# Patient Record
Sex: MALE | Race: BLACK OR AFRICAN AMERICAN | NOT HISPANIC OR LATINO | Employment: UNEMPLOYED | URBAN - METROPOLITAN AREA
[De-identification: names, ages, dates, MRNs, and addresses within clinical notes are randomized per-mention and may not be internally consistent; named-entity substitution may affect disease eponyms.]

---

## 2019-12-04 ENCOUNTER — LAB VISIT (OUTPATIENT)
Dept: LAB | Facility: HOSPITAL | Age: 64
End: 2019-12-04
Attending: STUDENT IN AN ORGANIZED HEALTH CARE EDUCATION/TRAINING PROGRAM
Payer: MEDICARE

## 2019-12-04 ENCOUNTER — CLINICAL SUPPORT (OUTPATIENT)
Dept: INTERNAL MEDICINE | Facility: CLINIC | Age: 64
End: 2019-12-04
Payer: MEDICARE

## 2019-12-04 ENCOUNTER — OFFICE VISIT (OUTPATIENT)
Dept: INTERNAL MEDICINE | Facility: CLINIC | Age: 64
End: 2019-12-04
Payer: MEDICARE

## 2019-12-04 VITALS
DIASTOLIC BLOOD PRESSURE: 70 MMHG | SYSTOLIC BLOOD PRESSURE: 110 MMHG | HEART RATE: 81 BPM | WEIGHT: 200.63 LBS | HEIGHT: 68 IN | BODY MASS INDEX: 30.41 KG/M2 | OXYGEN SATURATION: 99 %

## 2019-12-04 DIAGNOSIS — I10 ESSENTIAL HYPERTENSION: ICD-10-CM

## 2019-12-04 DIAGNOSIS — Z00.00 HEALTHCARE MAINTENANCE: ICD-10-CM

## 2019-12-04 DIAGNOSIS — E11.9 TYPE 2 DIABETES MELLITUS WITHOUT COMPLICATION, WITHOUT LONG-TERM CURRENT USE OF INSULIN: ICD-10-CM

## 2019-12-04 DIAGNOSIS — I10 ESSENTIAL HYPERTENSION: Primary | ICD-10-CM

## 2019-12-04 DIAGNOSIS — Z11.59 ENCOUNTER FOR HEPATITIS C SCREENING TEST FOR LOW RISK PATIENT: ICD-10-CM

## 2019-12-04 LAB
ALBUMIN SERPL BCP-MCNC: 3.9 G/DL (ref 3.5–5.2)
ALP SERPL-CCNC: 78 U/L (ref 55–135)
ALT SERPL W/O P-5'-P-CCNC: 31 U/L (ref 10–44)
ANION GAP SERPL CALC-SCNC: 10 MMOL/L (ref 8–16)
AST SERPL-CCNC: 23 U/L (ref 10–40)
BASOPHILS # BLD AUTO: 0.01 K/UL (ref 0–0.2)
BASOPHILS NFR BLD: 0.2 % (ref 0–1.9)
BILIRUB SERPL-MCNC: 1.1 MG/DL (ref 0.1–1)
BUN SERPL-MCNC: 12 MG/DL (ref 8–23)
CALCIUM SERPL-MCNC: 9.8 MG/DL (ref 8.7–10.5)
CHLORIDE SERPL-SCNC: 106 MMOL/L (ref 95–110)
CHOLEST SERPL-MCNC: 200 MG/DL (ref 120–199)
CHOLEST/HDLC SERPL: 3.8 {RATIO} (ref 2–5)
CO2 SERPL-SCNC: 26 MMOL/L (ref 23–29)
CREAT SERPL-MCNC: 1.2 MG/DL (ref 0.5–1.4)
DIFFERENTIAL METHOD: ABNORMAL
EOSINOPHIL # BLD AUTO: 0 K/UL (ref 0–0.5)
EOSINOPHIL NFR BLD: 0.3 % (ref 0–8)
ERYTHROCYTE [DISTWIDTH] IN BLOOD BY AUTOMATED COUNT: 14 % (ref 11.5–14.5)
EST. GFR  (AFRICAN AMERICAN): >60 ML/MIN/1.73 M^2
EST. GFR  (NON AFRICAN AMERICAN): >60 ML/MIN/1.73 M^2
GLUCOSE SERPL-MCNC: 145 MG/DL (ref 70–110)
HCT VFR BLD AUTO: 38.6 % (ref 40–54)
HDLC SERPL-MCNC: 53 MG/DL (ref 40–75)
HDLC SERPL: 26.5 % (ref 20–50)
HGB BLD-MCNC: 12.9 G/DL (ref 14–18)
LDLC SERPL CALC-MCNC: 119.4 MG/DL (ref 63–159)
LYMPHOCYTES # BLD AUTO: 1.6 K/UL (ref 1–4.8)
LYMPHOCYTES NFR BLD: 25.6 % (ref 18–48)
MCH RBC QN AUTO: 30.8 PG (ref 27–31)
MCHC RBC AUTO-ENTMCNC: 33.4 G/DL (ref 32–36)
MCV RBC AUTO: 92 FL (ref 82–98)
MONOCYTES # BLD AUTO: 0.4 K/UL (ref 0.3–1)
MONOCYTES NFR BLD: 7.1 % (ref 4–15)
NEUTROPHILS # BLD AUTO: 4.2 K/UL (ref 1.8–7.7)
NEUTROPHILS NFR BLD: 66.8 % (ref 38–73)
NONHDLC SERPL-MCNC: 147 MG/DL
PLATELET # BLD AUTO: 250 K/UL (ref 150–350)
PMV BLD AUTO: 10.4 FL (ref 9.2–12.9)
POTASSIUM SERPL-SCNC: 4.3 MMOL/L (ref 3.5–5.1)
PROT SERPL-MCNC: 7.6 G/DL (ref 6–8.4)
RBC # BLD AUTO: 4.19 M/UL (ref 4.6–6.2)
SODIUM SERPL-SCNC: 142 MMOL/L (ref 136–145)
TRIGL SERPL-MCNC: 138 MG/DL (ref 30–150)
WBC # BLD AUTO: 6.22 K/UL (ref 3.9–12.7)

## 2019-12-04 PROCEDURE — 87522 HEPATITIS C REVRS TRNSCRPJ: CPT

## 2019-12-04 PROCEDURE — 99999 PR PBB SHADOW E&M-EST. PATIENT-LVL I: CPT | Mod: PBBFAC,,,

## 2019-12-04 PROCEDURE — 36415 COLL VENOUS BLD VENIPUNCTURE: CPT

## 2019-12-04 PROCEDURE — 99999 PR PBB SHADOW E&M-NEW PATIENT-LVL III: ICD-10-PCS | Mod: PBBFAC,GC,, | Performed by: STUDENT IN AN ORGANIZED HEALTH CARE EDUCATION/TRAINING PROGRAM

## 2019-12-04 PROCEDURE — G0009 ADMIN PNEUMOCOCCAL VACCINE: HCPCS | Mod: PBBFAC

## 2019-12-04 PROCEDURE — 99999 PR PBB SHADOW E&M-EST. PATIENT-LVL I: ICD-10-PCS | Mod: PBBFAC,,,

## 2019-12-04 PROCEDURE — 83036 HEMOGLOBIN GLYCOSYLATED A1C: CPT

## 2019-12-04 PROCEDURE — 80061 LIPID PANEL: CPT

## 2019-12-04 PROCEDURE — 99999 PR PBB SHADOW E&M-NEW PATIENT-LVL III: CPT | Mod: PBBFAC,GC,, | Performed by: STUDENT IN AN ORGANIZED HEALTH CARE EDUCATION/TRAINING PROGRAM

## 2019-12-04 PROCEDURE — 99213 PR OFFICE/OUTPT VISIT, EST, LEVL III, 20-29 MIN: ICD-10-PCS | Mod: S$PBB,GC,, | Performed by: STUDENT IN AN ORGANIZED HEALTH CARE EDUCATION/TRAINING PROGRAM

## 2019-12-04 PROCEDURE — 80053 COMPREHEN METABOLIC PANEL: CPT

## 2019-12-04 PROCEDURE — 99211 OFF/OP EST MAY X REQ PHY/QHP: CPT | Mod: PBBFAC,27,25

## 2019-12-04 PROCEDURE — 90662 IIV NO PRSV INCREASED AG IM: CPT | Mod: PBBFAC

## 2019-12-04 PROCEDURE — 99203 OFFICE O/P NEW LOW 30 MIN: CPT | Mod: PBBFAC,25 | Performed by: STUDENT IN AN ORGANIZED HEALTH CARE EDUCATION/TRAINING PROGRAM

## 2019-12-04 PROCEDURE — 85025 COMPLETE CBC W/AUTO DIFF WBC: CPT

## 2019-12-04 PROCEDURE — 99213 OFFICE O/P EST LOW 20 MIN: CPT | Mod: S$PBB,GC,, | Performed by: STUDENT IN AN ORGANIZED HEALTH CARE EDUCATION/TRAINING PROGRAM

## 2019-12-04 RX ORDER — LISINOPRIL 2.5 MG/1
2.5 TABLET ORAL DAILY
Qty: 90 TABLET | Refills: 3 | Status: SHIPPED | OUTPATIENT
Start: 2019-12-04 | End: 2020-09-11

## 2019-12-04 NOTE — PATIENT INSTRUCTIONS
- labs today I will call you   - I will give you two scripts for vaccines please check with pharmacy what is covered, take the flu and tetanus together then you need your pneumonia first shot , and then another one in a year     - all medication is in your pharmacy

## 2019-12-04 NOTE — PROGRESS NOTES
Subjective     Chief Complaint: establish care     History of Present Illness:  Mr. Karthik Bentley is a 64 y.o. male with PMH of diabetes on Janumet, and HTN on lisinopril here for annual physical. Patient was in [place] for the last 5 years working as an  and just came back a couple months ago. He has not complaints and has been doing well. Per his health he has had labs for 6 months every time he was in [place], last labs was probably a year ago. No problems with hyper or hypoglycemia no problems with tingling sensation. He had a cold 3 weeks ago but has resolved , he has some medication left but needs refill     For health maintenance he needs a colonoscopy and update on his vaccines, and a hepatitis screen     Review of Systems   Constitutional: Negative for chills, diaphoresis, fever and weight loss.   HENT: Negative for congestion, hearing loss and sore throat.    Eyes: Negative for blurred vision, double vision and photophobia.   Respiratory: Negative for cough and wheezing.    Cardiovascular: Negative for chest pain, palpitations, orthopnea and PND.   Gastrointestinal: Negative for abdominal pain, constipation, diarrhea, nausea and vomiting.   Genitourinary: Negative for frequency, hematuria and urgency.   Musculoskeletal: Negative for back pain, joint pain, myalgias and neck pain.   Neurological: Negative for dizziness, sensory change, seizures, loss of consciousness, weakness and headaches.   Psychiatric/Behavioral: Negative for depression. The patient is not nervous/anxious.        PAST HISTORY:     Past Medical History:   Diagnosis Date    Diabetes mellitus type II     Hypertension        No past surgical history on file.    No family history on file.    Social History     Socioeconomic History    Marital status:      Spouse name: Not on file    Number of children: Not on file    Years of education: Not on file    Highest education level: Not on file   Occupational History    Not on  "file   Social Needs    Financial resource strain: Not on file    Food insecurity:     Worry: Not on file     Inability: Not on file    Transportation needs:     Medical: Not on file     Non-medical: Not on file   Tobacco Use    Smoking status: Never Smoker   Substance and Sexual Activity    Alcohol use: Not on file    Drug use: Not on file    Sexual activity: Not on file   Lifestyle    Physical activity:     Days per week: Not on file     Minutes per session: Not on file    Stress: Not on file   Relationships    Social connections:     Talks on phone: Not on file     Gets together: Not on file     Attends Latter-day service: Not on file     Active member of club or organization: Not on file     Attends meetings of clubs or organizations: Not on file     Relationship status: Not on file   Other Topics Concern    Not on file   Social History Narrative    Not on file       MEDICATIONS & ALLERGIES:     Current Outpatient Medications on File Prior to Visit   Medication Sig    doxycycline (VIBRA-TABS) 100 MG tablet Take 1 tablet (100 mg total) by mouth once daily.    lisinopril (PRINIVIL,ZESTRIL) 2.5 MG tablet TAKE 1 TABLET BY MOUTH EVERY DAY    metformin (GLUCOPHAGE-XR) 500 MG 24 hr tablet 2 tabs twice a day with meals     No current facility-administered medications on file prior to visit.        Review of patient's allergies indicates:  No Known Allergies    OBJECTIVE:     Vital Signs:  Vitals:    12/04/19 1427   BP: 110/70   BP Location: Right arm   Patient Position: Sitting   BP Method: Large (Manual)   Pulse: 81   SpO2: 99%   Weight: 91 kg (200 lb 9.9 oz)   Height: 5' 8" (1.727 m)       Body mass index is 30.5 kg/m².     Physical Exam:  General:  Well developed, well nourished, no acute distress  Head: Normocephalic, atraumatic  Eyes: PERRL, EOMI, clear sclera  Throat: No posterior pharyngeal erythema or exudate, no tonsillar exudate  Neck: supple, normal ROM, no thyromegaly   CVS:  RRR, S1 and S2 " normal, no murmurs, rubs, gallops, 2+ peripheral pulses  Resp:  Lungs clear to auscultation, no wheezes, rales, rhonchi, cough  GI:  Abdomen soft, non-tender, non-distended, normoactive bowel sounds  MSK:  No muscle atrophy, cyanosis, peripheral edema   Skin:  No rashes, ulcers, erythema  Neuro:  CNII-XII grossly intact, no focal deficits noted  Psych:  Appropriate mood and affect, normal judgement    Laboratory  Lab Results   Component Value Date    WBC 5.58 07/10/2008    HGB 14.5 07/10/2008    HCT 43.7 07/10/2008    MCV 92.4 07/10/2008     07/10/2008     @[id number](GLU,NA,K,Cl,CO2,BUN,Creatinine,Calcium,MG)@  No results found for: INR, PROTIME  Lab Results   Component Value Date    HGBA1C 7.9 (H) 09/03/2011     No results for input(s): POCTGLUCOSE in the last 72 hours.      Health Maintenance Due   Topic Date Due    Hepatitis C Screening  1955    TETANUS VACCINE  06/26/1972    Colonoscopy  06/26/2004    Lipid Panel  09/03/2016    Pneumococcal Vaccine (65+ Low/Medium Risk) (1 of 2 - PCV13) 06/26/2019       ASSESSMENT & PLAN:   Mr. Karthik Bentley is a 64 y.o. male here for annual physical and to establish care     Essential hypertension  Normotensive will continue home medication   -     Hemoglobin A1c; Future; Expected date: 12/04/2019  -     CBC auto differential; Future; Expected date: 12/04/2019  -     Comprehensive metabolic panel; Future; Expected date: 12/04/2019  -     Lipid panel; Future; Expected date: 12/04/2019  -     lisinopril (PRINIVIL,ZESTRIL) 2.5 MG tablet; Take 1 tablet (2.5 mg total) by mouth once daily.  Dispense: 90 tablet; Refill: 3    Type 2 diabetes mellitus without complication, without long-term current use of insulin  Continue home medication will get A1C and make changes as needed   -     Hemoglobin A1c; Future; Expected date: 12/04/2019  -     CBC auto differential; Future; Expected date: 12/04/2019  -     Comprehensive metabolic panel; Future; Expected date:  12/04/2019  -     Lipid panel; Future; Expected date: 12/04/2019  -     SITagliptan-metformin (JANUMET) 50-1,000 mg per tablet; Take 1 tablet by mouth 2 (two) times daily with meals.  Dispense: 180 tablet; Refill: 3    Encounter for hepatitis C screening test for low risk patient  -     HEPATITIS C RNA, QUANTITATIVE, PCR; Future; Expected date: 12/04/2019    Annual physical exam  = Last was when he was 49 with no polyps per patient   -     Case request GI: COLONOSCOPY        RTC in 6 months, gave him scripts for flu, Prevnar 13, and tetanus     Discussed with Dr. Rodriguez   - staff attestation to follow        Diana Pfeiffer MD, MPH  Internal Medicine PGY3  [facility]  [address] [place] [id number] [phone]

## 2019-12-05 LAB
ESTIMATED AVG GLUCOSE: 197 MG/DL (ref 68–131)
HBA1C MFR BLD HPLC: 8.5 % (ref 4–5.6)

## 2019-12-07 ENCOUNTER — PATIENT MESSAGE (OUTPATIENT)
Dept: INTERNAL MEDICINE | Facility: CLINIC | Age: 64
End: 2019-12-07

## 2019-12-09 ENCOUNTER — TELEPHONE (OUTPATIENT)
Dept: ENDOSCOPY | Facility: HOSPITAL | Age: 64
End: 2019-12-09

## 2019-12-09 DIAGNOSIS — Z12.11 SPECIAL SCREENING FOR MALIGNANT NEOPLASMS, COLON: Primary | ICD-10-CM

## 2019-12-09 RX ORDER — POLYETHYLENE GLYCOL 3350, SODIUM SULFATE ANHYDROUS, SODIUM BICARBONATE, SODIUM CHLORIDE, POTASSIUM CHLORIDE 236; 22.74; 6.74; 5.86; 2.97 G/4L; G/4L; G/4L; G/4L; G/4L
4 POWDER, FOR SOLUTION ORAL ONCE
Qty: 4000 ML | Refills: 0 | Status: SHIPPED | OUTPATIENT
Start: 2019-12-09 | End: 2019-12-09

## 2019-12-10 ENCOUNTER — TELEPHONE (OUTPATIENT)
Dept: INTERNAL MEDICINE | Facility: CLINIC | Age: 64
End: 2019-12-10

## 2019-12-10 DIAGNOSIS — E11.65 TYPE 2 DIABETES MELLITUS WITH HYPERGLYCEMIA, WITHOUT LONG-TERM CURRENT USE OF INSULIN: Primary | ICD-10-CM

## 2019-12-10 NOTE — TELEPHONE ENCOUNTER
Called patient back as he had questions about his last visit. He noted that he got embarrassed about being on valtrex and wanted to clarify to me that he does take it. Other than that we discussed his A1C and I am going to refer him to endocrine to establish care. He would like to try diet and exercise before adding another agent. He is to get a repeat A1C in 3 months     Diana Pfeiffer MD

## 2019-12-11 LAB
HCV RNA SERPL NAA+PROBE-LOG IU: <1.08 LOG (10) IU/ML
HCV RNA SERPL QL NAA+PROBE: NOT DETECTED IU/ML
HCV RNA SPEC NAA+PROBE-ACNC: <12 IU/ML

## 2019-12-11 NOTE — PROGRESS NOTES
I have reviewed the notes, assessments, and/or procedures performed by Dr. Pfeiffer, I concur with her documentation of Karthik Bentley.

## 2019-12-31 ENCOUNTER — IMMUNIZATION (OUTPATIENT)
Dept: PHARMACY | Facility: CLINIC | Age: 64
End: 2019-12-31
Payer: MEDICARE

## 2020-01-15 ENCOUNTER — ANESTHESIA EVENT (OUTPATIENT)
Dept: ENDOSCOPY | Facility: HOSPITAL | Age: 65
End: 2020-01-15
Payer: MEDICARE

## 2020-01-16 ENCOUNTER — HOSPITAL ENCOUNTER (OUTPATIENT)
Facility: HOSPITAL | Age: 65
Discharge: HOME | End: 2020-01-16
Attending: COLON & RECTAL SURGERY | Admitting: COLON & RECTAL SURGERY
Payer: MEDICARE

## 2020-01-16 ENCOUNTER — ANESTHESIA (OUTPATIENT)
Dept: ENDOSCOPY | Facility: HOSPITAL | Age: 65
End: 2020-01-16
Payer: MEDICARE

## 2020-01-16 VITALS
SYSTOLIC BLOOD PRESSURE: 157 MMHG | WEIGHT: 191 LBS | OXYGEN SATURATION: 97 % | TEMPERATURE: 97 F | RESPIRATION RATE: 14 BRPM | BODY MASS INDEX: 28.95 KG/M2 | HEART RATE: 75 BPM | HEIGHT: 68 IN | DIASTOLIC BLOOD PRESSURE: 79 MMHG

## 2020-01-16 DIAGNOSIS — Z12.11 SCREEN FOR COLON CANCER: Primary | ICD-10-CM

## 2020-01-16 LAB
GLUCOSE SERPL-MCNC: 119 MG/DL (ref 70–110)
POCT GLUCOSE: 119 MG/DL (ref 70–110)

## 2020-01-16 PROCEDURE — 88305 TISSUE EXAM BY PATHOLOGIST: CPT | Performed by: PATHOLOGY

## 2020-01-16 PROCEDURE — E9220 PRA ENDO ANESTHESIA: HCPCS | Mod: PT,,, | Performed by: NURSE ANESTHETIST, CERTIFIED REGISTERED

## 2020-01-16 PROCEDURE — 45380 COLONOSCOPY AND BIOPSY: CPT | Performed by: COLON & RECTAL SURGERY

## 2020-01-16 PROCEDURE — 27201089 HC SNARE, DISP (ANY): Performed by: COLON & RECTAL SURGERY

## 2020-01-16 PROCEDURE — 45385 PR COLONOSCOPY,REMV LESN,SNARE: ICD-10-PCS | Mod: PT,,, | Performed by: COLON & RECTAL SURGERY

## 2020-01-16 PROCEDURE — 45380 COLONOSCOPY AND BIOPSY: CPT | Mod: 59,,, | Performed by: COLON & RECTAL SURGERY

## 2020-01-16 PROCEDURE — 63600175 PHARM REV CODE 636 W HCPCS: Performed by: NURSE ANESTHETIST, CERTIFIED REGISTERED

## 2020-01-16 PROCEDURE — 37000009 HC ANESTHESIA EA ADD 15 MINS: Performed by: COLON & RECTAL SURGERY

## 2020-01-16 PROCEDURE — E9220 PRA ENDO ANESTHESIA: ICD-10-PCS | Mod: PT,,, | Performed by: NURSE ANESTHETIST, CERTIFIED REGISTERED

## 2020-01-16 PROCEDURE — 37000008 HC ANESTHESIA 1ST 15 MINUTES: Performed by: COLON & RECTAL SURGERY

## 2020-01-16 PROCEDURE — 63600175 PHARM REV CODE 636 W HCPCS: Performed by: COLON & RECTAL SURGERY

## 2020-01-16 PROCEDURE — 88305 TISSUE EXAM BY PATHOLOGIST: ICD-10-PCS | Mod: 26,,, | Performed by: PATHOLOGY

## 2020-01-16 PROCEDURE — 45380 PR COLONOSCOPY,BIOPSY: ICD-10-PCS | Mod: 59,,, | Performed by: COLON & RECTAL SURGERY

## 2020-01-16 PROCEDURE — 27201012 HC FORCEPS, HOT/COLD, DISP: Performed by: COLON & RECTAL SURGERY

## 2020-01-16 PROCEDURE — 88305 TISSUE EXAM BY PATHOLOGIST: CPT | Mod: 26,,, | Performed by: PATHOLOGY

## 2020-01-16 PROCEDURE — 45385 COLONOSCOPY W/LESION REMOVAL: CPT | Performed by: COLON & RECTAL SURGERY

## 2020-01-16 PROCEDURE — 45385 COLONOSCOPY W/LESION REMOVAL: CPT | Mod: PT,,, | Performed by: COLON & RECTAL SURGERY

## 2020-01-16 RX ORDER — PROPOFOL 10 MG/ML
VIAL (ML) INTRAVENOUS CONTINUOUS PRN
Status: DISCONTINUED | OUTPATIENT
Start: 2020-01-16 | End: 2020-01-16

## 2020-01-16 RX ORDER — PROPOFOL 10 MG/ML
VIAL (ML) INTRAVENOUS
Status: DISCONTINUED | OUTPATIENT
Start: 2020-01-16 | End: 2020-01-16

## 2020-01-16 RX ORDER — SODIUM CHLORIDE 9 MG/ML
INJECTION, SOLUTION INTRAVENOUS CONTINUOUS
Status: DISCONTINUED | OUTPATIENT
Start: 2020-01-16 | End: 2020-01-16 | Stop reason: HOSPADM

## 2020-01-16 RX ORDER — LIDOCAINE HCL/PF 100 MG/5ML
SYRINGE (ML) INTRAVENOUS
Status: DISCONTINUED | OUTPATIENT
Start: 2020-01-16 | End: 2020-01-16

## 2020-01-16 RX ADMIN — LIDOCAINE HYDROCHLORIDE 100 MG: 20 INJECTION, SOLUTION INTRAVENOUS at 09:01

## 2020-01-16 RX ADMIN — PROPOFOL 80 MG: 10 INJECTION, EMULSION INTRAVENOUS at 09:01

## 2020-01-16 RX ADMIN — SODIUM CHLORIDE: 0.9 INJECTION, SOLUTION INTRAVENOUS at 09:01

## 2020-01-16 RX ADMIN — PROPOFOL 150 MCG/KG/MIN: 10 INJECTION, EMULSION INTRAVENOUS at 09:01

## 2020-01-16 NOTE — ANESTHESIA POSTPROCEDURE EVALUATION
Anesthesia Post Evaluation    Patient: Karthik Bentley    Procedure(s) Performed: Procedure(s) (LRB):  COLONOSCOPY (N/A)    Final Anesthesia Type: general    Patient location during evaluation: GI PACU  Patient participation: Yes- Able to Participate  Level of consciousness: awake and alert, awake and oriented  Post-procedure vital signs: reviewed and stable  Pain management: adequate  Airway patency: patent    PONV status at discharge: No PONV  Anesthetic complications: no      Cardiovascular status: stable  Respiratory status: unassisted, spontaneous ventilation and room air  Hydration status: euvolemic  Follow-up not needed.          Vitals Value Taken Time   /79 1/16/2020 10:52 AM   Temp 36.3 °C (97.3 °F) 1/16/2020 10:22 AM   Pulse 75 1/16/2020 10:52 AM   Resp 14 1/16/2020 10:52 AM   SpO2 97 % 1/16/2020 10:52 AM         Event Time     Out of Recovery 11:04:29          Pain/Pablo Score: Pablo Score: 10 (1/16/2020 10:52 AM)

## 2020-01-16 NOTE — H&P
"COLONOSCOPY HISTORY AND PHYSICAL EXAM    Procedure : Colonoscopy      INDICATIONS: asymptomatic screening exam and family history of colon cancer (father)    Family Hx of CRC: father diagnosed in his 70s    Last Colonoscopy:  ~15 years ago  Findings: "normal"       Past Medical History:   Diagnosis Date    Diabetes mellitus type II     Hypertension      Sedation Problems: NO  Family History   Problem Relation Age of Onset    Bone cancer Mother     Colon cancer Father      Fam Hx of Sedation Problems: NO  Social History     Socioeconomic History    Marital status:      Spouse name: Not on file    Number of children: Not on file    Years of education: Not on file    Highest education level: Not on file   Occupational History    Not on file   Social Needs    Financial resource strain: Not on file    Food insecurity:     Worry: Not on file     Inability: Not on file    Transportation needs:     Medical: Not on file     Non-medical: Not on file   Tobacco Use    Smoking status: Never Smoker   Substance and Sexual Activity    Alcohol use: Yes     Comment: occasianally    Drug use: Never    Sexual activity: Not on file   Lifestyle    Physical activity:     Days per week: Not on file     Minutes per session: Not on file    Stress: Not on file   Relationships    Social connections:     Talks on phone: Not on file     Gets together: Not on file     Attends Anabaptism service: Not on file     Active member of club or organization: Not on file     Attends meetings of clubs or organizations: Not on file     Relationship status: Not on file   Other Topics Concern    Not on file   Social History Narrative    Not on file       Review of Systems - Negative except   Respiratory ROS: no dyspnea  Cardiovascular ROS: no exertional chest pain  Gastrointestinal ROS: NO abdominal discomfort,  NO rectal bleeding  Musculoskeletal ROS: no muscular pain  Neurological ROS: no recent stroke    Physical Exam:  BP (!) " "164/82 (BP Location: Left arm, Patient Position: Lying)   Pulse 80   Temp 97 °F (36.1 °C) (Temporal)   Resp 16   Ht 5' 8" (1.727 m)   Wt 86.6 kg (191 lb)   SpO2 99%   BMI 29.04 kg/m²   General: no distress  Head: normocephalic  Mallampati Score   Neck: supple, symmetrical, trachea midline  Lungs:  clear to auscultation bilaterally and normal respiratory effort  Heart: regular rate and rhythm and no murmur  Abdomen: soft, non-tender non-distented; bowel sounds normal; no masses,  no organomegaly  Extremities: no cyanosis or edema, or clubbing    ASA:  III    PLAN  COLONOSCOPY.    SedationPlan :MAC    The details of the procedure, the possible need for biopsy or polypectomy and the potential risks including bleeding, perforation, missed polyps were discussed in detail.    "

## 2020-01-16 NOTE — ANESTHESIA PREPROCEDURE EVALUATION
01/16/2020  Karthik Bentley is a 64 y.o., male.    Patient Active Problem List   Diagnosis    Essential hypertension    Type 2 diabetes mellitus, without long-term current use of insulin     History reviewed. No pertinent surgical history.  Past Medical History:   Diagnosis Date    Diabetes mellitus type II     Hypertension          Anesthesia Evaluation    I have reviewed the Patient Summary Reports.     I have reviewed the Medications.     Review of Systems      Physical Exam  General:  Well nourished    Airway/Jaw/Neck:  Airway Findings: Mouth Opening: Normal Tongue: Normal  General Airway Assessment: Adult  Mallampati: II  Improves to I with phonation.  TM Distance: Normal, at least 6 cm  Jaw/Neck Findings:  Neck ROM: Normal ROM  Neck Findings:     Eyes/Ears/Nose:  Eyes/Ears/Nose Findings:    Dental:  Dental Findings: In tact   Chest/Lungs:  Chest/Lungs Findings: Clear to auscultation, Normal Respiratory Rate     Heart/Vascular:  Heart Findings: Rate: Normal  Rhythm: Regular Rhythm  Vascular Findings:     Abdomen:  Abdomen Findings:  Normal       Mental Status:  Mental Status Findings:  Cooperative, Alert and Oriented         Anesthesia Plan  Type of Anesthesia, risks & benefits discussed:  Anesthesia Type:  general  Patient's Preference:   Intra-op Monitoring Plan: standard ASA monitors  Intra-op Monitoring Plan Comments:   Post Op Pain Control Plan:   Post Op Pain Control Plan Comments:   Induction:   IV  Beta Blocker:  Patient is not currently on a Beta-Blocker (No further documentation required).       Informed Consent: Patient understands risks and agrees with Anesthesia plan.  Questions answered. Anesthesia consent signed with patient.  ASA Score: 2     Day of Surgery Review of History & Physical: I have interviewed and examined the patient. I have reviewed the patient's H&P dated:            Ready  For Surgery From Anesthesia Perspective.

## 2020-01-16 NOTE — TRANSFER OF CARE
"Anesthesia Transfer of Care Note    Patient: Karthik Bentley    Procedure(s) Performed: Procedure(s) (LRB):  COLONOSCOPY (N/A)    Patient location: GI    Anesthesia Type: general    Transport from OR: Transported from OR on 6-10 L/min O2 by face mask with adequate spontaneous ventilation    Post pain: adequate analgesia    Post assessment: no apparent anesthetic complications and tolerated procedure well    Post vital signs: stable    Level of consciousness: awake and alert    Nausea/Vomiting: no nausea/vomiting    Complications: none    Transfer of care protocol was followed      Last vitals:   Visit Vitals  BP (!) 111/59 (BP Location: Left arm, Patient Position: Lying)   Pulse 81   Temp 36.3 °C (97.3 °F) (Temporal)   Resp 15   Ht 5' 8" (1.727 m)   Wt 86.6 kg (191 lb)   SpO2 99%   BMI 29.04 kg/m²     "

## 2020-01-16 NOTE — [employer]ATION PATIENT INSTRUCTIONS
Discharge Summary/Instructions after an Endoscopic Procedure  Patient Name: Karthik Bentley  Patient MRN: [id number]  Patient YOB: 1955 Thursday, January 16, 2020  Cynthia Kearney MD  RESTRICTIONS:  During your procedure today, you received medications for sedation.  These   medications may affect your judgment, balance and coordination.  Therefore,   for 24 hours, you have the following restrictions:   - DO NOT drive a car, operate machinery, make legal/financial decisions,   sign important papers or drink alcohol.    ACTIVITY:  Today: no heavy lifting, straining or running due to procedural   sedation/anesthesia.  The following day: return to full activity including work.  DIET:  Eat and drink normally unless instructed otherwise.     TREATMENT FOR COMMON SIDE EFFECTS:  - Mild abdominal pain, nausea, belching, bloating or excessive gas:  rest,   eat lightly and use a heating pad.  - Sore Throat: treat with throat lozenges and/or gargle with warm salt   water.  - Because air was used during the procedure, expelling large amounts of air   from your rectum or belching is normal.  - If a bowel prep was taken, you may not have a bowel movement for 1-3 days.    This is normal.  SYMPTOMS TO WATCH FOR AND REPORT TO YOUR PHYSICIAN:  1. Abdominal pain or bloating, other than gas cramps.  2. Chest pain.  3. Back pain.  4. Signs of infection such as: chills or fever occurring within 24 hours   after the procedure.  5. Rectal bleeding, which would show as bright red, maroon, or black stools.   (A tablespoon of blood from the rectum is not serious, especially if   hemorrhoids are present.)  6. Vomiting.  7. Weakness or dizziness.  GO DIRECTLY TO THE NEAREST EMERGENCY ROOM IF YOU HAVE ANY OF THE FOLLOWING:      Difficulty breathing              Chills and/or fever over 101 F   Persistent vomiting and/or vomiting blood   Severe abdominal pain   Severe chest pain   Black, tarry stools   Bleeding- more than one  tablespoon   Any other symptom or condition that you feel may need urgent attention  Your doctor recommends these additional instructions:  If any biopsies were taken, your doctors clinic will contact you in 1 to 2   weeks with any results.  - Discharge patient to home.   - Resume previous diet.   - Continue present medications.   - Await pathology results.   - Repeat colonoscopy date to be determined after pending pathology results   are reviewed for surveillance.   - Return to referring physician.   - Written discharge instructions were provided to the patient.   - The signs and symptoms of potential delayed complications were discussed   with the patient.   - Patient has a contact number available for emergencies.   - Return to normal activities tomorrow.  For questions, problems or results please call your physician - Cynthia Kearney MD at Work:  [phone].  [facility], EMERGENCY ROOM PHONE NUMBER: [phone]  IF A COMPLICATION OR EMERGENCY SITUATION ARISES AND YOU ARE UNABLE TO REACH   YOUR PHYSICIAN - GO DIRECTLY TO THE EMERGENCY ROOM.  Cynthia Kearney MD  1/16/2020 10:20:11 AM  This report has been verified and signed electronically.  [employer]

## 2020-01-16 NOTE — DISCHARGE INSTRUCTIONS
Colonoscopy     A camera attached to a flexible tube with a viewing lens is used to take video pictures.     Colonoscopy is a test to view the inside of your lower digestive tract (colon and rectum). Sometimes it can show the last part of the small intestine (ileum). During the test, small pieces of tissue may be removed for testing. This is called a biopsy. Small growths, such as polyps, may also be removed.   Why is colonoscopy done?  The test is done to help look for colon cancer. And it can help find the source of abdominal pain, bleeding, and changes in bowel habits. It may be needed once a year, depending on factors such as your:  · Age  · Health history  · Family health history  · Symptoms  · Results from any prior colonoscopy  Risks and possible complications  These include:  · Bleeding               · A puncture or tear in the colon   · Risks of anesthesia  · A cancer lesion not being seen  Getting ready   To prepare for the test:  · Talk with your healthcare provider about the risks of the test (see below). Also ask your healthcare provider about alternatives to the test.  · Tell your healthcare provider about any medicines you take. Also tell him or her about any health conditions you may have.  · Make sure your rectum and colon are empty for the test. Follow the diet and bowel prep instructions exactly. If you dont, the test may need to be rescheduled.  · Plan for a friend or family member to drive you home after the test.     Colonoscopy provides an inside view of the entire colon.     You may discuss the results with your doctor right away or at a future visit.  During the test   The test is usually done in the hospital on an outpatient basis. This means you go home the same day. The procedure takes about 30 minutes. During that time:  · You are given relaxing (sedating) medicine through an IV line. You may be drowsy, or fully asleep.  · The healthcare provider will first give you a physical exam to  check for anal and rectal problems.  · Then the anus is lubricated and the scope inserted.  · If you are awake, you may have a feeling similar to needing to have a bowel movement. You may also feel pressure as air is pumped into the colon. Its OK to pass gas during the procedure.  · Biopsy, polyp removal, or other treatments may be done during the test.  After the test   You may have gas right after the test. It can help to try to pass it to help prevent later bloating. Your healthcare provider may discuss the results with you right away. Or you may need to schedule a follow-up visit to talk about the results. After the test, you can go back to your normal eating and other activities. You may be tired from the sedation and need to rest for a few hours.  Date Last Reviewed: 11/1/2016 © 2000-2017 [employer]. [address] [place] [address], [place] [address]. All rights reserved. This information is not intended as a substitute for professional medical care. Always follow your healthcare professional's instructions.

## 2020-01-21 LAB
FINAL PATHOLOGIC DIAGNOSIS: NORMAL
GROSS: NORMAL

## 2020-01-27 ENCOUNTER — OFFICE VISIT (OUTPATIENT)
Dept: OPHTHALMOLOGY | Facility: CLINIC | Age: 65
End: 2020-01-27
Payer: MEDICARE

## 2020-01-27 ENCOUNTER — PATIENT OUTREACH (OUTPATIENT)
Dept: ADMINISTRATIVE | Facility: OTHER | Age: 65
End: 2020-01-27

## 2020-01-27 ENCOUNTER — OFFICE VISIT (OUTPATIENT)
Dept: PODIATRY | Facility: CLINIC | Age: 65
End: 2020-01-27
Payer: MEDICARE

## 2020-01-27 ENCOUNTER — HOSPITAL ENCOUNTER (OUTPATIENT)
Dept: RADIOLOGY | Facility: HOSPITAL | Age: 65
Discharge: HOME | End: 2020-01-27
Attending: PODIATRIST
Payer: MEDICARE

## 2020-01-27 VITALS
DIASTOLIC BLOOD PRESSURE: 85 MMHG | BODY MASS INDEX: 28.95 KG/M2 | WEIGHT: 191 LBS | SYSTOLIC BLOOD PRESSURE: 136 MMHG | HEART RATE: 68 BPM | HEIGHT: 68 IN

## 2020-01-27 DIAGNOSIS — M20.12 VALGUS DEFORMITY OF BOTH GREAT TOES: ICD-10-CM

## 2020-01-27 DIAGNOSIS — Z13.5 ENCOUNTER FOR SCREENING FOR DIABETIC RETINOPATHY: Primary | ICD-10-CM

## 2020-01-27 DIAGNOSIS — M20.11 VALGUS DEFORMITY OF BOTH GREAT TOES: ICD-10-CM

## 2020-01-27 DIAGNOSIS — B35.3 TINEA PEDIS OF BOTH FEET: ICD-10-CM

## 2020-01-27 DIAGNOSIS — H40.013 OPEN ANGLE WITH BORDERLINE FINDINGS AND LOW GLAUCOMA RISK IN BOTH EYES: ICD-10-CM

## 2020-01-27 DIAGNOSIS — E11.9 DIABETES MELLITUS TYPE 2 WITHOUT RETINOPATHY: Primary | ICD-10-CM

## 2020-01-27 DIAGNOSIS — E11.9 TYPE 2 DIABETES MELLITUS WITHOUT COMPLICATION, WITHOUT LONG-TERM CURRENT USE OF INSULIN: Primary | ICD-10-CM

## 2020-01-27 PROCEDURE — 99203 OFFICE O/P NEW LOW 30 MIN: CPT | Mod: S$PBB,,, | Performed by: PODIATRIST

## 2020-01-27 PROCEDURE — 99999 PR PBB SHADOW E&M-EST. PATIENT-LVL II: ICD-10-PCS | Mod: PBBFAC,,, | Performed by: OPHTHALMOLOGY

## 2020-01-27 PROCEDURE — 99999 PR PBB SHADOW E&M-EST. PATIENT-LVL II: CPT | Mod: PBBFAC,,, | Performed by: OPHTHALMOLOGY

## 2020-01-27 PROCEDURE — 73630 XR FOOT COMPLETE 3 VIEW BILATERAL: ICD-10-PCS | Mod: 26,50,, | Performed by: RADIOLOGY

## 2020-01-27 PROCEDURE — 92004 PR EYE EXAM, NEW PATIENT,COMPREHESV: ICD-10-PCS | Mod: S$PBB,,, | Performed by: OPHTHALMOLOGY

## 2020-01-27 PROCEDURE — 73630 X-RAY EXAM OF FOOT: CPT | Mod: TC,50,PN

## 2020-01-27 PROCEDURE — 92004 COMPRE OPH EXAM NEW PT 1/>: CPT | Mod: S$PBB,,, | Performed by: OPHTHALMOLOGY

## 2020-01-27 PROCEDURE — 99212 OFFICE O/P EST SF 10 MIN: CPT | Mod: PBBFAC,25,PO | Performed by: OPHTHALMOLOGY

## 2020-01-27 PROCEDURE — 99999 PR PBB SHADOW E&M-EST. PATIENT-LVL IV: ICD-10-PCS | Mod: PBBFAC,,, | Performed by: PODIATRIST

## 2020-01-27 PROCEDURE — 99203 PR OFFICE/OUTPT VISIT, NEW, LEVL III, 30-44 MIN: ICD-10-PCS | Mod: S$PBB,,, | Performed by: PODIATRIST

## 2020-01-27 PROCEDURE — 92133 HEIDELBERG RETINA TOMOGRAPHY (HRT) - OU - BOTH EYES: ICD-10-PCS | Mod: 26,S$PBB,, | Performed by: OPHTHALMOLOGY

## 2020-01-27 PROCEDURE — 73630 X-RAY EXAM OF FOOT: CPT | Mod: 26,50,, | Performed by: RADIOLOGY

## 2020-01-27 PROCEDURE — 99999 PR PBB SHADOW E&M-EST. PATIENT-LVL IV: CPT | Mod: PBBFAC,,, | Performed by: PODIATRIST

## 2020-01-27 PROCEDURE — 92133 CPTRZD OPH DX IMG PST SGM ON: CPT | Mod: PBBFAC,PO | Performed by: OPHTHALMOLOGY

## 2020-01-27 PROCEDURE — 99214 OFFICE O/P EST MOD 30 MIN: CPT | Mod: PBBFAC,25,27,PN | Performed by: PODIATRIST

## 2020-01-27 RX ORDER — GENTIAN VIOLET 2% 2 G/100ML
0.5 SOLUTION TOPICAL DAILY PRN
Qty: 59 ML | Refills: 1 | Status: SHIPPED | OUTPATIENT
Start: 2020-01-27 | End: 2022-10-10 | Stop reason: CLARIF

## 2020-01-27 NOTE — PROGRESS NOTES
"HPI     Here for Diabetic Eye Exam  Dx: 2001 BG doing ok. Denies eye pain no   previous eye injury or surgery. Does not use any eye gtts currently.   Positive family h/o States he believes his sister is getting shots in his   eyes unsure.     Lab Results       Component                Value               Date                       HGBA1C                   8.5 (H)             12/04/2019                 HGBA1C                   7.9 (H)             09/03/2011                 HGBA1C                   7.6 (H)             03/09/2011            No results found for: LABA1C    Agree with above. Retired, now back living in [place]. No known family   history of glaucoma.    Last edited by Rimma Cook MD on 1/27/2020  1:59 PM.   (History)        ROS     Positive for: Endocrine (DM diagnosed 2001), Cardiovascular (HTN -   controlled with meds per pt)    Negative for: Genitourinary (denies nephropathy, last Cr 1.2), Eyes   (denies surgery/trauma; poss lid lac OS years ago), Respiratory (denies   asthma/SOB)    Other comments for: Neurological (only numbness is when sleeping, denies   otherwise. )    Last edited by Rimma Cook MD on 1/27/2020  1:59 PM.   (History)        Assessment /Plan     For exam results, see Encounter Report.    Diabetes mellitus type 2 without retinopathy    Open angle with borderline findings and low glaucoma risk in both eyes  -     [facility] (HRT) - OU - Both Eyes; Standing        Annual DFE    Possible slight progression on HRT. Last test 2011, under "[facility]".   IOP 20 OU.  Follow up in about 6 months (around 7/27/2020) for IOP check, 24-2 HVF, OCT optic nerve, Gonio, Pachymetry.                   "

## 2020-01-27 NOTE — PROGRESS NOTES
Chart reviewed. Care Everywhere updates requested. Immunizations reconciled.  updated.  Placed order for diabetic eye photo.

## 2020-01-27 NOTE — PROGRESS NOTES
"Subjective:      Patient ID: Karthik Bentley is a 64 y.o. male.    Chief Complaint: Diabetes Mellitus (Dr. Henderson 12/4/19) and Diabetic Foot Exam (foster bunion )    Karthik is a 64 y.o. male who presents to the clinic for evaluation and treatment of diabetic feet. Karthik has a past medical history of Diabetes mellitus type II and Hypertension.  Patient complains patient complains of bunions bilaterally but denies any pain on his bunions when he wears the right shoes.  Also complains of macerations between his toes that itch particularly at night.  Also complains of dry scaly skin to the bottom of his feet with callus formation.  Also complains of numbness and tingling.  Denies trauma or falls.    PCP: Esau Henderson MD    Date Last Seen by PCP: 12/4/19    Current shoe gear: Casual shoes    Hemoglobin A1C   Date Value Ref Range Status   12/04/2019 8.5 (H) 4.0 - 5.6 % Final     Comment:     ADA Screening Guidelines:  5.7-6.4%  Consistent with prediabetes  >or=6.5%  Consistent with diabetes  High levels of fetal hemoglobin interfere with the HbA1C  assay. Heterozygous hemoglobin variants (HbS, HgC, etc)do  not significantly interfere with this assay.   However, presence of multiple variants may affect accuracy.     09/03/2011 7.9 (H) 4.0 - 6.2 % Final   03/09/2011 7.6 (H) 4.0 - 6.2 % Final     Vitals:    01/27/20 0817   BP: 136/85   Pulse: 68   Weight: 86.6 kg (191 lb)   Height: 5' 8" (1.727 m)   PainSc: 0-No pain      Past Medical History:   Diagnosis Date    Diabetes mellitus type II     Hypertension        Past Surgical History:   Procedure Laterality Date    COLONOSCOPY N/A 1/16/2020    Procedure: COLONOSCOPY;  Surgeon: Cynthia Kearney MD;  Location: [address]);  Service: Endoscopy;  Laterality: N/A;    SHOULDER SURGERY         Family History   Problem Relation Age of Onset    Bone cancer Mother     Colon cancer Father        Social History     Socioeconomic History    Marital status:      Spouse name: " Not on file    Number of children: Not on file    Years of education: Not on file    Highest education level: Not on file   Occupational History    Not on file   Social Needs    Financial resource strain: Not on file    Food insecurity:     Worry: Not on file     Inability: Not on file    Transportation needs:     Medical: Not on file     Non-medical: Not on file   Tobacco Use    Smoking status: Never Smoker   Substance and Sexual Activity    Alcohol use: Yes     Comment: occasianally    Drug use: Never    Sexual activity: Not on file   Lifestyle    Physical activity:     Days per week: Not on file     Minutes per session: Not on file    Stress: Not on file   Relationships    Social connections:     Talks on phone: Not on file     Gets together: Not on file     Attends [employer] service: Not on file     Active member of club or organization: Not on file     Attends meetings of clubs or organizations: Not on file     Relationship status: Not on file   Other Topics Concern    Not on file   Social History Narrative    Not on file       Current Outpatient Medications   Medication Sig Dispense Refill    lisinopril (PRINIVIL,ZESTRIL) 2.5 MG tablet Take 1 tablet (2.5 mg total) by mouth once daily. 90 tablet 3    SITagliptan-metformin (JANUMET) 50-1,000 mg per tablet Take 1 tablet by mouth 2 (two) times daily with meals. 180 tablet 3    gentian violet 2 % topical solution Apply 0.5 mLs topically daily as needed. Apply between toes. 59 mL 1     No current facility-administered medications for this visit.        Review of patient's allergies indicates:  No Known Allergies        Review of Systems   Constitution: Negative for chills and fever.   HENT: Negative for congestion and hearing loss.    Respiratory: Negative for cough, shortness of breath and wheezing.    Skin: Positive for dry skin, itching and nail changes. Negative for color change and suspicious lesions.   Musculoskeletal: Negative for  arthritis, joint pain, joint swelling, muscle weakness and myalgias.   Gastrointestinal: Negative for nausea and vomiting.   Neurological: Positive for numbness. Negative for loss of balance, paresthesias, sensory change and tremors.   Psychiatric/Behavioral: Negative for altered mental status and depression. The patient is not nervous/anxious.            Objective:      Physical Exam   Constitutional: He is oriented to person, place, and time. He appears well-developed and well-nourished. No distress.   HENT:   Head: Normocephalic and atraumatic.   Cardiovascular:   Pulses:       Dorsalis pedis pulses are 2+ on the right side, and 2+ on the left side.        Posterior tibial pulses are 2+ on the right side, and 2+ on the left side.   CFT< 3 secs all toes bilateral foot, skin temp warm bilateral foot, diminished digital hair growth bilateral foot, no lower extremity edema bilateral.     Musculoskeletal: Normal range of motion. He exhibits no edema, tenderness or deformity.   Non painful medial 1st MTPJ exostosis bilateral. Lateral deviation of hallux bilateral that is trackbound. No pain w/ ROM to 1st or 2nd MTPJs. No First ray hypermobility or sub second MT head callus. No lesser toe deformities or pain.     Ankle dorsiflexion decreased at <10 degrees bilateral with moderate increase with knee flexion bilateral.         Feet:   Right Foot:   Protective Sensation: 10 sites tested. 10 sites sensed.   Left Foot:   Protective Sensation: 10 sites tested. 10 sites sensed.   Neurological: He is alert and oriented to person, place, and time. He has normal strength. He displays no atrophy and no tremor.   Reflex Scores:       Patellar reflexes are 2+ on the right side and 2+ on the left side.       Achilles reflexes are 2+ on the right side and 2+ on the left side.  Vibratory sensation intact  5/5 muscle strength b/l LE   Skin: Skin is warm, dry and intact. Capillary refill takes less than 2 seconds. No rash noted. He is  not diaphoretic. No cyanosis or erythema. No pallor. Nails show no clubbing.   Interdigital macerations with spaces 3 and 4 bilateral    Received dry scaly skin in a moccasin distribution bilateral feet.  With hyperkeratotic lesions to heels and to balls of feet.   Psychiatric: He has a normal mood and affect. His behavior is normal.             Assessment:       Encounter Diagnoses   Name Primary?    Type 2 diabetes mellitus without complication, without long-term current use of insulin Yes    Valgus deformity of both great toes     Tinea pedis of both feet          Plan:       Karthik was seen today for diabetes mellitus and diabetic foot exam.    Diagnoses and all orders for this visit:    Type 2 diabetes mellitus without complication, without long-term current use of insulin    Valgus deformity of both great toes  -     X-Ray Foot Complete 3 view Bilateral; Future    Tinea pedis of both feet    Other orders  -     gentian violet 2 % topical solution; Apply 0.5 mLs topically daily as needed. Apply between toes.      I counseled the patient on his conditions, their implications and medical management.    Patient evaluated on Diabetic foot risk factors.  Patient counseled to do daily foot checks.  Counseled to wear accommodative shoe gear.  Educated on importance of glycemic control.    Rx gentian violet for interdigital macerations    X-ray b/l feet as baseline for bunions.    Assisted by Roberto Carlos Crawford, PGY3    I have personally taken the history and examined this patient and agree with the resident's note as stated as above.   Deshawn Manuel DPM, FACFAS    Prescribed medicated ft soaks be utilized daily.  Advised patient to dry very well between his toes.

## 2020-02-05 ENCOUNTER — PATIENT MESSAGE (OUTPATIENT)
Dept: PODIATRY | Facility: CLINIC | Age: 65
End: 2020-02-05

## 2020-02-24 DIAGNOSIS — E11.9 TYPE 2 DIABETES MELLITUS WITHOUT COMPLICATION, WITHOUT LONG-TERM CURRENT USE OF INSULIN: Primary | ICD-10-CM

## 2020-03-26 ENCOUNTER — TELEPHONE (OUTPATIENT)
Dept: ENDOCRINOLOGY | Facility: CLINIC | Age: 65
End: 2020-03-26

## 2020-03-30 ENCOUNTER — PATIENT MESSAGE (OUTPATIENT)
Dept: ENDOCRINOLOGY | Facility: CLINIC | Age: 65
End: 2020-03-30

## 2020-03-30 ENCOUNTER — OFFICE VISIT (OUTPATIENT)
Dept: ENDOCRINOLOGY | Facility: CLINIC | Age: 65
End: 2020-03-30
Payer: MEDICARE

## 2020-03-30 DIAGNOSIS — E11.9 TYPE 2 DIABETES MELLITUS WITHOUT COMPLICATION, WITHOUT LONG-TERM CURRENT USE OF INSULIN: Primary | ICD-10-CM

## 2020-03-30 DIAGNOSIS — E11.65 TYPE 2 DIABETES MELLITUS WITH HYPERGLYCEMIA, WITHOUT LONG-TERM CURRENT USE OF INSULIN: ICD-10-CM

## 2020-03-30 DIAGNOSIS — I10 ESSENTIAL HYPERTENSION: ICD-10-CM

## 2020-03-30 DIAGNOSIS — E66.3 OVERWEIGHT: ICD-10-CM

## 2020-03-30 PROCEDURE — 99204 OFFICE O/P NEW MOD 45 MIN: CPT | Mod: 95,,, | Performed by: INTERNAL MEDICINE

## 2020-03-30 PROCEDURE — 99204 PR OFFICE/OUTPT VISIT, NEW, LEVL IV, 45-59 MIN: ICD-10-PCS | Mod: 95,,, | Performed by: INTERNAL MEDICINE

## 2020-03-30 RX ORDER — ROSUVASTATIN CALCIUM 10 MG/1
10 TABLET, COATED ORAL DAILY
Qty: 90 TABLET | Refills: 3 | Status: SHIPPED | OUTPATIENT
Start: 2020-03-30 | End: 2021-04-29

## 2020-03-30 NOTE — ASSESSMENT & PLAN NOTE
Last A1c above goal although he has made significant change to diet and exercise since then with 10 lb weight loss  Feel he would benefit from addition of GL P 1 but will get labs and make decision pending results as he would like some more time to work on diet and exercise  Continue Janumet  mg b.i.d.  Will check labs in about two months due to current COVID outbreak    Discussed rationale for use of Ace inhibitor.  No recent microalbumin so will check with next labs and pending this as well as blood pressure consider need for ongoing use    Will start rosuvastatin 10 mg daily

## 2020-03-30 NOTE — ASSESSMENT & PLAN NOTE
A few isolated elevations although he feels these were related to stress  Will check blood pressure at next visit and consider need for lisinopril which he is currently taking at very low dose

## 2020-03-30 NOTE — PROGRESS NOTES
Karthik Bentley is a 64 y.o. male with HTN referred by Dr. Diana Pfeiffer for evaluation of T2DM    The patient location is: home  The chief complaint leading to consultation is:   Chief Complaint   Patient presents with    Diabetes     Visit type: Virtual visit with synchronous audio and video  Total time spent with patient: 30 min  Each patient to whom he or she provides medical services by telemedicine is:  (1) informed of the relationship between the physician and patient and the respective role of any other health care provider with respect to management of the patient; and (2) notified that he or she may decline to receive medical services by telemedicine and may withdraw from such care at any time.    History of Present Illness  T2DM  Diagnosed in 1993.  reports overall good control since that time  Known complications: none    Lived in [facility] for 4 years and less careful with diet.  Seen by his PCP in 12/2019 with rising A1c 8.5%. he wanted to focus on making changes to diet and increasing activity before adding a new medication.  Since that visit he has been eating healthier and has been exercising regularly with about 10 lb weight loss    Current Diabetes Regimen:  Janumet  mg BID    Prior mediations tried:  none    Recent Hgb A1C:  Lab Results   Component Value Date    HGBA1C 8.5 (H) 12/04/2019       Glucose Monitoring:  Not checking    Hypoglycemic Episodes:none     Screening / DM Complications:    Nephropathy:  ACEi/ARB:  He has been on low-dose lisinopril for years.  Questions if he needs this  Lab Results   Component Value Date    MICALBCREAT 2.5 09/29/2009       Last Lipid Panel:  Statin: Not taking  Lab Results   Component Value Date    LDLCALC 119.4 12/04/2019       Neuropathy:denies; seen by podiatry 1/2020   Last foot exam : 01/27/2020  Last eye exam : 01/27/2020;  no laser surgery or DR    B12:  No results found for: [id number]    Diet/Exercise:  Overall healthy diet.  Has been trying  to make changes lately        Current Outpatient Medications:     gentian violet 2 % topical solution, Apply 0.5 mLs topically daily as needed. Apply between toes., Disp: 59 mL, Rfl: 1    lisinopril (PRINIVIL,ZESTRIL) 2.5 MG tablet, Take 1 tablet (2.5 mg total) by mouth once daily., Disp: 90 tablet, Rfl: 3    rosuvastatin (CRESTOR) 10 MG tablet, Take 1 tablet (10 mg total) by mouth once daily., Disp: 90 tablet, Rfl: 3    SITagliptan-metformin (JANUMET) 50-1,000 mg per tablet, Take 1 tablet by mouth 2 (two) times daily with meals., Disp: 180 tablet, Rfl: 3    Review of Systems   Constitutional: Negative for fever.        Intentional weight loss   Eyes: Negative for pain.   Respiratory: Negative for shortness of breath.    Cardiovascular: Negative for chest pain and leg swelling.   Gastrointestinal: Negative for abdominal pain.   Genitourinary: Negative for dysuria.   Musculoskeletal: Negative for arthralgias.   Skin:        Foot fungus which has been improving with cream   Neurological: Negative for headaches.   Psychiatric/Behavioral: Negative for confusion.       Objective:     Wt Readings from Last 3 Encounters:   01/27/20 86.6 kg (191 lb)   01/16/20 86.6 kg (191 lb)   12/04/19 91 kg (200 lb 9.9 oz)         Labs    Chemistry        Component Value Date/Time     12/04/2019 1543    K 4.3 12/04/2019 1543     12/04/2019 1543    CO2 26 12/04/2019 1543    BUN 12 12/04/2019 1543    CREATININE 1.2 12/04/2019 1543     (H) 12/04/2019 1543        Component Value Date/Time    CALCIUM 9.8 12/04/2019 1543    ALKPHOS 78 12/04/2019 1543    AST 23 12/04/2019 1543    ALT 31 12/04/2019 1543    BILITOT 1.1 (H) 12/04/2019 1543    ESTGFRAFRICA >60.0 12/04/2019 1543    EGFRNONAA >60.0 12/04/2019 1543              Assessment and Plan     Type 2 diabetes mellitus, without long-term current use of insulin  Last A1c above goal although he has made significant change to diet and exercise since then with 10 lb weight  loss  Feel he would benefit from addition of GL P 1 but will get labs and make decision pending results as he would like some more time to work on diet and exercise  Continue Janumet  mg b.i.d.  Will check labs in about two months due to current COVID outbreak    Discussed rationale for use of Ace inhibitor.  No recent microalbumin so will check with next labs and pending this as well as blood pressure consider need for ongoing use    Will start rosuvastatin 10 mg daily    Overweight  See above  Likely add GLP1 in the future    Essential hypertension  A few isolated elevations although he feels these were related to stress  Will check blood pressure at next visit and consider need for lisinopril which he is currently taking at very low dose        RTC 4 months with labs before visit    I spent 35 minutes face-to-face with the patient, over half of the visit was spent on counseling and/or coordinating the care of the patient.        Michelle Ferreira MD

## 2020-03-30 NOTE — LETTER
March 30, 2020      Diana Pfeiffer MD  [address]  [facility] [address]           [facility] [place]  [address]  [facility] [address]  Phone: [phone]  Fax: [phone]          Patient: Karthik Bentley   MR Number: [id number]   YOB: 1955   Date of Visit: 3/30/2020       Dear Dr. Diana Pfeiffer:    Thank you for referring Karthik Bentley to me for evaluation. Attached you will find relevant portions of my assessment and plan of care.    If you have questions, please do not hesitate to call me. I look forward to following Karthik Bentley along with you.    Sincerely,    Michelle Ferreira MD    Enclosure  CC:  No Recipients    If you would like to receive this communication electronically, please contact [email or web address] or [phone] to request more information on [email or web address] Link access.    For providers and/or their staff who would like to refer a patient to [facility], please contact us through our one-stop-shop provider referral line, [facility], at [phone].    If you feel you have received this communication in error or would no longer like to receive these types of communications, please e-mail [email or web address]

## 2020-07-27 ENCOUNTER — PATIENT OUTREACH (OUTPATIENT)
Dept: ADMINISTRATIVE | Facility: OTHER | Age: 65
End: 2020-07-27

## 2020-07-27 NOTE — PROGRESS NOTES
Chart was reviewed for overdue Proactive [facility] Encounters (FRANCHESCA)  topics  Updates were requested from care everywhere

## 2020-08-03 ENCOUNTER — OFFICE VISIT (OUTPATIENT)
Dept: OPTOMETRY | Facility: CLINIC | Age: 65
End: 2020-08-03
Payer: MEDICARE

## 2020-08-03 ENCOUNTER — CLINICAL SUPPORT (OUTPATIENT)
Dept: OPHTHALMOLOGY | Facility: CLINIC | Age: 65
End: 2020-08-03
Payer: MEDICARE

## 2020-08-03 DIAGNOSIS — H40.013 OPEN ANGLE WITH BORDERLINE FINDINGS OF BOTH EYES: Primary | ICD-10-CM

## 2020-08-03 PROCEDURE — 99211 OFF/OP EST MAY X REQ PHY/QHP: CPT | Mod: PBBFAC,27,PO

## 2020-08-03 PROCEDURE — 76514 ECHO EXAM OF EYE THICKNESS: CPT | Mod: PBBFAC,PO | Performed by: OPTOMETRIST

## 2020-08-03 PROCEDURE — 92083 HUMPHREY VISUAL FIELD - OU - BOTH EYES: ICD-10-PCS | Mod: 26,S$PBB,, | Performed by: OPTOMETRIST

## 2020-08-03 PROCEDURE — 92083 EXTENDED VISUAL FIELD XM: CPT | Mod: PBBFAC,PO | Performed by: OPTOMETRIST

## 2020-08-03 PROCEDURE — 76514 ECHO EXAM OF EYE THICKNESS: CPT | Mod: 26,S$PBB,, | Performed by: OPTOMETRIST

## 2020-08-03 PROCEDURE — 76514 PR  US, EYE, FOR CORNEAL THICKNESS: ICD-10-PCS | Mod: 26,S$PBB,, | Performed by: OPTOMETRIST

## 2020-08-03 PROCEDURE — 92012 PR EYE EXAM, EST PATIENT,INTERMED: ICD-10-PCS | Mod: S$PBB,,, | Performed by: OPTOMETRIST

## 2020-08-03 PROCEDURE — 99999 PR PBB SHADOW E&M-EST. PATIENT-LVL I: CPT | Mod: PBBFAC,,,

## 2020-08-03 PROCEDURE — 92012 INTRM OPH EXAM EST PATIENT: CPT | Mod: S$PBB,,, | Performed by: OPTOMETRIST

## 2020-08-03 PROCEDURE — 99999 PR PBB SHADOW E&M-EST. PATIENT-LVL I: ICD-10-PCS | Mod: PBBFAC,,,

## 2020-08-03 PROCEDURE — 99999 PR PBB SHADOW E&M-EST. PATIENT-LVL II: ICD-10-PCS | Mod: PBBFAC,,, | Performed by: OPTOMETRIST

## 2020-08-03 PROCEDURE — 92133 POSTERIOR SEGMENT OCT OPTIC NERVE(OCULAR COHERENCE TOMOGRAPHY) - OU - BOTH EYES: ICD-10-PCS | Mod: 26,S$PBB,, | Performed by: OPTOMETRIST

## 2020-08-03 PROCEDURE — 99999 PR PBB SHADOW E&M-EST. PATIENT-LVL II: CPT | Mod: PBBFAC,,, | Performed by: OPTOMETRIST

## 2020-08-03 PROCEDURE — 99212 OFFICE O/P EST SF 10 MIN: CPT | Mod: PBBFAC,PO | Performed by: OPTOMETRIST

## 2020-08-03 PROCEDURE — 92133 CPTRZD OPH DX IMG PST SGM ON: CPT | Mod: PBBFAC,PO | Performed by: OPTOMETRIST

## 2020-08-03 NOTE — PROGRESS NOTES
HPI     DLS: 1/27/20  HVF, CCT, OCT N, IOP, GONIO       Pt states no new complaints. Denies pain/ FOL/ floaters. No gtts. States   DM ok.     Hemoglobin A1C       Date                     Value               Ref Range             Status                12/04/2019               8.5 (H)             4.0 - 5.6 %           Final                 09/03/2011               7.9 (H)             4.0 - 6.2 %           Final                 03/09/2011               7.6 (H)             4.0 - 6.2 %           Final            ----------      Last edited by Ysbael Nair on 8/3/2020  1:31 PM. (History)            Assessment /Plan     For exam results, see Encounter Report.    Open angle with borderline findings of both eyes  -     Posterior Segment OCT Optic Nerve- Both eyes  -     Bone Visual Field - OU - Extended - Both Eyes      Glaucoma suspect secondary to larger than average CD ratio OU. Discussed results with patient. No drops at this time, monitor yearly.   (-) fam hx  (+) DM  (+) HTN  (-) sleep apnea    IOP 08/03/20:  OD 17 mmHg, OS 17 mmHg    OCT 08/03/20:   OD no RNFL thinning     OS no RNFL thinning    HVF 08/03/20:  OD GHT within normal limits     OS GHT within normal limits    Pach 8/3/20            RTC in 6 months for comprehensive eye exam, or sooner prn.

## 2020-09-04 ENCOUNTER — LAB VISIT (OUTPATIENT)
Dept: LAB | Facility: HOSPITAL | Age: 65
End: 2020-09-04
Attending: INTERNAL MEDICINE
Payer: MEDICARE

## 2020-09-04 DIAGNOSIS — E11.65 TYPE 2 DIABETES MELLITUS WITH HYPERGLYCEMIA, WITHOUT LONG-TERM CURRENT USE OF INSULIN: ICD-10-CM

## 2020-09-04 LAB
ALBUMIN SERPL BCP-MCNC: 4.4 G/DL (ref 3.5–5.2)
ALP SERPL-CCNC: 84 U/L (ref 55–135)
ALT SERPL W/O P-5'-P-CCNC: 27 U/L (ref 10–44)
ANION GAP SERPL CALC-SCNC: 12 MMOL/L (ref 8–16)
AST SERPL-CCNC: 22 U/L (ref 10–40)
BILIRUB SERPL-MCNC: 0.8 MG/DL (ref 0.1–1)
BUN SERPL-MCNC: 16 MG/DL (ref 8–23)
CALCIUM SERPL-MCNC: 9.9 MG/DL (ref 8.7–10.5)
CHLORIDE SERPL-SCNC: 104 MMOL/L (ref 95–110)
CHOLEST SERPL-MCNC: 115 MG/DL (ref 120–199)
CHOLEST/HDLC SERPL: 2.3 {RATIO} (ref 2–5)
CO2 SERPL-SCNC: 22 MMOL/L (ref 23–29)
CREAT SERPL-MCNC: 1.4 MG/DL (ref 0.5–1.4)
EST. GFR  (AFRICAN AMERICAN): >60 ML/MIN/1.73 M^2
EST. GFR  (NON AFRICAN AMERICAN): 52 ML/MIN/1.73 M^2
ESTIMATED AVG GLUCOSE: 160 MG/DL (ref 68–131)
GLUCOSE SERPL-MCNC: 113 MG/DL (ref 70–110)
HBA1C MFR BLD HPLC: 7.2 % (ref 4–5.6)
HDLC SERPL-MCNC: 50 MG/DL (ref 40–75)
HDLC SERPL: 43.5 % (ref 20–50)
LDLC SERPL CALC-MCNC: 45.6 MG/DL (ref 63–159)
NONHDLC SERPL-MCNC: 65 MG/DL
POTASSIUM SERPL-SCNC: 4.4 MMOL/L (ref 3.5–5.1)
PROT SERPL-MCNC: 7.9 G/DL (ref 6–8.4)
SODIUM SERPL-SCNC: 138 MMOL/L (ref 136–145)
TRIGL SERPL-MCNC: 97 MG/DL (ref 30–150)

## 2020-09-04 PROCEDURE — 36415 COLL VENOUS BLD VENIPUNCTURE: CPT

## 2020-09-04 PROCEDURE — 80053 COMPREHEN METABOLIC PANEL: CPT

## 2020-09-04 PROCEDURE — 83036 HEMOGLOBIN GLYCOSYLATED A1C: CPT

## 2020-09-04 PROCEDURE — 80061 LIPID PANEL: CPT

## 2020-09-11 ENCOUNTER — OFFICE VISIT (OUTPATIENT)
Dept: ENDOCRINOLOGY | Facility: CLINIC | Age: 65
End: 2020-09-11
Payer: MEDICARE

## 2020-09-11 VITALS
BODY MASS INDEX: 29.9 KG/M2 | HEART RATE: 67 BPM | TEMPERATURE: 99 F | RESPIRATION RATE: 18 BRPM | WEIGHT: 197.31 LBS | HEIGHT: 68 IN | OXYGEN SATURATION: 99 % | DIASTOLIC BLOOD PRESSURE: 82 MMHG | SYSTOLIC BLOOD PRESSURE: 128 MMHG

## 2020-09-11 DIAGNOSIS — E11.9 TYPE 2 DIABETES MELLITUS WITHOUT COMPLICATION, WITHOUT LONG-TERM CURRENT USE OF INSULIN: Primary | ICD-10-CM

## 2020-09-11 DIAGNOSIS — E66.3 OVERWEIGHT: ICD-10-CM

## 2020-09-11 DIAGNOSIS — I10 ESSENTIAL HYPERTENSION: ICD-10-CM

## 2020-09-11 PROCEDURE — 99999 PR PBB SHADOW E&M-EST. PATIENT-LVL IV: CPT | Mod: PBBFAC,,, | Performed by: INTERNAL MEDICINE

## 2020-09-11 PROCEDURE — 99999 PR PBB SHADOW E&M-EST. PATIENT-LVL IV: ICD-10-PCS | Mod: PBBFAC,,, | Performed by: INTERNAL MEDICINE

## 2020-09-11 PROCEDURE — 99214 PR OFFICE/OUTPT VISIT, EST, LEVL IV, 30-39 MIN: ICD-10-PCS | Mod: S$PBB,,, | Performed by: INTERNAL MEDICINE

## 2020-09-11 PROCEDURE — 99214 OFFICE O/P EST MOD 30 MIN: CPT | Mod: PBBFAC | Performed by: INTERNAL MEDICINE

## 2020-09-11 PROCEDURE — 99214 OFFICE O/P EST MOD 30 MIN: CPT | Mod: S$PBB,,, | Performed by: INTERNAL MEDICINE

## 2020-09-11 NOTE — PROGRESS NOTES
Karthik Bentley is a 65 y.o. male with HTN presenting for follow-up of T2DM    History of Present Illness  T2DM  Diagnosed around 3246-4947. reports overall good control since that time  Known complications: none     Lived in [place] for 4 years and less careful with diet.  Seen by his PCP in 12/2019 with rising A1c 8.5%. he wanted to focus on making changes to diet and increasing activity before adding a new medication.  Since that visit he has been eating healthier and has been exercising regularly with about 10 lb weight loss    He has continued to work on diet and with this had significant improvement in A1c. Not exercising regularly but paying attention to diet    Current Diabetes Regimen:  Janumet  mg BID    Omitted doses: denies    Prior mediations tried:  none    Diet/Exercise:  Whole foods for all groceries  No red meat, lots of fish, fruits and vegetables  Limiting fried foods    Not as much exercising as he intended    Recent Hgb A1C:  Lab Results   Component Value Date    HGBA1C 7.2 (H) 09/04/2020       Glucose Monitoring:not checking    Hypoglycemic Episodes:denies    Screening / DM Complications:    Nephropathy:  ACEi/ARB: Taking- would like to try stopping ACEi   Lab Results   Component Value Date    MICALBCREAT 2.4 09/04/2020       Last Lipid Panel:  Statin: Taking  Lab Results   Component Value Date    LDLCALC 45.6 (L) 09/04/2020     Last foot exam : 01/27/2020  Last eye exam : 08/03/2020;  no laser surgery or DR    B12:  No results found for: [id number]              Current Outpatient Medications:     rosuvastatin (CRESTOR) 10 MG tablet, Take 1 tablet (10 mg total) by mouth once daily., Disp: 90 tablet, Rfl: 3    SITagliptan-metformin (JANUMET) 50-1,000 mg per tablet, Take 1 tablet by mouth 2 (two) times daily with meals., Disp: 180 tablet, Rfl: 3    gentian violet 2 % topical solution, Apply 0.5 mLs topically daily as needed. Apply between toes. (Patient not taking: Reported on  9/11/2020), Disp: 59 mL, Rfl: 1    Review of Systems   Constitutional: Negative for activity change and unexpected weight change.   Respiratory: Negative for shortness of breath.    Cardiovascular: Negative for chest pain and leg swelling.   Gastrointestinal: Negative for abdominal pain.   Genitourinary: Negative for dysuria.   Skin: Negative for rash.   Neurological: Negative for headaches.   Psychiatric/Behavioral: Negative for confusion.       Objective:     Vitals:    09/11/20 1345   BP: 128/82   Pulse: 67   Resp: 18   Temp: 98.9 °F (37.2 °C)     Wt Readings from Last 3 Encounters:   09/11/20 89.5 kg (197 lb 5 oz)   01/27/20 86.6 kg (191 lb)   01/16/20 86.6 kg (191 lb)     Body mass index is 30 kg/m².  Physical Exam  Constitutional:       Appearance: He is well-developed.   HENT:      Head: Normocephalic.   Eyes:      Conjunctiva/sclera: Conjunctivae normal.   Neck:      Comments: No thyromegaly  Pulmonary:      Effort: Pulmonary effort is normal.   Musculoskeletal: Normal range of motion.   Skin:     General: Skin is warm.      Findings: No rash.   Neurological:      Mental Status: He is alert and oriented to person, place, and time.         LABS    Chemistry        Component Value Date/Time     09/04/2020 0909    K 4.4 09/04/2020 0909     09/04/2020 0909    CO2 22 (L) 09/04/2020 0909    BUN 16 09/04/2020 0909    CREATININE 1.4 09/04/2020 0909     (H) 09/04/2020 0909        Component Value Date/Time    CALCIUM 9.9 09/04/2020 0909    ALKPHOS 84 09/04/2020 0909    AST 22 09/04/2020 0909    ALT 27 09/04/2020 0909    BILITOT 0.8 09/04/2020 0909    ESTGFRAFRICA >60.0 09/04/2020 0909    EGFRNONAA 52.0 (A) 09/04/2020 0909              Assessment and Plan     Type 2 diabetes mellitus, without long-term current use of insulin  Significant improvement in A1c although remains mildly above goal  Discussed again transition from DPP4 to GLP1 as well as benefit of CV protection with Trulicity but he would  prefer to hold off on this  Will continue Janumet  mg BID at this time with increase in exercise and continued attention to diet  A1c 3 months. If still above goal plan to change to Trulicity    Cont statin  Will stop ACEi as on very low dose and no MAC    Overweight  As above    Essential hypertension  BP at goal  Will do trial off lisinopril as above        RTC 6 months    Michelle Ferreira MD

## 2020-09-11 NOTE — ASSESSMENT & PLAN NOTE
Significant improvement in A1c although remains mildly above goal  Discussed again transition from DPP4 to GLP1 as well as benefit of CV protection with Trulicity but he would prefer to hold off on this  Will continue Janumet  mg BID at this time with increase in exercise and continued attention to diet  A1c 3 months. If still above goal plan to change to Trulicity    Cont statin  Will stop ACEi as on very low dose and no MAC

## 2020-10-01 ENCOUNTER — PATIENT MESSAGE (OUTPATIENT)
Dept: OTHER | Facility: OTHER | Age: 65
End: 2020-10-01

## 2020-12-11 ENCOUNTER — PATIENT MESSAGE (OUTPATIENT)
Dept: OTHER | Facility: OTHER | Age: 65
End: 2020-12-11

## 2020-12-11 ENCOUNTER — LAB VISIT (OUTPATIENT)
Dept: LAB | Facility: HOSPITAL | Age: 65
End: 2020-12-11
Attending: INTERNAL MEDICINE
Payer: MEDICARE

## 2020-12-11 DIAGNOSIS — E11.9 TYPE 2 DIABETES MELLITUS WITHOUT COMPLICATION, WITHOUT LONG-TERM CURRENT USE OF INSULIN: ICD-10-CM

## 2020-12-11 LAB
ESTIMATED AVG GLUCOSE: 166 MG/DL (ref 68–131)
HBA1C MFR BLD HPLC: 7.4 % (ref 4–5.6)

## 2020-12-11 PROCEDURE — 36415 COLL VENOUS BLD VENIPUNCTURE: CPT

## 2020-12-11 PROCEDURE — 83036 HEMOGLOBIN GLYCOSYLATED A1C: CPT

## 2020-12-14 ENCOUNTER — PATIENT MESSAGE (OUTPATIENT)
Dept: ENDOCRINOLOGY | Facility: CLINIC | Age: 65
End: 2020-12-14

## 2020-12-15 ENCOUNTER — PATIENT MESSAGE (OUTPATIENT)
Dept: ENDOCRINOLOGY | Facility: CLINIC | Age: 65
End: 2020-12-15

## 2020-12-15 RX ORDER — METFORMIN HYDROCHLORIDE 1000 MG/1
1000 TABLET ORAL 2 TIMES DAILY WITH MEALS
Qty: 180 TABLET | Refills: 3 | Status: SHIPPED | OUTPATIENT
Start: 2020-12-15 | End: 2022-01-18 | Stop reason: SDUPTHER

## 2020-12-15 RX ORDER — DULAGLUTIDE 0.75 MG/.5ML
0.75 INJECTION, SOLUTION SUBCUTANEOUS
Qty: 4 PEN | Refills: 5 | Status: SHIPPED | OUTPATIENT
Start: 2020-12-15 | End: 2021-06-29

## 2020-12-16 ENCOUNTER — PATIENT MESSAGE (OUTPATIENT)
Dept: ENDOCRINOLOGY | Facility: CLINIC | Age: 65
End: 2020-12-16

## 2021-01-13 ENCOUNTER — PATIENT MESSAGE (OUTPATIENT)
Dept: ENDOCRINOLOGY | Facility: CLINIC | Age: 66
End: 2021-01-13

## 2021-01-14 ENCOUNTER — PATIENT MESSAGE (OUTPATIENT)
Dept: ENDOCRINOLOGY | Facility: CLINIC | Age: 66
End: 2021-01-14

## 2021-01-14 ENCOUNTER — TELEPHONE (OUTPATIENT)
Dept: ENDOCRINOLOGY | Facility: CLINIC | Age: 66
End: 2021-01-14

## 2021-01-16 ENCOUNTER — OFFICE VISIT (OUTPATIENT)
Dept: INTERNAL MEDICINE | Facility: CLINIC | Age: 66
End: 2021-01-16
Payer: MEDICARE

## 2021-01-16 ENCOUNTER — LAB VISIT (OUTPATIENT)
Dept: LAB | Facility: HOSPITAL | Age: 66
End: 2021-01-16
Attending: FAMILY MEDICINE
Payer: MEDICARE

## 2021-01-16 VITALS
HEIGHT: 68 IN | OXYGEN SATURATION: 99 % | DIASTOLIC BLOOD PRESSURE: 78 MMHG | HEART RATE: 62 BPM | SYSTOLIC BLOOD PRESSURE: 122 MMHG | WEIGHT: 198.88 LBS | BODY MASS INDEX: 30.14 KG/M2

## 2021-01-16 DIAGNOSIS — I10 ESSENTIAL HYPERTENSION: ICD-10-CM

## 2021-01-16 DIAGNOSIS — E66.3 OVERWEIGHT: ICD-10-CM

## 2021-01-16 DIAGNOSIS — E11.9 TYPE 2 DIABETES MELLITUS WITHOUT COMPLICATION, WITHOUT LONG-TERM CURRENT USE OF INSULIN: ICD-10-CM

## 2021-01-16 DIAGNOSIS — Z76.89 ESTABLISHING CARE WITH NEW DOCTOR, ENCOUNTER FOR: Primary | ICD-10-CM

## 2021-01-16 DIAGNOSIS — B00.9 HSV INFECTION: ICD-10-CM

## 2021-01-16 LAB
ALBUMIN SERPL BCP-MCNC: 4.2 G/DL (ref 3.5–5.2)
ANION GAP SERPL CALC-SCNC: 12 MMOL/L (ref 8–16)
BASOPHILS # BLD AUTO: 0.03 K/UL (ref 0–0.2)
BASOPHILS NFR BLD: 0.4 % (ref 0–1.9)
BUN SERPL-MCNC: 12 MG/DL (ref 8–23)
CALCIUM SERPL-MCNC: 9.6 MG/DL (ref 8.7–10.5)
CHLORIDE SERPL-SCNC: 105 MMOL/L (ref 95–110)
CO2 SERPL-SCNC: 26 MMOL/L (ref 23–29)
CREAT SERPL-MCNC: 1.1 MG/DL (ref 0.5–1.4)
DIFFERENTIAL METHOD: ABNORMAL
EOSINOPHIL # BLD AUTO: 0.3 K/UL (ref 0–0.5)
EOSINOPHIL NFR BLD: 4 % (ref 0–8)
ERYTHROCYTE [DISTWIDTH] IN BLOOD BY AUTOMATED COUNT: 13.2 % (ref 11.5–14.5)
EST. GFR  (AFRICAN AMERICAN): >60 ML/MIN/1.73 M^2
EST. GFR  (NON AFRICAN AMERICAN): >60 ML/MIN/1.73 M^2
GLUCOSE SERPL-MCNC: 109 MG/DL (ref 70–110)
HCT VFR BLD AUTO: 40 % (ref 40–54)
HGB BLD-MCNC: 12.6 G/DL (ref 14–18)
IMM GRANULOCYTES # BLD AUTO: 0.01 K/UL (ref 0–0.04)
IMM GRANULOCYTES NFR BLD AUTO: 0.1 % (ref 0–0.5)
LYMPHOCYTES # BLD AUTO: 2.1 K/UL (ref 1–4.8)
LYMPHOCYTES NFR BLD: 24.2 % (ref 18–48)
MCH RBC QN AUTO: 30.4 PG (ref 27–31)
MCHC RBC AUTO-ENTMCNC: 31.5 G/DL (ref 32–36)
MCV RBC AUTO: 96 FL (ref 82–98)
MONOCYTES # BLD AUTO: 0.6 K/UL (ref 0.3–1)
MONOCYTES NFR BLD: 7.5 % (ref 4–15)
NEUTROPHILS # BLD AUTO: 5.4 K/UL (ref 1.8–7.7)
NEUTROPHILS NFR BLD: 63.8 % (ref 38–73)
NRBC BLD-RTO: 0 /100 WBC
PHOSPHATE SERPL-MCNC: 3.2 MG/DL (ref 2.7–4.5)
PLATELET # BLD AUTO: 189 K/UL (ref 150–350)
PMV BLD AUTO: 11.4 FL (ref 9.2–12.9)
POTASSIUM SERPL-SCNC: 4.3 MMOL/L (ref 3.5–5.1)
RBC # BLD AUTO: 4.15 M/UL (ref 4.6–6.2)
SODIUM SERPL-SCNC: 143 MMOL/L (ref 136–145)
TSH SERPL DL<=0.005 MIU/L-ACNC: 2.4 UIU/ML (ref 0.4–4)
WBC # BLD AUTO: 8.5 K/UL (ref 3.9–12.7)

## 2021-01-16 PROCEDURE — 84443 ASSAY THYROID STIM HORMONE: CPT

## 2021-01-16 PROCEDURE — 99999 PR PBB SHADOW E&M-EST. PATIENT-LVL III: CPT | Mod: PBBFAC,,, | Performed by: FAMILY MEDICINE

## 2021-01-16 PROCEDURE — 99999 PR PBB SHADOW E&M-EST. PATIENT-LVL III: ICD-10-PCS | Mod: PBBFAC,,, | Performed by: FAMILY MEDICINE

## 2021-01-16 PROCEDURE — 99387 PR PREVENTIVE VISIT,NEW,65 & OVER: ICD-10-PCS | Mod: S$GLB,,, | Performed by: FAMILY MEDICINE

## 2021-01-16 PROCEDURE — 36415 COLL VENOUS BLD VENIPUNCTURE: CPT

## 2021-01-16 PROCEDURE — 99499 RISK ADDL DX/OHS AUDIT: ICD-10-PCS | Mod: HCNC,S$GLB,, | Performed by: FAMILY MEDICINE

## 2021-01-16 PROCEDURE — 85025 COMPLETE CBC W/AUTO DIFF WBC: CPT

## 2021-01-16 PROCEDURE — 99213 OFFICE O/P EST LOW 20 MIN: CPT | Mod: PBBFAC | Performed by: FAMILY MEDICINE

## 2021-01-16 PROCEDURE — 99499 UNLISTED E&M SERVICE: CPT | Mod: HCNC,S$GLB,, | Performed by: FAMILY MEDICINE

## 2021-01-16 PROCEDURE — 80069 RENAL FUNCTION PANEL: CPT

## 2021-01-16 PROCEDURE — 99387 INIT PM E/M NEW PAT 65+ YRS: CPT | Mod: S$GLB,,, | Performed by: FAMILY MEDICINE

## 2021-01-16 RX ORDER — VALACYCLOVIR HYDROCHLORIDE 500 MG/1
500 TABLET, FILM COATED ORAL 2 TIMES DAILY
Qty: 6 TABLET | Refills: 0 | Status: SHIPPED | OUTPATIENT
Start: 2021-01-16 | End: 2021-10-12 | Stop reason: SDUPTHER

## 2021-01-23 ENCOUNTER — PATIENT MESSAGE (OUTPATIENT)
Dept: PHARMACY | Facility: CLINIC | Age: 66
End: 2021-01-23

## 2021-01-25 ENCOUNTER — IMMUNIZATION (OUTPATIENT)
Dept: PHARMACY | Facility: CLINIC | Age: 66
End: 2021-01-25
Payer: MEDICARE

## 2021-04-01 ENCOUNTER — IMMUNIZATION (OUTPATIENT)
Dept: PHARMACY | Facility: CLINIC | Age: 66
End: 2021-04-01
Payer: MEDICARE

## 2021-06-21 DIAGNOSIS — E11.9 TYPE 2 DIABETES MELLITUS WITHOUT COMPLICATION: ICD-10-CM

## 2021-07-01 ENCOUNTER — TELEPHONE (OUTPATIENT)
Dept: OPHTHALMOLOGY | Facility: CLINIC | Age: 66
End: 2021-07-01

## 2021-07-06 ENCOUNTER — PATIENT MESSAGE (OUTPATIENT)
Dept: ADMINISTRATIVE | Facility: HOSPITAL | Age: 66
End: 2021-07-06

## 2021-08-03 ENCOUNTER — PATIENT MESSAGE (OUTPATIENT)
Dept: ADMINISTRATIVE | Facility: HOSPITAL | Age: 66
End: 2021-08-03

## 2021-08-19 ENCOUNTER — LAB VISIT (OUTPATIENT)
Dept: LAB | Facility: HOSPITAL | Age: 66
End: 2021-08-19
Attending: FAMILY MEDICINE
Payer: MEDICARE

## 2021-08-19 ENCOUNTER — OFFICE VISIT (OUTPATIENT)
Dept: OPTOMETRY | Facility: CLINIC | Age: 66
End: 2021-08-19
Payer: MEDICARE

## 2021-08-19 DIAGNOSIS — H43.812 POSTERIOR VITREOUS DETACHMENT OF LEFT EYE: ICD-10-CM

## 2021-08-19 DIAGNOSIS — E11.9 DIABETES MELLITUS TYPE 2 WITHOUT RETINOPATHY: Primary | ICD-10-CM

## 2021-08-19 DIAGNOSIS — H40.013 OPEN ANGLE WITH BORDERLINE FINDINGS OF BOTH EYES: ICD-10-CM

## 2021-08-19 DIAGNOSIS — H52.7 REFRACTIVE ERROR: ICD-10-CM

## 2021-08-19 DIAGNOSIS — E11.36 DIABETIC CATARACT: ICD-10-CM

## 2021-08-19 DIAGNOSIS — H25.13 NUCLEAR SCLEROSIS, BILATERAL: ICD-10-CM

## 2021-08-19 DIAGNOSIS — E11.9 TYPE 2 DIABETES MELLITUS WITHOUT COMPLICATION: ICD-10-CM

## 2021-08-19 PROCEDURE — 1126F PR PAIN SEVERITY QUANTIFIED, NO PAIN PRESENT: ICD-10-PCS | Mod: CPTII,S$GLB,, | Performed by: OPTOMETRIST

## 2021-08-19 PROCEDURE — 92014 COMPRE OPH EXAM EST PT 1/>: CPT | Mod: S$GLB,,, | Performed by: OPTOMETRIST

## 2021-08-19 PROCEDURE — 1101F PR PT FALLS ASSESS DOC 0-1 FALLS W/OUT INJ PAST YR: ICD-10-PCS | Mod: CPTII,S$GLB,, | Performed by: OPTOMETRIST

## 2021-08-19 PROCEDURE — 83036 HEMOGLOBIN GLYCOSYLATED A1C: CPT | Performed by: FAMILY MEDICINE

## 2021-08-19 PROCEDURE — 99499 RISK ADDL DX/OHS AUDIT: ICD-10-PCS | Mod: S$GLB,,, | Performed by: OPTOMETRIST

## 2021-08-19 PROCEDURE — 1126F AMNT PAIN NOTED NONE PRSNT: CPT | Mod: CPTII,S$GLB,, | Performed by: OPTOMETRIST

## 2021-08-19 PROCEDURE — 2023F PR DILATED RETINAL EXAM W/O EVID OF RETINOPATHY: ICD-10-PCS | Mod: CPTII,S$GLB,, | Performed by: OPTOMETRIST

## 2021-08-19 PROCEDURE — 1159F PR MEDICATION LIST DOCUMENTED IN MEDICAL RECORD: ICD-10-PCS | Mod: CPTII,S$GLB,, | Performed by: OPTOMETRIST

## 2021-08-19 PROCEDURE — 3288F PR FALLS RISK ASSESSMENT DOCUMENTED: ICD-10-PCS | Mod: CPTII,S$GLB,, | Performed by: OPTOMETRIST

## 2021-08-19 PROCEDURE — 1159F MED LIST DOCD IN RCRD: CPT | Mod: CPTII,S$GLB,, | Performed by: OPTOMETRIST

## 2021-08-19 PROCEDURE — 3288F FALL RISK ASSESSMENT DOCD: CPT | Mod: CPTII,S$GLB,, | Performed by: OPTOMETRIST

## 2021-08-19 PROCEDURE — 1101F PT FALLS ASSESS-DOCD LE1/YR: CPT | Mod: CPTII,S$GLB,, | Performed by: OPTOMETRIST

## 2021-08-19 PROCEDURE — 1160F PR REVIEW ALL MEDS BY PRESCRIBER/CLIN PHARMACIST DOCUMENTED: ICD-10-PCS | Mod: CPTII,S$GLB,, | Performed by: OPTOMETRIST

## 2021-08-19 PROCEDURE — 99999 PR PBB SHADOW E&M-EST. PATIENT-LVL III: ICD-10-PCS | Mod: PBBFAC,,, | Performed by: OPTOMETRIST

## 2021-08-19 PROCEDURE — 92014 PR EYE EXAM, EST PATIENT,COMPREHESV: ICD-10-PCS | Mod: S$GLB,,, | Performed by: OPTOMETRIST

## 2021-08-19 PROCEDURE — 99499 UNLISTED E&M SERVICE: CPT | Mod: S$GLB,,, | Performed by: OPTOMETRIST

## 2021-08-19 PROCEDURE — 2023F DILAT RTA XM W/O RTNOPTHY: CPT | Mod: CPTII,S$GLB,, | Performed by: OPTOMETRIST

## 2021-08-19 PROCEDURE — 36415 COLL VENOUS BLD VENIPUNCTURE: CPT | Performed by: FAMILY MEDICINE

## 2021-08-19 PROCEDURE — 1160F RVW MEDS BY RX/DR IN RCRD: CPT | Mod: CPTII,S$GLB,, | Performed by: OPTOMETRIST

## 2021-08-19 PROCEDURE — 99999 PR PBB SHADOW E&M-EST. PATIENT-LVL III: CPT | Mod: PBBFAC,,, | Performed by: OPTOMETRIST

## 2021-08-20 LAB
ESTIMATED AVG GLUCOSE: 151 MG/DL (ref 68–131)
HBA1C MFR BLD: 6.9 % (ref 4–5.6)

## 2021-08-27 ENCOUNTER — CLINICAL SUPPORT (OUTPATIENT)
Dept: OPHTHALMOLOGY | Facility: CLINIC | Age: 66
End: 2021-08-27
Payer: MEDICARE

## 2021-08-27 DIAGNOSIS — H40.013 OPEN ANGLE WITH BORDERLINE FINDINGS OF BOTH EYES: ICD-10-CM

## 2021-09-07 ENCOUNTER — TELEPHONE (OUTPATIENT)
Dept: OPTOMETRY | Facility: CLINIC | Age: 66
End: 2021-09-07

## 2021-09-18 ENCOUNTER — PATIENT MESSAGE (OUTPATIENT)
Dept: ADMINISTRATIVE | Facility: HOSPITAL | Age: 66
End: 2021-09-18

## 2021-10-11 ENCOUNTER — TELEPHONE (OUTPATIENT)
Dept: INTERNAL MEDICINE | Facility: CLINIC | Age: 66
End: 2021-10-11

## 2021-10-12 ENCOUNTER — OFFICE VISIT (OUTPATIENT)
Dept: INTERNAL MEDICINE | Facility: CLINIC | Age: 66
End: 2021-10-12
Payer: MEDICARE

## 2021-10-12 DIAGNOSIS — Z12.5 PROSTATE CANCER SCREENING: ICD-10-CM

## 2021-10-12 DIAGNOSIS — E11.9 TYPE 2 DIABETES MELLITUS WITHOUT COMPLICATION, WITHOUT LONG-TERM CURRENT USE OF INSULIN: ICD-10-CM

## 2021-10-12 DIAGNOSIS — E66.3 OVERWEIGHT: ICD-10-CM

## 2021-10-12 DIAGNOSIS — I10 ESSENTIAL HYPERTENSION: Primary | ICD-10-CM

## 2021-10-12 DIAGNOSIS — B00.9 HSV INFECTION: ICD-10-CM

## 2021-10-12 DIAGNOSIS — Z00.00 ANNUAL PHYSICAL EXAM: ICD-10-CM

## 2021-10-12 PROCEDURE — 3044F HG A1C LEVEL LT 7.0%: CPT | Mod: HCNC,CPTII,95, | Performed by: FAMILY MEDICINE

## 2021-10-12 PROCEDURE — 3044F PR MOST RECENT HEMOGLOBIN A1C LEVEL <7.0%: ICD-10-PCS | Mod: HCNC,CPTII,95, | Performed by: FAMILY MEDICINE

## 2021-10-12 PROCEDURE — 99443 PR PHYSICIAN TELEPHONE EVALUATION 21-30 MIN: CPT | Mod: HCNC,95,, | Performed by: FAMILY MEDICINE

## 2021-10-12 PROCEDURE — 99443 PR PHYSICIAN TELEPHONE EVALUATION 21-30 MIN: ICD-10-PCS | Mod: HCNC,95,, | Performed by: FAMILY MEDICINE

## 2021-10-12 RX ORDER — VALACYCLOVIR HYDROCHLORIDE 500 MG/1
500 TABLET, FILM COATED ORAL 2 TIMES DAILY
Qty: 6 TABLET | Refills: 6 | Status: SHIPPED | OUTPATIENT
Start: 2021-10-12 | End: 2022-09-20 | Stop reason: SDUPTHER

## 2021-10-18 ENCOUNTER — PATIENT MESSAGE (OUTPATIENT)
Dept: ADMINISTRATIVE | Facility: HOSPITAL | Age: 66
End: 2021-10-18
Payer: MEDICARE

## 2022-01-12 ENCOUNTER — LAB VISIT (OUTPATIENT)
Dept: LAB | Facility: HOSPITAL | Age: 67
End: 2022-01-12
Attending: FAMILY MEDICINE
Payer: MEDICARE

## 2022-01-12 DIAGNOSIS — I10 ESSENTIAL HYPERTENSION: ICD-10-CM

## 2022-01-12 DIAGNOSIS — Z00.00 ANNUAL PHYSICAL EXAM: ICD-10-CM

## 2022-01-12 DIAGNOSIS — Z12.5 PROSTATE CANCER SCREENING: ICD-10-CM

## 2022-01-12 DIAGNOSIS — E11.9 TYPE 2 DIABETES MELLITUS WITHOUT COMPLICATION, WITHOUT LONG-TERM CURRENT USE OF INSULIN: ICD-10-CM

## 2022-01-12 LAB
ALBUMIN SERPL BCP-MCNC: 4 G/DL (ref 3.5–5.2)
ALP SERPL-CCNC: 91 U/L (ref 55–135)
ALT SERPL W/O P-5'-P-CCNC: 16 U/L (ref 10–44)
ANION GAP SERPL CALC-SCNC: 13 MMOL/L (ref 8–16)
AST SERPL-CCNC: 16 U/L (ref 10–40)
BASOPHILS # BLD AUTO: 0.02 K/UL (ref 0–0.2)
BASOPHILS NFR BLD: 0.3 % (ref 0–1.9)
BILIRUB SERPL-MCNC: 0.9 MG/DL (ref 0.1–1)
BUN SERPL-MCNC: 15 MG/DL (ref 8–23)
CALCIUM SERPL-MCNC: 9.8 MG/DL (ref 8.7–10.5)
CHLORIDE SERPL-SCNC: 103 MMOL/L (ref 95–110)
CHOLEST SERPL-MCNC: 100 MG/DL (ref 120–199)
CHOLEST/HDLC SERPL: 2.2 {RATIO} (ref 2–5)
CO2 SERPL-SCNC: 24 MMOL/L (ref 23–29)
COMPLEXED PSA SERPL-MCNC: 0.58 NG/ML (ref 0–4)
CREAT SERPL-MCNC: 1 MG/DL (ref 0.5–1.4)
DIFFERENTIAL METHOD: ABNORMAL
EOSINOPHIL # BLD AUTO: 0.4 K/UL (ref 0–0.5)
EOSINOPHIL NFR BLD: 4.7 % (ref 0–8)
ERYTHROCYTE [DISTWIDTH] IN BLOOD BY AUTOMATED COUNT: 13.2 % (ref 11.5–14.5)
EST. GFR  (AFRICAN AMERICAN): >60 ML/MIN/1.73 M^2
EST. GFR  (NON AFRICAN AMERICAN): >60 ML/MIN/1.73 M^2
ESTIMATED AVG GLUCOSE: 183 MG/DL (ref 68–131)
GLUCOSE SERPL-MCNC: 115 MG/DL (ref 70–110)
HBA1C MFR BLD: 8 % (ref 4–5.6)
HCT VFR BLD AUTO: 42.5 % (ref 40–54)
HDLC SERPL-MCNC: 45 MG/DL (ref 40–75)
HDLC SERPL: 45 % (ref 20–50)
HGB BLD-MCNC: 13.3 G/DL (ref 14–18)
IMM GRANULOCYTES # BLD AUTO: 0 K/UL (ref 0–0.04)
IMM GRANULOCYTES NFR BLD AUTO: 0 % (ref 0–0.5)
LDLC SERPL CALC-MCNC: 39.8 MG/DL (ref 63–159)
LYMPHOCYTES # BLD AUTO: 3 K/UL (ref 1–4.8)
LYMPHOCYTES NFR BLD: 37.7 % (ref 18–48)
MCH RBC QN AUTO: 30.2 PG (ref 27–31)
MCHC RBC AUTO-ENTMCNC: 31.3 G/DL (ref 32–36)
MCV RBC AUTO: 97 FL (ref 82–98)
MONOCYTES # BLD AUTO: 0.6 K/UL (ref 0.3–1)
MONOCYTES NFR BLD: 7.3 % (ref 4–15)
NEUTROPHILS # BLD AUTO: 3.9 K/UL (ref 1.8–7.7)
NEUTROPHILS NFR BLD: 50 % (ref 38–73)
NONHDLC SERPL-MCNC: 55 MG/DL
NRBC BLD-RTO: 0 /100 WBC
PLATELET # BLD AUTO: 238 K/UL (ref 150–450)
PMV BLD AUTO: 11.1 FL (ref 9.2–12.9)
POTASSIUM SERPL-SCNC: 4.6 MMOL/L (ref 3.5–5.1)
PROT SERPL-MCNC: 7.6 G/DL (ref 6–8.4)
RBC # BLD AUTO: 4.4 M/UL (ref 4.6–6.2)
SODIUM SERPL-SCNC: 140 MMOL/L (ref 136–145)
T4 FREE SERPL-MCNC: 0.89 NG/DL (ref 0.71–1.51)
TRIGL SERPL-MCNC: 76 MG/DL (ref 30–150)
TSH SERPL DL<=0.005 MIU/L-ACNC: 4 UIU/ML (ref 0.4–4)
WBC # BLD AUTO: 7.82 K/UL (ref 3.9–12.7)

## 2022-01-12 PROCEDURE — 84153 ASSAY OF PSA TOTAL: CPT | Mod: HCNC | Performed by: FAMILY MEDICINE

## 2022-01-12 PROCEDURE — 85025 COMPLETE CBC W/AUTO DIFF WBC: CPT | Mod: HCNC | Performed by: FAMILY MEDICINE

## 2022-01-12 PROCEDURE — 83036 HEMOGLOBIN GLYCOSYLATED A1C: CPT | Mod: HCNC | Performed by: FAMILY MEDICINE

## 2022-01-12 PROCEDURE — 84443 ASSAY THYROID STIM HORMONE: CPT | Mod: HCNC | Performed by: FAMILY MEDICINE

## 2022-01-12 PROCEDURE — 36415 COLL VENOUS BLD VENIPUNCTURE: CPT | Mod: HCNC | Performed by: FAMILY MEDICINE

## 2022-01-12 PROCEDURE — 80053 COMPREHEN METABOLIC PANEL: CPT | Mod: HCNC | Performed by: FAMILY MEDICINE

## 2022-01-12 PROCEDURE — 84439 ASSAY OF FREE THYROXINE: CPT | Mod: HCNC | Performed by: FAMILY MEDICINE

## 2022-01-12 PROCEDURE — 80061 LIPID PANEL: CPT | Mod: HCNC | Performed by: FAMILY MEDICINE

## 2022-01-15 ENCOUNTER — LAB VISIT (OUTPATIENT)
Dept: PRIMARY CARE CLINIC | Facility: OTHER | Age: 67
End: 2022-01-15
Payer: MEDICARE

## 2022-01-15 DIAGNOSIS — R05.9 COUGH: ICD-10-CM

## 2022-01-15 PROCEDURE — U0003 INFECTIOUS AGENT DETECTION BY NUCLEIC ACID (DNA OR RNA); SEVERE ACUTE RESPIRATORY SYNDROME CORONAVIRUS 2 (SARS-COV-2) (CORONAVIRUS DISEASE [COVID-19]), AMPLIFIED PROBE TECHNIQUE, MAKING USE OF HIGH THROUGHPUT TECHNOLOGIES AS DESCRIBED BY CMS-2020-01-R: HCPCS | Mod: HCNC | Performed by: INTERNAL MEDICINE

## 2022-01-16 LAB
SARS-COV-2 RNA RESP QL NAA+PROBE: NOT DETECTED
SARS-COV-2- CYCLE NUMBER: NORMAL

## 2022-01-18 ENCOUNTER — OFFICE VISIT (OUTPATIENT)
Dept: INTERNAL MEDICINE | Facility: CLINIC | Age: 67
End: 2022-01-18
Payer: MEDICARE

## 2022-01-18 VITALS
HEIGHT: 68 IN | WEIGHT: 194 LBS | DIASTOLIC BLOOD PRESSURE: 80 MMHG | BODY MASS INDEX: 29.4 KG/M2 | HEART RATE: 73 BPM | OXYGEN SATURATION: 98 % | SYSTOLIC BLOOD PRESSURE: 132 MMHG

## 2022-01-18 DIAGNOSIS — E11.9 TYPE 2 DIABETES MELLITUS WITHOUT COMPLICATION, WITHOUT LONG-TERM CURRENT USE OF INSULIN: ICD-10-CM

## 2022-01-18 DIAGNOSIS — I10 ESSENTIAL HYPERTENSION: Primary | ICD-10-CM

## 2022-01-18 DIAGNOSIS — M65.30 TRIGGER FINGER OF RIGHT HAND, UNSPECIFIED FINGER: ICD-10-CM

## 2022-01-18 DIAGNOSIS — E11.65 TYPE 2 DIABETES MELLITUS WITH HYPERGLYCEMIA, WITHOUT LONG-TERM CURRENT USE OF INSULIN: ICD-10-CM

## 2022-01-18 PROCEDURE — 1126F AMNT PAIN NOTED NONE PRSNT: CPT | Mod: HCNC,CPTII,S$GLB, | Performed by: FAMILY MEDICINE

## 2022-01-18 PROCEDURE — 3288F PR FALLS RISK ASSESSMENT DOCUMENTED: ICD-10-PCS | Mod: HCNC,CPTII,S$GLB, | Performed by: FAMILY MEDICINE

## 2022-01-18 PROCEDURE — 3052F HG A1C>EQUAL 8.0%<EQUAL 9.0%: CPT | Mod: HCNC,CPTII,S$GLB, | Performed by: FAMILY MEDICINE

## 2022-01-18 PROCEDURE — 3075F SYST BP GE 130 - 139MM HG: CPT | Mod: HCNC,CPTII,S$GLB, | Performed by: FAMILY MEDICINE

## 2022-01-18 PROCEDURE — 99999 PR PBB SHADOW E&M-EST. PATIENT-LVL III: CPT | Mod: PBBFAC,HCNC,, | Performed by: FAMILY MEDICINE

## 2022-01-18 PROCEDURE — 3079F DIAST BP 80-89 MM HG: CPT | Mod: HCNC,CPTII,S$GLB, | Performed by: FAMILY MEDICINE

## 2022-01-18 PROCEDURE — 3079F PR MOST RECENT DIASTOLIC BLOOD PRESSURE 80-89 MM HG: ICD-10-PCS | Mod: HCNC,CPTII,S$GLB, | Performed by: FAMILY MEDICINE

## 2022-01-18 PROCEDURE — 99499 RISK ADDL DX/OHS AUDIT: ICD-10-PCS | Mod: HCNC,S$GLB,, | Performed by: FAMILY MEDICINE

## 2022-01-18 PROCEDURE — 1159F MED LIST DOCD IN RCRD: CPT | Mod: HCNC,CPTII,S$GLB, | Performed by: FAMILY MEDICINE

## 2022-01-18 PROCEDURE — 99499 UNLISTED E&M SERVICE: CPT | Mod: HCNC,S$GLB,, | Performed by: FAMILY MEDICINE

## 2022-01-18 PROCEDURE — 3008F BODY MASS INDEX DOCD: CPT | Mod: HCNC,CPTII,S$GLB, | Performed by: FAMILY MEDICINE

## 2022-01-18 PROCEDURE — 3052F PR MOST RECENT HEMOGLOBIN A1C LEVEL 8.0 - < 9.0%: ICD-10-PCS | Mod: HCNC,CPTII,S$GLB, | Performed by: FAMILY MEDICINE

## 2022-01-18 PROCEDURE — 3072F PR LOW RISK FOR RETINOPATHY: ICD-10-PCS | Mod: HCNC,CPTII,S$GLB, | Performed by: FAMILY MEDICINE

## 2022-01-18 PROCEDURE — 1101F PT FALLS ASSESS-DOCD LE1/YR: CPT | Mod: HCNC,CPTII,S$GLB, | Performed by: FAMILY MEDICINE

## 2022-01-18 PROCEDURE — 99397 PER PM REEVAL EST PAT 65+ YR: CPT | Mod: HCNC,S$GLB,, | Performed by: FAMILY MEDICINE

## 2022-01-18 PROCEDURE — 99999 PR PBB SHADOW E&M-EST. PATIENT-LVL III: ICD-10-PCS | Mod: PBBFAC,HCNC,, | Performed by: FAMILY MEDICINE

## 2022-01-18 PROCEDURE — 99397 PR PREVENTIVE VISIT,EST,65 & OVER: ICD-10-PCS | Mod: HCNC,S$GLB,, | Performed by: FAMILY MEDICINE

## 2022-01-18 PROCEDURE — 3288F FALL RISK ASSESSMENT DOCD: CPT | Mod: HCNC,CPTII,S$GLB, | Performed by: FAMILY MEDICINE

## 2022-01-18 PROCEDURE — 1126F PR PAIN SEVERITY QUANTIFIED, NO PAIN PRESENT: ICD-10-PCS | Mod: HCNC,CPTII,S$GLB, | Performed by: FAMILY MEDICINE

## 2022-01-18 PROCEDURE — 3008F PR BODY MASS INDEX (BMI) DOCUMENTED: ICD-10-PCS | Mod: HCNC,CPTII,S$GLB, | Performed by: FAMILY MEDICINE

## 2022-01-18 PROCEDURE — 3072F LOW RISK FOR RETINOPATHY: CPT | Mod: HCNC,CPTII,S$GLB, | Performed by: FAMILY MEDICINE

## 2022-01-18 PROCEDURE — 1101F PR PT FALLS ASSESS DOC 0-1 FALLS W/OUT INJ PAST YR: ICD-10-PCS | Mod: HCNC,CPTII,S$GLB, | Performed by: FAMILY MEDICINE

## 2022-01-18 PROCEDURE — 3075F PR MOST RECENT SYSTOLIC BLOOD PRESS GE 130-139MM HG: ICD-10-PCS | Mod: HCNC,CPTII,S$GLB, | Performed by: FAMILY MEDICINE

## 2022-01-18 PROCEDURE — 1159F PR MEDICATION LIST DOCUMENTED IN MEDICAL RECORD: ICD-10-PCS | Mod: HCNC,CPTII,S$GLB, | Performed by: FAMILY MEDICINE

## 2022-01-18 RX ORDER — ROSUVASTATIN CALCIUM 10 MG/1
10 TABLET, COATED ORAL DAILY
Qty: 90 TABLET | Refills: 3 | Status: SHIPPED | OUTPATIENT
Start: 2022-01-18 | End: 2023-02-11

## 2022-01-18 RX ORDER — INSULIN PUMP SYRINGE, 3 ML
EACH MISCELLANEOUS
Qty: 1 EACH | Refills: 0 | Status: SHIPPED | OUTPATIENT
Start: 2022-01-18 | End: 2023-01-18

## 2022-01-18 RX ORDER — LANCETS
EACH MISCELLANEOUS
Qty: 100 EACH | Refills: 3 | Status: SHIPPED | OUTPATIENT
Start: 2022-01-18

## 2022-01-18 RX ORDER — METFORMIN HYDROCHLORIDE 1000 MG/1
1000 TABLET ORAL 2 TIMES DAILY WITH MEALS
Qty: 180 TABLET | Refills: 3 | Status: SHIPPED | OUTPATIENT
Start: 2022-01-18 | End: 2022-01-20 | Stop reason: SDUPTHER

## 2022-01-18 NOTE — PROGRESS NOTES
Subjective:       Patient ID: Karthik Bentley is a 66 y.o. male.    Chief Complaint: Annual Exam    Patient is here for annual exam; had labs recently    Diabetes Management Status    Statin: Taking  ACE/ARB: Not taking    Screening or Prevention Patient's value Goal Complete/Controlled?   HgA1C Testing and Control   Lab Results   Component Value Date    HGBA1C 8.0 (H) 01/12/2022      Annually/Less than 8% No   Lipid profile : 01/12/2022 Annually Yes   LDL control Lab Results   Component Value Date    LDLCALC 39.8 (L) 01/12/2022    Annually/Less than 100 mg/dl  Yes   Nephropathy screening Lab Results   Component Value Date    LABMICR 17.0 12/11/2020     No results found for: PROTEINUA  Lab Results   Component Value Date    UTPCR Unable to calculate 01/12/2022      Annually Yes   Blood pressure BP Readings from Last 1 Encounters:   01/18/22 132/80    Less than 140/90 Yes   Dilated retinal exam : 08/19/2021 Annually Yes   Foot exam   : 01/16/2021 Annually No         Social History     Tobacco Use    Smoking status: Never Smoker   Substance Use Topics    Alcohol use: Yes     Comment: occasianally    Drug use: Never       Family History   Problem Relation Age of Onset    Bone cancer Mother     Colon cancer Father        Past Surgical History:   Procedure Laterality Date    COLONOSCOPY N/A 1/16/2020    Procedure: COLONOSCOPY;  Surgeon: Cynthia Kearney MD;  Location: [address]);  Service: Endoscopy;  Laterality: N/A;    SHOULDER SURGERY         Patient Active Problem List   Diagnosis    Essential hypertension    Type 2 diabetes mellitus, without long-term current use of insulin    Screen for colon cancer    Overweight    HSV infection       Current Outpatient Medications on File Prior to Visit   Medication Sig Dispense Refill    gentian violet 2 % topical solution Apply 0.5 mLs topically daily as needed. Apply between toes. 59 mL 1    metFORMIN (GLUCOPHAGE) 1000 MG tablet Take 1 tablet (1,000 mg  "total) by mouth 2 (two) times daily with meals. 180 tablet 3    pneumococcal 23-merritt ps (PNEUMOVAX 23) 25 mcg/0.5 mL vaccine Inject into the muscle. 0.5 mL 0    rosuvastatin (CRESTOR) 10 MG tablet TAKE 1 TABLET(10 MG) BY MOUTH EVERY DAY 90 tablet 3    TRULICITY 0.75 mg/0.5 mL pen injector INJECT 0.75 MG(0.5 ML) UNDER THE SKIN EVERY 7 DAYS 4 pen 11    valACYclovir (VALTREX) 500 MG tablet Take 1 tablet (500 mg total) by mouth 2 (two) times daily. for 3 days 6 tablet 6    varicella-zoster gE-AS01B, PF, (SHINGRIX, PF,) 50 mcg/0.5 mL injection Inject into the muscle. 1 each 1     No current facility-administered medications on file prior to visit.           Review of Systems   Constitutional: Negative for chills and fever.   HENT: Negative for ear pain.    Eyes: Negative for pain.   Respiratory: Negative for chest tightness.    Cardiovascular: Negative for chest pain.   Gastrointestinal: Negative for abdominal pain.   Genitourinary: Negative for flank pain.   Musculoskeletal: Negative for gait problem.        Rihgt hand digits 2 & 3 are stiff and sometimes gets stuck in a bent position   Neurological: Negative for syncope.   Psychiatric/Behavioral: Negative for behavioral problems.       Objective:     /80 (BP Location: Right arm, Patient Position: Sitting, BP Method: Medium (Manual))   Pulse 73   Ht 5' 8" (1.727 m)   Wt 88 kg (194 lb 0.1 oz)   SpO2 98%   BMI 29.50 kg/m²     Physical Exam  Constitutional:       Appearance: He is well-developed.   HENT:      Head: Normocephalic and atraumatic.   Eyes:      Extraocular Movements: EOM normal.   Cardiovascular:      Rate and Rhythm: Normal rate.      Heart sounds: Normal heart sounds.   Pulmonary:      Effort: No respiratory distress.      Breath sounds: Normal breath sounds. No wheezing or rales.   Abdominal:      Palpations: Abdomen is soft.   Musculoskeletal:         General: No edema.        Arms:       Cervical back: Neck supple.      Right foot: Normal " range of motion. No deformity, bunion, Charcot foot, foot drop or prominent metatarsal heads.      Left foot: Normal range of motion. No deformity, bunion, Charcot foot, foot drop or prominent metatarsal heads.   Feet:      Right foot:      Protective Sensation: 5 sites tested. 5 sites sensed.      Skin integrity: Skin integrity normal.      Left foot:      Protective Sensation: 5 sites tested. 5 sites sensed.      Skin integrity: Skin integrity normal.   Skin:     General: Skin is dry.   Neurological:      Mental Status: He is alert.   Psychiatric:         Behavior: Behavior normal.         Assessment:       1. Essential hypertension    2. Type 2 diabetes mellitus without complication, without long-term current use of insulin    3. Type 2 diabetes mellitus with hyperglycemia, without long-term current use of insulin    4. Trigger finger of right hand, unspecified finger        Plan:       Karthik was seen today for annual exam.    Diagnoses and all orders for this visit:    Essential hypertension  -controlled, stable, no change in meds - diet controlled    Type 2 diabetes mellitus without complication, without long-term current use of insulin  Type 2 diabetes mellitus with hyperglycemia, without long-term current use of insulin  -     rosuvastatin (CRESTOR) 10 MG tablet; Take 1 tablet (10 mg total) by mouth once daily.  -     metFORMIN (GLUCOPHAGE) 1000 MG tablet; Take 1 tablet (1,000 mg total) by mouth 2 (two) times daily with meals.  -     Hemoglobin A1C; Future  -     blood-glucose meter kit; To check BG 1 times daily, to use with insurance preferred meter  -     lancets Misc; To check BG 1 times daily, to use with insurance preferred meter  -     blood sugar diagnostic Strp; To check BG 1 times daily, to use with insurance preferred meter  [id number], pt wants to work on diet, exercise on his own rather than increase meds. Agrees to HgbA1c in 3 mo and see me in 6 mo    Trigger finger of right hand, unspecified  finger  -     Ambulatory referral/consult to Orthopedics; Future    Follow up in about 6 months (around 7/18/2022) for dm, htn.

## 2022-01-20 ENCOUNTER — PATIENT MESSAGE (OUTPATIENT)
Dept: INTERNAL MEDICINE | Facility: CLINIC | Age: 67
End: 2022-01-20
Payer: MEDICARE

## 2022-01-20 DIAGNOSIS — E11.65 TYPE 2 DIABETES MELLITUS WITH HYPERGLYCEMIA, WITHOUT LONG-TERM CURRENT USE OF INSULIN: ICD-10-CM

## 2022-01-20 DIAGNOSIS — E11.9 TYPE 2 DIABETES MELLITUS WITHOUT COMPLICATION, WITHOUT LONG-TERM CURRENT USE OF INSULIN: ICD-10-CM

## 2022-01-20 RX ORDER — METFORMIN HYDROCHLORIDE 1000 MG/1
1000 TABLET ORAL 2 TIMES DAILY WITH MEALS
Qty: 180 TABLET | Refills: 3 | Status: SHIPPED | OUTPATIENT
Start: 2022-01-20 | End: 2023-02-09

## 2022-01-20 NOTE — TELEPHONE ENCOUNTER
----- Message from Noelle Cordero sent at 1/20/2022  1:13 PM CST -----  Contact: [facility] ([employer])[phone] ext [phone]  Requesting an RX refill or new RX.  Is this a refill or new RX: refill  RX name and strength (copy/paste from chart):  metFORMIN (GLUCOPHAGE) 1000 MG tablet  Is this a 30 day or 90 day RX:   Patient advised that in the future they can use their [facility] account to request a refill?:  yes  Patient advised that RX refills can take up to 72 hours?:  yes  Pharmacy name and phone # (copy/paste from chart):    [id number] [address] AT [address] [address]  Phone: [phone] Fax: [phone]      Comments: pt need sent to local pharm while await mail order    Please call and advise

## 2022-01-20 NOTE — TELEPHONE ENCOUNTER
No new care gaps identified.  Powered by [employer]. Reference number: [id number].   1/20/2022 1:37:34 PM CST

## 2022-02-10 DIAGNOSIS — M79.641 RIGHT HAND PAIN: Primary | ICD-10-CM

## 2022-02-17 ENCOUNTER — OFFICE VISIT (OUTPATIENT)
Dept: ORTHOPEDICS | Facility: CLINIC | Age: 67
End: 2022-02-17
Payer: MEDICARE

## 2022-02-17 ENCOUNTER — HOSPITAL ENCOUNTER (OUTPATIENT)
Dept: RADIOLOGY | Facility: HOSPITAL | Age: 67
Discharge: HOME | End: 2022-02-17
Attending: ORTHOPAEDIC SURGERY
Payer: MEDICARE

## 2022-02-17 DIAGNOSIS — M65.30 TRIGGER FINGER OF RIGHT HAND, UNSPECIFIED FINGER: ICD-10-CM

## 2022-02-17 DIAGNOSIS — M79.641 RIGHT HAND PAIN: ICD-10-CM

## 2022-02-17 DIAGNOSIS — M65.331 TRIGGER FINGER, RIGHT MIDDLE FINGER: ICD-10-CM

## 2022-02-17 DIAGNOSIS — G56.03 BILATERAL CARPAL TUNNEL SYNDROME: ICD-10-CM

## 2022-02-17 DIAGNOSIS — G56.23 CUBITAL TUNNEL SYNDROME, BILATERAL: Primary | ICD-10-CM

## 2022-02-17 PROCEDURE — 73130 XR HAND COMPLETE 3 VIEW RIGHT: ICD-10-PCS | Mod: 26,HCNC,RT, | Performed by: RADIOLOGY

## 2022-02-17 PROCEDURE — 99999 PR PBB SHADOW E&M-EST. PATIENT-LVL III: CPT | Mod: PBBFAC,HCNC,, | Performed by: ORTHOPAEDIC SURGERY

## 2022-02-17 PROCEDURE — 1159F PR MEDICATION LIST DOCUMENTED IN MEDICAL RECORD: ICD-10-PCS | Mod: HCNC,CPTII,S$GLB, | Performed by: ORTHOPAEDIC SURGERY

## 2022-02-17 PROCEDURE — 1125F PR PAIN SEVERITY QUANTIFIED, PAIN PRESENT: ICD-10-PCS | Mod: HCNC,CPTII,S$GLB, | Performed by: ORTHOPAEDIC SURGERY

## 2022-02-17 PROCEDURE — 20550 TENDON SHEATH: ICD-10-PCS | Mod: HCNC,F7,S$GLB, | Performed by: ORTHOPAEDIC SURGERY

## 2022-02-17 PROCEDURE — 99204 OFFICE O/P NEW MOD 45 MIN: CPT | Mod: HCNC,25,S$GLB, | Performed by: ORTHOPAEDIC SURGERY

## 2022-02-17 PROCEDURE — 3052F PR MOST RECENT HEMOGLOBIN A1C LEVEL 8.0 - < 9.0%: ICD-10-PCS | Mod: HCNC,CPTII,S$GLB, | Performed by: ORTHOPAEDIC SURGERY

## 2022-02-17 PROCEDURE — 3052F HG A1C>EQUAL 8.0%<EQUAL 9.0%: CPT | Mod: HCNC,CPTII,S$GLB, | Performed by: ORTHOPAEDIC SURGERY

## 2022-02-17 PROCEDURE — 73130 X-RAY EXAM OF HAND: CPT | Mod: 26,HCNC,RT, | Performed by: RADIOLOGY

## 2022-02-17 PROCEDURE — 99204 PR OFFICE/OUTPT VISIT, NEW, LEVL IV, 45-59 MIN: ICD-10-PCS | Mod: HCNC,25,S$GLB, | Performed by: ORTHOPAEDIC SURGERY

## 2022-02-17 PROCEDURE — 20550 NJX 1 TENDON SHEATH/LIGAMENT: CPT | Mod: HCNC,F7,S$GLB, | Performed by: ORTHOPAEDIC SURGERY

## 2022-02-17 PROCEDURE — 99999 PR PBB SHADOW E&M-EST. PATIENT-LVL III: ICD-10-PCS | Mod: PBBFAC,HCNC,, | Performed by: ORTHOPAEDIC SURGERY

## 2022-02-17 PROCEDURE — 1125F AMNT PAIN NOTED PAIN PRSNT: CPT | Mod: HCNC,CPTII,S$GLB, | Performed by: ORTHOPAEDIC SURGERY

## 2022-02-17 PROCEDURE — 3072F PR LOW RISK FOR RETINOPATHY: ICD-10-PCS | Mod: HCNC,CPTII,S$GLB, | Performed by: ORTHOPAEDIC SURGERY

## 2022-02-17 PROCEDURE — 3072F LOW RISK FOR RETINOPATHY: CPT | Mod: HCNC,CPTII,S$GLB, | Performed by: ORTHOPAEDIC SURGERY

## 2022-02-17 PROCEDURE — 1159F MED LIST DOCD IN RCRD: CPT | Mod: HCNC,CPTII,S$GLB, | Performed by: ORTHOPAEDIC SURGERY

## 2022-02-17 PROCEDURE — 73130 X-RAY EXAM OF HAND: CPT | Mod: TC,HCNC,PN,RT

## 2022-02-17 RX ORDER — DEXAMETHASONE SODIUM PHOSPHATE 4 MG/ML
4 INJECTION, SOLUTION INTRA-ARTICULAR; INTRALESIONAL; INTRAMUSCULAR; INTRAVENOUS; SOFT TISSUE
Status: DISCONTINUED | OUTPATIENT
Start: 2022-02-17 | End: 2022-02-17 | Stop reason: HOSPADM

## 2022-02-17 RX ADMIN — DEXAMETHASONE SODIUM PHOSPHATE 4 MG: 4 INJECTION, SOLUTION INTRA-ARTICULAR; INTRALESIONAL; INTRAMUSCULAR; INTRAVENOUS; SOFT TISSUE at 10:02

## 2022-02-17 NOTE — PROGRESS NOTES
[facility]  History & Physical  Orthopedics    SUBJECTIVE:      COVID-19 attestation:  This patient was treated during the COVID-19 pandemic.  This was discussed with the patient, they are aware of our current policies and procedures, were given the option of delaying their visit and or switching to a virtual visit, delaying their surgery when applicable, and they elect to proceed.    Chief Complaint:  Right index and long fingers    Referring Provider: Anibal Moya MD     History of Present Illness:  Patient is a 66 y.o. right hand dominant male who presents today with complaints of pain and stiffness to the right index and long fingers.  This is his primary complaint today.  He notes these are associated with some pain in the palm near the A1 pulleys of the same fingers.  He notes that the long finger occasionally snaps clicks and locks.  He has done some online research and feels that he may be suffering from trigger fingers.  Secondarily he describes numbness and tingling in the right greater than left upper extremities extending primarily from the elbows into the hands.  This is bothersome while sleeping in a wakes him and he has to shake his hands out to regain sensation.  He denies any other complaints today and presents for initial evaluation.     The patient is a/an retired.    Onset of symptoms/DOI was 2 months ago.    Symptoms are aggravated by activity, movement and at night.    Symptoms are alleviated by rest and during the day.    Symptoms consist of pain, swelling, decreased ROM and numbness/tingling.    The patient rates their pain as a 2/10.    Attempted treatment(s) and/or interventions include activity modifications, rest     The patient denies any fevers, chills, N/V, D/C and presents for evaluation.       Past Medical History:   Diagnosis Date    Diabetes mellitus type II     Hypertension      Past Surgical History:   Procedure Laterality Date    COLONOSCOPY N/A  1/16/2020    Procedure: COLONOSCOPY;  Surgeon: Cynthia Kearney MD;  Location: [facility] ([address] [place] [address]);  Service: Endoscopy;  Laterality: N/A;    SHOULDER SURGERY       Review of patient's allergies indicates:  No Known Allergies  Social History     Social History Narrative    Not on file     Family History   Problem Relation Age of Onset    Bone cancer Mother     Colon cancer Father          Current Outpatient Medications:     blood sugar diagnostic Strp, To check BG 1 times daily, to use with insurance preferred meter, Disp: 100 strip, Rfl: 3    blood-glucose meter kit, To check BG 1 times daily, to use with insurance preferred meter, Disp: 1 each, Rfl: 0    gentian violet 2 % topical solution, Apply 0.5 mLs topically daily as needed. Apply between toes., Disp: 59 mL, Rfl: 1    lancets Misc, To check BG 1 times daily, to use with insurance preferred meter, Disp: 100 each, Rfl: 3    metFORMIN (GLUCOPHAGE) 1000 MG tablet, Take 1 tablet (1,000 mg total) by mouth 2 (two) times daily with meals., Disp: 180 tablet, Rfl: 3    pneumococcal 23-merritt ps (PNEUMOVAX 23) 25 mcg/0.5 mL vaccine, Inject into the muscle., Disp: 0.5 mL, Rfl: 0    rosuvastatin (CRESTOR) 10 MG tablet, Take 1 tablet (10 mg total) by mouth once daily., Disp: 90 tablet, Rfl: 3    TRULICITY 0.75 mg/0.5 mL pen injector, INJECT 0.75 MG(0.5 ML) UNDER THE SKIN EVERY 7 DAYS, Disp: 4 pen, Rfl: 11    valACYclovir (VALTREX) 500 MG tablet, Take 1 tablet (500 mg total) by mouth 2 (two) times daily. for 3 days, Disp: 6 tablet, Rfl: 6    varicella-zoster gE-AS01B, PF, (SHINGRIX, PF,) 50 mcg/0.5 mL injection, Inject into the muscle., Disp: 1 each, Rfl: 1      Review of Systems:  As per HPI otherwise noncontributory    OBJECTIVE:      Vital Signs (Most Recent):  There were no vitals filed for this visit.  There is no height or weight on file to calculate BMI.      Physical Exam:  Constitutional: The patient appears well-developed and well-nourished. No  distress.   Skin: No lesions appreciated  Head: Normocephalic and atraumatic.   Nose: Nose normal.   Ears: No deformities seen  Eyes: Conjunctivae and EOM are normal.   Neck: No tracheal deviation present.   Cardiovascular: Normal rate and intact distal pulses.    Pulmonary/Chest: Effort normal. No respiratory distress.   Abdominal: There is no guarding.   Neurological: The patient is alert.   Psychiatric: The patient has a normal mood and affect.     Bilateral Hand/Wrist Examination:    Observation/Inspection:  Swelling  none    Deformity  none  Discoloration  none     Scars   none    Atrophy  None  Significant tenderness to palpation at the right index and long finger A1 pulleys with clicking felt with range of motion in the long finger      HAND/WRIST EXAMINATION:  Finkelstein's Test   Neg  WHAT Test    Neg  Snuff box tenderness   Neg  Osullivan's Test    Neg  Hook of Hamate Tenderness  Neg  CMC grind    Neg  Circumduction test   Neg    Neurovascular Exam:  Digits WWP, brisk CR < 3s throughout  NVI motor/LTS to M/R/U nerves, radial pulse 2+  Tinel's Test - Carpal Tunnel  mildly positive  Tinel's Test - Cubital Tunnel  mildly positive  Phalen's Test    mildly positive  Median Nerve Compression Test mildly positive    ROM hand full, painless except in the right index and long fingers    ROM wrist full, painless    ROM elbow full, painless    Abdomen not guarded  Respirations nonlabored  Perfusion intact    Diagnostic Results:     Imaging - I independently viewed the patient's imaging as well as the radiology report.  Xrays of the patient's right hand  demonstrate no evidence of any acute fractures or dislocations with mild degenerative changes.    EMG - none    ASSESSMENT/PLAN:      66 y.o. yo male with right index and long finger trigger fingers, bilateral carpal and cubital tunnel syndromes  Plan: The patient and I had a thorough discussion today.  We discussed the working diagnosis as well as several other  potential alternative diagnoses.  Treatment options were discussed, both conservative and surgical.  Conservative treatment options would include things such as activity modifications, workplace modifications, a period of rest, oral vs topical OTC and prescription anti-inflammatory medications, occupational therapy, splinting/bracing, immobilization, corticosteroid injections, and others.  Surgical options were discussed as well.     At this time, the patient would like to proceed with a trial of a right long finger corticosteroid injection into the A1 pulley as well as an EMG to evaluate for carpal and/or cubital tunnel syndromes, bilateral carpal tunnel braces to be worn at nighttime, a trigger finger splint which he was provided a handout for, anti-inflammatory medications as needed for pain, and activity modifications..    Should the patient's symptoms worsen, persist, or fail to improve they should return for reevaluation and I would be happy to see them back anytime.        Wiley Warren M.D.     Please be aware that this note has been generated with the assistance of [employer] voice-to-text.  Please excuse any spelling or grammatical errors.    Thank you for choosing Dr. Wiley Warren for your orthopedic hand and upper extremity care. It is our goal to provide you with exceptional care that will help keep you healthy, active, and get you back in the game.     If you felt that you received exemplary care today, please consider leaving feedback for Dr. Warren on [employer] at [email or web address].    Please do not hesitate to reach out to us via email, phone, or MyChart with any questions, concerns, or feedback.

## 2022-02-17 NOTE — PROCEDURES
Tendon Sheath    Date/Time: 2/17/2022 10:30 AM  Performed by: Wiley Warren MD  Authorized by: Wiley Warren MD     Consent Done?:  Yes (Verbal)  Indications:  Pain  Site marked: the procedure site was marked    Timeout: prior to procedure the correct patient, procedure, and site was verified    Prep: patient was prepped and draped in usual sterile fashion      Local anesthesia used?: Yes    Local anesthetic: Topical spray prior to injection and 1cc 1% plain lidocaine in the injectate.  Location:  Long finger  Site:  R long flexor tendon sheath  Ultrasonic guidance for needle placement?: No    Needle size:  25 G  Approach:  Volar  Medications:  4 mg dexamethasone 4 mg/mL  Patient tolerance:  Patient tolerated the procedure well with no immediate complications

## 2022-03-08 ENCOUNTER — PROCEDURE VISIT (OUTPATIENT)
Dept: PHYSICAL MEDICINE AND REHAB | Facility: CLINIC | Age: 67
End: 2022-03-08
Payer: MEDICARE

## 2022-03-08 DIAGNOSIS — G56.03 BILATERAL CARPAL TUNNEL SYNDROME: ICD-10-CM

## 2022-03-08 DIAGNOSIS — G56.23 CUBITAL TUNNEL SYNDROME, BILATERAL: ICD-10-CM

## 2022-03-08 PROCEDURE — 95911 NRV CNDJ TEST 9-10 STUDIES: CPT | Mod: HCNC,S$GLB,, | Performed by: PHYSICAL MEDICINE & REHABILITATION

## 2022-03-08 PROCEDURE — 95911 PR NERVE CONDUCTION STUDY; 9-10 STUDIES: ICD-10-PCS | Mod: HCNC,S$GLB,, | Performed by: PHYSICAL MEDICINE & REHABILITATION

## 2022-03-08 PROCEDURE — 95886 PR EMG COMPLETE, W/ NERVE CONDUCTION STUDIES, 5+ MUSCLES: ICD-10-PCS | Mod: HCNC,S$GLB,, | Performed by: PHYSICAL MEDICINE & REHABILITATION

## 2022-03-08 PROCEDURE — 95886 MUSC TEST DONE W/N TEST COMP: CPT | Mod: HCNC,S$GLB,, | Performed by: PHYSICAL MEDICINE & REHABILITATION

## 2022-03-08 NOTE — PROCEDURES
Procedures         [facility]  Physical Medicine and Rehabilitation   [address] [place] [address], [address]  [place] [address]             Full Name: Karthik Bentley YOB: 1955  Patient ID: [id number]      Visit Date: 3/8/2022 11:18  Age: 66 Years 8 Months Old  Examining Physician: Teddy Interiano D.O.       Sensory NCS      Nerve / Sites Rec. Site Onset Lat Peak Lat NP Amp Segments Distance Velocity     ms ms µV  cm m/s   R Median - Digit II (Antidromic)      Wrist Dig II NR NR NR Wrist - Dig II 14 NR   L Median - Digit II (Antidromic)      Wrist Dig II 4.84 6.88 14.7 Wrist - Dig II 14 29   R Ulnar - Digit V (Antidromic)      Wrist Dig V 3.13 3.65 3.4 Wrist - Dig V 14 45   L Ulnar - Digit V (Antidromic)      Wrist Dig V 3.49 4.38 9.6 Wrist - Dig V 14 40   R Radial - Anatomical snuff box (Forearm)      Forearm Wrist 2.14 2.71 10.1 Forearm - Wrist 10 47       Motor NCS      Nerve / Sites Muscle Latency Amplitude Amp % Duration Segments Distance Lat Diff Velocity     ms mV % ms  cm ms m/s   R Median - APB      Wrist APB 7.14 6.4 100 7.60 Wrist - APB 8        Elbow APB 12.55 5.3 82.3  Elbow - Wrist 22 5.42 41   L Median - APB      Wrist APB 6.56 8.3 100 8.02 Wrist - APB 8        Elbow APB 11.88 7.9 96.1  Elbow - Wrist 24 5.31 45   R Ulnar - ADM      Wrist ADM 2.92 8.4 100 5.52 Wrist - ADM 8        B.Elbow ADM 7.08 5.4 65.1 5.99 B.Elbow - Wrist 22 4.17 53      A.Elbow ADM 8.70 7.5 89.8 6.51 A.Elbow - B.Elbow 8 1.61 50   L Ulnar - ADM      Wrist ADM 3.49 6.3 100 6.35 Wrist - ADM 8        B.Elbow ADM 7.71 4.2 67.5 6.82 B.Elbow - Wrist 21 4.22 50      A.Elbow ADM 9.64 5.8 92.2 7.55 A.Elbow - B.Elbow 8 1.93 42       EMG Summary Table     Spontaneous MUAP Recruitment   Muscle IA Fib PSW Fasc Other Amp Dur. PPP Pattern   R. Deltoid N None None None . N N N N   R. Biceps brachii N None None None . N N N N   R. Triceps brachii N None None None . N N N N   R. Pronator teres 1+ 1+ 1+ None . N N N Reduced   R. First  dorsal interosseous N None None None . N N N N   R. Extensor indicis proprius 1+ None None None . N 1+ 1+ Reduced       Summary    The motor conduction test was performed on 4 nerve(s). The results were normal in 1 nerve(s): R Ulnar - ADM. Results outside the specified normal range were found in 3 nerve(s), as follows:   In the R Median - APB study  o the take off latency result was increased for Wrist stimulation  o the take off velocity result was reduced for Elbow - Wrist segment   In the L Median - APB study  o the take off latency result was increased for Wrist stimulation  o the take off velocity result was reduced for Elbow - Wrist segment   In the L Ulnar - ADM study  o the take off velocity result was reduced for B.Elbow - Wrist segment    The sensory conduction test had results outside of the specified normal range in all 5 of the tested nerves:   In the R Median - Digit II (Antidromic) study  o the response was considered absent for Wrist stimulation   In the L Median - Digit II (Antidromic) study  o the peak latency result was increased for Wrist stimulation  o the peak amplitude result was reduced for Wrist stimulation   In the R Ulnar - Digit V (Antidromic) study  o the peak amplitude result was reduced for Wrist stimulation   In the L Ulnar - Digit V (Antidromic) study  o the peak latency result was increased for Wrist stimulation  o the peak amplitude result was reduced for Wrist stimulation   In the R Radial - Anatomical snuff box (Forearm) study  o the peak latency result was increased for Forearm stimulation  o the peak amplitude result was reduced for Forearm stimulation    The needle EMG examination was performed in 6 muscles. It was normal in 4 muscle(s): R. Deltoid, R. Biceps brachii, R. Triceps brachii, R. First dorsal interosseous. The study was abnormal in 2 muscle(s), with the following distribution:   Abnormal spontaneous/insertional activity was found in R. Pronator teres, R.  Extensor indicis proprius.   The MUP waveform abnormality was found in R. Extensor indicis proprius.   Abnormal interference pattern was found in R. Pronator teres, R. Extensor indicis proprius.          Impression:  Abnormal examination.  There is electrodiagnostic evidence of:  1. Moderate to severe right median mononeuropathy at the wrist with predominantly demyelinating features and mild axonal features (carpal tunnel syndrome.)  2. Moderate left median mononeuropathy at the wrist with demyelinating features only (carpal tunnel syndrome.)  3. Likely an acute on chronic right C7 radiculopathy.  4. Mild ulnar neuropathy at the elbow on the left    Clinical history:  The chief complaint of bilateral right greater than left hand paresthesias in the thumb, index, middle finger is consistent with the above findings.  There is moderate to severe right and moderate left carpal tunnel syndromes.  There is likely an acute on chronic right C7 radiculopathy.  The patient was briefly counselled the above diagnoses.  I recommend a MRI of the cervical spine as well to assess for any C7 nerve root impingement      ____________________________  Teddy Interiano D.O.  American Board of Physical Medicine and Rehabilitation  American Board of Pain Medicine  American Board of Electrodiagnostic Medicine  Registered in Musculoskeletal Ultrasound

## 2022-03-14 DIAGNOSIS — E11.9 TYPE 2 DIABETES MELLITUS WITHOUT COMPLICATION: ICD-10-CM

## 2022-03-17 ENCOUNTER — OFFICE VISIT (OUTPATIENT)
Dept: ORTHOPEDICS | Facility: CLINIC | Age: 67
End: 2022-03-17
Payer: MEDICARE

## 2022-03-17 VITALS — HEIGHT: 68 IN | WEIGHT: 194 LBS | BODY MASS INDEX: 29.4 KG/M2

## 2022-03-17 DIAGNOSIS — G56.00 CTS (CARPAL TUNNEL SYNDROME): ICD-10-CM

## 2022-03-17 DIAGNOSIS — G56.01 CARPAL TUNNEL SYNDROME OF RIGHT WRIST: Primary | ICD-10-CM

## 2022-03-17 PROCEDURE — 1159F MED LIST DOCD IN RCRD: CPT | Mod: CPTII,S$GLB,, | Performed by: ORTHOPAEDIC SURGERY

## 2022-03-17 PROCEDURE — 99999 PR PBB SHADOW E&M-EST. PATIENT-LVL III: ICD-10-PCS | Mod: PBBFAC,HCNC,, | Performed by: ORTHOPAEDIC SURGERY

## 2022-03-17 PROCEDURE — 3052F PR MOST RECENT HEMOGLOBIN A1C LEVEL 8.0 - < 9.0%: ICD-10-PCS | Mod: CPTII,S$GLB,, | Performed by: ORTHOPAEDIC SURGERY

## 2022-03-17 PROCEDURE — 1125F AMNT PAIN NOTED PAIN PRSNT: CPT | Mod: CPTII,S$GLB,, | Performed by: ORTHOPAEDIC SURGERY

## 2022-03-17 PROCEDURE — 1125F PR PAIN SEVERITY QUANTIFIED, PAIN PRESENT: ICD-10-PCS | Mod: CPTII,S$GLB,, | Performed by: ORTHOPAEDIC SURGERY

## 2022-03-17 PROCEDURE — 1101F PR PT FALLS ASSESS DOC 0-1 FALLS W/OUT INJ PAST YR: ICD-10-PCS | Mod: CPTII,S$GLB,, | Performed by: ORTHOPAEDIC SURGERY

## 2022-03-17 PROCEDURE — 3288F FALL RISK ASSESSMENT DOCD: CPT | Mod: CPTII,S$GLB,, | Performed by: ORTHOPAEDIC SURGERY

## 2022-03-17 PROCEDURE — 3008F PR BODY MASS INDEX (BMI) DOCUMENTED: ICD-10-PCS | Mod: CPTII,S$GLB,, | Performed by: ORTHOPAEDIC SURGERY

## 2022-03-17 PROCEDURE — 3072F PR LOW RISK FOR RETINOPATHY: ICD-10-PCS | Mod: CPTII,S$GLB,, | Performed by: ORTHOPAEDIC SURGERY

## 2022-03-17 PROCEDURE — 99999 PR PBB SHADOW E&M-EST. PATIENT-LVL III: CPT | Mod: PBBFAC,HCNC,, | Performed by: ORTHOPAEDIC SURGERY

## 2022-03-17 PROCEDURE — 99214 PR OFFICE/OUTPT VISIT, EST, LEVL IV, 30-39 MIN: ICD-10-PCS | Mod: S$GLB,,, | Performed by: ORTHOPAEDIC SURGERY

## 2022-03-17 PROCEDURE — 3052F HG A1C>EQUAL 8.0%<EQUAL 9.0%: CPT | Mod: CPTII,S$GLB,, | Performed by: ORTHOPAEDIC SURGERY

## 2022-03-17 PROCEDURE — 1159F PR MEDICATION LIST DOCUMENTED IN MEDICAL RECORD: ICD-10-PCS | Mod: CPTII,S$GLB,, | Performed by: ORTHOPAEDIC SURGERY

## 2022-03-17 PROCEDURE — 3288F PR FALLS RISK ASSESSMENT DOCUMENTED: ICD-10-PCS | Mod: CPTII,S$GLB,, | Performed by: ORTHOPAEDIC SURGERY

## 2022-03-17 PROCEDURE — 3008F BODY MASS INDEX DOCD: CPT | Mod: CPTII,S$GLB,, | Performed by: ORTHOPAEDIC SURGERY

## 2022-03-17 PROCEDURE — 99214 OFFICE O/P EST MOD 30 MIN: CPT | Mod: S$GLB,,, | Performed by: ORTHOPAEDIC SURGERY

## 2022-03-17 PROCEDURE — 1101F PT FALLS ASSESS-DOCD LE1/YR: CPT | Mod: CPTII,S$GLB,, | Performed by: ORTHOPAEDIC SURGERY

## 2022-03-17 PROCEDURE — 3072F LOW RISK FOR RETINOPATHY: CPT | Mod: CPTII,S$GLB,, | Performed by: ORTHOPAEDIC SURGERY

## 2022-03-17 RX ORDER — MUPIROCIN 20 MG/G
OINTMENT TOPICAL
Status: CANCELLED | OUTPATIENT
Start: 2022-03-17

## 2022-03-17 RX ORDER — CEFAZOLIN SODIUM 1 G/3ML
2 INJECTION, POWDER, FOR SOLUTION INTRAMUSCULAR; INTRAVENOUS
Status: CANCELLED | OUTPATIENT
Start: 2022-03-17

## 2022-03-17 NOTE — PROGRESS NOTES
[facility]  History & Physical  Orthopedics    SUBJECTIVE:      COVID-19 attestation:  This patient was treated during the COVID-19 pandemic.  This was discussed with the patient, they are aware of our current policies and procedures, were given the option of delaying their visit and or switching to a virtual visit, delaying their surgery when applicable, and they elect to proceed.    Chief Complaint:  Right index and long fingers    Referring Provider: No ref. provider found     History of Present Illness:  Patient is a 66 y.o. right hand dominant male who presents today with complaints of pain and stiffness to the right index and long fingers.  This is his primary complaint today.  He notes these are associated with some pain in the palm near the A1 pulleys of the same fingers.  He notes that the long finger occasionally snaps clicks and locks.  He has done some online research and feels that he may be suffering from trigger fingers.  Secondarily he describes numbness and tingling in the right greater than left upper extremities extending primarily from the elbows into the hands.  This is bothersome while sleeping in a wakes him and he has to shake his hands out to regain sensation.  He denies any other complaints today and presents for initial evaluation.     Interval history March 17, 2022:  The patient returns today for re-evaluation.  He was recently seen by Dr. Interiano and had an EMG performed to evaluate for peripheral nerve compressive neuropathies.  He returns for these results and re-evaluation.  His trigger fingers are not bothering him a whole lot these days.  He does still have some swelling and stiffness in the right index and long fingers but no betty triggering or pulley pain at this point time.  He has been attempting nocturnal carpal tunnel splinting with minimal relief.  He returns for re-evaluation.    The patient is a/an retired.    Onset of symptoms/DOI was 2 months  ago.    Symptoms are aggravated by activity, movement and at night.    Symptoms are alleviated by rest and during the day.    Symptoms consist of pain, swelling, decreased ROM and numbness/tingling.    The patient rates their pain as a 2/10.    Attempted treatment(s) and/or interventions include activity modifications, rest     The patient denies any fevers, chills, N/V, D/C and presents for evaluation.       Past Medical History:   Diagnosis Date    Diabetes mellitus type II     Hypertension      Past Surgical History:   Procedure Laterality Date    COLONOSCOPY N/A 1/16/2020    Procedure: COLONOSCOPY;  Surgeon: Cynthia Kearney MD;  Location: [facility] ([address] [place] [address]);  Service: Endoscopy;  Laterality: N/A;    SHOULDER SURGERY       Review of patient's allergies indicates:  No Known Allergies  Social History     Social History Narrative    Not on file     Family History   Problem Relation Age of Onset    Bone cancer Mother     Colon cancer Father          Current Outpatient Medications:     blood sugar diagnostic Strp, To check BG 1 times daily, to use with insurance preferred meter, Disp: 100 strip, Rfl: 3    blood-glucose meter kit, To check BG 1 times daily, to use with insurance preferred meter, Disp: 1 each, Rfl: 0    gentian violet 2 % topical solution, Apply 0.5 mLs topically daily as needed. Apply between toes., Disp: 59 mL, Rfl: 1    lancets Misc, To check BG 1 times daily, to use with insurance preferred meter, Disp: 100 each, Rfl: 3    metFORMIN (GLUCOPHAGE) 1000 MG tablet, Take 1 tablet (1,000 mg total) by mouth 2 (two) times daily with meals., Disp: 180 tablet, Rfl: 3    pneumococcal 23-merritt ps (PNEUMOVAX 23) 25 mcg/0.5 mL vaccine, Inject into the muscle., Disp: 0.5 mL, Rfl: 0    rosuvastatin (CRESTOR) 10 MG tablet, Take 1 tablet (10 mg total) by mouth once daily., Disp: 90 tablet, Rfl: 3    TRULICITY 0.75 mg/0.5 mL pen injector, INJECT 0.75 MG(0.5 ML) UNDER THE SKIN EVERY 7 DAYS, Disp: 4  "pen, Rfl: 11    valACYclovir (VALTREX) 500 MG tablet, Take 1 tablet (500 mg total) by mouth 2 (two) times daily. for 3 days, Disp: 6 tablet, Rfl: 6    varicella-zoster gE-AS01B, PF, (SHINGRIX, PF,) 50 mcg/0.5 mL injection, Inject into the muscle., Disp: 1 each, Rfl: 1      Review of Systems:  As per HPI otherwise noncontributory    OBJECTIVE:      Vital Signs (Most Recent):  Vitals:    03/17/22 1038   Weight: 88 kg (194 lb)   Height: 5' 8" (1.727 m)     Body mass index is 29.5 kg/m².      Physical Exam:  Constitutional: The patient appears well-developed and well-nourished. No distress.   Skin: No lesions appreciated  Head: Normocephalic and atraumatic.   Nose: Nose normal.   Ears: No deformities seen  Eyes: Conjunctivae and EOM are normal.   Neck: No tracheal deviation present.   Cardiovascular: Normal rate and intact distal pulses.    Pulmonary/Chest: Effort normal. No respiratory distress.   Abdominal: There is no guarding.   Neurological: The patient is alert.   Psychiatric: The patient has a normal mood and affect.     Bilateral Hand/Wrist Examination:    Observation/Inspection:  Swelling  none    Deformity  none  Discoloration  none     Scars   none    Atrophy  None  Today the patient has really no tenderness to palpation at the right index and long finger A1 pulleys and there is no clicking felt with range of motion in the long finger      HAND/WRIST EXAMINATION:  Finkelstein's Test   Neg  WHAT Test    Neg  Snuff box tenderness   Neg  Osullivan's Test    Neg  Hook of Hamate Tenderness  Neg  CMC grind    Neg  Circumduction test   Neg    Neurovascular Exam:  Digits WWP, brisk CR < 3s throughout  NVI motor/LTS to M/R/U nerves, radial pulse 2+  Tinel's Test - Carpal Tunnel  mildly positive  Tinel's Test - Cubital Tunnel  negative on the right today  Phalen's Test    mildly positive  Median Nerve Compression Test mildly positive    ROM hand full, painless except in the right index and long fingers    ROM wrist " full, painless    ROM elbow full, painless    Abdomen not guarded  Respirations nonlabored  Perfusion intact    Diagnostic Results:     Imaging - I independently viewed the patient's imaging as well as the radiology report.  Xrays of the patient's right hand  demonstrate no evidence of any acute fractures or dislocations with mild degenerative changes.    EMG - Impression:  Abnormal examination.  There is electrodiagnostic evidence of:  1. Moderate to severe right median mononeuropathy at the wrist with predominantly demyelinating features and mild axonal features (carpal tunnel syndrome.)  2. Moderate left median mononeuropathy at the wrist with demyelinating features only (carpal tunnel syndrome.)  3. Likely an acute on chronic right C7 radiculopathy.  4. Mild ulnar neuropathy at the elbow on the left       ASSESSMENT/PLAN:      66 y.o. yo male with right carpal tunnel syndrome  Plan: The patient and I had a thorough discussion today.  We discussed the working diagnosis as well as several other potential alternative diagnoses.  Treatment options were discussed, both conservative and surgical.  Conservative treatment options would include things such as activity modifications, workplace modifications, a period of rest, oral vs topical OTC and prescription anti-inflammatory medications, occupational therapy, splinting/bracing, immobilization, corticosteroid injections, and others.  Surgical options were discussed as well.     At this time, we discussed referral to Spine for evaluation of cervical radiculopathy versus proceeding with a carpal tunnel release.  He declines workup for his neck at this time.  The patient would like to proceed with a right endoscopic versus open carpal tunnel release on April 1, 2022 which I feel is reasonable.  We will get this set up.    The patient has not responded to adequate non operative treatment at this time and/or non operative treatment is not indicated. Thus, the risks,  benefits and alternatives to surgery were discussed with the patient in detail.  Specific risks include but are not limited to bleeding, infection, vessel and/or nerve damage, pain, numbness, tingling, complex regional pain syndrome, compartment syndrome, failure to return to pre-injury and/or preoperative functional status, scar sensitivity, delayed healing, inability to return to work, pulley injury, tendon injury, bowstringing, partial and/or incomplete relief of symptoms, weakness, persistence of and/or worsening of symptoms, surgical failure, osteomyelitis, amputation, loss of function, stiffness, functional debility, dysfunction, decreased  strength, need for prolonged postoperative rehabilitation, need for further surgery, deep venous thrombosis, pulmonary embolism, arthritis and death.  The patient states an understanding and wishes to proceed with surgery.   All questions were answered.  No guarantees were implied or stated.  Written informed consent was obtained.        Wiley Warren M.D.     Please be aware that this note has been generated with the assistance of [employer] voice-to-text.  Please excuse any spelling or grammatical errors.    Thank you for choosing Dr. Wiley Warren for your orthopedic hand and upper extremity care. It is our goal to provide you with exceptional care that will help keep you healthy, active, and get you back in the game.     If you felt that you received exemplary care today, please consider leaving feedback for Dr. Warren on [email or web address] at [email or web address].    Please do not hesitate to reach out to us via email, phone, or MyChart with any questions, concerns, or feedback.

## 2022-03-17 NOTE — H&P (VIEW-ONLY)
[facility]  History & Physical  Orthopedics     SUBJECTIVE:       COVID-19 attestation:  This patient was treated during the COVID-19 pandemic.  This was discussed with the patient, they are aware of our current policies and procedures, were given the option of delaying their visit and or switching to a virtual visit, delaying their surgery when applicable, and they elect to proceed.     Chief Complaint:  Right index and long fingers     Referring Provider: No ref. provider found      History of Present Illness:  Patient is a 66 y.o. right hand dominant male who presents today with complaints of pain and stiffness to the right index and long fingers.  This is his primary complaint today.  He notes these are associated with some pain in the palm near the A1 pulleys of the same fingers.  He notes that the long finger occasionally snaps clicks and locks.  He has done some online research and feels that he may be suffering from trigger fingers.  Secondarily he describes numbness and tingling in the right greater than left upper extremities extending primarily from the elbows into the hands.  This is bothersome while sleeping in a wakes him and he has to shake his hands out to regain sensation.  He denies any other complaints today and presents for initial evaluation.      Interval history March 17, 2022:  The patient returns today for re-evaluation.  He was recently seen by Dr. Interiano and had an EMG performed to evaluate for peripheral nerve compressive neuropathies.  He returns for these results and re-evaluation.  His trigger fingers are not bothering him a whole lot these days.  He does still have some swelling and stiffness in the right index and long fingers but no betty triggering or pulley pain at this point time.  He has been attempting nocturnal carpal tunnel splinting with minimal relief.  He returns for re-evaluation.     The patient is a/an retired.     Onset of symptoms/DOI was 2 months  ago.     Symptoms are aggravated by activity, movement and at night.     Symptoms are alleviated by rest and during the day.     Symptoms consist of pain, swelling, decreased ROM and numbness/tingling.     The patient rates their pain as a 2/10.     Attempted treatment(s) and/or interventions include activity modifications, rest     The patient denies any fevers, chills, N/V, D/C and presents for evaluation.             Past Medical History:   Diagnosis Date    Diabetes mellitus type II      Hypertension              Past Surgical History:   Procedure Laterality Date    COLONOSCOPY N/A 1/16/2020     Procedure: COLONOSCOPY;  Surgeon: Cynthia Kearney MD;  Location: [facility] ([address] [place] [address]);  Service: Endoscopy;  Laterality: N/A;    SHOULDER SURGERY          Review of patient's allergies indicates:  No Known Allergies  Social History          Social History Narrative    Not on file            Family History   Problem Relation Age of Onset    Bone cancer Mother      Colon cancer Father              Current Outpatient Medications:     blood sugar diagnostic Strp, To check BG 1 times daily, to use with insurance preferred meter, Disp: 100 strip, Rfl: 3    blood-glucose meter kit, To check BG 1 times daily, to use with insurance preferred meter, Disp: 1 each, Rfl: 0    gentian violet 2 % topical solution, Apply 0.5 mLs topically daily as needed. Apply between toes., Disp: 59 mL, Rfl: 1    lancets Misc, To check BG 1 times daily, to use with insurance preferred meter, Disp: 100 each, Rfl: 3    metFORMIN (GLUCOPHAGE) 1000 MG tablet, Take 1 tablet (1,000 mg total) by mouth 2 (two) times daily with meals., Disp: 180 tablet, Rfl: 3    pneumococcal 23-merritt ps (PNEUMOVAX 23) 25 mcg/0.5 mL vaccine, Inject into the muscle., Disp: 0.5 mL, Rfl: 0    rosuvastatin (CRESTOR) 10 MG tablet, Take 1 tablet (10 mg total) by mouth once daily., Disp: 90 tablet, Rfl: 3    TRULICITY 0.75 mg/0.5 mL pen injector, INJECT 0.75 MG(0.5  "ML) UNDER THE SKIN EVERY 7 DAYS, Disp: 4 pen, Rfl: 11    valACYclovir (VALTREX) 500 MG tablet, Take 1 tablet (500 mg total) by mouth 2 (two) times daily. for 3 days, Disp: 6 tablet, Rfl: 6    varicella-zoster gE-AS01B, PF, (SHINGRIX, PF,) 50 mcg/0.5 mL injection, Inject into the muscle., Disp: 1 each, Rfl: 1        Review of Systems:  As per HPI otherwise noncontributory     OBJECTIVE:       Vital Signs (Most Recent):  Vitals       Vitals:     03/17/22 1038   Weight: 88 kg (194 lb)   Height: 5' 8" (1.727 m)         Body mass index is 29.5 kg/m².        Physical Exam:  Constitutional: The patient appears well-developed and well-nourished. No distress.   Skin: No lesions appreciated  Head: Normocephalic and atraumatic.   Nose: Nose normal.   Ears: No deformities seen  Eyes: Conjunctivae and EOM are normal.   Neck: No tracheal deviation present.   Cardiovascular: Normal rate and intact distal pulses.    Pulmonary/Chest: Effort normal. No respiratory distress.   Abdominal: There is no guarding.   Neurological: The patient is alert.   Psychiatric: The patient has a normal mood and affect.      Bilateral Hand/Wrist Examination:     Observation/Inspection:  Swelling                       none                  Deformity                     none  Discoloration               none                  Scars                           none                  Atrophy                        None  Today the patient has really no tenderness to palpation at the right index and long finger A1 pulleys and there is no clicking felt with range of motion in the long finger        HAND/WRIST EXAMINATION:  Finkelstein's Test                                Neg  WHAT Test                                         Neg  Snuff box tenderness                          Neg  Osullivan's Test                                     Neg  Hook of Hamate Tenderness              Neg  CMC grind                                           Neg  Circumduction test          "                     Neg     Neurovascular Exam:  Digits WWP, brisk CR < 3s throughout  NVI motor/LTS to M/R/U nerves, radial pulse 2+  Tinel's Test - Carpal Tunnel                mildly positive  Tinel's Test - Cubital Tunnel               negative on the right today  Phalen's Test                                      mildly positive  Median Nerve Compression Test       mildly positive     ROM hand full, painless except in the right index and long fingers     ROM wrist full, painless    ROM elbow full, painless     Abdomen not guarded  Respirations nonlabored  Perfusion intact     Diagnostic Results:     Imaging - I independently viewed the patient's imaging as well as the radiology report.  Xrays of the patient's right hand  demonstrate no evidence of any acute fractures or dislocations with mild degenerative changes.     EMG - Impression:  Abnormal examination.  There is electrodiagnostic evidence of:  1. Moderate to severe right median mononeuropathy at the wrist with predominantly demyelinating features and mild axonal features (carpal tunnel syndrome.)  2. Moderate left median mononeuropathy at the wrist with demyelinating features only (carpal tunnel syndrome.)  3. Likely an acute on chronic right C7 radiculopathy.  4. Mild ulnar neuropathy at the elbow on the left        ASSESSMENT/PLAN:       66 y.o. yo male with right carpal tunnel syndrome  Plan: The patient and I had a thorough discussion today.  We discussed the working diagnosis as well as several other potential alternative diagnoses.  Treatment options were discussed, both conservative and surgical.  Conservative treatment options would include things such as activity modifications, workplace modifications, a period of rest, oral vs topical OTC and prescription anti-inflammatory medications, occupational therapy, splinting/bracing, immobilization, corticosteroid injections, and others.  Surgical options were discussed as well.      At this time, we  discussed referral to Spine for evaluation of cervical radiculopathy versus proceeding with a carpal tunnel release.  He declines workup for his neck at this time.  The patient would like to proceed with a right endoscopic versus open carpal tunnel release on April 1, 2022 which I feel is reasonable.  We will get this set up.     The patient has not responded to adequate non operative treatment at this time and/or non operative treatment is not indicated. Thus, the risks, benefits and alternatives to surgery were discussed with the patient in detail.  Specific risks include but are not limited to bleeding, infection, vessel and/or nerve damage, pain, numbness, tingling, complex regional pain syndrome, compartment syndrome, failure to return to pre-injury and/or preoperative functional status, scar sensitivity, delayed healing, inability to return to work, pulley injury, tendon injury, bowstringing, partial and/or incomplete relief of symptoms, weakness, persistence of and/or worsening of symptoms, surgical failure, osteomyelitis, amputation, loss of function, stiffness, functional debility, dysfunction, decreased  strength, need for prolonged postoperative rehabilitation, need for further surgery, deep venous thrombosis, pulmonary embolism, arthritis and death.  The patient states an understanding and wishes to proceed with surgery.   All questions were answered.  No guarantees were implied or stated.  Written informed consent was obtained.          Wiley Warern M.D.     · Please be aware that this note has been generated with the assistance of [employer] voice-to-text.  Please excuse any spelling or grammatical errors.     Thank you for choosing Dr. Wiley Warren for your orthopedic hand and upper extremity care. It is our goal to provide you with exceptional care that will help keep you healthy, active, and get you back in the game.     If you felt that you received exemplary care today, please consider leaving  feedback for Dr. Warren on [employer] at [email or web address].     Please do not hesitate to reach out to us via email, phone, or MyChart with any questions, concerns, or feedback.

## 2022-03-17 NOTE — H&P
[facility]  History & Physical  Orthopedics     SUBJECTIVE:       COVID-19 attestation:  This patient was treated during the COVID-19 pandemic.  This was discussed with the patient, they are aware of our current policies and procedures, were given the option of delaying their visit and or switching to a virtual visit, delaying their surgery when applicable, and they elect to proceed.     Chief Complaint:  Right index and long fingers     Referring Provider: No ref. provider found      History of Present Illness:  Patient is a 66 y.o. right hand dominant male who presents today with complaints of pain and stiffness to the right index and long fingers.  This is his primary complaint today.  He notes these are associated with some pain in the palm near the A1 pulleys of the same fingers.  He notes that the long finger occasionally snaps clicks and locks.  He has done some online research and feels that he may be suffering from trigger fingers.  Secondarily he describes numbness and tingling in the right greater than left upper extremities extending primarily from the elbows into the hands.  This is bothersome while sleeping in a wakes him and he has to shake his hands out to regain sensation.  He denies any other complaints today and presents for initial evaluation.      Interval history March 17, 2022:  The patient returns today for re-evaluation.  He was recently seen by Dr. Interiano and had an EMG performed to evaluate for peripheral nerve compressive neuropathies.  He returns for these results and re-evaluation.  His trigger fingers are not bothering him a whole lot these days.  He does still have some swelling and stiffness in the right index and long fingers but no betty triggering or pulley pain at this point time.  He has been attempting nocturnal carpal tunnel splinting with minimal relief.  He returns for re-evaluation.     The patient is a/an retired.     Onset of symptoms/DOI was 2 months  ago.     Symptoms are aggravated by activity, movement and at night.     Symptoms are alleviated by rest and during the day.     Symptoms consist of pain, swelling, decreased ROM and numbness/tingling.     The patient rates their pain as a 2/10.     Attempted treatment(s) and/or interventions include activity modifications, rest     The patient denies any fevers, chills, N/V, D/C and presents for evaluation.             Past Medical History:   Diagnosis Date    Diabetes mellitus type II      Hypertension              Past Surgical History:   Procedure Laterality Date    COLONOSCOPY N/A 1/16/2020     Procedure: COLONOSCOPY;  Surgeon: Cynthia Kearney MD;  Location: [facility] ([address] [place] [address]);  Service: Endoscopy;  Laterality: N/A;    SHOULDER SURGERY          Review of patient's allergies indicates:  No Known Allergies  Social History          Social History Narrative    Not on file            Family History   Problem Relation Age of Onset    Bone cancer Mother      Colon cancer Father              Current Outpatient Medications:     blood sugar diagnostic Strp, To check BG 1 times daily, to use with insurance preferred meter, Disp: 100 strip, Rfl: 3    blood-glucose meter kit, To check BG 1 times daily, to use with insurance preferred meter, Disp: 1 each, Rfl: 0    gentian violet 2 % topical solution, Apply 0.5 mLs topically daily as needed. Apply between toes., Disp: 59 mL, Rfl: 1    lancets Misc, To check BG 1 times daily, to use with insurance preferred meter, Disp: 100 each, Rfl: 3    metFORMIN (GLUCOPHAGE) 1000 MG tablet, Take 1 tablet (1,000 mg total) by mouth 2 (two) times daily with meals., Disp: 180 tablet, Rfl: 3    pneumococcal 23-merritt ps (PNEUMOVAX 23) 25 mcg/0.5 mL vaccine, Inject into the muscle., Disp: 0.5 mL, Rfl: 0    rosuvastatin (CRESTOR) 10 MG tablet, Take 1 tablet (10 mg total) by mouth once daily., Disp: 90 tablet, Rfl: 3    TRULICITY 0.75 mg/0.5 mL pen injector, INJECT 0.75 MG(0.5  "ML) UNDER THE SKIN EVERY 7 DAYS, Disp: 4 pen, Rfl: 11    valACYclovir (VALTREX) 500 MG tablet, Take 1 tablet (500 mg total) by mouth 2 (two) times daily. for 3 days, Disp: 6 tablet, Rfl: 6    varicella-zoster gE-AS01B, PF, (SHINGRIX, PF,) 50 mcg/0.5 mL injection, Inject into the muscle., Disp: 1 each, Rfl: 1        Review of Systems:  As per HPI otherwise noncontributory     OBJECTIVE:       Vital Signs (Most Recent):  Vitals       Vitals:     03/17/22 1038   Weight: 88 kg (194 lb)   Height: 5' 8" (1.727 m)         Body mass index is 29.5 kg/m².        Physical Exam:  Constitutional: The patient appears well-developed and well-nourished. No distress.   Skin: No lesions appreciated  Head: Normocephalic and atraumatic.   Nose: Nose normal.   Ears: No deformities seen  Eyes: Conjunctivae and EOM are normal.   Neck: No tracheal deviation present.   Cardiovascular: Normal rate and intact distal pulses.    Pulmonary/Chest: Effort normal. No respiratory distress.   Abdominal: There is no guarding.   Neurological: The patient is alert.   Psychiatric: The patient has a normal mood and affect.      Bilateral Hand/Wrist Examination:     Observation/Inspection:  Swelling                       none                  Deformity                     none  Discoloration               none                  Scars                           none                  Atrophy                        None  Today the patient has really no tenderness to palpation at the right index and long finger A1 pulleys and there is no clicking felt with range of motion in the long finger        HAND/WRIST EXAMINATION:  Finkelstein's Test                                Neg  WHAT Test                                         Neg  Snuff box tenderness                          Neg  Osullivan's Test                                     Neg  Hook of Hamate Tenderness              Neg  CMC grind                                           Neg  Circumduction test          "                     Neg     Neurovascular Exam:  Digits WWP, brisk CR < 3s throughout  NVI motor/LTS to M/R/U nerves, radial pulse 2+  Tinel's Test - Carpal Tunnel                mildly positive  Tinel's Test - Cubital Tunnel               negative on the right today  Phalen's Test                                      mildly positive  Median Nerve Compression Test       mildly positive     ROM hand full, painless except in the right index and long fingers     ROM wrist full, painless    ROM elbow full, painless     Abdomen not guarded  Respirations nonlabored  Perfusion intact     Diagnostic Results:     Imaging - I independently viewed the patient's imaging as well as the radiology report.  Xrays of the patient's right hand  demonstrate no evidence of any acute fractures or dislocations with mild degenerative changes.     EMG - Impression:  Abnormal examination.  There is electrodiagnostic evidence of:  1. Moderate to severe right median mononeuropathy at the wrist with predominantly demyelinating features and mild axonal features (carpal tunnel syndrome.)  2. Moderate left median mononeuropathy at the wrist with demyelinating features only (carpal tunnel syndrome.)  3. Likely an acute on chronic right C7 radiculopathy.  4. Mild ulnar neuropathy at the elbow on the left        ASSESSMENT/PLAN:       66 y.o. yo male with right carpal tunnel syndrome  Plan: The patient and I had a thorough discussion today.  We discussed the working diagnosis as well as several other potential alternative diagnoses.  Treatment options were discussed, both conservative and surgical.  Conservative treatment options would include things such as activity modifications, workplace modifications, a period of rest, oral vs topical OTC and prescription anti-inflammatory medications, occupational therapy, splinting/bracing, immobilization, corticosteroid injections, and others.  Surgical options were discussed as well.      At this time, we  discussed referral to Spine for evaluation of cervical radiculopathy versus proceeding with a carpal tunnel release.  He declines workup for his neck at this time.  The patient would like to proceed with a right endoscopic versus open carpal tunnel release on April 1, 2022 which I feel is reasonable.  We will get this set up.     The patient has not responded to adequate non operative treatment at this time and/or non operative treatment is not indicated. Thus, the risks, benefits and alternatives to surgery were discussed with the patient in detail.  Specific risks include but are not limited to bleeding, infection, vessel and/or nerve damage, pain, numbness, tingling, complex regional pain syndrome, compartment syndrome, failure to return to pre-injury and/or preoperative functional status, scar sensitivity, delayed healing, inability to return to work, pulley injury, tendon injury, bowstringing, partial and/or incomplete relief of symptoms, weakness, persistence of and/or worsening of symptoms, surgical failure, osteomyelitis, amputation, loss of function, stiffness, functional debility, dysfunction, decreased  strength, need for prolonged postoperative rehabilitation, need for further surgery, deep venous thrombosis, pulmonary embolism, arthritis and death.  The patient states an understanding and wishes to proceed with surgery.   All questions were answered.  No guarantees were implied or stated.  Written informed consent was obtained.          Wiley Warren M.D.     · Please be aware that this note has been generated with the assistance of [employer] voice-to-text.  Please excuse any spelling or grammatical errors.     Thank you for choosing Dr. Wiley Warren for your orthopedic hand and upper extremity care. It is our goal to provide you with exceptional care that will help keep you healthy, active, and get you back in the game.     If you felt that you received exemplary care today, please consider leaving  feedback for Dr. Warren on [employer] at [email or web address].     Please do not hesitate to reach out to us via email, phone, or MyChart with any questions, concerns, or feedback.

## 2022-03-28 RX ORDER — TRAMADOL HYDROCHLORIDE 50 MG/1
50 TABLET ORAL EVERY 6 HOURS PRN
Qty: 20 TABLET | Refills: 0 | Status: SHIPPED | OUTPATIENT
Start: 2022-03-28 | End: 2022-10-10 | Stop reason: CLARIF

## 2022-03-29 ENCOUNTER — ANESTHESIA EVENT (OUTPATIENT)
Dept: SURGERY | Facility: HOSPITAL | Age: 67
End: 2022-03-29
Payer: MEDICARE

## 2022-03-31 ENCOUNTER — TELEPHONE (OUTPATIENT)
Dept: ORTHOPEDICS | Facility: CLINIC | Age: 67
End: 2022-03-31
Payer: MEDICARE

## 2022-03-31 NOTE — TELEPHONE ENCOUNTER
Spoke with the patient. Notified of 5 AM arrival time to the [facility].  Informed of current visitor policy.  Reminded of NPO and need for transportation. Patient verbalized understanding to all.    Martha Rodriguez MS, OTC  Clinical Assistant to Dr. Ross Dunbar [facility]

## 2022-04-01 ENCOUNTER — ANESTHESIA (OUTPATIENT)
Dept: SURGERY | Facility: HOSPITAL | Age: 67
End: 2022-04-01
Payer: MEDICARE

## 2022-04-01 ENCOUNTER — HOSPITAL ENCOUNTER (OUTPATIENT)
Facility: HOSPITAL | Age: 67
Discharge: HOME | End: 2022-04-01
Attending: ORTHOPAEDIC SURGERY | Admitting: ORTHOPAEDIC SURGERY
Payer: MEDICARE

## 2022-04-01 VITALS
WEIGHT: 192 LBS | TEMPERATURE: 98 F | HEART RATE: 53 BPM | BODY MASS INDEX: 29.1 KG/M2 | OXYGEN SATURATION: 98 % | HEIGHT: 68 IN | DIASTOLIC BLOOD PRESSURE: 70 MMHG | SYSTOLIC BLOOD PRESSURE: 113 MMHG | RESPIRATION RATE: 16 BRPM

## 2022-04-01 DIAGNOSIS — G56.00 CTS (CARPAL TUNNEL SYNDROME): ICD-10-CM

## 2022-04-01 DIAGNOSIS — G56.01 CARPAL TUNNEL SYNDROME OF RIGHT WRIST: ICD-10-CM

## 2022-04-01 LAB
POCT GLUCOSE: 135 MG/DL (ref 70–110)
POCT GLUCOSE: 152 MG/DL (ref 70–110)

## 2022-04-01 PROCEDURE — 27200750 HC INSULATED NEEDLE/ STIMUPLEX: Performed by: ANESTHESIOLOGY

## 2022-04-01 PROCEDURE — 63600175 PHARM REV CODE 636 W HCPCS: Performed by: ORTHOPAEDIC SURGERY

## 2022-04-01 PROCEDURE — 82962 GLUCOSE BLOOD TEST: CPT | Performed by: ORTHOPAEDIC SURGERY

## 2022-04-01 PROCEDURE — 64415 NJX AA&/STRD BRCH PLXS IMG: CPT | Performed by: ANESTHESIOLOGY

## 2022-04-01 PROCEDURE — 37000008 HC ANESTHESIA 1ST 15 MINUTES: Performed by: ORTHOPAEDIC SURGERY

## 2022-04-01 PROCEDURE — 29848 WRIST ENDOSCOPY/SURGERY: CPT | Mod: RT,,, | Performed by: ORTHOPAEDIC SURGERY

## 2022-04-01 PROCEDURE — 76942 ECHO GUIDE FOR BIOPSY: CPT | Mod: 26,,, | Performed by: ANESTHESIOLOGY

## 2022-04-01 PROCEDURE — 63600175 PHARM REV CODE 636 W HCPCS: Performed by: ANESTHESIOLOGY

## 2022-04-01 PROCEDURE — 63600175 PHARM REV CODE 636 W HCPCS: Performed by: NURSE ANESTHETIST, CERTIFIED REGISTERED

## 2022-04-01 PROCEDURE — 29848 PR WRIST ARTHROSCOP,RELEASE XVERS LIG: ICD-10-PCS | Mod: RT,,, | Performed by: ORTHOPAEDIC SURGERY

## 2022-04-01 PROCEDURE — 27201423 OPTIME MED/SURG SUP & DEVICES STERILE SUPPLY: Performed by: ORTHOPAEDIC SURGERY

## 2022-04-01 PROCEDURE — 37000009 HC ANESTHESIA EA ADD 15 MINS: Performed by: ORTHOPAEDIC SURGERY

## 2022-04-01 PROCEDURE — 36000707: Performed by: ORTHOPAEDIC SURGERY

## 2022-04-01 PROCEDURE — 63600175 PHARM REV CODE 636 W HCPCS: Performed by: SURGERY

## 2022-04-01 PROCEDURE — D9220A PRA ANESTHESIA: ICD-10-PCS | Mod: ,,, | Performed by: ANESTHESIOLOGY

## 2022-04-01 PROCEDURE — 71000015 HC POSTOP RECOV 1ST HR: Performed by: ORTHOPAEDIC SURGERY

## 2022-04-01 PROCEDURE — 64415 PR NERVE BLOCK INJ, ANES/STEROID, BRACHIAL PLEXUS, INCL IMAG GUIDANCE: ICD-10-PCS | Mod: 59,RT,, | Performed by: ANESTHESIOLOGY

## 2022-04-01 PROCEDURE — 71000033 HC RECOVERY, INTIAL HOUR: Performed by: ORTHOPAEDIC SURGERY

## 2022-04-01 PROCEDURE — 36000706: Performed by: ORTHOPAEDIC SURGERY

## 2022-04-01 PROCEDURE — 25000003 PHARM REV CODE 250: Performed by: SURGERY

## 2022-04-01 PROCEDURE — 99900035 HC TECH TIME PER 15 MIN (STAT)

## 2022-04-01 PROCEDURE — 25000003 PHARM REV CODE 250: Performed by: NURSE ANESTHETIST, CERTIFIED REGISTERED

## 2022-04-01 PROCEDURE — 25000003 PHARM REV CODE 250: Performed by: ORTHOPAEDIC SURGERY

## 2022-04-01 PROCEDURE — 64415 NJX AA&/STRD BRCH PLXS IMG: CPT | Mod: 59,RT,, | Performed by: ANESTHESIOLOGY

## 2022-04-01 PROCEDURE — 76942 PR U/S GUIDANCE FOR NEEDLE GUIDANCE: ICD-10-PCS | Mod: 26,,, | Performed by: ANESTHESIOLOGY

## 2022-04-01 PROCEDURE — 94761 N-INVAS EAR/PLS OXIMETRY MLT: CPT

## 2022-04-01 PROCEDURE — D9220A PRA ANESTHESIA: Mod: ,,, | Performed by: ANESTHESIOLOGY

## 2022-04-01 RX ORDER — CELECOXIB 200 MG/1
400 CAPSULE ORAL ONCE
Status: COMPLETED | OUTPATIENT
Start: 2022-04-01 | End: 2022-04-01

## 2022-04-01 RX ORDER — SODIUM CHLORIDE 0.9 % (FLUSH) 0.9 %
3 SYRINGE (ML) INJECTION EVERY 6 HOURS
Status: DISCONTINUED | OUTPATIENT
Start: 2022-04-01 | End: 2022-04-01 | Stop reason: HOSPADM

## 2022-04-01 RX ORDER — CLONIDINE 100 UG/ML
INJECTION, SOLUTION EPIDURAL
Status: DISCONTINUED
Start: 2022-04-01 | End: 2022-04-01 | Stop reason: HOSPADM

## 2022-04-01 RX ORDER — LIDOCAINE HYDROCHLORIDE 10 MG/ML
1 INJECTION, SOLUTION EPIDURAL; INFILTRATION; INTRACAUDAL; PERINEURAL ONCE
Status: DISCONTINUED | OUTPATIENT
Start: 2022-04-01 | End: 2022-09-20

## 2022-04-01 RX ORDER — CEFAZOLIN SODIUM/WATER 2 G/20 ML
2 SYRINGE (ML) INTRAVENOUS
Status: DISCONTINUED | OUTPATIENT
Start: 2022-04-01 | End: 2022-04-01 | Stop reason: HOSPADM

## 2022-04-01 RX ORDER — ONDANSETRON 2 MG/ML
INJECTION INTRAMUSCULAR; INTRAVENOUS
Status: DISCONTINUED | OUTPATIENT
Start: 2022-04-01 | End: 2022-04-01

## 2022-04-01 RX ORDER — FENTANYL CITRATE 50 UG/ML
25 INJECTION, SOLUTION INTRAMUSCULAR; INTRAVENOUS EVERY 5 MIN PRN
Status: DISCONTINUED | OUTPATIENT
Start: 2022-04-01 | End: 2022-04-01 | Stop reason: HOSPADM

## 2022-04-01 RX ORDER — MUPIROCIN 20 MG/G
OINTMENT TOPICAL
Status: DISCONTINUED | OUTPATIENT
Start: 2022-04-01 | End: 2022-04-01 | Stop reason: HOSPADM

## 2022-04-01 RX ORDER — HALOPERIDOL 5 MG/ML
0.5 INJECTION INTRAMUSCULAR EVERY 10 MIN PRN
Status: DISCONTINUED | OUTPATIENT
Start: 2022-04-01 | End: 2022-04-01 | Stop reason: HOSPADM

## 2022-04-01 RX ORDER — TRAMADOL HYDROCHLORIDE 50 MG/1
50 TABLET ORAL EVERY 6 HOURS PRN
Qty: 20 TABLET | Refills: 0 | Status: SHIPPED | OUTPATIENT
Start: 2022-04-01 | End: 2022-10-10 | Stop reason: CLARIF

## 2022-04-01 RX ORDER — ACETAMINOPHEN 500 MG
1000 TABLET ORAL
Status: COMPLETED | OUTPATIENT
Start: 2022-04-01 | End: 2022-04-01

## 2022-04-01 RX ORDER — FENTANYL CITRATE 50 UG/ML
25-200 INJECTION, SOLUTION INTRAMUSCULAR; INTRAVENOUS
Status: DISCONTINUED | OUTPATIENT
Start: 2022-04-01 | End: 2022-09-20

## 2022-04-01 RX ORDER — BUPIVACAINE HYDROCHLORIDE 5 MG/ML
INJECTION, SOLUTION EPIDURAL; INTRACAUDAL
Status: DISCONTINUED
Start: 2022-04-01 | End: 2022-04-01 | Stop reason: HOSPADM

## 2022-04-01 RX ORDER — DEXMEDETOMIDINE HYDROCHLORIDE 100 UG/ML
INJECTION, SOLUTION INTRAVENOUS
Status: DISCONTINUED | OUTPATIENT
Start: 2022-04-01 | End: 2022-04-01

## 2022-04-01 RX ORDER — DEXAMETHASONE SODIUM PHOSPHATE 4 MG/ML
INJECTION, SOLUTION INTRA-ARTICULAR; INTRALESIONAL; INTRAMUSCULAR; INTRAVENOUS; SOFT TISSUE
Status: DISCONTINUED | OUTPATIENT
Start: 2022-04-01 | End: 2022-04-01

## 2022-04-01 RX ORDER — BUPIVACAINE HYDROCHLORIDE 2.5 MG/ML
INJECTION, SOLUTION EPIDURAL; INFILTRATION; INTRACAUDAL
Status: DISCONTINUED
Start: 2022-04-01 | End: 2022-04-01 | Stop reason: HOSPADM

## 2022-04-01 RX ORDER — DEXAMETHASONE SODIUM PHOSPHATE 10 MG/ML
INJECTION INTRAMUSCULAR; INTRAVENOUS
Status: DISCONTINUED
Start: 2022-04-01 | End: 2022-04-01 | Stop reason: HOSPADM

## 2022-04-01 RX ORDER — EPINEPHRINE 1 MG/ML
INJECTION, SOLUTION INTRACARDIAC; INTRAMUSCULAR; INTRAVENOUS; SUBCUTANEOUS
Status: DISCONTINUED
Start: 2022-04-01 | End: 2022-04-01 | Stop reason: HOSPADM

## 2022-04-01 RX ORDER — PROPOFOL 10 MG/ML
VIAL (ML) INTRAVENOUS CONTINUOUS PRN
Status: DISCONTINUED | OUTPATIENT
Start: 2022-04-01 | End: 2022-04-01

## 2022-04-01 RX ORDER — OXYCODONE HYDROCHLORIDE 5 MG/1
5 TABLET ORAL
Status: DISCONTINUED | OUTPATIENT
Start: 2022-04-01 | End: 2022-04-01 | Stop reason: HOSPADM

## 2022-04-01 RX ORDER — LIDOCAINE HYDROCHLORIDE 20 MG/ML
INJECTION INTRAVENOUS
Status: DISCONTINUED | OUTPATIENT
Start: 2022-04-01 | End: 2022-04-01

## 2022-04-01 RX ORDER — MIDAZOLAM HYDROCHLORIDE 1 MG/ML
.5-4 INJECTION INTRAMUSCULAR; INTRAVENOUS
Status: DISCONTINUED | OUTPATIENT
Start: 2022-04-01 | End: 2022-09-20

## 2022-04-01 RX ORDER — ROPIVACAINE HYDROCHLORIDE 5 MG/ML
INJECTION, SOLUTION EPIDURAL; INFILTRATION; PERINEURAL
Status: DISCONTINUED
Start: 2022-04-01 | End: 2022-04-01 | Stop reason: HOSPADM

## 2022-04-01 RX ADMIN — LIDOCAINE HYDROCHLORIDE 60 MG: 20 INJECTION, SOLUTION INTRAVENOUS at 07:04

## 2022-04-01 RX ADMIN — MIDAZOLAM 2 MG: 1 INJECTION INTRAMUSCULAR; INTRAVENOUS at 06:04

## 2022-04-01 RX ADMIN — FENTANYL CITRATE 50 MCG: 50 INJECTION INTRAMUSCULAR; INTRAVENOUS at 06:04

## 2022-04-01 RX ADMIN — PROPOFOL 100 MCG/KG/MIN: 10 INJECTION, EMULSION INTRAVENOUS at 07:04

## 2022-04-01 RX ADMIN — DEXAMETHASONE SODIUM PHOSPHATE 8 MG: 4 INJECTION INTRA-ARTICULAR; INTRALESIONAL; INTRAMUSCULAR; INTRAVENOUS; SOFT TISSUE at 07:04

## 2022-04-01 RX ADMIN — SODIUM CHLORIDE: 9 INJECTION, SOLUTION INTRAVENOUS at 05:04

## 2022-04-01 RX ADMIN — CELECOXIB 400 MG: 200 CAPSULE ORAL at 05:04

## 2022-04-01 RX ADMIN — DEXMEDETOMIDINE HYDROCHLORIDE 12 MCG: 100 INJECTION, SOLUTION INTRAVENOUS at 07:04

## 2022-04-01 RX ADMIN — ACETAMINOPHEN 1000 MG: 500 TABLET ORAL at 05:04

## 2022-04-01 RX ADMIN — ONDANSETRON 4 MG: 2 INJECTION INTRAMUSCULAR; INTRAVENOUS at 07:04

## 2022-04-01 RX ADMIN — MEPIVACAINE HYDROCHLORIDE 30 ML: 15 INJECTION, SOLUTION EPIDURAL; INFILTRATION at 06:04

## 2022-04-01 RX ADMIN — MUPIROCIN: 20 OINTMENT TOPICAL at 05:04

## 2022-04-01 RX ADMIN — Medication 2 G: at 07:04

## 2022-04-01 RX ADMIN — DEXMEDETOMIDINE HYDROCHLORIDE 8 MCG: 100 INJECTION, SOLUTION INTRAVENOUS at 07:04

## 2022-04-01 NOTE — ANESTHESIA PROCEDURE NOTES
Supraclavicular Brachial Plexus Single Injection Block    Patient location during procedure: pre-op   Block not for primary anesthetic.  Reason for block: at surgeon's request and post-op pain management   Post-op Pain Location: RUE pain   Start time: 4/1/2022 6:36 AM  Timeout: 4/1/2022 6:33 AM   End time: 4/1/2022 6:40 AM    Staffing  Authorizing Provider: Jarred Milan MD  Performing Provider: Jarred Milan MD    Preanesthetic Checklist  Completed: patient identified, IV checked, site marked, risks and benefits discussed, surgical consent, monitors and equipment checked, pre-op evaluation and timeout performed  Peripheral Block  Patient position: supine  Prep: ChloraPrep  Patient monitoring: heart rate, cardiac monitor, continuous pulse ox, continuous capnometry and frequent blood pressure checks  Block type: supraclavicular  Laterality: right  Injection technique: single shot  Needle  Needle type: Stimuplex   Needle gauge: 22 G  Needle length: 2 in  Needle localization: anatomical landmarks and ultrasound guidance   -ultrasound image captured on disc.  Assessment  Injection assessment: negative aspiration, negative parasthesia and local visualized surrounding nerve  Paresthesia pain: none  Heart rate change: no  Slow fractionated injection: yes  Pain Tolerance: comfortable throughout block and no complaints  Medications:    Medications: mepivacaine (CARBOCAINE) injection 15 mg/mL (1.5%) - Perineural   30 mL - 4/1/2022 6:44:00 AM    Additional Notes  VSS.  [facility] RN monitoring vitals throughout procedure.  Patient tolerated procedure well.

## 2022-04-01 NOTE — PLAN OF CARE
Pt prepped for procedure.  POC reviewed with pt, who verbalized understanding.  Pt's nephew to pick him up.  He is planning to be at facility at 8 am.  Ok to receive medical updates    All belongings in locker #4

## 2022-04-01 NOTE — PATIENT INSTRUCTIONS
Non-weight bearing operative extremity  Take tylenol and ibuprofen addition to prescribed medicine for pain  Leave dressing on until clinic visit

## 2022-04-01 NOTE — BRIEF OP NOTE
[place] - Surgery ([facility])  Brief Operative Note    Surgery Date: 4/1/2022     Surgeon(s) and Role:     * Wiley Warren MD - Primary    Assisting Surgeon: None    Pre-op Diagnosis:  Carpal tunnel syndrome of right wrist [G56.01]    Post-op Diagnosis:  Post-Op Diagnosis Codes:     * Carpal tunnel syndrome of right wrist [G56.01]    Procedure(s) (LRB):  RELEASE, CARPAL TUNNEL, ENDOSCOPIC - right arthrex (Right)    Anesthesia: Regional    Operative Findings: See full operative note    Estimated Blood Loss: Minimal         Specimens:   Specimen (24h ago, onward)            None            Discharge Note    OUTCOME: Patient tolerated treatment/procedure well without complication and is now ready for discharge.    DISPOSITION: Home or Self Care    FINAL DIAGNOSIS:  <principal problem not specified>    FOLLOWUP: In clinic    DISCHARGE INSTRUCTIONS:    Discharge Procedure Orders   Notify your health care provider if you experience any of the following:  temperature >100.4     Notify your health care provider if you experience any of the following:  persistent nausea and vomiting or diarrhea     Notify your health care provider if you experience any of the following:  severe uncontrolled pain     Notify your health care provider if you experience any of the following:  redness, tenderness, or signs of infection (pain, swelling, redness, odor or green/yellow discharge around incision site)     Notify your health care provider if you experience any of the following:  difficulty breathing or increased cough     Notify your health care provider if you experience any of the following:  severe persistent headache     Notify your health care provider if you experience any of the following:  worsening rash     Notify your health care provider if you experience any of the following:  persistent dizziness, light-headedness, or visual disturbances     Notify your health care provider if you experience any of the following:   increased confusion or weakness     Weight bearing restrictions (specify):   Order Comments: NWB operative extremity.

## 2022-04-01 NOTE — ANESTHESIA POSTPROCEDURE EVALUATION
Anesthesia Post Evaluation    Patient: Karthik Bentley    Procedure(s) Performed: Procedure(s) (LRB):  RELEASE, CARPAL TUNNEL, ENDOSCOPIC - right arthrex (Right)    Final Anesthesia Type: general      Patient location during evaluation: PACU  Patient participation: Yes- Able to Participate  Level of consciousness: awake and alert  Post-procedure vital signs: reviewed and stable  Pain management: adequate  Airway patency: patent    PONV status at discharge: No PONV  Anesthetic complications: no      Cardiovascular status: blood pressure returned to baseline  Respiratory status: unassisted, spontaneous ventilation and room air  Hydration status: euvolemic  Follow-up not needed.          Vitals Value Taken Time   /70 04/01/22 0816   Temp 36.4 °C (97.5 °F) 04/01/22 0815   Pulse 58 04/01/22 0824   Resp 31 04/01/22 0824   SpO2 98 % 04/01/22 0824   Vitals shown include unvalidated device data.      Event Time   Out of Recovery 08:13:00         Pain/Pablo Score: Pain Rating Prior to Med Admin: 0 (4/1/2022  6:35 AM)  Pablo Score: 10 (4/1/2022  8:09 AM)

## 2022-04-01 NOTE — PLAN OF CARE
VSS. Patient able to tolerate oral liquids.  Patient denies c/o pain. Patient/family received home medication per bedside delivery. Dressing intact. Knee TEDs intact. Patient instructed not to wear JC hose without wearing closed-back shoes or  socks due to increased risk of falls, verbalized understanding.  No distress noted. Patient states he is ready for discharge. Discharge instructions reviewed with patient and family, verbalized understanding. IV discontinued with catheter tip intact. Family at bedside to help patient dress. Patient wheeled to lobby via staff.

## 2022-04-01 NOTE — OP NOTE
ORTHOPEDIC SURGERY OPERATIVE NOTE    SERVICE: ORTHOPEDICS     DATE OF PROCEDURE: 4/1/2022     SURGEON: Wiley Warren M.D.    ASSISTANT: SMA Jennifer    PREOPERATIVE DIAGNOSIS: Right sided carpal tunnel syndrome    POSTOPERATIVE DIAGNOSIS: Right sided carpal tunnel syndrome    PROCEDURE PERFORMED: Endoscopic Right sided carpal tunnel release, CPT code 08600    ANESTHESIA: Regional/MAC    ESTIMATED BLOOD LOSS: Minimal           SPECIMENS: None    COMPLICATIONS: None              CONDITION: Stable    IV FLUIDS: Crystalloid per anesthesia    IMPLANTS: None    TOURNIQUET TIME: 12 minutes at 250 mmHg    INDICATIONS FOR PROCEDURE: Karthik Bentley is a 66 y.o. male who presented to clinic with findings and workup consistent with carpal tunnel syndrome of the right hand.  The patient had not responded to nonoperative management and wished to proceed with surgical intervention.  The risks, benefits and alternatives to surgery were discussed with the patient at great length and in great detail.  Specific risks discussed include but are not limited to bleeding, infection, vessel damage, nerve damage, pain, numbness, tingling, weakness, stiffness, complex regional pain syndrome, hardware/surgical failure, need for further surgery, DVT, PE, arthritis, scar sensitivity, delayed healing, need for prolonged postoperative rehabilitation, and death amongst others.  The patient stated an understanding of the risks and wished to proceed with surgery.   All questions were answered.  No guarantees were implied or stated.  Informed consent was obtained.    PROCEDURE IN DETAIL: With the patient's participation in the preoperative holding area, the right upper extremity was marked as the surgical site. Preoperative checks were completed and consents were verified.  Regional anesthesia was administered.  The patient was brought to the Operating Room and placed in supine position.  A well-padded nonsterile tourniquet was placed on the upper arm.   The right arm was then prepped and draped in the usual sterile fashion.  Timeout was called for to verify the correct site, procedure and patient.  All were in agreement and we proceeded.  MAC anesthesia was introduced.  Esmarch was utilized to exsanguinate the arm and tourniquet was insufflated to 250mmHg where it remained for the duration of the procedure.    Next, a small 1 cm transverse incision was made in line with a proximal wrist flexion crease beginning radially at the palmaris longus and extending ulnarly for 1 cm.  Dissection was continued to level of antebrachial fascia.  This was transversely incised in line with the skin incision.  A narrow double skin hook was then utilized to elevate the distal most aspect of the incision to gain access to the carpal tunnel.  A series of small and large dilators were utilized to dilate our planned path with the endoscope.  A synovial elevator was then utilized to free synovium from the undersurface of the transverse carpal ligament.  Washboard effect was confirmed.  At this time, the Arthrex endoscopic carpal tunnel system was introduced into the incision.  Confirmation of placement within the carpal tunnel was confirmed.  The endoscope was advanced the distal aspect of the transverse carpal ligament and the distal fat was visualized.  The median nerve was identified radial to the endoscope and was protected throughout the duration of the procedure.  Excellent visualization of the transverse carpal ligament along its entire length was confirmed with no interposed soft tissue.  I then began my division of the transverse carpal ligament from distally and extending in a proximal direction.  The knife was deployed and complete full-thickness transection of the transverse carpal ligament was performed beginning distally and working my way more proximally.  Complete division of the ligament was performed. Care was taken distally to ensure not to cut past [employer]  Line or injure the superficial palmar arch. The distal fat was visualized at the distalmost aspect of the ligament division. Tension and pressure within the carpal tunnel was dramatically improved and decreased status post ligament division.  The endoscope was then once again advanced more distally and completion of the division was confirmed.  Fat protruded through the divided ligament throughout.  The median nerve was identified along the length of our ligament division and remained intact and uninjured.  The endoscope was then removed from the wound and the antebrachial forearm fascia was divided in line with the ligament division by hand with tenotomy scissors.  The wound was then irrigated with copious amounts of sterile saline.  Skin was closed with 4 0 nylon suture.        Sterile dressings were applied consisting of Xeroform 4 x 4 gauze cast padding and 2 in Ace wrap to the hand.  Tourniquet was deflated and brisk cap refill in "[employer] throughout the operative upper extremity.  There was no significant bleeding.      DISPOSITION: The patient will be discharged home with followup in approximately 2 weeks for suture removal.  Early active range of motion in the acute postoperative period was discussed and encouraged.  They are to keep the dressing clean, dry, and intact until that time.

## 2022-04-01 NOTE — ANESTHESIA PREPROCEDURE EVALUATION
04/01/2022  Karthik Bentley is a 66 y.o., male.    Active Ambulatory Problems     Diagnosis Date Noted    Essential hypertension 12/04/2019    Type 2 diabetes mellitus, without long-term current use of insulin 12/04/2019    Screen for colon cancer 01/16/2020    Overweight 03/30/2020    HSV infection 01/16/2021     Resolved Ambulatory Problems     Diagnosis Date Noted    No Resolved Ambulatory Problems     Past Medical History:   Diagnosis Date    Diabetes mellitus type II     Hypertension            Pre-op Assessment    I have reviewed the Patient Summary Reports.     I have reviewed the Nursing Notes.       Review of Systems  Anesthesia Hx:  Denies Hx of Anesthetic complications    Social:  Non-Smoker    Cardiovascular:   Exercise tolerance: good Hypertension Denies CAD.     Denies Angina.        Pulmonary:   Denies COPD.  Denies Asthma.  Denies Shortness of breath.  Denies Sleep Apnea.    Renal/:   Denies Chronic Renal Disease.     Hepatic/GI:   Denies GERD. Denies Liver Disease.    Neurological:   Denies CVA. Denies Seizures. B/l ulnar neuropathy   Endocrine:   Diabetes, poorly controlled, type 2 Denies Hypothyroidism.        Physical Exam  General: Well nourished    Airway:  Mallampati: II   TM Distance: Normal  Neck ROM: Normal ROM    Chest/Lungs:  Normal Respiratory Rate    Heart:  Rate: Normal        Anesthesia Plan  Type of Anesthesia, risks & benefits discussed:    Anesthesia Type: Gen Natural Airway, Regional, Gen Supraglottic Airway  Intra-op Monitoring Plan: Standard ASA Monitors  Post Op Pain Control Plan: multimodal analgesia, IV/PO Opioids PRN and peripheral nerve block  Induction:  IV  Informed Consent: Informed consent signed with the Patient and all parties understand the risks and agree with anesthesia plan.  All questions answered.   ASA Score: 2  Anesthesia Plan Notes: The  patient's PMH was reviewed and PE was performed  Plan for GA and natural airway. Will convert to secured airway if needed      Ready For Surgery From Anesthesia Perspective.     .

## 2022-04-01 NOTE — TRANSFER OF CARE
"Anesthesia Transfer of Care Note    Patient: Karthik Bentley    Procedure(s) Performed: Procedure(s) (LRB):  RELEASE, CARPAL TUNNEL, ENDOSCOPIC - right arthrex (Right)    Patient location: PACU    Anesthesia Type: regional    Transport from OR: Transported from OR on 6-10 L/min O2 by face mask with adequate spontaneous ventilation    Post pain: adequate analgesia    Post assessment: no apparent anesthetic complications    Post vital signs: stable    Level of consciousness: awake    Nausea/Vomiting: no nausea/vomiting    Complications: none    Transfer of care protocol was followed      Last vitals:   Visit Vitals  /79   Pulse 68   Temp 36.7 °C (98.1 °F)   Resp 17   Ht 5' 8" (1.727 m)   Wt 87.1 kg (192 lb)   SpO2 100%   BMI 29.19 kg/m²     "

## 2022-04-12 ENCOUNTER — TELEPHONE (OUTPATIENT)
Dept: ORTHOPEDICS | Facility: CLINIC | Age: 67
End: 2022-04-12
Payer: MEDICARE

## 2022-04-12 DIAGNOSIS — R52 PAIN: Primary | ICD-10-CM

## 2022-04-14 ENCOUNTER — OFFICE VISIT (OUTPATIENT)
Dept: ORTHOPEDICS | Facility: CLINIC | Age: 67
End: 2022-04-14
Payer: MEDICARE

## 2022-04-14 DIAGNOSIS — Z98.890 POSTOPERATIVE STATE: ICD-10-CM

## 2022-04-14 DIAGNOSIS — M65.30 TRIGGER FINGER OF RIGHT HAND, UNSPECIFIED FINGER: ICD-10-CM

## 2022-04-14 DIAGNOSIS — G56.01 CARPAL TUNNEL SYNDROME OF RIGHT WRIST: Primary | ICD-10-CM

## 2022-04-14 PROCEDURE — 1125F AMNT PAIN NOTED PAIN PRSNT: CPT | Mod: CPTII,S$GLB,, | Performed by: PHYSICIAN ASSISTANT

## 2022-04-14 PROCEDURE — 1125F PR PAIN SEVERITY QUANTIFIED, PAIN PRESENT: ICD-10-PCS | Mod: CPTII,S$GLB,, | Performed by: PHYSICIAN ASSISTANT

## 2022-04-14 PROCEDURE — 3288F PR FALLS RISK ASSESSMENT DOCUMENTED: ICD-10-PCS | Mod: CPTII,S$GLB,, | Performed by: PHYSICIAN ASSISTANT

## 2022-04-14 PROCEDURE — 1159F PR MEDICATION LIST DOCUMENTED IN MEDICAL RECORD: ICD-10-PCS | Mod: CPTII,S$GLB,, | Performed by: PHYSICIAN ASSISTANT

## 2022-04-14 PROCEDURE — 1101F PR PT FALLS ASSESS DOC 0-1 FALLS W/OUT INJ PAST YR: ICD-10-PCS | Mod: CPTII,S$GLB,, | Performed by: PHYSICIAN ASSISTANT

## 2022-04-14 PROCEDURE — 99999 PR PBB SHADOW E&M-EST. PATIENT-LVL III: CPT | Mod: PBBFAC,,, | Performed by: PHYSICIAN ASSISTANT

## 2022-04-14 PROCEDURE — 3288F FALL RISK ASSESSMENT DOCD: CPT | Mod: CPTII,S$GLB,, | Performed by: PHYSICIAN ASSISTANT

## 2022-04-14 PROCEDURE — 3052F PR MOST RECENT HEMOGLOBIN A1C LEVEL 8.0 - < 9.0%: ICD-10-PCS | Mod: CPTII,S$GLB,, | Performed by: PHYSICIAN ASSISTANT

## 2022-04-14 PROCEDURE — 99999 PR PBB SHADOW E&M-EST. PATIENT-LVL III: ICD-10-PCS | Mod: PBBFAC,,, | Performed by: PHYSICIAN ASSISTANT

## 2022-04-14 PROCEDURE — 99024 PR POST-OP FOLLOW-UP VISIT: ICD-10-PCS | Mod: S$GLB,,, | Performed by: PHYSICIAN ASSISTANT

## 2022-04-14 PROCEDURE — 1159F MED LIST DOCD IN RCRD: CPT | Mod: CPTII,S$GLB,, | Performed by: PHYSICIAN ASSISTANT

## 2022-04-14 PROCEDURE — 1101F PT FALLS ASSESS-DOCD LE1/YR: CPT | Mod: CPTII,S$GLB,, | Performed by: PHYSICIAN ASSISTANT

## 2022-04-14 PROCEDURE — 3072F LOW RISK FOR RETINOPATHY: CPT | Mod: CPTII,S$GLB,, | Performed by: PHYSICIAN ASSISTANT

## 2022-04-14 PROCEDURE — 99024 POSTOP FOLLOW-UP VISIT: CPT | Mod: S$GLB,,, | Performed by: PHYSICIAN ASSISTANT

## 2022-04-14 PROCEDURE — 3052F HG A1C>EQUAL 8.0%<EQUAL 9.0%: CPT | Mod: CPTII,S$GLB,, | Performed by: PHYSICIAN ASSISTANT

## 2022-04-14 PROCEDURE — 3072F PR LOW RISK FOR RETINOPATHY: ICD-10-PCS | Mod: CPTII,S$GLB,, | Performed by: PHYSICIAN ASSISTANT

## 2022-04-14 RX ORDER — MELOXICAM 15 MG/1
15 TABLET ORAL DAILY
Qty: 30 TABLET | Refills: 2 | Status: SHIPPED | OUTPATIENT
Start: 2022-04-14 | End: 2022-09-16

## 2022-04-14 NOTE — PROGRESS NOTES
Karthik Bentley presents for post-operative evaluation.  The patient is now 2 weeks s/p Right endoscopic carpal tunnel release with Dr. Warren on 4/1/22.  Overall the patient reports doing well.  He reports 1/10 pain, denies any f/c/s. He tells me that his numbness/tingling has resolved. However, the swelling into the index and long fingers has remained the same. Dr. Warren performed long trigger finger CSI in February, does not recall much improvement from this. He is not taking any medications for this.    PE:    AA&O x 4.  NAD  HEENT:  NCAT, sclera nonicteric  Lungs:  Respirations are equal and unlabored.  CV:  2+ bilateral upper and lower extremity pulses.  MSK: The wound is healing well with no signs of erythema or warmth.  There is no drainage.  No clinical signs or symptoms of infection are present.  Edema present in the index and long fingers, decreased flexion and extension in both fingers as well. Mild TTP to the long A1 pulley. SILT. DNVI.    A/P: Status post above, doing well  1) OT ordered for postop rehab, to include treatment of likely trigger fingers.  2) All sutures removed today. Wound care and signs of infection discussed. Mobic 15mg ordered today, encouraged alternating between ice/heat.  3) F/U 4 weeks for further evaluation of the trigger fingers.  4) Call with any questions/concerns in the interim      Jamie Russo-DiGeorge PA-C

## 2022-04-18 ENCOUNTER — CLINICAL SUPPORT (OUTPATIENT)
Dept: REHABILITATION | Facility: HOSPITAL | Age: 67
End: 2022-04-18
Payer: MEDICARE

## 2022-04-18 DIAGNOSIS — M79.641 PAIN OF RIGHT HAND: ICD-10-CM

## 2022-04-18 DIAGNOSIS — M25.641 FINGER STIFFNESS, RIGHT: ICD-10-CM

## 2022-04-18 DIAGNOSIS — Z98.890 POSTOPERATIVE STATE: ICD-10-CM

## 2022-04-18 DIAGNOSIS — M65.30 TRIGGER FINGER OF RIGHT HAND, UNSPECIFIED FINGER: ICD-10-CM

## 2022-04-18 DIAGNOSIS — G56.01 CARPAL TUNNEL SYNDROME OF RIGHT WRIST: ICD-10-CM

## 2022-04-18 PROCEDURE — 97166 OT EVAL MOD COMPLEX 45 MIN: CPT

## 2022-04-18 NOTE — PLAN OF CARE
[facility]  Occupational Therapy Initial Evaluation    Date: 4/18/2022  Name: Karthik Bentley  Clinic Number: [id number]    Therapy Diagnosis:   Encounter Diagnoses   Name Primary?    Carpal tunnel syndrome of right wrist     Trigger finger of right hand, unspecified finger     Postoperative state     Finger stiffness, right     Pain of right hand      Physician: Russo-Digeorge, Jamie L*; Dr. Wiley Warren    Physician Orders: eval and treat  Medical Diagnosis: Carpal tunnel syndrome of right wrist [G56.01], Trigger finger of right hand, unspecified finger [M65.30], Postoperative state [Z98.890]    Surgical Procedure and Date: 4/1/22, s/p R CTR / Date of Injury/Onset: ~2 months ago  Evaluation Date: 4/18/22  Insurance Authorization Period Expiration: 4/14/23  Plan of Care Expiration: 6/17/22  Progress Note Due: 5/18/22   Date of Return to MD: 6/7/22  Visit # / Visits authorized: 1 / 1  FOTO: initial eval     Precautions:  Standard, diabetic    Time In: 12:00 pm  Time Out: 12:55  Total Appointment Time (timed & untimed codes): 55 minutes      SUBJECTIVE       History of Current Condition/Mechanism of Injury: Ray reports: his numbness and tingling have improved but cont to have swelling and stiffness in IF and LF. Pt did have cortisone injections for trigger fingers. REports he had been getting radiating pain from shoulder and was waking up at night due to pain and numbness prior to surgery. Pt had EMG which showed CTS in B hands. Pt reports he can sleep now and pain has improved. Reports he has not had to take any pain medication.     Falls: n/a    Involved Side: right  Dominant Side: Right  Imaging: see chart  Prior Therapy: none  Occupation:  Pt is an , also plays piano  Working presently: retired  Duties: was computer use, now house hold tasks    Functional Limitations/Social History:    Previous functional status includes: Independent with all ADLs.     Current  Functional Status   Home/Living environment: lives alone      Limitation of Functional Status as follows:   ADLs/IADLs:     - Feeding: I    - Bathing: I    - Dressing/Grooming: I, but reports wearing looser clothes at this time    - Driving: I    -difficulty opening bottles, jars, turning keys, turning door knobs     Leisure: plays the piano, and has tried it since surgery; enjoys fishing and kayaking but has been unable; exercises/jogs 2x/week    Pain:  Functional Pain Scale Rating 0-10: Current 3/10, worst 3/10, best 1/10   Location: R wrist and LF  Description: Tight and stiff  Aggravating Factors: Bending, Flexing and Lifting  Easing Factors: rest and taking anti-inflammatories    Patient's Goals for Therapy: to get dexterity back and make a fist    Medical History:   Past Medical History:   Diagnosis Date    Diabetes mellitus type II     Hypertension        Surgical History:    has a past surgical history that includes Shoulder surgery; Colonoscopy (N/A, 1/16/2020); and Endoscopic carpal tunnel release (Right, 4/1/2022).    Medications:   has a current medication list which includes the following prescription(s): blood sugar diagnostic, blood-glucose meter, gentian violet, lancets, meloxicam, metformin, pneumococcal 23-merritt ps, rosuvastatin, tramadol, tramadol, trulicity, valacyclovir, and shingrix (pf), and the following Facility-Administered Medications: fentanyl, lidocaine (pf) 10 mg/ml (1%), and midazolam.    Allergies:   Review of patient's allergies indicates:  No Known Allergies       OBJECTIVE     Observation/Appearance: moderate swelling in IF/LF. Healing closed volar incision, ~1 cm.     Edema. Measured in centimeters.   4/18/2022 4/18/2022    Left Right   Wrist Crease 17 18.2   DPC 21.9 23   MCPs 21.7 22.3     Edema. Measured in centimeters.   4/18/2022 4/18/2022    Left Right   Index:       P1 7.4 8    PIP 7.6 7.9   P2 6.4 6.6    DIP 6.1 6.3   P3 5.6 6.1   Long:       P1 7.1 7.8    PIP 7.6 8.2   P2  6.4 7    DIP 5.9 6.5   P3 5.8 6     Elbow and Wrist ROM. Measured in degrees.   4/18/2022 4/18/2022    Left Right   Elbow Ext/Flex     Supination/Pronation     Wrist Ext/Flex 65/60 60/60   Wrist RD/UD 25/35 15/32     Hand ROM. Measured in degrees.  L hand WFL   4/18/2022    Right       Index: MP  87              PIP     70              DIP 45              GIBSON 202       Long:  MP 78              PIP 15/80              DIP 5/50              GIBSON 188       Ring:   MP 83              PIP 80              DIP 50              GIBSON 213       Small:  MP WFL               PIP WFL               DIP WFL              GIBSON        Thumb: MP 65                IP 42       Rad ADD/ABD 70       Pal ADD/ABD 65          Opposition ~0.5 cm from DPC      Strength (Dynamometer) and Pinch Strength (Pinch Gauge)  Measured in pounds.   4/18/2022 4/18/2022    Left Right   Rung II deferred deferred   [place] Pinch deferred deferred   3pt Pinch deferred deferred   2pt Pinch deferred deferred     Sensation: reports only a little of numbness and tingling in thumb and finger tips   4/18/2022 4/18/2022    Left Right   [place] Mj     Normal 1.65-2.83     Diminished Light Touch 3.22-3.61     Diminished Protective 3.84-4.31     Loss of Protective 4.56-6.65     Untestable >6.65     2 Point Discrimination     Static     Dynamic             Limitation/Restriction for FOTO Wrist Survey    Therapist reviewed FOTO scores for Karthik Bentley on 4/18/2022.   FOTO documents entered into [employer] - see Media section.    Limitation Score: 64%         Treatment   Total Treatment time (time-based codes) separate from Evaluation: 15 minutes    Karthik received the treatments listed below:     Supervised modalities after being cleared for contradictions: Hot Pack -  Patient received MH x 5 min to R wrist/hand to increase blood flow, circulation and tissue elasticity prior to therex       Therapeutic exercises to develop ROM and flexibility for 10 minutes, including:  Passive  "flexion stretch of fingers X 10 reps   AROM Wrist  Ext/flx  RD/UD   X 10 reps each    Wave, hook, straight fist, composite fist, finger spreads, finger lifts, pinky slides   X 10 reps each    Prayer stretch X 2 reps x 30"           Patient Education and Home Exercises      Education provided:   - educated on HEP as above  -educated on scar massage  -pt given LMB extension splint for LF PIP, size D  -pt given compression glove size medium for edema management    Written Home Exercises Provided: yes.  Exercises were reviewed and Karthik was able to demonstrate them prior to the end of the session.  Karthik demonstrated good  understanding of the education provided. See EMR under Patient Instructions for exercises provided during therapy sessions.     Pt was advised to perform these exercises free of pain, and to stop performing them if pain occurs.    Patient/Family Education: role of OT, goals for OT, scheduling/cancellations - pt verbalized understanding. Discussed insurance limitations with patient.    ASSESSMENT     Karthik Bentley is a 66 y.o. male referred to outpatient occupational therapy and presents with a medical diagnosis of s/p R endoscopic CTR and IF/LF trigger fingers.  Patient presents with the following therapy deficits: Decreased ROM, Decreased  strength, Decreased pinch strength, Decreased muscle strength, Decreased functional hand use, Increased pain, Edema, Joint Stiffness and Scar Adhesions and demonstrates limitations as described in the chart below. Following medical record review it is determined that pt will benefit from occupational therapy services in order to maximize pain free and/or functional use of right hand. The following goals were discussed with the patient and patient is in agreement with them as to be addressed in the treatment plan. The patient's rehab potential is Good.     Anticipated barriers to occupational therapy: none  Pt has no cultural, educational or language barriers to " learning provided.    Profile and History Assessment of Occupational Performance Level of Clinical Decision Making Complexity Score   Occupational Profile:   Karthik Bentley is a 66 y.o. male who lives alone and is retired Karthik Bentley has difficulty with  ADLs and IADLs as listed previously, which  Affecting hisdaily functional abilities.      Comorbidities:    has a past medical history of Diabetes mellitus type II and Hypertension.    Medical and Therapy History Review:   Expanded               Performance Deficits    Physical:  Joint Mobility  Muscle Power/Strength  Muscle Endurance  Skin Integrity/Scar Formation  Edema   Strength   pain    Cognitive:  No Deficits    Psychosocial:    Habits  Routines     Clinical Decision Making:  moderate    Assessment Process:  Comprehensive Assessments    Modification/Need for Assistance:  Minimal-Moderate Modifications/Assistance    Intervention Selection:  Several Treatment Options       moderate  Based on PMHX, co morbidities , data from assessments and functional level of assistance required with task and clinical presentation directly impacting function.         Goals:   The following goals were discussed with the patient and patient is in agreement with them as to be addressed in the treatment plan.   Long Term Goals (LTGs); to be met by discharge.  LTG #1: Pt will report a pain level of 1 out of 10 with ADLs, playing piano, and fishing activities   LTG #2: Pt will demo improved FOTO score by at least 20 points.   LTG #3: Pt will return to prior level of function for ADLs and household management.   LTG #4: Pt will demonstrate improved R digits AROM WFL for grasping and holding things  LTG #5:  and pinch strength to be assessed when appropriate and goals set accordingly    Short Term Goals (STGs); to be met within 4 weeks (5/18/22).  STG #1: Pt will report 3 out of 10 pain level with ADLs and piano.  STG #2:  Pt will demonstrate independence with issued HEP.        PLAN   Plan of Care Certification: 4/18/2022 to 6/17/22.     Outpatient Occupational Therapy 2 times weekly for 8 weeks to include the following interventions: Paraffin, Fluidotherapy, Manual therapy/joint mobilizations, Modalities for pain management, US 3 mhz, Therapeutic exercises/activities., Strengthening, Orthotic Fabrication/Fit/Training, Edema Control, Scar Management, Electrical Modalities, Joint Protection and Energy Conservation.      MIKE Pugh, T      I CERTIFY THE NEED FOR THESE SERVICES FURNISHED UNDER THIS PLAN OF TREATMENT AND WHILE UNDER MY CARE  Physician's comments:      Physician's Signature: ___________________________________________________

## 2022-04-20 ENCOUNTER — DOCUMENTATION ONLY (OUTPATIENT)
Dept: REHABILITATION | Facility: HOSPITAL | Age: 67
End: 2022-04-20
Payer: MEDICARE

## 2022-04-20 NOTE — PROGRESS NOTES
No Show Note/Documentation    Patient: Karthik Bentley  Date of Session: 4/20/2022  Diagnosis: No diagnosis found.  MRN: [id number]    Karthik Bentley did not attend his/her scheduled therapy appointment today. HE did not call to cancel nor reschedule. This is the first appointment that he has not attended.  No charges have been posted today.       Tabitha Sutherland, OTR/L   4/20/2022

## 2022-04-21 ENCOUNTER — CLINICAL SUPPORT (OUTPATIENT)
Dept: REHABILITATION | Facility: HOSPITAL | Age: 67
End: 2022-04-21
Payer: MEDICARE

## 2022-04-21 DIAGNOSIS — M25.641 FINGER STIFFNESS, RIGHT: Primary | ICD-10-CM

## 2022-04-21 DIAGNOSIS — M79.641 PAIN OF RIGHT HAND: ICD-10-CM

## 2022-04-21 PROCEDURE — 97110 THERAPEUTIC EXERCISES: CPT

## 2022-04-21 PROCEDURE — 97140 MANUAL THERAPY 1/> REGIONS: CPT

## 2022-04-21 PROCEDURE — 97022 WHIRLPOOL THERAPY: CPT

## 2022-04-21 NOTE — PROGRESS NOTES
"    [facility] OUTPATIENT THERAPY AND WELLNESS  Occupational Therapy Treatment Note    Date: 4/21/2022  Name: Karthik Bentley  Clinic Number: [id number]    Therapy Diagnosis:   Encounter Diagnoses   Name Primary?    Finger stiffness, right Yes    Pain of right hand      Physician: Russo-Digeorge, Jamie L*  Physician Orders: eval and treat  Medical Diagnosis: Carpal tunnel syndrome of right wrist [G56.01], Trigger finger of right hand, unspecified finger [M65.30], Postoperative state [Z98.890]     Surgical Procedure and Date: 4/1/22, s/p R CTR / Date of Injury/Onset: ~2 months ago  Evaluation Date: 4/18/22  Insurance Authorization Period Expiration: 4/14/23  Plan of Care Expiration: 6/17/22  Progress Note Due: 5/18/22   Date of Return to MD: 6/7/22  Visit # / Visits authorized: 1 / 1  FOTO: initial eval      Precautions:  Standard, diabetic     Time In: 11:00 am  Time Out: 12: 15 pm   Total Appointment Time (timed & untimed codes): 75 minutes     SUBJECTIVE     Pt reports: "These fingers are stiff I have to stretch it."    He was compliant with home exercise program given last session.   Response to previous treatment: good; improved dexterity   Functional change: none noted to this date     Pain: 2/10  Location: right wrists      OBJECTIVE   Objective Measures updated at progress report unless specified.  Edema. Measured in centimeters.    4/18/2022 4/18/2022     Left Right   Wrist Crease 17 18.2   DPC 21.9 23   MCPs 21.7 22.3      Edema. Measured in centimeters.    4/18/2022 4/18/2022     Left Right   Index:         P1 7.4 8    PIP 7.6 7.9   P2 6.4 6.6    DIP 6.1 6.3   P3 5.6 6.1   Long:         P1 7.1 7.8    PIP 7.6 8.2   P2 6.4 7    DIP 5.9 6.5   P3 5.8 6      Elbow and Wrist ROM. Measured in degrees.    4/18/2022 4/18/2022     Left Right   Elbow Ext/Flex       Supination/Pronation       Wrist Ext/Flex 65/60 60/60   Wrist RD/UD 25/35 15/32      Hand ROM. Measured in degrees.  L hand WFL    4/18/2022     Right       "   Index: MP  87              PIP     70              DIP 45              GIBSON 202         Long:  MP 78              PIP 15/80              DIP 5/50              GIBSON 188         Ring:   MP 83              PIP 80              DIP 50              GIBSON 213         Small:  MP WFL               PIP WFL               DIP WFL              GIBSON           Thumb: MP 65                IP 42       Rad ADD/ABD 70       Pal ADD/ABD 65          Opposition ~0.5 cm from DPC       Strength (Dynamometer) and Pinch Strength (Pinch Gauge)  Measured in pounds.    4/18/2022 4/18/2022     Left Right   Rung II deferred deferred   [place] Pinch deferred deferred   3pt Pinch deferred deferred   2pt Pinch deferred deferred      Sensation: reports only a little of numbness and tingling in thumb and finger tips    4/18/2022 4/18/2022     Left Right   [place] Mj       Normal 1.65-2.83       Diminished Light Touch 3.22-3.61       Diminished Protective 3.84-4.31       Loss of Protective 4.56-6.65       Untestable >6.65       2 Point Discrimination       Static       Dynamic             Treatment     Ray received the treatments listed below:     Supervised modalities after being cleared for contradictions: Fluidotherapy - Fluidotherapy: To R hand  for 10 min, continuous air, 115 deg, air speed 50 to decrease pain, edema & scar tissue, sensory re- education, and increased tissue extensibility prior to therex    Manual therapy techniques: Manual Lymphatic Drainage and Soft tissue Mobilization were applied to the: R anterior forearm for 10  minutes, including:  - retrograde massage   - scar massage with vibration tool  - soft tissue mobilization with star massage     Therapeutic exercises to develop ROM and flexibility for 35 minutes, including:  Passive flexion stretch of fingers X 10 reps each    AROM Wrist  Ext/flx  RD/UD    X 10 reps each    Wave, hook, straight fist, composite fist, finger spreads, finger lifts, pinky slides    X 10 reps each   "  Prayer stretch X 2 reps x 30"     *fitted for oval 8; size 11 on IF and size 10 on RF       Patient Education and Home Exercises      Education provided:   - wear LMB at nighttime and oval 8 during the day during functional activities   - scar pad for nighttime to assist   - Progress towards goals     Written Home Exercises Provided: yes.  Exercises were reviewed and Karthik was able to demonstrate them prior to the end of the session.  Karthik demonstrated good  understanding of the HEP provided. See EMR under Patient Instructions for exercises provided during therapy sessions.      ASSESSMENT     Pt would continue to benefit from skilled OT.  Able to rosita exercises today pain-free. Demonstrated improved PIP extension following tx today. Able to place hand flat on table following tx. Fitted oval 8 for TF on IF and RF. Will cont to progress as rosita.      Karthik is progressing well towards his goals and there are no updates to goals at this time. Pt prognosis is Good.     Pt will continue to benefit from skilled outpatient occupational therapy to address the deficits listed in the problem list on initial evaluation, provide pt/family education and to maximize pt's level of independence in the home and community environment.     Pt's spiritual, cultural and educational needs considered and pt agreeable to plan of care and goals.    Anticipated barriers to occupational therapy: n/a     Goals:  Long Term Goals (LTGs); to be met by discharge.  LTG #1: Pt will report a pain level of 1 out of 10 with ADLs, playing piano, and fishing activities           LTG #2: Pt will demo improved FOTO score by at least 20 points.   LTG #3: Pt will return to prior level of function for ADLs and household management.   LTG #4: Pt will demonstrate improved R digits AROM WFL for grasping and holding things  LTG #5:  and pinch strength to be assessed when appropriate and goals set accordingly     Short Term Goals (STGs); to be met within 4 weeks " (5/18/22).  STG #1: Pt will report 3 out of 10 pain level with ADLs and piano.  STG #2:  Pt will demonstrate independence with issued HEP.       PLAN     Continue skilled occupational therapy with individualized plan of care focusing on improving functional independence     Updates/Grading for next session: cont as roista Sutherland, OT

## 2022-04-26 ENCOUNTER — CLINICAL SUPPORT (OUTPATIENT)
Dept: REHABILITATION | Facility: HOSPITAL | Age: 67
End: 2022-04-26
Payer: MEDICARE

## 2022-04-26 DIAGNOSIS — M79.641 PAIN OF RIGHT HAND: ICD-10-CM

## 2022-04-26 DIAGNOSIS — M25.641 FINGER STIFFNESS, RIGHT: Primary | ICD-10-CM

## 2022-04-26 PROCEDURE — 97110 THERAPEUTIC EXERCISES: CPT

## 2022-04-26 PROCEDURE — 97022 WHIRLPOOL THERAPY: CPT

## 2022-04-26 NOTE — PROGRESS NOTES
"    [facility]  Occupational Therapy Treatment Note    Date: 4/26/2022  Name: Karthik Bentley  Clinic Number: [id number]    Therapy Diagnosis:  Right carpal tunnel syndrome , trigger finger    Physician: Russo-Digeorge, Jamie L*  Physician Orders: eval and treat  Medical Diagnosis: Carpal tunnel syndrome of right wrist [G56.01], Trigger finger of right hand, unspecified finger [M65.30], Postoperative state [Z98.890]     Surgical Procedure and Date: 4/1/22, s/p R CTR /   Date of Injury/Onset: ~2 months ago  Evaluation Date: 4/18/22  Insurance Authorization Period Expiration: 4/14/23  Plan of Care Expiration: 6/17/22  Progress Note Due: 5/18/22   Date of Return to MD: 6/7/22  Visit # / Visits authorized: 3 / 1  FOTO: initial eval      Precautions:  Standard, diabetic     Time In: 10:30  am  Time Out: 11:30  pm   Total Appointment Time (timed & untimed codes): 75 minutes     SUBJECTIVE     Pt reports: "These fingers are stiff I have to stretch it."  I have been playing the piano hardest issue is lifting up middle finger in extension when playing .      He was compliant with home exercise program given last session.   Response to previous treatment: good; improved dexterity   Functional change: none noted to this date     Pain: 2/10  Location: right wrists      OBJECTIVE   Objective Measures updated at progress report unless specified.  Edema. Measured in centimeters.    4/18/2022 4/18/2022     Left Right   Wrist Crease 17 18.2   DPC 21.9 23   MCPs 21.7 22.3      Edema. Measured in centimeters.    4/18/2022 4/18/2022     Left Right   Index:         P1 7.4 8    PIP 7.6 7.9   P2 6.4 6.6    DIP 6.1 6.3   P3 5.6 6.1   Long:         P1 7.1 7.8    PIP 7.6 8.2   P2 6.4 7    DIP 5.9 6.5   P3 5.8 6      Elbow and Wrist ROM. Measured in degrees.    4/18/2022 4/18/2022     Left Right   Elbow Ext/Flex       Supination/Pronation       Wrist Ext/Flex 65/60 60/60   Wrist RD/UD 25/35 15/32      Hand ROM. Measured " in degrees.  L hand WFL    4/18/2022     Right         Index: MP  87              PIP     70              DIP 45              GIBSON 202         Long:  MP 78              PIP 15/80              DIP 5/50              GIBSON 188         Ring:   MP 83              PIP 80              DIP 50              GIBSON 213         Small:  MP WFL               PIP WFL               DIP WFL              GIBSON           Thumb: MP 65                IP 42       Rad ADD/ABD 70       Pal ADD/ABD 65          Opposition ~0.5 cm from DPC       Strength (Dynamometer) and Pinch Strength (Pinch Gauge)  Measured in pounds.    4/18/2022 4/18/2022     Left Right   Rung II deferred deferred   Ohara Pinch deferred deferred   3pt Pinch deferred deferred   2pt Pinch deferred deferred      Sensation: reports only a little of numbness and tingling in thumb and finger tips    4/18/2022 4/18/2022     Left Right   [place] Mj       Normal 1.65-2.83       Diminished Light Touch 3.22-3.61       Diminished Protective 3.84-4.31       Loss of Protective 4.56-6.65       Untestable >6.65       2 Point Discrimination       Static       Dynamic             Treatment     Ray received the treatments listed below:     Supervised modalities after being cleared for contradictions: Fluidotherapy - Fluidotherapy: To R hand  for 10 min, continuous air, 115 deg, air speed 50 to decrease pain, edema & scar tissue, sensory re- education, and increased tissue extensibility prior to therex    Manual therapy techniques: Manual Lymphatic Drainage and Soft tissue Mobilization were applied to the: R anterior forearm for 10  minutes, including:  - retrograde massage   - scar massage with vibration tool  - soft tissue mobilization with star massage     issued XL size finger sleeves for index and  middle fingers to wear during the day .      Therapeutic exercises to develop ROM and flexibility for 35 minutes, including:  Passive flexion stretch of fingers X 10 reps each    AROM  "Wrist  Ext/flx  RD/UD    X 10 reps each    Wave, hook, straight fist, composite fist, finger spreads, finger lifts, pinky slides    X 10 reps each    tight intrinsics with Hook position ( encouraged to stretch intrinsics )     Prayer stretch X 2 reps x 30"    joint blocking   pip and dip  10 reps    isopheres   2 minutes        *fitted for oval 8; size 11 on IF and size 10 on RF       Patient Education and Home Exercises      Education provided:   - wear LMB at nighttime and oval 8 during the day during functional activities   - scar pad for nighttime to assist   - Progress towards goals     Written Home Exercises Provided: yes.  Exercises were reviewed and Karthik was able to demonstrate them prior to the end of the session.  Karthik demonstrated good  understanding of the HEP provided. See EMR under Patient Instructions for exercises provided during therapy sessions.      ASSESSMENT     Pt would continue to benefit from skilled OT.  Able to rosita exercises today pain-free. Demonstrated improved PIP extension following tx today. Able to place hand flat on table following tx. Fitted oval 8 for TF on IF and RF. Will cont to progress as rosita.      Karthik is progressing well towards his goals and there are no updates to goals at this time. Pt prognosis is Good.     Pt will continue to benefit from skilled outpatient occupational therapy to address the deficits listed in the problem list on initial evaluation, provide pt/family education and to maximize pt's level of independence in the home and community environment.     Pt's spiritual, cultural and educational needs considered and pt agreeable to plan of care and goals.    Anticipated barriers to occupational therapy: n/a     Goals:  Long Term Goals (LTGs); to be met by discharge.  LTG #1: Pt will report a pain level of 1 out of 10 with ADLs, playing piano, and fishing activities           LTG #2: Pt will demo improved FOTO score by at least 20 points.   LTG #3: Pt will return to " prior level of function for ADLs and household management.   LTG #4: Pt will demonstrate improved R digits AROM WFL for grasping and holding things  LTG #5:  and pinch strength to be assessed when appropriate and goals set accordingly     Short Term Goals (STGs); to be met within 4 weeks (5/18/22).  STG #1: Pt will report 3 out of 10 pain level with ADLs and piano.  STG #2:  Pt will demonstrate independence with issued HEP.       PLAN     Continue skilled occupational therapy with individualized plan of care focusing on improving functional independence     Updates/Grading for next session: cont as rosita Washburn, OT

## 2022-04-27 NOTE — PROGRESS NOTES
[facility]  Occupational Therapy Treatment Note    Date: 4/28/2022  Name: Karthik Bentley  Clinic Number: [id number]    Therapy Diagnosis:   Encounter Diagnoses   Name Primary?    Finger stiffness, right Yes    Pain of right hand      Physician: Russo-Digeorge, Jamie L*  Physician Orders: eval and treat  Medical Diagnosis: Carpal tunnel syndrome of right wrist [G56.01], Trigger finger of right hand, unspecified finger [M65.30], Postoperative state [Z98.890]  Surgical Procedure and Date: 4/1/22, s/p R CTR /   Date of Injury/Onset: ~2 months ago  Evaluation Date: 4/18/22  Insurance Authorization Period Expiration: 4/14/23  Plan of Care Expiration: 6/17/22  Progress Note Due: 5/18/22   Date of Return to MD: 6/7/22  Visit # / Visits authorized: 3 / 20  FOTO: initial eval      Precautions:  Standard, diabetic     Time In: 11:01  am  Time Out: 12:00 pm   Total Appointment Time (timed & untimed codes): 59 minutes     SUBJECTIVE     Pt reports: Pt reports still having clicking in the index finger mainly, but wears his splint and compression sleeve. He states that he is still avoiding heavy lifting and painful movements at this time.      He was compliant with home exercise program given last session.   Response to previous treatment: good; improving dexterity   Functional change: continued piano playing     Pain: 3/10  Location: right wrists      OBJECTIVE   Objective Measures updated at progress report unless specified.  Edema. Measured in centimeters.    4/18/2022 4/18/2022     Left Right   Wrist Crease 17 18.2   DPC 21.9 23   MCPs 21.7 22.3      Edema. Measured in centimeters.    4/18/2022 4/18/2022     Left Right   Index:         P1 7.4 8    PIP 7.6 7.9   P2 6.4 6.6    DIP 6.1 6.3   P3 5.6 6.1   Long:         P1 7.1 7.8    PIP 7.6 8.2   P2 6.4 7    DIP 5.9 6.5   P3 5.8 6      Elbow and Wrist ROM. Measured in degrees.    4/18/2022 4/18/2022     Left Right   Elbow Ext/Flex        Supination/Pronation       Wrist Ext/Flex 65/60 60/60   Wrist RD/UD 25/35 15/32      Hand ROM. Measured in degrees.  L hand WFL    4/18/2022     Right         Index: MP  87              PIP     70              DIP 45              GIBSON 202         Long:  MP 78              PIP 15/80              DIP 5/50              GIBSON 188         Ring:   MP 83              PIP 80              DIP 50              GIBSON 213         Small:  MP WFL               PIP WFL               DIP WFL              GIBSON           Thumb: MP 65                IP 42       Rad ADD/ABD 70       Pal ADD/ABD 65          Opposition ~0.5 cm from DPC       Strength (Dynamometer) and Pinch Strength (Pinch Gauge)  Measured in pounds.    4/18/2022 4/18/2022     Left Right   Rung II deferred deferred   [place]ara Pinch deferred deferred   3pt Pinch deferred deferred   2pt Pinch deferred deferred      Sensation: reports only a little of numbness and tingling in thumb and finger tips    4/18/2022 4/18/2022     Left Right   [place] Mj       Normal 1.65-2.83       Diminished Light Touch 3.22-3.61       Diminished Protective 3.84-4.31       Loss of Protective 4.56-6.65       Untestable >6.65       2 Point Discrimination       Static       Dynamic             Treatment     Ray received the treatments listed below:     Supervised modalities after being cleared for contradictions: Fluidotherapy - Fluidotherapy: To R hand  for 10 min, continuous air, 115 deg, air speed 50 to decrease pain, edema & scar tissue, sensory re- education, and increased tissue extensibility prior to therex    Manual therapy techniques: Manual Lymphatic Drainage and Soft tissue Mobilization were applied to the: R anterior forearm for 15  minutes, including:  - retrograde massage   - scar massage with vibration tool  - soft tissue mobilization with star massage   - k tape for carpal tunnel on left hand; educated on wear, care, and precautions       Therapeutic exercises to develop ROM and  "flexibility for 34 minutes, including:  Passive flexion stretch of fingers    Passive extension stretch of index finger/LF PIPs X 10 reps each    AROM Wrist  Ext/flx  RD/UD    X 10 reps each    Wave, hook, straight fist, composite fist, finger spreads, finger lifts, pinky slides    X 10 reps each    tight intrinsics with Hook position ( encouraged to stretch intrinsics )     Prayer stretch X 3 reps x 30"    joint blocking   pip and dip  10 reps    isopheres   2 minutes    Pink putty scar rollong 2 minutes        Patient Education and Home Exercises      Education provided:   - wear LMB at nighttime and oval 8 during the day during functional activities   - scar pad for nighttime to assist   - issued XL size finger sleeves for index and  middle fingers to wear during the day .   - *fitted for oval 8; size 11 on IF and size 10 on RF   - Progress towards goals     Written Home Exercises Provided: Patient instructed to cont prior HEP.  Exercises were reviewed and Karthik was able to demonstrate them prior to the end of the session.  Karthik demonstrated good  understanding of the HEP provided. See EMR under Patient Instructions for exercises provided during therapy sessions.      ASSESSMENT     Pt would continue to benefit from skilled OT.  Pt able to rosita exercises today pain-free and reports continued clicking at index finger with comp flexion. Pt continues to have an extension lag at index finger and long finger PIPs that can be improved to neutral with exercising and stretching today. Pt taped on left hand for carpal tunnel like symptoms and educated on wear, care, and precautions, to continue oval 8 wear for possible trigger, and educated to continue HEP pain-free.  Will cont to progress as rosita.      Karthik is progressing well towards his goals and there are no updates to goals at this time. Pt prognosis is Good.     Pt will continue to benefit from skilled outpatient occupational therapy to address the deficits listed in the " problem list on initial evaluation, provide pt/family education and to maximize pt's level of independence in the home and community environment.     Pt's spiritual, cultural and educational needs considered and pt agreeable to plan of care and goals.    Anticipated barriers to occupational therapy: n/a     Goals:  Long Term Goals (LTGs); to be met by discharge.  LTG #1: Pt will report a pain level of 1 out of 10 with ADLs, playing piano, and fishing activities           LTG #2: Pt will demo improved FOTO score by at least 20 points.   LTG #3: Pt will return to prior level of function for ADLs and household management.   LTG #4: Pt will demonstrate improved R digits AROM WFL for grasping and holding things  LTG #5:  and pinch strength to be assessed when appropriate and goals set accordingly     Short Term Goals (STGs); to be met within 4 weeks (5/18/22).  STG #1: Pt will report 3 out of 10 pain level with ADLs and piano. - progressing   STG #2:  Pt will demonstrate independence with issued HEP.  - progressing       PLAN     Continue skilled occupational therapy with individualized plan of care focusing on improving functional independence     Updates/Grading for next session: cont as rosita Estrada, OT

## 2022-04-28 ENCOUNTER — CLINICAL SUPPORT (OUTPATIENT)
Dept: REHABILITATION | Facility: HOSPITAL | Age: 67
End: 2022-04-28
Payer: MEDICARE

## 2022-04-28 DIAGNOSIS — M25.641 FINGER STIFFNESS, RIGHT: Primary | ICD-10-CM

## 2022-04-28 DIAGNOSIS — M79.641 PAIN OF RIGHT HAND: ICD-10-CM

## 2022-04-28 PROCEDURE — 97110 THERAPEUTIC EXERCISES: CPT

## 2022-04-28 PROCEDURE — 97022 WHIRLPOOL THERAPY: CPT

## 2022-04-28 PROCEDURE — 97140 MANUAL THERAPY 1/> REGIONS: CPT

## 2022-05-03 ENCOUNTER — CLINICAL SUPPORT (OUTPATIENT)
Dept: REHABILITATION | Facility: HOSPITAL | Age: 67
End: 2022-05-03
Payer: MEDICARE

## 2022-05-03 DIAGNOSIS — M25.641 FINGER STIFFNESS, RIGHT: Primary | ICD-10-CM

## 2022-05-03 DIAGNOSIS — M79.641 PAIN OF RIGHT HAND: ICD-10-CM

## 2022-05-03 PROCEDURE — 97110 THERAPEUTIC EXERCISES: CPT

## 2022-05-03 NOTE — PROGRESS NOTES
[facility]  Occupational Therapy Treatment Note    Date: 5/3/2022  Name: Karthik Bentley  Clinic Number: [id number]    Therapy Diagnosis:   Encounter Diagnoses   Name Primary?    Finger stiffness, right Yes    Pain of right hand      Physician: Russo-Digeorge, Jamie L*  Physician Orders: eval and treat  Medical Diagnosis: Carpal tunnel syndrome of right wrist [G56.01], Trigger finger of right hand, unspecified finger [M65.30], Postoperative state [Z98.890]  Surgical Procedure and Date: 4/1/22, s/p R CTR /   Date of Injury/Onset: ~2 months ago  Evaluation Date: 4/18/22  Insurance Authorization Period Expiration: 4/14/23  Plan of Care Expiration: 6/17/22  Progress Note Due: 5/18/22   Date of Return to MD: 6/7/22  Visit # / Visits authorized: 4 / 20  FOTO: initial eval      Precautions:  Standard, diabetic     Time In: 11:03   am  Time Out: 12:00 pm   Total Appointment Time (timed & untimed codes): 57  minutes     SUBJECTIVE     Pt reports: Pt reports  That his long finger appears to be straighter at PIP joint .  He states that he is still avoiding heavy lifting and painful movements at this time.      He was compliant with home exercise program given last session.   Response to previous treatment: good; improving dexterity   Functional change: continued piano playing     Pain: 3/10  Location: right wrists      OBJECTIVE   Objective Measures updated at progress report unless specified.  Edema. Measured in centimeters.    4/18/2022 4/18/2022     Left Right   Wrist Crease 17 18.2   DPC 21.9 23   MCPs 21.7 22.3      Edema. Measured in centimeters.    4/18/2022 4/18/2022     Left Right   Index:         P1 7.4 8    PIP 7.6 7.9   P2 6.4 6.6    DIP 6.1 6.3   P3 5.6 6.1   Long:         P1 7.1 7.8    PIP 7.6 8.2   P2 6.4 7    DIP 5.9 6.5   P3 5.8 6      Elbow and Wrist ROM. Measured in degrees.    4/18/2022 4/18/2022     Left Right   Elbow Ext/Flex       Supination/Pronation       Wrist Ext/Flex 65/60  60/60   Wrist RD/UD 25/35 15/32      Hand ROM. Measured in degrees.  L hand WFL    4/18/2022     Right         Index: MP  87              PIP     70              DIP 45              GIBSON 202         Long:  MP 78              PIP 15/80              DIP 5/50              GIBSON 188         Ring:   MP 83              PIP 80              DIP 50              GIBSON 213         Small:  MP WFL               PIP WFL               DIP WFL              GIBSON           Thumb: MP 65                IP 42       Rad ADD/ABD 70       Pal ADD/ABD 65          Opposition ~0.5 cm from DPC       Strength (Dynamometer) and Pinch Strength (Pinch Gauge)  Measured in pounds. Til 6 weeks 5/10/22       4/18/2022 4/18/2022     Left Right   Rung II deferred deferred   Oh[place] Pinch deferred deferred   3pt Pinch deferred deferred   2pt Pinch deferred deferred        Sensation: reports only a little of numbness and tingling in thumb and finger tips      4/18/2022 4/18/2022     Left Right   [place] Mj       Normal 1.65-2.83    X    Diminished Light Touch 3.22-3.61       Diminished Protective 3.84-4.31       Loss of Protective 4.56-6.65       Untestable >6.65       2 Point Discrimination       Static       Dynamic             Treatment     Ray received the treatments listed below:     Supervised modalities after being cleared for contradictions: Fluidotherapy - Fluidotherapy: To R hand  for 10 min, continuous air, 115 deg, air speed 50 to decrease pain, edema & scar tissue, sensory re- education, and increased tissue extensibility prior to therex    Manual therapy techniques: Manual Lymphatic Drainage and Soft tissue Mobilization were applied to the: R anterior forearm for 15  minutes, including:  - retrograde massage   - scar massage with vibration tool  - soft tissue mobilization with star massage   - k tape for carpal tunnel on left hand; educated on wear, care, and precautions       Therapeutic exercises to develop ROM and flexibility for 34  "minutes, including:  Passive flexion stretch of fingers    Passive extension stretch of index finger/LF PIPs X 10 reps each    AROM Wrist  Ext/flx  RD/UD    X 10 reps each    Wave, hook, straight fist, composite fist, finger spreads, finger lifts, pinky slides    X 10 reps each    tight intrinsics with Hook position ( encouraged to stretch intrinsics )     Prayer stretch X 3 reps x 30"    joint blocking   pip and dip  10 reps    isopheres   2 minutes    Pink putty scar vida   2 minutes     wrist yellow flex  Bar  perturbations  2 minutes    Wrist proprioception   large silver stray 2 minutes     wrist flex/ ext 1/2 #   30 reps        Patient Education and Home Exercises      Education provided:   - wear LMB at nighttime and oval 8 during the day during functional activities   - scar pad for nighttime to assist   - issued XL size finger sleeves for index and  middle fingers to wear during the day .   - *fitted for oval 8; size 11 on IF and size 10 on RF   - Progress towards goals     Written Home Exercises Provided: Patient instructed to cont prior HEP.  Exercises were reviewed and Karthik was able to demonstrate them prior to the end of the session.  Karthik demonstrated good  understanding of the HEP provided. See EMR under Patient Instructions for exercises provided during therapy sessions.      ASSESSMENT     Pt would continue to benefit from skilled OT.  Pt able to rosita exercises today pain-free and reports continued clicking at index finger with comp flexion. Pt continues to have an extension lag at index finger and long finger PIPs that can be improved to neutral with exercising and stretching today. Pt taped on left hand for carpal tunnel like symptoms and educated on wear, care, and precautions, to continue oval 8 wear for possible trigger, and educated to continue HEP pain-free.  Will cont to progress as rosita.      Karthik is progressing well towards his goals and there are no updates to goals at this time. Pt " prognosis is Good.     Pt will continue to benefit from skilled outpatient occupational therapy to address the deficits listed in the problem list on initial evaluation, provide pt/family education and to maximize pt's level of independence in the home and community environment.     Pt's spiritual, cultural and educational needs considered and pt agreeable to plan of care and goals.    Anticipated barriers to occupational therapy: n/a     Goals:  Long Term Goals (LTGs); to be met by discharge.  LTG #1: Pt will report a pain level of 1 out of 10 with ADLs, playing piano, and fishing activities           LTG #2: Pt will demo improved FOTO score by at least 20 points.   LTG #3: Pt will return to prior level of function for ADLs and household management.   LTG #4: Pt will demonstrate improved R digits AROM WFL for grasping and holding things  LTG #5:  and pinch strength to be assessed when appropriate and goals set accordingly     Short Term Goals (STGs); to be met within 4 weeks (5/18/22).  STG #1: Pt will report 3 out of 10 pain level with ADLs and piano. - progressing   STG #2:  Pt will demonstrate independence with issued HEP.  - progressing       PLAN     Continue skilled occupational therapy with individualized plan of care focusing on improving functional independence     Updates/Grading for next session: cont as rosita Washburn, OT

## 2022-05-06 ENCOUNTER — CLINICAL SUPPORT (OUTPATIENT)
Dept: REHABILITATION | Facility: HOSPITAL | Age: 67
End: 2022-05-06
Payer: MEDICARE

## 2022-05-06 DIAGNOSIS — M25.641 FINGER STIFFNESS, RIGHT: Primary | ICD-10-CM

## 2022-05-06 DIAGNOSIS — M79.641 PAIN OF RIGHT HAND: ICD-10-CM

## 2022-05-06 PROCEDURE — 97022 WHIRLPOOL THERAPY: CPT

## 2022-05-06 PROCEDURE — 97140 MANUAL THERAPY 1/> REGIONS: CPT

## 2022-05-06 PROCEDURE — 97110 THERAPEUTIC EXERCISES: CPT

## 2022-05-06 NOTE — PROGRESS NOTES
[facility]  Occupational Therapy Treatment Note    Date: 5/6/2022  Name: Karthik Bentley  Clinic Number: [id number]    Therapy Diagnosis:   Encounter Diagnoses   Name Primary?    Finger stiffness, right Yes    Pain of right hand      Physician: Russo-Digeorge, Jamie L*  Physician Orders: eval and treat  Medical Diagnosis: Carpal tunnel syndrome of right wrist [G56.01], Trigger finger of right hand, unspecified finger [M65.30], Postoperative state [Z98.890]  Surgical Procedure and Date: 4/1/22, s/p R CTR /   Date of Injury/Onset: ~2 months ago  Evaluation Date: 4/18/22  Insurance Authorization Period Expiration: 4/14/23  Plan of Care Expiration: 6/17/22  Progress Note Due: 5/18/22   Date of Return to MD: 6/7/22  Visit # / Visits authorized: 5 / 20  FOTO: initial eval      Precautions:  Standard, diabetic     Time In: 11:03 am  Time Out: 12:00 pm   Total Appointment Time (timed & untimed codes): 57  minutes     SUBJECTIVE     Pt reports: cont to have bend in PIP. Concerned with return of strength      He was compliant with home exercise program given last session.   Response to previous treatment: good; improving dexterity   Functional change: continued piano playing     Pain: 3/10  Location: right wrists      OBJECTIVE   Objective Measures updated at progress report unless specified.  Edema. Measured in centimeters.    4/18/2022 4/18/2022     Left Right   Wrist Crease 17 18.2   DPC 21.9 23   MCPs 21.7 22.3      Edema. Measured in centimeters.    4/18/2022 4/18/2022     Left Right   Index:         P1 7.4 8    PIP 7.6 7.9   P2 6.4 6.6    DIP 6.1 6.3   P3 5.6 6.1   Long:         P1 7.1 7.8    PIP 7.6 8.2   P2 6.4 7    DIP 5.9 6.5   P3 5.8 6      Elbow and Wrist ROM. Measured in degrees.    4/18/2022 4/18/2022     Left Right   Elbow Ext/Flex       Supination/Pronation       Wrist Ext/Flex 65/60 60/60   Wrist RD/UD 25/35 15/32      Hand ROM. Measured in degrees.  L hand WFL    4/18/2022      Right         Index: MP  87              PIP     70              DIP 45              GIBSON 202         Long:  MP 78              PIP 15/80              DIP 5/50              GIBSON 188         Ring:   MP 83              PIP 80              DIP 50              GIBSON 213         Small:  MP WFL               PIP WFL               DIP WFL              GIBSON           Thumb: MP 65                IP 42       Rad ADD/ABD 70       Pal ADD/ABD 65          Opposition ~0.5 cm from DPC       Strength (Dynamometer) and Pinch Strength (Pinch Gauge)  Measured in pounds. Til 6 weeks 5/10/22       4/18/2022 4/18/2022     Left Right   Rung II deferred deferred   [place] Pinch deferred deferred   3pt Pinch deferred deferred   2pt Pinch deferred deferred        Sensation: reports only a little of numbness and tingling in thumb and finger tips      4/18/2022 4/18/2022     Left Right   [place] Mj       Normal 1.65-2.83    X    Diminished Light Touch 3.22-3.61       Diminished Protective 3.84-4.31       Loss of Protective 4.56-6.65       Untestable >6.65       2 Point Discrimination       Static       Dynamic             Treatment     Ray received the treatments listed below:     Supervised modalities after being cleared for contradictions: Fluidotherapy - Fluidotherapy: To R hand  for 10 min, continuous air, 115 deg, air speed 50 to decrease pain, edema & scar tissue, sensory re- education, and increased tissue extensibility prior to therex      Manual therapy techniques: Manual Lymphatic Drainage and Soft tissue Mobilization were applied to the: R anterior forearm for 12  minutes, including:  - retrograde massage   - scar massage with vibration tool  - soft tissue mobilization with star massage        Therapeutic exercises to develop ROM and flexibility for 35 minutes, including:  Passive flexion stretch of fingers    Passive extension stretch of index finger/LF PIPs X 10 reps each    AROM Wrist  Ext/flx  RD/UD    X 10 reps each    Wave,  "hook, straight fist, composite fist, finger spreads, finger lifts, pinky slides    X 10 reps each    tight intrinsics with Hook position ( encouraged to stretch intrinsics )     Prayer stretch X 3 reps x 30"   joint blocking   pip and dip  10 reps    isopheres   2 minutes    Pink putty scar vida   2 minutes (NT)   wrist yellow flex  Bar  perturbations  2 minutes    Wrist proprioception  large silver stray 2 minutes (30s each direction)    wrist flex/ ext 1.1 # ball    30 reps    Wrist wobble board x 10 reps         Patient Education and Home Exercises      Education provided:   - wear LMB at nighttime and oval 8 during the day during functional activities   - scar pad for nighttime to assist   - issued XL size finger sleeves for index and  middle fingers to wear during the day .   - *fitted for oval 8; size 11 on IF and size 10 on RF   - Progress towards goals     Written Home Exercises Provided: Patient instructed to cont prior HEP.  Exercises were reviewed and Karthik was able to demonstrate them prior to the end of the session.  Karthik demonstrated good  understanding of the HEP provided. See EMR under Patient Instructions for exercises provided during therapy sessions.      ASSESSMENT     Pt would continue to benefit from skilled OT.  Pt able to rosita exercises today pain-free and reports continued clicking at index finger with comp flexion. Pt continues to have an extension lag at index finger however able to achieve full extension with passive motion. Discussed progress in tx. Pt is eager to know his progress and duration with POC. Informed pt he will be measured next session to determine progression since initial eval.  educated on wear, care, and precautions, to continue oval 8 wear for possible trigger, and educated to continue HEP pain-free.  Will cont to progress as rosita.      Karthik is progressing well towards his goals and there are no updates to goals at this time. Pt prognosis is Good.     Pt will continue to " benefit from skilled outpatient occupational therapy to address the deficits listed in the problem list on initial evaluation, provide pt/family education and to maximize pt's level of independence in the home and community environment.     Pt's spiritual, cultural and educational needs considered and pt agreeable to plan of care and goals.    Anticipated barriers to occupational therapy: n/a     Goals:  Long Term Goals (LTGs); to be met by discharge.  LTG #1: Pt will report a pain level of 1 out of 10 with ADLs, playing piano, and fishing activities           LTG #2: Pt will demo improved FOTO score by at least 20 points.   LTG #3: Pt will return to prior level of function for ADLs and household management.   LTG #4: Pt will demonstrate improved R digits AROM WFL for grasping and holding things  LTG #5:  and pinch strength to be assessed when appropriate and goals set accordingly     Short Term Goals (STGs); to be met within 4 weeks (5/18/22).  STG #1: Pt will report 3 out of 10 pain level with ADLs and piano. - progressing   STG #2:  Pt will demonstrate independence with issued HEP.  - progressing       PLAN     Continue skilled occupational therapy with individualized plan of care focusing on improving functional independence     Updates/Grading for next session: cont as rosita; MEASURE!    Tabitha Sutherland OT

## 2022-05-10 ENCOUNTER — CLINICAL SUPPORT (OUTPATIENT)
Dept: REHABILITATION | Facility: HOSPITAL | Age: 67
End: 2022-05-10
Payer: MEDICARE

## 2022-05-10 ENCOUNTER — LAB VISIT (OUTPATIENT)
Dept: LAB | Facility: HOSPITAL | Age: 67
End: 2022-05-10
Attending: FAMILY MEDICINE
Payer: MEDICARE

## 2022-05-10 DIAGNOSIS — E11.9 TYPE 2 DIABETES MELLITUS WITHOUT COMPLICATION, WITHOUT LONG-TERM CURRENT USE OF INSULIN: ICD-10-CM

## 2022-05-10 DIAGNOSIS — M79.641 PAIN OF RIGHT HAND: ICD-10-CM

## 2022-05-10 DIAGNOSIS — M25.641 FINGER STIFFNESS, RIGHT: Primary | ICD-10-CM

## 2022-05-10 DIAGNOSIS — E11.65 TYPE 2 DIABETES MELLITUS WITH HYPERGLYCEMIA, WITHOUT LONG-TERM CURRENT USE OF INSULIN: ICD-10-CM

## 2022-05-10 LAB
ESTIMATED AVG GLUCOSE: 169 MG/DL (ref 68–131)
HBA1C MFR BLD: 7.5 % (ref 4–5.6)

## 2022-05-10 PROCEDURE — 97022 WHIRLPOOL THERAPY: CPT

## 2022-05-10 PROCEDURE — 97110 THERAPEUTIC EXERCISES: CPT

## 2022-05-10 PROCEDURE — 83036 HEMOGLOBIN GLYCOSYLATED A1C: CPT | Performed by: FAMILY MEDICINE

## 2022-05-10 PROCEDURE — 36415 COLL VENOUS BLD VENIPUNCTURE: CPT | Performed by: FAMILY MEDICINE

## 2022-05-10 PROCEDURE — 97140 MANUAL THERAPY 1/> REGIONS: CPT

## 2022-05-10 NOTE — PROGRESS NOTES
[facility]  Occupational Therapy Treatment Note    Date: 5/10/2022  Name: Karthik Bentley  Clinic Number: [id number]    Therapy Diagnosis:   Encounter Diagnoses   Name Primary?    Finger stiffness, right Yes    Pain of right hand      Physician: Russo-Digeorge, Jamie L*  Physician Orders: eval and treat  Medical Diagnosis: Carpal tunnel syndrome of right wrist [G56.01], Trigger finger of right hand, unspecified finger [M65.30], Postoperative state [Z98.890]  Surgical Procedure and Date: 4/1/22, s/p R CTR   Date of Injury/Onset: ~2 months ago  Evaluation Date: 4/18/22  Insurance Authorization Period Expiration: 4/14/23  Plan of Care Expiration: 6/17/22  Progress Note Due: 5/27/22   Date of Return to MD: 6/7/22  Visit # / Visits authorized: 6 / 20  FOTO: initial eval      Precautions:  Standard, diabetic     Time In: 11:00 am  Time Out: 12:04 pm   Total Appointment Time (timed & untimed codes): 64 minutes     SUBJECTIVE     Pt reports: The fingers bother him more than his wrist.   He was compliant with home exercise program given last session.   Response to previous treatment: good; improving dexterity   Functional change: continued piano playing     Pain: 3/10  Location: Right index and middle fingers     OBJECTIVE   Objective Measures updated at progress report unless specified.  Edema. Measured in centimeters.    4/18/2022 4/18/2022 5/10/2022     Left Right Right   Wrist Crease 17 18.2 18.0   DPC 21.9 23 21.5   MCPs 21.7 22.3 21.2      Edema. Measured in centimeters.    4/18/2022 4/18/2022 5/10/2022     Left Right Right   Index:          P1 7.4 8 7.5    PIP 7.6 7.9 7.2   P2 6.4 6.6 6.3    DIP 6.1 6.3 6.2   P3 5.6 6.1 5.7   Long:          P1 7.1 7.8 7.6    PIP 7.6 8.2 7.7   P2 6.4 7 7.2    DIP 5.9 6.5 6.3   P3 5.8 6 6      Elbow and Wrist ROM. Measured in degrees.    4/18/2022 4/18/2022 5/10/2022     Left Right Right   Wrist Ext/Flex 65/60 60/60    Wrist RD/UD 25/35 15/32 25/35      Hand  ROM. Measured in degrees.  L hand WFL    4/18/2022 5/10/2022     Right Right          Index: MP  87 85              PIP     70 75              DIP 45 60              GIBSON 202 220          Long:  MP 78 85              PIP 15/80 -15/85              DIP 5/50 -5/70              GIBSON 188 220          Ring:   MP 83               PIP 80               DIP 50               GIBSON 213           Small:  MP WFL                PIP WFL                DIP WFL               GIBSON             Thumb: MP 65                 IP 42        Rad ADD/ABD 70        Pal ADD/ABD 65           Opposition ~0.5 cm from DPC Touch to DPC with pain       Strength (Dynamometer) and Pinch Strength (Pinch Gauge)  Measured in pounds to POP.    5/10/2022 5/10/2022     Left Right   Rung II 80 22   Key Pinch 19 14   3pt Pinch 18 7   2pt Pinch 14 8      Sensation: reports only a little of numbness and tingling in thumb and finger tips    4/18/2022 4/18/2022     Left Right   [place] Mj       Normal 1.65-2.83    X    Diminished Light Touch 3.22-3.61       Diminished Protective 3.84-4.31       Loss of Protective 4.56-6.65       Untestable >6.65       2 Point Discrimination       Static       Dynamic          Treatment     Ray received the treatments listed below:     Supervised modalities after being cleared for contradictions: Fluidotherapy - Fluidotherapy: To R hand  for 10 min, continuous air, 115 deg, air speed 50 to decrease pain, edema & scar tissue, sensory re- education, and increased tissue extensibility prior to therex    Manual therapy techniques: Manual Lymphatic Drainage and Soft tissue Mobilization were applied to the: R anterior forearm for 10 minutes, including:  - retrograde massage   - scar massage with vibration tool  - soft tissue mobilization with star massage      Therapeutic exercises to develop ROM and flexibility for 44 minutes, including:  -Significant time spent updating objective edema, ROM, and strength measurements, see charts  "above.    Passive flexion stretch of fingers    Passive extension stretch of index finger/LF PIPs X 10 reps each    AROM Wrist  Ext/flx  RD/UD    X 10 reps each    Wave, hook, straight fist, composite fist, finger spreads, finger lifts, pinky slides    X 10 reps each   tight intrinsics with Hook position (encouraged to stretch intrinsics)     Tabletop stretch @ home X 3 reps x 30"   Yellow therabar scar rolling   2 minutes    wrist yellow flex  Bar  perturbations  2 minutes      Patient Education and Home Exercises      Education provided:   - wear LMB at nighttime and oval 8 during the day during functional activities   - scar pad for nighttime to wrist   - Progress towards goals     Written Home Exercises Provided: Patient instructed to cont prior HEP.  Exercises were reviewed and Karthik was able to demonstrate them prior to the end of the session.  Karthik demonstrated good  understanding of the HEP provided. See EMR under Patient Instructions for exercises provided during therapy sessions.      ASSESSMENT     Pt would continue to benefit from skilled OT. Pt able to rosita exercises today with min pain and reports continued clicking at index finger with comp flexion. Pt continues to have an extension lag at long finger however able to achieve full extension with passive motion. Still, good improvements in edema and digit/wrist ROM evidenced by updated measurements. Discussed progress and POC. Strength measurements show deficits in /pinch of dominant, affected hand. No strengthening initiated, continuing with PROM to address trigger finger symptoms and finger pain. Will cont to progress as rosita.     Karthik is progressing well towards his goals and there are no updates to goals at this time. Pt prognosis is Good.     Pt will continue to benefit from skilled outpatient occupational therapy to address the deficits listed in the problem list on initial evaluation, provide pt/family education and to maximize pt's level of " independence in the home and community environment.     Pt's spiritual, cultural and educational needs considered and pt agreeable to plan of care and goals.    Anticipated barriers to occupational therapy: n/a     Goals:  Long Term Goals (LTGs); to be met by discharge.  LTG #1: Pt will report a pain level of 1 out of 10 with ADLs, playing piano, and fishing activities. ------progressing not met 5/10/2022      LTG #2: Pt will demo improved FOTO score by at least 20 points. ------progressing not met 5/10/2022  LTG #3: Pt will return to prior level of function for ADLs and household management. ------progressing not met 5/10/2022  LTG #4: Pt will demonstrate improved R digits AROM WFL for grasping and holding things. ------progressing not met 5/10/2022  LTG #5:  and pinch strength to be assessed when appropriate and goals set accordingly. ------progressing not met 5/10/2022     Short Term Goals (STGs); to be met within 4 weeks (5/18/22).  STG #1: Pt will report 3 out of 10 pain level with ADLs and piano.------progressing not met 5/10/2022  STG #2:  Pt will demonstrate independence with issued HEP.  ------progressing not met 5/10/2022    PLAN     Continue skilled occupational therapy with individualized plan of care focusing on improving functional independence     Updates/Grading for next session: progress as tolerated. Try paraffin next session.    Cammie Conteh, OT

## 2022-05-13 ENCOUNTER — CLINICAL SUPPORT (OUTPATIENT)
Dept: REHABILITATION | Facility: HOSPITAL | Age: 67
End: 2022-05-13
Payer: MEDICARE

## 2022-05-13 DIAGNOSIS — M25.641 FINGER STIFFNESS, RIGHT: Primary | ICD-10-CM

## 2022-05-13 DIAGNOSIS — M79.641 PAIN OF RIGHT HAND: ICD-10-CM

## 2022-05-13 PROCEDURE — 97140 MANUAL THERAPY 1/> REGIONS: CPT | Mod: CO

## 2022-05-13 PROCEDURE — 97110 THERAPEUTIC EXERCISES: CPT | Mod: CO

## 2022-05-13 PROCEDURE — 97022 WHIRLPOOL THERAPY: CPT | Mod: CO

## 2022-05-13 NOTE — PROGRESS NOTES
[facility]  Occupational Therapy Treatment Note    Date: 5/13/2022  Name: Karthik Bentley  Clinic Number: [id number]    Therapy Diagnosis:   No diagnosis found.  Physician: Russo-Digeorge, Jamie L*  Physician Orders: eval and treat  Medical Diagnosis: Carpal tunnel syndrome of right wrist [G56.01], Trigger finger of right hand, unspecified finger [M65.30], Postoperative state [Z98.890]  Surgical Procedure and Date: 4/1/22, s/p R CTR   Date of Injury/Onset: ~2 months ago  Evaluation Date: 4/18/22  Insurance Authorization Period Expiration: 4/14/23  Plan of Care Expiration: 6/17/22  Progress Note Due: 5/27/22   Date of Return to MD: 6/7/22  Visit # / Visits authorized: 8 / 20  FOTO: initial eval      Precautions:  Standard, diabetic     Time In: 11:02 am  Time Out: 12:00 pm   Total Appointment Time (timed & untimed codes): 58 minutes     SUBJECTIVE     Pt reports: The fingers bother him more than his wrist, reports onset of SF pain noted post surgery  He was compliant with home exercise program given last session.   Response to previous treatment: good; improving dexterity   Functional change: continued piano playing     Pain: 2/10  Location: Right index and middle fingers     OBJECTIVE   Objective Measures updated at progress report unless specified.  Edema. Measured in centimeters.    4/18/2022 4/18/2022 5/10/2022     Left Right Right   Wrist Crease 17 18.2 18.0   DPC 21.9 23 21.5   MCPs 21.7 22.3 21.2      Edema. Measured in centimeters.    4/18/2022 4/18/2022 5/10/2022     Left Right Right   Index:          P1 7.4 8 7.5    PIP 7.6 7.9 7.2   P2 6.4 6.6 6.3    DIP 6.1 6.3 6.2   P3 5.6 6.1 5.7   Long:          P1 7.1 7.8 7.6    PIP 7.6 8.2 7.7   P2 6.4 7 7.2    DIP 5.9 6.5 6.3   P3 5.8 6 6      Elbow and Wrist ROM. Measured in degrees.    4/18/2022 4/18/2022 5/10/2022     Left Right Right   Wrist Ext/Flex 65/60 60/60    Wrist RD/UD 25/35 15/32 25/35      Hand ROM. Measured in degrees.  L hand  WFL    4/18/2022 5/10/2022     Right Right          Index: MP  87 85              PIP     70 75              DIP 45 60              GIBSON 202 220          Long:  MP 78 85              PIP 15/80 -15/85              DIP 5/50 -5/70              GIBSON 188 220          Ring:   MP 83               PIP 80               DIP 50               GIBSON 213           Small:  MP WFL                PIP WFL                DIP WFL               GIBSON             Thumb: MP 65                 IP 42        Rad ADD/ABD 70        Pal ADD/ABD 65           Opposition ~0.5 cm from DPC Touch to DPC with pain       Strength (Dynamometer) and Pinch Strength (Pinch Gauge)  Measured in pounds to POP.    5/10/2022 5/10/2022     Left Right   Rung II 80 22   Key Pinch 19 14   3pt Pinch 18 7   2pt Pinch 14 8      Sensation: reports only a little of numbness and tingling in thumb and finger tips    4/18/2022 4/18/2022     Left Right   [place] Mj       Normal 1.65-2.83    X    Diminished Light Touch 3.22-3.61       Diminished Protective 3.84-4.31       Loss of Protective 4.56-6.65       Untestable >6.65       2 Point Discrimination       Static       Dynamic          Treatment     Ray received the treatments listed below:     Supervised modalities after being cleared for contradictions: Fluidotherapy - Fluidotherapy: To R hand  for 10 min, continuous air, 115 deg, air speed 50 to decrease pain, edema & scar tissue, sensory re- education, and increased tissue extensibility prior to therex    Manual therapy techniques: Manual Lymphatic Drainage and Soft tissue Mobilization were applied to the: R anterior forearm for 10 minutes, including:  - retrograde massage   - scar massage with vibration tool  - soft tissue mobilization with star massage      Therapeutic exercises to develop ROM and flexibility for 38 minutes, including:  -Significant time spent updating objective edema, ROM, and strength measurements, see charts above.    Passive flexion stretch of  "fingers    Passive extension stretch of index finger/LF PIPs X 10 reps each    AROM Wrist  Ext/flx  RD/UD  With 1.0 KG ball    X 20 reps each    Wave, hook, straight fist, composite fist, finger spreads, finger lifts, pinky slides    X 10 reps each   tight intrinsics with Hook position (encouraged to stretch intrinsics)     Tabletop stretch X 3 reps x 30"   Yellow therabar scar rolling   2 minutes (NT)   wrist yellow flex  Bar  perturbations  2 minutes NT   Yellow digi flexIsolated digit press x 10  (incr pain in SF instructed to terminate)  Patient Education and Home Exercises      Education provided:   - wear LMB at nighttime and oval 8 during the day during functional activities   - scar pad for nighttime to wrist   - Progress towards goals     Written Home Exercises Provided: Patient instructed to cont prior HEP.  Exercises were reviewed and Karthik was able to demonstrate them prior to the end of the session.  Karthik demonstrated good  understanding of the HEP provided. See EMR under Patient Instructions for exercises provided during therapy sessions.      ASSESSMENT     Pt would continue to benefit from skilled OT. Pt able to rosita exercises today with no pain or clicking at index finger. However, pt reported pain in SF with digi flex exerciser radiating along FA, tolerated gentle strengthening otherwise. Pt continues to have an extension lag at long finger however able to achieve full extension with passive motion.  Will cont to progress as rosita.     Karthik is progressing well towards his goals and there are no updates to goals at this time. Pt prognosis is Good.     Pt will continue to benefit from skilled outpatient occupational therapy to address the deficits listed in the problem list on initial evaluation, provide pt/family education and to maximize pt's level of independence in the home and community environment.     Pt's spiritual, cultural and educational needs considered and pt agreeable to plan of care and " goals.    Anticipated barriers to occupational therapy: n/a     Goals:  Long Term Goals (LTGs); to be met by discharge.  LTG #1: Pt will report a pain level of 1 out of 10 with ADLs, playing piano, and fishing activities. ------progressing not met 5/13/2022      LTG #2: Pt will demo improved FOTO score by at least 20 points. ------progressing not met 5/13/2022  LTG #3: Pt will return to prior level of function for ADLs and household management. ------progressing not met 5/13/2022  LTG #4: Pt will demonstrate improved R digits AROM WFL for grasping and holding things. ------progressing not met 5/13/2022  LTG #5:  and pinch strength to be assessed when appropriate and goals set accordingly. ------progressing not met 5/13/2022     Short Term Goals (STGs); to be met within 4 weeks (5/18/22).  STG #1: Pt will report 3 out of 10 pain level with ADLs and piano.------progressing not met 5/13/2022  STG #2:  Pt will demonstrate independence with issued HEP.  ------progressing not met 5/13/2022    PLAN     Continue skilled occupational therapy with individualized plan of care focusing on improving functional independence     Updates/Grading for next session: progress as tolerated. Try paraffin next session.    FILEMON Meyers/L        Client Care conference completed with evaluating therapist in regards to this patients POC as evidenced by co signature of supervising therapist.

## 2022-05-17 ENCOUNTER — CLINICAL SUPPORT (OUTPATIENT)
Dept: REHABILITATION | Facility: HOSPITAL | Age: 67
End: 2022-05-17
Payer: MEDICARE

## 2022-05-17 DIAGNOSIS — M25.641 FINGER STIFFNESS, RIGHT: Primary | ICD-10-CM

## 2022-05-17 PROCEDURE — 97110 THERAPEUTIC EXERCISES: CPT

## 2022-05-17 PROCEDURE — 97140 MANUAL THERAPY 1/> REGIONS: CPT

## 2022-05-17 PROCEDURE — 97022 WHIRLPOOL THERAPY: CPT

## 2022-05-17 NOTE — PROGRESS NOTES
"[facility]  Occupational Therapy Treatment Note    Date: 5/17/2022  Name: Karthik Bentley  Clinic Number: [id number]    Therapy Diagnosis:   No diagnosis found.  Physician: Russo-Digeorge, Jamie L*  Physician Orders: eval and treat  Medical Diagnosis: Carpal tunnel syndrome of right wrist [G56.01], Trigger finger of right hand, unspecified finger [M65.30], Postoperative state [Z98.890]  Surgical Procedure and Date: 4/1/22, s/p R CTR   Date of Injury/Onset: ~2 months ago  Evaluation Date: 4/18/22  Insurance Authorization Period Expiration: 4/14/23  Plan of Care Expiration: 6/17/22  Progress Note Due: 5/27/22   Date of Return to MD: 6/7/22  Visit # / Visits authorized: 8 / 20  FOTO: initial eval      Precautions:  Standard, diabetic     Time In: 11:02 am  Time Out: 12:00 pm   Total Appointment Time (timed & untimed codes): 58 minutes     SUBJECTIVE   " I have started playing the piano again , I have no issues with it"      Pt reports: The fingers bother him more than his wrist, reports onset of SF pain noted post surgery  He was compliant with home exercise program given last session.   Response to previous treatment: good; improving dexterity   Functional change: continued piano playing     Pain: 2/10  Location: Right index and middle fingers     OBJECTIVE   Objective Measures updated at progress report unless specified.  Edema. Measured in centimeters.    4/18/2022 4/18/2022 5/10/2022     Left Right Right   Wrist Crease 17 18.2 18.0   DPC 21.9 23 21.5   MCPs 21.7 22.3 21.2      Edema. Measured in centimeters.    4/18/2022 4/18/2022 5/10/2022     Left Right Right   Index:          P1 7.4 8 7.5    PIP 7.6 7.9 7.2   P2 6.4 6.6 6.3    DIP 6.1 6.3 6.2   P3 5.6 6.1 5.7   Long:          P1 7.1 7.8 7.6    PIP 7.6 8.2 7.7   P2 6.4 7 7.2    DIP 5.9 6.5 6.3   P3 5.8 6 6      Elbow and Wrist ROM. Measured in degrees.    4/18/2022 4/18/2022 5/10/2022     Left Right Right   Wrist Ext/Flex 65/60 60/60  "   Wrist RD/UD 25/35 15/32 25/35      Hand ROM. Measured in degrees.  L hand WFL    4/18/2022 5/10/2022     Right Right          Index: MP  87 85              PIP     70 75              DIP 45 60              GIBSON 202 220          Long:  MP 78 85              PIP 15/80 -15/85              DIP 5/50 -5/70              GIBSON 188 220          Ring:   MP 83               PIP 80               DIP 50               GIBSON 213           Small:  MP WFL                PIP WFL                DIP WFL               GIBSON             Thumb: MP 65                 IP 42        Rad ADD/ABD 70        Pal ADD/ABD 65           Opposition ~0.5 cm from DPC Touch to DPC with pain       Strength (Dynamometer) and Pinch Strength (Pinch Gauge)  Measured in pounds to POP.    5/10/2022 5/10/2022     Left Right   Rung II 80 22   Key Pinch 19 14   3pt Pinch 18 7   2pt Pinch 14 8      Sensation: reports only a little of numbness and tingling in thumb and finger tips    4/18/2022 4/18/2022     Left Right   [place] Mj       Normal 1.65-2.83    X    Diminished Light Touch 3.22-3.61       Diminished Protective 3.84-4.31       Loss of Protective 4.56-6.65       Untestable >6.65       2 Point Discrimination       Static       Dynamic          Treatment     Ray received the treatments listed below:      Pt is 6 weeks post op ( 5/17/22)     Supervised modalities after being cleared for contradictions: Fluidotherapy - Fluidotherapy: To R hand  for 10 min, continuous air, 115 deg, air speed 50 to decrease pain, edema & scar tissue, sensory re- education, and increased tissue extensibility prior to therex    Manual therapy techniques: Manual Lymphatic Drainage and Soft tissue Mobilization were applied to the: R anterior forearm for 10 minutes, including:  - retrograde massage   - scar massage with vibration tool  - soft tissue mobilization with star massage      Therapeutic exercises to develop ROM and flexibility for 38 minutes, including:  -Significant  "time spent updating objective edema, ROM, and strength measurements, see charts above.    Passive flexion stretch of fingers    Passive extension stretch of index finger/LF PIPs X 10 reps each    A/AROM Wrist  Ext/flx  RD/UD      X 20 reps each    Wave, hook, straight fist, composite fist, finger spreads, finger lifts, pinky slides    X 10 reps each   tight intrinsics with Hook position (encouraged to stretch intrinsics)     Tabletop stretch X 3 reps x 30"   Yellow therabar scar rolling   2 minutes    Wrist proprioceptions   large silver stray 2 minutes     wrist flex/ ext 1.1 kg ball  30 reps     green flex bar smiles/ frowns   20 reps     weight well  1# 2 minutes     hand gripper pick small wooden dowels   20 reps    wrist  Red  flex  Bar  perturbations  2 minutes NT   Yellow digi flexIsolated digit press x 10  (incr pain in SF instructed to terminate)  Patient Education and Home Exercises      Education provided:   - wear LMB at nighttime and oval 8 during the day during functional activities   - scar pad for nighttime to wrist   - Progress towards goals     Written Home Exercises Provided: Patient instructed to cont prior HEP.  Exercises were reviewed and Karthik was able to demonstrate them prior to the end of the session.  Karthik demonstrated good  understanding of the HEP provided. See EMR under Patient Instructions for exercises provided during therapy sessions.      ASSESSMENT     Pt would continue to benefit from skilled OT. Pt able to rosita exercises today with no pain or clicking at index finger. However, pt reported pain in SF with digi flex exerciser radiating along FA, tolerated gentle strengthening otherwise. Pt continues to have an extension lag at long finger however able to achieve full extension with passive motion.  Will cont to progress as rosita.     Karthik is progressing well towards his goals and there are no updates to goals at this time. Pt prognosis is Good.     Pt will continue to benefit from skilled " outpatient occupational therapy to address the deficits listed in the problem list on initial evaluation, provide pt/family education and to maximize pt's level of independence in the home and community environment.     Pt's spiritual, cultural and educational needs considered and pt agreeable to plan of care and goals.    Anticipated barriers to occupational therapy: n/a     Goals:  Long Term Goals (LTGs); to be met by discharge.  LTG #1: Pt will report a pain level of 1 out of 10 with ADLs, playing piano, and fishing activities. ------progressing not met 5/17/2022      LTG #2: Pt will demo improved FOTO score by at least 20 points. ------progressing not met 5/17/2022  LTG #3: Pt will return to prior level of function for ADLs and household management. ------progressing not met 5/17/2022  LTG #4: Pt will demonstrate improved R digits AROM WFL for grasping and holding things. ------progressing not met 5/17/2022  LTG #5:  and pinch strength to be assessed when appropriate and goals set accordingly. ------progressing not met 5/17/2022     Short Term Goals (STGs); to be met within 4 weeks (5/18/22).  STG #1: Pt will report 3 out of 10 pain level with ADLs and piano.------progressing not met 5/17/2022  STG #2:  Pt will demonstrate independence with issued HEP.  ------progressing not met 5/17/2022    PLAN     Continue skilled occupational therapy with individualized plan of care focusing on improving functional independence     Updates/Grading for next session: progress as tolerated. Try paraffin next session.    Susan Washburn, OT        Client Care conference completed with evaluating therapist in regards to this patients POC as evidenced by co signature of supervising therapist.

## 2022-05-24 ENCOUNTER — CLINICAL SUPPORT (OUTPATIENT)
Dept: REHABILITATION | Facility: HOSPITAL | Age: 67
End: 2022-05-24
Payer: MEDICARE

## 2022-05-24 DIAGNOSIS — M79.641 PAIN OF RIGHT HAND: ICD-10-CM

## 2022-05-24 DIAGNOSIS — M25.641 FINGER STIFFNESS, RIGHT: Primary | ICD-10-CM

## 2022-05-24 PROCEDURE — 97140 MANUAL THERAPY 1/> REGIONS: CPT | Mod: CO

## 2022-05-24 PROCEDURE — 97022 WHIRLPOOL THERAPY: CPT | Mod: CO

## 2022-05-24 PROCEDURE — 97110 THERAPEUTIC EXERCISES: CPT | Mod: CO

## 2022-05-24 NOTE — PROGRESS NOTES
"[facility]  Occupational Therapy Treatment Note    Date: 5/24/2022  Name: Karthik Bentley  [facility] Number: [id number]    Therapy Diagnosis:   Encounter Diagnoses   Name Primary?    Finger stiffness, right Yes    Pain of right hand      Physician: Russo-Digeorge, Jamie L*  Physician Orders: eval and treat  Medical Diagnosis: Carpal tunnel syndrome of right wrist [G56.01], Trigger finger of right hand, unspecified finger [M65.30], Postoperative state [Z98.890]  Surgical Procedure and Date: 4/1/22, s/p R CTR   Date of Injury/Onset: ~2 months ago  Evaluation Date: 4/18/22  Insurance Authorization Period Expiration: 4/14/23  Plan of Care Expiration: 6/17/22  Progress Note Due: 5/27/22   Date of Return to MD: 6/7/22  Visit # / Visits authorized: 9 / 20  FOTO: initial eval      Precautions:  Standard, diabetic     Time In: 11:00 am  Time Out: 12:00 pm   Total Appointment Time (timed & untimed codes): 60 minutes     SUBJECTIVE       Pt reports: "I woke up really swollen and stiff."  He was compliant with home exercise program given last session.   Response to previous treatment: good; improving dexterity   Functional change: continued piano playing     Pain: 2/10  Location: Right index and middle fingers     OBJECTIVE   Objective Measures updated at progress report unless specified.  Edema. Measured in centimeters.    4/18/2022 4/18/2022 5/10/2022 5/24/2022     Left Right Right right   Wrist Crease 17 18.2 18.0 18.2   DPC 21.9 23 21.5 21.7   MCPs 21.7 22.3 21.2 21.2      Edema. Measured in centimeters.    4/18/2022 4/18/2022 5/10/2022 5/24/2022     Left Right Right right   Index:           P1 7.4 8 7.5 8    PIP 7.6 7.9 7.2 7.6   P2 6.4 6.6 6.3 6.8    DIP 6.1 6.3 6.2 6.3   P3 5.6 6.1 5.7 5.7   Long:           P1 7.1 7.8 7.6 7.9    PIP 7.6 8.2 7.7 7.9   P2 6.4 7 7.2 7.2    DIP 5.9 6.5 6.3 6.7   P3 5.8 6 6 6      Elbow and Wrist ROM. Measured in degrees.    4/18/2022 4/18/2022 5/10/2022      Left Right " Right    Wrist Ext/Flex 65/60 60/60     Wrist RD/UD 25/35 15/32 25/35       Hand ROM. Measured in degrees.  L hand WFL    4/18/2022 5/10/2022 5/24/2022     Right Right            Index: MP  87 85 88              PIP     70 75 74              DIP 45 60 59              GIBSON 202 220            Long:  MP 78 85 87              PIP 15/80 -15/85 -16/81              DIP 5/50 -5/70 -9/74              GIBSON 188 220            Ring:   MP 83  84              PIP 80  82              DIP 50  70              GIBSON 213             Small:  MP WFL                 PIP WFL                 DIP WFL                GIBSON               Thumb: MP 65                  IP 42         Rad ADD/ABD 70         Pal ADD/ABD 65            Opposition ~0.5 cm from DPC Touch to DPC with pain        Strength (Dynamometer) and Pinch Strength (Pinch Gauge)  Measured in pounds to POP.    5/10/2022 5/10/2022     Left Right   Rung II 80 22   Key Pinch 19 14   3pt Pinch 18 7   2pt Pinch 14 8      Sensation: reports only a little of numbness and tingling in thumb and finger tips    4/18/2022 4/18/2022     Left Right   [place] Mj       Normal 1.65-2.83    X    Diminished Light Touch 3.22-3.61       Diminished Protective 3.84-4.31       Loss of Protective 4.56-6.65       Untestable >6.65       2 Point Discrimination       Static       Dynamic          Treatment     Ray received the treatments listed below:      Pt is 7 weeks post op ( 5/24/22)     Supervised modalities after being cleared for contradictions: Fluidotherapy - Fluidotherapy: To R hand  for 10 min, continuous air, 115 deg, air speed 50 to decrease pain, edema & scar tissue, sensory re- education, and increased tissue extensibility prior to therex    Manual therapy techniques: Manual Lymphatic Drainage and Soft tissue Mobilization were applied to the: R anterior forearm and hand for 10 minutes, including:   - retrograde massage   - scar massage with red star  - soft tissue mobilization with vee  "tool  -TFM with red star at LF A1 pulley     Therapeutic exercises to develop ROM and flexibility for 40 minutes, including:  -Significant time spent updating objective edema and ROM, see charts above.    Passive flexion stretch of fingers    Passive extension stretch of index finger/LF PIPs X 10 reps each    A/AROM Wrist  Ext/flx  RD/UD      X 20 reps each (NT)   Wave, hook, straight fist, composite fist, finger spreads, finger lifts, pinky slides  (NT)  X 10 reps each   tight intrinsics with Hook position (encouraged to stretch intrinsics)     Tabletop stretch X 3 reps x 30"   Yellow therabar scar rolling   2 minutes (NT)   Wrist proprioceptions   large silver stray 2 minutes (NT)    wrist flex/ ext 1.1 kg ball  30 reps (NT)    green flex bar smiles/ frowns   20 reps (NT)    weight well  1# 2 minutes (NT)    hand gripper pick small wooden dowels   20 reps (NT)   wrist  Red  flex  Bar  perturbations  2 minutes NT   Yellow digi flexIsolated digit press x 10  (no SF)Ulnar nerve glides Issued for HEP   x5 reps   Patient Education and Home Exercises      Education provided:   - wear LMB at nighttime and oval 8 during the day during functional activities   - scar pad for nighttime to wrist   - Progress towards goals     Written Home Exercises Provided: Patient instructed to cont prior HEP.  Exercises were reviewed and Karthik was able to demonstrate them prior to the end of the session.  Karthik demonstrated good  understanding of the HEP provided. See EMR under Patient Instructions for exercises provided during therapy sessions.      ASSESSMENT     Pt would continue to benefit from skilled OT. Pt tolerated session fairly well. As evidenced in objective measures, pt displayed very mild in in edema and mild improvement in digit flexion, however limited improvement with ext lag in LF. Pt very focused on pain descriptions in shoulders and SF throughout session, limiting attn to exercises. EMMA issued for HEP to address SF pain, " demo'd understanding for exercise however difficulty to perform 2* c/o shoulder pain.  Will cont to progress as rosita.     Karthik is progressing well towards his goals and there are no updates to goals at this time. Pt prognosis is Good.     Pt will continue to benefit from skilled outpatient occupational therapy to address the deficits listed in the problem list on initial evaluation, provide pt/family education and to maximize pt's level of independence in the home and community environment.     Pt's spiritual, cultural and educational needs considered and pt agreeable to plan of care and goals.    Anticipated barriers to occupational therapy: n/a     Goals:  Long Term Goals (LTGs); to be met by discharge.  LTG #1: Pt will report a pain level of 1 out of 10 with ADLs, playing piano, and fishing activities. ------progressing not met 5/24/2022      LTG #2: Pt will demo improved FOTO score by at least 20 points. ------progressing not met 5/24/2022  LTG #3: Pt will return to prior level of function for ADLs and household management. ------progressing not met 5/24/2022  LTG #4: Pt will demonstrate improved R digits AROM WFL for grasping and holding things. ------progressing not met 5/24/2022  LTG #5:  and pinch strength to be assessed when appropriate and goals set accordingly. ------progressing not met 5/24/2022     Short Term Goals (STGs); to be met within 4 weeks (5/18/22).  STG #1: Pt will report 3 out of 10 pain level with ADLs and piano.------progressing not met 5/24/2022  STG #2:  Pt will demonstrate independence with issued HEP.  ------progressing not met 5/24/2022    PLAN     Continue skilled occupational therapy with individualized plan of care focusing on improving functional independence     Updates/Grading for next session: progress as tolerated. Try paraffin next session.    FILEMON Meyers/ISELA

## 2022-05-24 NOTE — PATIENT INSTRUCTIONS
[facility], OCCUPATIONAL THERAPY  HOME EXERCISE PROGRAM       Complete 10 repetitions of each exercise 2 times a day.              Extend your arm in front of you keeping your elbow straight, and bend the wrist and fingers.  Extend your wrist and fingers.  Bend your elbow.  Bring your arm out to the side, bend your wrist and fingers.  Rotate your arm so that your palm is facing behind you.  Tilt your head to the opposite side.        If any of these exercises aggravate your hands, stop, rest and try returning again to the previous exercise performed without pain. Proceed at your own pace using pain tolerance as your guide.        ______________________________________   Hand Therapist

## 2022-05-27 ENCOUNTER — CLINICAL SUPPORT (OUTPATIENT)
Dept: REHABILITATION | Facility: HOSPITAL | Age: 67
End: 2022-05-27
Payer: MEDICARE

## 2022-05-27 DIAGNOSIS — M79.641 PAIN OF RIGHT HAND: ICD-10-CM

## 2022-05-27 DIAGNOSIS — M25.641 FINGER STIFFNESS, RIGHT: Primary | ICD-10-CM

## 2022-05-27 PROCEDURE — 97140 MANUAL THERAPY 1/> REGIONS: CPT

## 2022-05-27 PROCEDURE — 97110 THERAPEUTIC EXERCISES: CPT

## 2022-05-27 PROCEDURE — 97018 PARAFFIN BATH THERAPY: CPT | Mod: 59

## 2022-05-27 NOTE — PROGRESS NOTES
"[facility]  Occupational Therapy Treatment Note    Date: 5/27/2022  Name: Karthik Bentley  Clinic Number: [id number]    Therapy Diagnosis:   Encounter Diagnoses   Name Primary?    Finger stiffness, right Yes    Pain of right hand      Physician: Russo-Digeorge, Jamie L*  Physician Orders: eval and treat  Medical Diagnosis: Carpal tunnel syndrome of right wrist [G56.01], Trigger finger of right hand, unspecified finger [M65.30], Postoperative state [Z98.890]  Surgical Procedure and Date: 4/1/22, s/p R CTR   Date of Injury/Onset: ~2 months ago  Evaluation Date: 4/18/22  Insurance Authorization Period Expiration: 4/14/23  Plan of Care Expiration: 6/17/22  Progress Note Due: 5/27/22   Date of Return to MD: 6/7/22  Visit # / Visits authorized: 10 / 20  FOTO: initial eval      Precautions:  Standard, diabetic     Time In: 11:00 am  Time Out: 12:00 pm   Total Appointment Time (timed & untimed codes): 60 minutes     SUBJECTIVE       Pt reports: "the trigger fingers are still bothering me. And my shoulders have been bothering me."  Pt c/o B shoulder pain recently. He also reports he feels not much improvement in the trigger fingers.   He was compliant with home exercise program given last session.   Response to previous treatment: good; improving dexterity   Functional change: continued piano playing     Pain: 2/10  Location: Right index and middle fingers     OBJECTIVE   Objective Measures updated at progress report unless specified.  Edema. Measured in centimeters.    4/18/2022 4/18/2022 5/10/2022 5/24/2022     Left Right Right right   Wrist Crease 17 18.2 18.0 18.2   DPC 21.9 23 21.5 21.7   MCPs 21.7 22.3 21.2 21.2      Edema. Measured in centimeters.    4/18/2022 4/18/2022 5/10/2022 5/24/2022     Left Right Right right   Index:           P1 7.4 8 7.5 8    PIP 7.6 7.9 7.2 7.6   P2 6.4 6.6 6.3 6.8    DIP 6.1 6.3 6.2 6.3   P3 5.6 6.1 5.7 5.7   Long:           P1 7.1 7.8 7.6 7.9    PIP 7.6 8.2 7.7 " 7.9   P2 6.4 7 7.2 7.2    DIP 5.9 6.5 6.3 6.7   P3 5.8 6 6 6      Elbow and Wrist ROM. Measured in degrees.    4/18/2022 4/18/2022 5/10/2022      Left Right Right    Wrist Ext/Flex 65/60 60/60     Wrist RD/UD 25/35 15/32 25/35       Hand ROM. Measured in degrees.  L hand WFL    4/18/2022 5/10/2022 5/24/2022     Right Right            Index: MP  87 85 88              PIP     70 75 74              DIP 45 60 59              GIBSON 202 220            Long:  MP 78 85 87              PIP 15/80 -15/85 -16/81              DIP 5/50 -5/70 -9/74              GIBSON 188 220            Ring:   MP 83  84              PIP 80  82              DIP 50  70              GIBSON 213             Small:  MP WFL                 PIP WFL                 DIP WFL                GIBSON               Thumb: MP 65                  IP 42         Rad ADD/ABD 70         Pal ADD/ABD 65            Opposition ~0.5 cm from DPC Touch to DPC with pain        Strength (Dynamometer) and Pinch Strength (Pinch Gauge)  Measured in pounds to POP.    5/10/2022 5/10/2022     Left Right   Rung II 80 22   Key Pinch 19 14   3pt Pinch 18 7   2pt Pinch 14 8      Sensation: reports only a little of numbness and tingling in thumb and finger tips    4/18/2022 4/18/2022     Left Right   [place] Mj       Normal 1.65-2.83    X    Diminished Light Touch 3.22-3.61       Diminished Protective 3.84-4.31       Loss of Protective 4.56-6.65       Untestable >6.65       2 Point Discrimination       Static       Dynamic          Treatment     Ray received the treatments listed below:      Pt is 7 weeks post op ( 5/24/22)     Supervised modalities after being cleared for contradictions: Fluidotherapy - Fluidotherapy: To R hand  for 10 min, continuous air, 115 deg, air speed 50 to decrease pain, edema & scar tissue, sensory re- education, and increased tissue extensibility prior to therex (NT)  Patient received paraffin bath to R hand(s) for 10 minutes to increase blood flow,  "circulation, pain management and for tissue elasticity prior to therex.       Manual therapy techniques: Manual Lymphatic Drainage and Soft tissue Mobilization were applied to the: R anterior forearm and hand for 12 minutes, including:   - retrograde massage   - scar massage with red star  - soft tissue mobilization with eve tool  -TFM with red star at LF A1 pulley     Therapeutic exercises to develop ROM and flexibility for 28 minutes, including: (10 min supervised)  -   Passive flexion stretch of fingers    Passive extension stretch of index finger/LF PIPs X 10 reps each    A/AROM Wrist  Ext/flx  RD/UD      X 20 reps each (NT)   Wave, hook,  finger spreads, finger lifts, pinky slides    X 10 reps each   tight intrinsics with Hook position (encouraged to stretch intrinsics)     Tabletop stretch X 3 reps x 30"   Yellow therabar scar rolling   2 minutes (NT)   Wrist proprioceptions   large silver stray 2 minutes (NT)    wrist flex/ ext 1.1 kg ball  30 reps (NT)    green flex bar smiles/ frowns   20 reps (NT)    weight well  1# 2 minutes (NT)    hand gripper pick small wooden dowels   20 reps (NT)   wrist  Red  flex  Bar  perturbations  2 minutes NT   Yellow digi flexIsolated digit press x 10 (NT)Ulnar nerve glides Issued for HEP   x5 reps   Fabricated a custom static volar gutter finger orthosis to right LF to rest finger in extension at night and for light stretch of PIP.  Instructed to wear nightly.  Instructed to monitor for pain/pressure, increased edema, or redness and to contact office for adjustments as needed. Patient reported good understanding of orthotic wear and care schedule. (x 10 minutes)      Patient Education and Home Exercises      Education provided:   - wear LMB at nighttime and oval 8 during the day during functional activities   - scar pad for nighttime to wrist   - Progress towards goals     Written Home Exercises Provided: Patient instructed to cont prior HEP.  Exercises were reviewed and Ray " was able to demonstrate them prior to the end of the session.  Karthik demonstrated good  understanding of the HEP provided. See EMR under Patient Instructions for exercises provided during therapy sessions.      ASSESSMENT     Pt would continue to benefit from skilled OT. Pt tolerated session fairly well. Pt cont to present with swelling and stiffness in R IF and LF. LF noted to still have flexion contracture. Good fit noted of static finger extension gutter night time orthosis for LF.  Pt cont to focus on and report pain descriptions in shoulders, at times limiting attn to exercises. Deferred gripping and strengthening due to continued triggering and pain in IF/LF today.  Wrist ROM and thumb noted to be WFL. Will cont to progress as rosita.     Karthik is progressing fairly towards his goals and there are no updates to goals at this time. Pt prognosis is Good.     Pt will continue to benefit from skilled outpatient occupational therapy to address the deficits listed in the problem list on initial evaluation, provide pt/family education and to maximize pt's level of independence in the home and community environment.     Pt's spiritual, cultural and educational needs considered and pt agreeable to plan of care and goals.    Anticipated barriers to occupational therapy: n/a     Goals:  Long Term Goals (LTGs); to be met by discharge.  LTG #1: Pt will report a pain level of 1 out of 10 with ADLs, playing piano, and fishing activities. ------progressing not met 5/27/2022      LTG #2: Pt will demo improved FOTO score by at least 20 points. ------progressing not met 5/27/2022  LTG #3: Pt will return to prior level of function for ADLs and household management. ------progressing not met 5/27/2022  LTG #4: Pt will demonstrate improved R digits AROM WFL for grasping and holding things. ------progressing not met 5/27/2022  LTG #5:  and pinch strength to be assessed when appropriate and goals set accordingly. ------progressing not  met 5/27/2022     Short Term Goals (STGs); to be met within 4 weeks (5/18/22).  STG #1: Pt will report 3 out of 10 pain level with ADLs and piano.------progressing not met 5/27/2022  STG #2:  Pt will demonstrate independence with issued HEP.  ------progressing not met 5/27/2022    PLAN     Continue skilled occupational therapy with individualized plan of care focusing on improving functional independence     Updates/Grading for next session: progress as tolerated.     MIKE Pugh, CHT

## 2022-06-01 ENCOUNTER — CLINICAL SUPPORT (OUTPATIENT)
Dept: REHABILITATION | Facility: HOSPITAL | Age: 67
End: 2022-06-01
Payer: MEDICARE

## 2022-06-01 DIAGNOSIS — M25.641 FINGER STIFFNESS, RIGHT: Primary | ICD-10-CM

## 2022-06-01 DIAGNOSIS — M79.641 PAIN OF RIGHT HAND: ICD-10-CM

## 2022-06-01 PROCEDURE — 97140 MANUAL THERAPY 1/> REGIONS: CPT

## 2022-06-01 PROCEDURE — 97110 THERAPEUTIC EXERCISES: CPT

## 2022-06-01 NOTE — PROGRESS NOTES
"[facility]  Occupational Therapy Treatment Note    Date: 6/1/2022  Name: Karthik Bentley  Clinic Number: [id number]    Therapy Diagnosis:   Encounter Diagnoses   Name Primary?    Finger stiffness, right Yes    Pain of right hand      Physician: Russo-Digeorge, Jamie L*  Physician Orders: eval and treat  Medical Diagnosis: Carpal tunnel syndrome of right wrist [G56.01], Trigger finger of right hand, unspecified finger [M65.30], Postoperative state [Z98.890]  Surgical Procedure and Date: 4/1/22, s/p R CTR   Date of Injury/Onset: ~2 months ago  Evaluation Date: 4/18/22  Insurance Authorization Period Expiration: 4/14/23  Plan of Care Expiration: 6/17/22  Progress Note Due: 6/3/22  Date of Return to MD: 6/7/22  Visit # / Visits authorized: 11 / 20  FOTO: initial eval, 5th visit, 10th visit      Precautions:  Standard, diabetic     Time In: 3:00 pm  Time Out: 3:47 pm   Total Appointment Time (timed & untimed codes): 47 minutes     SUBJECTIVE     Pt reports: "The trigger fingers are still bothering me. And my shoulders have been bothering me."  Pt c/o B shoulder pain recently. He also reports he feels not much improvement in the trigger fingers.   He was compliant with home exercise program given last session.   Response to previous treatment: good; improving dexterity   Functional change: continued piano playing     Pain: 2/10  Location: Right index and middle fingers     OBJECTIVE   Objective Measures updated at progress report unless specified.  Edema. Measured in centimeters.    4/18/2022 4/18/2022 5/10/2022 5/24/2022     Left Right Right right   Wrist Crease 17 18.2 18.0 18.2   DPC 21.9 23 21.5 21.7   MCPs 21.7 22.3 21.2 21.2      Edema. Measured in centimeters.    4/18/2022 4/18/2022 5/10/2022 5/24/2022     Left Right Right right   Index:           P1 7.4 8 7.5 8    PIP 7.6 7.9 7.2 7.6   P2 6.4 6.6 6.3 6.8    DIP 6.1 6.3 6.2 6.3   P3 5.6 6.1 5.7 5.7   Long:           P1 7.1 7.8 7.6 7.9    " PIP 7.6 8.2 7.7 7.9   P2 6.4 7 7.2 7.2    DIP 5.9 6.5 6.3 6.7   P3 5.8 6 6 6      Elbow and Wrist ROM. Measured in degrees.    4/18/2022 4/18/2022 5/10/2022      Left Right Right    Wrist Ext/Flex 65/60 60/60     Wrist RD/UD 25/35 15/32 25/35       Hand ROM. Measured in degrees.  L hand WFL    4/18/2022 5/10/2022 5/24/2022     Right Right            Index: MP  87 85 88              PIP     70 75 74              DIP 45 60 59              GIBSON 202 220            Long:  MP 78 85 87              PIP 15/80 -15/85 -16/81              DIP 5/50 -5/70 -9/74              GIBSON 188 220            Ring:   MP 83  84              PIP 80  82              DIP 50  70              GIBSON 213             Small:  MP WFL                 PIP WFL                 DIP WFL                GIBSON               Thumb: MP 65                  IP 42         Rad ADD/ABD 70         Pal ADD/ABD 65            Opposition ~0.5 cm from DPC Touch to DPC with pain        Strength (Dynamometer) and Pinch Strength (Pinch Gauge)  Measured in pounds to POP.    5/10/2022 5/10/2022     Left Right   Rung II 80 22   Key Pinch 19 14   3pt Pinch 18 7   2pt Pinch 14 8      Sensation: reports only a little of numbness and tingling in thumb and finger tips    4/18/2022 4/18/2022     Left Right   [place] Mj       Normal 1.65-2.83    X    Diminished Light Touch 3.22-3.61       Diminished Protective 3.84-4.31       Loss of Protective 4.56-6.65       Untestable >6.65       2 Point Discrimination       Static       Dynamic          Treatment     Ray received the treatments listed below:     Supervised modalities after being cleared for contradictions:   Fluidotherapy: To R hand  for 10 min, continuous air, 115 deg, air speed 50 to decrease pain, edema & scar tissue, sensory re- education, and increased tissue extensibility prior to therex (NT *pt declined)  Patient received paraffin bath to R hand(s) for 10 minutes to increase blood flow, circulation, pain management and  "for tissue elasticity prior to therex. (NT *pt declined)    Manual therapy techniques: Manual Lymphatic Drainage and Soft tissue Mobilization were applied to the: R anterior forearm and hand for 15 minutes, including:   - retrograde massage   - scar massage with red star  - soft tissue mobilization with eve tool  -TFM with red star at LF A1 pulley     Therapeutic exercises to develop ROM and flexibility for 32 minutes, including: (0 min supervised)  Passive flexion stretch of fingers    Passive extension stretch of index finger/LF PIPs X 10 reps each    A/AROM Wrist  Ext/flx  RD/UD      X 10 reps each    Wave, hook,  finger spreads, finger lifts, pinky slides    X 10 reps each   tight intrinsics with Hook position (encouraged to stretch intrinsics)     Prayer stretch 3 reps x 30"    Yellow therabar scar rolling   2 minutes    Wrist proprioception  frizbee 2 minutes    Wrist flex/ ext 1.1 kg ball  30 reps (NT)   Green flex bar smiles/ frowns   20 reps (NT)   Weight well  1# 2 minutes (NT)   Hand gripper pick small wooden dowels   20 reps (NT)   wrist  Red  flex  Bar  perturbations  2 minutes NT   Yellow digi flexIsolated digit press x 10 (NT)Ulnar nerve glides Issued for HEP   x5 reps   Patient Education and Home Exercises      Education provided:   - wear LMB at nighttime and oval 8 during the day during functional activities   - scar pad for nighttime to wrist   - Progress towards goals     Written Home Exercises Provided: Patient instructed to cont prior HEP.  Exercises were reviewed and Karthik was able to demonstrate them prior to the end of the session.  Karthik demonstrated good  understanding of the HEP provided. See EMR under Patient Instructions for exercises provided during therapy sessions.      ASSESSMENT     Pt would continue to benefit from skilled OT. Pt tolerated session fairly well. Pt cont to present with swelling and stiffness in R IF and LF. LF noted to still have extension lag. Pt reports good " adherence to static finger extension gutter night time orthosis for LF. Pt cont to focus on and report pain descriptions in shoulders. Deferred gripping and strengthening due to continued triggering and pain in IF/LF today. Wrist ROM and thumb noted to be WFL. Will cont to progress as rosita.     Karthik is progressing fairly towards his goals and there are no updates to goals at this time. Pt prognosis is Good.     Pt will continue to benefit from skilled outpatient occupational therapy to address the deficits listed in the problem list on initial evaluation, provide pt/family education and to maximize pt's level of independence in the home and community environment.     Pt's spiritual, cultural and educational needs considered and pt agreeable to plan of care and goals.    Anticipated barriers to occupational therapy: n/a     Goals:  Long Term Goals (LTGs); to be met by discharge.  LTG #1: Pt will report a pain level of 1 out of 10 with ADLs, playing piano, and fishing activities. ------progressing not met 6/1/2022      LTG #2: Pt will demo improved FOTO score by at least 20 points. ------progressing not met 6/1/2022  LTG #3: Pt will return to prior level of function for ADLs and household management. ------progressing not met 6/1/2022  LTG #4: Pt will demonstrate improved R digits AROM WFL for grasping and holding things. ------progressing not met 6/1/2022  LTG #5:  and pinch strength to be assessed when appropriate and goals set accordingly. ------progressing not met 6/1/2022     Short Term Goals (STGs); to be met within 4 weeks (5/18/22).  STG #1: Pt will report 3 out of 10 pain level with ADLs and piano. Met 6/1/2022  STG #2:  Pt will demonstrate independence with issued HEP.  Met 6/1/2022    PLAN     Continue skilled occupational therapy with individualized plan of care focusing on improving functional independence     Updates/Grading for next session: progress as tolerated. Measure next session.    Cammie Conteh,  OT

## 2022-06-03 ENCOUNTER — CLINICAL SUPPORT (OUTPATIENT)
Dept: REHABILITATION | Facility: HOSPITAL | Age: 67
End: 2022-06-03
Payer: MEDICARE

## 2022-06-03 DIAGNOSIS — M25.641 FINGER STIFFNESS, RIGHT: Primary | ICD-10-CM

## 2022-06-03 DIAGNOSIS — M79.641 PAIN OF RIGHT HAND: ICD-10-CM

## 2022-06-03 PROCEDURE — 97110 THERAPEUTIC EXERCISES: CPT

## 2022-06-03 PROCEDURE — 97140 MANUAL THERAPY 1/> REGIONS: CPT

## 2022-06-03 PROCEDURE — 97018 PARAFFIN BATH THERAPY: CPT | Mod: 59

## 2022-06-03 NOTE — PROGRESS NOTES
"[facility]  Occupational Therapy Treatment Note    Date: 6/3/2022  Name: Karthik Bentley  Clinic Number: [id number]    Therapy Diagnosis:   Encounter Diagnoses   Name Primary?    Finger stiffness, right Yes    Pain of right hand      Physician: Russo-Digeorge, Jamie L*  Physician Orders: eval and treat  Medical Diagnosis: Carpal tunnel syndrome of right wrist [G56.01], Trigger finger of right hand, unspecified finger [M65.30], Postoperative state [Z98.890]  Surgical Procedure and Date: 4/1/22, s/p R CTR   Date of Injury/Onset: ~2 months ago  Evaluation Date: 4/18/22  Insurance Authorization Period Expiration: 4/14/23  Plan of Care Expiration: 6/17/22  Progress Note Due: 6/3/22  Date of Return to MD: 6/7/22  Visit # / Visits authorized: 12 / 20  FOTO: initial eval, 5th visit, 10th visit      Precautions:  Standard, diabetic     Time In: 1:00 pm  Time Out: 2:00 pm   Total Appointment Time (timed & untimed codes): 60 minutes     SUBJECTIVE     Pt reports: "My shoulders have been bothering me, this was since before the surgery."  Pt c/o B shoulder pain recently. He reports less pain in fingers.   He was compliant with home exercise program given last session.   Response to previous treatment: good; improving dexterity   Functional change: continued piano playing     Pain: 1-2/10  Location: Right index and middle fingers     OBJECTIVE   Objective Measures updated at progress report unless specified.  Edema. Measured in centimeters.    4/18/2022 4/18/2022 5/10/2022 5/24/2022 6/3/2022     Left Right Right right Right   Wrist Crease 17 18.2 18.0 18.2 17.7   DPC 21.9 23 21.5 21.7    MCPs 21.7 22.3 21.2 21.2       Edema. Measured in centimeters.    4/18/2022 4/18/2022 5/10/2022 5/24/2022 6/3/2022     Left Right Right right Right   Index:            P1 7.4 8 7.5 8 7.4    PIP 7.6 7.9 7.2 7.6 6.3   P2 6.4 6.6 6.3 6.8 6.4    DIP 6.1 6.3 6.2 6.3 5.9   P3 5.6 6.1 5.7 5.7 5.7   Long:            P1 7.1 7.8 " 7.6 7.9 7.5    PIP 7.6 8.2 7.7 7.9 7.4   P2 6.4 7 7.2 7.2 7.3    DIP 5.9 6.5 6.3 6.7 6.2   P3 5.8 6 6 6 6.0      Elbow and Wrist ROM. Measured in degrees.    4/18/2022 4/18/2022 5/10/2022 6/3/2022     Left Right Right Right   Wrist Ext/Flex 65/60 60/60  65/65   Wrist RD/UD 25/35 15/32 25/35       Hand ROM. Measured in degrees.  L hand WFL    4/18/2022 5/10/2022 5/24/2022 6/3/2022     Right Right Right Right            Index: MP  87 85 88 95              PIP     70 75 74 82              DIP 45 60 59 60              GIBSON 202 220  237            Long:  MP 78 85 87 90              PIP 15/80 -15/85 -16/81 -5/82              DIP 5/50 -5/70 -9/74 -5/75              GIBSON 188 220  237            Ring:   MP 83  84               PIP 80  82               DIP 50  70               GIBSON 213               Small:  MP WFL                  PIP WFL                  DIP WFL                 GIBSON                 Thumb: MP 65                   IP 42          Rad ADD/ABD 70          Pal ADD/ABD 65             Opposition ~0.5 cm from DPC Touch to DPC with pain         Strength (Dynamometer) and Pinch Strength (Pinch Gauge)  Measured in pounds to POP.    5/10/2022 5/10/2022 6/3/2022     Left Right Right   Rung II 80 22 40   [place] Pinch 19 14 19   3pt Pinch 18 7 15   2pt Pinch 14 8 13      Sensation: reports only a little of numbness and tingling in thumb and finger tips    4/18/2022 4/18/2022     Left Right   [place] Mj       Normal 1.65-2.83    X    Diminished Light Touch 3.22-3.61       Diminished Protective 3.84-4.31       Loss of Protective 4.56-6.65       Untestable >6.65       2 Point Discrimination       Static       Dynamic          Treatment     Ray received the treatments listed below:     Supervised modalities after being cleared for contradictions:   (NT) Fluidotherapy: To R hand  for 10 min, continuous air, 115 deg, air speed 50 to decrease pain, edema & scar tissue, sensory re- education, and increased tissue extensibility  "prior to therex   -Patient received paraffin bath to R hand(s) for 10 minutes to increase blood flow, circulation, pain management and for tissue elasticity prior to therex.     Manual therapy techniques: Manual Lymphatic Drainage and Soft tissue Mobilization were applied to the: R anterior forearm and hand for 15 minutes, including:   - retrograde massage   - scar massage   - soft tissue mobilization with eve tool     Therapeutic exercises to develop ROM and flexibility for 35 minutes, including: (0 min supervised)  -Updated objective measurements, see above.   Passive flexion stretch of fingers    Passive extension stretch of index finger/LF PIPs X 10 reps each    A/AROM Wrist  Ext/flx closed fist  RD/UD      X 10 reps each    Wave, hook,  finger spreads, finger lifts, pinky slides    X 10 reps each   tight intrinsics with Hook position (encouraged to stretch intrinsics)     Isometric gripping with tennis ball 10 reps x 3 sec holds   Prayer stretch 3 reps x 30"    Wrist proprioception flexbar oscillations x 1 minute   Wrist proprioception frizbee 1 minute (NT)   Wrist flex/ ext 1.1 kg ball  30 reps (NT)   Green flex bar smiles/ frowns   20 reps (NT)   Weight well  1# 2 minutes (NT)   Hand gripper pick small wooden dowels   20 reps (NT)   wrist  Red  flex  Bar  perturbations  2 minutes NT   Yellow digi flexIsolated digit press x 10 (NT)Ulnar nerve glides Issued for HEP   x5 reps   Patient Education and Home Exercises      Education provided:   - wear LMB at nighttime and oval 8 during the day during functional activities   - scar pad for nighttime to wrist   - Progress towards goals     Written Home Exercises Provided: Patient instructed to cont prior HEP.  Exercises were reviewed and Karthik was able to demonstrate them prior to the end of the session.  Karthik demonstrated good  understanding of the HEP provided. See EMR under Patient Instructions for exercises provided during therapy sessions.      ASSESSMENT     Pt " would continue to benefit from skilled OT. Pt tolerated session fairly well. Improvements in edema, digit/wrist ROM, and strength noted by objective measurements. Overall decreased pain in hand/wrist. LF extension lag with good improvements and pt cont to report good adherence to static finger extension gutter night time orthosis for LF. Pt cont to focus on and report pain descriptions in shoulders. Encouraged pt to get this evaluated. Performed isometric  strengthening with no issue and no c/o pain. Will cont to progress as tolerated with strengthening and PROM.    Karthik is progressing fairly towards his goals and there are no updates to goals at this time. Pt prognosis is Good.     Pt will continue to benefit from skilled outpatient occupational therapy to address the deficits listed in the problem list on initial evaluation, provide pt/family education and to maximize pt's level of independence in the home and community environment.     Pt's spiritual, cultural and educational needs considered and pt agreeable to plan of care and goals.    Anticipated barriers to occupational therapy: n/a     Goals:  Long Term Goals (LTGs); to be met by discharge.  LTG #1: Pt will report a pain level of 1 out of 10 with ADLs, playing piano, and fishing activities. ------progressing not met 6/3/2022      LTG #2: Pt will demo improved FOTO score by at least 20 points. ------progressing not met 6/3/2022  LTG #3: Pt will return to prior level of function for ADLs and household management. ------progressing not met 6/3/2022  LTG #4: Pt will demonstrate improved R digits AROM WFL for grasping and holding things. Met 6/3/2022  LTG #5:  and pinch strength to be assessed when appropriate and goals set accordingly. Met 5/10/2022     Short Term Goals (STGs); to be met within 4 weeks (5/18/22).  STG #1: Pt will report 3 out of 10 pain level with ADLs and piano. Met 6/1/2022  STG #2:  Pt will demonstrate independence with issued HEP.   Met 6/1/2022    PLAN     Continue skilled occupational therapy with individualized plan of care focusing on improving functional independence     Updates/Grading for next session: progress as tolerated.     Cammie Conteh OT

## 2022-06-06 ENCOUNTER — CLINICAL SUPPORT (OUTPATIENT)
Dept: REHABILITATION | Facility: HOSPITAL | Age: 67
End: 2022-06-06
Payer: MEDICARE

## 2022-06-06 DIAGNOSIS — M25.641 FINGER STIFFNESS, RIGHT: Primary | ICD-10-CM

## 2022-06-06 DIAGNOSIS — M79.641 PAIN OF RIGHT HAND: ICD-10-CM

## 2022-06-06 PROCEDURE — 97022 WHIRLPOOL THERAPY: CPT

## 2022-06-06 PROCEDURE — 97110 THERAPEUTIC EXERCISES: CPT

## 2022-06-06 NOTE — PROGRESS NOTES
"[facility]  Occupational Therapy Treatment Note    Date: 6/6/2022  Name: Karthik Bentley  Clinic Number: [id number]    Therapy Diagnosis:   No diagnosis found.  Physician: Russo-Digeorge, Jamie L*  Physician Orders: eval and treat  Medical Diagnosis: Carpal tunnel syndrome of right wrist [G56.01], Trigger finger of right hand, unspecified finger [M65.30], Postoperative state [Z98.890]  Surgical Procedure and Date: 4/1/22, s/p R CTR   Date of Injury/Onset: ~2 months ago  Evaluation Date: 4/18/22  Insurance Authorization Period Expiration: 4/14/23  Plan of Care Expiration: 6/17/22  Progress Note Due: 6/3/22  Date of Return to MD: 6/7/22  Visit # / Visits authorized: 12 / 20  FOTO: initial eval, 5th visit, 10th visit      Precautions:  Standard, diabetic     Time In: 1:00 pm  Time Out: 2:00 pm   Total Appointment Time (timed & untimed codes): 60 minutes     SUBJECTIVE     Pt reports: "My shoulders have been bothering me, this was since before the surgery."  Pt c/o B shoulder pain recently. He reports less pain in fingers. He sees MD next week     He was compliant with home exercise program given last session.   Response to previous treatment: good; improving dexterity   Functional change: continued piano playing     Pain: 1-2/10  Location: Right index and middle fingers     OBJECTIVE   Objective Measures updated at progress report unless specified.  Edema. Measured in centimeters.    4/18/2022 4/18/2022 5/10/2022 5/24/2022 6/3/2022     Left Right Right right Right   Wrist Crease 17 18.2 18.0 18.2 17.7   DPC 21.9 23 21.5 21.7    MCPs 21.7 22.3 21.2 21.2       Edema. Measured in centimeters.    4/18/2022 4/18/2022 5/10/2022 5/24/2022 6/3/2022     Left Right Right right Right   Index:            P1 7.4 8 7.5 8 7.4    PIP 7.6 7.9 7.2 7.6 6.3   P2 6.4 6.6 6.3 6.8 6.4    DIP 6.1 6.3 6.2 6.3 5.9   P3 5.6 6.1 5.7 5.7 5.7   Long:            P1 7.1 7.8 7.6 7.9 7.5    PIP 7.6 8.2 7.7 7.9 7.4   P2 6.4 7 7.2 " 7.2 7.3    DIP 5.9 6.5 6.3 6.7 6.2   P3 5.8 6 6 6 6.0      Elbow and Wrist ROM. Measured in degrees.    4/18/2022 4/18/2022 5/10/2022 6/3/2022     Left Right Right Right   Wrist Ext/Flex 65/60 60/60  65/65   Wrist RD/UD 25/35 15/32 25/35       Hand ROM. Measured in degrees.  L hand WFL    4/18/2022 5/10/2022 5/24/2022 6/3/2022     Right Right Right Right            Index: MP  87 85 88 95              PIP     70 75 74 82              DIP 45 60 59 60              GIBSON 202 220  237            Long:  MP 78 85 87 90              PIP 15/80 -15/85 -16/81 -5/82              DIP 5/50 -5/70 -9/74 -5/75              GIBSON 188 220  237            Ring:   MP 83  84               PIP 80  82               DIP 50  70               GIBSON 213               Small:  MP WFL                  PIP WFL                  DIP WFL                 GIBSON                 Thumb: MP 65                   IP 42          Rad ADD/ABD 70          Pal ADD/ABD 65             Opposition ~0.5 cm from DPC Touch to DPC with pain         Strength (Dynamometer) and Pinch Strength (Pinch Gauge)  Measured in pounds to POP.    5/10/2022 5/10/2022 6/3/2022     Left Right Right   Rung II 80 22 40   [place] Pinch 19 14 19   3pt Pinch 18 7 15   2pt Pinch 14 8 13      Sensation: reports only a little of numbness and tingling in thumb and finger tips    4/18/2022 4/18/2022     Left Right   [place] Mj       Normal 1.65-2.83    X    Diminished Light Touch 3.22-3.61       Diminished Protective 3.84-4.31       Loss of Protective 4.56-6.65       Untestable >6.65       2 Point Discrimination       Static       Dynamic          Treatment     Ray received the treatments listed below:      Pt is 8 weeks post op       Supervised modalities after being cleared for contradictions:     Paraffin X 10  To R hand  for right hand  -Patient received paraffin bath to R hand(s) for 10 minutes to increase blood flow, circulation, pain management and for tissue elasticity prior to therex.      Manual therapy techniques: Manual Lymphatic Drainage and Soft tissue Mobilization were applied to the: R anterior forearm and hand for 15 minutes, including:   - retrograde massage   - scar massage   - soft tissue mobilization with eve tool     Therapeutic exercises to develop ROM and flexibility for 35 minutes, including: (0 min supervised)  -Updated objective measurements, see above.   Passive flexion stretch of fingers    Passive extension stretch of index finger/LF PIPs X 10 reps each    A/AROM Wrist  Ext/flx closed fist  RD/UD      X 10 reps each    Wave, hook,  finger spreads, finger lifts, pinky slides    X 10 reps each   tight intrinsics with Hook position (encouraged to stretch intrinsics)     Isometric gripping with tennis ball 10 reps x 3 sec holds    hand gripper 3 YRB , 2 minutes   2 minutes    Wrist proprioception flexbar oscillations x 1 minute   Wrist proprioception  large tray 1 minute )   Wrist flex/ ext 1.1 kg ball  30 reps    Green flex bar smiles/ frowns   20 reps    Weight well  1# 2 minutes (NT)   Hand gripper pick small wooden dowels   20 reps (NT)   wrist  Red  flex  Bar  perturbations  2 minutes NT   Yellow digi flexIsolated digit press x 10 (NT)Ulnar nerve glides Issued for HEP   x5 reps   Patient Education and Home Exercises      Education provided:   - wear LMB at nighttime and oval 8 during the day during functional activities   - scar pad for nighttime to wrist   - Progress towards goals     Written Home Exercises Provided: Patient instructed to cont prior HEP.  Exercises were reviewed and Karthik was able to demonstrate them prior to the end of the session.  Karthik demonstrated good  understanding of the HEP provided. See EMR under Patient Instructions for exercises provided during therapy sessions.      ASSESSMENT     Pt would continue to benefit from skilled OT. Pt tolerated session fairly well. Improvements in edema, digit/wrist ROM, and strength noted by objective measurements.  Overall decreased pain in hand/wrist. . Pt cont to focus on and report pain descriptions in shoulders. Encouraged pt to get this evaluated at next MD visit . Performed isometric  strengthening with no issue and no c/o pain. Will cont to progress as tolerated with strengthening and PROM.    Karthik is progressing fairly towards his goals and there are no updates to goals at this time. Pt prognosis is Good.     Pt will continue to benefit from skilled outpatient occupational therapy to address the deficits listed in the problem list on initial evaluation, provide pt/family education and to maximize pt's level of independence in the home and community environment.     Pt's spiritual, cultural and educational needs considered and pt agreeable to plan of care and goals.    Anticipated barriers to occupational therapy: n/a     Goals:  Long Term Goals (LTGs); to be met by discharge.  LTG #1: Pt will report a pain level of 1 out of 10 with ADLs, playing piano, and fishing activities. ------progressing not met 6/6/2022      LTG #2: Pt will demo improved FOTO score by at least 20 points. ------progressing not met 6/6/2022  LTG #3: Pt will return to prior level of function for ADLs and household management. ------progressing not met 6/6/2022  LTG #4: Pt will demonstrate improved R digits AROM WFL for grasping and holding things. Met 6/3/2022  LTG #5:  and pinch strength to be assessed when appropriate and goals set accordingly. Met 5/10/2022     Short Term Goals (STGs); to be met within 4 weeks (5/18/22).  STG #1: Pt will report 3 out of 10 pain level with ADLs and piano. Met 6/1/2022  STG #2:  Pt will demonstrate independence with issued HEP.  Met 6/1/2022    PLAN     Continue skilled occupational therapy with individualized plan of care focusing on improving functional independence     Updates/Grading for next session: progress as tolerated.     Susan Washburn, OT

## 2022-06-07 ENCOUNTER — OFFICE VISIT (OUTPATIENT)
Dept: ORTHOPEDICS | Facility: CLINIC | Age: 67
End: 2022-06-07
Payer: MEDICARE

## 2022-06-07 VITALS — HEIGHT: 68 IN | BODY MASS INDEX: 29.1 KG/M2 | WEIGHT: 192 LBS

## 2022-06-07 DIAGNOSIS — G56.01 CARPAL TUNNEL SYNDROME OF RIGHT WRIST: Primary | ICD-10-CM

## 2022-06-07 PROCEDURE — 1159F MED LIST DOCD IN RCRD: CPT | Mod: CPTII,S$GLB,, | Performed by: ORTHOPAEDIC SURGERY

## 2022-06-07 PROCEDURE — 3072F PR LOW RISK FOR RETINOPATHY: ICD-10-PCS | Mod: CPTII,S$GLB,, | Performed by: ORTHOPAEDIC SURGERY

## 2022-06-07 PROCEDURE — 99999 PR PBB SHADOW E&M-EST. PATIENT-LVL III: ICD-10-PCS | Mod: PBBFAC,,, | Performed by: ORTHOPAEDIC SURGERY

## 2022-06-07 PROCEDURE — 3066F NEPHROPATHY DOC TX: CPT | Mod: CPTII,S$GLB,, | Performed by: ORTHOPAEDIC SURGERY

## 2022-06-07 PROCEDURE — 1101F PT FALLS ASSESS-DOCD LE1/YR: CPT | Mod: CPTII,S$GLB,, | Performed by: ORTHOPAEDIC SURGERY

## 2022-06-07 PROCEDURE — 1125F AMNT PAIN NOTED PAIN PRSNT: CPT | Mod: CPTII,S$GLB,, | Performed by: ORTHOPAEDIC SURGERY

## 2022-06-07 PROCEDURE — 1101F PR PT FALLS ASSESS DOC 0-1 FALLS W/OUT INJ PAST YR: ICD-10-PCS | Mod: CPTII,S$GLB,, | Performed by: ORTHOPAEDIC SURGERY

## 2022-06-07 PROCEDURE — 3288F FALL RISK ASSESSMENT DOCD: CPT | Mod: CPTII,S$GLB,, | Performed by: ORTHOPAEDIC SURGERY

## 2022-06-07 PROCEDURE — 3008F BODY MASS INDEX DOCD: CPT | Mod: CPTII,S$GLB,, | Performed by: ORTHOPAEDIC SURGERY

## 2022-06-07 PROCEDURE — 3051F PR MOST RECENT HEMOGLOBIN A1C LEVEL 7.0 - < 8.0%: ICD-10-PCS | Mod: CPTII,S$GLB,, | Performed by: ORTHOPAEDIC SURGERY

## 2022-06-07 PROCEDURE — 3072F LOW RISK FOR RETINOPATHY: CPT | Mod: CPTII,S$GLB,, | Performed by: ORTHOPAEDIC SURGERY

## 2022-06-07 PROCEDURE — 99024 POSTOP FOLLOW-UP VISIT: CPT | Mod: S$GLB,,, | Performed by: ORTHOPAEDIC SURGERY

## 2022-06-07 PROCEDURE — 3066F PR DOCUMENTATION OF TREATMENT FOR NEPHROPATHY: ICD-10-PCS | Mod: CPTII,S$GLB,, | Performed by: ORTHOPAEDIC SURGERY

## 2022-06-07 PROCEDURE — 3051F HG A1C>EQUAL 7.0%<8.0%: CPT | Mod: CPTII,S$GLB,, | Performed by: ORTHOPAEDIC SURGERY

## 2022-06-07 PROCEDURE — 3061F NEG MICROALBUMINURIA REV: CPT | Mod: CPTII,S$GLB,, | Performed by: ORTHOPAEDIC SURGERY

## 2022-06-07 PROCEDURE — 3008F PR BODY MASS INDEX (BMI) DOCUMENTED: ICD-10-PCS | Mod: CPTII,S$GLB,, | Performed by: ORTHOPAEDIC SURGERY

## 2022-06-07 PROCEDURE — 3288F PR FALLS RISK ASSESSMENT DOCUMENTED: ICD-10-PCS | Mod: CPTII,S$GLB,, | Performed by: ORTHOPAEDIC SURGERY

## 2022-06-07 PROCEDURE — 3061F PR NEG MICROALBUMINURIA RESULT DOCUMENTED/REVIEW: ICD-10-PCS | Mod: CPTII,S$GLB,, | Performed by: ORTHOPAEDIC SURGERY

## 2022-06-07 PROCEDURE — 99024 PR POST-OP FOLLOW-UP VISIT: ICD-10-PCS | Mod: S$GLB,,, | Performed by: ORTHOPAEDIC SURGERY

## 2022-06-07 PROCEDURE — 1159F PR MEDICATION LIST DOCUMENTED IN MEDICAL RECORD: ICD-10-PCS | Mod: CPTII,S$GLB,, | Performed by: ORTHOPAEDIC SURGERY

## 2022-06-07 PROCEDURE — 99999 PR PBB SHADOW E&M-EST. PATIENT-LVL III: CPT | Mod: PBBFAC,,, | Performed by: ORTHOPAEDIC SURGERY

## 2022-06-07 PROCEDURE — 1125F PR PAIN SEVERITY QUANTIFIED, PAIN PRESENT: ICD-10-PCS | Mod: CPTII,S$GLB,, | Performed by: ORTHOPAEDIC SURGERY

## 2022-06-07 NOTE — PROGRESS NOTES
Karthik Bentley presents for post-operative evaluation.  The patient is now 9 weeks s/p Right endoscopic carpal tunnel release with Dr. Warren on 4/1/22.  Overall the patient reports doing well.  He reports 1/10 pain, denies any f/c/s. His numbness/tingling has resolved. There is swelling into the index and long fingers that was present before. He does have trigger finger of the right index and middle that are improving. He reports that is improving with time and therapy. He is taking Mobic daily and reports good reliefs of pain.  He also reports b/l shoulder pain with activity.    PE:    AA&O x 4.  NAD  HEENT:  NCAT, sclera nonicteric  Lungs:  Respirations are equal and unlabored.  CV:  2+ bilateral upper and lower extremity pulses.  MSK: The wound is healing well with no signs of erythema or warmth.  There is no drainage.  No clinical signs or symptoms of infection are present.  Edema present in the index and long fingers, improving. ROM full.      A/P: Status post above, doing well  1) Continue OT.  2) Continue to self-monitor right trigger fingers, no intervention today.  3) Continue to monitor left CTS, and will address with CTR when patient feels he is recovered from R CTS and is ready.   4) Will refer to shoulder specialist, Dr Beckford  4) Call with any questions/concerns in the interim

## 2022-06-08 ENCOUNTER — CLINICAL SUPPORT (OUTPATIENT)
Dept: REHABILITATION | Facility: HOSPITAL | Age: 67
End: 2022-06-08
Payer: MEDICARE

## 2022-06-08 ENCOUNTER — TELEPHONE (OUTPATIENT)
Dept: SPORTS MEDICINE | Facility: CLINIC | Age: 67
End: 2022-06-08
Payer: MEDICARE

## 2022-06-08 DIAGNOSIS — M79.641 PAIN OF RIGHT HAND: ICD-10-CM

## 2022-06-08 DIAGNOSIS — M25.641 FINGER STIFFNESS, RIGHT: Primary | ICD-10-CM

## 2022-06-08 PROCEDURE — 97140 MANUAL THERAPY 1/> REGIONS: CPT

## 2022-06-08 PROCEDURE — 97018 PARAFFIN BATH THERAPY: CPT

## 2022-06-08 PROCEDURE — 97110 THERAPEUTIC EXERCISES: CPT

## 2022-06-08 NOTE — TELEPHONE ENCOUNTER
Called patient per Dr. Warren's team to set up appointment for bilateral shoulders.   Was unable to LVM.     ----- Message from Martha Rodriguez sent at 6/7/2022 12:03 PM CDT -----  Regarding: bilateral shoulder pain  Hey!    The attached patient was seen in clinic today by Dr. Warren for a follow up status post CTR and has a new complaint of bilateral shoulder pain. Could he please be scheduled with Dr. Degroot at your convenience?    Thank you!    Martha Rodriguez MS, OTC  Clinical & OR Assistant to Dr. Wiley GallagehrA[facility]  [phone]

## 2022-06-08 NOTE — PROGRESS NOTES
"[facility]  Occupational Therapy Treatment Note    Date: 6/8/2022  Name: Karthik Bentley  Clinic Number: [id number]    Therapy Diagnosis:   Encounter Diagnoses   Name Primary?    Finger stiffness, right Yes    Pain of right hand      Physician: Russo-Digeorge, Jamie L*  Physician Orders: eval and treat  Medical Diagnosis: Carpal tunnel syndrome of right wrist [G56.01], Trigger finger of right hand, unspecified finger [M65.30], Postoperative state [Z98.890]  Surgical Procedure and Date: 4/1/22, s/p R CTR   Date of Injury/Onset: ~2 months ago  Evaluation Date: 4/18/22  Insurance Authorization Period Expiration: 4/14/23  Plan of Care Expiration: 6/17/22  Progress Note Due: 6/17/22  Date of Return to MD: PRN  Visit # / Visits authorized: 13 / 20  FOTO: initial eval, 5th visit, 10th visit      Precautions:  Standard, diabetic     Time In: 3:00 pm  Time Out: 3:52 pm   Total Appointment Time (timed & untimed codes): 52 minutes     SUBJECTIVE     Pt reports: Pt c/o B shoulder pain recently, saw Dr. Warren yesterday for hand and was referred to Dr. Degroot for shoulder evaluation. "The [hand] pain has definitely decreased a lot, even with the trigger fingers. I'm able to manipulate them more." He reports less pain in fingers but decreased strength.  He was compliant with home exercise program given last session.   Response to previous treatment: good; improving dexterity   Functional change: continued piano playing     Pain: 0/10  Location: Right index and middle fingers     OBJECTIVE   Objective Measures updated at progress report unless specified.  Edema. Measured in centimeters.    4/18/2022 4/18/2022 5/10/2022 5/24/2022 6/3/2022     Left Right Right right Right   Wrist Crease 17 18.2 18.0 18.2 17.7   DPC 21.9 23 21.5 21.7    MCPs 21.7 22.3 21.2 21.2       Edema. Measured in centimeters.    4/18/2022 4/18/2022 5/10/2022 5/24/2022 6/3/2022     Left Right Right right Right   Index:            P1 7.4 " 8 7.5 8 7.4    PIP 7.6 7.9 7.2 7.6 6.3   P2 6.4 6.6 6.3 6.8 6.4    DIP 6.1 6.3 6.2 6.3 5.9   P3 5.6 6.1 5.7 5.7 5.7   Long:            P1 7.1 7.8 7.6 7.9 7.5    PIP 7.6 8.2 7.7 7.9 7.4   P2 6.4 7 7.2 7.2 7.3    DIP 5.9 6.5 6.3 6.7 6.2   P3 5.8 6 6 6 6.0      Elbow and Wrist ROM. Measured in degrees.    4/18/2022 4/18/2022 5/10/2022 6/3/2022     Left Right Right Right   Wrist Ext/Flex 65/60 60/60  65/65   Wrist RD/UD 25/35 15/32 25/35       Hand ROM. Measured in degrees.  L hand WFL    4/18/2022 5/10/2022 5/24/2022 6/3/2022     Right Right Right Right            Index: MP  87 85 88 95              PIP     70 75 74 82              DIP 45 60 59 60              GIBSON 202 220  237            Long:  MP 78 85 87 90              PIP 15/80 -15/85 -16/81 -5/82              DIP 5/50 -5/70 -9/74 -5/75              GIBSON 188 220  237            Ring:   MP 83  84               PIP 80  82               DIP 50  70               GIBSON 213               Small:  MP WFL                  PIP WFL                  DIP WFL                 GIBSON                 Thumb: MP 65                   IP 42          Rad ADD/ABD 70          Pal ADD/ABD 65             Opposition ~0.5 cm from DPC Touch to DPC with pain         Strength (Dynamometer) and Pinch Strength (Pinch Gauge)  Measured in pounds to POP.    5/10/2022 5/10/2022 6/3/2022     Left Right Right   Rung II 80 22 40   Ohara Pinch 19 14 19   3pt Pinch 18 7 15   2pt Pinch 14 8 13      Sensation: reports only a little of numbness and tingling in thumb and finger tips    4/18/2022 4/18/2022     Left Right   [place] Mj       Normal 1.65-2.83    X    Diminished Light Touch 3.22-3.61       Diminished Protective 3.84-4.31       Loss of Protective 4.56-6.65       Untestable >6.65       2 Point Discrimination       Static       Dynamic          Treatment     Ray received the treatments listed below:     Supervised modalities after being cleared for contradictions:     Paraffin X 10  To R hand  for  right hand  -Patient received paraffin bath to R hand(s) for 10 minutes to increase blood flow, circulation, pain management and for tissue elasticity prior to therex.     Manual therapy techniques: Manual Lymphatic Drainage and Soft tissue Mobilization were applied to the: R anterior forearm and hand for 15 minutes, including:   - retrograde massage   - scar massage   - soft tissue mobilization with eve tool     Therapeutic exercises to develop ROM and flexibility for 27 minutes, including: (0 min supervised)  Passive flexion stretch of fingers    Passive extension stretch of index finger/LF PIPs X 10 reps each    A/AROM Wrist  Ext/flx closed fist  RD/UD      X 10 reps each    Wave, hook, full fist, finger spreads, finger lifts, pinky slides    X 10 reps each   tight intrinsics with Hook position (encouraged to stretch intrinsics)     Isometric gripping with tennis ball 10 reps x 3 sec holds   Hand gripper 3 YRB  2 minutes    Wrist proprioception flexbar oscillations x 1 minute   Wrist proprioception Large tray 1 minute (NT)   Wrist flex/ext 1.1 kg ball  30 reps (NT)   Green flex bar smiles/ frowns   20 reps    Weight well  1# 2 minutes (NT)   Hand gripper  small wooden dowels   20 reps (NT)   Yellow digi flexIsolated digit press x 10 (NT)Ulnar nerve glides X 5 reps   Patient Education and Home Exercises      Education provided:   - wear LMB at nighttime and oval 8 during the day during functional activities   - scar pad for nighttime to wrist   - Progress towards goals     Written Home Exercises Provided: Patient instructed to cont prior HEP.  Exercises were reviewed and Karthik was able to demonstrate them prior to the end of the session.  Karthik demonstrated good  understanding of the HEP provided. See EMR under Patient Instructions for exercises provided during therapy sessions.      ASSESSMENT     Pt would continue to benefit from skilled OT. Pt tolerated session fairly well. Overall decreased pain in  hand/wrist, IF remains stiff when making a composite fist. Pt cont to focus on and report pain descriptions in shoulders, future appointment to be set with Dr. Degroot. Advanced strengthening with min issue and no c/o pain. Will cont to progress as tolerated with strengthening and PROM.    Karthik is progressing fairly towards his goals and there are no updates to goals at this time. Pt prognosis is Good.     Pt will continue to benefit from skilled outpatient occupational therapy to address the deficits listed in the problem list on initial evaluation, provide pt/family education and to maximize pt's level of independence in the home and community environment.     Pt's spiritual, cultural and educational needs considered and pt agreeable to plan of care and goals.    Anticipated barriers to occupational therapy: n/a     Goals:  Long Term Goals (LTGs); to be met by discharge.  LTG #1: Pt will report a pain level of 1 out of 10 with ADLs, playing piano, and fishing activities. ------progressing not met 6/8/2022      LTG #2: Pt will demo improved FOTO score by at least 20 points. ------progressing not met 6/8/2022  LTG #3: Pt will return to prior level of function for ADLs and household management. ------progressing not met 6/8/2022  LTG #4: Pt will demonstrate improved R digits AROM WFL for grasping and holding things. Met 6/3/2022  LTG #5:  and pinch strength to be assessed when appropriate and goals set accordingly. Met 5/10/2022     Short Term Goals (STGs); to be met within 4 weeks (5/18/22).  STG #1: Pt will report 3 out of 10 pain level with ADLs and piano. Met 6/1/2022  STG #2:  Pt will demonstrate independence with issued HEP.  Met 6/1/2022    PLAN     Continue skilled occupational therapy with individualized plan of care focusing on improving functional independence     Updates/Grading for next session: progress as tolerated.     Cammie Conteh, OT

## 2022-06-13 ENCOUNTER — CLINICAL SUPPORT (OUTPATIENT)
Dept: REHABILITATION | Facility: HOSPITAL | Age: 67
End: 2022-06-13
Payer: MEDICARE

## 2022-06-13 DIAGNOSIS — M79.641 PAIN OF RIGHT HAND: ICD-10-CM

## 2022-06-13 DIAGNOSIS — M25.641 FINGER STIFFNESS, RIGHT: Primary | ICD-10-CM

## 2022-06-13 PROCEDURE — 97110 THERAPEUTIC EXERCISES: CPT

## 2022-06-13 PROCEDURE — 97140 MANUAL THERAPY 1/> REGIONS: CPT

## 2022-06-13 NOTE — PROGRESS NOTES
[facility]  Occupational Therapy Treatment Note    Date: 6/13/2022  Name: Karthik Bentley  Clinic Number: [id number]    Therapy Diagnosis:   Encounter Diagnoses   Name Primary?    Finger stiffness, right Yes    Pain of right hand      Physician: Russo-Digeorge, Jamie L*  Physician Orders: eval and treat  Medical Diagnosis: Carpal tunnel syndrome of right wrist [G56.01], Trigger finger of right hand, unspecified finger [M65.30], Postoperative state [Z98.890]  Surgical Procedure and Date: 4/1/22, s/p R CTR   Date of Injury/Onset: ~2 months ago  Evaluation Date: 4/18/22  Insurance Authorization Period Expiration: 4/14/23  Plan of Care Expiration: 6/17/22  Progress Note Due: 6/17/22  Date of Return to MD: PRN  Visit # / Visits authorized: 15 / 20  FOTO: initial eval, 5th visit, 10th visit      Precautions:  Standard, diabetic     Time In: 10:40 am  Time Out: 11:30 am   Total Appointment Time (timed & untimed codes): 35 minutes  *pt arrived 10 min late     SUBJECTIVE     Pt reports: Pt cont c/o B shoulder pain recently. He reports his fingers are not triggering, and carpal tunnel in R hand has improved. He reports continued stiffness and swelling in R IF/LF.   He was compliant with home exercise program given last session.   Response to previous treatment: good; improving dexterity   Functional change: continued piano playing     Pain: 0/10  Location: Right index and middle fingers     OBJECTIVE   Objective Measures updated at progress report unless specified.  Edema. Measured in centimeters.    4/18/2022 4/18/2022 5/10/2022 5/24/2022 6/3/2022     Left Right Right right Right   Wrist Crease 17 18.2 18.0 18.2 17.7   DPC 21.9 23 21.5 21.7    MCPs 21.7 22.3 21.2 21.2       Edema. Measured in centimeters.    4/18/2022 4/18/2022 5/10/2022 5/24/2022 6/3/2022     Left Right Right right Right   Index:            P1 7.4 8 7.5 8 7.4    PIP 7.6 7.9 7.2 7.6 6.3   P2 6.4 6.6 6.3 6.8 6.4    DIP 6.1 6.3 6.2 6.3  5.9   P3 5.6 6.1 5.7 5.7 5.7   Long:            P1 7.1 7.8 7.6 7.9 7.5    PIP 7.6 8.2 7.7 7.9 7.4   P2 6.4 7 7.2 7.2 7.3    DIP 5.9 6.5 6.3 6.7 6.2   P3 5.8 6 6 6 6.0      Elbow and Wrist ROM. Measured in degrees.    4/18/2022 4/18/2022 5/10/2022 6/3/2022     Left Right Right Right   Wrist Ext/Flex 65/60 60/60  65/65   Wrist RD/UD 25/35 15/32 25/35       Hand ROM. Measured in degrees.  L hand WFL    4/18/2022 5/10/2022 5/24/2022 6/3/2022     Right Right Right Right            Index: MP  87 85 88 95              PIP     70 75 74 82              DIP 45 60 59 60              GIBSON 202 220  237            Long:  MP 78 85 87 90              PIP 15/80 -15/85 -16/81 -5/82              DIP 5/50 -5/70 -9/74 -5/75              GIBSON 188 220  237            Ring:   MP 83  84               PIP 80  82               DIP 50  70               GIBSON 213               Small:  MP WFL                  PIP WFL                  DIP WFL                 GIBSON                 Thumb: MP 65                   IP 42          Rad ADD/ABD 70          Pal ADD/ABD 65             Opposition ~0.5 cm from DPC Touch to DPC with pain         Strength (Dynamometer) and Pinch Strength (Pinch Gauge)  Measured in pounds to POP.    5/10/2022 5/10/2022 6/3/2022     Left Right Right   Rung II 80 22 40   [place]ara Pinch 19 14 19   3pt Pinch 18 7 15   2pt Pinch 14 8 13      Sensation: reports only a little of numbness and tingling in thumb and finger tips    4/18/2022 4/18/2022     Left Right   [place] Mj       Normal 1.65-2.83    X    Diminished Light Touch 3.22-3.61       Diminished Protective 3.84-4.31       Loss of Protective 4.56-6.65       Untestable >6.65       2 Point Discrimination       Static       Dynamic          Treatment     Ray received the treatments listed below:     Supervised modalities after being cleared for contradictions:     -Patient received paraffin bath to R hand(s) for 0 minutes to increase blood flow, circulation, pain management and  for tissue elasticity prior to therex. (NT)    Manual therapy techniques: Manual Lymphatic Drainage and Soft tissue Mobilization were applied to the: R anterior forearm and hand for 15 minutes, including:   - retrograde massage   - scar massage   - soft tissue mobilization with eve tool     Therapeutic exercises to develop ROM and flexibility for 35 minutes, including: (15 min supervised)  Passive flexion stretch of fingers    Passive extension stretch of index finger/LF PIPs X 10 reps each    A/AROM Wrist  Ext/flx closed fist  RD/UD      X 10 reps each    Wave, hook, full fist, finger spreads, finger lifts, pinky slides    X 10 reps each   tight intrinsics with Hook position (encouraged to stretch intrinsics)     Isometric gripping with tennis ball 10 reps x 3 sec holds   Hand gripper 3 YRB  2 minutes    Wrist proprioception flexbar oscillations x 1 minute   Wrist proprioception Large tray 1 minute (NT)   Wrist flex/ext 1.1 kg ball  30 reps (NT)   Green flex bar smiles/ frowns   20 reps    Weight well  1# 2 minutes (NT)   Hand gripper  small wooden dowels   20 reps (NT)   Green digi flexIsolated and composite digit press x 15 Ulnar nerve glides X 5 reps In hand manipulation with small buttonsX 2 contianers  Patient Education and Home Exercises      Education provided:   - wear LMB as needed during the day and static finger orthosis at night for LF.   - scar pad for nighttime to wrist   - Progress towards goals     Written Home Exercises Provided: Patient instructed to cont prior HEP.  Exercises were reviewed and Karthik was able to demonstrate them prior to the end of the session.  Karthik demonstrated good  understanding of the HEP provided. See EMR under Patient Instructions for exercises provided during therapy sessions.      ASSESSMENT     Pt would continue to benefit from skilled OT. Pt tolerated session fairly well today. Overall he has decreased pain in hand/wrist, IF/LF remains stiff when making a composite  fist. Pt cont to focus on and report pain descriptions in shoulders, future appointment to be set with Dr. Degroot. Advanced light hand strengthening again with no c/o pain. Will cont to progress as tolerated with strengthening and PROM.    Karthik is progressing fairly towards his goals and there are no updates to goals at this time. Pt prognosis is Good.     Pt will continue to benefit from skilled outpatient occupational therapy to address the deficits listed in the problem list on initial evaluation, provide pt/family education and to maximize pt's level of independence in the home and community environment.     Pt's spiritual, cultural and educational needs considered and pt agreeable to plan of care and goals.    Anticipated barriers to occupational therapy: n/a     Goals:  Long Term Goals (LTGs); to be met by discharge.  LTG #1: Pt will report a pain level of 1 out of 10 with ADLs, playing piano, and fishing activities. ------progressing not met 6/13/2022      LTG #2: Pt will demo improved FOTO score by at least 20 points. ------progressing not met 6/13/2022  LTG #3: Pt will return to prior level of function for ADLs and household management. ------progressing not met 6/13/2022  LTG #4: Pt will demonstrate improved R digits AROM WFL for grasping and holding things. Met 6/3/2022  LTG #5:  and pinch strength to be assessed when appropriate and goals set accordingly. Met 5/10/2022     Short Term Goals (STGs); to be met within 4 weeks (5/18/22).  STG #1: Pt will report 3 out of 10 pain level with ADLs and piano. Met 6/1/2022  STG #2:  Pt will demonstrate independence with issued HEP.  Met 6/1/2022    PLAN     Continue skilled occupational therapy with individualized plan of care focusing on improving functional independence     Updates/Grading for next session: progress as tolerated.     MIKE Pugh, CHT

## 2022-06-15 ENCOUNTER — CLINICAL SUPPORT (OUTPATIENT)
Dept: REHABILITATION | Facility: HOSPITAL | Age: 67
End: 2022-06-15
Payer: MEDICARE

## 2022-06-15 DIAGNOSIS — M25.641 FINGER STIFFNESS, RIGHT: Primary | ICD-10-CM

## 2022-06-15 DIAGNOSIS — M79.641 PAIN OF RIGHT HAND: ICD-10-CM

## 2022-06-15 PROCEDURE — 97110 THERAPEUTIC EXERCISES: CPT

## 2022-06-15 PROCEDURE — 97140 MANUAL THERAPY 1/> REGIONS: CPT

## 2022-06-15 NOTE — PROGRESS NOTES
"[facility]  Occupational Therapy Treatment Note & updated POC    Date: 6/15/2022  Name: Karthik Bentley  Clinic Number: [id number]    Therapy Diagnosis:   Encounter Diagnoses   Name Primary?    Finger stiffness, right Yes    Pain of right hand      Physician: Russo-Digeorge, Jamie L*  Physician Orders: eval and treat  Medical Diagnosis: Carpal tunnel syndrome of right wrist [G56.01], Trigger finger of right hand, unspecified finger [M65.30], Postoperative state [Z98.890]  Surgical Procedure and Date: 4/1/22, s/p R CTR   Date of Injury/Onset: ~2 months ago  Evaluation Date: 4/18/22  Insurance Authorization Period Expiration: 4/14/23  Plan of Care Expiration: 6/17/22  Progress Note Due: 6/17/22  Date of Return to MD: PRN  Visit # / Visits authorized: 16 / 20  FOTO: initial eval, 5th visit, 10th visit      Precautions:  Standard, diabetic     Time In: 10:00 am  Time Out: 10:50 am   Total Appointment Time (timed & untimed codes): 50 minutes     SUBJECTIVE     Pt reports: "my shoulders are really bothering me." He reports less pain in fingers but decreased strength.  He was compliant with home exercise program given last session.   Response to previous treatment: good; improving dexterity   Functional change: continued piano playing     Pain: 0/10  Location: Right index and middle fingers     OBJECTIVE   Objective Measures updated at progress report unless specified.  Edema. Measured in centimeters.    4/18/2022 4/18/2022 5/10/2022 5/24/2022 6/3/2022     Left Right Right right Right   Wrist Crease 17 18.2 18.0 18.2 17.7   DPC 21.9 23 21.5 21.7    MCPs 21.7 22.3 21.2 21.2       Edema. Measured in centimeters.    4/18/2022 4/18/2022 5/10/2022 5/24/2022 6/3/2022     Left Right Right right Right   Index:            P1 7.4 8 7.5 8 7.4    PIP 7.6 7.9 7.2 7.6 6.3   P2 6.4 6.6 6.3 6.8 6.4    DIP 6.1 6.3 6.2 6.3 5.9   P3 5.6 6.1 5.7 5.7 5.7   Long:            P1 7.1 7.8 7.6 7.9 7.5    PIP 7.6 8.2 7.7 7.9 " 7.4   P2 6.4 7 7.2 7.2 7.3    DIP 5.9 6.5 6.3 6.7 6.2   P3 5.8 6 6 6 6.0      Elbow and Wrist ROM. Measured in degrees.    4/18/2022 4/18/2022 5/10/2022 6/3/2022     Left Right Right Right   Wrist Ext/Flex 65/60 60/60  65/65   Wrist RD/UD 25/35 15/32 25/35       Hand ROM. Measured in degrees.  L hand WFL    4/18/2022 5/10/2022 5/24/2022 6/3/2022 6/15/22     Right Right Right Right Right             Index: MP  87 85 88 95 85              PIP     70 75 74 82 80              DIP 45 60 59 60 55              GIBSON 202 220  237              Long:  MP 78 85 87 90 85              PIP 15/80 -15/85 -16/81 -5/82 -8/85              DIP 5/50 -5/70 -9/74 -5/75 2/60              GIBSON 188 220  237              Ring:   MP 83  84                PIP 80  82                DIP 50  70                GIBSON 213                 Small:  MP WFL                   PIP WFL                   DIP WFL                  GIBSON                   Thumb: MP 65                    IP 42           Rad ADD/ABD 70           Pal ADD/ABD 65              Opposition ~0.5 cm from DPC Touch to DPC with pain          Strength (Dynamometer) and Pinch Strength (Pinch Gauge)  Measured in pounds to POP.    5/10/2022 5/10/2022 6/3/2022 6/15/22     Left Right Right Right   Rung II 80 22 40 41   [place] Pinch 19 14 19 17   3pt Pinch 18 7 15 13   2pt Pinch 14 8 13 6      Sensation: reports only a little of numbness and tingling in thumb and finger tips    4/18/2022 4/18/2022     Left Right   [place] Mj       Normal 1.65-2.83    X    Diminished Light Touch 3.22-3.61       Diminished Protective 3.84-4.31       Loss of Protective 4.56-6.65       Untestable >6.65       2 Point Discrimination       Static       Dynamic          Treatment     Ray received the treatments listed below:     Supervised modalities after being cleared for contradictions:     Paraffin X 10  To R hand  for right hand  -Patient received paraffin bath to R hand(s) for 10 minutes to increase blood flow,  circulation, pain management and for tissue elasticity prior to therex.     Manual therapy techniques: Manual Lymphatic Drainage and Soft tissue Mobilization were applied to the: R anterior forearm and hand for 15 minutes, including:   - retrograde massage   - scar massage   - soft tissue mobilization with eve tool     Therapeutic exercises to develop ROM and flexibility for 25 minutes, including: (5 min supervised)  -objective measurements taken today  Passive flexion stretch of fingers    Passive extension stretch of index finger/LF PIPs X 10 reps each    A/AROM Wrist  Ext/flx closed fist  RD/UD      X 10 reps each    Wave, hook, full fist, finger spreads, finger lifts, pinky slides    X 10 reps each   tight intrinsics with Hook position (encouraged to stretch intrinsics)     Isometric gripping with tennis ball 10 reps x 3 sec holds   Hand gripper 3 YRB  2 minutes (NT)   Wrist proprioception flexbar oscillations x 1 minute(NT)   Wrist proprioception Large tray 1 minute (NT)   Wrist flex/ext 1.1 kg ball  30 reps (NT)   Green flex bar smiles/ frowns   20 reps (NT)   Weight well  1# 2 minutes (NT)   Hand gripper  small wooden dowels   20 reps (NT)   Green digi flexIsolated and composite digit (NT) press x 15 In hand manipulation with small buttons X 2 containers   Ulnar nerve glides X 5 reps   Patient Education and Home Exercises      Education provided:   - wear LMB at nighttime and oval 8 during the day during functional activities   - scar pad for nighttime to wrist   - Progress towards goals     Written Home Exercises Provided: Patient instructed to cont prior HEP.  Exercises were reviewed and Karthik was able to demonstrate them prior to the end of the session.  Karthik demonstrated good  understanding of the HEP provided. See EMR under Patient Instructions for exercises provided during therapy sessions.      ASSESSMENT     Pt would continue to benefit from skilled OT. Pt tolerated session fairly well. Overall  decreased pain in hand/wrist, IF remains stiff when making a composite fist. He reports increased pain in shoulders. He presents with increased finger stiffness today. Clicking noted in IF today, so held off on hand strengthening/gripping Pt cont to focus on and report pain descriptions in shoulders, future appointment to be set with Dr. Degroot. Advanced strengthening with min issue and no c/o pain. Will cont to progress as tolerated with strengthening and PROM. He would cont to benefit for a bit longer to cont to address goals.     Karthik is progressing fairly towards his goals and there are no updates to goals at this time. Pt prognosis is Good.     Pt will continue to benefit from skilled outpatient occupational therapy to address the deficits listed in the problem list on initial evaluation, provide pt/family education and to maximize pt's level of independence in the home and community environment.     Pt's spiritual, cultural and educational needs considered and pt agreeable to plan of care and goals.    Anticipated barriers to occupational therapy: n/a     Goals:  Long Term Goals (LTGs); to be met by discharge.  LTG #1: Pt will report a pain level of 1 out of 10 with ADLs, playing piano, and fishing activities. ------progressing not met 6/15/2022      LTG #2: Pt will demo improved FOTO score by at least 20 points. ------progressing not met 6/15/2022  LTG #3: Pt will return to prior level of function for ADLs and household management. ------progressing not met 6/15/2022  LTG #4: Pt will demonstrate improved R digits AROM WFL for grasping and holding things. Met 6/3/2022  LTG #5:  and pinch strength to be assessed when appropriate and goals set accordingly. Met 5/10/2022     Short Term Goals (STGs); to be met within 4 weeks (5/18/22).  STG #1: Pt will report 3 out of 10 pain level with ADLs and piano. Met 6/1/2022  STG #2:  Pt will demonstrate independence with issued HEP.  Met 6/1/2022    PLAN     Continue  skilled occupational therapy 2x/week for 4 more weeks during certification period 6/15/22 - 7/15/22 with individualized plan of care focusing on improving functional independence     Updates/Grading for next session: progress as tolerated.     MIKE Pugh, VIJAYAT

## 2022-06-17 NOTE — PLAN OF CARE
"[facility]  Occupational Therapy Treatment Note & updated POC    Date: 6/15/2022  Name: Karthik Bentley  Clinic Number: [id number]    Therapy Diagnosis:   Encounter Diagnoses   Name Primary?    Finger stiffness, right Yes    Pain of right hand      Physician: Russo-Digeorge, Jamie L*  Physician Orders: eval and treat  Medical Diagnosis: Carpal tunnel syndrome of right wrist [G56.01], Trigger finger of right hand, unspecified finger [M65.30], Postoperative state [Z98.890]  Surgical Procedure and Date: 4/1/22, s/p R CTR   Date of Injury/Onset: ~2 months ago  Evaluation Date: 4/18/22  Insurance Authorization Period Expiration: 4/14/23  Plan of Care Expiration: 6/17/22  Progress Note Due: 6/17/22  Date of Return to MD: PRN  Visit # / Visits authorized: 16 / 20  FOTO: initial eval, 5th visit, 10th visit      Precautions:  Standard, diabetic     Time In: 10:00 am  Time Out: 10:50 am   Total Appointment Time (timed & untimed codes): 50 minutes     SUBJECTIVE     Pt reports: "my shoulders are really bothering me." He reports less pain in fingers but decreased strength.  He was compliant with home exercise program given last session.   Response to previous treatment: good; improving dexterity   Functional change: continued piano playing     Pain: 0/10  Location: Right index and middle fingers     OBJECTIVE   Objective Measures updated at progress report unless specified.  Edema. Measured in centimeters.    4/18/2022 4/18/2022 5/10/2022 5/24/2022 6/3/2022     Left Right Right right Right   Wrist Crease 17 18.2 18.0 18.2 17.7   DPC 21.9 23 21.5 21.7    MCPs 21.7 22.3 21.2 21.2       Edema. Measured in centimeters.    4/18/2022 4/18/2022 5/10/2022 5/24/2022 6/3/2022     Left Right Right right Right   Index:            P1 7.4 8 7.5 8 7.4    PIP 7.6 7.9 7.2 7.6 6.3   P2 6.4 6.6 6.3 6.8 6.4    DIP 6.1 6.3 6.2 6.3 5.9   P3 5.6 6.1 5.7 5.7 5.7   Long:            P1 7.1 7.8 7.6 7.9 7.5    PIP 7.6 8.2 7.7 7.9 " 7.4   P2 6.4 7 7.2 7.2 7.3    DIP 5.9 6.5 6.3 6.7 6.2   P3 5.8 6 6 6 6.0      Elbow and Wrist ROM. Measured in degrees.    4/18/2022 4/18/2022 5/10/2022 6/3/2022     Left Right Right Right   Wrist Ext/Flex 65/60 60/60  65/65   Wrist RD/UD 25/35 15/32 25/35       Hand ROM. Measured in degrees.  L hand WFL    4/18/2022 5/10/2022 5/24/2022 6/3/2022 6/15/22     Right Right Right Right Right             Index: MP  87 85 88 95 85              PIP     70 75 74 82 80              DIP 45 60 59 60 55              GIBSON 202 220  237              Long:  MP 78 85 87 90 85              PIP 15/80 -15/85 -16/81 -5/82 -8/85              DIP 5/50 -5/70 -9/74 -5/75 2/60              GIBSON 188 220  237              Ring:   MP 83  84                PIP 80  82                DIP 50  70                GIBSON 213                 Small:  MP WFL                   PIP WFL                   DIP WFL                  GIBSON                   Thumb: MP 65                    IP 42           Rad ADD/ABD 70           Pal ADD/ABD 65              Opposition ~0.5 cm from DPC Touch to DPC with pain          Strength (Dynamometer) and Pinch Strength (Pinch Gauge)  Measured in pounds to POP.    5/10/2022 5/10/2022 6/3/2022 6/15/22     Left Right Right Right   Rung II 80 22 40 41   Ohara Pinch 19 14 19 17   3pt Pinch 18 7 15 13   2pt Pinch 14 8 13 6      Sensation: reports only a little of numbness and tingling in thumb and finger tips    4/18/2022 4/18/2022     Left Right   [place] Mj       Normal 1.65-2.83    X    Diminished Light Touch 3.22-3.61       Diminished Protective 3.84-4.31       Loss of Protective 4.56-6.65       Untestable >6.65       2 Point Discrimination       Static       Dynamic          Treatment     Ray received the treatments listed below:     Supervised modalities after being cleared for contradictions:     Paraffin X 10  To R hand  for right hand  -Patient received paraffin bath to R hand(s) for 10 minutes to increase blood flow,  circulation, pain management and for tissue elasticity prior to therex. (NT)    Manual therapy techniques: Manual Lymphatic Drainage and Soft tissue Mobilization were applied to the: R anterior forearm and hand for 15 minutes, including:   - retrograde massage   - scar massage   - soft tissue mobilization with eve tool     Therapeutic exercises to develop ROM and flexibility for 35 minutes, including: (10 min supervised)  -objective measurements taken today  Passive flexion stretch of fingers    Passive extension stretch of index finger/LF PIPs X 10 reps each    A/AROM Wrist  Ext/flx closed fist  RD/UD      X 10 reps each    Wave, hook, full fist, finger spreads, finger lifts, pinky slides    X 10 reps each   tight intrinsics with Hook position (encouraged to stretch intrinsics)     Isometric gripping with tennis ball 10 reps x 3 sec holds   Hand gripper 3 YRB  2 minutes (NT)   Wrist proprioception flexbar oscillations x 1 minute(NT)   Wrist proprioception Large tray 1 minute (NT)   Wrist flex/ext 1.1 kg ball  30 reps (NT)   Green flex bar smiles/ frowns   20 reps (NT)   Weight well  1# 2 minutes (NT)   Hand gripper  small wooden dowels   20 reps (NT)   Green digi flexIsolated and composite digit (NT) press x 15 In hand manipulation with small buttons X 2 containers   Ulnar nerve glides X 5 reps   Patient Education and Home Exercises      Education provided:   - wear LMB at nighttime and oval 8 during the day during functional activities   - scar pad for nighttime to wrist   - Progress towards goals     Written Home Exercises Provided: Patient instructed to cont prior HEP.  Exercises were reviewed and Karthik was able to demonstrate them prior to the end of the session.  Karthik demonstrated good  understanding of the HEP provided. See EMR under Patient Instructions for exercises provided during therapy sessions.      ASSESSMENT     Pt would continue to benefit from skilled OT. Pt tolerated session fairly well.  Overall decreased pain in hand/wrist, IF remains stiff when making a composite fist. He reports increased pain in shoulders. He presents with increased finger stiffness today. Clicking noted in IF today, so held off on hand strengthening/gripping Pt cont to focus on and report pain descriptions in shoulders, future appointment to be set with Dr. Degroot. Advanced strengthening with min issue and no c/o pain. Will cont to progress as tolerated with strengthening and PROM. He would cont to benefit for a bit longer to cont to address goals.     Karthik is progressing fairly towards his goals and there are no updates to goals at this time. Pt prognosis is Good.     Pt will continue to benefit from skilled outpatient occupational therapy to address the deficits listed in the problem list on initial evaluation, provide pt/family education and to maximize pt's level of independence in the home and community environment.     Pt's spiritual, cultural and educational needs considered and pt agreeable to plan of care and goals.    Anticipated barriers to occupational therapy: n/a     Goals:  Long Term Goals (LTGs); to be met by discharge.  LTG #1: Pt will report a pain level of 1 out of 10 with ADLs, playing piano, and fishing activities. ------progressing not met 6/15/2022      LTG #2: Pt will demo improved FOTO score by at least 20 points. ------progressing not met 6/15/2022  LTG #3: Pt will return to prior level of function for ADLs and household management. ------progressing not met 6/15/2022  LTG #4: Pt will demonstrate improved R digits AROM WFL for grasping and holding things. Met 6/3/2022  LTG #5:  and pinch strength to be assessed when appropriate and goals set accordingly. Met 5/10/2022     Short Term Goals (STGs); to be met within 4 weeks (5/18/22).  STG #1: Pt will report 3 out of 10 pain level with ADLs and piano. Met 6/1/2022  STG #2:  Pt will demonstrate independence with issued HEP.  Met 6/1/2022    PLAN      Continue skilled occupational therapy 2x/week for 4 more weeks during certification period 6/15/22 - 7/15/22 with individualized plan of care focusing on improving functional independence     Updates/Grading for next session: progress as tolerated.     MIKE Pugh, CHT

## 2022-06-21 ENCOUNTER — CLINICAL SUPPORT (OUTPATIENT)
Dept: REHABILITATION | Facility: HOSPITAL | Age: 67
End: 2022-06-21
Payer: MEDICARE

## 2022-06-21 DIAGNOSIS — M25.641 FINGER STIFFNESS, RIGHT: Primary | ICD-10-CM

## 2022-06-21 DIAGNOSIS — M79.641 PAIN OF RIGHT HAND: ICD-10-CM

## 2022-06-21 PROCEDURE — 97110 THERAPEUTIC EXERCISES: CPT

## 2022-06-21 PROCEDURE — 97018 PARAFFIN BATH THERAPY: CPT

## 2022-06-21 NOTE — PROGRESS NOTES
"[facility]  Occupational Therapy Treatment Note & updated POC    Date: 6/21/2022  Name: Karthik Bentley  Clinic Number: [id number]    Therapy Diagnosis:   Encounter Diagnoses   Name Primary?    Finger stiffness, right Yes    Pain of right hand      Physician: Russo-Digeorge, Jamie L*  Physician Orders: eval and treat  Medical Diagnosis: Carpal tunnel syndrome of right wrist [G56.01], Trigger finger of right hand, unspecified finger [M65.30], Postoperative state [Z98.890]  Surgical Procedure and Date: 4/1/22, s/p R CTR   Date of Injury/Onset: ~2 months ago  Evaluation Date: 4/18/22  Insurance Authorization Period Expiration: 4/14/23  Plan of Care Expiration: 6/17/22  Progress Note Due: 6/17/22  Date of Return to MD: PRN  Visit # / Visits authorized: 17 / 20  FOTO: initial eval, 5th visit, 10th visit      Precautions:  Standard, diabetic     Time In: 5:00 pm   Time Out: 6:00 pm    Total Appointment Time (timed & untimed codes): 50 minutes     SUBJECTIVE     Pt reports: I am wearing the finger gutter splint in hopes of gaining that last bit if finger extension in  My long finger" He was compliant with home exercise program given last session.   Response to previous treatment: good; improving dexterity   Functional change: continued piano playing     Pain: 0/10  Location: Right index and middle fingers     OBJECTIVE   Objective Measures updated at progress report unless specified.  Edema. Measured in centimeters.    4/18/2022 4/18/2022 5/10/2022 5/24/2022 6/3/2022     Left Right Right right Right   Wrist Crease 17 18.2 18.0 18.2 17.7   DPC 21.9 23 21.5 21.7    MCPs 21.7 22.3 21.2 21.2       Edema. Measured in centimeters.    4/18/2022 4/18/2022 5/10/2022 5/24/2022 6/3/2022     Left Right Right right Right   Index:            P1 7.4 8 7.5 8 7.4    PIP 7.6 7.9 7.2 7.6 6.3   P2 6.4 6.6 6.3 6.8 6.4    DIP 6.1 6.3 6.2 6.3 5.9   P3 5.6 6.1 5.7 5.7 5.7   Long:            P1 7.1 7.8 7.6 7.9 7.5    PIP " 7.6 8.2 7.7 7.9 7.4   P2 6.4 7 7.2 7.2 7.3    DIP 5.9 6.5 6.3 6.7 6.2   P3 5.8 6 6 6 6.0      Elbow and Wrist ROM. Measured in degrees.    4/18/2022 4/18/2022 5/10/2022 6/3/2022     Left Right Right Right   Wrist Ext/Flex 65/60 60/60  65/65   Wrist RD/UD 25/35 15/32 25/35       Hand ROM. Measured in degrees.  L hand WFL    4/18/2022 5/10/2022 5/24/2022 6/3/2022 6/15/22     Right Right Right Right Right             Index: MP  87 85 88 95 85              PIP     70 75 74 82 80              DIP 45 60 59 60 55              GIBSON 202 220  237              Long:  MP 78 85 87 90 85              PIP 15/80 -15/85 -16/81 -5/82 -8/85              DIP 5/50 -5/70 -9/74 -5/75 2/60              GIBSON 188 220  237              Ring:   MP 83  84                PIP 80  82                DIP 50  70                GIBSON 213                 Small:  MP WFL                   PIP WFL                   DIP WFL                  GIBSON                   Thumb: MP 65                    IP 42           Rad ADD/ABD 70           Pal ADD/ABD 65              Opposition ~0.5 cm from DPC Touch to DPC with pain          Strength (Dynamometer) and Pinch Strength (Pinch Gauge)  Measured in pounds to POP.    5/10/2022 5/10/2022 6/3/2022 6/15/22     Left Right Right Right   Rung II 80 22 40 41   Ohara Pinch 19 14 19 17   3pt Pinch 18 7 15 13   2pt Pinch 14 8 13 6      Sensation: reports only a little of numbness and tingling in thumb and finger tips    4/18/2022 4/18/2022     Left Right   [place] Mj       Normal 1.65-2.83    X    Diminished Light Touch 3.22-3.61       Diminished Protective 3.84-4.31       Loss of Protective 4.56-6.65       Untestable >6.65       2 Point Discrimination       Static       Dynamic          Treatment     Ray received the treatments listed below:     Supervised modalities after being cleared for contradictions:     Paraffin X 10  To R hand  for right hand  -Patient received paraffin bath to R hand(s) for 10 minutes to increase  blood flow, circulation, pain management and for tissue elasticity prior to therex.     Manual therapy techniques: Manual Lymphatic Drainage and Soft tissue Mobilization were applied to the: R anterior forearm and hand for 15 minutes, including:   - retrograde massage   - scar massage   - soft tissue mobilization with eve tool     Therapeutic exercises to develop ROM and flexibility for 25 minutes, including: (5 min supervised)  -objective measurements taken today  Passive flexion stretch of fingers    Passive extension stretch of index finger/LF PIPs X 10 reps each    A/AROM Wrist  Ext/flx closed fist  RD/UD      X 10 reps each    Wave, hook, full fist, finger spreads, finger lifts, pinky slides    X 10 reps each   tight intrinsics with Hook position (encouraged to stretch intrinsics)     Isometric gripping with tennis ball 10 reps x 3 sec holds   Hand gripper 3 YRB  2 minutes    Wrist proprioception flexbar oscillations x 1 minute(   Wrist proprioception Large tray 1 minute    Wrist flex/ext 1.1 kg ball  30 reps    Green flex bar smiles/ frowns   20 reps    Weight well  1# 2 minutes    Hand gripper  small wooden dowels   20 reps    Green digi flexIsolated and composite digit  press x 15   Patient Education and Home Exercises      Education provided:   - wear LMB at nighttime and oval 8 during the day during functional activities   - scar pad for nighttime to wrist   - Progress towards goals     Written Home Exercises Provided: Patient instructed to cont prior HEP.  Exercises were reviewed and Karthik was able to demonstrate them prior to the end of the session.  Karthik demonstrated good  understanding of the HEP provided. See EMR under Patient Instructions for exercises provided during therapy sessions.      ASSESSMENT     Pt would continue to benefit from skilled OT. Pt tolerated session fairly well. Overall decreased pain in hand/wrist, IF remains stiff when making a composite fist.  Advanced strengthening with  min issue and no c/o pain. Will cont to progress as tolerated with strengthening and PROM. He would cont to benefit for a bit longer to cont to address goals.     Karthik is progressing fairly towards his goals and there are no updates to goals at this time. Pt prognosis is Good.     Pt will continue to benefit from skilled outpatient occupational therapy to address the deficits listed in the problem list on initial evaluation, provide pt/family education and to maximize pt's level of independence in the home and community environment.     Pt's spiritual, cultural and educational needs considered and pt agreeable to plan of care and goals.    Anticipated barriers to occupational therapy: n/a     Goals:  Long Term Goals (LTGs); to be met by discharge.  LTG #1: Pt will report a pain level of 1 out of 10 with ADLs, playing piano, and fishing activities. ------progressing not met 6/21/2022      LTG #2: Pt will demo improved FOTO score by at least 20 points. ------progressing not met 6/21/2022  LTG #3: Pt will return to prior level of function for ADLs and household management. ------progressing not met 6/21/2022  LTG #4: Pt will demonstrate improved R digits AROM WFL for grasping and holding things. Met 6/3/2022  LTG #5:  and pinch strength to be assessed when appropriate and goals set accordingly. Met 5/10/2022     Short Term Goals (STGs); to be met within 4 weeks (5/18/22).  STG #1: Pt will report 3 out of 10 pain level with ADLs and piano. Met 6/1/2022  STG #2:  Pt will demonstrate independence with issued HEP.  Met 6/1/2022    PLAN     Continue skilled occupational therapy 2x/week for 4 more weeks during certification period 6/15/22 - 7/15/22 with individualized plan of care focusing on improving functional independence     Updates/Grading for next session: progress as tolerated.     Susan Washburn, OT

## 2022-06-23 ENCOUNTER — CLINICAL SUPPORT (OUTPATIENT)
Dept: REHABILITATION | Facility: HOSPITAL | Age: 67
End: 2022-06-23
Payer: MEDICARE

## 2022-06-23 DIAGNOSIS — M79.641 PAIN OF RIGHT HAND: ICD-10-CM

## 2022-06-23 DIAGNOSIS — M25.641 FINGER STIFFNESS, RIGHT: Primary | ICD-10-CM

## 2022-06-23 PROCEDURE — 97018 PARAFFIN BATH THERAPY: CPT | Mod: 59

## 2022-06-23 PROCEDURE — 97140 MANUAL THERAPY 1/> REGIONS: CPT

## 2022-06-23 PROCEDURE — 97110 THERAPEUTIC EXERCISES: CPT

## 2022-06-23 NOTE — PROGRESS NOTES
"[facility]  Occupational Therapy Treatment Note     Date: 6/23/2022  Name: Karthik Bentley  Clinic Number: [id number]    Therapy Diagnosis:   Encounter Diagnoses   Name Primary?    Finger stiffness, right Yes    Pain of right hand      Physician: Russo-Digeorge, Jamie L*  Physician Orders: eval and treat  Medical Diagnosis: Carpal tunnel syndrome of right wrist [G56.01], Trigger finger of right hand, unspecified finger [M65.30], Postoperative state [Z98.890]  Surgical Procedure and Date: 4/1/22, s/p R CTR   Date of Injury/Onset: ~2 months ago  Evaluation Date: 4/18/22  Insurance Authorization Period Expiration: 4/14/23  Plan of Care Expiration: 6/17/22; updated to 7/15/22  Progress Note Due: 6/17/22  Date of Return to MD: PRN  Visit # / Visits authorized: 18 / 40  FOTO: initial eval, 5th visit, 10th visit      Precautions:  Standard, diabetic     Time In: 10:35 am   Time Out: 11:30 am    Total Appointment Time (timed & untimed codes): 50 minutes     SUBJECTIVE     Pt reports: it's ok today. The fingers still get stiff" He was compliant with home exercise program given last session.   Response to previous treatment: good; improving dexterity   Functional change: continued piano playing     Pain: 0/10  Location: Right index and middle fingers     OBJECTIVE   Objective Measures updated at progress report unless specified.  Edema. Measured in centimeters.    4/18/2022 4/18/2022 5/10/2022 5/24/2022 6/3/2022     Left Right Right right Right   Wrist Crease 17 18.2 18.0 18.2 17.7   DPC 21.9 23 21.5 21.7    MCPs 21.7 22.3 21.2 21.2       Edema. Measured in centimeters.    4/18/2022 4/18/2022 5/10/2022 5/24/2022 6/3/2022     Left Right Right right Right   Index:            P1 7.4 8 7.5 8 7.4    PIP 7.6 7.9 7.2 7.6 6.3   P2 6.4 6.6 6.3 6.8 6.4    DIP 6.1 6.3 6.2 6.3 5.9   P3 5.6 6.1 5.7 5.7 5.7   Long:            P1 7.1 7.8 7.6 7.9 7.5    PIP 7.6 8.2 7.7 7.9 7.4   P2 6.4 7 7.2 7.2 7.3    DIP 5.9 6.5 " 6.3 6.7 6.2   P3 5.8 6 6 6 6.0      Elbow and Wrist ROM. Measured in degrees.    4/18/2022 4/18/2022 5/10/2022 6/3/2022     Left Right Right Right   Wrist Ext/Flex 65/60 60/60  65/65   Wrist RD/UD 25/35 15/32 25/35       Hand ROM. Measured in degrees.  L hand WFL    4/18/2022 5/10/2022 5/24/2022 6/3/2022 6/15/22     Right Right Right Right Right             Index: MP  87 85 88 95 85              PIP     70 75 74 82 80              DIP 45 60 59 60 55              GIBSON 202 220  237              Long:  MP 78 85 87 90 85              PIP 15/80 -15/85 -16/81 -5/82 -8/85              DIP 5/50 -5/70 -9/74 -5/75 2/60              GIBSON 188 220  237              Ring:   MP 83  84                PIP 80  82                DIP 50  70                GIBSON 213                 Small:  MP WFL                   PIP WFL                   DIP WFL                  GIBSON                   Thumb: MP 65                    IP 42           Rad ADD/ABD 70           Pal ADD/ABD 65              Opposition ~0.5 cm from DPC Touch to DPC with pain          Strength (Dynamometer) and Pinch Strength (Pinch Gauge)  Measured in pounds to POP.    5/10/2022 5/10/2022 6/3/2022 6/15/22     Left Right Right Right   Rung II 80 22 40 41   Ohara Pinch 19 14 19 17   3pt Pinch 18 7 15 13   2pt Pinch 14 8 13 6      Sensation: reports only a little of numbness and tingling in thumb and finger tips    4/18/2022 4/18/2022     Left Right   [place] Mj       Normal 1.65-2.83    X    Diminished Light Touch 3.22-3.61       Diminished Protective 3.84-4.31       Loss of Protective 4.56-6.65       Untestable >6.65       2 Point Discrimination       Static       Dynamic          Treatment     Ray received the treatments listed below:     Supervised modalities after being cleared for contradictions:     Paraffin X 10  To R hand  for right hand  -Patient received paraffin bath to R hand(s) for 10 minutes to increase blood flow, circulation, pain management and for tissue  elasticity prior to therex.     Manual therapy techniques: Manual Lymphatic Drainage and Soft tissue Mobilization were applied to the: R anterior forearm and hand for 15 minutes, including:   - retrograde massage   - scar massage   - soft tissue mobilization with eve tool     Therapeutic exercises to develop ROM and flexibility for 30 minutes, including: (20 min supervised)  -  Passive flexion stretch of fingers    Passive extension stretch of index finger/LF PIPs X 10 reps each    A/AROM Wrist  Ext/flx closed fist  RD/UD      X 10 reps each    Wave, hook, full fist, finger spreads, finger lifts, pinky slides    X 10 reps each   tight intrinsics with Hook position (encouraged to stretch intrinsics)     Isometric gripping with tennis ball 10 reps x 3 sec holds   Hand gripper 3 YRB  2 minutes    Wrist proprioception flexbar oscillations x 1 minute(   Wrist proprioception Large tray 1 minute    Wrist flex/ext 1.1 kg ball  30 reps    Green flex bar smiles/ frowns   20 reps    Weight well  1# 2 minutes    Hand gripper  small wooden dowels   20 reps    Green digi flexIsolated and composite digit  press x 15   Patient Education and Home Exercises      Education provided:   - wear LMB at nighttime and oval 8 during the day during functional activities   - scar pad for nighttime to wrist   - Progress towards goals     Written Home Exercises Provided: Patient instructed to cont prior HEP.  Exercises were reviewed and Karthik was able to demonstrate them prior to the end of the session.  Karthik demonstrated good  understanding of the HEP provided. See EMR under Patient Instructions for exercises provided during therapy sessions.      ASSESSMENT     Pt would continue to benefit from skilled OT. Pt tolerated session fairly well. Overall decreased pain in hand/wrist, IF remains stiff when making a composite fist. He was able to tolerate more today in therapy. He demonstrates decreased tightness in IF/LF after heat and stretch.   Advanced strengthening with min issue and no c/o pain. Will cont to progress as tolerated with strengthening and PROM. He would cont to benefit for a bit longer to cont to address goals.     Karthik is progressing fairly towards his goals and there are no updates to goals at this time. Pt prognosis is Good.     Pt will continue to benefit from skilled outpatient occupational therapy to address the deficits listed in the problem list on initial evaluation, provide pt/family education and to maximize pt's level of independence in the home and community environment.     Pt's spiritual, cultural and educational needs considered and pt agreeable to plan of care and goals.    Anticipated barriers to occupational therapy: n/a     Goals:  Long Term Goals (LTGs); to be met by discharge.  LTG #1: Pt will report a pain level of 1 out of 10 with ADLs, playing piano, and fishing activities. ------progressing not met 6/23/2022      LTG #2: Pt will demo improved FOTO score by at least 20 points. ------progressing not met 6/23/2022  LTG #3: Pt will return to prior level of function for ADLs and household management. ------progressing not met 6/23/2022  LTG #4: Pt will demonstrate improved R digits AROM WFL for grasping and holding things. Met 6/3/2022  LTG #5:  and pinch strength to be assessed when appropriate and goals set accordingly. Met 5/10/2022     Short Term Goals (STGs); to be met within 4 weeks (5/18/22).  STG #1: Pt will report 3 out of 10 pain level with ADLs and piano. Met 6/1/2022  STG #2:  Pt will demonstrate independence with issued HEP.  Met 6/1/2022    PLAN     Continue skilled occupational therapy 2x/week for 4 more weeks during certification period 6/15/22 - 7/15/22 with individualized plan of care focusing on improving functional independence     Updates/Grading for next session: progress as tolerated.     MIKE Pugh, CHT

## 2022-06-28 ENCOUNTER — OFFICE VISIT (OUTPATIENT)
Dept: SPORTS MEDICINE | Facility: CLINIC | Age: 67
End: 2022-06-28
Payer: MEDICARE

## 2022-06-28 ENCOUNTER — CLINICAL SUPPORT (OUTPATIENT)
Dept: REHABILITATION | Facility: HOSPITAL | Age: 67
End: 2022-06-28
Payer: MEDICARE

## 2022-06-28 ENCOUNTER — HOSPITAL ENCOUNTER (OUTPATIENT)
Dept: RADIOLOGY | Facility: HOSPITAL | Age: 67
Discharge: HOME | End: 2022-06-28
Attending: ORTHOPAEDIC SURGERY
Payer: MEDICARE

## 2022-06-28 VITALS
SYSTOLIC BLOOD PRESSURE: 155 MMHG | HEIGHT: 68 IN | HEART RATE: 65 BPM | DIASTOLIC BLOOD PRESSURE: 87 MMHG | WEIGHT: 191 LBS | BODY MASS INDEX: 28.95 KG/M2

## 2022-06-28 DIAGNOSIS — M79.641 PAIN OF RIGHT HAND: ICD-10-CM

## 2022-06-28 DIAGNOSIS — M19.011 PRIMARY OSTEOARTHRITIS OF BOTH SHOULDERS: Primary | ICD-10-CM

## 2022-06-28 DIAGNOSIS — M25.512 CHRONIC PAIN OF BOTH SHOULDERS: ICD-10-CM

## 2022-06-28 DIAGNOSIS — M25.512 BILATERAL SHOULDER PAIN, UNSPECIFIED CHRONICITY: ICD-10-CM

## 2022-06-28 DIAGNOSIS — G89.29 CHRONIC PAIN OF BOTH SHOULDERS: ICD-10-CM

## 2022-06-28 DIAGNOSIS — M25.511 CHRONIC PAIN OF BOTH SHOULDERS: ICD-10-CM

## 2022-06-28 DIAGNOSIS — M25.641 FINGER STIFFNESS, RIGHT: Primary | ICD-10-CM

## 2022-06-28 DIAGNOSIS — M25.511 BILATERAL SHOULDER PAIN, UNSPECIFIED CHRONICITY: ICD-10-CM

## 2022-06-28 DIAGNOSIS — M19.012 PRIMARY OSTEOARTHRITIS OF BOTH SHOULDERS: Primary | ICD-10-CM

## 2022-06-28 PROCEDURE — 99204 OFFICE O/P NEW MOD 45 MIN: CPT | Mod: 25,S$GLB,, | Performed by: ORTHOPAEDIC SURGERY

## 2022-06-28 PROCEDURE — 3077F PR MOST RECENT SYSTOLIC BLOOD PRESSURE >= 140 MM HG: ICD-10-PCS | Mod: CPTII,S$GLB,, | Performed by: ORTHOPAEDIC SURGERY

## 2022-06-28 PROCEDURE — 3077F SYST BP >= 140 MM HG: CPT | Mod: CPTII,S$GLB,, | Performed by: ORTHOPAEDIC SURGERY

## 2022-06-28 PROCEDURE — 3061F PR NEG MICROALBUMINURIA RESULT DOCUMENTED/REVIEW: ICD-10-PCS | Mod: CPTII,S$GLB,, | Performed by: ORTHOPAEDIC SURGERY

## 2022-06-28 PROCEDURE — 20610 LARGE JOINT ASPIRATION/INJECTION: L GLENOHUMERAL: ICD-10-PCS | Mod: 50,S$GLB,, | Performed by: ORTHOPAEDIC SURGERY

## 2022-06-28 PROCEDURE — 3079F DIAST BP 80-89 MM HG: CPT | Mod: CPTII,S$GLB,, | Performed by: ORTHOPAEDIC SURGERY

## 2022-06-28 PROCEDURE — 3061F NEG MICROALBUMINURIA REV: CPT | Mod: CPTII,S$GLB,, | Performed by: ORTHOPAEDIC SURGERY

## 2022-06-28 PROCEDURE — 99999 PR PBB SHADOW E&M-EST. PATIENT-LVL III: ICD-10-PCS | Mod: PBBFAC,,, | Performed by: ORTHOPAEDIC SURGERY

## 2022-06-28 PROCEDURE — 97140 MANUAL THERAPY 1/> REGIONS: CPT

## 2022-06-28 PROCEDURE — 99204 PR OFFICE/OUTPT VISIT, NEW, LEVL IV, 45-59 MIN: ICD-10-PCS | Mod: 25,S$GLB,, | Performed by: ORTHOPAEDIC SURGERY

## 2022-06-28 PROCEDURE — 73030 X-RAY EXAM OF SHOULDER: CPT | Mod: 26,50,, | Performed by: RADIOLOGY

## 2022-06-28 PROCEDURE — 1125F PR PAIN SEVERITY QUANTIFIED, PAIN PRESENT: ICD-10-PCS | Mod: CPTII,S$GLB,, | Performed by: ORTHOPAEDIC SURGERY

## 2022-06-28 PROCEDURE — 3079F PR MOST RECENT DIASTOLIC BLOOD PRESSURE 80-89 MM HG: ICD-10-PCS | Mod: CPTII,S$GLB,, | Performed by: ORTHOPAEDIC SURGERY

## 2022-06-28 PROCEDURE — 73030 XR SHOULDER COMPLETE 2 OR MORE VIEWS BILATERAL: ICD-10-PCS | Mod: 26,50,, | Performed by: RADIOLOGY

## 2022-06-28 PROCEDURE — 99999 PR PBB SHADOW E&M-EST. PATIENT-LVL III: CPT | Mod: PBBFAC,,, | Performed by: ORTHOPAEDIC SURGERY

## 2022-06-28 PROCEDURE — 97018 PARAFFIN BATH THERAPY: CPT

## 2022-06-28 PROCEDURE — 73030 X-RAY EXAM OF SHOULDER: CPT | Mod: TC,50

## 2022-06-28 PROCEDURE — 3288F FALL RISK ASSESSMENT DOCD: CPT | Mod: CPTII,S$GLB,, | Performed by: ORTHOPAEDIC SURGERY

## 2022-06-28 PROCEDURE — 3051F HG A1C>EQUAL 7.0%<8.0%: CPT | Mod: CPTII,S$GLB,, | Performed by: ORTHOPAEDIC SURGERY

## 2022-06-28 PROCEDURE — 3072F PR LOW RISK FOR RETINOPATHY: ICD-10-PCS | Mod: CPTII,S$GLB,, | Performed by: ORTHOPAEDIC SURGERY

## 2022-06-28 PROCEDURE — 1101F PR PT FALLS ASSESS DOC 0-1 FALLS W/OUT INJ PAST YR: ICD-10-PCS | Mod: CPTII,S$GLB,, | Performed by: ORTHOPAEDIC SURGERY

## 2022-06-28 PROCEDURE — 3051F PR MOST RECENT HEMOGLOBIN A1C LEVEL 7.0 - < 8.0%: ICD-10-PCS | Mod: CPTII,S$GLB,, | Performed by: ORTHOPAEDIC SURGERY

## 2022-06-28 PROCEDURE — 3066F NEPHROPATHY DOC TX: CPT | Mod: CPTII,S$GLB,, | Performed by: ORTHOPAEDIC SURGERY

## 2022-06-28 PROCEDURE — 20610 DRAIN/INJ JOINT/BURSA W/O US: CPT | Mod: 50,S$GLB,, | Performed by: ORTHOPAEDIC SURGERY

## 2022-06-28 PROCEDURE — 1159F PR MEDICATION LIST DOCUMENTED IN MEDICAL RECORD: ICD-10-PCS | Mod: CPTII,S$GLB,, | Performed by: ORTHOPAEDIC SURGERY

## 2022-06-28 PROCEDURE — 1101F PT FALLS ASSESS-DOCD LE1/YR: CPT | Mod: CPTII,S$GLB,, | Performed by: ORTHOPAEDIC SURGERY

## 2022-06-28 PROCEDURE — 3008F BODY MASS INDEX DOCD: CPT | Mod: CPTII,S$GLB,, | Performed by: ORTHOPAEDIC SURGERY

## 2022-06-28 PROCEDURE — 97110 THERAPEUTIC EXERCISES: CPT

## 2022-06-28 PROCEDURE — 3288F PR FALLS RISK ASSESSMENT DOCUMENTED: ICD-10-PCS | Mod: CPTII,S$GLB,, | Performed by: ORTHOPAEDIC SURGERY

## 2022-06-28 PROCEDURE — 1125F AMNT PAIN NOTED PAIN PRSNT: CPT | Mod: CPTII,S$GLB,, | Performed by: ORTHOPAEDIC SURGERY

## 2022-06-28 PROCEDURE — 3008F PR BODY MASS INDEX (BMI) DOCUMENTED: ICD-10-PCS | Mod: CPTII,S$GLB,, | Performed by: ORTHOPAEDIC SURGERY

## 2022-06-28 PROCEDURE — 1159F MED LIST DOCD IN RCRD: CPT | Mod: CPTII,S$GLB,, | Performed by: ORTHOPAEDIC SURGERY

## 2022-06-28 PROCEDURE — 3066F PR DOCUMENTATION OF TREATMENT FOR NEPHROPATHY: ICD-10-PCS | Mod: CPTII,S$GLB,, | Performed by: ORTHOPAEDIC SURGERY

## 2022-06-28 PROCEDURE — 3072F LOW RISK FOR RETINOPATHY: CPT | Mod: CPTII,S$GLB,, | Performed by: ORTHOPAEDIC SURGERY

## 2022-06-28 RX ORDER — TRIAMCINOLONE ACETONIDE 40 MG/ML
40 INJECTION, SUSPENSION INTRA-ARTICULAR; INTRAMUSCULAR
Status: DISCONTINUED | OUTPATIENT
Start: 2022-06-28 | End: 2022-06-28 | Stop reason: HOSPADM

## 2022-06-28 RX ORDER — CELECOXIB 200 MG/1
200 CAPSULE ORAL DAILY
Qty: 60 CAPSULE | Refills: 1 | Status: SHIPPED | OUTPATIENT
Start: 2022-06-28 | End: 2022-10-11 | Stop reason: SDUPTHER

## 2022-06-28 RX ADMIN — TRIAMCINOLONE ACETONIDE 40 MG: 40 INJECTION, SUSPENSION INTRA-ARTICULAR; INTRAMUSCULAR at 01:06

## 2022-06-28 NOTE — PROGRESS NOTES
CC: Bilateral shoulder pain     67 y.o. Male presents as a new patient to me. He is a retired . Complaint is bilateral shoulder pain, left worse than right x 3 + months. Atraumatic onset, he recently had right carpal tunnel release with Dr. Wiley Warren on April 1. He has most pain, numbness, and tingling at night.  Pain localizes posterior shoulders and in the neck.  Notably the numbness and tingling in his right hand has improved since his carpal tunnel release.  He describes subjective stiffness in the shoulders when he first gets up in the morning.  Some intermittent painful mechanical symptoms.  Better with activity but also bothersome with strenuous overhead lifting or exercise.  Worse when lying on his shoulders.  Better with rest. Treatment thus far has included activity modifications, rest, and oral medication.  Here today to discuss diagnosis and treatment options.     History is notable for previous traumatic left shoulder AC separation many years ago.  Status post open repair/reconstruction.  No AC specific complaints at this time.    Negative for tobacco.   Positive for diabetes. Last A1C:7.5 on 5/10/22    Pain Score:   2    PAST MEDICAL HISTORY:   Past Medical History:   Diagnosis Date    Diabetes mellitus type II     Hypertension        PAST SURGICAL HISTORY:  Past Surgical History:   Procedure Laterality Date    COLONOSCOPY N/A 1/16/2020    Procedure: COLONOSCOPY;  Surgeon: Cynthia Kearney MD;  Location: [address];  Service: Endoscopy;  Laterality: N/A;    ENDOSCOPIC CARPAL TUNNEL RELEASE Right 4/1/2022    Procedure: RELEASE, CARPAL TUNNEL, ENDOSCOPIC - right arthrex;  Surgeon: Wiley Warren MD;  Location: [facility];  Service: Orthopedics;  Laterality: Right;    SHOULDER SURGERY         FAMILY HISTORY:  Family History   Problem Relation Age of Onset    Bone cancer Mother     Colon cancer Father        MEDICATIONS:    Current Outpatient Medications:     blood  sugar diagnostic Strp, To check BG 1 times daily, to use with insurance preferred meter, Disp: 100 strip, Rfl: 3    blood-glucose meter kit, To check BG 1 times daily, to use with insurance preferred meter, Disp: 1 each, Rfl: 0    gentian violet 2 % topical solution, Apply 0.5 mLs topically daily as needed. Apply between toes., Disp: 59 mL, Rfl: 1    lancets Misc, To check BG 1 times daily, to use with insurance preferred meter, Disp: 100 each, Rfl: 3    meloxicam (MOBIC) 15 MG tablet, Take 1 tablet (15 mg total) by mouth once daily., Disp: 30 tablet, Rfl: 2    metFORMIN (GLUCOPHAGE) 1000 MG tablet, Take 1 tablet (1,000 mg total) by mouth 2 (two) times daily with meals., Disp: 180 tablet, Rfl: 3    pneumococcal 23-merritt ps (PNEUMOVAX 23) 25 mcg/0.5 mL vaccine, Inject into the muscle., Disp: 0.5 mL, Rfl: 0    rosuvastatin (CRESTOR) 10 MG tablet, Take 1 tablet (10 mg total) by mouth once daily., Disp: 90 tablet, Rfl: 3    traMADoL (ULTRAM) 50 mg tablet, Take 1 tablet (50 mg total) by mouth every 6 (six) hours as needed for Pain., Disp: 20 tablet, Rfl: 0    traMADoL (ULTRAM) 50 mg tablet, Take 1 tablet (50 mg total) by mouth every 6 (six) hours as needed for Pain., Disp: 20 tablet, Rfl: 0    TRULICITY 0.75 mg/0.5 mL pen injector, INJECT 0.75 MG(0.5 ML) UNDER THE SKIN EVERY 7 DAYS, Disp: 4 pen, Rfl: 11    varicella-zoster gE-AS01B, PF, (SHINGRIX, PF,) 50 mcg/0.5 mL injection, Inject into the muscle., Disp: 1 each, Rfl: 1    celecoxib (CELEBREX) 200 MG capsule, Take 1 capsule (200 mg total) by mouth once daily., Disp: 60 capsule, Rfl: 1    valACYclovir (VALTREX) 500 MG tablet, Take 1 tablet (500 mg total) by mouth 2 (two) times daily. for 3 days, Disp: 6 tablet, Rfl: 6  No current facility-administered medications for this visit.    Facility-Administered Medications Ordered in Other Visits:     fentaNYL 50 mcg/mL injection  mcg,  mcg, Intravenous, PRN, Marc Felton MD, 50 mcg at 04/01/22  "0635    LIDOcaine (PF) 10 mg/ml (1%) injection 10 mg, 1 mL, Intradermal, Once, Marc Felton MD    midazolam (VERSED) 1 mg/mL injection 0.5-4 mg, 0.5-4 mg, Intravenous, PRN, Marc Felton MD, 2 mg at 04/01/22 0633    ALLERGIES:  Review of patient's allergies indicates:  No Known Allergies     REVIEW OF SYSTEMS:  Constitution: Negative. Negative for chills, fever and night sweats.    Hematologic/Lymphatic: Negative for bleeding problem. Does not bruise/bleed easily.   Skin: Negative for dry skin, itching and rash.   Musculoskeletal: Negative for falls. Positive for left and right shoulder pain and muscle weakness.     All other review of symptoms were reviewed and found to be noncontributory.    PHYSICAL EXAMINATION:  Vitals:  BP (!) 155/87   Pulse 65   Ht 5' 8" (1.727 m)   Wt 86.6 kg (191 lb)   BMI 29.04 kg/m²    General: Well-developed well-nourished 67 y.o. malein no acute distress   Cardiovascular: Regular rhythm by palpation of distal pulse, normal color and temperature, no concerning varicosities on symptomatic side   Lungs: No labored breathing or wheezing appreciated   Neuro: Alert and oriented ×3   Psychiatric: well oriented to person, place and time, demonstrates normal mood and affect   Skin: No rashes, lesions or ulcers, normal temperature, turgor, and texture on uninvolved extremity    Ortho/SPM Exam  Examination of left shoulder demonstrates active forward elevation in the scapular plane to 140, passive external rotation with arm at side to 50° with pain.  Positive glenohumeral grind.  Pain over the posterior glenohumeral joint line.  Intact rotator cuff strength.  No pain on resisted cuff testing.  Negative AC joint.  Mild pain to palpation over the proximal biceps groove with positive modified speed's.    Examination of the right shoulder demonstrates active forward elevation in the scapular plane to 150, passive external rotation with arm at side to 50° with pain.  Positive " glenohumeral grind.  Pain over the posterior glenohumeral joint line to palpation.  Intact cuff strength.  No pain on resisted cuff testing.  Negative AC joint. Mild pain to palpation over the proximal biceps groove with positive modified speed's.    Intact cervical neck range of motion.  Negative Spurling's maneuver.    IMAGING:  Xrays including AP, Outlet and Axillary Lateral of Left and Right shoulder are ordered / images reviewed by me:   Bilateral shoulder glenohumeral DJD, mild to moderate.  Possible remote prior anterior instability event involving the right shoulder with Hill-Sachs deformity.  No proximal humeral migration.  Bilateral AC joint arthropathy.  Evidence of prior high-grade AC separation with subsequent repair/reconstruction.  Heterotopic bone present.    EMG 2022 - Impression:  Abnormal examination.  There is electrodiagnostic evidence of:  1. Moderate to severe right median mononeuropathy at the wrist with predominantly demyelinating features and mild axonal features (carpal tunnel syndrome.)  2. Moderate left median mononeuropathy at the wrist with demyelinating features only (carpal tunnel syndrome.)  3. Likely an acute on chronic right C7 radiculopathy.  4. Mild ulnar neuropathy at the elbow on the left    ASSESSMENT:      ICD-10-CM ICD-9-CM   1. Primary osteoarthritis of both shoulders  M19.011 715.11    M19.012    2. Chronic pain of both shoulders  M25.511 719.41    G89.29 338.29    M25.512      PLAN:     Pain localized deep in the joint with associated stiffness and mechanical symptoms.  Consistent with symptomatic chondromalacia/DJD.  Fairly good cuff strength on exam.  Treatment options discussed.  Conservative care recommended.  -Bilateral glenohumeral CSI given today in clinic  -Celebrex 200mg prescribed today  -Physical therapy referral placed today [facility].  Focus on periscapular and cuff strengthening.  Set up a home program.  -RTC in 8 weeks    The patient reports good  recent control of serum glucose levels with no prior diabetes related complication associated with steroid injection. Available lab values were reviewed to confirm. The patient has elected to proceed with injection with the understanding that locally administered steroid can still temporarily elevate serum glucose levels in the days that follow.     Large Joint Aspiration/Injection: L glenohumeral    Date/Time: 6/28/2022 1:00 PM  Performed by: MINERVA Degroot MD  Authorized by: MINERVA Degroot MD     Consent Done?:  Yes (Verbal)  Indications:  Pain  Site marked: the procedure site was marked    Timeout: prior to procedure the correct patient, procedure, and site was verified    Prep: patient was prepped and draped in usual sterile fashion      Local anesthesia used?: Yes    Local anesthetic:  Co-phenylcaine spray (0.2% Naropin)  Anesthetic total (ml):  4      Details:  Needle Size:  22 G  Approach:  Superior  Location:  Shoulder  Site:  L glenohumeral  Medications:  40 mg triamcinolone acetonide 40 mg/mL  Patient tolerance:  Patient tolerated the procedure well with no immediate complications  Large Joint Aspiration/Injection: R glenohumeral    Date/Time: 6/28/2022 1:00 PM  Performed by: MINERVA Degroot MD  Authorized by: MINERVA Degroot MD     Consent Done?:  Yes (Verbal)  Indications:  Pain  Site marked: the procedure site was marked    Timeout: prior to procedure the correct patient, procedure, and site was verified    Prep: patient was prepped and draped in usual sterile fashion      Local anesthesia used?: Yes    Local anesthetic:  Co-phenylcaine spray (0.2% Naropin)  Anesthetic total (ml):  4      Details:  Needle Size:  22 G  Approach:  Superior  Location:  Shoulder  Site:  R glenohumeral  Medications:  40 mg triamcinolone acetonide 40 mg/mL  Patient tolerance:  Patient tolerated the procedure well with no immediate complications

## 2022-06-28 NOTE — PROGRESS NOTES
[facility]  Occupational Therapy Treatment Note     Date: 6/28/2022  Name: Karthik Bentley  [facility] Number: [id number]    Therapy Diagnosis:   Encounter Diagnoses   Name Primary?    Finger stiffness, right Yes    Pain of right hand      Physician: Russo-Digeorge, Jamie L*  Physician Orders: eval and treat  Medical Diagnosis: Carpal tunnel syndrome of right wrist [G56.01], Trigger finger of right hand, unspecified finger [M65.30], Postoperative state [Z98.890]  Surgical Procedure and Date: 4/1/22, s/p R CTR   Date of Injury/Onset: ~2 months ago  Evaluation Date: 4/18/22  Insurance Authorization Period Expiration: 4/14/23  Plan of Care Expiration: 6/17/22; updated to 7/15/22  Progress Note Due: 6/17/22  Date of Return to MD: PRN  Visit # / Visits authorized: 19 / 40  FOTO: initial eval, 5th visit, 10th visit      Precautions:  Standard, diabetic     Time In: 10:35 am   Time Out: 11:30 am    Total Appointment Time (timed & untimed codes): 50 minutes     SUBJECTIVE     Pt reports: he reports that he  Is going to see the Md Dr. Degroot about his shoulders. Hoping for a an xray .     Response to previous treatment: good; improving dexterity   Functional change: continued piano playing     Pain: 0/10  Location: Right index and middle fingers     OBJECTIVE   Objective Measures updated at progress report unless specified.  Edema. Measured in centimeters.    4/18/2022 4/18/2022 5/10/2022 5/24/2022 6/3/2022     Left Right Right right Right   Wrist Crease 17 18.2 18.0 18.2 17.7   DPC 21.9 23 21.5 21.7    MCPs 21.7 22.3 21.2 21.2       Edema. Measured in centimeters.    4/18/2022 4/18/2022 5/10/2022 5/24/2022 6/3/2022     Left Right Right right Right   Index:            P1 7.4 8 7.5 8 7.4    PIP 7.6 7.9 7.2 7.6 6.3   P2 6.4 6.6 6.3 6.8 6.4    DIP 6.1 6.3 6.2 6.3 5.9   P3 5.6 6.1 5.7 5.7 5.7   Long:            P1 7.1 7.8 7.6 7.9 7.5    PIP 7.6 8.2 7.7 7.9 7.4   P2 6.4 7 7.2 7.2 7.3    DIP 5.9 6.5 6.3 6.7  6.2   P3 5.8 6 6 6 6.0      Elbow and Wrist ROM. Measured in degrees.    4/18/2022 4/18/2022 5/10/2022 6/3/2022     Left Right Right Right   Wrist Ext/Flex 65/60 60/60  65/65   Wrist RD/UD 25/35 15/32 25/35       Hand ROM. Measured in degrees.  L hand WFL    4/18/2022 5/10/2022 5/24/2022 6/3/2022 6/15/22     Right Right Right Right Right             Index: MP  87 85 88 95 85              PIP     70 75 74 82 80              DIP 45 60 59 60 55              GIBSON 202 220  237              Long:  MP 78 85 87 90 85              PIP 15/80 -15/85 -16/81 -5/82 -8/85              DIP 5/50 -5/70 -9/74 -5/75 2/60              GIBSON 188 220  237              Ring:   MP 83  84                PIP 80  82                DIP 50  70                GIBSON 213                 Small:  MP WFL                   PIP WFL                   DIP WFL                  GIBSON                   Thumb: MP 65                    IP 42           Rad ADD/ABD 70           Pal ADD/ABD 65              Opposition ~0.5 cm from DPC Touch to DPC with pain          Strength (Dynamometer) and Pinch Strength (Pinch Gauge)  Measured in pounds to POP.    5/10/2022 5/10/2022 6/3/2022 6/15/22     Left Right Right Right   Rung II 80 22 40 41   [place] Pinch 19 14 19 17   3pt Pinch 18 7 15 13   2pt Pinch 14 8 13 6      Sensation: reports only a little of numbness and tingling in thumb and finger tips    4/18/2022 4/18/2022     Left Right   [place] Mj       Normal 1.65-2.83    X    Diminished Light Touch 3.22-3.61       Diminished Protective 3.84-4.31       Loss of Protective 4.56-6.65       Untestable >6.65       2 Point Discrimination       Static       Dynamic          Treatment     Ray received the treatments listed below:     Supervised modalities after being cleared for contradictions:     Paraffin X 10  To R hand  for right hand  -Patient received paraffin bath to R hand(s) for 10 minutes to increase blood flow, circulation, pain management and for tissue elasticity  prior to therex.     Manual therapy techniques: Manual Lymphatic Drainage and Soft tissue Mobilization were applied to the: R anterior forearm and hand for 15 minutes, including:   - retrograde massage   - scar massage   - soft tissue mobilization with eve tool     Therapeutic exercises to develop ROM and flexibility for 30 minutes, including: (20 min supervised)  -  Passive flexion stretch of fingers    Passive extension stretch of index finger/LF PIPs X 10 reps each    A/AROM Wrist  Ext/flx closed fist  RD/UD      X 10 reps each    Wave, hook, full fist, finger spreads, finger lifts, pinky slides    X 10 reps each   tight intrinsics with Hook position (encouraged to stretch intrinsics)     Isometric gripping with tennis ball 10 reps x 3 sec holds   Hand gripper 3 YRB  2 minutes    Wrist proprioception flexbar oscillations x 1 minute(   Wrist proprioception Large tray 1 minute    Wrist flex/ext 1.1 kg ball  30 reps    Green flex bar smiles/ frowns   20 reps    Weight well  1# 2 minutes    Hand gripper  small wooden dowels   20 reps    Green digi flexIsolated and composite digit  press x 15   Patient Education and Home Exercises      Education provided:   - wear LMB at nighttime and oval 8 during the day during functional activities   - scar pad for nighttime to wrist   - Progress towards goals     Written Home Exercises Provided: Patient instructed to cont prior HEP.  Exercises were reviewed and Karthik was able to demonstrate them prior to the end of the session.  Karthik demonstrated good  understanding of the HEP provided. See EMR under Patient Instructions for exercises provided during therapy sessions.      ASSESSMENT     Pt would continue to benefit from skilled OT. Pt tolerated session fairly well. Overall decreased pain in hand/wrist, IF remains stiff when making a composite fist. He was able to tolerate more today in therapy. He demonstrates decreased tightness in IF/LF after heat and stretch.  Advanced  strengthening with min issue and no c/o pain. Will cont to progress as tolerated with strengthening and PROM. He would cont to benefit for a bit longer to cont to address goals.     Karthik is progressing fairly towards his goals and there are no updates to goals at this time. Pt prognosis is Good.     Pt will continue to benefit from skilled outpatient occupational therapy to address the deficits listed in the problem list on initial evaluation, provide pt/family education and to maximize pt's level of independence in the home and community environment.     Pt's spiritual, cultural and educational needs considered and pt agreeable to plan of care and goals.    Anticipated barriers to occupational therapy: n/a     Goals:  Long Term Goals (LTGs); to be met by discharge.  LTG #1: Pt will report a pain level of 1 out of 10 with ADLs, playing piano, and fishing activities. ------progressing not met 6/28/2022      LTG #2: Pt will demo improved FOTO score by at least 20 points. ------progressing not met 6/28/2022  LTG #3: Pt will return to prior level of function for ADLs and household management. ------progressing not met 6/28/2022  LTG #4: Pt will demonstrate improved R digits AROM WFL for grasping and holding things. Met 6/3/2022  LTG #5:  and pinch strength to be assessed when appropriate and goals set accordingly. Met 5/10/2022     Short Term Goals (STGs); to be met within 4 weeks (5/18/22).  STG #1: Pt will report 3 out of 10 pain level with ADLs and piano. Met 6/1/2022  STG #2:  Pt will demonstrate independence with issued HEP.  Met 6/1/2022    PLAN     Continue skilled occupational therapy 2x/week for 4 more weeks during certification period 6/15/22 - 7/15/22 with individualized plan of care focusing on improving functional independence     Updates/Grading for next session: progress as tolerated.     Susan Washburn, OT

## 2022-06-30 ENCOUNTER — CLINICAL SUPPORT (OUTPATIENT)
Dept: REHABILITATION | Facility: HOSPITAL | Age: 67
End: 2022-06-30
Payer: MEDICARE

## 2022-06-30 ENCOUNTER — CLINICAL SUPPORT (OUTPATIENT)
Dept: REHABILITATION | Facility: HOSPITAL | Age: 67
End: 2022-06-30
Attending: ORTHOPAEDIC SURGERY
Payer: MEDICARE

## 2022-06-30 DIAGNOSIS — M79.641 PAIN OF RIGHT HAND: ICD-10-CM

## 2022-06-30 DIAGNOSIS — M25.512 CHRONIC PAIN OF BOTH SHOULDERS: ICD-10-CM

## 2022-06-30 DIAGNOSIS — M25.641 FINGER STIFFNESS, RIGHT: Primary | ICD-10-CM

## 2022-06-30 DIAGNOSIS — M25.511 CHRONIC PAIN OF BOTH SHOULDERS: ICD-10-CM

## 2022-06-30 DIAGNOSIS — G89.29 CHRONIC PAIN OF BOTH SHOULDERS: ICD-10-CM

## 2022-06-30 PROCEDURE — 97110 THERAPEUTIC EXERCISES: CPT

## 2022-06-30 PROCEDURE — 97140 MANUAL THERAPY 1/> REGIONS: CPT

## 2022-06-30 PROCEDURE — 97161 PT EVAL LOW COMPLEX 20 MIN: CPT

## 2022-06-30 PROCEDURE — 97018 PARAFFIN BATH THERAPY: CPT | Mod: 59

## 2022-06-30 NOTE — PROGRESS NOTES
[facility] OUTPATIENT TH[facility]  Occupational Therapy Treatment Note     Date: 6/30/2022  Name: Karthik Bentley  [facility] Number: [id number]    Therapy Diagnosis:   Encounter Diagnoses   Name Primary?    Finger stiffness, right Yes    Pain of right hand      Physician: Russo-Digeorge, Jamie L*  Physician Orders: eval and treat  Medical Diagnosis: Carpal tunnel syndrome of right wrist [G56.01], Trigger finger of right hand, unspecified finger [M65.30], Postoperative state [Z98.890]  Surgical Procedure and Date: 4/1/22, s/p R CTR   Date of Injury/Onset: ~2 months ago  Evaluation Date: 4/18/22  Insurance Authorization Period Expiration: 4/14/23  Plan of Care Expiration: 7/15/22  Progress Note Due: 7/15/22  Date of Return to MD: PRN  Visit # / Visits authorized: 20 / 40  FOTO: initial eval, 5th visit, 10th visit     Precautions:  Standard, diabetic     Time In: 2:00 pm   Time Out: 2:56 pm    Total Appointment Time (timed & untimed codes): 56 minutes     SUBJECTIVE     Pt reports: He saw Dr. Degroot for his shoulder and is now doing PT downstairs. Wants to drop down to 1 x a week for OT.     Response to previous treatment: good; improving dexterity, decreased pain and swelling trigger fingers   Functional change: continued piano playing     Pain: 0/10  Location: Right index and middle fingers     OBJECTIVE   Objective Measures updated at progress report unless specified.  Edema. Measured in centimeters.    4/18/2022 4/18/2022 5/10/2022 5/24/2022 6/3/2022     Left Right Right right Right   Wrist Crease 17 18.2 18.0 18.2 17.7   DPC 21.9 23 21.5 21.7    MCPs 21.7 22.3 21.2 21.2       Edema. Measured in centimeters.    4/18/2022 4/18/2022 5/10/2022 5/24/2022 6/3/2022     Left Right Right right Right   Index:            P1 7.4 8 7.5 8 7.4    PIP 7.6 7.9 7.2 7.6 6.3   P2 6.4 6.6 6.3 6.8 6.4    DIP 6.1 6.3 6.2 6.3 5.9   P3 5.6 6.1 5.7 5.7 5.7   Long:            P1 7.1 7.8 7.6 7.9 7.5    PIP 7.6 8.2 7.7 7.9 7.4   P2 6.4 7 7.2  7.2 7.3    DIP 5.9 6.5 6.3 6.7 6.2   P3 5.8 6 6 6 6.0      Elbow and Wrist ROM. Measured in degrees.    4/18/2022 4/18/2022 5/10/2022 6/3/2022     Left Right Right Right   Wrist Ext/Flex 65/60 60/60  65/65   Wrist RD/UD 25/35 15/32 25/35       Hand ROM. Measured in degrees.  L hand WFL    4/18/2022 5/10/2022 5/24/2022 6/3/2022 6/15/22     Right Right Right Right Right             Index: MP  87 85 88 95 85              PIP     70 75 74 82 80              DIP 45 60 59 60 55              GIBSON 202 220  237              Long:  MP 78 85 87 90 85              PIP 15/80 -15/85 -16/81 -5/82 -8/85              DIP 5/50 -5/70 -9/74 -5/75 2/60              GIBSON 188 220  237              Ring:   MP 83  84                PIP 80  82                DIP 50  70                GIBSON 213                 Small:  MP WFL                   PIP WFL                   DIP WFL                  GIBSON                   Thumb: MP 65                    IP 42           Rad ADD/ABD 70           Pal ADD/ABD 65              Opposition ~0.5 cm from DPC Touch to DPC with pain          Strength (Dynamometer) and Pinch Strength (Pinch Gauge)  Measured in pounds to POP.    5/10/2022 5/10/2022 6/3/2022 6/15/22     Left Right Right Right   Rung II 80 22 40 41   [place] Pinch 19 14 19 17   3pt Pinch 18 7 15 13   2pt Pinch 14 8 13 6      Sensation: reports only a little of numbness and tingling in thumb and finger tips    4/18/2022 4/18/2022     Left Right   [place] Mj       Normal 1.65-2.83    X    Diminished Light Touch 3.22-3.61       Diminished Protective 3.84-4.31       Loss of Protective 4.56-6.65       Untestable >6.65       2 Point Discrimination       Static       Dynamic          Treatment     Ray received the treatments listed below:     Supervised modalities after being cleared for contradictions: x 10 to R hand    -Patient received paraffin bath to R hand(s) for 10 minutes to increase blood flow, circulation, pain management and for tissue  elasticity prior to therex.     Manual therapy techniques: Manual Lymphatic Drainage and Soft tissue Mobilization were applied to the: R anterior forearm and hand for 12 minutes, including:   - retrograde massage   - scar massage   - soft tissue mobilization with eve tool     Therapeutic exercises to develop ROM and flexibility for 34 minutes, including: (0 min supervised)  Passive flexion stretch of fingers    Passive extension stretch of index finger/LF PIPs X 10 reps each    A/AROM Wrist  Ext/flx closed fist  RD/UD      X 10 reps each    Wave, hook, full fist, finger spreads, finger lifts    X 10 reps each   tight intrinsics with Hook position (encouraged to stretch intrinsics)     Isometric gripping with tennis ball 10 reps x 3 sec holds   Hand gripper 2 YRB  2 minutes    Wrist proprioception flexbar oscillations x 1 minute   Wrist proprioception Large tray 1 minute each direction 2 weights   Wrist flex/ext 1.5 kg ball, 2 ways  2 x10 reps     Green flex bar smiles/ frowns   10 reps    Weight well  1.5# 2 minutes     Hand gripper  small wooden dowels   20 reps (NT)   Green digi flexIsolated and composite digit  press x 15 (NT)Gyroscope2 min  Patient Education and Home Exercises      Education provided:   - wear LMB at nighttime and oval 8 during the day during functional activities   - scar pad for nighttime to wrist   - Progress towards goals     Written Home Exercises Provided: Patient instructed to cont prior HEP.  Exercises were reviewed and Karthik was able to demonstrate them prior to the end of the session.  Krathik demonstrated good  understanding of the HEP provided. See EMR under Patient Instructions for exercises provided during therapy sessions.      ASSESSMENT     Pt would continue to benefit from skilled OT. Pt tolerated session fairly well. Overall decreased pain in hand/wrist, IF remains stiff when making a composite fist. He was able to tolerate more today in therapy. He demonstrates decreased  tightness in IF/LF after heat and stretch. Advanced strengthening with min issue and no c/o pain. Will cont to progress as tolerated with strengthening and PROM. He would cont to benefit for another 2 weeks to address goals. Pt voiced reducing therapy frequency to 1 x a week due to seeing PT concurrently and independence with HEP. I feel this is appropriate at this time and schedule changed accordingly.    Karthik is progressing fairly towards his goals and there are no updates to goals at this time. Pt prognosis is Good.     Pt will continue to benefit from skilled outpatient occupational therapy to address the deficits listed in the problem list on initial evaluation, provide pt/family education and to maximize pt's level of independence in the home and community environment.     Pt's spiritual, cultural and educational needs considered and pt agreeable to plan of care and goals.    Anticipated barriers to occupational therapy: n/a     Goals:  Long Term Goals (LTGs); to be met by discharge.  LTG #1: Pt will report a pain level of 1 out of 10 with ADLs, playing piano, and fishing activities. ------progressing not met 6/30/2022      LTG #2: Pt will demo improved FOTO score by at least 20 points. ------progressing not met 6/30/2022  LTG #3: Pt will return to prior level of function for ADLs and household management. ------progressing not met 6/30/2022  LTG #4: Pt will demonstrate improved R digits AROM WFL for grasping and holding things. Met 6/3/2022  LTG #5:  and pinch strength to be assessed when appropriate and goals set accordingly. Met 5/10/2022     Short Term Goals (STGs); to be met within 4 weeks (5/18/22).  STG #1: Pt will report 3 out of 10 pain level with ADLs and piano. Met 6/1/2022  STG #2:  Pt will demonstrate independence with issued HEP.  Met 6/1/2022    PLAN     Continue skilled occupational therapy 2x/week for 4 more weeks during certification period 6/15/22 - 7/15/22 with individualized plan of  care focusing on improving functional independence.    Updates/Grading for next session: progress as tolerated.     Cammie Conteh, OT

## 2022-06-30 NOTE — PROGRESS NOTES
[facility] OUTPATIENT THERAPY AND WELLNESS  Physical Therapy Initial Evaluation    Name: Karthik Bentley  Clinic Number: [id number]    Therapy Diagnosis:   Encounter Diagnosis   Name Primary?    Chronic pain of both shoulders      Physician: MINERVA Degroot MD    Physician Orders: PT Eval and Treat  Medical Diagnosis from Referral: M25.511,G89.29,M25.512 (ICD-10-CM) - Chronic pain of both shoulders  Evaluation Date: 6/30/2022  Authorization Period Expiration: 06/28/2023  Plan of Care Expiration: 10/1/2022   Visit # / Visits authorized: 1/ 1    Time In: 1104  Time Out: 1205  Total Billable Time: 58 minutes    Precautions: Standard, Diabetes and HTN    Subjective     Date of onset: early April 2022  History of current condition - Ray reports: he is going to PT upstairs for carpal tunnel surgery of right hand on 4/1/2022. Pt states bilateral shoulders started hurting a week after surgery, insidious onset of stiffness and pain, and have been progressively worsening since. Is worse in the morning and better throughout the day and with movement. Pt states he was normal after the surgery. Pt had bilateral shoulder CSI done 2 days ago.  Sleeping on his back currently. States history of cervical pain that he feels may be associated with shoulder. Is also having tingling in the L hand that feels like the carpal tunnel syndrome that he had on the other side.     States history of L AC joint dislocation as youth playing baseball. No other injury history.   Denies consistent mechanical signs or current neuro symptoms.      Imaging     X-Ray Shoulder Complete Bilateral:   FINDINGS:  DJD also affecting the AC joints bilaterally.  No acute fracture or dislocation.  No bone destruction identified..  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       Prior Therapy: none for shoulder, undergoing PT now for R carpal tunnel release  "  Exercise Routine/Sport Participation: , fishing, kayaking, deep sea   Social History: lives alone  Occupation: retired  Prior Level of Function: IADLs, participating in hobbies  Current Level of Function: IADLs limited secondary to pain     Pain:  Current 4/10, worst 9/10, best 4/10   Location: bilateral shoulder lateral shoulder "deep"   Description: achy and pinching  Aggravating Factors: flexion/abduction at 90 degrees   Easing Factors: relaxation, rest and elevation    Pts goals: get back to doing hobbies      Medical History:   Past Medical History:   Diagnosis Date    Diabetes mellitus type II     Hypertension        Surgical History:   Karthik Bentley  has a past surgical history that includes Shoulder surgery; Colonoscopy (N/A, 1/16/2020); and Endoscopic carpal tunnel release (Right, 4/1/2022).    Medications:   Karthik has a current medication list which includes the following prescription(s): blood sugar diagnostic, blood-glucose meter, celecoxib, gentian violet, lancets, meloxicam, metformin, pneumococcal 23-merritt ps, rosuvastatin, tramadol, tramadol, trulicity, valacyclovir, and shingrix (pf), and the following Facility-Administered Medications: fentanyl, lidocaine (pf) 10 mg/ml (1%), and midazolam.    Allergies:   Review of patient's allergies indicates:  No Known Allergies     Objective     Active Range of Motion: Measured in degrees  Shoulder Left Right   Flexion 180 180   Abduction 180*  180* (pain starting at 90 deg > LUE)    ER at 0 80 85   IR at 0 80 80   FIR T10 T10   CAROLINE  T2 T5         Passive Range of Motion: Measured in degrees  Shoulder Left Right   Flexion 180 180   Abduction 180 180   ER at 0 90 90   ER at 90 100 100   IR 85 85     Upper Extremity Strength  Elbow Left Right   Biceps 5/5 5/5   Triceps 5/5 5/5     Shoulder Left Right   Shoulder flexion: 4+/5 4+/5   Shoulder Abduction: 4/5*  4/5*    Shoulder ER 4+/5 4+/5   Shoulder IR 4/5 4/5     Shoulder Left Right   Middle " Trapezius 3+/5 3+/5   Lower Trapezius 3+/5 3+/5   Serratus Anterior 4-/5 4/5     Special Tests:   Impingement Left Right   Neer NT NT   Empty Can Test Negative Negative   Currie Tay Negative Negative   Painful Arc Sign Positive Positive   Painful ER MMT Negative Negative   Posterior Impingement Test NT NT   Speeds Test NT NT     RTC Pathology Left Right   Drop Arm Negative Negative   ER Lag Sign Negative Negative   Empty Can Test Negative Negative   Subscapularis Lift Off Negative Negative     Instability/Labrum Left Right   Anterior Apprehension/Relocation Negative Negative   Biceps Load II Negative Negative   Martines's Test Negative Negative   Norma's Test NT NT   Jerk Test NT NT   Sulcus Sign Negative Negative     Scapular Control/Dyskinesis:    Normal / Subtle / Obvious  Comments    Left  subtle     Right  subtle      Palpation Shoulder: no TTP noted      CMS Impairment/Limitation/Restriction for FOTO Shoulder Survey    Therapist reviewed FOTO scores for Karthik Bentley on 6/30/2022.   FOTO documents entered into [employer] - see Media section.    Limitation Score: see media%  Category: Mobility       Treatment     Treatment Time In: 1135  Treatment Time Out: 1200  Total Treatment time separate from Evaluation: 25 minutes     Karthik received the treatments listed below:      Therapeutic exercises to develop strength, endurance, ROM, posture and core stabilization for 25 minutes including:    - Wall slides  - Single Arm Wall slides   - Prone T  - Prone Y   - Scapular retractions purple theraband     Manual therapy techniques: Joint mobilizations and Soft tissue Mobilization were applied to the: shoulders for 00 minutes, including:        Home Exercises and Patient Education Provided     Education provided:     - Prognosis, activity modification, goals for therapy, role of therapy for care, exercises/HEP    Written Home Exercises Provided: yes.  Exercises were reviewed and Karthik was able to demonstrate them prior to the end  of the session.   Pt received a written copy of exercises to perform at home. Karthik demonstrated good  understanding of the education provided.     - Wall slides  - Single Arm Wall slides   - Prone T  - Prone Y   - Scapular retractions purple theraband     See EMR under patient instructions for exercises given.     Assessment     Karthik is a 67 y.o. male referred to outpatient Physical Therapy with bilateral shoulder pain. Patient possessed full range of motion both actively and passively. Demonstrated mild painful arc. Otherwise strength was adequate and special testing was largely negative. Patient's subjective complaints resemble effects of osteoarthritis that has recently worsened due to inactivity recovering from his carpal tunnel surgery. Addressed deficits seen on exam with HEP.     Pt will benefit from skilled outpatient Physical Therapy to address the deficits stated above and in the chart below, provide pt/family education, and to maximize pt's level of independence. Pt prognosis is Good.     Plan of care discussed with patient: Yes  Pt's spiritual, cultural and educational needs considered and patient is agreeable to the plan of care and goals as stated below:       Anticipated Barriers for therapy: none      Medical Necessity is demonstrated by the following  History  Co-morbidities and personal factors that may impact the plan of care Co-morbidities:   advanced age, diabetes, high BMI and HTN    Personal Factors:   age     moderate   Examination  Body Structures and Functions, activity limitations and participation restrictions that may impact the plan of care Body Regions:   upper extremities    Body Systems:    gross symmetry  ROM  strength  gross coordinated movement    Participation Restrictions:   none    Activity limitations:   Learning and applying knowledge  No deficit    General Tasks and Commands  No deficit    Communication  No deficit    Mobility  lifting and carrying objects    Self care  No  deficit    Domestic Life  No deficit    Interactions/Relationships  No deficit    Life Areas  Recreational Activities to A with health and wellness     Community and Social Life  No deficit          low   Clinical Presentation stable and uncomplicated low   Decision Making/ Complexity Score: low     GOALS:   Short Term Goals:  4 weeks  .Report decreased shoulder pain < / =  4/10  to increase tolerance for HEP/ADL   . Increased strength by 1/3 MMT grade in RTC/periscapular musculature to increase tolerance for ADL and work activities.  . Pt to tolerate HEP to improve ROM and independence with ADL's      Long Term Goals: 12 weeks  Report decreased shoulder pain  < / =  2 /10  to increase tolerance for HEP/ADL/hobbie  .Increase strength to >/= 4/5 in RTC/periscapular musculature to increase tolerance for ADL and work activities.  . Pt goal: get back to doing hobbies  . Pt will have improved gcode of CJ (20-40% limited) on FOTO shoulder in order to demonstrate true functional improvement.    Plan   Plan of care Certification: 6/30/2022 to 10/1/2022.    Outpatient Physical Therapy 1-2 times weekly for 12 weeks to include the following interventions: Aquatic Therapy, Manual Therapy, Moist Heat/ Ice, Neuromuscular Re-ed, Patient Education, Self Care, Therapeutic Activities and Therapeutic Exercise.     Jennifer López, PT , DPT

## 2022-07-02 PROBLEM — G89.29 CHRONIC PAIN OF BOTH SHOULDERS: Status: ACTIVE | Noted: 2022-07-02

## 2022-07-02 PROBLEM — M25.512 CHRONIC PAIN OF BOTH SHOULDERS: Status: ACTIVE | Noted: 2022-07-02

## 2022-07-02 PROBLEM — M25.511 CHRONIC PAIN OF BOTH SHOULDERS: Status: ACTIVE | Noted: 2022-07-02

## 2022-07-13 ENCOUNTER — CLINICAL SUPPORT (OUTPATIENT)
Dept: REHABILITATION | Facility: HOSPITAL | Age: 67
End: 2022-07-13
Payer: MEDICARE

## 2022-07-13 DIAGNOSIS — M25.511 CHRONIC PAIN OF BOTH SHOULDERS: Primary | ICD-10-CM

## 2022-07-13 DIAGNOSIS — M25.512 CHRONIC PAIN OF BOTH SHOULDERS: Primary | ICD-10-CM

## 2022-07-13 DIAGNOSIS — G89.29 CHRONIC PAIN OF BOTH SHOULDERS: Primary | ICD-10-CM

## 2022-07-13 DIAGNOSIS — M25.641 FINGER STIFFNESS, RIGHT: Primary | ICD-10-CM

## 2022-07-13 DIAGNOSIS — M79.641 PAIN OF RIGHT HAND: ICD-10-CM

## 2022-07-13 PROCEDURE — 97110 THERAPEUTIC EXERCISES: CPT

## 2022-07-13 PROCEDURE — 97112 NEUROMUSCULAR REEDUCATION: CPT

## 2022-07-13 PROCEDURE — 97140 MANUAL THERAPY 1/> REGIONS: CPT

## 2022-07-13 PROCEDURE — 97018 PARAFFIN BATH THERAPY: CPT

## 2022-07-13 NOTE — PROGRESS NOTES
"[facility]  Occupational Therapy Treatment Note/Discharge Summary     Date: 7/13/2022  Name: Karthik Bentley  Clinic Number: [id number]    Therapy Diagnosis:   Encounter Diagnoses   Name Primary?    Finger stiffness, right Yes    Pain of right hand      Physician: Russo-Digeorge, Jamie L*     Physician Orders: eval and treat  Medical Diagnosis: Carpal tunnel syndrome of right wrist [G56.01], Trigger finger of right hand, unspecified finger [M65.30], Postoperative state [Z98.890]  Surgical Procedure and Date: 4/1/22, s/p R CTR   Date of Injury/Onset: ~2 months ago  Evaluation Date: 4/18/22  Insurance Authorization Period Expiration: 4/14/23  Plan of Care Expiration: 7/15/22  Progress Note Due: 7/15/22  Date of Return to MD: PRN  Visit # / Visits authorized: 21 / 40  FOTO: initial eval, 5th visit, 10th visit, discharge      Precautions:  Standard, diabetic     Time In: 3:00 pm   Time Out: 3:50 pm    Total Appointment Time (timed & untimed codes): 50 minutes      SUBJECTIVE     Pt reports: He can be discharged today and continue with HEP at home. "It's not as painful as it used to be."   Response to previous treatment: good; improving dexterity, decreased pain and swelling in trigger fingers   Functional change: continued piano playing     Pain: 0/10  Location: Right index and middle fingers     OBJECTIVE   Objective Measures updated at progress report unless specified.    Edema. Measured in centimeters.    4/18/2022 4/18/2022 5/10/2022 5/24/2022 6/3/2022     Left Right Right right Right   Wrist Crease 17 18.2 18.0 18.2 17.7   DPC 21.9 23 21.5 21.7    MCPs 21.7 22.3 21.2 21.2       Edema. Measured in centimeters.    4/18/2022 4/18/2022 5/10/2022 5/24/2022 6/3/2022     Left Right Right right Right   Index:            P1 7.4 8 7.5 8 7.4    PIP 7.6 7.9 7.2 7.6 6.3   P2 6.4 6.6 6.3 6.8 6.4    DIP 6.1 6.3 6.2 6.3 5.9   P3 5.6 6.1 5.7 5.7 5.7   Long:            P1 7.1 7.8 7.6 7.9 7.5    PIP 7.6 8.2 " 7.7 7.9 7.4   P2 6.4 7 7.2 7.2 7.3    DIP 5.9 6.5 6.3 6.7 6.2   P3 5.8 6 6 6 6.0      Elbow and Wrist ROM. Measured in degrees.    4/18/2022 4/18/2022 5/10/2022 6/3/2022     Left Right Right Right   Wrist Ext/Flex 65/60 60/60  65/65   Wrist RD/UD 25/35 15/32 25/35       Hand ROM. Measured in degrees.  L hand WFL    4/18/2022 5/10/2022 5/24/2022 6/3/2022 6/15/22 7/13/2022     Right Right Right Right Right Right              Index: MP  87 85 88 95 85 90              PIP     70 75 74 82 80 85              DIP 45 60 59 60 55 60              GIBSON 202 220  237  235              Long:  MP 78 85 87 90 85 85              PIP 15/80 -15/85 -16/81 -5/82 -8/85 -5/85              DIP 5/50 -5/70 -9/74 -5/75 2/60 70              GIBSON 188 220  237  237              Ring:   MP 83  84                 PIP 80  82                 DIP 50  70                 GIBSON 213                   Small:  MP WFL                    PIP WFL                    DIP WFL                   GIBSON                     Thumb: MP 65                     IP 42            Rad ADD/ABD 70            Pal ADD/ABD 65               Opposition ~0.5 cm from DPC Touch to DPC with pain           Strength (Dynamometer) and Pinch Strength (Pinch Gauge)  Measured in pounds to POP.    5/10/2022 5/10/2022 6/3/2022 6/15/22 7/13/2022 7/13/2022     Left Right Right Right Right Left   Rung II 80 22 40 41 77 50   [place] Pinch 19 14 19 17 18 13   3pt Pinch 18 7 15 13 19 11   2pt Pinch 14 8 13 6 10 9      Sensation: reports only a little of numbness and tingling in thumb and finger tips  7/13/2022 - Pt reports no more numbness and tingling only tightness.    4/18/2022 4/18/2022     Left Right   [place] Mj       Normal 1.65-2.83    X    Diminished Light Touch 3.22-3.61       Diminished Protective 3.84-4.31       Loss of Protective 4.56-6.65       Untestable >6.65       2 Point Discrimination       Static       Dynamic          CMS Impairment/Limitation/Restriction for FOTO Wrist  Survey    Therapist reviewed FOTO scores for Karthik Bentley on 7/13/2022.   FOTO documents entered into EPIC - see Media section.    Limitation Score: 41% (23 points improved)       Treatment     Ray received the treatments listed below:     Supervised modalities after being cleared for contradictions: x 10 to R hand    -Patient received paraffin bath to R hand(s) for 10 minutes to increase blood flow, circulation, pain management and for tissue elasticity prior to therex.     Manual therapy techniques: Manual Lymphatic Drainage and Soft tissue Mobilization were applied to the: R anterior forearm and hand for 10 minutes, including:   - scar massage   - soft tissue mobilization with eve tool     Therapeutic exercises to develop ROM and flexibility for 30 minutes, including:   -Updated objective measurements, see above.   Passive flexion stretch of fingers    Passive extension stretch of index finger/LF PIPs X 10 reps each    Wave, hook, full fist, finger spreads, finger lifts    X 10 reps each   tight intrinsics with Hook position (encouraged to stretch intrinsics)     Isometric gripping with tennis ball @ home 10 reps x 3 sec holds   Median-ulnar nerve glides 10 reps     Patient Education and Home Exercises      Education provided:   - wear LMB at nighttime and oval 8 during the day during functional activities   - scar pad for nighttime to wrist   - Progress towards goals     Written Home Exercises Provided: Patient instructed to cont prior HEP.  Exercises were reviewed and Karthik was able to demonstrate them prior to the end of the session.  Karthik demonstrated good  understanding of the HEP provided. See EMR under Patient Instructions for exercises provided during therapy sessions.      ASSESSMENT     Karthik had good tolerance to treatment this date. Pt has attended outpatient OT services since 4/18/22 (12 weeks) and has met all goals at this time. Pt has made significant gains with therapy, demonstrating independence in  all ADLs and IADLs, with improved ROM, decreased pain, and increased hand strength. He is independent with HEP and is appropriate for discharge from OT services at this time to continue progressing at home.     Pt's spiritual, cultural and educational needs considered and pt agreeable to plan of care and goals.    Anticipated barriers to occupational therapy: n/a     Goals:  Long Term Goals (LTGs); to be met by discharge.  LTG #1: Pt will report a pain level of 1 out of 10 with ADLs, playing piano, and fishing activities. Met 7/13/2022      LTG #2: Pt will demo improved FOTO score by at least 20 points. Met 7/13/2022   LTG #3: Pt will return to prior level of function for ADLs and household management. Met 7/13/2022  LTG #4: Pt will demonstrate improved R digits AROM WFL for grasping and holding things. Met 6/3/2022  LTG #5:  and pinch strength to be assessed when appropriate and goals set accordingly. Met 5/10/2022     Short Term Goals (STGs); to be met within 4 weeks (5/18/22).  STG #1: Pt will report 3 out of 10 pain level with ADLs and piano. Met 6/1/2022  STG #2:  Pt will demonstrate independence with issued HEP.  Met 6/1/2022    Discharge reason: Patient has met all of his/her goals    PLAN     This patient is discharged from Outpatient Occupational Therapy Services.     Cammie Conteh, OTR/L

## 2022-07-13 NOTE — PROGRESS NOTES
Physical Therapy Daily Treatment Note     Name: Karthik Bentley  Clinic Number: [id number]    Therapy Diagnosis:   Encounter Diagnosis   Name Primary?    Chronic pain of both shoulders Yes     Physician: Russo-Digeorge, Jamie L*    Visit Date: 7/13/2022    Physician Orders: PT Eval and Treat  Medical Diagnosis from Referral: M25.511,G89.29,M25.512 (ICD-10-CM) - Chronic pain of both shoulders  Evaluation Date: 6/30/2022  Authorization Period Expiration: 06/28/2023  Plan of Care Expiration: 10/1/2022    Visit # / Visits authorized: 1/ 20  FOTO: 1/10    Time In: 1301  Time Out: 1411  Total Billable Time: 60 minutes    Precautions: Standard, Diabetes and HTN    Subjective     Pt reports: bilateral shoulder symptoms are slightly improved. Pt states current level of function is 70%. He is having trouble with arm abduction at 90 deg.   He was compliant with home exercise program.  Response to previous treatment: no adverse effect  Functional change: no change     Pain: 2/10 currently   Location: bilateral shoulder      Objective     (+) painful arc sign     Karthik received therapeutic exercises to develop strength, endurance, ROM, flexibility, posture and core stabilization for 18 minutes including:    - Shoulder re-assessment  - Cane supine flexion x30   - Shoulder ER walkouts [place] theraband 2x10 B   - Pulleys abduction x2' ea      NP:  - Wall slides  - Single Arm Wall slides   - Scapular retractions purple theraband     Karthik received the following manual therapy techniques: Joint mobilizations and Soft tissue Mobilization were applied to the: bilateral glenohumeral joints for 10 minutes, including:    - AP glides  - Inferior glides     Karthik participated in neuromuscular re-education activities to improve: Balance, Coordination, Kinesthetic, Sense, Proprioception and Posture for 32 minutes, including:    - side-lying ER 4# 2x10 B   - side-lying scaption - scapula NMR assist 2x10 B   - Prone Y x10 B    - Prone T x10 B       Karthik participated in dynamic functional therapeutic activities to improve functional performance for 00  minutes, including:        Home Exercises Provided and Patient Education Provided     Education provided:   - Prognosis, activity modification, goals for therapy, role of therapy for care, exercises/HEP    Written Home Exercises Provided: Patient instructed to cont prior HEP.  Exercises were reviewed and Raywas able to demonstrate them prior to the end of the session.  Ray demonstrated good  understanding of the education provided.     - Wall slides  - Single Arm Wall slides   - Prone T  - Prone Y   - Scapular retractions purple theraband   - [place] Walk outs at 90 deg     See EMR under Patient Instructions for exercises provided prior visit.    Added peach walkouts     Assessment     Ray demonstrated good tolerance to activity today. Still demonstrates painful arc sign at 90 degrees of abduction, but able to progress through. We added banded strengthening in overhead positions which pt tolerated well. A lot of focus was also given to scapular re-education, as pt demonstrates scapular internal rotation and anterior tilting, requiring cueing for improved posture. Will continue to progress as tolerated.      Aury Is progressing well towards his goals.   Pt prognosis is Excellent.     Pt will continue to benefit from skilled outpatient physical therapy to address the deficits listed in the problem list box on initial evaluation, provide pt/family education and to maximize pt's level of independence in the home and community environment.     Pt's spiritual, cultural and educational needs considered and pt agreeable to plan of care and goals.    Anticipated barriers to physical therapy: none    GOALS:   Short Term Goals:  4 weeks  1. .Report decreased shoulder pain < / =  4/10  to increase tolerance for HEP/ADL   2. . Increased strength by 1/3 MMT grade in RTC/periscapular musculature to increase  tolerance for ADL and work activities.  3. . Pt to tolerate HEP to improve ROM and independence with ADL's        Long Term Goals: 12 weeks  1. Report decreased shoulder pain  < / =  2 /10  to increase tolerance for HEP/ADL/hobbie  2. .Increase strength to >/= 4/5 in RTC/periscapular musculature to increase tolerance for ADL and work activities.  3. . Pt goal: get back to doing hobbies  4. . Pt will have improved gcode of CJ (20-40% limited) on FOTO shoulder in order to demonstrate true functional improvement.      Plan     Plan of care Certification: 6/30/2022 to 10/1/2022.     Outpatient Physical Therapy 1-2 times weekly for 12 weeks to include the following interventions: Aquatic Therapy, Manual Therapy, Moist Heat/ Ice, Neuromuscular Re-ed, Patient Education, Self Care, Therapeutic Activities and Therapeutic Exercise.     Jennifer López, PT, DPT

## 2022-07-13 NOTE — PATIENT INSTRUCTIONS
Median/Ulnar Nerve glides       Begin exercise with arms at sides and bend elbows to a 90* with palms facing up,   shoulders in a relaxed position       Straighten elbows, bend wrist so palms are facing the floor, then shrug shoulders (simultaneously).   Keep a fluid motion, alternating between the 2 positions.   Repeat 10 reps  Perform 4-6 times a day

## 2022-07-19 ENCOUNTER — TELEPHONE (OUTPATIENT)
Dept: ORTHOPEDICS | Facility: CLINIC | Age: 67
End: 2022-07-19
Payer: MEDICARE

## 2022-07-19 NOTE — TELEPHONE ENCOUNTER
Spoke with the patient. Scheduled f/u with Dr. Warren for L CTR planning. All questions answered. Patient verbalized understanding and was thankful.     Martha Rodriguez, MS, OTC  Clinical & OR Assistant to [facility]. [facility]  [phone]          ----- Message from Viri Russell sent at 7/19/2022  8:42 AM CDT -----  Contact: [phone]  Pt is calling to let the dr know that he is loosing the  in his left hand and think he is going to need the surgery for it.    Pt would like to speak with both dr's to see what he can do to start the process.    [phone]

## 2022-07-21 ENCOUNTER — CLINICAL SUPPORT (OUTPATIENT)
Dept: REHABILITATION | Facility: HOSPITAL | Age: 67
End: 2022-07-21
Payer: MEDICARE

## 2022-07-21 DIAGNOSIS — G89.29 CHRONIC PAIN OF BOTH SHOULDERS: Primary | ICD-10-CM

## 2022-07-21 DIAGNOSIS — M25.512 CHRONIC PAIN OF BOTH SHOULDERS: Primary | ICD-10-CM

## 2022-07-21 DIAGNOSIS — M25.511 CHRONIC PAIN OF BOTH SHOULDERS: Primary | ICD-10-CM

## 2022-07-21 PROCEDURE — 97140 MANUAL THERAPY 1/> REGIONS: CPT

## 2022-07-21 PROCEDURE — 97112 NEUROMUSCULAR REEDUCATION: CPT

## 2022-07-21 PROCEDURE — 97110 THERAPEUTIC EXERCISES: CPT

## 2022-07-21 NOTE — PROGRESS NOTES
Physical Therapy Daily Treatment Note     Name: Karthik Bentley  Clinic Number: [id number]    Therapy Diagnosis:   No diagnosis found.  Physician: Russo-Digeorge, Jamie L*    Visit Date: 7/21/2022    Physician Orders: PT Eval and Treat  Medical Diagnosis from Referral: M25.511,G89.29,M25.512 (ICD-10-CM) - Chronic pain of both shoulders  Evaluation Date: 6/30/2022  Authorization Period Expiration: 06/28/2023  Plan of Care Expiration: 10/1/2022    Visit # / Visits authorized: 2/ 20  FOTO: 1/10    Time In: 1102  Time Out: 1200  Total Billable Time: 51 minutes    Precautions: Standard, Diabetes and HTN    Subjective     Pt reports: he is starting to feel the same carpal tunnel symptoms in his left hand that he was having in his right hand prior to his carpal tunnel release. He states his  strength is weakening on that side. He is planning to have release on the left in a few weeks. Shoulder are a little achy but he states they are improving. He states most pain comes in the mornings, which lasts an hour or two. Pt states he is about 75% of the way to normal.     Last visit: bilateral shoulder symptoms are slightly improved. Pt states current level of function is 70%. He is having trouble with arm abduction at 90 deg.     He was compliant with home exercise program.  Response to previous treatment: no adverse effect  Functional change: no change     Pain: 2/10 currently   Location: bilateral shoulder      Objective     (+) painful arc sign     Karthik received therapeutic exercises to develop strength, endurance, ROM, flexibility, posture and core stabilization for 25 minutes including:    - Shoulder re-assessment  - Cane supine flexion x30   - Cane supine ER x30 B   - Shoulder ER walkouts [place] theraband at 90 deg 2x10 B   - Wall slides 3x10     NP:  - Single Arm Wall slides   - Scapular retractions purple theraband   - Pulleys abduction x2' ea     Karthik received the following manual therapy  techniques: Joint mobilizations and Soft tissue Mobilization were applied to the: bilateral glenohumeral joints for 10 minutes, including:    - AP glides  - Inferior glides   - shoulder PROM     Ray participated in neuromuscular re-education activities to improve: Balance, Coordination, Kinesthetic, Sense, Proprioception and Posture for 16 minutes, including:    - Prone Y 2# 3x10 B   - Prone T 2# 3x10 B     NP:  - side-lying ER 4# 2x10 B   - side-lying scaption - scapula NMR assist 2x10 B       Ray participated in dynamic functional therapeutic activities to improve functional performance for 00  minutes, including:        Home Exercises Provided and Patient Education Provided     Education provided:   - Prognosis, activity modification, goals for therapy, role of therapy for care, exercises/HEP    Written Home Exercises Provided: Patient instructed to cont prior HEP.  Exercises were reviewed and Raywas able to demonstrate them prior to the end of the session.  Ray demonstrated good  understanding of the education provided.     - Wall slides  - Single Arm Wall slides   - Prone T  - Prone Y   - Scapular retractions purple theraband   - [place] Walk outs at 90 deg     See EMR under Patient Instructions for exercises provided prior visit.    Added peach walkouts     Assessment     Today: Karthik is demonstrating improvements in periscapular strength , using weight for prone T/Y.  strength will be a limiting factor in the left arm for progression of UE strengthening and ADL use. Will continue to work on improving strength and motor control to decrease pain and improve function.     Last visit: Ray demonstrated good tolerance to activity today. Still demonstrates painful arc sign at 90 degrees of abduction, but able to progress through. We added banded strengthening in overhead positions which pt tolerated well. A lot of focus was also given to scapular re-education, as pt demonstrates scapular internal rotation and  anterior tilting, requiring cueing for improved posture. Will continue to progress as tolerated.      Aury Is progressing well towards his goals.     Pt prognosis is Excellent.     Pt will continue to benefit from skilled outpatient physical therapy to address the deficits listed in the problem list box on initial evaluation, provide pt/family education and to maximize pt's level of independence in the home and community environment.     Pt's spiritual, cultural and educational needs considered and pt agreeable to plan of care and goals.    Anticipated barriers to physical therapy: none    GOALS:   Short Term Goals:  4 weeks  1. .Report decreased shoulder pain < / =  4/10  to increase tolerance for HEP/ADL   2. . Increased strength by 1/3 MMT grade in RTC/periscapular musculature to increase tolerance for ADL and work activities.  3. . Pt to tolerate HEP to improve ROM and independence with ADL's        Long Term Goals: 12 weeks  1. Report decreased shoulder pain  < / =  2 /10  to increase tolerance for HEP/ADL/hobbie  2. .Increase strength to >/= 4/5 in RTC/periscapular musculature to increase tolerance for ADL and work activities.  3. . Pt goal: get back to doing hobbies  4. . Pt will have improved gcode of CJ (20-40% limited) on FOTO shoulder in order to demonstrate true functional improvement.      Plan     Plan of care Certification: 6/30/2022 to 10/1/2022.     Outpatient Physical Therapy 1-2 times weekly for 12 weeks to include the following interventions: Aquatic Therapy, Manual Therapy, Moist Heat/ Ice, Neuromuscular Re-ed, Patient Education, Self Care, Therapeutic Activities and Therapeutic Exercise.     Jennifer óLpez, PT, DPT

## 2022-07-26 ENCOUNTER — TELEPHONE (OUTPATIENT)
Dept: SPORTS MEDICINE | Facility: CLINIC | Age: 67
End: 2022-07-26
Payer: MEDICARE

## 2022-07-26 NOTE — TELEPHONE ENCOUNTER
Spoke with patient, gave him information for back and spine per his request.    Cristina France, MS, OTC [facility]    ----- Message from Svetlana Santana sent at 7/26/2022 12:37 PM CDT -----  Regarding: concerns  Name of Who is Calling: Ray           What is the request in detail: Patient is requesting a call back because he woke up and couldn't move his arms. He still cant raise it to touch his head.           Can the clinic reply by [facility]: No           What Number to Call Back if not in [place]: [phone]

## 2022-07-27 NOTE — PROGRESS NOTES
DATE: 7/28/2022  PATIENT: Karthik Bentley    Supervising Physician: Kody Marshall M.D.    CHIEF COMPLAINT: neck and bilateral arm pain    HISTORY:  Karthik Bentley is a 67 y.o. male with pmhx of DM2 (A1c: 7.5) here for initial evaluation of neck and bilateral arm pain (Neck - 4, Arm - 3).  He denies specific injury but states the pain began when recovering from his right hand carpal tunnel surgery with Dr. Warren 5 months ago. The patient describes the pain as numbness with an intermittent sharp shooting pain. The pain is worse with raising his arms and improved by nothing in particular. There is subjective weakness. Prior treatments have included tylenol and celebres, but no PT, EDITH or surgery. He is scheduled to see Dr. Warren next week to discuss carpal tunnel surgery on his left hand. The patient was referred to me by Dr. Degroot. EMG (3/8/22) shows acute on chronic C7 radiculopathy and bilateral carpel tunnel syndrome.   The patient denies myelopathic symptoms such as handwriting changes or difficulty with buttons/coins/keys. Denies perineal paresthesias, bowel/bladder dysfunction.    PAST MEDICAL/SURGICAL HISTORY:  Past Medical History:   Diagnosis Date    Diabetes mellitus type II     Hypertension      Past Surgical History:   Procedure Laterality Date    COLONOSCOPY N/A 1/16/2020    Procedure: COLONOSCOPY;  Surgeon: Cynthia Kearney MD;  Location: [address];  Service: Endoscopy;  Laterality: N/A;    ENDOSCOPIC CARPAL TUNNEL RELEASE Right 4/1/2022    Procedure: RELEASE, CARPAL TUNNEL, ENDOSCOPIC - right arthrex;  Surgeon: Wiley Warren MD;  Location: [facility];  Service: Orthopedics;  Laterality: Right;    SHOULDER SURGERY         Medications:  Current Outpatient Medications on File Prior to Visit   Medication Sig Dispense Refill    blood sugar diagnostic Strp To check BG 1 times daily, to use with insurance preferred meter 100 strip 3    blood-glucose meter kit To check BG 1 times daily, to  use with insurance preferred meter 1 each 0    CARBOCAINE, PF, 15 mg/mL (1.5 %) injection       cefazolin sodium/water (CEFAZOLIN IN STERILE WATER) 2 gram/20 mL       celecoxib (CELEBREX) 200 MG capsule Take 1 capsule (200 mg total) by mouth once daily. 60 capsule 1    fentaNYL (SUBLIMAZE) 50 mcg/mL injection       gentian violet 2 % topical solution Apply 0.5 mLs topically daily as needed. Apply between toes. 59 mL 1    lancets Misc To check BG 1 times daily, to use with insurance preferred meter 100 each 3    meloxicam (MOBIC) 15 MG tablet Take 1 tablet (15 mg total) by mouth once daily. 30 tablet 2    metFORMIN (GLUCOPHAGE) 1000 MG tablet Take 1 tablet (1,000 mg total) by mouth 2 (two) times daily with meals. 180 tablet 3    midazolam (VERSED) 1 mg/mL injection       ondansetron 4 mg/2 mL Soln       pneumococcal 23-merritt ps (PNEUMOVAX 23) 25 mcg/0.5 mL vaccine Inject into the muscle. 0.5 mL 0    propofoL (DIPRIVAN) 10 mg/mL infusion       rosuvastatin (CRESTOR) 10 MG tablet Take 1 tablet (10 mg total) by mouth once daily. 90 tablet 3    traMADoL (ULTRAM) 50 mg tablet Take 1 tablet (50 mg total) by mouth every 6 (six) hours as needed for Pain. 20 tablet 0    traMADoL (ULTRAM) 50 mg tablet Take 1 tablet (50 mg total) by mouth every 6 (six) hours as needed for Pain. 20 tablet 0    TRULICITY 0.75 mg/0.5 mL pen injector INJECT 0.75 MG(0.5 ML) UNDER THE SKIN EVERY 7 DAYS 4 pen 11    varicella-zoster gE-AS01B, PF, (SHINGRIX, PF,) 50 mcg/0.5 mL injection Inject into the muscle. 1 each 1    [DISCONTINUED] dexamethasone (DECADRON) 4 mg/mL injection       valACYclovir (VALTREX) 500 MG tablet Take 1 tablet (500 mg total) by mouth 2 (two) times daily. for 3 days 6 tablet 6     Current Facility-Administered Medications on File Prior to Visit   Medication Dose Route Frequency Provider Last Rate Last Admin    fentaNYL 50 mcg/mL injection  mcg   mcg Intravenous PRN Marc Felton MD   50 mcg at  "04/01/22 0635    LIDOcaine (PF) 10 mg/ml (1%) injection 10 mg  1 mL Intradermal Once Marc Felton MD        midazolam (VERSED) 1 mg/mL injection 0.5-4 mg  0.5-4 mg Intravenous PRN Marc Felton MD   2 mg at 04/01/22 0633       Social History:   Social History     Socioeconomic History    Marital status: Single   Tobacco Use    Smoking status: Never Smoker    Smokeless tobacco: Never Used   Substance and Sexual Activity    Alcohol use: Yes     Comment: occasionally    Drug use: Never       REVIEW OF SYSTEMS:  Constitution: Negative. Negative for chills, fever and night sweats.   Cardiovascular: Negative for chest pain and syncope.   Respiratory: Negative for cough and shortness of breath.   Gastrointestinal: See HPI. Negative for nausea/vomiting. Negative for abdominal pain.  Genitourinary: See HPI. Negative for discoloration or dysuria.  Skin: Negative for dry skin, itching and rash.   Hematologic/Lymphatic: Negative for bleeding problem. Does not bruise/bleed easily.   Musculoskeletal: Negative for falls and muscle weakness.   Neurological: See HPI. No seizures.   Endocrine: Negative for polydipsia, polyphagia and polyuria.   Allergic/Immunologic: Negative for hives and persistent infections.  Psychiatric/Behavioral: Negative for depression and insomnia.         EXAM:  Ht 5' 7.5" (1.715 m)   Wt 85.9 kg (189 lb 6 oz)   BMI 29.22 kg/m²     General: The patient is a very plesant 67 y.o. male in no apparent distress, the patient is oriented to person, place and time.  Psych: Normal mood and affect  HEENT: Vision grossly intact, hearing intact to the spoken word.  Lungs: Respirations unlabored.  Gait: Normal station and gait, no difficulty with toe or heel walk.   Skin: Cervical skin negative for rashes, lesions, hairy patches and surgical scars.  Range of motion: Cervical range of motion is acceptable. There is mild tenderness to palpation.  Spinal Balance: Global saggital and coronal spinal " balance acceptable, no significant for scoliosis and kyphosis.  Musculoskeletal: pain with the range of motion of the bilateral shoulders and elbows. Normal bulk and contour of the bilateral hands.  Vascular: Bilateral hands warm and well perfused, radial pulses 2+ bilaterally.  Neurological: decreased strength and tone in all major motor groups in the bilateral upper and lower extremities. Decreased sensation to light touch in the C5-T1 and L2-S1 dermatomes bilaterally.  Deep tendon reflexes symmetric 2+ in the bilateral upper and lower extremities.  Negative Inverted Radial Reflex and Adams's bilaterally. Negative Babinski bilaterally.     IMAGING:   Today I personally reviewed AP, Lat and Flex/Ex  upright C-spine films that demonstrate moderate DDD from  C4-7. Reversal of the lordotic curve.     Body mass index is 29.22 kg/m².    Hemoglobin A1C   Date Value Ref Range Status   05/10/2022 7.5 (H) 4.0 - 5.6 % Final     Comment:     ADA Screening Guidelines:  5.7-6.4%  Consistent with prediabetes  >or=6.5%  Consistent with diabetes    High levels of fetal hemoglobin interfere with the HbA1C  assay. Heterozygous hemoglobin variants (HbS, HgC, etc)do  not significantly interfere with this assay.   However, presence of multiple variants may affect accuracy.     01/12/2022 8.0 (H) 4.0 - 5.6 % Final     Comment:     ADA Screening Guidelines:  5.7-6.4%  Consistent with prediabetes  >or=6.5%  Consistent with diabetes    High levels of fetal hemoglobin interfere with the HbA1C  assay. Heterozygous hemoglobin variants (HbS, HgC, etc)do  not significantly interfere with this assay.   However, presence of multiple variants may affect accuracy.     08/19/2021 6.9 (H) 4.0 - 5.6 % Final     Comment:     ADA Screening Guidelines:  5.7-6.4%  Consistent with prediabetes  >or=6.5%  Consistent with diabetes    High levels of fetal hemoglobin interfere with the HbA1C  assay. Heterozygous hemoglobin variants (HbS, HgC, etc)do  not  significantly interfere with this assay.   However, presence of multiple variants may affect accuracy.             ASSESSMENT/PLAN:    Karthik was seen today for establish care and pain.    Diagnoses and all orders for this visit:    DDD (degenerative disc disease), cervical  -     Discontinue: methylPREDNISolone (MEDROL DOSEPACK) 4 mg tablet; use as directed  -     Discontinue: gabapentin (NEURONTIN) 300 MG capsule; Take 1 capsule (300 mg total) by mouth every evening.  -     Discontinue: tiZANidine (ZANAFLEX) 4 MG tablet; Take 1 tablet (4 mg total) by mouth every 8 (eight) hours as needed (muscle spasms).  -     MRI Cervical Spine Without Contrast; Future  -     methylPREDNISolone (MEDROL DOSEPACK) 4 mg tablet; use as directed  -     gabapentin (NEURONTIN) 300 MG capsule; Take 1 capsule (300 mg total) by mouth every evening.  -     tiZANidine (ZANAFLEX) 4 MG tablet; Take 1 tablet (4 mg total) by mouth every 8 (eight) hours as needed (muscle spasms).    Cervical radiculopathy  -     Discontinue: methylPREDNISolone (MEDROL DOSEPACK) 4 mg tablet; use as directed  -     Discontinue: gabapentin (NEURONTIN) 300 MG capsule; Take 1 capsule (300 mg total) by mouth every evening.  -     Discontinue: tiZANidine (ZANAFLEX) 4 MG tablet; Take 1 tablet (4 mg total) by mouth every 8 (eight) hours as needed (muscle spasms).  -     MRI Cervical Spine Without Contrast; Future  -     methylPREDNISolone (MEDROL DOSEPACK) 4 mg tablet; use as directed  -     gabapentin (NEURONTIN) 300 MG capsule; Take 1 capsule (300 mg total) by mouth every evening.  -     tiZANidine (ZANAFLEX) 4 MG tablet; Take 1 tablet (4 mg total) by mouth every 8 (eight) hours as needed (muscle spasms).        Today we discussed at length all of the different treatment options including anti-inflammatories, acetaminophen, rest, ice, heat, physical therapy including strengthening and stretching exercises, home exercises, ROM, aerobic conditioning, aqua therapy, other  modalities including ultrasound, massage, and dry needling, epidural steroid injections and finally surgical intervention.  medrol dose pack, zanaflex and gabapentin sent to pharmacy. Cervical MRI ordered, I will call with results.

## 2022-07-28 ENCOUNTER — OFFICE VISIT (OUTPATIENT)
Dept: ORTHOPEDICS | Facility: CLINIC | Age: 67
End: 2022-07-28
Payer: MEDICARE

## 2022-07-28 ENCOUNTER — HOSPITAL ENCOUNTER (OUTPATIENT)
Dept: RADIOLOGY | Facility: HOSPITAL | Age: 67
Discharge: HOME | End: 2022-07-28
Attending: ORTHOPAEDIC SURGERY
Payer: MEDICARE

## 2022-07-28 VITALS — WEIGHT: 189.38 LBS | BODY MASS INDEX: 28.7 KG/M2 | HEIGHT: 68 IN

## 2022-07-28 DIAGNOSIS — M54.12 CERVICAL RADICULOPATHY: ICD-10-CM

## 2022-07-28 DIAGNOSIS — M50.30 DDD (DEGENERATIVE DISC DISEASE), CERVICAL: Primary | ICD-10-CM

## 2022-07-28 DIAGNOSIS — M50.30 DDD (DEGENERATIVE DISC DISEASE), CERVICAL: ICD-10-CM

## 2022-07-28 PROCEDURE — 3051F HG A1C>EQUAL 7.0%<8.0%: CPT | Mod: CPTII,S$GLB,, | Performed by: REGISTERED NURSE

## 2022-07-28 PROCEDURE — 3288F FALL RISK ASSESSMENT DOCD: CPT | Mod: CPTII,S$GLB,, | Performed by: REGISTERED NURSE

## 2022-07-28 PROCEDURE — 72050 XR CERVICAL SPINE AP LAT WITH FLEX EXTEN: ICD-10-PCS | Mod: 26,,, | Performed by: STUDENT IN AN ORGANIZED HEALTH CARE EDUCATION/TRAINING PROGRAM

## 2022-07-28 PROCEDURE — 99999 PR PBB SHADOW E&M-EST. PATIENT-LVL IV: CPT | Mod: PBBFAC,,, | Performed by: REGISTERED NURSE

## 2022-07-28 PROCEDURE — 72050 X-RAY EXAM NECK SPINE 4/5VWS: CPT | Mod: TC

## 2022-07-28 PROCEDURE — 1160F PR REVIEW ALL MEDS BY PRESCRIBER/CLIN PHARMACIST DOCUMENTED: ICD-10-PCS | Mod: CPTII,S$GLB,, | Performed by: REGISTERED NURSE

## 2022-07-28 PROCEDURE — 3066F NEPHROPATHY DOC TX: CPT | Mod: CPTII,S$GLB,, | Performed by: REGISTERED NURSE

## 2022-07-28 PROCEDURE — 3008F BODY MASS INDEX DOCD: CPT | Mod: CPTII,S$GLB,, | Performed by: REGISTERED NURSE

## 2022-07-28 PROCEDURE — 3072F PR LOW RISK FOR RETINOPATHY: ICD-10-PCS | Mod: CPTII,S$GLB,, | Performed by: REGISTERED NURSE

## 2022-07-28 PROCEDURE — 99999 PR PBB SHADOW E&M-EST. PATIENT-LVL IV: ICD-10-PCS | Mod: PBBFAC,,, | Performed by: REGISTERED NURSE

## 2022-07-28 PROCEDURE — 1125F PR PAIN SEVERITY QUANTIFIED, PAIN PRESENT: ICD-10-PCS | Mod: CPTII,S$GLB,, | Performed by: REGISTERED NURSE

## 2022-07-28 PROCEDURE — 3061F PR NEG MICROALBUMINURIA RESULT DOCUMENTED/REVIEW: ICD-10-PCS | Mod: CPTII,S$GLB,, | Performed by: REGISTERED NURSE

## 2022-07-28 PROCEDURE — 99214 OFFICE O/P EST MOD 30 MIN: CPT | Mod: S$GLB,,, | Performed by: REGISTERED NURSE

## 2022-07-28 PROCEDURE — 1160F RVW MEDS BY RX/DR IN RCRD: CPT | Mod: CPTII,S$GLB,, | Performed by: REGISTERED NURSE

## 2022-07-28 PROCEDURE — 1101F PR PT FALLS ASSESS DOC 0-1 FALLS W/OUT INJ PAST YR: ICD-10-PCS | Mod: CPTII,S$GLB,, | Performed by: REGISTERED NURSE

## 2022-07-28 PROCEDURE — 1159F MED LIST DOCD IN RCRD: CPT | Mod: CPTII,S$GLB,, | Performed by: REGISTERED NURSE

## 2022-07-28 PROCEDURE — 3066F PR DOCUMENTATION OF TREATMENT FOR NEPHROPATHY: ICD-10-PCS | Mod: CPTII,S$GLB,, | Performed by: REGISTERED NURSE

## 2022-07-28 PROCEDURE — 1101F PT FALLS ASSESS-DOCD LE1/YR: CPT | Mod: CPTII,S$GLB,, | Performed by: REGISTERED NURSE

## 2022-07-28 PROCEDURE — 99214 PR OFFICE/OUTPT VISIT, EST, LEVL IV, 30-39 MIN: ICD-10-PCS | Mod: S$GLB,,, | Performed by: REGISTERED NURSE

## 2022-07-28 PROCEDURE — 1125F AMNT PAIN NOTED PAIN PRSNT: CPT | Mod: CPTII,S$GLB,, | Performed by: REGISTERED NURSE

## 2022-07-28 PROCEDURE — 3072F LOW RISK FOR RETINOPATHY: CPT | Mod: CPTII,S$GLB,, | Performed by: REGISTERED NURSE

## 2022-07-28 PROCEDURE — 72050 X-RAY EXAM NECK SPINE 4/5VWS: CPT | Mod: 26,,, | Performed by: STUDENT IN AN ORGANIZED HEALTH CARE EDUCATION/TRAINING PROGRAM

## 2022-07-28 PROCEDURE — 3061F NEG MICROALBUMINURIA REV: CPT | Mod: CPTII,S$GLB,, | Performed by: REGISTERED NURSE

## 2022-07-28 PROCEDURE — 1159F PR MEDICATION LIST DOCUMENTED IN MEDICAL RECORD: ICD-10-PCS | Mod: CPTII,S$GLB,, | Performed by: REGISTERED NURSE

## 2022-07-28 PROCEDURE — 3288F PR FALLS RISK ASSESSMENT DOCUMENTED: ICD-10-PCS | Mod: CPTII,S$GLB,, | Performed by: REGISTERED NURSE

## 2022-07-28 PROCEDURE — 3051F PR MOST RECENT HEMOGLOBIN A1C LEVEL 7.0 - < 8.0%: ICD-10-PCS | Mod: CPTII,S$GLB,, | Performed by: REGISTERED NURSE

## 2022-07-28 PROCEDURE — 3008F PR BODY MASS INDEX (BMI) DOCUMENTED: ICD-10-PCS | Mod: CPTII,S$GLB,, | Performed by: REGISTERED NURSE

## 2022-07-28 RX ORDER — METHYLPREDNISOLONE 4 MG/1
TABLET ORAL
Qty: 21 TABLET | Refills: 0 | Status: SHIPPED | OUTPATIENT
Start: 2022-07-28 | End: 2022-08-18

## 2022-07-28 RX ORDER — PROPOFOL 10 MG/ML
INJECTION, EMULSION INTRAVENOUS
COMMUNITY
Start: 2022-04-01 | End: 2022-09-20

## 2022-07-28 RX ORDER — DEXAMETHASONE SODIUM PHOSPHATE 4 MG/ML
INJECTION, SOLUTION INTRA-ARTICULAR; INTRALESIONAL; INTRAMUSCULAR; INTRAVENOUS; SOFT TISSUE
COMMUNITY
Start: 2022-04-01 | End: 2022-07-28

## 2022-07-28 RX ORDER — TIZANIDINE 4 MG/1
4 TABLET ORAL EVERY 8 HOURS PRN
Qty: 60 TABLET | Refills: 1 | Status: SHIPPED | OUTPATIENT
Start: 2022-07-28 | End: 2022-07-28 | Stop reason: SDUPTHER

## 2022-07-28 RX ORDER — TIZANIDINE 4 MG/1
4 TABLET ORAL EVERY 8 HOURS PRN
Qty: 60 TABLET | Refills: 1 | Status: ON HOLD | OUTPATIENT
Start: 2022-07-28 | End: 2022-10-31 | Stop reason: HOSPADM

## 2022-07-28 RX ORDER — MIDAZOLAM HYDROCHLORIDE 1 MG/ML
INJECTION, SOLUTION INTRAMUSCULAR; INTRAVENOUS
COMMUNITY
Start: 2022-04-01 | End: 2022-09-20

## 2022-07-28 RX ORDER — METHYLPREDNISOLONE 4 MG/1
TABLET ORAL
Qty: 21 TABLET | Refills: 0 | Status: SHIPPED | OUTPATIENT
Start: 2022-07-28 | End: 2022-07-28 | Stop reason: SDUPTHER

## 2022-07-28 RX ORDER — MEPIVACAINE HYDROCHLORIDE 15 MG/ML
INJECTION, SOLUTION EPIDURAL; INFILTRATION
COMMUNITY
Start: 2022-04-01 | End: 2023-08-01

## 2022-07-28 RX ORDER — GABAPENTIN 300 MG/1
300 CAPSULE ORAL NIGHTLY
Qty: 30 CAPSULE | Refills: 11 | Status: SHIPPED | OUTPATIENT
Start: 2022-07-28 | End: 2022-08-25

## 2022-07-28 RX ORDER — METHYLPREDNISOLONE 4 MG/1
TABLET ORAL
Qty: 1 EACH | Refills: 0 | Status: SHIPPED | OUTPATIENT
Start: 2022-07-28 | End: 2022-07-28

## 2022-07-28 RX ORDER — GABAPENTIN 300 MG/1
300 CAPSULE ORAL NIGHTLY
Qty: 30 CAPSULE | Refills: 11 | Status: SHIPPED | OUTPATIENT
Start: 2022-07-28 | End: 2022-07-28

## 2022-07-28 RX ORDER — CEFAZOLIN SODIUM/WATER 2 G/20 ML
SYRINGE (ML) INTRAVENOUS
COMMUNITY
Start: 2022-04-01 | End: 2022-09-20

## 2022-07-28 RX ORDER — TIZANIDINE 4 MG/1
4 TABLET ORAL EVERY 8 HOURS PRN
Qty: 60 TABLET | Refills: 2 | Status: SHIPPED | OUTPATIENT
Start: 2022-07-28 | End: 2022-07-28

## 2022-07-28 RX ORDER — GABAPENTIN 300 MG/1
300 CAPSULE ORAL NIGHTLY
Qty: 30 CAPSULE | Refills: 11 | Status: SHIPPED | OUTPATIENT
Start: 2022-07-28 | End: 2022-07-28 | Stop reason: SDUPTHER

## 2022-07-28 RX ORDER — FENTANYL CITRATE 50 UG/ML
INJECTION, SOLUTION INTRAMUSCULAR; INTRAVENOUS
COMMUNITY
Start: 2022-04-01 | End: 2022-09-20

## 2022-07-28 RX ORDER — ONDANSETRON 2 MG/ML
INJECTION INTRAMUSCULAR; INTRAVENOUS
COMMUNITY
Start: 2022-04-01 | End: 2022-10-10 | Stop reason: CLARIF

## 2022-08-02 ENCOUNTER — OFFICE VISIT (OUTPATIENT)
Dept: ORTHOPEDICS | Facility: CLINIC | Age: 67
End: 2022-08-02
Payer: MEDICARE

## 2022-08-02 VITALS — BODY MASS INDEX: 28.64 KG/M2 | HEIGHT: 68 IN | WEIGHT: 189 LBS

## 2022-08-02 DIAGNOSIS — I87.8 VENOUS ENGORGEMENT: Primary | ICD-10-CM

## 2022-08-02 PROCEDURE — 3072F PR LOW RISK FOR RETINOPATHY: ICD-10-PCS | Mod: CPTII,S$GLB,, | Performed by: ORTHOPAEDIC SURGERY

## 2022-08-02 PROCEDURE — 3061F PR NEG MICROALBUMINURIA RESULT DOCUMENTED/REVIEW: ICD-10-PCS | Mod: CPTII,S$GLB,, | Performed by: ORTHOPAEDIC SURGERY

## 2022-08-02 PROCEDURE — 3066F NEPHROPATHY DOC TX: CPT | Mod: CPTII,S$GLB,, | Performed by: ORTHOPAEDIC SURGERY

## 2022-08-02 PROCEDURE — 1125F PR PAIN SEVERITY QUANTIFIED, PAIN PRESENT: ICD-10-PCS | Mod: CPTII,S$GLB,, | Performed by: ORTHOPAEDIC SURGERY

## 2022-08-02 PROCEDURE — 3051F HG A1C>EQUAL 7.0%<8.0%: CPT | Mod: CPTII,S$GLB,, | Performed by: ORTHOPAEDIC SURGERY

## 2022-08-02 PROCEDURE — 99213 OFFICE O/P EST LOW 20 MIN: CPT | Mod: S$GLB,,, | Performed by: ORTHOPAEDIC SURGERY

## 2022-08-02 PROCEDURE — 99999 PR PBB SHADOW E&M-EST. PATIENT-LVL IV: CPT | Mod: PBBFAC,,, | Performed by: ORTHOPAEDIC SURGERY

## 2022-08-02 PROCEDURE — 99999 PR PBB SHADOW E&M-EST. PATIENT-LVL IV: ICD-10-PCS | Mod: PBBFAC,,, | Performed by: ORTHOPAEDIC SURGERY

## 2022-08-02 PROCEDURE — 1101F PT FALLS ASSESS-DOCD LE1/YR: CPT | Mod: CPTII,S$GLB,, | Performed by: ORTHOPAEDIC SURGERY

## 2022-08-02 PROCEDURE — 99213 PR OFFICE/OUTPT VISIT, EST, LEVL III, 20-29 MIN: ICD-10-PCS | Mod: S$GLB,,, | Performed by: ORTHOPAEDIC SURGERY

## 2022-08-02 PROCEDURE — 3008F BODY MASS INDEX DOCD: CPT | Mod: CPTII,S$GLB,, | Performed by: ORTHOPAEDIC SURGERY

## 2022-08-02 PROCEDURE — 3061F NEG MICROALBUMINURIA REV: CPT | Mod: CPTII,S$GLB,, | Performed by: ORTHOPAEDIC SURGERY

## 2022-08-02 PROCEDURE — 1159F PR MEDICATION LIST DOCUMENTED IN MEDICAL RECORD: ICD-10-PCS | Mod: CPTII,S$GLB,, | Performed by: ORTHOPAEDIC SURGERY

## 2022-08-02 PROCEDURE — 3288F PR FALLS RISK ASSESSMENT DOCUMENTED: ICD-10-PCS | Mod: CPTII,S$GLB,, | Performed by: ORTHOPAEDIC SURGERY

## 2022-08-02 PROCEDURE — 3066F PR DOCUMENTATION OF TREATMENT FOR NEPHROPATHY: ICD-10-PCS | Mod: CPTII,S$GLB,, | Performed by: ORTHOPAEDIC SURGERY

## 2022-08-02 PROCEDURE — 1101F PR PT FALLS ASSESS DOC 0-1 FALLS W/OUT INJ PAST YR: ICD-10-PCS | Mod: CPTII,S$GLB,, | Performed by: ORTHOPAEDIC SURGERY

## 2022-08-02 PROCEDURE — 1125F AMNT PAIN NOTED PAIN PRSNT: CPT | Mod: CPTII,S$GLB,, | Performed by: ORTHOPAEDIC SURGERY

## 2022-08-02 PROCEDURE — 3008F PR BODY MASS INDEX (BMI) DOCUMENTED: ICD-10-PCS | Mod: CPTII,S$GLB,, | Performed by: ORTHOPAEDIC SURGERY

## 2022-08-02 PROCEDURE — 1159F MED LIST DOCD IN RCRD: CPT | Mod: CPTII,S$GLB,, | Performed by: ORTHOPAEDIC SURGERY

## 2022-08-02 PROCEDURE — 3072F LOW RISK FOR RETINOPATHY: CPT | Mod: CPTII,S$GLB,, | Performed by: ORTHOPAEDIC SURGERY

## 2022-08-02 PROCEDURE — 3051F PR MOST RECENT HEMOGLOBIN A1C LEVEL 7.0 - < 8.0%: ICD-10-PCS | Mod: CPTII,S$GLB,, | Performed by: ORTHOPAEDIC SURGERY

## 2022-08-02 PROCEDURE — 3288F FALL RISK ASSESSMENT DOCD: CPT | Mod: CPTII,S$GLB,, | Performed by: ORTHOPAEDIC SURGERY

## 2022-08-02 NOTE — PROGRESS NOTES
[facility]  History & Physical  Orthopedics     SUBJECTIVE:       COVID-19 attestation:  This patient was treated during the COVID-19 pandemic.  This was discussed with the patient, they are aware of our current policies and procedures, were given the option of delaying their visit and or switching to a virtual visit, delaying their surgery when applicable, and they elect to proceed.     Chief Complaint:  Right index and long fingers     Referring Provider: No ref. provider found      History of Present Illness:  Patient is a 66 y.o. right hand dominant male who presents today in follow-up of his bilateral hands.  He is now approximately 4 months status post endoscopic right carpal tunnel release.  He reports that this is doing great.  His numbness and tingling is completely resolved and he is very happy with his result there.  He is also being worked up and treated for cervical radiculopathy and has a cervical spine MRI scheduled for later this month with spine clinic followups shortly thereafter.  He is coming in today now for some symptoms in his left hand.  Otherwise no new or other complaints.     The patient is a/an retired.     Onset of symptoms/DOI was about a year ago.     Symptoms are aggravated by activity, movement and at night.     Symptoms are alleviated by rest and during the day.     Symptoms consist of pain, swelling, decreased ROM and numbness/tingling.     The patient rates their pain as a 1/10.     Attempted treatment(s) and/or interventions include activity modifications, rest     The patient denies any fevers, chills, N/V, D/C and presents for evaluation.             Past Medical History:   Diagnosis Date    Diabetes mellitus type II      Hypertension              Past Surgical History:   Procedure Laterality Date    COLONOSCOPY N/A 1/16/2020     Procedure: COLONOSCOPY;  Surgeon: Cynthia Kearney MD;  Location: [address];  Service: Endoscopy;  Laterality:  "N/A;    SHOULDER SURGERY          Review of patient's allergies indicates:  No Known Allergies  Social History          Social History Narrative    Not on file            Family History   Problem Relation Age of Onset    Bone cancer Mother      Colon cancer Father              Current Outpatient Medications:     blood sugar diagnostic Strp, To check BG 1 times daily, to use with insurance preferred meter, Disp: 100 strip, Rfl: 3    blood-glucose meter kit, To check BG 1 times daily, to use with insurance preferred meter, Disp: 1 each, Rfl: 0    gentian violet 2 % topical solution, Apply 0.5 mLs topically daily as needed. Apply between toes., Disp: 59 mL, Rfl: 1    lancets Misc, To check BG 1 times daily, to use with insurance preferred meter, Disp: 100 each, Rfl: 3    metFORMIN (GLUCOPHAGE) 1000 MG tablet, Take 1 tablet (1,000 mg total) by mouth 2 (two) times daily with meals., Disp: 180 tablet, Rfl: 3    pneumococcal 23-merritt ps (PNEUMOVAX 23) 25 mcg/0.5 mL vaccine, Inject into the muscle., Disp: 0.5 mL, Rfl: 0    rosuvastatin (CRESTOR) 10 MG tablet, Take 1 tablet (10 mg total) by mouth once daily., Disp: 90 tablet, Rfl: 3    TRULICITY 0.75 mg/0.5 mL pen injector, INJECT 0.75 MG(0.5 ML) UNDER THE SKIN EVERY 7 DAYS, Disp: 4 pen, Rfl: 11    valACYclovir (VALTREX) 500 MG tablet, Take 1 tablet (500 mg total) by mouth 2 (two) times daily. for 3 days, Disp: 6 tablet, Rfl: 6    varicella-zoster gE-AS01B, PF, (SHINGRIX, PF,) 50 mcg/0.5 mL injection, Inject into the muscle., Disp: 1 each, Rfl: 1        Review of Systems:  As per HPI otherwise noncontributory     OBJECTIVE:       Vital Signs (Most Recent):  Vitals       Vitals:     03/17/22 1038   Weight: 88 kg (194 lb)   Height: 5' 8" (1.727 m)         Body mass index is 29.5 kg/m².        Physical Exam:  Constitutional: The patient appears well-developed and well-nourished. No distress.   Skin: No lesions appreciated  Head: Normocephalic and atraumatic.   Nose: " Nose normal.   Ears: No deformities seen  Eyes: Conjunctivae and EOM are normal.   Neck: No tracheal deviation present.   Cardiovascular: Normal rate and intact distal pulses.    Pulmonary/Chest: Effort normal. No respiratory distress.   Abdominal: There is no guarding.   Neurological: The patient is alert.   Psychiatric: The patient has a normal mood and affect.      Bilateral Hand/Wrist Examination:     Observation/Inspection:  Swelling                       none                  Deformity                     none  Discoloration               none                  Scars                            endoscopic right carpal tunnel well-healed                   Atrophy                        None  Today the patient has really no tenderness to palpation at the right index and long finger A1 pulleys and there is no clicking felt with range of motion in the long finger        HAND/WRIST EXAMINATION:  Finkelstein's Test                                Neg  WHAT Test                                         Neg  Snuff box tenderness                          Neg  Osullivan's Test                                     Neg  Hook of Hamate Tenderness              Neg  CMC grind                                           Neg  Circumduction test                              Neg     Neurovascular Exam:  Digits WWP, brisk CR < 3s throughout  NVI motor/LTS to M/R/U nerves, radial pulse 2+  Tinel's Test - Carpal Tunnel                mildly positive on the left  Tinel's Test - Cubital Tunnel               negative today  Phalen's Test                                      mildly positive on the left  Median Nerve Compression Test       mildly positive on the left     ROM hand full, painless except in the right index and long fingers     ROM wrist full, painless    ROM elbow full, painless     Abdomen not guarded  Respirations nonlabored  Perfusion intact     Diagnostic Results:     Imaging - I independently viewed the patient's imaging as  well as the radiology report.  Xrays of the patient's right hand  demonstrate no evidence of any acute fractures or dislocations with mild degenerative changes.     EMG - Impression:  Abnormal examination.  There is electrodiagnostic evidence of:  1. Moderate to severe right median mononeuropathy at the wrist with predominantly demyelinating features and mild axonal features (carpal tunnel syndrome.)  2. Moderate left median mononeuropathy at the wrist with demyelinating features only (carpal tunnel syndrome.)  3. Likely an acute on chronic right C7 radiculopathy.  4. Mild ulnar neuropathy at the elbow on the left        ASSESSMENT/PLAN:       66 y.o. yo male with left  carpal and cubital tunnel syndrome  Plan: The patient and I had a thorough discussion today.  We discussed the working diagnosis as well as several other potential alternative diagnoses.  Treatment options were discussed, both conservative and surgical.  Conservative treatment options would include things such as activity modifications, workplace modifications, a period of rest, oral vs topical OTC and prescription anti-inflammatory medications, occupational therapy, splinting/bracing, immobilization, corticosteroid injections, and others.  Surgical options were discussed as well.      At this time,  the patient reports that he is likely interested in proceeding with left carpal and/or cubital tunnel surgery.  He does get occasional symptoms of cubital tunnel syndrome when he rests his elbow on the table and in flexion although his Tinel's at the left elbow was negative today.  His EMG did demonstrate some mild ulnar neuropathy at the left elbow previously, however.  Regardless, he notes that he is currently undergoing workup for cervical spine radiculopathy and would like to continue pursuing that prior to proceeding with left upper extremity surgery which I feel is reasonable.  I would like for him to follow up with me in 1 month with new x-rays of  his left hand to re-evaluate and potentially discuss surgical intervention at that time pending further workup on his neck.       Wiley Warren M.D.     · Please be aware that this note has been generated with the assistance of [email or web address] voice-to-text.  Please excuse any spelling or grammatical errors.     Thank you for choosing Dr. Wiley Warren for your orthopedic hand and upper extremity care. It is our goal to provide you with exceptional care that will help keep you healthy, active, and get you back in the game.     If you felt that you received exemplary care today, please consider leaving feedback for Dr. Wraren on [email or web address] at [email or web address].     Please do not hesitate to reach out to us via email, phone, or MyChart with any questions, concerns, or feedback.

## 2022-08-02 NOTE — PROGRESS NOTES
[facility]  History & Physical  Orthopedics     SUBJECTIVE:       COVID-19 attestation:  This patient was treated during the COVID-19 pandemic.  This was discussed with the patient, they are aware of our current policies and procedures, were given the option of delaying their visit and or switching to a virtual visit, delaying their surgery when applicable, and they elect to proceed.     Chief Complaint:  Right index and long fingers     Referring Provider: No ref. provider found      History of Present Illness:  Patient is a 66 y.o. right hand dominant male who presents today with complaints of pain and stiffness to the right index and long fingers.  This is his primary complaint today.  He notes these are associated with some pain in the palm near the A1 pulleys of the same fingers.  He notes that the long finger occasionally snaps clicks and locks.  He has done some online research and feels that he may be suffering from trigger fingers.  Secondarily he describes numbness and tingling in the right greater than left upper extremities extending primarily from the elbows into the hands.  This is bothersome while sleeping in a wakes him and he has to shake his hands out to regain sensation.  He denies any other complaints today and presents for initial evaluation.      Interval history March 17, 2022:  The patient returns today for re-evaluation.  He was recently seen by Dr. Interiano and had an EMG performed to evaluate for peripheral nerve compressive neuropathies.  He returns for these results and re-evaluation.  His trigger fingers are not bothering him a whole lot these days.  He does still have some swelling and stiffness in the right index and long fingers but no betty triggering or pulley pain at this point time.  He has been attempting nocturnal carpal tunnel splinting with minimal relief.  He returns for re-evaluation.     The patient is a/an retired.     Onset of symptoms/DOI was 2 months  ago.     Symptoms are aggravated by activity, movement and at night.     Symptoms are alleviated by rest and during the day.     Symptoms consist of pain, swelling, decreased ROM and numbness/tingling.     The patient rates their pain as a 2/10.     Attempted treatment(s) and/or interventions include activity modifications, rest     The patient denies any fevers, chills, N/V, D/C and presents for evaluation.             Past Medical History:   Diagnosis Date    Diabetes mellitus type II      Hypertension              Past Surgical History:   Procedure Laterality Date    COLONOSCOPY N/A 1/16/2020     Procedure: COLONOSCOPY;  Surgeon: Cynthia Kearney MD;  Location: [facility] ([address] [place] [address]);  Service: Endoscopy;  Laterality: N/A;    SHOULDER SURGERY          Review of patient's allergies indicates:  No Known Allergies  Social History          Social History Narrative    Not on file            Family History   Problem Relation Age of Onset    Bone cancer Mother      Colon cancer Father              Current Outpatient Medications:     blood sugar diagnostic Strp, To check BG 1 times daily, to use with insurance preferred meter, Disp: 100 strip, Rfl: 3    blood-glucose meter kit, To check BG 1 times daily, to use with insurance preferred meter, Disp: 1 each, Rfl: 0    gentian violet 2 % topical solution, Apply 0.5 mLs topically daily as needed. Apply between toes., Disp: 59 mL, Rfl: 1    lancets Misc, To check BG 1 times daily, to use with insurance preferred meter, Disp: 100 each, Rfl: 3    metFORMIN (GLUCOPHAGE) 1000 MG tablet, Take 1 tablet (1,000 mg total) by mouth 2 (two) times daily with meals., Disp: 180 tablet, Rfl: 3    pneumococcal 23-merritt ps (PNEUMOVAX 23) 25 mcg/0.5 mL vaccine, Inject into the muscle., Disp: 0.5 mL, Rfl: 0    rosuvastatin (CRESTOR) 10 MG tablet, Take 1 tablet (10 mg total) by mouth once daily., Disp: 90 tablet, Rfl: 3    TRULICITY 0.75 mg/0.5 mL pen injector, INJECT 0.75 MG(0.5  "ML) UNDER THE SKIN EVERY 7 DAYS, Disp: 4 pen, Rfl: 11    valACYclovir (VALTREX) 500 MG tablet, Take 1 tablet (500 mg total) by mouth 2 (two) times daily. for 3 days, Disp: 6 tablet, Rfl: 6    varicella-zoster gE-AS01B, PF, (SHINGRIX, PF,) 50 mcg/0.5 mL injection, Inject into the muscle., Disp: 1 each, Rfl: 1        Review of Systems:  As per HPI otherwise noncontributory     OBJECTIVE:       Vital Signs (Most Recent):  Vitals       Vitals:     03/17/22 1038   Weight: 88 kg (194 lb)   Height: 5' 8" (1.727 m)         Body mass index is 29.5 kg/m².        Physical Exam:  Constitutional: The patient appears well-developed and well-nourished. No distress.   Skin: No lesions appreciated  Head: Normocephalic and atraumatic.   Nose: Nose normal.   Ears: No deformities seen  Eyes: Conjunctivae and EOM are normal.   Neck: No tracheal deviation present.   Cardiovascular: Normal rate and intact distal pulses.    Pulmonary/Chest: Effort normal. No respiratory distress.   Abdominal: There is no guarding.   Neurological: The patient is alert.   Psychiatric: The patient has a normal mood and affect.      Bilateral Hand/Wrist Examination:     Observation/Inspection:  Swelling                       none                  Deformity                     none  Discoloration               none                  Scars                           none                  Atrophy                        None  Today the patient has really no tenderness to palpation at the right index and long finger A1 pulleys and there is no clicking felt with range of motion in the long finger        HAND/WRIST EXAMINATION:  Finkelstein's Test                                Neg  WHAT Test                                         Neg  Snuff box tenderness                          Neg  Osullivan's Test                                     Neg  Hook of Hamate Tenderness              Neg  CMC grind                                           Neg  Circumduction test          "                     Neg     Neurovascular Exam:  Digits WWP, brisk CR < 3s throughout  NVI motor/LTS to M/R/U nerves, radial pulse 2+  Tinel's Test - Carpal Tunnel                mildly positive  Tinel's Test - Cubital Tunnel               negative on the right today  Phalen's Test                                      mildly positive  Median Nerve Compression Test       mildly positive     ROM hand full, painless except in the right index and long fingers     ROM wrist full, painless    ROM elbow full, painless     Abdomen not guarded  Respirations nonlabored  Perfusion intact     Diagnostic Results:     Imaging - I independently viewed the patient's imaging as well as the radiology report.  Xrays of the patient's right hand  demonstrate no evidence of any acute fractures or dislocations with mild degenerative changes.     EMG - Impression:  Abnormal examination.  There is electrodiagnostic evidence of:  1. Moderate to severe right median mononeuropathy at the wrist with predominantly demyelinating features and mild axonal features (carpal tunnel syndrome.)  2. Moderate left median mononeuropathy at the wrist with demyelinating features only (carpal tunnel syndrome.)  3. Likely an acute on chronic right C7 radiculopathy.  4. Mild ulnar neuropathy at the elbow on the left        ASSESSMENT/PLAN:       66 y.o. yo male with right carpal tunnel syndrome  Plan: The patient and I had a thorough discussion today.  We discussed the working diagnosis as well as several other potential alternative diagnoses.  Treatment options were discussed, both conservative and surgical.  Conservative treatment options would include things such as activity modifications, workplace modifications, a period of rest, oral vs topical OTC and prescription anti-inflammatory medications, occupational therapy, splinting/bracing, immobilization, corticosteroid injections, and others.  Surgical options were discussed as well.      At this time, we  discussed referral to Spine for evaluation of cervical radiculopathy versus proceeding with a carpal tunnel release.  He declines workup for his neck at this time.  The patient would like to proceed with a right endoscopic versus open carpal tunnel release on April 1, 2022 which I feel is reasonable.  We will get this set up.     The patient has not responded to adequate non operative treatment at this time and/or non operative treatment is not indicated. Thus, the risks, benefits and alternatives to surgery were discussed with the patient in detail.  Specific risks include but are not limited to bleeding, infection, vessel and/or nerve damage, pain, numbness, tingling, complex regional pain syndrome, compartment syndrome, failure to return to pre-injury and/or preoperative functional status, scar sensitivity, delayed healing, inability to return to work, pulley injury, tendon injury, bowstringing, partial and/or incomplete relief of symptoms, weakness, persistence of and/or worsening of symptoms, surgical failure, osteomyelitis, amputation, loss of function, stiffness, functional debility, dysfunction, decreased  strength, need for prolonged postoperative rehabilitation, need for further surgery, deep venous thrombosis, pulmonary embolism, arthritis and death.  The patient states an understanding and wishes to proceed with surgery.   All questions were answered.  No guarantees were implied or stated.  Written informed consent was obtained.          Wiley Warren M.D.     · Please be aware that this note has been generated with the assistance of [employer] voice-to-text.  Please excuse any spelling or grammatical errors.     Thank you for choosing Dr. Wiley Warren for your orthopedic hand and upper extremity care. It is our goal to provide you with exceptional care that will help keep you healthy, active, and get you back in the game.     If you felt that you received exemplary care today, please consider leaving  feedback for Dr. Warren on [employer] at [email or web address].     Please do not hesitate to reach out to us via email, phone, or MyChart with any questions, concerns, or feedback.

## 2022-08-19 NOTE — PROGRESS NOTES
Established Patient - Audio Only Telehealth Visit     The patient location is: home  The chief complaint leading to consultation is: MRI results  Visit type: Virtual visit with audio only (telephone)  Total time spent with patient: 30min     The reason for the audio only service rather than synchronous audio and video virtual visit was related to technical difficulties or patient preference/necessity.     Each patient to whom I provide medical services by telemedicine is:  (1) informed of the relationship between the physician and patient and the respective role of any other health care provider with respect to management of the patient; and (2) notified that they may decline to receive medical services by telemedicine and may withdraw from such care at any time. Patient verbally consented to receive this service via voice-only telephone call.    DATE: 8/25/2022  PATIENT: Karthik Bentley    Attending Physician: Kody Marshall M.D.    HISTORY:  Karthik Bentley is a 67 y.o. male who returns to me today for MRI results.  He was last seen by me 7/28/2022.  Today he is doing well but notes significant weakness in his bilateral upper extremities. He states he often is unable to get up out of bed due to bilateral arm weakness and shoulder pain at night.  EMG (3/8/22) shows acute on chronic C7 radiculopathy and bilateral carpel tunnel syndrome.   The patient endorses myelopathic symptoms such as handwriting changes or difficulty with buttons/coins/keys. Denies perineal paresthesias, bowel/bladder dysfunction.    PMH/PSH/FamHx/SocHx:  Unchanged from prior visit    ROS:  REVIEW OF SYSTEMS:  Constitution: Negative. Negative for chills, fever and night sweats.   HENT: Negative for congestion and headaches.    Eyes: Negative for blurred vision, left vision loss and right vision loss.   Cardiovascular: Negative for chest pain and syncope.   Respiratory: Negative for cough and shortness of breath.    Endocrine: Negative for  polydipsia, polyphagia and polyuria.   Hematologic/Lymphatic: Negative for bleeding problem. Does not bruise/bleed easily.   Skin: Negative for dry skin, itching and rash.   Musculoskeletal: Negative for falls and muscle weakness.   Gastrointestinal: Negative for abdominal pain and bowel incontinence.   Allergic/Immunologic: Negative for hives and persistent infections.  Genitourinary: Negative for urinary retention/incontinence and nocturia.   Neurological: positive for disturbances in coordination, no myelopathic symptoms such as handwriting changes or difficulty with buttons, coins, keys or small objects. No loss of balance and seizures.   Psychiatric/Behavioral: Negative for depression. The patient does not have insomnia.   Denies myelopathic symptoms, perineal paresthesias, bowel or bladder incontinence    EXAM:  There were no vitals taken for this visit.    Neuro and physical exam stable.     IMAGING:    Today I personally re-reviewed AP, Lat and Flex/Ex  upright C-spine films that demonstrate moderate DDD from  C4-7. Reversal of the lordotic curve.     Lumbar MRI shows foraminal narrowing and spinal canal stenosis most notable at C4-C5 and C5-C6. Short segment cord signal abnormality at C4-C5 which is suggestive chronic compressive myelopathy.    There is no height or weight on file to calculate BMI.    Hemoglobin A1C   Date Value Ref Range Status   05/10/2022 7.5 (H) 4.0 - 5.6 % Final     Comment:     ADA Screening Guidelines:  5.7-6.4%  Consistent with prediabetes  >or=6.5%  Consistent with diabetes    High levels of fetal hemoglobin interfere with the HbA1C  assay. Heterozygous hemoglobin variants (HbS, HgC, etc)do  not significantly interfere with this assay.   However, presence of multiple variants may affect accuracy.     01/12/2022 8.0 (H) 4.0 - 5.6 % Final     Comment:     ADA Screening Guidelines:  5.7-6.4%  Consistent with prediabetes  >or=6.5%  Consistent with diabetes    High levels of fetal  hemoglobin interfere with the HbA1C  assay. Heterozygous hemoglobin variants (HbS, HgC, etc)do  not significantly interfere with this assay.   However, presence of multiple variants may affect accuracy.     08/19/2021 6.9 (H) 4.0 - 5.6 % Final     Comment:     ADA Screening Guidelines:  5.7-6.4%  Consistent with prediabetes  >or=6.5%  Consistent with diabetes    High levels of fetal hemoglobin interfere with the HbA1C  assay. Heterozygous hemoglobin variants (HbS, HgC, etc)do  not significantly interfere with this assay.   However, presence of multiple variants may affect accuracy.           ASSESSMENT/PLAN:    Diagnoses and all orders for this visit:    Cervical myelopathy    Cervical radiculopathy  -     Procedure Order to Pain Management; Future  -     gabapentin (NEURONTIN) 300 MG capsule; Take 2 capsules (600 mg total) by mouth every evening.      Today we discussed at length all of the different treatment options including anti-inflammatories, acetaminophen, rest, ice, heat, physical therapy including strengthening and stretching exercises, home exercises, ROM, aerobic conditioning, aqua therapy, other modalities including ultrasound, massage, and dry needling, epidural steroid injections and finally surgical intervention.      The patient has concerning signs of myelopathy. Cervical MRI ordered with pain management. Gabapentin dose increased nightly. I will schedule him in the clinic with Dr. Marshall to further discuss surgery.     I had a sit down discussion with the patient regarding cervical myelopathy. I discussed the known stepwise degeneration of cervical myelopathy. I discussed the risks and benefits of decompressive surgery, including the concept that surgery would be done to prevent further neurological injury/degeneration rather than to ameliorate any existing deficits.      This service was not originating from a related E/M service provided within the previous 7 days nor will  to an E/M  service or procedure within the next 24 hours or my soonest available appointment.  Prevailing standard of care was able to be met in this audio-only visit.

## 2022-08-22 ENCOUNTER — TELEPHONE (OUTPATIENT)
Dept: ORTHOPEDICS | Facility: CLINIC | Age: 67
End: 2022-08-22
Payer: MEDICARE

## 2022-08-22 NOTE — TELEPHONE ENCOUNTER
----- Message from Fox Cao sent at 8/22/2022  1:40 PM CDT -----  Regarding: AUDIO APPOINTMENT  Contact: Self  Pt ask to reschedule his appointment to right after his MRI on the same day - 8/24/2022 Pt ask for a call      Contact [employer]  [phone] (home)

## 2022-08-24 ENCOUNTER — HOSPITAL ENCOUNTER (OUTPATIENT)
Dept: RADIOLOGY | Facility: HOSPITAL | Age: 67
Discharge: HOME | End: 2022-08-24
Attending: REGISTERED NURSE
Payer: MEDICARE

## 2022-08-24 DIAGNOSIS — M50.30 DDD (DEGENERATIVE DISC DISEASE), CERVICAL: ICD-10-CM

## 2022-08-24 DIAGNOSIS — M54.12 CERVICAL RADICULOPATHY: ICD-10-CM

## 2022-08-24 PROCEDURE — 72141 MRI CERVICAL SPINE WITHOUT CONTRAST: ICD-10-PCS | Mod: 26,,, | Performed by: RADIOLOGY

## 2022-08-24 PROCEDURE — 72141 MRI NECK SPINE W/O DYE: CPT | Mod: TC

## 2022-08-24 PROCEDURE — 72141 MRI NECK SPINE W/O DYE: CPT | Mod: 26,,, | Performed by: RADIOLOGY

## 2022-08-25 ENCOUNTER — OFFICE VISIT (OUTPATIENT)
Dept: ORTHOPEDICS | Facility: CLINIC | Age: 67
End: 2022-08-25
Payer: MEDICARE

## 2022-08-25 DIAGNOSIS — G95.9 CERVICAL MYELOPATHY: Primary | ICD-10-CM

## 2022-08-25 DIAGNOSIS — M54.12 CERVICAL RADICULOPATHY: ICD-10-CM

## 2022-08-25 PROCEDURE — 1160F RVW MEDS BY RX/DR IN RCRD: CPT | Mod: CPTII,95,, | Performed by: REGISTERED NURSE

## 2022-08-25 PROCEDURE — 1160F PR REVIEW ALL MEDS BY PRESCRIBER/CLIN PHARMACIST DOCUMENTED: ICD-10-PCS | Mod: CPTII,95,, | Performed by: REGISTERED NURSE

## 2022-08-25 PROCEDURE — 3072F LOW RISK FOR RETINOPATHY: CPT | Mod: CPTII,95,, | Performed by: REGISTERED NURSE

## 2022-08-25 PROCEDURE — 3051F PR MOST RECENT HEMOGLOBIN A1C LEVEL 7.0 - < 8.0%: ICD-10-PCS | Mod: CPTII,95,, | Performed by: REGISTERED NURSE

## 2022-08-25 PROCEDURE — 1159F MED LIST DOCD IN RCRD: CPT | Mod: CPTII,95,, | Performed by: REGISTERED NURSE

## 2022-08-25 PROCEDURE — 3061F PR NEG MICROALBUMINURIA RESULT DOCUMENTED/REVIEW: ICD-10-PCS | Mod: CPTII,95,, | Performed by: REGISTERED NURSE

## 2022-08-25 PROCEDURE — 3066F NEPHROPATHY DOC TX: CPT | Mod: CPTII,95,, | Performed by: REGISTERED NURSE

## 2022-08-25 PROCEDURE — 3051F HG A1C>EQUAL 7.0%<8.0%: CPT | Mod: CPTII,95,, | Performed by: REGISTERED NURSE

## 2022-08-25 PROCEDURE — 1159F PR MEDICATION LIST DOCUMENTED IN MEDICAL RECORD: ICD-10-PCS | Mod: CPTII,95,, | Performed by: REGISTERED NURSE

## 2022-08-25 PROCEDURE — 3072F PR LOW RISK FOR RETINOPATHY: ICD-10-PCS | Mod: CPTII,95,, | Performed by: REGISTERED NURSE

## 2022-08-25 PROCEDURE — 3066F PR DOCUMENTATION OF TREATMENT FOR NEPHROPATHY: ICD-10-PCS | Mod: CPTII,95,, | Performed by: REGISTERED NURSE

## 2022-08-25 PROCEDURE — 99443 PR PHYSICIAN TELEPHONE EVALUATION 21-30 MIN: CPT | Mod: 95,,, | Performed by: REGISTERED NURSE

## 2022-08-25 PROCEDURE — 99443 PR PHYSICIAN TELEPHONE EVALUATION 21-30 MIN: ICD-10-PCS | Mod: 95,,, | Performed by: REGISTERED NURSE

## 2022-08-25 PROCEDURE — 3061F NEG MICROALBUMINURIA REV: CPT | Mod: CPTII,95,, | Performed by: REGISTERED NURSE

## 2022-08-25 RX ORDER — GABAPENTIN 300 MG/1
600 CAPSULE ORAL NIGHTLY
Qty: 60 CAPSULE | Refills: 11 | Status: ON HOLD | OUTPATIENT
Start: 2022-08-25 | End: 2022-10-31 | Stop reason: SDUPTHER

## 2022-08-26 ENCOUNTER — TELEPHONE (OUTPATIENT)
Dept: PAIN MEDICINE | Facility: OTHER | Age: 67
End: 2022-08-26
Payer: MEDICARE

## 2022-08-26 ENCOUNTER — PATIENT MESSAGE (OUTPATIENT)
Dept: ORTHOPEDICS | Facility: CLINIC | Age: 67
End: 2022-08-26
Payer: MEDICARE

## 2022-08-26 NOTE — TELEPHONE ENCOUNTER
Spoke to patient to schedule direct referral procedure. Patient has many unclear question and concerns which I tried to help him get a better understanding of. Patient was offered an opportunity to be scheduled in the [facility] Management [facility] for a consult with the performing provider before scheduling. Patient declined provider consult. He will contact the ordering provider for clarification and call me back if he decides to move forward. My direct contact information was given to the patient. Patient verbalized understanding.

## 2022-08-29 ENCOUNTER — TELEPHONE (OUTPATIENT)
Dept: ORTHOPEDICS | Facility: CLINIC | Age: 67
End: 2022-08-29
Payer: MEDICARE

## 2022-08-29 ENCOUNTER — OFFICE VISIT (OUTPATIENT)
Dept: ORTHOPEDICS | Facility: CLINIC | Age: 67
End: 2022-08-29
Payer: MEDICARE

## 2022-08-29 DIAGNOSIS — G95.9 CERVICAL MYELOPATHY: Primary | ICD-10-CM

## 2022-08-29 PROCEDURE — 3061F NEG MICROALBUMINURIA REV: CPT | Mod: CPTII,95,, | Performed by: REGISTERED NURSE

## 2022-08-29 PROCEDURE — 3051F HG A1C>EQUAL 7.0%<8.0%: CPT | Mod: CPTII,95,, | Performed by: REGISTERED NURSE

## 2022-08-29 PROCEDURE — 3072F LOW RISK FOR RETINOPATHY: CPT | Mod: CPTII,95,, | Performed by: REGISTERED NURSE

## 2022-08-29 PROCEDURE — 1159F MED LIST DOCD IN RCRD: CPT | Mod: CPTII,95,, | Performed by: REGISTERED NURSE

## 2022-08-29 PROCEDURE — 99499 UNLISTED E&M SERVICE: CPT | Mod: 95,,, | Performed by: REGISTERED NURSE

## 2022-08-29 PROCEDURE — 3072F PR LOW RISK FOR RETINOPATHY: ICD-10-PCS | Mod: CPTII,95,, | Performed by: REGISTERED NURSE

## 2022-08-29 PROCEDURE — 3066F PR DOCUMENTATION OF TREATMENT FOR NEPHROPATHY: ICD-10-PCS | Mod: CPTII,95,, | Performed by: REGISTERED NURSE

## 2022-08-29 PROCEDURE — 1159F PR MEDICATION LIST DOCUMENTED IN MEDICAL RECORD: ICD-10-PCS | Mod: CPTII,95,, | Performed by: REGISTERED NURSE

## 2022-08-29 PROCEDURE — 3061F PR NEG MICROALBUMINURIA RESULT DOCUMENTED/REVIEW: ICD-10-PCS | Mod: CPTII,95,, | Performed by: REGISTERED NURSE

## 2022-08-29 PROCEDURE — 3066F NEPHROPATHY DOC TX: CPT | Mod: CPTII,95,, | Performed by: REGISTERED NURSE

## 2022-08-29 PROCEDURE — 99499 NO LOS: ICD-10-PCS | Mod: 95,,, | Performed by: REGISTERED NURSE

## 2022-08-29 PROCEDURE — 3051F PR MOST RECENT HEMOGLOBIN A1C LEVEL 7.0 - < 8.0%: ICD-10-PCS | Mod: CPTII,95,, | Performed by: REGISTERED NURSE

## 2022-08-29 PROCEDURE — 1160F PR REVIEW ALL MEDS BY PRESCRIBER/CLIN PHARMACIST DOCUMENTED: ICD-10-PCS | Mod: CPTII,95,, | Performed by: REGISTERED NURSE

## 2022-08-29 PROCEDURE — 1160F RVW MEDS BY RX/DR IN RCRD: CPT | Mod: CPTII,95,, | Performed by: REGISTERED NURSE

## 2022-08-29 NOTE — PROGRESS NOTES
Established Patient - Audio Only Telehealth Visit     The patient location is: home  The chief complaint leading to consultation is: EDITH  Visit type: Virtual visit with audio only (telephone)  Total time spent with patient: 10min    The reason for the audio only service rather than synchronous audio and video virtual visit was related to technical difficulties or patient preference/necessity.     Each patient to whom I provide medical services by telemedicine is:  (1) informed of the relationship between the physician and patient and the respective role of any other health care provider with respect to management of the patient; and (2) notified that they may decline to receive medical services by telemedicine and may withdraw from such care at any time. Patient verbally consented to receive this service via voice-only telephone call.    DATE: 8/29/2022  PATIENT: Karthik Bentley    Attending Physician: Kody Marshall M.D.    HISTORY:  Karthik Bentley is a 67 y.o. male who returns to me today for follow up.  He was last seen by me 8/25/2022.  Today he is doing well but notes continued neck and bilateral arm pain.    The Patient denies myelopathic symptoms such as handwriting changes or difficulty with buttons/coins/keys. Denies perineal paresthesias, bowel/bladder dysfunction.    PMH/PSH/FamHx/SocHx:  Unchanged from prior visit    ROS:  REVIEW OF SYSTEMS:  Constitution: Negative. Negative for chills, fever and night sweats.   HENT: Negative for congestion and headaches.    Eyes: Negative for blurred vision, left vision loss and right vision loss.   Cardiovascular: Negative for chest pain and syncope.   Respiratory: Negative for cough and shortness of breath.    Endocrine: Negative for polydipsia, polyphagia and polyuria.   Hematologic/Lymphatic: Negative for bleeding problem. Does not bruise/bleed easily.   Skin: Negative for dry skin, itching and rash.   Musculoskeletal: Negative for falls and muscle weakness.    Gastrointestinal: Negative for abdominal pain and bowel incontinence.   Allergic/Immunologic: Negative for hives and persistent infections.  Genitourinary: Negative for urinary retention/incontinence and nocturia.   Neurological: positive for disturbances in coordination, no myelopathic symptoms such as handwriting changes or difficulty with buttons, coins, keys or small objects. No loss of balance and seizures.   Psychiatric/Behavioral: Negative for depression. The patient does not have insomnia.   Denies myelopathic symptoms, perineal paresthesias, bowel or bladder incontinence    EXAM:  There were no vitals taken for this visit.    Neuro and physical exam stable.      IMAGING:     Today I personally re-reviewed AP, Lat and Flex/Ex  upright C-spine films that demonstrate moderate DDD from  C4-7. Reversal of the lordotic curve.      Lumbar MRI shows foraminal narrowing and spinal canal stenosis most notable at C4-C5 and C5-C6. Short segment cord signal abnormality at C4-C5 which is suggestive chronic compressive myelopathy.    There is no height or weight on file to calculate BMI.    Hemoglobin A1C   Date Value Ref Range Status   05/10/2022 7.5 (H) 4.0 - 5.6 % Final     Comment:     ADA Screening Guidelines:  5.7-6.4%  Consistent with prediabetes  >or=6.5%  Consistent with diabetes    High levels of fetal hemoglobin interfere with the HbA1C  assay. Heterozygous hemoglobin variants (HbS, HgC, etc)do  not significantly interfere with this assay.   However, presence of multiple variants may affect accuracy.     01/12/2022 8.0 (H) 4.0 - 5.6 % Final     Comment:     ADA Screening Guidelines:  5.7-6.4%  Consistent with prediabetes  >or=6.5%  Consistent with diabetes    High levels of fetal hemoglobin interfere with the HbA1C  assay. Heterozygous hemoglobin variants (HbS, HgC, etc)do  not significantly interfere with this assay.   However, presence of multiple variants may affect accuracy.     08/19/2021 6.9 (H) 4.0 -  5.6 % Final     Comment:     ADA Screening Guidelines:  5.7-6.4%  Consistent with prediabetes  >or=6.5%  Consistent with diabetes    High levels of fetal hemoglobin interfere with the HbA1C  assay. Heterozygous hemoglobin variants (HbS, HgC, etc)do  not significantly interfere with this assay.   However, presence of multiple variants may affect accuracy.           ASSESSMENT/PLAN:    Diagnoses and all orders for this visit:    Cervical myelopathy      Today we discussed at length all of the different treatment options including anti-inflammatories, acetaminophen, rest, ice, heat, physical therapy including strengthening and stretching exercises, home exercises, ROM, aerobic conditioning, aqua therapy, other modalities including ultrasound, massage, and dry needling, epidural steroid injections and finally surgical intervention.    The patient will be scheduled for a cervical EDITH at [facility].     This service was not originating from a related E/M service provided within the previous 7 days nor will  to an E/M service or procedure within the next 24 hours or my soonest available appointment.  Prevailing standard of care was able to be met in this audio-only visit.

## 2022-08-29 NOTE — TELEPHONE ENCOUNTER
Spoke with patient and he just wants pain management to call him back to schedule, gave patient number to call [facility] to schedule appointment. He agreed verbally.

## 2022-08-29 NOTE — TELEPHONE ENCOUNTER
----- Message from Matilde Hogan sent at 8/29/2022 10:46 AM CDT -----  Regarding: Procedure question  Contact: [phone]  Patient Ray is calling. Patient spoke with Bernadette Connelly in ref to his injection being scheduled at [facility]. Patient has questions. Please call patient at [phone]    Thank You

## 2022-08-29 NOTE — H&P (VIEW-ONLY)
Established Patient - Audio Only Telehealth Visit     The patient location is: home  The chief complaint leading to consultation is: EDITH  Visit type: Virtual visit with audio only (telephone)  Total time spent with patient: 10min    The reason for the audio only service rather than synchronous audio and video virtual visit was related to technical difficulties or patient preference/necessity.     Each patient to whom I provide medical services by telemedicine is:  (1) informed of the relationship between the physician and patient and the respective role of any other health care provider with respect to management of the patient; and (2) notified that they may decline to receive medical services by telemedicine and may withdraw from such care at any time. Patient verbally consented to receive this service via voice-only telephone call.    DATE: 8/29/2022  PATIENT: Karthik Bentley    Attending Physician: Kody Marshall M.D.    HISTORY:  Karthik Bentley is a 67 y.o. male who returns to me today for follow up.  He was last seen by me 8/25/2022.  Today he is doing well but notes continued neck and bilateral arm pain.    The Patient denies myelopathic symptoms such as handwriting changes or difficulty with buttons/coins/keys. Denies perineal paresthesias, bowel/bladder dysfunction.    PMH/PSH/FamHx/SocHx:  Unchanged from prior visit    ROS:  REVIEW OF SYSTEMS:  Constitution: Negative. Negative for chills, fever and night sweats.   HENT: Negative for congestion and headaches.    Eyes: Negative for blurred vision, left vision loss and right vision loss.   Cardiovascular: Negative for chest pain and syncope.   Respiratory: Negative for cough and shortness of breath.    Endocrine: Negative for polydipsia, polyphagia and polyuria.   Hematologic/Lymphatic: Negative for bleeding problem. Does not bruise/bleed easily.   Skin: Negative for dry skin, itching and rash.   Musculoskeletal: Negative for falls and muscle weakness.    Gastrointestinal: Negative for abdominal pain and bowel incontinence.   Allergic/Immunologic: Negative for hives and persistent infections.  Genitourinary: Negative for urinary retention/incontinence and nocturia.   Neurological: positive for disturbances in coordination, no myelopathic symptoms such as handwriting changes or difficulty with buttons, coins, keys or small objects. No loss of balance and seizures.   Psychiatric/Behavioral: Negative for depression. The patient does not have insomnia.   Denies myelopathic symptoms, perineal paresthesias, bowel or bladder incontinence    EXAM:  There were no vitals taken for this visit.    Neuro and physical exam stable.      IMAGING:     Today I personally re-reviewed AP, Lat and Flex/Ex  upright C-spine films that demonstrate moderate DDD from  C4-7. Reversal of the lordotic curve.      Lumbar MRI shows foraminal narrowing and spinal canal stenosis most notable at C4-C5 and C5-C6. Short segment cord signal abnormality at C4-C5 which is suggestive chronic compressive myelopathy.    There is no height or weight on file to calculate BMI.    Hemoglobin A1C   Date Value Ref Range Status   05/10/2022 7.5 (H) 4.0 - 5.6 % Final     Comment:     ADA Screening Guidelines:  5.7-6.4%  Consistent with prediabetes  >or=6.5%  Consistent with diabetes    High levels of fetal hemoglobin interfere with the HbA1C  assay. Heterozygous hemoglobin variants (HbS, HgC, etc)do  not significantly interfere with this assay.   However, presence of multiple variants may affect accuracy.     01/12/2022 8.0 (H) 4.0 - 5.6 % Final     Comment:     ADA Screening Guidelines:  5.7-6.4%  Consistent with prediabetes  >or=6.5%  Consistent with diabetes    High levels of fetal hemoglobin interfere with the HbA1C  assay. Heterozygous hemoglobin variants (HbS, HgC, etc)do  not significantly interfere with this assay.   However, presence of multiple variants may affect accuracy.     08/19/2021 6.9 (H) 4.0 -  5.6 % Final     Comment:     ADA Screening Guidelines:  5.7-6.4%  Consistent with prediabetes  >or=6.5%  Consistent with diabetes    High levels of fetal hemoglobin interfere with the HbA1C  assay. Heterozygous hemoglobin variants (HbS, HgC, etc)do  not significantly interfere with this assay.   However, presence of multiple variants may affect accuracy.           ASSESSMENT/PLAN:    Diagnoses and all orders for this visit:    Cervical myelopathy      Today we discussed at length all of the different treatment options including anti-inflammatories, acetaminophen, rest, ice, heat, physical therapy including strengthening and stretching exercises, home exercises, ROM, aerobic conditioning, aqua therapy, other modalities including ultrasound, massage, and dry needling, epidural steroid injections and finally surgical intervention.    The patient will be scheduled for a cervical EDITH at [facility].     This service was not originating from a related E/M service provided within the previous 7 days nor will  to an E/M service or procedure within the next 24 hours or my soonest available appointment.  Prevailing standard of care was able to be met in this audio-only visit.

## 2022-08-29 NOTE — TELEPHONE ENCOUNTER
----- Message from Livia Tellez LPN sent at 8/29/2022 12:54 PM CDT -----  Regarding: FW: Procedure question  Contact: [phone]    ----- Message -----  From: Matilde Hogan  Sent: 8/29/2022  10:54 AM CDT  To: Aggie Ashraf LPN, Joanna Gates Staff, #  Subject: Procedure question                               Patient Ray is calling. Patient spoke with Bernadette Connelly in ref to his injection being scheduled at [facility]. Patient has questions. Please call patient at [phone]    Thank You

## 2022-08-31 ENCOUNTER — PATIENT MESSAGE (OUTPATIENT)
Dept: ORTHOPEDICS | Facility: CLINIC | Age: 67
End: 2022-08-31
Payer: MEDICARE

## 2022-08-31 ENCOUNTER — TELEPHONE (OUTPATIENT)
Dept: PAIN MEDICINE | Facility: OTHER | Age: 67
End: 2022-08-31
Payer: MEDICARE

## 2022-08-31 ENCOUNTER — PATIENT MESSAGE (OUTPATIENT)
Dept: PAIN MEDICINE | Facility: OTHER | Age: 67
End: 2022-08-31
Payer: MEDICARE

## 2022-08-31 ENCOUNTER — TELEPHONE (OUTPATIENT)
Dept: ORTHOPEDICS | Facility: CLINIC | Age: 67
End: 2022-08-31
Payer: MEDICARE

## 2022-08-31 DIAGNOSIS — M54.12 CERVICAL RADICULOPATHY: Primary | ICD-10-CM

## 2022-08-31 NOTE — TELEPHONE ENCOUNTER
Left message for patient informing him that he would have to call [facility] to talk about change in appointment for injection.

## 2022-08-31 NOTE — TELEPHONE ENCOUNTER
Spoke to patient to schedule direct referral procedure. Patient is scheduled on 9/6/22 at 2:00 PM with Dr. Taylor. Patient was offered an opportunity to be scheduled in the Pain Management Clinic for a consult with the performing provider before scheduling. Patient declined provider consult. Date, time, and instructions for procedure given to patient. Patient verbalized understanding.

## 2022-08-31 NOTE — TELEPHONE ENCOUNTER
----- Message from Seamus Lazaro sent at 8/31/2022 11:11 AM CDT -----  Regarding: PT WOULD LIKE TO SPEAK WITH STAFF REAGRDING INJECTION  Contact: PT  Pt's requesting a call regarding upcoming appt.. Pt wants to be seen sooner..     Confirmed contact info below:  Contact Name: Karthik Bentley  Phone Number: [phone]

## 2022-09-06 ENCOUNTER — HOSPITAL ENCOUNTER (OUTPATIENT)
Facility: OTHER | Age: 67
Discharge: HOME | End: 2022-09-06
Attending: ANESTHESIOLOGY | Admitting: ANESTHESIOLOGY
Payer: MEDICARE

## 2022-09-06 VITALS
WEIGHT: 188 LBS | SYSTOLIC BLOOD PRESSURE: 162 MMHG | RESPIRATION RATE: 16 BRPM | HEART RATE: 69 BPM | HEIGHT: 68 IN | BODY MASS INDEX: 28.49 KG/M2 | OXYGEN SATURATION: 100 % | TEMPERATURE: 97 F | DIASTOLIC BLOOD PRESSURE: 77 MMHG

## 2022-09-06 DIAGNOSIS — M50.30 DDD (DEGENERATIVE DISC DISEASE), CERVICAL: ICD-10-CM

## 2022-09-06 DIAGNOSIS — G89.29 CHRONIC PAIN: ICD-10-CM

## 2022-09-06 DIAGNOSIS — M54.12 CERVICAL RADICULOPATHY: Primary | ICD-10-CM

## 2022-09-06 LAB — POCT GLUCOSE: 153 MG/DL (ref 70–110)

## 2022-09-06 PROCEDURE — 63600175 PHARM REV CODE 636 W HCPCS: Performed by: ANESTHESIOLOGY

## 2022-09-06 PROCEDURE — 25000003 PHARM REV CODE 250: Performed by: ANESTHESIOLOGY

## 2022-09-06 PROCEDURE — 82947 ASSAY GLUCOSE BLOOD QUANT: CPT | Performed by: ANESTHESIOLOGY

## 2022-09-06 PROCEDURE — 62321 NJX INTERLAMINAR CRV/THRC: CPT | Performed by: ANESTHESIOLOGY

## 2022-09-06 PROCEDURE — 62321 PR INJ CERV/THORAC, W/GUIDANCE: ICD-10-PCS | Mod: ,,, | Performed by: ANESTHESIOLOGY

## 2022-09-06 PROCEDURE — 62321 NJX INTERLAMINAR CRV/THRC: CPT | Mod: ,,, | Performed by: ANESTHESIOLOGY

## 2022-09-06 PROCEDURE — 25500020 PHARM REV CODE 255: Performed by: ANESTHESIOLOGY

## 2022-09-06 PROCEDURE — A4216 STERILE WATER/SALINE, 10 ML: HCPCS | Performed by: ANESTHESIOLOGY

## 2022-09-06 RX ORDER — SODIUM CHLORIDE 9 MG/ML
INJECTION, SOLUTION INTRAMUSCULAR; INTRAVENOUS; SUBCUTANEOUS
Status: DISCONTINUED | OUTPATIENT
Start: 2022-09-06 | End: 2022-09-06 | Stop reason: HOSPADM

## 2022-09-06 RX ORDER — LIDOCAINE HYDROCHLORIDE 20 MG/ML
INJECTION, SOLUTION INFILTRATION; PERINEURAL
Status: DISCONTINUED | OUTPATIENT
Start: 2022-09-06 | End: 2022-09-06 | Stop reason: HOSPADM

## 2022-09-06 RX ORDER — LIDOCAINE HYDROCHLORIDE 10 MG/ML
INJECTION, SOLUTION EPIDURAL; INFILTRATION; INTRACAUDAL; PERINEURAL
Status: DISCONTINUED | OUTPATIENT
Start: 2022-09-06 | End: 2022-09-06 | Stop reason: HOSPADM

## 2022-09-06 RX ORDER — DEXAMETHASONE SODIUM PHOSPHATE 10 MG/ML
INJECTION INTRAMUSCULAR; INTRAVENOUS
Status: DISCONTINUED | OUTPATIENT
Start: 2022-09-06 | End: 2022-09-06 | Stop reason: HOSPADM

## 2022-09-06 RX ORDER — SODIUM CHLORIDE 9 MG/ML
500 INJECTION, SOLUTION INTRAVENOUS CONTINUOUS
Status: DISCONTINUED | OUTPATIENT
Start: 2022-09-06 | End: 2022-09-06 | Stop reason: HOSPADM

## 2022-09-06 NOTE — DISCHARGE SUMMARY
[facility] - [facility] ([facility])  Discharge Note  Short Stay    Procedure(s) (LRB):  CERVICAL EDITH CONTRAST DIRECT REFERRAL (N/A)    OUTCOME: Patient tolerated treatment/procedure well without complication and is now ready for discharge.    DISPOSITION: Home or Self Care    FINAL DIAGNOSIS:  Cervical Radiculopathy    FOLLOWUP: In clinic    DISCHARGE INSTRUCTIONS:  No discharge procedures on file.     TIME SPENT ON DISCHARGE: 2 minutes

## 2022-09-06 NOTE — DISCHARGE INSTRUCTIONS

## 2022-09-06 NOTE — OP NOTE
Cervical Interlaminar Epidural Steroid Injection under Fluoroscopic Guidance    The procedure, risks, benefits, and options were discussed with the patient. There are no contraindications to the procedure. The patent expressed understanding and agreed to the procedure. Informed written consent was obtained prior to the start of the procedure and can be found in the patient's chart.     PATIENT NAME: Karthik Bentley   MRN: [id number]     DATE OF PROCEDURE: 09/06/2022    PROCEDURE: Cervical Interlaminar Epidural Steroid Injection C7/T1 under Fluoroscopic Guidance    PRE-OP DIAGNOSIS: Cervical radiculopathy [M54.12] Cervical radiculopathy [M54.12]    POST-OP DIAGNOSIS: Same    PHYSICIAN: Yasmeen Taylor MD     ASSISTANTS: Dr. Genao    MEDICATIONS INJECTED: Preservative-free Decadron 10mg with 1cc of Lidocaine 1% MPF and preservative free normal saline    LOCAL ANESTHETIC INJECTED: Xylocaine 2%     SEDATION: None    ESTIMATED BLOOD LOSS: None    COMPLICATIONS: None    TECHNIQUE: Time-out was performed to identify the patient and procedure to be performed. With the patient laying in a prone position, the surgical area was prepped and draped in the usual sterile fashion using ChloraPrep and a fenestrated drape. The level was determined under fluoroscopy guidance. Skin anesthesia was achieved by injecting Lidocaine 2% over the injection site.  The interlaminar space was then approached with a 20 gauge, 3.5 inch Tuohy needle that was introduced under fluoroscopic guidance with AP, lateral and/or contralateral oblique imaging. Once the Ligamentum flavum was encountered loss of resistance to air was used to enter the epidural space. With positive loss of resistance and negative aspiration for CSF or Blood, contrast dye  Omnipaque (240mg/mL) was injected to confirm placement and there was no vascular runoff. Then 4 mL of the medication mixture listed above was then injected slowly. Displacement of the radio opaque contrast after  injection of the medication confirmed that the medication went into the area of the epidural space. The needles were removed, and bleeding was nil. A sterile dressing was applied. No specimens collected. The patient tolerated the procedure well.       The patient was monitored after the procedure in the recovery area. They were given post-procedure and discharge instructions to follow at home. The patient was discharged in a stable condition.    Yasmeen Taylor MD

## 2022-09-06 NOTE — INTERVAL H&P NOTE
The patient has been examined and the H&P has been reviewed:    I concur with the findings and no changes have occurred since H&P was written.  No blood thinners or infections.    Procedure risks, benefits and alternative options discussed and understood by patient/family.          There are no hospital problems to display for this patient.

## 2022-09-07 ENCOUNTER — PATIENT MESSAGE (OUTPATIENT)
Dept: PAIN MEDICINE | Facility: OTHER | Age: 67
End: 2022-09-07
Payer: MEDICARE

## 2022-09-12 ENCOUNTER — PATIENT MESSAGE (OUTPATIENT)
Dept: ORTHOPEDICS | Facility: CLINIC | Age: 67
End: 2022-09-12
Payer: MEDICARE

## 2022-09-12 DIAGNOSIS — M79.641 RIGHT HAND PAIN: Primary | ICD-10-CM

## 2022-09-13 ENCOUNTER — TELEPHONE (OUTPATIENT)
Dept: OPHTHALMOLOGY | Facility: CLINIC | Age: 67
End: 2022-09-13
Payer: MEDICARE

## 2022-09-13 ENCOUNTER — OFFICE VISIT (OUTPATIENT)
Dept: ORTHOPEDICS | Facility: CLINIC | Age: 67
End: 2022-09-13
Payer: MEDICARE

## 2022-09-13 ENCOUNTER — TELEPHONE (OUTPATIENT)
Dept: OPTOMETRY | Facility: CLINIC | Age: 67
End: 2022-09-13
Payer: MEDICARE

## 2022-09-13 ENCOUNTER — HOSPITAL ENCOUNTER (OUTPATIENT)
Dept: RADIOLOGY | Facility: HOSPITAL | Age: 67
Discharge: HOME | End: 2022-09-13
Attending: ORTHOPAEDIC SURGERY
Payer: MEDICARE

## 2022-09-13 VITALS — HEIGHT: 68 IN | BODY MASS INDEX: 28.49 KG/M2 | WEIGHT: 188 LBS

## 2022-09-13 DIAGNOSIS — M10.9 GOUTY ARTHRITIS OF LEFT HAND: Primary | ICD-10-CM

## 2022-09-13 DIAGNOSIS — M25.642 STIFFNESS OF LEFT HAND JOINT: ICD-10-CM

## 2022-09-13 DIAGNOSIS — M79.642 LEFT HAND PAIN: ICD-10-CM

## 2022-09-13 DIAGNOSIS — M79.642 LEFT HAND PAIN: Primary | ICD-10-CM

## 2022-09-13 PROCEDURE — 73130 X-RAY EXAM OF HAND: CPT | Mod: 26,LT,, | Performed by: RADIOLOGY

## 2022-09-13 PROCEDURE — 73130 XR HAND COMPLETE 3 VIEW LEFT: ICD-10-PCS | Mod: 26,LT,, | Performed by: RADIOLOGY

## 2022-09-13 PROCEDURE — 73130 X-RAY EXAM OF HAND: CPT | Mod: TC,LT

## 2022-09-13 PROCEDURE — 1159F PR MEDICATION LIST DOCUMENTED IN MEDICAL RECORD: ICD-10-PCS | Mod: CPTII,S$GLB,, | Performed by: ORTHOPAEDIC SURGERY

## 2022-09-13 PROCEDURE — 1159F MED LIST DOCD IN RCRD: CPT | Mod: CPTII,S$GLB,, | Performed by: ORTHOPAEDIC SURGERY

## 2022-09-13 PROCEDURE — 99999 PR PBB SHADOW E&M-EST. PATIENT-LVL IV: CPT | Mod: PBBFAC,,, | Performed by: ORTHOPAEDIC SURGERY

## 2022-09-13 PROCEDURE — 3051F PR MOST RECENT HEMOGLOBIN A1C LEVEL 7.0 - < 8.0%: ICD-10-PCS | Mod: CPTII,S$GLB,, | Performed by: ORTHOPAEDIC SURGERY

## 2022-09-13 PROCEDURE — 3061F PR NEG MICROALBUMINURIA RESULT DOCUMENTED/REVIEW: ICD-10-PCS | Mod: CPTII,S$GLB,, | Performed by: ORTHOPAEDIC SURGERY

## 2022-09-13 PROCEDURE — 99214 PR OFFICE/OUTPT VISIT, EST, LEVL IV, 30-39 MIN: ICD-10-PCS | Mod: S$GLB,,, | Performed by: ORTHOPAEDIC SURGERY

## 2022-09-13 PROCEDURE — 1101F PR PT FALLS ASSESS DOC 0-1 FALLS W/OUT INJ PAST YR: ICD-10-PCS | Mod: CPTII,S$GLB,, | Performed by: ORTHOPAEDIC SURGERY

## 2022-09-13 PROCEDURE — 3072F LOW RISK FOR RETINOPATHY: CPT | Mod: CPTII,S$GLB,, | Performed by: ORTHOPAEDIC SURGERY

## 2022-09-13 PROCEDURE — 3288F FALL RISK ASSESSMENT DOCD: CPT | Mod: CPTII,S$GLB,, | Performed by: ORTHOPAEDIC SURGERY

## 2022-09-13 PROCEDURE — 3066F NEPHROPATHY DOC TX: CPT | Mod: CPTII,S$GLB,, | Performed by: ORTHOPAEDIC SURGERY

## 2022-09-13 PROCEDURE — 99999 PR PBB SHADOW E&M-EST. PATIENT-LVL IV: ICD-10-PCS | Mod: PBBFAC,,, | Performed by: ORTHOPAEDIC SURGERY

## 2022-09-13 PROCEDURE — 3072F PR LOW RISK FOR RETINOPATHY: ICD-10-PCS | Mod: CPTII,S$GLB,, | Performed by: ORTHOPAEDIC SURGERY

## 2022-09-13 PROCEDURE — 3066F PR DOCUMENTATION OF TREATMENT FOR NEPHROPATHY: ICD-10-PCS | Mod: CPTII,S$GLB,, | Performed by: ORTHOPAEDIC SURGERY

## 2022-09-13 PROCEDURE — 1125F AMNT PAIN NOTED PAIN PRSNT: CPT | Mod: CPTII,S$GLB,, | Performed by: ORTHOPAEDIC SURGERY

## 2022-09-13 PROCEDURE — 99214 OFFICE O/P EST MOD 30 MIN: CPT | Mod: S$GLB,,, | Performed by: ORTHOPAEDIC SURGERY

## 2022-09-13 PROCEDURE — 1101F PT FALLS ASSESS-DOCD LE1/YR: CPT | Mod: CPTII,S$GLB,, | Performed by: ORTHOPAEDIC SURGERY

## 2022-09-13 PROCEDURE — 3051F HG A1C>EQUAL 7.0%<8.0%: CPT | Mod: CPTII,S$GLB,, | Performed by: ORTHOPAEDIC SURGERY

## 2022-09-13 PROCEDURE — 3288F PR FALLS RISK ASSESSMENT DOCUMENTED: ICD-10-PCS | Mod: CPTII,S$GLB,, | Performed by: ORTHOPAEDIC SURGERY

## 2022-09-13 PROCEDURE — 3008F BODY MASS INDEX DOCD: CPT | Mod: CPTII,S$GLB,, | Performed by: ORTHOPAEDIC SURGERY

## 2022-09-13 PROCEDURE — 1125F PR PAIN SEVERITY QUANTIFIED, PAIN PRESENT: ICD-10-PCS | Mod: CPTII,S$GLB,, | Performed by: ORTHOPAEDIC SURGERY

## 2022-09-13 PROCEDURE — 3061F NEG MICROALBUMINURIA REV: CPT | Mod: CPTII,S$GLB,, | Performed by: ORTHOPAEDIC SURGERY

## 2022-09-13 PROCEDURE — 3008F PR BODY MASS INDEX (BMI) DOCUMENTED: ICD-10-PCS | Mod: CPTII,S$GLB,, | Performed by: ORTHOPAEDIC SURGERY

## 2022-09-13 NOTE — TELEPHONE ENCOUNTER
Spoke to patient no appts available today or tomorrow with Dr. Massey. Patient recently moved to the  sent a message to [facility] to see if they have someone who can see the patient.

## 2022-09-13 NOTE — PROGRESS NOTES
[facility]  History & Physical  Orthopedics     SUBJECTIVE:       COVID-19 attestation:  This patient was treated during the COVID-19 pandemic.  This was discussed with the patient, they are aware of our current policies and procedures, were given the option of delaying their visit and or switching to a virtual visit, delaying their surgery when applicable, and they elect to proceed.     Chief Complaint:  Right index and long fingers     Referring Provider: No ref. provider found      History of Present Illness:  Patient is a 66 y.o. right hand dominant male who presents today in follow-up of his bilateral hands.  He is now approximately 4 months status post endoscopic right carpal tunnel release.  He reports that this is doing great.  His numbness and tingling is completely resolved and he is very happy with his result there.  He is also being worked up and treated for cervical radiculopathy and has a cervical spine MRI scheduled for later this month with spine clinic followups shortly thereafter.  He is coming in today now for some symptoms in his left hand.  Otherwise no new or other complaints.    Interval history September 13, 2022: The patient returns today for re-evaluation.  He notes that he has been found to have significant cervical spinal pathology for which he has been treated conservatively thus far including some cervical EDITH is a.  He has an upcoming appoint with Dr. Maricruz Bolden to discuss potential surgical options per his report.  In regards to his hands, he notes that he continues to do very well status post right endoscopic carpal tunnel release.  His left hand is really giving him significant dysfunction, however.  He notes that he cannot make a fist and really has no functional usage of the left hand.  He does report a remote history of gout but is not currently under the care of any treating provider for this condition.  He reports severe stiffness pain and numbness in the  left hand presents today for re-evaluation by myself with no other complaints     The patient is a/an retired.     Onset of symptoms/DOI was about a year ago.     Symptoms are aggravated by activity, movement and at night.     Symptoms are alleviated by rest and during the day.     Symptoms consist of pain, swelling, decreased ROM and numbness/tingling.     The patient rates their pain as a 1/10.     Attempted treatment(s) and/or interventions include activity modifications, rest     The patient denies any fevers, chills, N/V, D/C and presents for evaluation.             Past Medical History:   Diagnosis Date    Diabetes mellitus type II      Hypertension              Past Surgical History:   Procedure Laterality Date    COLONOSCOPY N/A 1/16/2020     Procedure: COLONOSCOPY;  Surgeon: Cynthia Kearney MD;  Location: [address]);  Service: Endoscopy;  Laterality: N/A;    SHOULDER SURGERY          Review of patient's allergies indicates:  No Known Allergies  Social History          Social History Narrative    Not on file            Family History   Problem Relation Age of Onset    Bone cancer Mother      Colon cancer Father              Current Outpatient Medications:     blood sugar diagnostic Strp, To check BG 1 times daily, to use with insurance preferred meter, Disp: 100 strip, Rfl: 3    blood-glucose meter kit, To check BG 1 times daily, to use with insurance preferred meter, Disp: 1 each, Rfl: 0    gentian violet 2 % topical solution, Apply 0.5 mLs topically daily as needed. Apply between toes., Disp: 59 mL, Rfl: 1    lancets Misc, To check BG 1 times daily, to use with insurance preferred meter, Disp: 100 each, Rfl: 3    metFORMIN (GLUCOPHAGE) 1000 MG tablet, Take 1 tablet (1,000 mg total) by mouth 2 (two) times daily with meals., Disp: 180 tablet, Rfl: 3    pneumococcal 23-merritt ps (PNEUMOVAX 23) 25 mcg/0.5 mL vaccine, Inject into the muscle., Disp: 0.5 mL, Rfl: 0    rosuvastatin (CRESTOR) 10 MG  "tablet, Take 1 tablet (10 mg total) by mouth once daily., Disp: 90 tablet, Rfl: 3    TRULICITY 0.75 mg/0.5 mL pen injector, INJECT 0.75 MG(0.5 ML) UNDER THE SKIN EVERY 7 DAYS, Disp: 4 pen, Rfl: 11    valACYclovir (VALTREX) 500 MG tablet, Take 1 tablet (500 mg total) by mouth 2 (two) times daily. for 3 days, Disp: 6 tablet, Rfl: 6    varicella-zoster gE-AS01B, PF, (SHINGRIX, PF,) 50 mcg/0.5 mL injection, Inject into the muscle., Disp: 1 each, Rfl: 1        Review of Systems:  As per HPI otherwise noncontributory     OBJECTIVE:       Vital Signs (Most Recent):  Vitals       Vitals:     03/17/22 1038   Weight: 88 kg (194 lb)   Height: 5' 8" (1.727 m)         Body mass index is 29.5 kg/m².        Physical Exam:  Constitutional: The patient appears well-developed and well-nourished. No distress.   Skin: No lesions appreciated  Head: Normocephalic and atraumatic.   Nose: Nose normal.   Ears: No deformities seen  Eyes: Conjunctivae and EOM are normal.   Neck: No tracheal deviation present.   Cardiovascular: Normal rate and intact distal pulses.    Pulmonary/Chest: Effort normal. No respiratory distress.   Abdominal: There is no guarding.   Neurological: The patient is alert.   Psychiatric: The patient has a normal mood and affect.      Bilateral Hand/Wrist Examination:     Observation/Inspection:  Swelling                       none                  Deformity                     none  Discoloration               none                  Scars                            endoscopic right carpal tunnel well-healed                   Atrophy                        None  Patient with severe tenderness palpation left hand A1 pulleys of all digits        HAND/WRIST EXAMINATION:  Finkelstein's Test                                Neg  WHAT Test                                         Neg  Snuff box tenderness                          Neg  Osullivan's Test                                     Neg  Hook of Hamate Tenderness              " Neg  CMC grind                                           Neg  Circumduction test                              Neg     Neurovascular Exam:  Digits WWP, brisk CR < 3s throughout  NVI motor/LTS to M/R/U nerves, radial pulse 2+  Tinel's Test - Carpal Tunnel                positive on the left  Tinel's Test - Cubital Tunnel               negative today  Phalen's Test                                      positive on the left  Median Nerve Compression Test       positive on the left     ROM right hand is full, left hand range of motion is severely restricted.  The patient is unable to make a fist.  He has nearly full range of motion at the MP joints but very minimal range of motion at his IPs.     ROM wrist full, painless    ROM elbow full, painless     Abdomen not guarded  Respirations nonlabored  Perfusion intact     Diagnostic Results:     Imaging - I independently viewed the patient's imaging as well as the radiology report.  Xrays of the patient's right hand  demonstrate no evidence of any acute fractures or dislocations with mild degenerative changes.     EMG - Impression:  Abnormal examination.  There is electrodiagnostic evidence of:  Moderate to severe right median mononeuropathy at the wrist with predominantly demyelinating features and mild axonal features (carpal tunnel syndrome.)  Moderate left median mononeuropathy at the wrist with demyelinating features only (carpal tunnel syndrome.)  Likely an acute on chronic right C7 radiculopathy.  Mild ulnar neuropathy at the elbow on the left        ASSESSMENT/PLAN:       66 y.o. yo male with left  carpal and cubital tunnel syndrome, severe left hand stiffness  Plan: The patient and I had a thorough discussion today.  We discussed the working diagnosis as well as several other potential alternative diagnoses.  Treatment options were discussed, both conservative and surgical.  Conservative treatment options would include things such as activity modifications, workplace  modifications, a period of rest, oral vs topical OTC and prescription anti-inflammatory medications, occupational therapy, splinting/bracing, immobilization, corticosteroid injections, and others.  Surgical options were discussed as well.      At this time, the patient is reporting a remote history of gout and he certainly could be suffering a flare of this in his left hand which could be contributing to his stiffness and decreased functional usage of the left upper extremity.  Thus, I would like to obtain an autoimmune panel including a serum uric acid and refer him to Rheumatology so they can optimize him from a gouty arthritis perspective.  Also, I would like to refer him to occupational therapy to try and reestablish some functional range of motion left hand as this is currently very poor.  He certainly could benefit from an endoscopic left carpal tunnel release and ulnar nerve decompression with possible A1 pulley releases but I would like for him to attempt to improve his range of motion perioperatively and or optimize his gout.  He is following up with Dr. Alcantara treated we will await the recommendations from the Spine Team.  Follow-up with me in 3 months or sooner for any problems.            Wiley Warren M.D.     Please be aware that this note has been generated with the assistance of [employer] voice-to-text.  Please excuse any spelling or grammatical errors.     Thank you for choosing Dr. Wiley Warren for your orthopedic hand and upper extremity care. It is our goal to provide you with exceptional care that will help keep you healthy, active, and get you back in the game.     If you felt that you received exemplary care today, please consider leaving feedback for Dr. Warren on [employer] at [email or web address].     Please do not hesitate to reach out to us via email, phone, or MyChart with any questions, concerns, or feedback.

## 2022-09-13 NOTE — TELEPHONE ENCOUNTER
----- Message from Eugenia Jett sent at 9/13/2022  3:24 PM CDT -----  Hello,   This patient has recently moved to [place] and is requesting an urgent appt for eye infection. Could you please reach out to him to schedule an appt on the [place]?

## 2022-09-13 NOTE — TELEPHONE ENCOUNTER
----- Message from Segundo Gaviria Patient Care Assistant sent at 9/13/2022  3:12 PM CDT -----  Contact: Pt  Type: Same Day Appointment    Caller is requesting a same day appointment.  Caller declined first available appt listed below.    Name of caller:Pt  When is the first available appt:11/15/2022  Symptoms:Infection to left eye  Best Call back number:[phone]  Additional Info:Please advise-Thank you~

## 2022-09-14 ENCOUNTER — PATIENT MESSAGE (OUTPATIENT)
Dept: ORTHOPEDICS | Facility: CLINIC | Age: 67
End: 2022-09-14
Payer: MEDICARE

## 2022-09-15 ENCOUNTER — TELEPHONE (OUTPATIENT)
Dept: INTERNAL MEDICINE | Facility: CLINIC | Age: 67
End: 2022-09-15
Payer: MEDICARE

## 2022-09-15 DIAGNOSIS — M10.9 GOUTY ARTHRITIS OF LEFT HAND: Primary | ICD-10-CM

## 2022-09-15 RX ORDER — METHYLPREDNISOLONE 4 MG/1
TABLET ORAL
Qty: 1 EACH | Refills: 0 | Status: SHIPPED | OUTPATIENT
Start: 2022-09-15 | End: 2022-09-20 | Stop reason: ALTCHOICE

## 2022-09-15 NOTE — TELEPHONE ENCOUNTER
----- Message from Noelle Cordero sent at 9/14/2022  4:12 PM CDT -----  Contact: self [phone]  Per pt requesting a call stated need to discuss multiple things.    Please call and advise

## 2022-09-15 NOTE — TELEPHONE ENCOUNTER
Spoke with pt to help him schedule an appt to see  soon in regards to a surgery he is supposed to be having.

## 2022-09-16 ENCOUNTER — OFFICE VISIT (OUTPATIENT)
Dept: RHEUMATOLOGY | Facility: CLINIC | Age: 67
End: 2022-09-16
Payer: MEDICARE

## 2022-09-16 ENCOUNTER — HOSPITAL ENCOUNTER (OUTPATIENT)
Dept: RADIOLOGY | Facility: HOSPITAL | Age: 67
Discharge: HOME | End: 2022-09-16
Attending: STUDENT IN AN ORGANIZED HEALTH CARE EDUCATION/TRAINING PROGRAM
Payer: MEDICARE

## 2022-09-16 ENCOUNTER — TELEPHONE (OUTPATIENT)
Dept: RHEUMATOLOGY | Facility: CLINIC | Age: 67
End: 2022-09-16
Payer: MEDICARE

## 2022-09-16 VITALS
WEIGHT: 193.13 LBS | BODY MASS INDEX: 29.36 KG/M2 | HEART RATE: 80 BPM | SYSTOLIC BLOOD PRESSURE: 157 MMHG | DIASTOLIC BLOOD PRESSURE: 86 MMHG

## 2022-09-16 DIAGNOSIS — M25.462 EFFUSION OF LEFT KNEE: ICD-10-CM

## 2022-09-16 DIAGNOSIS — M10.9 GOUTY ARTHRITIS OF LEFT HAND: ICD-10-CM

## 2022-09-16 DIAGNOSIS — M1A.0710 CHRONIC IDIOPATHIC GOUT INVOLVING TOE OF RIGHT FOOT WITHOUT TOPHUS: ICD-10-CM

## 2022-09-16 DIAGNOSIS — R70.0 ELEVATED SED RATE: ICD-10-CM

## 2022-09-16 DIAGNOSIS — M25.461 EFFUSION, RIGHT KNEE: ICD-10-CM

## 2022-09-16 DIAGNOSIS — M10.9 GOUTY ARTHRITIS: ICD-10-CM

## 2022-09-16 DIAGNOSIS — M1A.0710 CHRONIC IDIOPATHIC GOUT INVOLVING TOE OF RIGHT FOOT WITHOUT TOPHUS: Primary | ICD-10-CM

## 2022-09-16 PROCEDURE — 1125F AMNT PAIN NOTED PAIN PRSNT: CPT | Mod: CPTII,GC,S$GLB, | Performed by: STUDENT IN AN ORGANIZED HEALTH CARE EDUCATION/TRAINING PROGRAM

## 2022-09-16 PROCEDURE — 3072F LOW RISK FOR RETINOPATHY: CPT | Mod: CPTII,GC,S$GLB, | Performed by: STUDENT IN AN ORGANIZED HEALTH CARE EDUCATION/TRAINING PROGRAM

## 2022-09-16 PROCEDURE — 73630 X-RAY EXAM OF FOOT: CPT | Mod: 26,50,, | Performed by: RADIOLOGY

## 2022-09-16 PROCEDURE — 3066F PR DOCUMENTATION OF TREATMENT FOR NEPHROPATHY: ICD-10-PCS | Mod: CPTII,GC,S$GLB, | Performed by: STUDENT IN AN ORGANIZED HEALTH CARE EDUCATION/TRAINING PROGRAM

## 2022-09-16 PROCEDURE — 89051 BODY FLUID CELL COUNT: CPT | Performed by: STUDENT IN AN ORGANIZED HEALTH CARE EDUCATION/TRAINING PROGRAM

## 2022-09-16 PROCEDURE — 20610 LARGE JOINT ASPIRATION/INJECTION: R KNEE: ICD-10-PCS | Mod: RT,GC,S$GLB, | Performed by: STUDENT IN AN ORGANIZED HEALTH CARE EDUCATION/TRAINING PROGRAM

## 2022-09-16 PROCEDURE — 99999 PR PBB SHADOW E&M-EST. PATIENT-LVL V: ICD-10-PCS | Mod: PBBFAC,GC,, | Performed by: STUDENT IN AN ORGANIZED HEALTH CARE EDUCATION/TRAINING PROGRAM

## 2022-09-16 PROCEDURE — 1159F MED LIST DOCD IN RCRD: CPT | Mod: CPTII,GC,S$GLB, | Performed by: STUDENT IN AN ORGANIZED HEALTH CARE EDUCATION/TRAINING PROGRAM

## 2022-09-16 PROCEDURE — 3061F PR NEG MICROALBUMINURIA RESULT DOCUMENTED/REVIEW: ICD-10-PCS | Mod: CPTII,GC,S$GLB, | Performed by: STUDENT IN AN ORGANIZED HEALTH CARE EDUCATION/TRAINING PROGRAM

## 2022-09-16 PROCEDURE — 1125F PR PAIN SEVERITY QUANTIFIED, PAIN PRESENT: ICD-10-PCS | Mod: CPTII,GC,S$GLB, | Performed by: STUDENT IN AN ORGANIZED HEALTH CARE EDUCATION/TRAINING PROGRAM

## 2022-09-16 PROCEDURE — 3008F PR BODY MASS INDEX (BMI) DOCUMENTED: ICD-10-PCS | Mod: CPTII,GC,S$GLB, | Performed by: STUDENT IN AN ORGANIZED HEALTH CARE EDUCATION/TRAINING PROGRAM

## 2022-09-16 PROCEDURE — 73130 XR HAND COMPLETE 3 VIEW RIGHT: ICD-10-PCS | Mod: 26,RT,, | Performed by: RADIOLOGY

## 2022-09-16 PROCEDURE — 89060 EXAM SYNOVIAL FLUID CRYSTALS: CPT | Performed by: STUDENT IN AN ORGANIZED HEALTH CARE EDUCATION/TRAINING PROGRAM

## 2022-09-16 PROCEDURE — 3066F NEPHROPATHY DOC TX: CPT | Mod: CPTII,GC,S$GLB, | Performed by: STUDENT IN AN ORGANIZED HEALTH CARE EDUCATION/TRAINING PROGRAM

## 2022-09-16 PROCEDURE — 3072F PR LOW RISK FOR RETINOPATHY: ICD-10-PCS | Mod: CPTII,GC,S$GLB, | Performed by: STUDENT IN AN ORGANIZED HEALTH CARE EDUCATION/TRAINING PROGRAM

## 2022-09-16 PROCEDURE — 3051F HG A1C>EQUAL 7.0%<8.0%: CPT | Mod: CPTII,GC,S$GLB, | Performed by: STUDENT IN AN ORGANIZED HEALTH CARE EDUCATION/TRAINING PROGRAM

## 2022-09-16 PROCEDURE — 99999 PR PBB SHADOW E&M-EST. PATIENT-LVL V: CPT | Mod: PBBFAC,GC,, | Performed by: STUDENT IN AN ORGANIZED HEALTH CARE EDUCATION/TRAINING PROGRAM

## 2022-09-16 PROCEDURE — 20610 DRAIN/INJ JOINT/BURSA W/O US: CPT | Mod: RT,GC,S$GLB, | Performed by: STUDENT IN AN ORGANIZED HEALTH CARE EDUCATION/TRAINING PROGRAM

## 2022-09-16 PROCEDURE — 3077F PR MOST RECENT SYSTOLIC BLOOD PRESSURE >= 140 MM HG: ICD-10-PCS | Mod: CPTII,GC,S$GLB, | Performed by: STUDENT IN AN ORGANIZED HEALTH CARE EDUCATION/TRAINING PROGRAM

## 2022-09-16 PROCEDURE — 1160F PR REVIEW ALL MEDS BY PRESCRIBER/CLIN PHARMACIST DOCUMENTED: ICD-10-PCS | Mod: CPTII,GC,S$GLB, | Performed by: STUDENT IN AN ORGANIZED HEALTH CARE EDUCATION/TRAINING PROGRAM

## 2022-09-16 PROCEDURE — 3079F PR MOST RECENT DIASTOLIC BLOOD PRESSURE 80-89 MM HG: ICD-10-PCS | Mod: CPTII,GC,S$GLB, | Performed by: STUDENT IN AN ORGANIZED HEALTH CARE EDUCATION/TRAINING PROGRAM

## 2022-09-16 PROCEDURE — 99204 PR OFFICE/OUTPT VISIT, NEW, LEVL IV, 45-59 MIN: ICD-10-PCS | Mod: 25,GC,S$GLB, | Performed by: STUDENT IN AN ORGANIZED HEALTH CARE EDUCATION/TRAINING PROGRAM

## 2022-09-16 PROCEDURE — 3008F BODY MASS INDEX DOCD: CPT | Mod: CPTII,GC,S$GLB, | Performed by: STUDENT IN AN ORGANIZED HEALTH CARE EDUCATION/TRAINING PROGRAM

## 2022-09-16 PROCEDURE — 3061F NEG MICROALBUMINURIA REV: CPT | Mod: CPTII,GC,S$GLB, | Performed by: STUDENT IN AN ORGANIZED HEALTH CARE EDUCATION/TRAINING PROGRAM

## 2022-09-16 PROCEDURE — 73630 XR FOOT COMPLETE 3 VIEW BILATERAL: ICD-10-PCS | Mod: 26,50,, | Performed by: RADIOLOGY

## 2022-09-16 PROCEDURE — 73130 X-RAY EXAM OF HAND: CPT | Mod: TC,RT

## 2022-09-16 PROCEDURE — 1159F PR MEDICATION LIST DOCUMENTED IN MEDICAL RECORD: ICD-10-PCS | Mod: CPTII,GC,S$GLB, | Performed by: STUDENT IN AN ORGANIZED HEALTH CARE EDUCATION/TRAINING PROGRAM

## 2022-09-16 PROCEDURE — 3051F PR MOST RECENT HEMOGLOBIN A1C LEVEL 7.0 - < 8.0%: ICD-10-PCS | Mod: CPTII,GC,S$GLB, | Performed by: STUDENT IN AN ORGANIZED HEALTH CARE EDUCATION/TRAINING PROGRAM

## 2022-09-16 PROCEDURE — 1160F RVW MEDS BY RX/DR IN RCRD: CPT | Mod: CPTII,GC,S$GLB, | Performed by: STUDENT IN AN ORGANIZED HEALTH CARE EDUCATION/TRAINING PROGRAM

## 2022-09-16 PROCEDURE — 3077F SYST BP >= 140 MM HG: CPT | Mod: CPTII,GC,S$GLB, | Performed by: STUDENT IN AN ORGANIZED HEALTH CARE EDUCATION/TRAINING PROGRAM

## 2022-09-16 PROCEDURE — 3079F DIAST BP 80-89 MM HG: CPT | Mod: CPTII,GC,S$GLB, | Performed by: STUDENT IN AN ORGANIZED HEALTH CARE EDUCATION/TRAINING PROGRAM

## 2022-09-16 PROCEDURE — 73630 X-RAY EXAM OF FOOT: CPT | Mod: TC,50

## 2022-09-16 PROCEDURE — 99204 OFFICE O/P NEW MOD 45 MIN: CPT | Mod: 25,GC,S$GLB, | Performed by: STUDENT IN AN ORGANIZED HEALTH CARE EDUCATION/TRAINING PROGRAM

## 2022-09-16 PROCEDURE — 73130 X-RAY EXAM OF HAND: CPT | Mod: 26,RT,, | Performed by: RADIOLOGY

## 2022-09-16 NOTE — PROGRESS NOTES
Subjective:       Patient ID: Karthik Bentley is a 67 y.o. male.    Chief Complaint: gout    HPI  Patient is a 68 yo M with diabetes who presents for Rheumatology consultation for gout suspected in the left hand. He initially had RIGHT carpal tunnel surgery. He now presents with pain and swelling in the LEFT thumb and 2nd MCP. He also has carpal tunnel in the left wrist. He started with weakness in the left hand in mid-April. He complained to the OT about this but they were only treating his right hand post carpal tunnel surgery on 4/1/22. Left hand weakness and stiffness got worse until now he can't make a fist. A few months ago he developed swelling at the left thumb and 2nd finger MCP. Pain was not too much but a few days ago it became very tender 9/10 even with light touch. He denies erythema or warmth ever. He contacted Dr. Warren who started a medrol dosepack which he started last night. This helped with the pain.    He had a gout attacks in the past when he was in [employer] working. He had flares in the right big toe; he would wake up from sleep with severe pain in the great toe; there was a little swelling, some erythema, sometimes warmth. Mostly it was painful with bearing weight. Flares lasted 3 days and resolved with a medication he took there and can't remember the name. It happens once or twice a year and he still gets flares in the right great toe, last was 3-4 months ago and continues to abort the flare with the medication. Never had joint fluid aspiration. He has not had a gout flare in other joints. He has never had a kidney stone. No known family history of gout. Has never been on allopurinol, colchicine, or steroids for gout. 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 Never was much of an alcohol drinker - no alcohol anymore.    He also had shoulder pain. XR shoulders 6/28/22, DJD also affecting the AC joints  bilaterally.  No acute fracture or dislocation.  No bone destruction identified..  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 He was referred to Dr. Degroot and got bilateral shoulder injections. He was doing well in PT for the shoulders but one day woke up with terrible pain in the shoulders and reduced ROM. He told Dr. Degroot who then referred to neck and spine Ortho.    He had MRI cervical spine 8/24/22: 1. Multilevel cervical spondylosis resulting in varying degrees of foraminal narrowing and spinal canal stenosis most notable at C4-C5 and C5-C6.  2. Short segment cord signal abnormality at C4-C5 which is suggestive chronic compressive myelopathy.  He had epidural injection for this and a medrol dosepack prior (7/28/22). He is on Celebrex for this.    Social Hx: never smoker; doesn't drink alcohol, never was a heavy drinker.   Family Hx: no family history of autoimmune disease    Review of Systems   Constitutional:  Negative for chills and fever.   HENT:  Negative for sore throat.    Eyes:  Negative for photophobia and visual disturbance.   Respiratory:  Negative for shortness of breath and wheezing.    Cardiovascular:  Negative for chest pain and leg swelling.   Gastrointestinal:  Negative for abdominal pain, diarrhea and vomiting.   Genitourinary:  Negative for dysuria and frequency.   Musculoskeletal:  Positive for arthralgias and joint swelling. Negative for back pain.   Skin:  Negative for rash.   Neurological:  Negative for syncope and headaches.       Objective:   BP (!) 157/86   Pulse 80   Wt 87.6 kg (193 lb 2 oz)   BMI 29.36 kg/m²      Physical Exam   Constitutional: He is oriented to person, place, and time. No distress.   HENT:   Head: Normocephalic and atraumatic.   Eyes: Pupils are equal, round, and reactive to light.   Cardiovascular: Normal rate and regular rhythm.   No murmur  heard.  Pulmonary/Chest: Effort normal and breath sounds normal. No respiratory distress. He has no wheezes.   Abdominal: Soft. Bowel sounds are normal. There is no abdominal tenderness.   Musculoskeletal:         General: No deformity.      Right shoulder: Normal.      Left shoulder: Normal.      Right elbow: Normal.      Left elbow: Normal.      Right wrist: Normal.      Left wrist: Normal.      Cervical back: Normal range of motion.      Right knee: Normal.      Left knee: Normal.      Comments: He has synovitis and joint tenderness. See homunculus.   Neurological: He is alert and oriented to person, place, and time.   Skin: Skin is warm and dry.   Psychiatric: Affect and judgment normal.       Right Side Rheumatological Exam     Examination finds the shoulder, elbow, wrist, knee, 1st PIP, 1st MCP, 2nd PIP, 2nd MCP, 3rd PIP, 3rd MCP, 4th PIP, 4th MCP, 5th PIP and 5th MCP normal.    Muscle Strength (0-5 scale):  Neck Flexion:  5  Neck Extension: 5  Deltoid:  5  Biceps: 5/5   Triceps:  5  : 5/5   Iliopsoas: 5  Quadriceps:  5   Distal Lower Extremity: 5    Left Side Rheumatological Exam     Examination finds the shoulder, elbow, wrist, knee, 1st PIP, 1st MCP, 2nd PIP, 2nd MCP, 3rd PIP, 3rd MCP, 4th PIP, 4th MCP, 5th PIP and 5th MCP normal.    Muscle Strength (0-5 scale):  Neck Flexion:  5  Neck Extension: 5  Deltoid:  5  Biceps: 5/5   Triceps:  5  :  5/5   Iliopsoas: 5  Quadriceps:  5   Distal Lower Extremity: 5          9/16/2022   Tender (WALKER-28) 1 / 28    Swollen (WALKER-28) 6 / 28    Provider Global --   Patient Global --   ESR 77 mm/hr   CRP 6.5 mg/L   WALKER-28 (ESR) --   WALKER-28 (CRP) --   CDAI Score --          Assessment:       1. Chronic idiopathic gout involving toe of right foot without tophus    2. Gouty arthritis of left hand    3. Effusion of left knee    4. Elevated sed rate              Plan:       Problem List Items Addressed This Visit    None  Visit Diagnoses       Chronic idiopathic gout  involving toe of right foot without tophus    -  Primary    Relevant Orders    X-Ray Foot Complete Bilateral (Completed)    Gouty arthritis of left hand        Relevant Orders    MRI Hand Wrist W WO Bilat_Rheumatoid    PROTEIN ELECTROPHORESIS, SERUM (Completed)    IMMUNOFIXATION ELECTROPHORESIS, SERUM (Completed)    Misc Sendout Test, Blood anti-carP antibody (Completed)    Body fluid crystal Joint Fluid, Right Knee    WBC & Diff,Body Fluid Joint Fluid, Right Knee    Ambulatory referral/consult to Physical/Occupational Therapy    X-Ray Foot Complete Bilateral (Completed)    Effusion of left knee        Relevant Orders    MRI Hand Wrist W WO Bilat_Rheumatoid    PROTEIN ELECTROPHORESIS, SERUM (Completed)    IMMUNOFIXATION ELECTROPHORESIS, SERUM (Completed)    Misc Sendout Test, Blood anti-carP antibody (Completed)    Body fluid crystal Joint Fluid, Right Knee    WBC & Diff,Body Fluid Joint Fluid, Right Knee    Elevated sed rate        Relevant Orders    PROTEIN ELECTROPHORESIS, SERUM (Completed)    IMMUNOFIXATION ELECTROPHORESIS, SERUM (Completed)          Patient is a 66 yo M with diabetes who presents for Rheumatology consultation for gout suspected in the left hand.     Reviewed hand XR: appears to have marginal erosions 1st, 2nd, possibly 4th MCPs and calcium deposit 3rd MCP  Has synovitis on exam.     Labs 9/13/22:   Latest Reference Range & Units 04/28/09 08:38 09/07/11 16:23 09/13/22 10:20   Uric Acid 3.4 - 7.0 mg/dL 7.5 (H) 7.4 (H) 6.0   (H): Data is abnormally high      He has had elevated uric acid in the past. He has multiple joints with synovitis. We are more suspicious for RA but this can be gout. Large effusion on the right knee which was aspirated and sent for studies. Please see procedure note.      Plan:  Check labs: SPEP, ОЛЬГА, anti carP antibody. Joint fluid for cell count, crystals  MRI RA protocol  XR right hand, bilateral feet  OT for left hand  RTC 1 month      Ashley Max MD  Rheumatology  Fellow  PGY-5

## 2022-09-16 NOTE — TELEPHONE ENCOUNTER
Attempted to reach out to patient to schedule an appointment for today. Left voicemail     Than you,   Lizz

## 2022-09-20 ENCOUNTER — LAB VISIT (OUTPATIENT)
Dept: LAB | Facility: HOSPITAL | Age: 67
End: 2022-09-20
Attending: FAMILY MEDICINE
Payer: MEDICARE

## 2022-09-20 ENCOUNTER — OFFICE VISIT (OUTPATIENT)
Dept: INTERNAL MEDICINE | Facility: CLINIC | Age: 67
End: 2022-09-20
Payer: MEDICARE

## 2022-09-20 ENCOUNTER — IMMUNIZATION (OUTPATIENT)
Dept: INTERNAL MEDICINE | Facility: CLINIC | Age: 67
End: 2022-09-20
Payer: MEDICARE

## 2022-09-20 VITALS
SYSTOLIC BLOOD PRESSURE: 160 MMHG | WEIGHT: 189.63 LBS | HEIGHT: 68 IN | DIASTOLIC BLOOD PRESSURE: 92 MMHG | BODY MASS INDEX: 28.74 KG/M2 | HEART RATE: 74 BPM | OXYGEN SATURATION: 97 %

## 2022-09-20 DIAGNOSIS — B00.9 HSV INFECTION: ICD-10-CM

## 2022-09-20 DIAGNOSIS — I10 ESSENTIAL HYPERTENSION: ICD-10-CM

## 2022-09-20 DIAGNOSIS — E11.9 TYPE 2 DIABETES MELLITUS WITHOUT COMPLICATION, WITHOUT LONG-TERM CURRENT USE OF INSULIN: ICD-10-CM

## 2022-09-20 DIAGNOSIS — E11.9 TYPE 2 DIABETES MELLITUS WITHOUT COMPLICATION, WITHOUT LONG-TERM CURRENT USE OF INSULIN: Primary | ICD-10-CM

## 2022-09-20 LAB
ESTIMATED AVG GLUCOSE: 197 MG/DL (ref 68–131)
HBA1C MFR BLD: 8.5 % (ref 4–5.6)

## 2022-09-20 PROCEDURE — 3072F LOW RISK FOR RETINOPATHY: CPT | Mod: CPTII,S$GLB,, | Performed by: FAMILY MEDICINE

## 2022-09-20 PROCEDURE — 1159F PR MEDICATION LIST DOCUMENTED IN MEDICAL RECORD: ICD-10-PCS | Mod: CPTII,S$GLB,, | Performed by: FAMILY MEDICINE

## 2022-09-20 PROCEDURE — 3061F PR NEG MICROALBUMINURIA RESULT DOCUMENTED/REVIEW: ICD-10-PCS | Mod: CPTII,S$GLB,, | Performed by: FAMILY MEDICINE

## 2022-09-20 PROCEDURE — 1159F MED LIST DOCD IN RCRD: CPT | Mod: CPTII,S$GLB,, | Performed by: FAMILY MEDICINE

## 2022-09-20 PROCEDURE — 3077F PR MOST RECENT SYSTOLIC BLOOD PRESSURE >= 140 MM HG: ICD-10-PCS | Mod: CPTII,S$GLB,, | Performed by: FAMILY MEDICINE

## 2022-09-20 PROCEDURE — 3061F NEG MICROALBUMINURIA REV: CPT | Mod: CPTII,S$GLB,, | Performed by: FAMILY MEDICINE

## 2022-09-20 PROCEDURE — 90694 FLU VACCINE - QUADRIVALENT - ADJUVANTED: ICD-10-PCS | Mod: S$GLB,,, | Performed by: FAMILY MEDICINE

## 2022-09-20 PROCEDURE — 99215 OFFICE O/P EST HI 40 MIN: CPT | Mod: S$GLB,,, | Performed by: FAMILY MEDICINE

## 2022-09-20 PROCEDURE — 4010F PR ACE/ARB THEARPY RXD/TAKEN: ICD-10-PCS | Mod: CPTII,S$GLB,, | Performed by: FAMILY MEDICINE

## 2022-09-20 PROCEDURE — 1125F PR PAIN SEVERITY QUANTIFIED, PAIN PRESENT: ICD-10-PCS | Mod: CPTII,S$GLB,, | Performed by: FAMILY MEDICINE

## 2022-09-20 PROCEDURE — 3066F PR DOCUMENTATION OF TREATMENT FOR NEPHROPATHY: ICD-10-PCS | Mod: CPTII,S$GLB,, | Performed by: FAMILY MEDICINE

## 2022-09-20 PROCEDURE — 1101F PT FALLS ASSESS-DOCD LE1/YR: CPT | Mod: CPTII,S$GLB,, | Performed by: FAMILY MEDICINE

## 2022-09-20 PROCEDURE — 90694 VACC AIIV4 NO PRSRV 0.5ML IM: CPT | Mod: S$GLB,,, | Performed by: FAMILY MEDICINE

## 2022-09-20 PROCEDURE — 3080F PR MOST RECENT DIASTOLIC BLOOD PRESSURE >= 90 MM HG: ICD-10-PCS | Mod: CPTII,S$GLB,, | Performed by: FAMILY MEDICINE

## 2022-09-20 PROCEDURE — 3077F SYST BP >= 140 MM HG: CPT | Mod: CPTII,S$GLB,, | Performed by: FAMILY MEDICINE

## 2022-09-20 PROCEDURE — 3008F BODY MASS INDEX DOCD: CPT | Mod: CPTII,S$GLB,, | Performed by: FAMILY MEDICINE

## 2022-09-20 PROCEDURE — G0008 ADMIN INFLUENZA VIRUS VAC: HCPCS | Mod: S$GLB,,, | Performed by: FAMILY MEDICINE

## 2022-09-20 PROCEDURE — 3008F PR BODY MASS INDEX (BMI) DOCUMENTED: ICD-10-PCS | Mod: CPTII,S$GLB,, | Performed by: FAMILY MEDICINE

## 2022-09-20 PROCEDURE — 3288F PR FALLS RISK ASSESSMENT DOCUMENTED: ICD-10-PCS | Mod: CPTII,S$GLB,, | Performed by: FAMILY MEDICINE

## 2022-09-20 PROCEDURE — 1101F PR PT FALLS ASSESS DOC 0-1 FALLS W/OUT INJ PAST YR: ICD-10-PCS | Mod: CPTII,S$GLB,, | Performed by: FAMILY MEDICINE

## 2022-09-20 PROCEDURE — 99215 PR OFFICE/OUTPT VISIT, EST, LEVL V, 40-54 MIN: ICD-10-PCS | Mod: S$GLB,,, | Performed by: FAMILY MEDICINE

## 2022-09-20 PROCEDURE — 3288F FALL RISK ASSESSMENT DOCD: CPT | Mod: CPTII,S$GLB,, | Performed by: FAMILY MEDICINE

## 2022-09-20 PROCEDURE — 83036 HEMOGLOBIN GLYCOSYLATED A1C: CPT | Performed by: FAMILY MEDICINE

## 2022-09-20 PROCEDURE — 3051F HG A1C>EQUAL 7.0%<8.0%: CPT | Mod: CPTII,S$GLB,, | Performed by: FAMILY MEDICINE

## 2022-09-20 PROCEDURE — 99999 PR PBB SHADOW E&M-EST. PATIENT-LVL III: ICD-10-PCS | Mod: PBBFAC,,, | Performed by: FAMILY MEDICINE

## 2022-09-20 PROCEDURE — 3051F PR MOST RECENT HEMOGLOBIN A1C LEVEL 7.0 - < 8.0%: ICD-10-PCS | Mod: CPTII,S$GLB,, | Performed by: FAMILY MEDICINE

## 2022-09-20 PROCEDURE — 3080F DIAST BP >= 90 MM HG: CPT | Mod: CPTII,S$GLB,, | Performed by: FAMILY MEDICINE

## 2022-09-20 PROCEDURE — G0008 FLU VACCINE - QUADRIVALENT - ADJUVANTED: ICD-10-PCS | Mod: S$GLB,,, | Performed by: FAMILY MEDICINE

## 2022-09-20 PROCEDURE — 1125F AMNT PAIN NOTED PAIN PRSNT: CPT | Mod: CPTII,S$GLB,, | Performed by: FAMILY MEDICINE

## 2022-09-20 PROCEDURE — 4010F ACE/ARB THERAPY RXD/TAKEN: CPT | Mod: CPTII,S$GLB,, | Performed by: FAMILY MEDICINE

## 2022-09-20 PROCEDURE — 99999 PR PBB SHADOW E&M-EST. PATIENT-LVL III: CPT | Mod: PBBFAC,,, | Performed by: FAMILY MEDICINE

## 2022-09-20 PROCEDURE — 3066F NEPHROPATHY DOC TX: CPT | Mod: CPTII,S$GLB,, | Performed by: FAMILY MEDICINE

## 2022-09-20 PROCEDURE — 3072F PR LOW RISK FOR RETINOPATHY: ICD-10-PCS | Mod: CPTII,S$GLB,, | Performed by: FAMILY MEDICINE

## 2022-09-20 PROCEDURE — 36415 COLL VENOUS BLD VENIPUNCTURE: CPT | Performed by: FAMILY MEDICINE

## 2022-09-20 RX ORDER — VALACYCLOVIR HYDROCHLORIDE 500 MG/1
500 TABLET, FILM COATED ORAL DAILY
Qty: 30 TABLET | Refills: 2 | Status: SHIPPED | OUTPATIENT
Start: 2022-09-20 | End: 2022-11-21

## 2022-09-20 RX ORDER — DULAGLUTIDE 0.75 MG/.5ML
INJECTION, SOLUTION SUBCUTANEOUS
Qty: 4 PEN | Refills: 11 | Status: SHIPPED | OUTPATIENT
Start: 2022-09-20 | End: 2022-10-11 | Stop reason: DRUGHIGH

## 2022-09-20 RX ORDER — VALSARTAN 160 MG/1
160 TABLET ORAL DAILY
Qty: 90 TABLET | Refills: 3 | Status: SHIPPED | OUTPATIENT
Start: 2022-09-20 | End: 2022-09-20 | Stop reason: SDUPTHER

## 2022-09-20 RX ORDER — VALSARTAN 160 MG/1
160 TABLET ORAL DAILY
Qty: 90 TABLET | Refills: 3 | Status: SHIPPED | OUTPATIENT
Start: 2022-09-20 | End: 2022-11-21

## 2022-09-20 NOTE — PROGRESS NOTES
Subjective:       Patient ID: Karthik Bentley is a 67 y.o. male.    Chief Complaint: Follow-up    Patient is here for follow-up of his BP and DM  He has a very long and complicated history this year of hand problems, CTS, cervical mylopathy, ?gout and has see ortho, pain clinic for EDITH, rheumatology, hand surgery, and is seeing a spine surgeon tomorrow. While the majority of today's 50 min visit was listening to the story and reviewing the chart, the primary purpose of today's visit is:    BP has been high, it was normal when I saw him last time in Jan 2022  Goals (5 Years of Data)       Today  9/16/22 9/6/22 9/6/22 9/6/22 9/6/22 9/6/22   Blood Pressure < 130/80   160/92  157/86  162/77  146/76  150/81  150/77  158/78   Possibly related to celebrex &/or steroids (just finished another medrol dosepak) but may just be primary HTN  We discussed best to start him on a BP medication today and to f/u in 3 weeks for adjustment. Will also sign him up for the digital HTN program though it is past 5 today so they may not be there to sign him up today    2. DM.   Lab Results   Component Value Date    HGBA1C 7.5 (H) 05/10/2022    HGBA1C 8.0 (H) 01/12/2022    HGBA1C 6.9 (H) 08/19/2021     Had been seeing endocrine but as he was not seen in over a year they declined refill f trulicity. He just took his last dose this past week.  We discussed need to check A1c today, will refill trulicity 0.75 that hs has been on, and when I see him in 3 weeks will increase it to 1.5mg, and then 1-2 mo after that to 3mg penidng A1c control      Diabetes Management Status    Statin: Taking  ACE/ARB: Not taking    Screening or Prevention Patient's value Goal Complete/Controlled?   HgA1C Testing and Control   Lab Results   Component Value Date    HGBA1C 7.5 (H) 05/10/2022      Annually/Less than 8% Yes     Lipid profile : 01/12/2022 Annually Yes     LDL control Lab Results   Component Value Date    LDLCALC 39.8 (L) 01/12/2022    Annually/Less than  100 mg/dl  Yes     Nephropathy screening Lab Results   Component Value Date    LABMICR 9.0 05/10/2022     No results found for: PROTEINUA  Lab Results   Component Value Date    UTPCR Unable to calculate 01/12/2022      Annually Yes     Blood pressure BP Readings from Last 1 Encounters:   09/20/22 (!) 160/92    Less than 140/90 No     Dilated retinal exam : 08/19/2021 Annually No     Foot exam   : 01/18/2022 Annually Yes           Review of patient's allergies indicates:  No Known Allergies    Social History     Tobacco Use    Smoking status: Never    Smokeless tobacco: Never   Substance Use Topics    Alcohol use: Yes     Comment: occasionally    Drug use: Never       Family History   Problem Relation Age of Onset    Bone cancer Mother     Colon cancer Father        Past Surgical History:   Procedure Laterality Date    COLONOSCOPY N/A 1/16/2020    Procedure: COLONOSCOPY;  Surgeon: Cynthia Kearney MD;  Location: [facility] ENDO [address]);  Service: Endoscopy;  Laterality: N/A;    ENDOSCOPIC CARPAL TUNNEL RELEASE Right 4/1/2022    Procedure: RELEASE, CARPAL TUNNEL, ENDOSCOPIC - right arthrex;  Surgeon: Wiley Warren MD;  Location: [facility] OR;  Service: Orthopedics;  Laterality: Right;    EPIDURAL STEROID INJECTION N/A 9/6/2022    Procedure: CERVICAL EDITH CONTRAST DIRECT REFERRAL;  Surgeon: Yasmeen Taylor MD;  Location: [facility] PAIN T;  Service: Pain Management;  Laterality: N/A;    SHOULDER SURGERY         Patient Active Problem List   Diagnosis    Essential hypertension    Type 2 diabetes mellitus, without long-term current use of insulin    Screen for colon cancer    Overweight    HSV infection    Chronic pain of both shoulders       Current Outpatient Medications on File Prior to Visit   Medication Sig Dispense Refill    CARBOCAINE, PF, 15 mg/mL (1.5 %) injection       cefazolin sodium/water (CEFAZOLIN IN STERILE WATER) 2 gram/20 mL       celecoxib (CELEBREX) 200 MG capsule Take 1 capsule (200 mg total) by mouth once  daily. 60 capsule 1    fentaNYL (SUBLIMAZE) 50 mcg/mL injection       lancets Misc To check BG 1 times daily, to use with insurance preferred meter 100 each 3    metFORMIN (GLUCOPHAGE) 1000 MG tablet Take 1 tablet (1,000 mg total) by mouth 2 (two) times daily with meals. 180 tablet 3    methylPREDNISolone (MEDROL DOSEPACK) 4 mg tablet use as directed 1 each 0    midazolam (VERSED) 1 mg/mL injection       ondansetron 4 mg/2 mL Soln       pneumococcal 23-merritt ps (PNEUMOVAX 23) 25 mcg/0.5 mL vaccine Inject into the muscle. 0.5 mL 0    propofoL (DIPRIVAN) 10 mg/mL infusion       rosuvastatin (CRESTOR) 10 MG tablet Take 1 tablet (10 mg total) by mouth once daily. 90 tablet 3    traMADoL (ULTRAM) 50 mg tablet Take 1 tablet (50 mg total) by mouth every 6 (six) hours as needed for Pain. 20 tablet 0    traMADoL (ULTRAM) 50 mg tablet Take 1 tablet (50 mg total) by mouth every 6 (six) hours as needed for Pain. 20 tablet 0    TRULICITY 0.75 mg/0.5 mL pen injector INJECT 0.75 MG(0.5 ML) UNDER THE SKIN EVERY 7 DAYS 4 pen 11    blood sugar diagnostic Strp To check BG 1 times daily, to use with insurance preferred meter (Patient not taking: No sig reported) 100 strip 3    blood-glucose meter kit To check BG 1 times daily, to use with insurance preferred meter (Patient not taking: No sig reported) 1 each 0    gabapentin (NEURONTIN) 300 MG capsule Take 2 capsules (600 mg total) by mouth every evening. (Patient not taking: No sig reported) 60 capsule 11    gentian violet 2 % topical solution Apply 0.5 mLs topically daily as needed. Apply between toes. (Patient not taking: No sig reported) 59 mL 1    tiZANidine (ZANAFLEX) 4 MG tablet Take 1 tablet (4 mg total) by mouth every 8 (eight) hours as needed (muscle spasms). (Patient not taking: No sig reported) 60 tablet 1    valACYclovir (VALTREX) 500 MG tablet Take 1 tablet (500 mg total) by mouth 2 (two) times daily. for 3 days 6 tablet 6    varicella-zoster [id number], PF, (SHINGRIX, PF,) 50  "mcg/0.5 mL injection Inject into the muscle. (Patient not taking: Reported on 9/20/2022) 1 each 1     Current Facility-Administered Medications on File Prior to Visit   Medication Dose Route Frequency Provider Last Rate Last Admin    fentaNYL 50 mcg/mL injection  mcg   mcg Intravenous PRN Marc Felton MD   50 mcg at 04/01/22 0635    LIDOcaine (PF) 10 mg/ml (1%) injection 10 mg  1 mL Intradermal Once Marc Felton MD        midazolam (VERSED) 1 mg/mL injection 0.5-4 mg  0.5-4 mg Intravenous PRN Marc Felton MD   2 mg at 04/01/22 0633           Review of Systems   Constitutional:  Negative for chills and fever.   HENT:  Negative for ear pain.    Eyes:  Negative for pain.   Respiratory:  Negative for chest tightness.    Cardiovascular:  Negative for chest pain.   Gastrointestinal:  Negative for abdominal pain.   Genitourinary:  Negative for flank pain.   Musculoskeletal:  Positive for arthralgias, joint swelling, myalgias and neck pain. Negative for gait problem.   Neurological:  Positive for numbness. Negative for syncope.   Psychiatric/Behavioral:  Negative for behavioral problems.      Objective:    BP (!) 160/92 (BP Location: Right arm, Patient Position: Sitting, BP Method: Medium (Manual))   Pulse 74   Ht 5' 8" (1.727 m)   Wt 86 kg (189 lb 9.5 oz)   SpO2 97%   BMI 28.83 kg/m²     Physical Exam  Constitutional:       Appearance: He is well-developed.   HENT:      Head: Normocephalic and atraumatic.   Cardiovascular:      Rate and Rhythm: Normal rate.      Heart sounds: Normal heart sounds.   Pulmonary:      Effort: No respiratory distress.      Breath sounds: Normal breath sounds. No wheezing or rales.   Abdominal:      Palpations: Abdomen is soft.   Musculoskeletal:      Cervical back: Neck supple.   Skin:     General: Skin is dry.   Neurological:      Mental Status: He is alert.   Psychiatric:         Behavior: Behavior normal.       Assessment:       1. Type 2 diabetes " mellitus without complication, without long-term current use of insulin    2. Essential hypertension    3. HSV infection          Plan:       Karthik was seen today for follow-up.    Diagnoses and all orders for this visit:    Type 2 diabetes mellitus without complication, without long-term current use of insulin  -     Hemoglobin A1C; Future  -     dulaglutide (TRULICITY) 0.75 mg/0.5 mL pen injector; INJECT 0.75 MG(0.5 ML) UNDER THE SKIN EVERY 7 DAYS Strength: 0.75 mg/0.5 mL    Essential hypertension - not controlled  -     Discontinue: valsartan (DIOVAN) 160 MG tablet; Take 1 tablet (160 mg total) by mouth once daily.  -     valsartan (DIOVAN) 160 MG tablet; Take 1 tablet (160 mg total) by mouth once daily.  -     Hypertension Digital Medicine (HDMP) Enrollment Order  -     Hypertension Digital Medicine (HDMP): Assign Onboarding Questionnaires  -as he says celebrex is not helping him, can stop it unless rheumatology wants him to continue it    HSV infection  -     valACYclovir (VALTREX) 500 MG tablet; Take 1 tablet (500 mg total) by mouth once daily. #30, 2 RF    Follow up in about 3 weeks (around 10/11/2022) for BP. dm.

## 2022-09-20 NOTE — PROCEDURES
Large Joint Aspiration/Injection: R knee    Date/Time: 9/16/2022 11:00 AM  Performed by: Ashley Max MD  Authorized by: Ashley Max MD     Indications:  Joint swelling and diagnostic evaluation  Local anesthesia used?: No      Details:  Needle Size:  22 G  Ultrasonic Guidance for needle placement?: No    Approach:  Medial  Location:  Knee  Site:  R knee  Aspirate amount (mL):  8  Aspirate:  Clear and yellow  Lab: fluid sent for laboratory analysis    Patient tolerance:  Patient tolerated the procedure well with no immediate complications

## 2022-09-20 NOTE — PROGRESS NOTES
I have reviewed the notes, assessments, and/or procedures performed this visit, and I concur with the documentation. Pt interviewed, examined and discussed assessment and plans with patient and Dr. Max. VALERI

## 2022-09-21 ENCOUNTER — OFFICE VISIT (OUTPATIENT)
Dept: ORTHOPEDICS | Facility: CLINIC | Age: 67
End: 2022-09-21
Payer: MEDICARE

## 2022-09-21 VITALS — WEIGHT: 189.63 LBS | BODY MASS INDEX: 28.74 KG/M2 | HEIGHT: 68 IN

## 2022-09-21 DIAGNOSIS — G95.9 CERVICAL MYELOPATHY: Primary | ICD-10-CM

## 2022-09-21 PROCEDURE — 3066F PR DOCUMENTATION OF TREATMENT FOR NEPHROPATHY: ICD-10-PCS | Mod: CPTII,S$GLB,, | Performed by: ORTHOPAEDIC SURGERY

## 2022-09-21 PROCEDURE — 3066F NEPHROPATHY DOC TX: CPT | Mod: CPTII,S$GLB,, | Performed by: ORTHOPAEDIC SURGERY

## 2022-09-21 PROCEDURE — 1125F AMNT PAIN NOTED PAIN PRSNT: CPT | Mod: CPTII,S$GLB,, | Performed by: ORTHOPAEDIC SURGERY

## 2022-09-21 PROCEDURE — 3072F PR LOW RISK FOR RETINOPATHY: ICD-10-PCS | Mod: CPTII,S$GLB,, | Performed by: ORTHOPAEDIC SURGERY

## 2022-09-21 PROCEDURE — 1101F PR PT FALLS ASSESS DOC 0-1 FALLS W/OUT INJ PAST YR: ICD-10-PCS | Mod: CPTII,S$GLB,, | Performed by: ORTHOPAEDIC SURGERY

## 2022-09-21 PROCEDURE — 3288F PR FALLS RISK ASSESSMENT DOCUMENTED: ICD-10-PCS | Mod: CPTII,S$GLB,, | Performed by: ORTHOPAEDIC SURGERY

## 2022-09-21 PROCEDURE — 3061F NEG MICROALBUMINURIA REV: CPT | Mod: CPTII,S$GLB,, | Performed by: ORTHOPAEDIC SURGERY

## 2022-09-21 PROCEDURE — 1159F MED LIST DOCD IN RCRD: CPT | Mod: CPTII,S$GLB,, | Performed by: ORTHOPAEDIC SURGERY

## 2022-09-21 PROCEDURE — 3008F BODY MASS INDEX DOCD: CPT | Mod: CPTII,S$GLB,, | Performed by: ORTHOPAEDIC SURGERY

## 2022-09-21 PROCEDURE — 99213 PR OFFICE/OUTPT VISIT, EST, LEVL III, 20-29 MIN: ICD-10-PCS | Mod: S$GLB,,, | Performed by: ORTHOPAEDIC SURGERY

## 2022-09-21 PROCEDURE — 1159F PR MEDICATION LIST DOCUMENTED IN MEDICAL RECORD: ICD-10-PCS | Mod: CPTII,S$GLB,, | Performed by: ORTHOPAEDIC SURGERY

## 2022-09-21 PROCEDURE — 4010F PR ACE/ARB THEARPY RXD/TAKEN: ICD-10-PCS | Mod: CPTII,S$GLB,, | Performed by: ORTHOPAEDIC SURGERY

## 2022-09-21 PROCEDURE — 4010F ACE/ARB THERAPY RXD/TAKEN: CPT | Mod: CPTII,S$GLB,, | Performed by: ORTHOPAEDIC SURGERY

## 2022-09-21 PROCEDURE — 1125F PR PAIN SEVERITY QUANTIFIED, PAIN PRESENT: ICD-10-PCS | Mod: CPTII,S$GLB,, | Performed by: ORTHOPAEDIC SURGERY

## 2022-09-21 PROCEDURE — 3008F PR BODY MASS INDEX (BMI) DOCUMENTED: ICD-10-PCS | Mod: CPTII,S$GLB,, | Performed by: ORTHOPAEDIC SURGERY

## 2022-09-21 PROCEDURE — 3052F PR MOST RECENT HEMOGLOBIN A1C LEVEL 8.0 - < 9.0%: ICD-10-PCS | Mod: CPTII,S$GLB,, | Performed by: ORTHOPAEDIC SURGERY

## 2022-09-21 PROCEDURE — 3061F PR NEG MICROALBUMINURIA RESULT DOCUMENTED/REVIEW: ICD-10-PCS | Mod: CPTII,S$GLB,, | Performed by: ORTHOPAEDIC SURGERY

## 2022-09-21 PROCEDURE — 1101F PT FALLS ASSESS-DOCD LE1/YR: CPT | Mod: CPTII,S$GLB,, | Performed by: ORTHOPAEDIC SURGERY

## 2022-09-21 PROCEDURE — 99213 OFFICE O/P EST LOW 20 MIN: CPT | Mod: S$GLB,,, | Performed by: ORTHOPAEDIC SURGERY

## 2022-09-21 PROCEDURE — 99999 PR PBB SHADOW E&M-EST. PATIENT-LVL III: CPT | Mod: PBBFAC,,, | Performed by: ORTHOPAEDIC SURGERY

## 2022-09-21 PROCEDURE — 3052F HG A1C>EQUAL 8.0%<EQUAL 9.0%: CPT | Mod: CPTII,S$GLB,, | Performed by: ORTHOPAEDIC SURGERY

## 2022-09-21 PROCEDURE — 3288F FALL RISK ASSESSMENT DOCD: CPT | Mod: CPTII,S$GLB,, | Performed by: ORTHOPAEDIC SURGERY

## 2022-09-21 PROCEDURE — 3072F LOW RISK FOR RETINOPATHY: CPT | Mod: CPTII,S$GLB,, | Performed by: ORTHOPAEDIC SURGERY

## 2022-09-21 PROCEDURE — 99999 PR PBB SHADOW E&M-EST. PATIENT-LVL III: ICD-10-PCS | Mod: PBBFAC,,, | Performed by: ORTHOPAEDIC SURGERY

## 2022-09-22 LAB
APPEARANCE FLD: NORMAL
BODY FLD TYPE: NORMAL
BODY FLD TYPE: NORMAL
BODY FLUID COMMENTS: NORMAL
COLOR FLD: COLORLESS
CRYSTALS FLD MICRO: NEGATIVE
WBC # FLD: 111 /CU MM

## 2022-09-23 DIAGNOSIS — G95.9 CERVICAL MYELOPATHY: Primary | ICD-10-CM

## 2022-09-23 LAB — PATH INTERP FLD-IMP: NORMAL

## 2022-09-26 NOTE — PROGRESS NOTES
The patient returns for follow-up.  He has a history of a right endoscopic carpal tunnel release, and his chief complaint is decreased in left hand  strength, as well as dexterity issues.      He also endorses neck pain, and diffuse upper extremity weakness.      On physical examination he has notable intrinsic wasting bilaterally.      Cervical spine radiographs demonstrate multilevel cervical spondylosis particularly from C4-C7.  An MRI of this cervical spine demonstrates severe multilevel stenosis with significant cord deformation and myelomalacia from C4-C6    Impression: Cervical myelopathy     Plan:  Today we discussed options, he would like to proceed forward with a C3-C6 ACDF

## 2022-10-07 ENCOUNTER — PATIENT MESSAGE (OUTPATIENT)
Dept: INTERNAL MEDICINE | Facility: CLINIC | Age: 67
End: 2022-10-07
Payer: MEDICARE

## 2022-10-08 ENCOUNTER — PATIENT MESSAGE (OUTPATIENT)
Dept: ADMINISTRATIVE | Facility: OTHER | Age: 67
End: 2022-10-08
Payer: MEDICARE

## 2022-10-08 ENCOUNTER — HOSPITAL ENCOUNTER (OUTPATIENT)
Dept: RADIOLOGY | Facility: HOSPITAL | Age: 67
Discharge: HOME | End: 2022-10-08
Attending: STUDENT IN AN ORGANIZED HEALTH CARE EDUCATION/TRAINING PROGRAM
Payer: MEDICARE

## 2022-10-08 DIAGNOSIS — M25.462 EFFUSION OF LEFT KNEE: ICD-10-CM

## 2022-10-08 DIAGNOSIS — M10.9 GOUTY ARTHRITIS OF LEFT HAND: ICD-10-CM

## 2022-10-08 PROCEDURE — 25500020 PHARM REV CODE 255: Performed by: STUDENT IN AN ORGANIZED HEALTH CARE EDUCATION/TRAINING PROGRAM

## 2022-10-08 PROCEDURE — 73223 MRI JOINT UPR EXTR W/O&W/DYE: CPT | Mod: 26,LT,, | Performed by: RADIOLOGY

## 2022-10-08 PROCEDURE — A9585 GADOBUTROL INJECTION: HCPCS | Performed by: STUDENT IN AN ORGANIZED HEALTH CARE EDUCATION/TRAINING PROGRAM

## 2022-10-08 PROCEDURE — 73223 MRI HAND_WRIST W WO LEFT_RHEUMATOID ARTHRITIS: ICD-10-PCS | Mod: 26,LT,, | Performed by: RADIOLOGY

## 2022-10-08 PROCEDURE — 73223 MRI JOINT UPR EXTR W/O&W/DYE: CPT | Mod: TC,LT

## 2022-10-08 RX ORDER — GADOBUTROL 604.72 MG/ML
10 INJECTION INTRAVENOUS
Status: COMPLETED | OUTPATIENT
Start: 2022-10-08 | End: 2022-10-08

## 2022-10-08 RX ADMIN — GADOBUTROL 10 ML: 604.72 INJECTION INTRAVENOUS at 01:10

## 2022-10-10 ENCOUNTER — PATIENT MESSAGE (OUTPATIENT)
Dept: ADMINISTRATIVE | Facility: HOSPITAL | Age: 67
End: 2022-10-10
Payer: MEDICARE

## 2022-10-10 ENCOUNTER — TELEPHONE (OUTPATIENT)
Dept: PREADMISSION TESTING | Facility: HOSPITAL | Age: 67
End: 2022-10-10
Payer: MEDICARE

## 2022-10-10 DIAGNOSIS — M79.601 PAIN IN BOTH UPPER EXTREMITIES: ICD-10-CM

## 2022-10-10 DIAGNOSIS — Z01.818 PREOPERATIVE TESTING: Primary | ICD-10-CM

## 2022-10-10 DIAGNOSIS — M79.602 PAIN IN BOTH UPPER EXTREMITIES: ICD-10-CM

## 2022-10-10 NOTE — ANESTHESIA PAT ROS NOTE
10/10/2022  Karthik Bentley is a 67 y.o., male.      Pre-op Assessment          Review of Systems         Anesthesia Assessment: Preoperative EQUATION    Planned Procedure: Procedure(s) (LRB):  DISCECTOMY, SPINE, CERVICAL, ANTERIOR APPROACH, WITH FUSION C3-6 depuy SNS:vocal/motors/SSEP (N/A)  Requested Anesthesia Type:General  Surgeon: Kody Gudino MD  Service: Orthopedics  Known or anticipated Date of Surgery:10/31/2022    Surgeon notes: reviewed    Electronic QUestionnaire Assessment completed via nurse interview with patient.        Triage considerations:       Previous anesthesia records:No problems and REGIONAL  4/1/2022   RELEASE, CARPAL TUNNEL, ENDOSCOPIC - right arthrex (Right: Hand)   Airway:  Mallampati: II   TM Distance: Normal  Neck ROM: Normal ROM    Last PCP note: within 3 months , within [facility]   Subspecialty notes: Ortho, Rheumatology, SPORTS MEDICINE    Other important co-morbidities: PER Epic: DM2, HTN, and CERVICAL MYELOPATHY, H/O GOUT       Tests already available:  Available tests,  within 3 months , 3-6 months ago , within [facility] .   9/20/2022 HGA1C, 9/13/2022 CMP, CBC, 8/24/2022MRI CERVICAL SPINE W/O CONTRAST, 7/28/2022 XRAY CERVICAL SPINE AP/LAT W F/E          Instructions given. (See in Nurse's note)    Optimization:  Anesthesia Preop Clinic Assessment  Indicated    Medical Opinion Indicated         Plan:    Testing:  EKG, PT/INR, and T&S   Pre-anesthesia  visit       Visit focus: concerns in complex and/or prolonged anesthesia     Consultation:Patient's PCP for re-evaluation     Patient  has previously scheduled Medical Appointment:10/11 LINDA, DR SEPULVEDA, 10/21 DR GUDINO    Navigation: Tests Scheduled. TBD             Consults scheduled.TBD             Results will be tracked by Preop Clinic.  10/14 EKG resulted an  noted by Dr. Anibal Sepulveda.Medical clearance given by   Anibal Moya on 10/14: Addendum 10/14/22: cardiology reading of ECG reviewed. Pt is cleared for surgery  10/24  Labs resulted and noted by Dr.Stuart Uribe.Medication instructions were given by Nadya Diaz on 10/21.  Loraine Adames RN BSN     10/21/22  Conferred with Dr. Uribe and instructed patient:  Preop instructions given. Hold aspirin, aspirin containing products, nsaids(aleve, advil, motrin, ibuprofen, naprosyn, naproxen, voltaren, diclofenac, Celebrex, Celecoxib), vitamins and supplements one week prior to surgery.         Patient is currently holding all meds, and vitamins.   Nadya Diaz RN

## 2022-10-10 NOTE — PRE-PROCEDURE INSTRUCTIONS
Patient stated has not had any problem with anesthesia in the past. Will need medical clearance from your PCP,Dr.Joseph Moya. He has an appt tomorrow and will see if can get clearance then. Will need poc  appt, labs and ekg.  Our  will call to set up these appts.    Preop instructions given. Hold aspirin, aspirin containing products, nsaids(aleve, advil, motrin, ibuprofen, naprosyn, naproxen, voltaren, diclofenac, Celebrex, Celecoxib), vitamins and supplements one week prior to surgery.     May take Tylenol.( Sent to [facility] portal)  Verbalizes understanding.

## 2022-10-11 ENCOUNTER — OFFICE VISIT (OUTPATIENT)
Dept: RHEUMATOLOGY | Facility: CLINIC | Age: 67
End: 2022-10-11
Payer: MEDICARE

## 2022-10-11 ENCOUNTER — OFFICE VISIT (OUTPATIENT)
Dept: INTERNAL MEDICINE | Facility: CLINIC | Age: 67
End: 2022-10-11
Payer: MEDICARE

## 2022-10-11 ENCOUNTER — HOSPITAL ENCOUNTER (OUTPATIENT)
Dept: RADIOLOGY | Facility: HOSPITAL | Age: 67
Discharge: HOME | End: 2022-10-11
Attending: STUDENT IN AN ORGANIZED HEALTH CARE EDUCATION/TRAINING PROGRAM
Payer: MEDICARE

## 2022-10-11 VITALS
DIASTOLIC BLOOD PRESSURE: 74 MMHG | HEART RATE: 79 BPM | BODY MASS INDEX: 28.76 KG/M2 | WEIGHT: 189.13 LBS | SYSTOLIC BLOOD PRESSURE: 127 MMHG

## 2022-10-11 VITALS
SYSTOLIC BLOOD PRESSURE: 130 MMHG | HEIGHT: 68 IN | BODY MASS INDEX: 28.04 KG/M2 | WEIGHT: 185 LBS | OXYGEN SATURATION: 98 % | HEART RATE: 67 BPM | DIASTOLIC BLOOD PRESSURE: 80 MMHG

## 2022-10-11 DIAGNOSIS — M06.9 RHEUMATOID ARTHRITIS, INVOLVING UNSPECIFIED SITE, UNSPECIFIED WHETHER RHEUMATOID FACTOR PRESENT: Primary | ICD-10-CM

## 2022-10-11 DIAGNOSIS — E11.9 TYPE 2 DIABETES MELLITUS WITHOUT COMPLICATION, WITHOUT LONG-TERM CURRENT USE OF INSULIN: ICD-10-CM

## 2022-10-11 DIAGNOSIS — Z01.818 PRE-OPERATIVE CLEARANCE: Primary | ICD-10-CM

## 2022-10-11 DIAGNOSIS — M25.511 CHRONIC PAIN OF BOTH SHOULDERS: ICD-10-CM

## 2022-10-11 DIAGNOSIS — M06.9 RHEUMATOID ARTHRITIS, INVOLVING UNSPECIFIED SITE, UNSPECIFIED WHETHER RHEUMATOID FACTOR PRESENT: ICD-10-CM

## 2022-10-11 DIAGNOSIS — Z23 NEED FOR COVID-19 VACCINE: ICD-10-CM

## 2022-10-11 DIAGNOSIS — G89.29 CHRONIC PAIN OF BOTH SHOULDERS: ICD-10-CM

## 2022-10-11 DIAGNOSIS — M25.512 CHRONIC PAIN OF BOTH SHOULDERS: ICD-10-CM

## 2022-10-11 DIAGNOSIS — M19.011 OSTEOARTHRITIS OF BOTH SHOULDERS, UNSPECIFIED OSTEOARTHRITIS TYPE: ICD-10-CM

## 2022-10-11 DIAGNOSIS — M19.012 OSTEOARTHRITIS OF BOTH SHOULDERS, UNSPECIFIED OSTEOARTHRITIS TYPE: ICD-10-CM

## 2022-10-11 DIAGNOSIS — I10 ESSENTIAL HYPERTENSION: ICD-10-CM

## 2022-10-11 PROCEDURE — 3066F PR DOCUMENTATION OF TREATMENT FOR NEPHROPATHY: ICD-10-PCS | Mod: CPTII,S$GLB,, | Performed by: FAMILY MEDICINE

## 2022-10-11 PROCEDURE — 3072F LOW RISK FOR RETINOPATHY: CPT | Mod: CPTII,GC,S$GLB, | Performed by: STUDENT IN AN ORGANIZED HEALTH CARE EDUCATION/TRAINING PROGRAM

## 2022-10-11 PROCEDURE — 93010 EKG 12-LEAD: ICD-10-PCS | Mod: S$GLB,,, | Performed by: INTERNAL MEDICINE

## 2022-10-11 PROCEDURE — 3079F PR MOST RECENT DIASTOLIC BLOOD PRESSURE 80-89 MM HG: ICD-10-PCS | Mod: CPTII,S$GLB,, | Performed by: FAMILY MEDICINE

## 2022-10-11 PROCEDURE — 71046 X-RAY EXAM CHEST 2 VIEWS: CPT | Mod: TC

## 2022-10-11 PROCEDURE — 3288F PR FALLS RISK ASSESSMENT DOCUMENTED: ICD-10-PCS | Mod: CPTII,S$GLB,, | Performed by: FAMILY MEDICINE

## 2022-10-11 PROCEDURE — 3052F PR MOST RECENT HEMOGLOBIN A1C LEVEL 8.0 - < 9.0%: ICD-10-PCS | Mod: CPTII,S$GLB,, | Performed by: FAMILY MEDICINE

## 2022-10-11 PROCEDURE — 3078F DIAST BP <80 MM HG: CPT | Mod: CPTII,GC,S$GLB, | Performed by: STUDENT IN AN ORGANIZED HEALTH CARE EDUCATION/TRAINING PROGRAM

## 2022-10-11 PROCEDURE — 93005 EKG 12-LEAD: ICD-10-PCS | Mod: S$GLB,,, | Performed by: FAMILY MEDICINE

## 2022-10-11 PROCEDURE — 99214 OFFICE O/P EST MOD 30 MIN: CPT | Mod: S$GLB,,, | Performed by: FAMILY MEDICINE

## 2022-10-11 PROCEDURE — 3052F PR MOST RECENT HEMOGLOBIN A1C LEVEL 8.0 - < 9.0%: ICD-10-PCS | Mod: CPTII,GC,S$GLB, | Performed by: STUDENT IN AN ORGANIZED HEALTH CARE EDUCATION/TRAINING PROGRAM

## 2022-10-11 PROCEDURE — 99999 PR PBB SHADOW E&M-EST. PATIENT-LVL IV: ICD-10-PCS | Mod: PBBFAC,GC,, | Performed by: STUDENT IN AN ORGANIZED HEALTH CARE EDUCATION/TRAINING PROGRAM

## 2022-10-11 PROCEDURE — 1101F PT FALLS ASSESS-DOCD LE1/YR: CPT | Mod: CPTII,S$GLB,, | Performed by: FAMILY MEDICINE

## 2022-10-11 PROCEDURE — 99999 PR PBB SHADOW E&M-EST. PATIENT-LVL IV: CPT | Mod: PBBFAC,,, | Performed by: FAMILY MEDICINE

## 2022-10-11 PROCEDURE — 1101F PR PT FALLS ASSESS DOC 0-1 FALLS W/OUT INJ PAST YR: ICD-10-PCS | Mod: CPTII,S$GLB,, | Performed by: FAMILY MEDICINE

## 2022-10-11 PROCEDURE — 1159F PR MEDICATION LIST DOCUMENTED IN MEDICAL RECORD: ICD-10-PCS | Mod: CPTII,GC,S$GLB, | Performed by: STUDENT IN AN ORGANIZED HEALTH CARE EDUCATION/TRAINING PROGRAM

## 2022-10-11 PROCEDURE — 3072F LOW RISK FOR RETINOPATHY: CPT | Mod: CPTII,S$GLB,, | Performed by: FAMILY MEDICINE

## 2022-10-11 PROCEDURE — 3061F NEG MICROALBUMINURIA REV: CPT | Mod: CPTII,GC,S$GLB, | Performed by: STUDENT IN AN ORGANIZED HEALTH CARE EDUCATION/TRAINING PROGRAM

## 2022-10-11 PROCEDURE — 3008F PR BODY MASS INDEX (BMI) DOCUMENTED: ICD-10-PCS | Mod: CPTII,GC,S$GLB, | Performed by: STUDENT IN AN ORGANIZED HEALTH CARE EDUCATION/TRAINING PROGRAM

## 2022-10-11 PROCEDURE — 3061F NEG MICROALBUMINURIA REV: CPT | Mod: CPTII,S$GLB,, | Performed by: FAMILY MEDICINE

## 2022-10-11 PROCEDURE — 71046 X-RAY EXAM CHEST 2 VIEWS: CPT | Mod: 26,,, | Performed by: RADIOLOGY

## 2022-10-11 PROCEDURE — 3066F PR DOCUMENTATION OF TREATMENT FOR NEPHROPATHY: ICD-10-PCS | Mod: CPTII,GC,S$GLB, | Performed by: STUDENT IN AN ORGANIZED HEALTH CARE EDUCATION/TRAINING PROGRAM

## 2022-10-11 PROCEDURE — 3052F HG A1C>EQUAL 8.0%<EQUAL 9.0%: CPT | Mod: CPTII,GC,S$GLB, | Performed by: STUDENT IN AN ORGANIZED HEALTH CARE EDUCATION/TRAINING PROGRAM

## 2022-10-11 PROCEDURE — 1125F AMNT PAIN NOTED PAIN PRSNT: CPT | Mod: CPTII,S$GLB,, | Performed by: FAMILY MEDICINE

## 2022-10-11 PROCEDURE — 3061F PR NEG MICROALBUMINURIA RESULT DOCUMENTED/REVIEW: ICD-10-PCS | Mod: CPTII,GC,S$GLB, | Performed by: STUDENT IN AN ORGANIZED HEALTH CARE EDUCATION/TRAINING PROGRAM

## 2022-10-11 PROCEDURE — 3066F NEPHROPATHY DOC TX: CPT | Mod: CPTII,GC,S$GLB, | Performed by: STUDENT IN AN ORGANIZED HEALTH CARE EDUCATION/TRAINING PROGRAM

## 2022-10-11 PROCEDURE — 99214 OFFICE O/P EST MOD 30 MIN: CPT | Mod: GC,S$GLB,, | Performed by: STUDENT IN AN ORGANIZED HEALTH CARE EDUCATION/TRAINING PROGRAM

## 2022-10-11 PROCEDURE — 3066F NEPHROPATHY DOC TX: CPT | Mod: CPTII,S$GLB,, | Performed by: FAMILY MEDICINE

## 2022-10-11 PROCEDURE — 73521 X-RAY EXAM HIPS BI 2 VIEWS: CPT | Mod: 26,,, | Performed by: RADIOLOGY

## 2022-10-11 PROCEDURE — 3008F BODY MASS INDEX DOCD: CPT | Mod: CPTII,GC,S$GLB, | Performed by: STUDENT IN AN ORGANIZED HEALTH CARE EDUCATION/TRAINING PROGRAM

## 2022-10-11 PROCEDURE — 93005 ELECTROCARDIOGRAM TRACING: CPT | Mod: S$GLB,,, | Performed by: FAMILY MEDICINE

## 2022-10-11 PROCEDURE — 3052F HG A1C>EQUAL 8.0%<EQUAL 9.0%: CPT | Mod: CPTII,S$GLB,, | Performed by: FAMILY MEDICINE

## 2022-10-11 PROCEDURE — 99999 PR PBB SHADOW E&M-EST. PATIENT-LVL IV: ICD-10-PCS | Mod: PBBFAC,,, | Performed by: FAMILY MEDICINE

## 2022-10-11 PROCEDURE — 3288F FALL RISK ASSESSMENT DOCD: CPT | Mod: CPTII,S$GLB,, | Performed by: FAMILY MEDICINE

## 2022-10-11 PROCEDURE — 1159F MED LIST DOCD IN RCRD: CPT | Mod: CPTII,S$GLB,, | Performed by: FAMILY MEDICINE

## 2022-10-11 PROCEDURE — 71046 XR CHEST PA AND LATERAL: ICD-10-PCS | Mod: 26,,, | Performed by: RADIOLOGY

## 2022-10-11 PROCEDURE — 1125F PR PAIN SEVERITY QUANTIFIED, PAIN PRESENT: ICD-10-PCS | Mod: CPTII,S$GLB,, | Performed by: FAMILY MEDICINE

## 2022-10-11 PROCEDURE — 3075F SYST BP GE 130 - 139MM HG: CPT | Mod: CPTII,S$GLB,, | Performed by: FAMILY MEDICINE

## 2022-10-11 PROCEDURE — 1125F PR PAIN SEVERITY QUANTIFIED, PAIN PRESENT: ICD-10-PCS | Mod: CPTII,GC,S$GLB, | Performed by: STUDENT IN AN ORGANIZED HEALTH CARE EDUCATION/TRAINING PROGRAM

## 2022-10-11 PROCEDURE — 99499 RISK ADDL DX/OHS AUDIT: ICD-10-PCS | Mod: HCNC,S$GLB,, | Performed by: FAMILY MEDICINE

## 2022-10-11 PROCEDURE — 3072F PR LOW RISK FOR RETINOPATHY: ICD-10-PCS | Mod: CPTII,GC,S$GLB, | Performed by: STUDENT IN AN ORGANIZED HEALTH CARE EDUCATION/TRAINING PROGRAM

## 2022-10-11 PROCEDURE — 99999 PR PBB SHADOW E&M-EST. PATIENT-LVL IV: CPT | Mod: PBBFAC,GC,, | Performed by: STUDENT IN AN ORGANIZED HEALTH CARE EDUCATION/TRAINING PROGRAM

## 2022-10-11 PROCEDURE — 73521 XR HIPS BILATERAL 2 VIEW INCL AP PELVIS: ICD-10-PCS | Mod: 26,,, | Performed by: RADIOLOGY

## 2022-10-11 PROCEDURE — 4010F ACE/ARB THERAPY RXD/TAKEN: CPT | Mod: CPTII,GC,S$GLB, | Performed by: STUDENT IN AN ORGANIZED HEALTH CARE EDUCATION/TRAINING PROGRAM

## 2022-10-11 PROCEDURE — 99214 PR OFFICE/OUTPT VISIT, EST, LEVL IV, 30-39 MIN: ICD-10-PCS | Mod: GC,S$GLB,, | Performed by: STUDENT IN AN ORGANIZED HEALTH CARE EDUCATION/TRAINING PROGRAM

## 2022-10-11 PROCEDURE — 99499 UNLISTED E&M SERVICE: CPT | Mod: HCNC,S$GLB,, | Performed by: FAMILY MEDICINE

## 2022-10-11 PROCEDURE — 3079F DIAST BP 80-89 MM HG: CPT | Mod: CPTII,S$GLB,, | Performed by: FAMILY MEDICINE

## 2022-10-11 PROCEDURE — 3078F PR MOST RECENT DIASTOLIC BLOOD PRESSURE < 80 MM HG: ICD-10-PCS | Mod: CPTII,GC,S$GLB, | Performed by: STUDENT IN AN ORGANIZED HEALTH CARE EDUCATION/TRAINING PROGRAM

## 2022-10-11 PROCEDURE — 4010F ACE/ARB THERAPY RXD/TAKEN: CPT | Mod: CPTII,S$GLB,, | Performed by: FAMILY MEDICINE

## 2022-10-11 PROCEDURE — 93010 ELECTROCARDIOGRAM REPORT: CPT | Mod: S$GLB,,, | Performed by: INTERNAL MEDICINE

## 2022-10-11 PROCEDURE — 1125F AMNT PAIN NOTED PAIN PRSNT: CPT | Mod: CPTII,GC,S$GLB, | Performed by: STUDENT IN AN ORGANIZED HEALTH CARE EDUCATION/TRAINING PROGRAM

## 2022-10-11 PROCEDURE — 3061F PR NEG MICROALBUMINURIA RESULT DOCUMENTED/REVIEW: ICD-10-PCS | Mod: CPTII,S$GLB,, | Performed by: FAMILY MEDICINE

## 2022-10-11 PROCEDURE — 1159F MED LIST DOCD IN RCRD: CPT | Mod: CPTII,GC,S$GLB, | Performed by: STUDENT IN AN ORGANIZED HEALTH CARE EDUCATION/TRAINING PROGRAM

## 2022-10-11 PROCEDURE — 3074F SYST BP LT 130 MM HG: CPT | Mod: CPTII,GC,S$GLB, | Performed by: STUDENT IN AN ORGANIZED HEALTH CARE EDUCATION/TRAINING PROGRAM

## 2022-10-11 PROCEDURE — 73521 X-RAY EXAM HIPS BI 2 VIEWS: CPT | Mod: TC

## 2022-10-11 PROCEDURE — 99214 PR OFFICE/OUTPT VISIT, EST, LEVL IV, 30-39 MIN: ICD-10-PCS | Mod: S$GLB,,, | Performed by: FAMILY MEDICINE

## 2022-10-11 PROCEDURE — 4010F PR ACE/ARB THEARPY RXD/TAKEN: ICD-10-PCS | Mod: CPTII,GC,S$GLB, | Performed by: STUDENT IN AN ORGANIZED HEALTH CARE EDUCATION/TRAINING PROGRAM

## 2022-10-11 PROCEDURE — 1159F PR MEDICATION LIST DOCUMENTED IN MEDICAL RECORD: ICD-10-PCS | Mod: CPTII,S$GLB,, | Performed by: FAMILY MEDICINE

## 2022-10-11 PROCEDURE — 3074F PR MOST RECENT SYSTOLIC BLOOD PRESSURE < 130 MM HG: ICD-10-PCS | Mod: CPTII,GC,S$GLB, | Performed by: STUDENT IN AN ORGANIZED HEALTH CARE EDUCATION/TRAINING PROGRAM

## 2022-10-11 PROCEDURE — 3075F PR MOST RECENT SYSTOLIC BLOOD PRESS GE 130-139MM HG: ICD-10-PCS | Mod: CPTII,S$GLB,, | Performed by: FAMILY MEDICINE

## 2022-10-11 PROCEDURE — 3072F PR LOW RISK FOR RETINOPATHY: ICD-10-PCS | Mod: CPTII,S$GLB,, | Performed by: FAMILY MEDICINE

## 2022-10-11 PROCEDURE — 4010F PR ACE/ARB THEARPY RXD/TAKEN: ICD-10-PCS | Mod: CPTII,S$GLB,, | Performed by: FAMILY MEDICINE

## 2022-10-11 RX ORDER — FOLIC ACID 1 MG/1
1 TABLET ORAL DAILY
Qty: 90 TABLET | Refills: 3 | Status: SHIPPED | OUTPATIENT
Start: 2022-10-11 | End: 2023-03-21 | Stop reason: SDUPTHER

## 2022-10-11 RX ORDER — METHOTREXATE 2.5 MG/1
15 TABLET ORAL
Qty: 24 TABLET | Refills: 1 | Status: SHIPPED | OUTPATIENT
Start: 2022-10-11 | End: 2022-11-18 | Stop reason: SDUPTHER

## 2022-10-11 RX ORDER — DULAGLUTIDE 1.5 MG/.5ML
1.5 INJECTION, SOLUTION SUBCUTANEOUS
Qty: 4 PEN | Refills: 11 | Status: SHIPPED | OUTPATIENT
Start: 2022-10-11 | End: 2023-09-25

## 2022-10-11 RX ORDER — CELECOXIB 200 MG/1
200 CAPSULE ORAL DAILY
Qty: 60 CAPSULE | Refills: 1 | Status: SHIPPED | OUTPATIENT
Start: 2022-10-11 | End: 2022-11-21

## 2022-10-11 NOTE — PROGRESS NOTES
"Subjective:       Patient ID: Krathik Bentley is a 67 y.o. male.    Chief Complaint: Follow-up and Pre-op Exam    Patient is here for follow-up of their chronic diseases & pre-op clearance    Re: Pre-op clearance:  "MANFRED Lazar MD; KATERINA WEISS Staff  Cc: Loraine Adames RN  Caller: Unspecified (Yesterday, 11:09 AM)  Patient is scheduled for ACDF C3-6 on 10/31 with Dr. Marshall.( approximately 155 minutes of general anesthesia) He will need medical clearance. He has a fu appt with you on 10/11. Will he be able to get his clearance then? If not, please schedule a preop clearance appt.   Thanks! "    Pt can walk a mile and also climbs a full flight of stairs without shortness of breath or chest pain problems. Will get a baseline ECG given he is 68yo w/DM, HTN, but if his baseline ECG is acceptable, no further cardiac workup needed for the 10/31/22 surgery scheduled in 20 days form today.  He is on a statin & last LDL early this year was <40 - excellent  HTN under good control with 130/80 today  DM: was adequate control in May but went up too high since with A1c below. When seen on 9/20/22 was started on dulaglutide (TRULICITY) 0.75 mg weekly and today will increase to 1.5mg weekly. Will check fructosamine (6 week lookback) to get better idea of readings this past month as well as HgbA1c (3 mo lookback) decrease. He is not able to check sugars at home due to his hand problems - RA. We did discuss that if his sugars are not well controlled that this may hinder the healing after surgery. Hopefully with his diet changes this past month, the start of trulicity 9/20/22,a nd the increase of trulicity today, sugars will be closer to goal by the time of surgery  Lab Results   Component Value Date    HGBA1C 8.5 (H) 09/20/2022    HGBA1C 7.5 (H) 05/10/2022    HGBA1C 8.0 (H) 01/12/2022     Diabetes Management Status    Statin: Taking  ACE/ARB: Taking    Screening or Prevention Patient's value Goal " Complete/Controlled?   HgA1C Testing and Control   Lab Results   Component Value Date    HGBA1C 8.5 (H) 09/20/2022      Annually/Less than 8% No     Lipid profile : 01/12/2022 Annually Yes     LDL control Lab Results   Component Value Date    LDLCALC 39.8 (L) 01/12/2022    Annually/Less than 100 mg/dl  Yes     Nephropathy screening Lab Results   Component Value Date    LABMICR 9.0 05/10/2022     No results found for: PROTEINUA  Lab Results   Component Value Date    UTPCR Unable to calculate 01/12/2022      Annually Yes     Blood pressure BP Readings from Last 1 Encounters:   10/11/22 130/80    Less than 140/90 Yes     Dilated retinal exam : 08/19/2021 Annually No     Foot exam   : 01/18/2022 Annually Yes         Also of note he is on methotrexate for his RA.   He should get the new bivalent covid booster today - while his immunosuppressed state means its effectiveness may be limited, best to get whatever effectiveness that is possible.   His rheumatologist note was reviewed and they are aware of his upcoming surgery and avoiding steroids        Review of patient's allergies indicates:  No Known Allergies    Social History     Tobacco Use    Smoking status: Never    Smokeless tobacco: Never   Substance Use Topics    Alcohol use: Yes     Comment: occasionally    Drug use: Never       Family History   Problem Relation Age of Onset    Bone cancer Mother     Colon cancer Father        Past Surgical History:   Procedure Laterality Date    COLONOSCOPY N/A 1/16/2020    Procedure: COLONOSCOPY;  Surgeon: Cynthia Kearney MD;  Location: [address];  Service: Endoscopy;  Laterality: N/A;    ENDOSCOPIC CARPAL TUNNEL RELEASE Right 4/1/2022    Procedure: RELEASE, CARPAL TUNNEL, ENDOSCOPIC - right arthrex;  Surgeon: Wiley Warren MD;  Location: [facility];  Service: Orthopedics;  Laterality: Right;    EPIDURAL STEROID INJECTION N/A 9/6/2022    Procedure: CERVICAL EDITH CONTRAST DIRECT REFERRAL;  Surgeon: Yasmeen Taylor,  MD;  Location: [facility];  Service: Pain Management;  Laterality: N/A;    SHOULDER SURGERY         Patient Active Problem List   Diagnosis    Essential hypertension    Type 2 diabetes mellitus, without long-term current use of insulin    Screen for colon cancer    Overweight    HSV infection    Chronic pain of both shoulders       Current Outpatient Medications on File Prior to Visit   Medication Sig Dispense Refill    blood sugar diagnostic Strp To check BG 1 times daily, to use with insurance preferred meter 100 strip 3    blood-glucose meter kit To check BG 1 times daily, to use with insurance preferred meter 1 each 0    CARBOCAINE, PF, 15 mg/mL (1.5 %) injection       celecoxib (CELEBREX) 200 MG capsule Take 1 capsule (200 mg total) by mouth once daily. 60 capsule 1    dulaglutide (TRULICITY) 0.75 mg/0.5 mL pen injector INJECT 0.75 MG(0.5 ML) UNDER THE SKIN EVERY 7 DAYS Strength: 0.75 mg/0.5 mL (Patient taking differently: INJECT 0.75 MG(0.5 ML) UNDER THE SKIN EVERY 7 DAYS Strength: 0.75 mg/0.5 mL  TAKES ON TUESDAYS) 4 pen 11    folic acid (FOLVITE) 1 MG tablet Take 1 tablet (1 mg total) by mouth once daily. 90 tablet 3    gabapentin (NEURONTIN) 300 MG capsule Take 2 capsules (600 mg total) by mouth every evening. 60 capsule 11    lancets Misc To check BG 1 times daily, to use with insurance preferred meter 100 each 3    metFORMIN (GLUCOPHAGE) 1000 MG tablet Take 1 tablet (1,000 mg total) by mouth 2 (two) times daily with meals. 180 tablet 3    methotrexate 2.5 MG Tab Take 6 tablets (15 mg total) by mouth every 7 days. 24 tablet 1    rosuvastatin (CRESTOR) 10 MG tablet Take 1 tablet (10 mg total) by mouth once daily. 90 tablet 3    tiZANidine (ZANAFLEX) 4 MG tablet Take 1 tablet (4 mg total) by mouth every 8 (eight) hours as needed (muscle spasms). 60 tablet 1    valACYclovir (VALTREX) 500 MG tablet Take 1 tablet (500 mg total) by mouth once daily. 30 tablet 2    valsartan (DIOVAN) 160 MG tablet Take 1  "tablet (160 mg total) by mouth once daily. 90 tablet 3    [DISCONTINUED] celecoxib (CELEBREX) 200 MG capsule Take 1 capsule (200 mg total) by mouth once daily. (Patient not taking: Reported on 10/11/2022) 60 capsule 1     No current facility-administered medications on file prior to visit.           Review of Systems   Constitutional:  Negative for chills and fever.   HENT:  Negative for ear pain.    Eyes:  Negative for pain.   Respiratory:  Negative for chest tightness.    Cardiovascular:  Negative for chest pain.   Gastrointestinal:  Negative for abdominal pain.   Genitourinary:  Negative for flank pain.   Musculoskeletal:  Positive for arthralgias and myalgias. Negative for gait problem.   Neurological:  Positive for numbness. Negative for syncope.   Psychiatric/Behavioral:  Negative for behavioral problems.      Objective:    /80 (BP Location: Left arm, Patient Position: Sitting, BP Method: Medium (Manual))   Pulse 67   Ht 5' 8" (1.727 m)   Wt 83.9 kg (185 lb)   SpO2 98%   BMI 28.13 kg/m²     Physical Exam  Constitutional:       Appearance: He is well-developed.   HENT:      Head: Normocephalic and atraumatic.   Cardiovascular:      Rate and Rhythm: Normal rate.      Heart sounds: Normal heart sounds.   Pulmonary:      Effort: No respiratory distress.      Breath sounds: Normal breath sounds. No wheezing or rales.   Abdominal:      Palpations: Abdomen is soft.   Musculoskeletal:      Cervical back: Neck supple.   Skin:     General: Skin is dry.   Neurological:      Mental Status: He is alert.   Psychiatric:         Behavior: Behavior normal.       Assessment:       1. Pre-operative clearance    2. Essential hypertension    3. Type 2 diabetes mellitus without complication, without long-term current use of insulin    4. Need for COVID-19 vaccine          Plan:       Ray was seen today for follow-up and pre-op exam.    Diagnoses and all orders for this visit:    Pre-operative clearance  -     IN OFFICE " EKG 12-LEAD (to [place]) - will await cardiology reading before completing this note  - medical optimization:   ---HOLD metformin starting with when ever meals must be skipped for surgery (e.g. day of, and if instructed to not have supper the night before).  ---Can take his morning BP meds with sip of water unless surgery or anesthesia instruct him otherwise  -    will send a copy of this note to the requesting surgeon once it is completed  Addendum 10/14/22: cardiology reading of ECG reviewed. Pt is cleared for surgery  Note: Pt send me a message saying he was planning on postponing surgery. That of coruse is up to him, but he is cleared for surgery  Copy of this will be sent to the surgical team    Essential hypertension  -controlled, stable, no change in meds    Type 2 diabetes mellitus without complication, without long-term current use of insulin  -     INCREASE TO: dulaglutide (TRULICITY) 1.5 mg/0.5 mL pen injector; Inject 1.5 mg into the skin every 7 days. {Completed 4 shots of 0.75mg dose]  -     Hemoglobin A1C; Future  -     FRUCTOSAMINE; Future  -     Ambulatory referral/consult to Optometry; Future - routine, not needed before surgery    Need for COVID-19 vaccine  -will get the bivalent covid booster today    Follow up in about 3 months (around 1/11/2023) for dm.

## 2022-10-11 NOTE — PATIENT INSTRUCTIONS
Labs today and chest x-ray  Continue Celebrex 200 mg (1 tablet) daily  Start methotrexate 6 tablets weekly (3 tablets in the morning ad 3 tablets in the evening ONCE A WEEK)  Start folic acid 1 tablet daily  Physical therapy for bilateral shoulders  Get the new COVID booster  Follow up in 1 month with labs prior

## 2022-10-11 NOTE — PROGRESS NOTES
I  Have personally take the history and examined the patient and agree with fellow's note as stated above. The methotrexate will be 15mg po one day/week in split dose, 7.5mg am and 7.5mg  pm with folic acid 1mg daily. Pre-DMARD labs ordered. New bivalent Covid booster 2 wks before starting methotrexate. Chest x-ray and AP pelvis with hips today  Ref to PT. RTC 4 wks with standing labs.

## 2022-10-11 NOTE — PROGRESS NOTES
Subjective:       Patient ID: Karthik Bentley is a 67 y.o. male.    Chief Complaint: RA    HPI  Interval Hx:  He's doing worse today. His left thumb is more swollen and painful. He also has swelling in the other MCPs of the left hand. He complains again of pain in bilateral shoulders and due to the pain he is unable to exercise and feels that he is getting weaker. His bilateral shoulder pain and stiffness is worsening. He has morning stiffness for a couple hours in the legs, backs of knees.       Initial Hx:  Patient is a 68 yo M with diabetes who presents for Rheumatology consultation for gout suspected in the left hand. He initially had RIGHT carpal tunnel surgery. He now presents with pain and swelling in the LEFT thumb and 2nd MCP. He also has carpal tunnel in the left wrist. He started with weakness in the left hand in mid-April. He complained to the OT about this but they were only treating his right hand post carpal tunnel surgery on 4/1/22. Left hand weakness and stiffness got worse until now he can't make a fist. A few months ago he developed swelling at the left thumb and 2nd finger MCP. Pain was not too much but a few days ago it became very tender 9/10 even with light touch. He denies erythema or warmth ever. He contacted Dr. Warren who started a medrol dosepack which he started last night. This helped with the pain.    He had a gout attacks in the past when he was in [employer] working. He had flares in the right big toe; he would wake up from sleep with severe pain in the great toe; there was a little swelling, some erythema, sometimes warmth. Mostly it was painful with bearing weight. Flares lasted 3 days and resolved with a medication he took there and can't remember the name. It happens once or twice a year and he still gets flares in the right great toe, last was 3-4 months ago and continues to abort the flare with the medication. Never had joint fluid aspiration. He has not had a gout flare in  other joints. He has never had a kidney stone. No known family history of gout. Has never been on allopurinol, colchicine, or steroids for gout. 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 Never was much of an alcohol drinker - no alcohol anymore.    He also had shoulder pain. 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  No acute fracture or dislocation.  No bone destruction identified..  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 He was referred to Dr. Degroot and got bilateral shoulder injections. He was doing well in PT for the shoulders but one day woke up with terrible pain in the shoulders and reduced ROM. He told Dr. Degroot who then referred to neck and spine Ortho.    He had MRI cervical spine 8/24/22: 1. Multilevel cervical spondylosis resulting in varying degrees of foraminal narrowing and spinal canal stenosis most notable at C4-C5 and C5-C6.  2. Short segment cord signal abnormality at C4-C5 which is suggestive chronic compressive myelopathy.  He had epidural injection for this and a medrol dosepack prior (7/28/22). He is on Celebrex for this.    Social Hx: never smoker; doesn't drink alcohol, never was a heavy drinker.   Family Hx: no family history of autoimmune disease    Review of Systems   Constitutional:  Negative for chills, fever and unexpected weight change.   HENT:  Negative for mouth sores, sore throat and trouble swallowing.    Eyes:  Negative for photophobia, redness and visual disturbance.   Respiratory:  Negative for cough, shortness of breath and wheezing.    Cardiovascular:  Negative for chest pain and leg swelling.   Gastrointestinal:  Negative for abdominal pain, constipation, diarrhea and vomiting.   Genitourinary:  Negative for dysuria, frequency and genital sores.    Musculoskeletal:  Positive for arthralgias and joint swelling. Negative for back pain.   Skin:  Negative for rash.   Neurological:  Negative for syncope and headaches.   Hematological:  Does not bruise/bleed easily.       Objective:   /74   Pulse 79   Wt 85.8 kg (189 lb 2.5 oz)   BMI 28.76 kg/m²      Physical Exam   Constitutional: He is oriented to person, place, and time. No distress.   HENT:   Head: Normocephalic and atraumatic.   Eyes: Pupils are equal, round, and reactive to light.   Cardiovascular: Normal rate and regular rhythm.   No murmur heard.  Pulmonary/Chest: Effort normal and breath sounds normal. No respiratory distress. He has no wheezes.   Abdominal: Soft. Bowel sounds are normal. There is no abdominal tenderness.   Musculoskeletal:         General: No deformity.      Right shoulder: Normal.      Left shoulder: Normal.      Right elbow: Normal.      Left elbow: Normal.      Right wrist: Normal.      Left wrist: Normal.      Cervical back: Normal range of motion.      Right knee: Normal.      Left knee: Normal.      Comments: He has synovitis and joint tenderness. See homunculus.  Trace fluid bilateral knees.  Hip ROM is limited by stiffness.   Neurological: He is alert and oriented to person, place, and time.   Skin: Skin is warm and dry.   Psychiatric: Affect and judgment normal.       Right Side Rheumatological Exam     Examination finds the shoulder, elbow, wrist, knee, 1st PIP, 1st MCP, 2nd PIP, 2nd MCP, 3rd PIP, 3rd MCP, 4th PIP, 4th MCP, 5th PIP and 5th MCP normal.    Muscle Strength (0-5 scale):  Neck Flexion:  5  Neck Extension: 5  Deltoid:  5  Biceps: 5/5   Triceps:  5  : 5/5   Iliopsoas: 5  Quadriceps:  5   Distal Lower Extremity: 5    Left Side Rheumatological Exam     Examination finds the shoulder, elbow, wrist, knee, 1st PIP, 1st MCP, 2nd PIP, 2nd MCP, 3rd PIP, 3rd MCP, 4th PIP, 4th MCP, 5th PIP and 5th MCP normal.    Muscle Strength (0-5 scale):  Neck Flexion:  5  Neck  Extension: 5  Deltoid:  5  Biceps: 5/5   Triceps:  5  :  5/5   Iliopsoas: 5  Quadriceps:  5   Distal Lower Extremity: 5          9/16/2022 10/11/2022   Tender (WALKER-28) 1 / 28  10 / 28    Swollen (WALKER-28) 6 / 28 2 / 28    Provider Global -- 60 mm   Patient Global -- 90 mm   ESR 77 mm/hr 77 mm/hr   CRP 6.5 mg/L 6.5 mg/L   WALKER-28 (ESR) -- 6.47 (High disease activity)   WALKER-28 (CRP) -- 5.11 (High disease activity)   CDAI Score -- 27           Assessment:       1. Rheumatoid arthritis, involving unspecified site, unspecified whether rheumatoid factor present    2. Osteoarthritis of both shoulders, unspecified osteoarthritis type    3. Chronic pain of both shoulders                Plan:       Problem List Items Addressed This Visit          Orthopedic    Chronic pain of both shoulders    Relevant Medications    celecoxib (CELEBREX) 200 MG capsule     Other Visit Diagnoses       Rheumatoid arthritis, involving unspecified site, unspecified whether rheumatoid factor present    -  Primary    Relevant Medications    methotrexate 2.5 MG Tab    folic acid (FOLVITE) 1 MG tablet    Other Relevant Orders    HIV 1/2 Ag/Ab (4th Gen)    Hepatitis B surface antigen    HBcAB    Hepatitis B surface antibody    Hepatitis A antibody, IgG    Strongyloides IgG Antibodies    Quantiferon Gold TB    RPR    Varicella zoster antibody, IgG    Ambulatory referral/consult to Physical/Occupational Therapy    X-Ray Chest PA And Lateral    CBC Auto Differential    Comprehensive Metabolic Panel    C-Reactive Protein    Sedimentation rate    X-Ray Hips Bilateral 2 View Inc AP Pelvis    Osteoarthritis of both shoulders, unspecified osteoarthritis type        Relevant Orders    Ambulatory referral/consult to Physical/Occupational Therapy          Patient is a 68 yo M with diabetes who presents for Rheumatology consultation for gout suspected in the left hand.     Reviewed hand XR: appears to have marginal erosions 1st, 2nd, possibly 4th MCPs and  calcium deposit 3rd MCP  Has synovitis on exam.     Labs 9/13/22:   Latest Reference Range & Units 04/28/09 08:38 09/07/11 16:23 09/13/22 10:20   Uric Acid 3.4 - 7.0 mg/dL 7.5 (H) 7.4 (H) 6.0   (H): Data is abnormally high    RF 25 (mildly elevated)  CCP negative  IESHA negative  SPEP and ОЛЬГА were normal  Anti-carP antibody negative  Large effusion on the right knee, aspirated last visit. Fluid had 111 WBCs and no crystals.    MRI hands/wrists RA protocol: MR imaging findings most concerning for an inflammatory arthropathy favoring rheumatoid arthritis with moderate disease activity and mild risk of future aggressiveness.    He has rheumatoid arthritis. He also has OA in the shoulders. He self-discontinued PT when he was diagnosed with the neck issues.     Plan:  Labs today: pre-DMARDs, ESR, CRP  Start methotrexate 6 tablets weekly in split dosing  Start folic acid 1 mg daily  Refilled Celebrex 200 mg daily  Will avoid giving a course of prednisone because of his upcoming C-spine discectomy and fusion on 10/31/22.  CXR prior to starting MTX  Physical therapy for bilateral shoulders  Advised to get the new COVID booster  RTC 1 month with labs prior (CBC, CMP, ESR, CRP)      Ashley Max MD  Rheumatology Fellow  PGY-5

## 2022-10-12 ENCOUNTER — CLINICAL SUPPORT (OUTPATIENT)
Dept: REHABILITATION | Facility: HOSPITAL | Age: 67
End: 2022-10-12
Payer: MEDICARE

## 2022-10-12 ENCOUNTER — TELEPHONE (OUTPATIENT)
Dept: INTERNAL MEDICINE | Facility: CLINIC | Age: 67
End: 2022-10-12
Payer: MEDICARE

## 2022-10-12 DIAGNOSIS — M19.011 OSTEOARTHRITIS OF BOTH SHOULDERS, UNSPECIFIED OSTEOARTHRITIS TYPE: ICD-10-CM

## 2022-10-12 DIAGNOSIS — M25.511 ACUTE PAIN OF BOTH SHOULDERS: ICD-10-CM

## 2022-10-12 DIAGNOSIS — M19.012 OSTEOARTHRITIS OF BOTH SHOULDERS, UNSPECIFIED OSTEOARTHRITIS TYPE: ICD-10-CM

## 2022-10-12 DIAGNOSIS — M62.81 MUSCLE WEAKNESS (GENERALIZED): ICD-10-CM

## 2022-10-12 DIAGNOSIS — M06.9 RHEUMATOID ARTHRITIS, INVOLVING UNSPECIFIED SITE, UNSPECIFIED WHETHER RHEUMATOID FACTOR PRESENT: ICD-10-CM

## 2022-10-12 DIAGNOSIS — Z01.818 PRE-OP EVALUATION: Primary | ICD-10-CM

## 2022-10-12 DIAGNOSIS — M25.512 ACUTE PAIN OF BOTH SHOULDERS: ICD-10-CM

## 2022-10-12 DIAGNOSIS — Z74.09 DECREASED MOBILITY AND ENDURANCE: ICD-10-CM

## 2022-10-12 PROCEDURE — 97530 THERAPEUTIC ACTIVITIES: CPT | Mod: PN

## 2022-10-12 PROCEDURE — 97110 THERAPEUTIC EXERCISES: CPT | Mod: PN

## 2022-10-12 PROCEDURE — 97162 PT EVAL MOD COMPLEX 30 MIN: CPT | Mod: PN

## 2022-10-12 NOTE — PROGRESS NOTES
[facility]   Physical Therapy Initial Evaluation     Date: 10/12/2022   Name: Karthik Bentley  Clinic Number: [id number]    Therapy Diagnosis:   Encounter Diagnoses   Name Primary?    Rheumatoid arthritis, involving unspecified site, unspecified whether rheumatoid factor present     Osteoarthritis of both shoulders, unspecified osteoarthritis type     Acute pain of both shoulders     Muscle weakness (generalized)     Decreased mobility and endurance      Physician: Ashley Max MD    Physician Orders: PT Eval and Treat   Medical Diagnosis from Referral:   M06.9 (ICD-10-CM) - Rheumatoid arthritis, involving unspecified site, unspecified whether rheumatoid factor present   M19.011,M19.012 (ICD-10-CM) - Osteoarthritis of both shoulders, unspecified osteoarthritis type     Evaluation Date: 10/12/2022  Authorization Period Expiration: 10/11/2023  Plan of Care Expiration: 12/7/2022   Progress Note Due: 11/12/2022  Visit # / Visits authorized: 1/ 1     Eval Visit FOTO - 45 (10/12/2022)  5th Visit FOTO  - (Date/Score/Turn Green)   10th Visit FOTO - (Date/Score/Turn Green)   D/C FOTO -  (Date/Score/Turn Green)      Precautions: Standard, Diabetes, and Fall     Time In: 15:15  Time Out: 14:00  Total Appointment Time (timed & untimed codes): 45 minutes      Please see Plan of Care notation section to view Karthik Bentley Initial Evaluation.       Babak López, PT,DPT

## 2022-10-12 NOTE — TELEPHONE ENCOUNTER
Order for cardiology ecg is in      ===  Normal sinus rhythm   Otherwise normal ECG   When compared with ECG of 15-ALMA-2007 07:18,   Limb lead reversal is now present     Please repeat with proper lead placement   Confirmed by Marc Gandara MD ([id number]) on 10/12/2022 3:22:00 PM

## 2022-10-12 NOTE — PATIENT INSTRUCTIONS
Access Code: [email or web address]  URL: [email or web address]  Date: 10/12/2022  Prepared by: Babak López

## 2022-10-13 ENCOUNTER — PATIENT MESSAGE (OUTPATIENT)
Dept: SPORTS MEDICINE | Facility: CLINIC | Age: 67
End: 2022-10-13
Payer: MEDICARE

## 2022-10-13 ENCOUNTER — PATIENT MESSAGE (OUTPATIENT)
Dept: RHEUMATOLOGY | Facility: CLINIC | Age: 67
End: 2022-10-13
Payer: MEDICARE

## 2022-10-13 ENCOUNTER — PATIENT MESSAGE (OUTPATIENT)
Dept: INTERNAL MEDICINE | Facility: CLINIC | Age: 67
End: 2022-10-13
Payer: MEDICARE

## 2022-10-13 ENCOUNTER — PATIENT MESSAGE (OUTPATIENT)
Dept: ORTHOPEDICS | Facility: CLINIC | Age: 67
End: 2022-10-13
Payer: MEDICARE

## 2022-10-14 ENCOUNTER — TELEPHONE (OUTPATIENT)
Dept: REHABILITATION | Facility: HOSPITAL | Age: 67
End: 2022-10-14
Payer: MEDICARE

## 2022-10-14 ENCOUNTER — CLINICAL SUPPORT (OUTPATIENT)
Dept: OPTOMETRY | Facility: CLINIC | Age: 67
End: 2022-10-14
Payer: MEDICARE

## 2022-10-14 ENCOUNTER — PATIENT MESSAGE (OUTPATIENT)
Dept: ORTHOPEDICS | Facility: CLINIC | Age: 67
End: 2022-10-14
Payer: MEDICARE

## 2022-10-14 ENCOUNTER — IMMUNIZATION (OUTPATIENT)
Dept: INTERNAL MEDICINE | Facility: CLINIC | Age: 67
End: 2022-10-14
Payer: MEDICARE

## 2022-10-14 DIAGNOSIS — Z23 NEED FOR VACCINATION: Primary | ICD-10-CM

## 2022-10-14 DIAGNOSIS — E11.9 TYPE 2 DIABETES MELLITUS WITHOUT COMPLICATION, WITHOUT LONG-TERM CURRENT USE OF INSULIN: Primary | ICD-10-CM

## 2022-10-14 PROCEDURE — 92228 IMG RTA DETC/MNTR DS PHY/QHP: CPT | Mod: TC,S$GLB,, | Performed by: FAMILY MEDICINE

## 2022-10-14 PROCEDURE — 92228 DIABETIC EYE SCREENING PHOTO: ICD-10-PCS | Mod: TC,S$GLB,, | Performed by: FAMILY MEDICINE

## 2022-10-14 PROCEDURE — 2025F PR 7 STANDARD FLD STEREO RETINAL PHOTOS W/O EVID OF RETINOPATHY W/INTERPT BY OPHTH/OPT: ICD-10-PCS | Mod: CPTII,S$GLB,, | Performed by: FAMILY MEDICINE

## 2022-10-14 PROCEDURE — 92228 DIABETIC EYE SCREENING PHOTO: ICD-10-PCS | Mod: 26,S$GLB,, | Performed by: OPHTHALMOLOGY

## 2022-10-14 PROCEDURE — 92228 IMG RTA DETC/MNTR DS PHY/QHP: CPT | Mod: 26,S$GLB,, | Performed by: OPHTHALMOLOGY

## 2022-10-14 PROCEDURE — 91312 COVID-19, MRNA, LNP-S, BIVALENT BOOSTER, PF, 30 MCG/0.3 ML DOSE: CPT | Mod: S$GLB,,, | Performed by: INTERNAL MEDICINE

## 2022-10-14 PROCEDURE — 2025F 7 FLD RTA PHOTO W/O RTNOPTHY: CPT | Mod: CPTII,S$GLB,, | Performed by: FAMILY MEDICINE

## 2022-10-14 PROCEDURE — 0124A COVID-19, MRNA, LNP-S, BIVALENT BOOSTER, PF, 30 MCG/0.3 ML DOSE: CPT | Mod: CV19,PBBFAC | Performed by: INTERNAL MEDICINE

## 2022-10-14 PROCEDURE — 91312 COVID-19, MRNA, LNP-S, BIVALENT BOOSTER, PF, 30 MCG/0.3 ML DOSE: ICD-10-PCS | Mod: S$GLB,,, | Performed by: INTERNAL MEDICINE

## 2022-10-14 NOTE — PROGRESS NOTES
Karthik Bentley is a 67 y.o. male here for a diabetic eye screening with non-dilated fundus photos per Dr Moya.    Patient cooperative?: Yes  Small pupils?: Yes  Last eye exam: 8/19/2021    For exam results, see Encounter Report.

## 2022-10-14 NOTE — TELEPHONE ENCOUNTER
Mr. Bentley was called to follow up on the status of his surgery due to the MRI results. He was advised to continue with the surgical route due to the myelopathy occurring in the cervical spine and to hold off PT at this time. Pt confirmed understanding and PT will be held off at this time for his shoulder pain.

## 2022-10-18 ENCOUNTER — TELEPHONE (OUTPATIENT)
Dept: INTERNAL MEDICINE | Facility: CLINIC | Age: 67
End: 2022-10-18
Payer: MEDICARE

## 2022-10-18 NOTE — TELEPHONE ENCOUNTER
----- Message from Arianna Davies sent at 10/18/2022  4:50 PM CDT -----  Regarding: EKG  Pt is confused as to why he has to have another EKG. Please contact pt and explain.

## 2022-10-21 ENCOUNTER — ANESTHESIA EVENT (OUTPATIENT)
Dept: SURGERY | Facility: HOSPITAL | Age: 67
DRG: 473 | End: 2022-10-21
Payer: MEDICARE

## 2022-10-21 ENCOUNTER — OFFICE VISIT (OUTPATIENT)
Dept: ORTHOPEDICS | Facility: CLINIC | Age: 67
End: 2022-10-21
Payer: MEDICARE

## 2022-10-21 ENCOUNTER — HOSPITAL ENCOUNTER (OUTPATIENT)
Dept: PREADMISSION TESTING | Facility: HOSPITAL | Age: 67
Discharge: HOME | End: 2022-10-21
Attending: ORTHOPAEDIC SURGERY | Admitting: ORTHOPAEDIC SURGERY
Payer: MEDICARE

## 2022-10-21 VITALS
HEART RATE: 82 BPM | WEIGHT: 185.88 LBS | DIASTOLIC BLOOD PRESSURE: 82 MMHG | OXYGEN SATURATION: 98 % | TEMPERATURE: 98 F | SYSTOLIC BLOOD PRESSURE: 130 MMHG | RESPIRATION RATE: 16 BRPM | HEIGHT: 68 IN | BODY MASS INDEX: 28.17 KG/M2

## 2022-10-21 VITALS — WEIGHT: 185 LBS | HEIGHT: 68 IN | BODY MASS INDEX: 28.04 KG/M2

## 2022-10-21 DIAGNOSIS — G95.9 CERVICAL MYELOPATHY: Primary | ICD-10-CM

## 2022-10-21 PROCEDURE — 3288F FALL RISK ASSESSMENT DOCD: CPT | Mod: CPTII,S$GLB,, | Performed by: ORTHOPAEDIC SURGERY

## 2022-10-21 PROCEDURE — 1159F PR MEDICATION LIST DOCUMENTED IN MEDICAL RECORD: ICD-10-PCS | Mod: CPTII,S$GLB,, | Performed by: ORTHOPAEDIC SURGERY

## 2022-10-21 PROCEDURE — 3061F NEG MICROALBUMINURIA REV: CPT | Mod: CPTII,S$GLB,, | Performed by: ORTHOPAEDIC SURGERY

## 2022-10-21 PROCEDURE — 1101F PT FALLS ASSESS-DOCD LE1/YR: CPT | Mod: CPTII,S$GLB,, | Performed by: ORTHOPAEDIC SURGERY

## 2022-10-21 PROCEDURE — 3052F PR MOST RECENT HEMOGLOBIN A1C LEVEL 8.0 - < 9.0%: ICD-10-PCS | Mod: CPTII,S$GLB,, | Performed by: ORTHOPAEDIC SURGERY

## 2022-10-21 PROCEDURE — 1101F PR PT FALLS ASSESS DOC 0-1 FALLS W/OUT INJ PAST YR: ICD-10-PCS | Mod: CPTII,S$GLB,, | Performed by: ORTHOPAEDIC SURGERY

## 2022-10-21 PROCEDURE — 99999 PR PBB SHADOW E&M-EST. PATIENT-LVL III: ICD-10-PCS | Mod: PBBFAC,,, | Performed by: ORTHOPAEDIC SURGERY

## 2022-10-21 PROCEDURE — 3288F PR FALLS RISK ASSESSMENT DOCUMENTED: ICD-10-PCS | Mod: CPTII,S$GLB,, | Performed by: ORTHOPAEDIC SURGERY

## 2022-10-21 PROCEDURE — 4010F PR ACE/ARB THEARPY RXD/TAKEN: ICD-10-PCS | Mod: CPTII,S$GLB,, | Performed by: ORTHOPAEDIC SURGERY

## 2022-10-21 PROCEDURE — 1159F MED LIST DOCD IN RCRD: CPT | Mod: CPTII,S$GLB,, | Performed by: ORTHOPAEDIC SURGERY

## 2022-10-21 PROCEDURE — 4010F ACE/ARB THERAPY RXD/TAKEN: CPT | Mod: CPTII,S$GLB,, | Performed by: ORTHOPAEDIC SURGERY

## 2022-10-21 PROCEDURE — 3052F HG A1C>EQUAL 8.0%<EQUAL 9.0%: CPT | Mod: CPTII,S$GLB,, | Performed by: ORTHOPAEDIC SURGERY

## 2022-10-21 PROCEDURE — 3061F PR NEG MICROALBUMINURIA RESULT DOCUMENTED/REVIEW: ICD-10-PCS | Mod: CPTII,S$GLB,, | Performed by: ORTHOPAEDIC SURGERY

## 2022-10-21 PROCEDURE — 3066F PR DOCUMENTATION OF TREATMENT FOR NEPHROPATHY: ICD-10-PCS | Mod: CPTII,S$GLB,, | Performed by: ORTHOPAEDIC SURGERY

## 2022-10-21 PROCEDURE — 3072F LOW RISK FOR RETINOPATHY: CPT | Mod: CPTII,S$GLB,, | Performed by: ORTHOPAEDIC SURGERY

## 2022-10-21 PROCEDURE — 99214 OFFICE O/P EST MOD 30 MIN: CPT | Mod: S$GLB,,, | Performed by: ORTHOPAEDIC SURGERY

## 2022-10-21 PROCEDURE — 1125F AMNT PAIN NOTED PAIN PRSNT: CPT | Mod: CPTII,S$GLB,, | Performed by: ORTHOPAEDIC SURGERY

## 2022-10-21 PROCEDURE — 1125F PR PAIN SEVERITY QUANTIFIED, PAIN PRESENT: ICD-10-PCS | Mod: CPTII,S$GLB,, | Performed by: ORTHOPAEDIC SURGERY

## 2022-10-21 PROCEDURE — 99999 PR PBB SHADOW E&M-EST. PATIENT-LVL III: CPT | Mod: PBBFAC,,, | Performed by: ORTHOPAEDIC SURGERY

## 2022-10-21 PROCEDURE — 3066F NEPHROPATHY DOC TX: CPT | Mod: CPTII,S$GLB,, | Performed by: ORTHOPAEDIC SURGERY

## 2022-10-21 PROCEDURE — 99214 PR OFFICE/OUTPT VISIT, EST, LEVL IV, 30-39 MIN: ICD-10-PCS | Mod: S$GLB,,, | Performed by: ORTHOPAEDIC SURGERY

## 2022-10-21 PROCEDURE — 3072F PR LOW RISK FOR RETINOPATHY: ICD-10-PCS | Mod: CPTII,S$GLB,, | Performed by: ORTHOPAEDIC SURGERY

## 2022-10-21 NOTE — ANESTHESIA PREPROCEDURE EVALUATION
[facility]  Anesthesia Pre-Operative Evaluation         Patient Name/: Karthik Bentley, 1955  MRN: [id number]    SUBJECTIVE:     Pre-operative evaluation for Procedure(s) (LRB):  DISCECTOMY, SPINE, CERVICAL, ANTERIOR APPROACH, WITH FUSION C3-6 [facility] SNS:vocal/motors/SSEP (N/A)     10/21/2022    Karthik Bentley is a 67 y.o. male w/ a significant PMHx of HTN, T2DM (trulicity, metformin, A1c 8.4), carpal tunnel syndrome bilaterally, RA, HLD, and cervical myelopathy who presents for ACDF C3-6 with Dr Marshall. Patient now presents for the above procedure(s).    Patient denies any other known cardiopulmonary disease.     Functional status:  Indpendently ambulates without assistance. Independent in all ADLs.   Good functional status, > 8 METs, denies any NEWSOME, anginal symptoms    NPO status:  Review day of surgery    Previous Anesthetics:  GETA, L shoulder surgery, no anesthetic complicaitons  L Carpal Tunnel hand surgery w/ Regional anesthesia, no complications  Colonoscopy, MAC, no complications    Previous Airway:  None in our records    ________________________________________  ECHO: No results found for this or any previous visit.    ________________________________________    MRI Cervical Spine (2022)  FINDINGS:  Reversal of the normal cervical lordosis.  Grade 1 retrolisthesis of C4 on C5 and C5 on C6.  Vertebral body heights are maintained.  No acute fracture.  No marrow signal abnormality concerning for an infiltrative process.  Multilevel degenerative endplate changes with marginal osteophytes.  Degenerative change about the dens.  There is multilevel disc desiccation and intervertebral disc height loss at C4-C5 through C6-C7.     Partially visualized intracranial structures appear within normal limits.  Cervicomedullary junction is unremarkable.  6 mm linear signal abnormality in the right roxanna cord at the C4-C5 disc.     Demonstrated paraspinal musculature and cervical soft tissues  appear within normal limits.     C2-C3: Posterior disc osteophyte complex, right-sided uncovertebral joint spurring, and facet joint arthropathy resulting in moderate right foraminal narrowing.  No spinal canal stenosis.     C3-C4: Posterior disc osteophyte complex with posterior central disc osteophyte spur which effaces the ventral aspect of the thecal sac and abuts the spinal cord.  There is bilateral uncovertebral joint spurring.  Findings result in moderate right, mild left foraminal narrowing and mild spinal canal stenosis.     C4-C5: Posterior disc osteophyte complex and bilateral uncovertebral joint spurring resulting and moderate bilateral foraminal narrowing and moderate to severe spinal canal stenosis with underlying cord signal changes, as above.     C5-C6: Posterior disc osteophyte complex with prominent right paracentral disc osteophyte which effaces the ventral thecal sac with abutment and mild flattening of the right ventral spinal cord.  No underlying signal changes at this location.  There is bilateral uncovertebral joint spurring. These findings result in mild left, moderate right foraminal narrowing and moderate spinal canal stenosis.     C6-C7: Posterior disc osteophyte complex, asymmetric to the right, and bilateral uncovertebral joint spurring resulting in mild left, moderate right foraminal narrowing.  Effacement of the ventral CSF sleeve without significant spinal canal stenosis.     C7-T1: Posterior disc osteophyte complex, bilateral uncovertebral joint spurring, and ligamentum flavum thickening resulting in moderate bilateral foraminal narrowing and mild spinal canal stenosis.     Impression:     1. Multilevel cervical spondylosis resulting in varying degrees of foraminal narrowing and spinal canal stenosis most notable at C4-C5 and C5-C6.  2. Short segment cord signal abnormality at C4-C5 which is suggestive chronic compressive myelopathy.    CXR without any significant findings.        LDA: None documented.       Drips: None documented.      Patient Active Problem List   Diagnosis    Essential hypertension    Type 2 diabetes mellitus, without long-term current use of insulin    Screen for colon cancer    Overweight    HSV infection    Chronic pain of both shoulders    Shoulder pain, bilateral    Muscle weakness (generalized)    Decreased mobility and endurance       Review of patient's allergies indicates:  No Known Allergies    Current Inpatient Medications:       Current Outpatient Medications on File Prior to Visit   Medication Sig Dispense Refill    blood sugar diagnostic Strp To check BG 1 times daily, to use with insurance preferred meter 100 strip 3    blood-glucose meter kit To check BG 1 times daily, to use with insurance preferred meter 1 each 0    CARBOCAINE, PF, 15 mg/mL (1.5 %) injection       celecoxib (CELEBREX) 200 MG capsule Take 1 capsule (200 mg total) by mouth once daily. 60 capsule 1    dulaglutide (TRULICITY) 1.5 mg/0.5 mL pen injector Inject 1.5 mg into the skin every 7 days. 4 pen 11    folic acid (FOLVITE) 1 MG tablet Take 1 tablet (1 mg total) by mouth once daily. 90 tablet 3    gabapentin (NEURONTIN) 300 MG capsule Take 2 capsules (600 mg total) by mouth every evening. 60 capsule 11    lancets Misc To check BG 1 times daily, to use with insurance preferred meter 100 each 3    metFORMIN (GLUCOPHAGE) 1000 MG tablet Take 1 tablet (1,000 mg total) by mouth 2 (two) times daily with meals. 180 tablet 3    methotrexate 2.5 MG Tab Take 6 tablets (15 mg total) by mouth every 7 days. 24 tablet 1    rosuvastatin (CRESTOR) 10 MG tablet Take 1 tablet (10 mg total) by mouth once daily. 90 tablet 3    tiZANidine (ZANAFLEX) 4 MG tablet Take 1 tablet (4 mg total) by mouth every 8 (eight) hours as needed (muscle spasms). 60 tablet 1    valACYclovir (VALTREX) 500 MG tablet Take 1 tablet (500 mg total) by mouth once daily. 30 tablet 2    valsartan (DIOVAN) 160  MG tablet Take 1 tablet (160 mg total) by mouth once daily. 90 tablet 3     No current facility-administered medications on file prior to visit.       Past Surgical History:   Procedure Laterality Date    COLONOSCOPY N/A 1/16/2020    Procedure: COLONOSCOPY;  Surgeon: Cynthia Kearney MD;  Location: [facility] ENDO [address]);  Service: Endoscopy;  Laterality: N/A;    ENDOSCOPIC CARPAL TUNNEL RELEASE Right 4/1/2022    Procedure: RELEASE, CARPAL TUNNEL, ENDOSCOPIC - right arthrex;  Surgeon: Wiley Warren MD;  Location: [facility];  Service: Orthopedics;  Laterality: Right;    EPIDURAL STEROID INJECTION N/A 9/6/2022    Procedure: CERVICAL EDITH CONTRAST DIRECT REFERRAL;  Surgeon: Yasmeen Taylor MD;  Location: [facility];  Service: Pain Management;  Laterality: N/A;    SHOULDER SURGERY         Social History:  Tobacco Use: Low Risk     Smoking Tobacco Use: Never    Smokeless Tobacco Use: Never    Passive Exposure: Not on file       Alcohol Use: Not on file       OBJECTIVE:     Vital Signs Range:  BMI Readings from Last 1 Encounters:   10/21/22 28.27 kg/m²       Temp:  [98 °F (36.7 °C)]   Pulse:  [82]   Resp:  [16]   BP: (130)/(82)   SpO2:  [98 %]        Significant Labs:        Component Value Date/Time    WBC 8.71 09/13/2022 1020    HGB 12.0 (L) 09/13/2022 1020    HCT 35.7 (L) 09/13/2022 1020     09/13/2022 1020     09/13/2022 1020    K 4.3 09/13/2022 1020     09/13/2022 1020    CO2 25 09/13/2022 1020     (H) 09/13/2022 1020    BUN 10 09/13/2022 1020    CREATININE 1.0 09/13/2022 1020    PHOS 3.2 01/16/2021 0930    CALCIUM 9.8 09/13/2022 1020    ALBUMIN 3.8 09/13/2022 1020    PROT 7.6 09/13/2022 1020    ALKPHOS 84 09/13/2022 1020    BILITOT 0.7 09/13/2022 1020    AST 10 09/13/2022 1020    ALT 9 (L) 09/13/2022 1020    HGBA1C 8.4 (H) 10/11/2022 1703        Please see Results Review for additional labs.     Diagnostic Studies: No relevant studies.    EKG:   Results for orders placed or  performed in visit on 10/11/22   IN OFFICE EKG 12-LEAD (to [place])    Collection Time: 10/11/22  4:46 PM    Narrative    Test Reason : Z01.818,    Vent. Rate : 070 BPM     Atrial Rate : 070 BPM     P-R Int : 158 ms          QRS Dur : 090 ms      QT Int : 382 ms       P-R-T Axes : 000 149 161 degrees     QTc Int : 412 ms      Limb lead reversal    Normal sinus rhythm  Otherwise normal ECG  When compared with ECG of 15-ALMA-2007 07:18,  Limb lead reversal is now present    Please repeat with proper lead placement  Confirmed by Juliocesar GRAHAM, Marc CABAN ([id number]) on 10/12/2022 3:22:00 PM    Referred By: JESSICA SEPULVEDA           Confirmed By:Marc Gandara MD       ECHO:  See subjective, if available.      ASSESSMENT/PLAN:           Pre-op Assessment    I have reviewed the Patient Summary Reports.     I have reviewed the Nursing Notes. I have reviewed the NPO Status.   I have reviewed the Medications.     Review of Systems  Anesthesia Hx:  No problems with previous Anesthesia  History of prior surgery of interest to airway management or planning: Previous anesthesia: General   Social:  Non-Smoker, Social Alcohol Use    Hematology/Oncology:  Hematology Normal   Oncology Normal     EENT/Dental:EENT/Dental Normal   Cardiovascular:   Exercise tolerance: good Hypertension, well controlled    Pulmonary:  Pulmonary Normal    Renal/:  Renal/ Normal     Hepatic/GI:  Hepatic/GI Normal    Musculoskeletal:  Musculoskeletal Normal    Neurological:  Neurology Normal    Endocrine:   Diabetes, well controlled, type 2    Dermatological:  Skin Normal    Psych:  Psychiatric Normal           Physical Exam  General: Well nourished, Cooperative, Alert and Oriented    Airway:  Mallampati: II   Mouth Opening: Normal  TM Distance: Normal  Tongue: Normal  Neck ROM: Normal ROM    Dental:  Intact    Chest/Lungs:  Clear to auscultation, Normal Respiratory Rate    Heart:  Rate: Normal  Rhythm: Regular Rhythm  Sounds: Normal        Anesthesia Assessment:  Preoperative EQUATION    Planned Procedure: Procedure(s) (LRB):  DISCECTOMY, SPINE, CERVICAL, ANTERIOR APPROACH, WITH FUSION C3-6 depuy SNS:vocal/motors/SSEP (N/A)  Requested Anesthesia Type:General  Surgeon: Kody Gudino MD  Service: Orthopedics  Known or anticipated Date of Surgery:10/31/2022    Surgeon notes: reviewed    Electronic QUestionnaire Assessment completed via nurse interview with patient.        Triage considerations:       Previous anesthesia records:No problems and REGIONAL  4/1/2022    RELEASE, CARPAL TUNNEL, ENDOSCOPIC - right arthrex (Right: Hand)   Airway:  Mallampati: II   TM Distance: Normal  Neck ROM: Normal ROM    Last PCP note: within 3 months , within [facility]   Subspecialty notes: Ortho, Rheumatology, SPORTS MEDICINE    Other important co-morbidities: PER Epic: DM2, HTN, and CERVICAL MYELOPATHY, H/O GOUT       Tests already available:  Available tests,  within 3 months , 3-6 months ago , within [facility] .   9/20/2022 HGA1C, 9/13/2022 CMP, CBC, 8/24/2022MRI CERVICAL SPINE W/O CONTRAST, 7/28/2022 XRAY CERVICAL SPINE AP/LAT W F/E          Instructions given. (See in Nurse's note)    Optimization:  Anesthesia Preop Clinic Assessment  Indicated    Medical Opinion Indicated         Plan:    Testing:  EKG, PT/INR, and T&S   Pre-anesthesia  visit       Visit focus: concerns in complex and/or prolonged anesthesia     Consultation:Patient's PCP for re-evaluation     Patient  has previously scheduled Medical Appointment:10/11 LINDA, DR SEPULVEDA, 10/21 DR GUDINO    Navigation: Tests Scheduled. TBD             Consults scheduled.TBD             Results will be tracked by Preop Clinic.  10/14 EKG resulted an  noted by Dr. Anibal Sepulveda.Medical clearance given by Dr. Anibal Sepulveda on 10/14: Addendum 10/14/22: cardiology reading of ECG reviewed. Pt is cleared for surgery  Loraine Adames RN BSN                 Anesthesia Plan  Type of Anesthesia, risks & benefits discussed:    Anesthesia Type: Gen  ETT  Intra-op Monitoring Plan: Standard ASA Monitors and Art Line  Post Op Pain Control Plan: multimodal analgesia and IV/PO Opioids PRN  Induction:  IV  Airway Plan: Video and Fiberoptic, Post-Induction  Informed Consent: Informed consent signed with the Patient and all parties understand the risks and agree with anesthesia plan.  All questions answered.   ASA Score: 2  Day of Surgery Review of History & Physical: H&P Update referred to the surgeon/provider.    Ready For Surgery From Anesthesia Perspective.       .

## 2022-10-21 NOTE — DISCHARGE INSTRUCTIONS
Your surgery has been scheduled for:____________10/31/2022______________________________    You should report to:  ____[facility], located on the [place] side of the first floor of the           [facility] ([phone])  ____[facility], located on the [facility] side of the            Second floor of the [facility] ([phone])  ____3rd Floor [facility] located on the [facility] side of the [facility] [phone]  __X__[facility]: located at [address] [place] [address]. [address].     Please Note   Tell your doctor if you take Aspirin, products containing Aspirin, herbal medications  or blood thinners, such as Coumadin, Ticlid, or Plavix.  (Consult your provider regarding holding or stopping before surgery).  Arrange for someone to drive you home following surgery.  You will not be allowed to leave the surgical facility alone or drive yourself home following sedation and anesthesia.    Before Surgery  Stop taking all herbal medications, vitamins, and supplements 7 days prior to surgery  No Motrin/Advil (Ibuprofen) 7 days before surgery  No Aleve (Naproxen) 7 days before surgery  Stop Taking Asprin, products containing Asprin _7____days before surgery  Stop taking blood thinners___NA____days before surgery  No Goody's/BC  Powder 7 days before surgery  Refrain from drinking alcoholic beverages for 24hours before and after surgery  Stop or limit smoking ____7_____days before surgery  You may take Tylenol for pain    Night before Surgery  Do not eat or drink after midnight  Take a shower or bath (shower is recommended).  Bathe with Hibiclens soap or an antibacterial soap from the neck down.  If not supplied by your surgeon, hibiclens soap will need to be purchased over the counter in pharmacy.  Rinse soap off thoroughly.  Shampoo your hair with your regular shampoo    The Day of  Surgery  Take another bath or shower with hibiclens or any antibacterial soap, to reduce the chance of infection.  Take heart and blood pressure medications with a small sip of water, as advised by the perioperative team.  Do not take fluid pills  You may brush your teeth and rinse your mouth, but do not swall any additional water.   Do not apply perfumes, powder, body lotions or deodorant on the day of surgery.  Nail polish should be removed.  Do not wear makeup or moisturizer  Wear comfortable clothes, such as a button front shirt and loose fitting pants.  Leave all jewelry, including body piercings, and valuables at home.    Bring any devices you will neeed after surgery such as crutches or canes.  If you have sleep apnea, please bring your CPAP machine  In the event that your physical condition changes including the onset of a cold or respiratory illness, or if you have to delay or cancel your surgery, please notify your surgeon.        Anesthesia: General Anesthesia     You are watched continuously during your procedure by your anesthesia provider.     Youre due to have surgery. During surgery, youll be given medicine called anesthesia or anesthetic. This will keep you comfortable and pain-free. Your anesthesia provider will use general anesthesia.  What is general anesthesia?  General anesthesia puts you into a state like deep sleep. It goes into the bloodstream (IV anesthetics), into the lungs (gas anesthetics), or both. You feel nothing during the procedure. You will not remember it. During the procedure, the anesthesia provider monitors you continuously. He or she checks your heart rate and rhythm, blood pressure, breathing, and blood oxygen.  IV anesthetics. IV anesthetics are given through an IV line in your arm. Theyre often given first. This is so you are asleep before a gas anesthetic is started. Some kinds of IV anesthetics relieve pain. Others relax you. Your doctor will decide which kind is best  in your case.  Gas anesthetics. Gas anesthetics are breathed into the lungs. They are often used to keep you asleep. They can be given through a facemask or a tube placed in your larynx or trachea (breathing tube).  If you have a facemask, your anesthesia provider will most likely place it over your nose and mouth while youre still awake. Youll breathe oxygen through the mask as your IV anesthetic is started. Gas anesthetic may be added through the mask.  If you have a tube in the larynx or trachea, it will be inserted into your throat after youre asleep.  Anesthesia tools and medicines  You will likely have:  IV anesthetics. These are put into an IV line into your bloodstream.  Gas anesthetics. You breathe these anesthetics into your lungs, where they pass into your bloodstream.  Pulse oximeter. This is a small clip that is attached to the end of your finger. This measures your blood oxygen level.  Electrocardiography leads (electrodes). These are small sticky pads that are placed on your chest. They record your heart rate and rhythm.  Blood pressure cuff. This reads your blood pressure.  Risks and possible complications  General anesthesia has some risks. These include:  Breathing problems  Nausea and vomiting  Sore throat or hoarseness (usually temporary)  Allergic reaction to the anesthetic  Irregular heartbeat (rare)  Cardiac arrest (rare)   Anesthesia safety  Follow all instructions you are given for how long not to eat or drink before your procedure.  Be sure your doctor knows what medicines and drugs you take. This includes over-the-counter medicines, herbs, supplements, alcohol or other drugs. You will be asked when those were last taken.  Have an adult family member or friend drive you home after the procedure.  For the first 24 hours after your surgery:  Do not drive or use heavy equipment.  Do not make important decisions or sign legal documents. If important decisions or signing legal documents is  necessary during the first 24 hours after surgery, have a trusted family member or spouse act on your behalf.  Avoid alcohol.  Have a responsible adult stay with you. He or she can watch for problems and help keep you safe.  Date Last Reviewed: 12/1/2016 © 2000-2017 [employer]. [address] [place] [address], [place] [address]. All rights reserved. This information is not intended as a substitute for professional medical care. Always follow your healthcare professional's instructions.

## 2022-10-21 NOTE — PROGRESS NOTES
"DATE: 10/21/2022  PATIENT: Karthik Bentley    Attending Physician: Kody Marshall M.D.    HISTORY:  The patient returns for follow-up.  He has a history of a right endoscopic carpal tunnel release, and his chief complaint is decreased in left hand  strength, as well as dexterity issues.      He also endorses neck pain, and diffuse upper extremity weakness.    PMH/PSH/FamHx/SocHx:  Unchanged from prior visit    ROS:  REVIEW OF SYSTEMS:  Constitution: Negative. Negative for chills, fever and night sweats.   HENT: Negative for congestion and headaches.    Eyes: Negative for blurred vision, left vision loss and right vision loss.   Cardiovascular: Negative for chest pain and syncope.   Respiratory: Negative for cough and shortness of breath.    Endocrine: Negative for polydipsia, polyphagia and polyuria.   Hematologic/Lymphatic: Negative for bleeding problem. Does not bruise/bleed easily.   Skin: Negative for dry skin, itching and rash.   Musculoskeletal: Negative for falls and muscle weakness.   Gastrointestinal: Negative for abdominal pain and bowel incontinence.   Allergic/Immunologic: Negative for hives and persistent infections.  Genitourinary: Negative for urinary retention/incontinence and nocturia.   Neurological: positive for disturbances in coordination, no myelopathic symptoms such as handwriting changes or difficulty with buttons, coins, keys or small objects. No loss of balance and seizures.   Psychiatric/Behavioral: Negative for depression. The patient does not have insomnia.   Denies myelopathic symptoms, perineal paresthesias, bowel or bladder incontinence    EXAM:  Ht 5' 8" (1.727 m)   Wt 83.9 kg (185 lb)   BMI 28.13 kg/m²     Neuro and physical exam stable.      IMAGING:     Today I personally re-reviewed AP, Lat and Flex/Ex  upright C-spine films that demonstrate moderate DDD from  C4-7. Reversal of the lordotic curve.      Lumbar MRI shows foraminal narrowing and spinal canal stenosis most " notable at C4-C5 and C5-C6. Short segment cord signal abnormality at C4-C5 which is suggestive chronic compressive myelopathy.    Body mass index is 28.13 kg/m².    Hemoglobin A1C   Date Value Ref Range Status   10/11/2022 8.4 (H) 4.0 - 5.6 % Final     Comment:     ADA Screening Guidelines:  5.7-6.4%  Consistent with prediabetes  >or=6.5%  Consistent with diabetes    High levels of fetal hemoglobin interfere with the HbA1C  assay. Heterozygous hemoglobin variants (HbS, HgC, etc)do  not significantly interfere with this assay.   However, presence of multiple variants may affect accuracy.     09/20/2022 8.5 (H) 4.0 - 5.6 % Final     Comment:     ADA Screening Guidelines:  5.7-6.4%  Consistent with prediabetes  >or=6.5%  Consistent with diabetes    High levels of fetal hemoglobin interfere with the HbA1C  assay. Heterozygous hemoglobin variants (HbS, HgC, etc)do  not significantly interfere with this assay.   However, presence of multiple variants may affect accuracy.     05/10/2022 7.5 (H) 4.0 - 5.6 % Final     Comment:     ADA Screening Guidelines:  5.7-6.4%  Consistent with prediabetes  >or=6.5%  Consistent with diabetes    High levels of fetal hemoglobin interfere with the HbA1C  assay. Heterozygous hemoglobin variants (HbS, HgC, etc)do  not significantly interfere with this assay.   However, presence of multiple variants may affect accuracy.           ASSESSMENT/PLAN:    Karthik was seen today for follow-up.    Diagnoses and all orders for this visit:    Cervical myelopathy        Patient has C3-6HNP with BUE radiculopathy. I discussed the natural history of their diagnoses as well as surgical and nonsurgical treatment options. I educated the patient on the importance of core/back strengthening, correct posture, bending/lifting ergonomics, and low-impact aerobic exercises (walking, elliptical, and aquatherapy). He has failed conservative management and is a candidate for C3-6 ACDF.     I had a sit down discussion with  the patient regarding the above procedure. We specifically discussed the risks, benefits, and alternatives to surgery. We discussed the surgical procedure including the skin incision, nerve decompression, bone fusion, allograft and/or iliac crest bone graft, and surgical implants including plates and interbody spacers as indicated: they understand the risks include but are not limited to death, paralysis, blindness, bleeding, infection, damage to arteries, veins and nerves, tracheal injury, esophageal injury, vertebral artery injury, dysphagia, spinal fluid leak, continued or worsening pain, no improvement in symptoms, non-union, and the possible need for more surgery in the future, as well as the possibility other unforseen and unknown complications. We talked about expected hospital stay and recovery period. All questions were answered; they understand and wish to proceed.

## 2022-10-21 NOTE — H&P
"DATE: 10/21/2022  PATIENT: Karthik Bentley    Attending Physician: Kody Marshall M.D.    HISTORY:  The patient returns for follow-up.  He has a history of a right endoscopic carpal tunnel release, and his chief complaint is decreased in left hand  strength, as well as dexterity issues.      He also endorses neck pain, and diffuse upper extremity weakness.    PMH/PSH/FamHx/SocHx:  Unchanged from prior visit    ROS:  REVIEW OF SYSTEMS:  Constitution: Negative. Negative for chills, fever and night sweats.   HENT: Negative for congestion and headaches.    Eyes: Negative for blurred vision, left vision loss and right vision loss.   Cardiovascular: Negative for chest pain and syncope.   Respiratory: Negative for cough and shortness of breath.    Endocrine: Negative for polydipsia, polyphagia and polyuria.   Hematologic/Lymphatic: Negative for bleeding problem. Does not bruise/bleed easily.   Skin: Negative for dry skin, itching and rash.   Musculoskeletal: Negative for falls and muscle weakness.   Gastrointestinal: Negative for abdominal pain and bowel incontinence.   Allergic/Immunologic: Negative for hives and persistent infections.  Genitourinary: Negative for urinary retention/incontinence and nocturia.   Neurological: positive for disturbances in coordination, no myelopathic symptoms such as handwriting changes or difficulty with buttons, coins, keys or small objects. No loss of balance and seizures.   Psychiatric/Behavioral: Negative for depression. The patient does not have insomnia.   Denies myelopathic symptoms, perineal paresthesias, bowel or bladder incontinence    EXAM:  Ht 5' 8" (1.727 m)   Wt 83.9 kg (185 lb)   BMI 28.13 kg/m²     Neuro and physical exam stable.      IMAGING:     Today I personally re-reviewed AP, Lat and Flex/Ex  upright C-spine films that demonstrate moderate DDD from  C4-7. Reversal of the lordotic curve.      Lumbar MRI shows foraminal narrowing and spinal canal stenosis most " notable at C4-C5 and C5-C6. Short segment cord signal abnormality at C4-C5 which is suggestive chronic compressive myelopathy.    Body mass index is 28.13 kg/m².    Hemoglobin A1C   Date Value Ref Range Status   10/11/2022 8.4 (H) 4.0 - 5.6 % Final     Comment:     ADA Screening Guidelines:  5.7-6.4%  Consistent with prediabetes  >or=6.5%  Consistent with diabetes    High levels of fetal hemoglobin interfere with the HbA1C  assay. Heterozygous hemoglobin variants (HbS, HgC, etc)do  not significantly interfere with this assay.   However, presence of multiple variants may affect accuracy.     09/20/2022 8.5 (H) 4.0 - 5.6 % Final     Comment:     ADA Screening Guidelines:  5.7-6.4%  Consistent with prediabetes  >or=6.5%  Consistent with diabetes    High levels of fetal hemoglobin interfere with the HbA1C  assay. Heterozygous hemoglobin variants (HbS, HgC, etc)do  not significantly interfere with this assay.   However, presence of multiple variants may affect accuracy.     05/10/2022 7.5 (H) 4.0 - 5.6 % Final     Comment:     ADA Screening Guidelines:  5.7-6.4%  Consistent with prediabetes  >or=6.5%  Consistent with diabetes    High levels of fetal hemoglobin interfere with the HbA1C  assay. Heterozygous hemoglobin variants (HbS, HgC, etc)do  not significantly interfere with this assay.   However, presence of multiple variants may affect accuracy.           ASSESSMENT/PLAN:    There are no diagnoses linked to this encounter.      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 I discussed the natural history of their diagnoses as well as surgical and nonsurgical treatment options. I educated the patient on the importance of core/back strengthening, correct posture, bending/lifting ergonomics, and low-impact aerobic exercises (walking, elliptical, and aquatherapy). He has failed conservative management and is a candidate for C3-6 ACDF.     I had a sit down discussion with the patient regarding the above procedure. We  specifically discussed the risks, benefits, and alternatives to surgery. We discussed the surgical procedure including the skin incision, nerve decompression, bone fusion, allograft and/or iliac crest bone graft, and surgical implants including plates and interbody spacers as indicated: they understand the risks include but are not limited to death, paralysis, blindness, bleeding, infection, damage to arteries, veins and nerves, tracheal injury, esophageal injury, vertebral artery injury, dysphagia, spinal fluid leak, continued or worsening pain, no improvement in symptoms, non-union, and the possible need for more surgery in the future, as well as the possibility other unforseen and unknown complications. We talked about expected hospital stay and recovery period. All questions were answered; they understand and wish to proceed.

## 2022-10-31 ENCOUNTER — HOSPITAL ENCOUNTER (INPATIENT)
Facility: HOSPITAL | Age: 67
LOS: 2 days | Discharge: HOME | DRG: 473 | End: 2022-11-02
Attending: ORTHOPAEDIC SURGERY | Admitting: ORTHOPAEDIC SURGERY
Payer: MEDICARE

## 2022-10-31 ENCOUNTER — ANESTHESIA (OUTPATIENT)
Dept: SURGERY | Facility: HOSPITAL | Age: 67
DRG: 473 | End: 2022-10-31
Payer: MEDICARE

## 2022-10-31 DIAGNOSIS — G95.9 CERVICAL MYELOPATHY: ICD-10-CM

## 2022-10-31 DIAGNOSIS — M54.12 CERVICAL RADICULOPATHY: Primary | ICD-10-CM

## 2022-10-31 LAB
ABO + RH BLD: NORMAL
BLD GP AB SCN CELLS X3 SERPL QL: NORMAL
POCT GLUCOSE: 115 MG/DL (ref 70–110)
POCT GLUCOSE: 148 MG/DL (ref 70–110)
POCT GLUCOSE: 157 MG/DL (ref 70–110)
POCT GLUCOSE: 228 MG/DL (ref 70–110)
POCT GLUCOSE: 265 MG/DL (ref 70–110)

## 2022-10-31 PROCEDURE — 63600175 PHARM REV CODE 636 W HCPCS: Performed by: STUDENT IN AN ORGANIZED HEALTH CARE EDUCATION/TRAINING PROGRAM

## 2022-10-31 PROCEDURE — 63600175 PHARM REV CODE 636 W HCPCS: Performed by: ORTHOPAEDIC SURGERY

## 2022-10-31 PROCEDURE — 22853 INSJ BIOMECHANICAL DEVICE: CPT | Mod: ,,, | Performed by: ORTHOPAEDIC SURGERY

## 2022-10-31 PROCEDURE — 97530 THERAPEUTIC ACTIVITIES: CPT

## 2022-10-31 PROCEDURE — 36000710: Performed by: ORTHOPAEDIC SURGERY

## 2022-10-31 PROCEDURE — 25000003 PHARM REV CODE 250: Performed by: STUDENT IN AN ORGANIZED HEALTH CARE EDUCATION/TRAINING PROGRAM

## 2022-10-31 PROCEDURE — 97165 OT EVAL LOW COMPLEX 30 MIN: CPT

## 2022-10-31 PROCEDURE — 36415 COLL VENOUS BLD VENIPUNCTURE: CPT | Performed by: STUDENT IN AN ORGANIZED HEALTH CARE EDUCATION/TRAINING PROGRAM

## 2022-10-31 PROCEDURE — 63600175 PHARM REV CODE 636 W HCPCS: Performed by: NURSE ANESTHETIST, CERTIFIED REGISTERED

## 2022-10-31 PROCEDURE — 22552 ARTHRD ANT NTRBD CERVICAL EA: CPT | Mod: ,,, | Performed by: ORTHOPAEDIC SURGERY

## 2022-10-31 PROCEDURE — 27201423 OPTIME MED/SURG SUP & DEVICES STERILE SUPPLY: Performed by: ORTHOPAEDIC SURGERY

## 2022-10-31 PROCEDURE — D9220A PRA ANESTHESIA: Mod: CRNA,,, | Performed by: NURSE ANESTHETIST, CERTIFIED REGISTERED

## 2022-10-31 PROCEDURE — 63600175 PHARM REV CODE 636 W HCPCS: Performed by: ANESTHESIOLOGY

## 2022-10-31 PROCEDURE — D9220A PRA ANESTHESIA: ICD-10-PCS | Mod: CRNA,,, | Performed by: NURSE ANESTHETIST, CERTIFIED REGISTERED

## 2022-10-31 PROCEDURE — 11000001 HC ACUTE MED/SURG PRIVATE ROOM

## 2022-10-31 PROCEDURE — D9220A PRA ANESTHESIA: ICD-10-PCS | Mod: ANES,,, | Performed by: ANESTHESIOLOGY

## 2022-10-31 PROCEDURE — 25000003 PHARM REV CODE 250: Performed by: NURSE ANESTHETIST, CERTIFIED REGISTERED

## 2022-10-31 PROCEDURE — 20936 PR AUTOGRAFT SPINE SURGERY LOCAL FROM SAME INCISION: ICD-10-PCS | Mod: ,,, | Performed by: ORTHOPAEDIC SURGERY

## 2022-10-31 PROCEDURE — 86901 BLOOD TYPING SEROLOGIC RH(D): CPT | Performed by: STUDENT IN AN ORGANIZED HEALTH CARE EDUCATION/TRAINING PROGRAM

## 2022-10-31 PROCEDURE — 22853 PR INSERT BIOMECH DEV W/INTERBODY ARTHRODESIS, EA CONTIGUOUS DEFECT: ICD-10-PCS | Mod: ,,, | Performed by: ORTHOPAEDIC SURGERY

## 2022-10-31 PROCEDURE — C1729 CATH, DRAINAGE: HCPCS | Performed by: ORTHOPAEDIC SURGERY

## 2022-10-31 PROCEDURE — 22551 PR ARTHRODESIS ANT INTERBODY INC DISCECTOMY, CERVICAL BELOW C2: ICD-10-PCS | Mod: ,,, | Performed by: ORTHOPAEDIC SURGERY

## 2022-10-31 PROCEDURE — 20936 SP BONE AGRFT LOCAL ADD-ON: CPT | Mod: ,,, | Performed by: ORTHOPAEDIC SURGERY

## 2022-10-31 PROCEDURE — 94761 N-INVAS EAR/PLS OXIMETRY MLT: CPT

## 2022-10-31 PROCEDURE — 22846 INSERT SPINE FIXATION DEVICE: CPT | Mod: 59,,, | Performed by: ORTHOPAEDIC SURGERY

## 2022-10-31 PROCEDURE — 27100025 HC TUBING, SET FLUID WARMER: Performed by: ANESTHESIOLOGY

## 2022-10-31 PROCEDURE — 99900035 HC TECH TIME PER 15 MIN (STAT)

## 2022-10-31 PROCEDURE — 22552 PR ARTHRODESIS ANT INTERBODY INC DISCECTOMY, CERVICAL BELOW C2 EACH ADDL: ICD-10-PCS | Mod: ,,, | Performed by: ORTHOPAEDIC SURGERY

## 2022-10-31 PROCEDURE — 97161 PT EVAL LOW COMPLEX 20 MIN: CPT

## 2022-10-31 PROCEDURE — C1713 ANCHOR/SCREW BN/BN,TIS/BN: HCPCS | Performed by: ORTHOPAEDIC SURGERY

## 2022-10-31 PROCEDURE — 25000003 PHARM REV CODE 250: Performed by: ORTHOPAEDIC SURGERY

## 2022-10-31 PROCEDURE — 37000008 HC ANESTHESIA 1ST 15 MINUTES: Performed by: ORTHOPAEDIC SURGERY

## 2022-10-31 PROCEDURE — 20930 PR ALLOGRAFT FOR SPINE SURGERY ONLY MORSELIZED: ICD-10-PCS | Mod: ,,, | Performed by: ORTHOPAEDIC SURGERY

## 2022-10-31 PROCEDURE — 71000033 HC RECOVERY, INTIAL HOUR: Performed by: ORTHOPAEDIC SURGERY

## 2022-10-31 PROCEDURE — 36000711: Performed by: ORTHOPAEDIC SURGERY

## 2022-10-31 PROCEDURE — 22551 ARTHRD ANT NTRBDY CERVICAL: CPT | Mod: ,,, | Performed by: ORTHOPAEDIC SURGERY

## 2022-10-31 PROCEDURE — 37000009 HC ANESTHESIA EA ADD 15 MINS: Performed by: ORTHOPAEDIC SURGERY

## 2022-10-31 PROCEDURE — 22846 PR ANTERIOR INSTRUMENTATION 4-7 VERTEBRAL SEGMENTS: ICD-10-PCS | Mod: 59,,, | Performed by: ORTHOPAEDIC SURGERY

## 2022-10-31 PROCEDURE — 71000039 HC RECOVERY, EACH ADD'L HOUR: Performed by: ORTHOPAEDIC SURGERY

## 2022-10-31 PROCEDURE — 27800903 OPTIME MED/SURG SUP & DEVICES OTHER IMPLANTS: Performed by: ORTHOPAEDIC SURGERY

## 2022-10-31 PROCEDURE — 94799 UNLISTED PULMONARY SVC/PX: CPT

## 2022-10-31 PROCEDURE — 82962 GLUCOSE BLOOD TEST: CPT | Performed by: ORTHOPAEDIC SURGERY

## 2022-10-31 PROCEDURE — D9220A PRA ANESTHESIA: Mod: ANES,,, | Performed by: ANESTHESIOLOGY

## 2022-10-31 PROCEDURE — 20930 SP BONE ALGRFT MORSEL ADD-ON: CPT | Mod: ,,, | Performed by: ORTHOPAEDIC SURGERY

## 2022-10-31 DEVICE — PLATE BONE ANT CERV 48 3 LEVEL: Type: IMPLANTABLE DEVICE | Site: NECK | Status: FUNCTIONAL

## 2022-10-31 DEVICE — CAGE SPINAL BENGAL STD 7DEG6MM: Type: IMPLANTABLE DEVICE | Site: NECK | Status: FUNCTIONAL

## 2022-10-31 DEVICE — SCREW BONE ANT SKYLINE 18MM TI: Type: IMPLANTABLE DEVICE | Site: NECK | Status: FUNCTIONAL

## 2022-10-31 DEVICE — GRAFT PRIME DBM HD BONE 1.0CC: Type: IMPLANTABLE DEVICE | Site: NECK | Status: FUNCTIONAL

## 2022-10-31 RX ORDER — ACETAMINOPHEN 500 MG
1000 TABLET ORAL ONCE
Status: COMPLETED | OUTPATIENT
Start: 2022-10-31 | End: 2022-10-31

## 2022-10-31 RX ORDER — MORPHINE SULFATE 2 MG/ML
2 INJECTION, SOLUTION INTRAMUSCULAR; INTRAVENOUS
Status: DISCONTINUED | OUTPATIENT
Start: 2022-10-31 | End: 2022-11-02 | Stop reason: HOSPADM

## 2022-10-31 RX ORDER — IBUPROFEN 200 MG
16 TABLET ORAL
Status: DISCONTINUED | OUTPATIENT
Start: 2022-10-31 | End: 2022-11-02 | Stop reason: HOSPADM

## 2022-10-31 RX ORDER — CEFAZOLIN SODIUM 2 G/50ML
2 SOLUTION INTRAVENOUS
Status: COMPLETED | OUTPATIENT
Start: 2022-10-31 | End: 2022-11-01

## 2022-10-31 RX ORDER — SODIUM CHLORIDE 9 MG/ML
INJECTION, SOLUTION INTRAVENOUS CONTINUOUS
Status: DISCONTINUED | OUTPATIENT
Start: 2022-10-31 | End: 2022-11-02 | Stop reason: HOSPADM

## 2022-10-31 RX ORDER — OXYCODONE HYDROCHLORIDE 5 MG/1
5 TABLET ORAL EVERY 4 HOURS PRN
Status: DISCONTINUED | OUTPATIENT
Start: 2022-10-31 | End: 2022-11-02 | Stop reason: HOSPADM

## 2022-10-31 RX ORDER — METOCLOPRAMIDE HYDROCHLORIDE 5 MG/ML
5 INJECTION INTRAMUSCULAR; INTRAVENOUS EVERY 6 HOURS PRN
Status: DISCONTINUED | OUTPATIENT
Start: 2022-10-31 | End: 2022-11-02 | Stop reason: HOSPADM

## 2022-10-31 RX ORDER — OXYCODONE HYDROCHLORIDE 5 MG/1
5 TABLET ORAL
Status: DISCONTINUED | OUTPATIENT
Start: 2022-10-31 | End: 2022-10-31 | Stop reason: HOSPADM

## 2022-10-31 RX ORDER — ACETAMINOPHEN 500 MG
1000 TABLET ORAL EVERY 8 HOURS
Status: DISCONTINUED | OUTPATIENT
Start: 2022-10-31 | End: 2022-11-02 | Stop reason: HOSPADM

## 2022-10-31 RX ORDER — HYDROMORPHONE HYDROCHLORIDE 1 MG/ML
0.2 INJECTION, SOLUTION INTRAMUSCULAR; INTRAVENOUS; SUBCUTANEOUS EVERY 5 MIN PRN
Status: DISCONTINUED | OUTPATIENT
Start: 2022-10-31 | End: 2022-10-31 | Stop reason: HOSPADM

## 2022-10-31 RX ORDER — OXYCODONE HYDROCHLORIDE 5 MG/1
5 TABLET ORAL EVERY 6 HOURS PRN
Qty: 42 TABLET | Refills: 0 | Status: SHIPPED | OUTPATIENT
Start: 2022-10-31 | End: 2022-11-21

## 2022-10-31 RX ORDER — BISACODYL 10 MG
10 SUPPOSITORY, RECTAL RECTAL DAILY PRN
Status: DISCONTINUED | OUTPATIENT
Start: 2022-10-31 | End: 2022-11-02 | Stop reason: HOSPADM

## 2022-10-31 RX ORDER — VALSARTAN 160 MG/1
160 TABLET ORAL DAILY
Status: DISCONTINUED | OUTPATIENT
Start: 2022-11-01 | End: 2022-11-02 | Stop reason: HOSPADM

## 2022-10-31 RX ORDER — PROPOFOL 10 MG/ML
VIAL (ML) INTRAVENOUS
Status: DISCONTINUED | OUTPATIENT
Start: 2022-10-31 | End: 2022-10-31

## 2022-10-31 RX ORDER — ACETAMINOPHEN 500 MG
1000 TABLET ORAL EVERY 8 HOURS PRN
Qty: 120 TABLET | Refills: 0 | Status: SHIPPED | OUTPATIENT
Start: 2022-10-31 | End: 2023-09-05

## 2022-10-31 RX ORDER — KETAMINE HCL IN 0.9 % NACL 50 MG/5 ML
SYRINGE (ML) INTRAVENOUS
Status: DISCONTINUED | OUTPATIENT
Start: 2022-10-31 | End: 2022-10-31

## 2022-10-31 RX ORDER — MIDAZOLAM HYDROCHLORIDE 1 MG/ML
INJECTION, SOLUTION INTRAMUSCULAR; INTRAVENOUS
Status: DISCONTINUED | OUTPATIENT
Start: 2022-10-31 | End: 2022-10-31

## 2022-10-31 RX ORDER — MUPIROCIN 20 MG/G
OINTMENT TOPICAL
Status: DISCONTINUED | OUTPATIENT
Start: 2022-10-31 | End: 2022-10-31 | Stop reason: HOSPADM

## 2022-10-31 RX ORDER — PHENYLEPHRINE HYDROCHLORIDE 10 MG/ML
INJECTION INTRAVENOUS
Status: DISCONTINUED | OUTPATIENT
Start: 2022-10-31 | End: 2022-10-31

## 2022-10-31 RX ORDER — LIDOCAINE HYDROCHLORIDE 20 MG/ML
INJECTION INTRAVENOUS
Status: DISCONTINUED | OUTPATIENT
Start: 2022-10-31 | End: 2022-10-31

## 2022-10-31 RX ORDER — METHOCARBAMOL 750 MG/1
750 TABLET, FILM COATED ORAL 3 TIMES DAILY PRN
Qty: 42 TABLET | Refills: 0 | Status: SHIPPED | OUTPATIENT
Start: 2022-10-31 | End: 2022-11-16

## 2022-10-31 RX ORDER — GABAPENTIN 300 MG/1
600 CAPSULE ORAL 3 TIMES DAILY
Qty: 180 CAPSULE | Refills: 0 | Status: SHIPPED | OUTPATIENT
Start: 2022-10-31 | End: 2022-12-05

## 2022-10-31 RX ORDER — FAMOTIDINE 20 MG/1
20 TABLET, FILM COATED ORAL 2 TIMES DAILY
Status: DISCONTINUED | OUTPATIENT
Start: 2022-10-31 | End: 2022-11-02 | Stop reason: HOSPADM

## 2022-10-31 RX ORDER — SODIUM CHLORIDE 0.9 % (FLUSH) 0.9 %
10 SYRINGE (ML) INJECTION
Status: DISCONTINUED | OUTPATIENT
Start: 2022-10-31 | End: 2022-11-02 | Stop reason: HOSPADM

## 2022-10-31 RX ORDER — BUPIVACAINE HYDROCHLORIDE AND EPINEPHRINE 2.5; 5 MG/ML; UG/ML
INJECTION, SOLUTION EPIDURAL; INFILTRATION; INTRACAUDAL; PERINEURAL
Status: DISCONTINUED | OUTPATIENT
Start: 2022-10-31 | End: 2022-10-31 | Stop reason: HOSPADM

## 2022-10-31 RX ORDER — GABAPENTIN 300 MG/1
600 CAPSULE ORAL 3 TIMES DAILY
Status: DISCONTINUED | OUTPATIENT
Start: 2022-10-31 | End: 2022-11-02 | Stop reason: HOSPADM

## 2022-10-31 RX ORDER — POLYETHYLENE GLYCOL 3350 17 G/17G
17 POWDER, FOR SOLUTION ORAL DAILY
Status: DISCONTINUED | OUTPATIENT
Start: 2022-10-31 | End: 2022-11-02 | Stop reason: HOSPADM

## 2022-10-31 RX ORDER — OXYCODONE HYDROCHLORIDE 10 MG/1
10 TABLET ORAL EVERY 4 HOURS PRN
Status: DISCONTINUED | OUTPATIENT
Start: 2022-10-31 | End: 2022-11-02 | Stop reason: HOSPADM

## 2022-10-31 RX ORDER — CELECOXIB 200 MG/1
200 CAPSULE ORAL DAILY
Status: DISCONTINUED | OUTPATIENT
Start: 2022-10-31 | End: 2022-11-02 | Stop reason: HOSPADM

## 2022-10-31 RX ORDER — IBUPROFEN 200 MG
24 TABLET ORAL
Status: DISCONTINUED | OUTPATIENT
Start: 2022-10-31 | End: 2022-11-02 | Stop reason: HOSPADM

## 2022-10-31 RX ORDER — CEFAZOLIN SODIUM 1 G/3ML
2 INJECTION, POWDER, FOR SOLUTION INTRAMUSCULAR; INTRAVENOUS
Status: COMPLETED | OUTPATIENT
Start: 2022-10-31 | End: 2022-10-31

## 2022-10-31 RX ORDER — TALC
6 POWDER (GRAM) TOPICAL NIGHTLY PRN
Status: DISCONTINUED | OUTPATIENT
Start: 2022-10-31 | End: 2022-11-02 | Stop reason: HOSPADM

## 2022-10-31 RX ORDER — ATORVASTATIN CALCIUM 20 MG/1
40 TABLET, FILM COATED ORAL DAILY
Status: DISCONTINUED | OUTPATIENT
Start: 2022-11-01 | End: 2022-11-02 | Stop reason: HOSPADM

## 2022-10-31 RX ORDER — HALOPERIDOL 5 MG/ML
0.5 INJECTION INTRAMUSCULAR EVERY 10 MIN PRN
Status: DISCONTINUED | OUTPATIENT
Start: 2022-10-31 | End: 2022-10-31 | Stop reason: HOSPADM

## 2022-10-31 RX ORDER — CELECOXIB 200 MG/1
400 CAPSULE ORAL ONCE
Status: COMPLETED | OUTPATIENT
Start: 2022-10-31 | End: 2022-10-31

## 2022-10-31 RX ORDER — FENTANYL CITRATE 50 UG/ML
25 INJECTION, SOLUTION INTRAMUSCULAR; INTRAVENOUS
Status: COMPLETED | OUTPATIENT
Start: 2022-10-31 | End: 2022-10-31

## 2022-10-31 RX ORDER — PREGABALIN 75 MG/1
150 CAPSULE ORAL ONCE
Status: COMPLETED | OUTPATIENT
Start: 2022-10-31 | End: 2022-10-31

## 2022-10-31 RX ORDER — INSULIN ASPART 100 [IU]/ML
1-10 INJECTION, SOLUTION INTRAVENOUS; SUBCUTANEOUS
Status: DISCONTINUED | OUTPATIENT
Start: 2022-10-31 | End: 2022-11-02 | Stop reason: HOSPADM

## 2022-10-31 RX ORDER — ONDANSETRON 2 MG/ML
4 INJECTION INTRAMUSCULAR; INTRAVENOUS EVERY 12 HOURS PRN
Status: DISCONTINUED | OUTPATIENT
Start: 2022-10-31 | End: 2022-11-02 | Stop reason: HOSPADM

## 2022-10-31 RX ORDER — FENTANYL CITRATE 50 UG/ML
INJECTION, SOLUTION INTRAMUSCULAR; INTRAVENOUS
Status: DISCONTINUED | OUTPATIENT
Start: 2022-10-31 | End: 2022-10-31

## 2022-10-31 RX ORDER — METHOCARBAMOL 500 MG/1
1000 TABLET, FILM COATED ORAL ONCE
Status: COMPLETED | OUTPATIENT
Start: 2022-10-31 | End: 2022-10-31

## 2022-10-31 RX ORDER — GLUCAGON 1 MG
1 KIT INJECTION
Status: DISCONTINUED | OUTPATIENT
Start: 2022-10-31 | End: 2022-11-02 | Stop reason: HOSPADM

## 2022-10-31 RX ORDER — DOCUSATE SODIUM 100 MG/1
100 CAPSULE, LIQUID FILLED ORAL 2 TIMES DAILY
Status: DISCONTINUED | OUTPATIENT
Start: 2022-10-31 | End: 2022-11-02 | Stop reason: HOSPADM

## 2022-10-31 RX ORDER — SUCCINYLCHOLINE CHLORIDE 20 MG/ML
INJECTION INTRAMUSCULAR; INTRAVENOUS
Status: DISCONTINUED | OUTPATIENT
Start: 2022-10-31 | End: 2022-10-31

## 2022-10-31 RX ADMIN — GABAPENTIN 600 MG: 300 CAPSULE ORAL at 08:10

## 2022-10-31 RX ADMIN — FENTANYL CITRATE 25 MCG: 0.05 INJECTION, SOLUTION INTRAMUSCULAR; INTRAVENOUS at 10:10

## 2022-10-31 RX ADMIN — INSULIN ASPART 3 UNITS: 100 INJECTION, SOLUTION INTRAVENOUS; SUBCUTANEOUS at 10:10

## 2022-10-31 RX ADMIN — CELECOXIB 400 MG: 200 CAPSULE ORAL at 05:10

## 2022-10-31 RX ADMIN — ACETAMINOPHEN 1000 MG: 500 TABLET ORAL at 05:10

## 2022-10-31 RX ADMIN — MIDAZOLAM HYDROCHLORIDE 2 MG: 1 INJECTION, SOLUTION INTRAMUSCULAR; INTRAVENOUS at 07:10

## 2022-10-31 RX ADMIN — CEFAZOLIN SODIUM 2 G: 2 SOLUTION INTRAVENOUS at 04:10

## 2022-10-31 RX ADMIN — HYDROMORPHONE HYDROCHLORIDE 0.2 MG: 1 INJECTION, SOLUTION INTRAMUSCULAR; INTRAVENOUS; SUBCUTANEOUS at 11:10

## 2022-10-31 RX ADMIN — PHENYLEPHRINE HYDROCHLORIDE 100 MCG: 10 INJECTION INTRAVENOUS at 08:10

## 2022-10-31 RX ADMIN — PROPOFOL 200 MCG/KG/MIN: 10 INJECTION, EMULSION INTRAVENOUS at 07:10

## 2022-10-31 RX ADMIN — OXYCODONE 10 MG: 5 TABLET ORAL at 11:10

## 2022-10-31 RX ADMIN — REMIFENTANIL HYDROCHLORIDE 0.2 MCG/KG/MIN: 1 INJECTION, POWDER, LYOPHILIZED, FOR SOLUTION INTRAVENOUS at 07:10

## 2022-10-31 RX ADMIN — PHENYLEPHRINE HYDROCHLORIDE 50 MCG: 10 INJECTION INTRAVENOUS at 09:10

## 2022-10-31 RX ADMIN — PHENYLEPHRINE HYDROCHLORIDE 200 MCG: 10 INJECTION INTRAVENOUS at 08:10

## 2022-10-31 RX ADMIN — FAMOTIDINE 20 MG: 20 TABLET ORAL at 08:10

## 2022-10-31 RX ADMIN — ACETAMINOPHEN 1000 MG: 500 TABLET ORAL at 09:10

## 2022-10-31 RX ADMIN — Medication 15 MG: at 08:10

## 2022-10-31 RX ADMIN — ACETAMINOPHEN 1000 MG: 500 TABLET ORAL at 03:10

## 2022-10-31 RX ADMIN — LIDOCAINE HYDROCHLORIDE 100 MG: 20 INJECTION INTRAVENOUS at 07:10

## 2022-10-31 RX ADMIN — SODIUM CHLORIDE, SODIUM GLUCONATE, SODIUM ACETATE, POTASSIUM CHLORIDE, MAGNESIUM CHLORIDE, SODIUM PHOSPHATE, DIBASIC, AND POTASSIUM PHOSPHATE: .53; .5; .37; .037; .03; .012; .00082 INJECTION, SOLUTION INTRAVENOUS at 08:10

## 2022-10-31 RX ADMIN — FENTANYL CITRATE 100 MCG: 50 INJECTION, SOLUTION INTRAMUSCULAR; INTRAVENOUS at 07:10

## 2022-10-31 RX ADMIN — METHOCARBAMOL 1000 MG: 500 TABLET ORAL at 05:10

## 2022-10-31 RX ADMIN — SUCCINYLCHOLINE CHLORIDE 120 MG: 20 INJECTION, SOLUTION INTRAMUSCULAR; INTRAVENOUS at 07:10

## 2022-10-31 RX ADMIN — PROPOFOL 200 MG: 10 INJECTION, EMULSION INTRAVENOUS at 07:10

## 2022-10-31 RX ADMIN — PROPOFOL 30 MG: 10 INJECTION, EMULSION INTRAVENOUS at 08:10

## 2022-10-31 RX ADMIN — CEFAZOLIN 2 G: 330 INJECTION, POWDER, FOR SOLUTION INTRAMUSCULAR; INTRAVENOUS at 08:10

## 2022-10-31 RX ADMIN — PREGABALIN 150 MG: 75 CAPSULE ORAL at 05:10

## 2022-10-31 RX ADMIN — INSULIN ASPART 4 UNITS: 100 INJECTION, SOLUTION INTRAVENOUS; SUBCUTANEOUS at 05:10

## 2022-10-31 RX ADMIN — SODIUM CHLORIDE: 0.9 INJECTION, SOLUTION INTRAVENOUS at 06:10

## 2022-10-31 RX ADMIN — GABAPENTIN 600 MG: 300 CAPSULE ORAL at 03:10

## 2022-10-31 RX ADMIN — DOCUSATE SODIUM 100 MG: 100 CAPSULE ORAL at 08:10

## 2022-10-31 NOTE — CARE UPDATE
Lab logistics called for STAT blood specimen pickup going to [facility] blood bank. Confirmation #[id number]

## 2022-10-31 NOTE — ASSESSMENT & PLAN NOTE
Karthik Bentley is a 67 y.o. male s/p C3-6 ACDF on 10/31.    - Antibiotics: postop ancef x24hrs  - Weight bearing status: WBAT in C collar  - Labs: none  - DVT Prophylaxis: No chemical DVT PPx, SCDs at all times while in bed  - Lines/Drains: PIV/MIRNA. Continue MIRNA today, plan to pull tomorrow  - Pain control: multimodal  - PT/OT     Dispo: plan to pull drain tomorrow and dc home tomorrow

## 2022-10-31 NOTE — TRANSFER OF CARE
"Anesthesia Transfer of Care Note    Patient: Karthik Bentley    Procedure(s) Performed: Procedure(s) (LRB):  DISCECTOMY, SPINE, CERVICAL, ANTERIOR APPROACH, WITH FUSION C3-6 depuy SNS:vocal/motors/SSEP (N/A)    Patient location: PACU    Anesthesia Type: general    Transport from OR: Transported from OR on 6-10 L/min O2 by face mask with adequate spontaneous ventilation    Post pain: adequate analgesia    Post assessment: no apparent anesthetic complications    Post vital signs: stable    Level of consciousness: awake    Nausea/Vomiting: no nausea/vomiting    Complications: none    Transfer of care protocol was followed      Last vitals:   Visit Vitals  BP (!) 148/72   Pulse 90   Temp 36.3 °C (97.3 °F) (Oral)   Resp 16   Ht 5' 8" (1.727 m)   Wt 83.9 kg (185 lb)   SpO2 100%   BMI 28.13 kg/m²     "

## 2022-10-31 NOTE — PT/OT/SLP EVAL
"Occupational Therapy   Evaluation and Discharge Note    Name: Karthik Bentley  MRN: [id number]  Admitting Diagnosis:  Cervical myelopathy   Recent Surgery: Procedure(s) (LRB):  DISCECTOMY, SPINE, CERVICAL, ANTERIOR APPROACH, WITH FUSION C3-6 depuy SNS:vocal/motors/SSEP (N/A) Day of Surgery    Recommendations:     Discharge Recommendations: home  Discharge Equipment Recommendations:  none  Barriers to discharge:  Decreased caregiver support    Assessment:     Karthik Bentley is a 67 y.o. male with a medical diagnosis of Cervical myelopathy. Pt presents s/p discectomy with C3-6 fusion. Pt met HOB elevated with c-collar donned. Pt completed bed mobility with Mod (I), UBD with set up (A), and toilet tf with SPV and no AD. OT educated and demonstrated donning/doffing c-collar with pt practicing after to demonstrate competency prior to D/C with SBA. At this time, patient is functioning at their prior level of function and does not require further acute OT services.     Plan:     D/C from OT    Subjective   "I appreciate your help"  Chief Complaint: pain and discomfort  Patient/Family Comments/goals: to improve functional use of B UE    Occupational Profile:  Living Environment: Pt lives alone in a 2 story [facility] with no JASON. He has a 1/2 bath on the 1st floor and full bedroom/bathroom on 2nd floor with t/s combo. Pt has a full flight of stairs with R HR to access 2nd floor.   Previous level of function: PTA, pt was (I) with ADLs and functional mobility using no AD. He (+) drives and is a retired . He enjoys being active and walking/jogging  Roles and Routines: friend  Equipment Used at home:  none  Assistance upon Discharge: friends    Pain/Comfort:  Pain Rating 1:  (pt did not rate)  Location - Orientation 1: posterior  Location 1: neck  Pain Addressed 1: Pre-medicate for activity, Reposition, Distraction, Cessation of Activity  Pain Rating Post-Intervention 1:  (pt did not rate)    Patients cultural, " spiritual, Religion conflicts given the current situation: no    Objective:     Communicated with: RN prior to session.  Patient found HOB elevated with SCD, MIRNA drain, cervical collar upon OT entry to room.    General Precautions: Standard, fall   Orthopedic Precautions:spinal precautions, RLE weight bearing as tolerated, LLE weight bearing as tolerated   Braces: Aspen collar  Respiratory Status: Room air     Occupational Performance:    Bed Mobility:    Patient completed Scooting/Bridging with modified independence  Patient completed Supine to Sit with modified independence    Functional Mobility/Transfers:  Patient completed Sit <> Stand Transfer with supervision  with  no assistive device   Patient completed Toilet Transfer Step Transfer technique with supervision with  no AD  Functional Mobility: Pt completed functional ambulation EOB>toilet>bedside chair with SPV and no AD  No LOB noted    Activities of Daily Living:  Upper Body Dressing: set up (A) to don gown posteriorly EOB; SBA and vc's to don/doff c-collar with OT providing demonstration 1st trial    Cognitive/Visual Perceptual:  Cognitive/Psychosocial Skills:     -       Oriented to: Person, Place, Time, and Situation   -       Follows Commands/attention:Follows multistep  commands  -       Safety awareness/insight to disability: intact   -       Mood/Affect/Coping skills/emotional control: Cooperative and Pleasant    Physical Exam:  Balance:    -       good standing balance with no AD; normal sitting balance  Postural examination/scapula alignment:    -       Rounded shoulders  Sensation:    -       Intact  light/touch B UE proximal-distal  Dominant hand:    -       right  Upper Extremity Range of Motion:     -       Right Upper Extremity: WFL except shoulder ROM limited due 90* flex/abd   -       Left Upper Extremity: WFL except shoulder ROM limited due to 90* flex/abd  Upper Extremity Strength:    -       Right Upper Extremity: WFL except shoulder  NT  -       Left Upper Extremity: WFL except shoulder NT   Strength:    -       Right Upper Extremity: WFL  -       Left Upper Extremity: decreased with pt reporting hx of CTS  Fine Motor Coordination:    -       Impaired  Left hand, manipulation of objects with inflammation noted in L thumb MCP joint d/t hx of RA    AMPAC 6 Click ADL:  AMPAC Total Score: 24    Treatment & Education:  -Education provided regarding fit and wear schedule of c-collar, adjustments provided for comfort.  -Education provided regarding spinal precautions for use during functional tasks/mobility/transfers   -Pt educated on weightbearing and surgical precautions with pt verbalizing 3/3 correctly  -Pt educated on role of OT and plan of care s/p surgery at [facility], white board updated     Patient left up in chair with all lines intact, call button in reach, and RN notified    GOALS:   Multidisciplinary Problems       Occupational Therapy Goals       Not on file              Multidisciplinary Problems (Resolved)          Problem: Occupational Therapy    Goal Priority Disciplines Outcome Interventions   Occupational Therapy Goal   (Resolved)     OT, PT/OT Met    Description: Pt is functioning at his PLOF in self-care, ADLs, and functional mobility at this time. Therefore, he does not require further skilled OT intervention at this time. Pt is appropriate for D/C home.                       History:     Past Medical History:   Diagnosis Date    Diabetes mellitus type II     Hypertension     Rheumatoid arthritis          Past Surgical History:   Procedure Laterality Date    COLONOSCOPY N/A 1/16/2020    Procedure: COLONOSCOPY;  Surgeon: Cynthia Kearney MD;  Location: [address]);  Service: Endoscopy;  Laterality: N/A;    ENDOSCOPIC CARPAL TUNNEL RELEASE Right 4/1/2022    Procedure: RELEASE, CARPAL TUNNEL, ENDOSCOPIC - right arthrex;  Surgeon: Wiley Warren MD;  Location: [facility];  Service: Orthopedics;  Laterality:  Right;    EPIDURAL STEROID INJECTION N/A 9/6/2022    Procedure: CERVICAL EDITH CONTRAST DIRECT REFERRAL;  Surgeon: Yasmeen Taylor MD;  [facility];  Service: Pain Management;  Laterality: N/A;    SHOULDER SURGERY         Time Tracking:     OT Date of Treatment: 10/31/22  OT Start Time: 1320  OT Stop Time: 1350  OT Total Time (min): 30 min    Billable Minutes:Evaluation 15  Therapeutic Activity 15    10/31/2022

## 2022-10-31 NOTE — NURSING
Report received. Care assumed. Patient arrived to unit AAOx4 in hospital bed from PACU. VSS, IVF infusing. Dressing to anterior neck, CDI. MIRNA to suction, dressing CDI.  Aspen collar placed. Pt lying supine in bed. Pt denies pain or any other concerns at this time. See assessment. Patient oriented to room. Bed in lowest position, side rails up x2, bed wheels locked and call light within reach.  Pt instructed to call for assistance, verbalized understanding. NADN. Will continue to monitor.

## 2022-10-31 NOTE — OP NOTE
DATE OF PROCEDURE: 10/31/2022.     SURGEON: Kody Marshall M.D.     FIRST ASSISTANT SURGEON: Richard Davidson MD, PGY-4     PREOPERATIVE DIAGNOSIS: Cervical spondylitic myelopathy.     POSTOPERATIVE DIAGNOSIS: Cervical spondylitic myelopathy.     PROCEDURES PERFORMED:  1. Anterior cervical diskectomy and instrumented spinal fusion, including decompression of neurological elements, C3-4, C4-5, and C5-6.  2. Application of carbon fiber reinforced polyetheretherketone titanium intervertebral spacer to C3-4, C4-5, and C5-6 disk space.  3. Anterior instrumentation, C3-C6.  4. Cadaveric and local bone grafting.     ANESTHESIA: General endotracheal anesthesia.     ESTIMATED BLOOD LOSS: 50 mL.     IMPLANTS: Steve skyline plate / screws and bengal CFRP Cage.     SPECIMENS: None.     FINDINGS: None.     DRAINS: One deep.     COMPLICATIONS: None.     SPONGE AND NEEDLE COUNT: Correct x2.     NEUROLOGICAL MONITORING: Motor evoked potentials, somatosensory evoked    potential, free-running EMG, and vocal fold monitoring.  There were no changes to preincisional MEP and SSEP baselines.  There was intermittent recurrent laryngeal nerve activity that responded to decreasing air in the ETT balloon and no significant EMG activity.     REASON FOR OPERATION AND BRIEF HISTORY AND PHYSICAL: Karthik Allen a    67 y.o. male with symptomatic cervical myelopathy. They have failed conservative management and are here for surgery today.      DESCRIPTION OF INFORMED CONSENT: Please see my last clinic note for a full    description of the informed consent process that I had with the patient.     DESCRIPTION OF PROCEDURE: The patient was met in the preoperative area where    their right-sided neck was marked as the operative site. Subsequently, they were    brought to the Operating Room where they was placed under general endotracheal    anesthesia. Sequential compression devices were placed in the patient's    bilateral calves and run throughout  the case. A Busby catheter was not placed. At  this point, a shoulder roll was placed and the patient's neck was gently    hyperextended. The neck was stacked in multiple stack sheets as well as a gel    roll. At this point, the patient's anterior cervical spine was prepped and    draped in a normal sterile fashion.     A full timeout was then called, identifying the patient, the procedural site and  levels, the availability of all instruments and implants, and no specific    nursing, surgical, anesthetic, or neurological monitoring concerns.  All present were empowered to identify any concerns at any time. Finally, it  was safe to proceed with surgery and the patient was given weight-appropriate    dose of Ancef by the Anesthesia Service as well as 8 mg of Decadron.     I then infiltrated my planned incision with 10 mL of 0.5% Marcaine with    epinephrine.     I then made a transverse incision centered over the patient's anterior cervical    spine  and carried dissection down through skin and    subcutaneous tissues using a combination of sharp dissection and electrocautery    where necessary. The platysma was identified and transected in line with the    skin incision and subplatysmal flaps were elevated. At this point, I performed a standard Sotelo Mederos approach tothe anterior cervical spine. Any large bridging veins or arteries were tied and ligated.  Small veins were coagulated with bipolar electrocautery. At this point, I  visualized the anterior cervical spine. Peanut dissection allowed me to    visualize the vertebral body. I placed a needle in what I took to be the C5/6 Disc     took a lateral radiograph and thus confirmed my  level. I then finalized my exposure. At the conclusion of my exposure, I could see from the inferior half of the C3 vertebrae to the superior half of the C6 vertebra  and the disk space from uncinate process to uncinate process.     I then performed a subtotal C34 diskectomy under  loupe magnification using a disk    knife as well as straight and angled curettes, Kerrison punches, and pituitary    rongeurs.     Under microscopic visulalization I drilled down the    posterior aspect of the disk space with a high-speed drill to reveal the    posterior longitudinal ligament. I used a 4-0 forward-angle curette to enter    the median raphe of the ligament, followed by #1 and #2 Kerrison punches to    resect the posterior longitudinal ligament. At the end of my neurological    decompression, I could see dura from uncinate process to uncinate process.    Epidural hemostasis was finalized with patties and FloSeal. The wound was then    thoroughly irrigated. The disk space was sized for a size 6 spacer and this was  filled with 1 mL of DBX putty as well as locally harvested bone and gently impacted into place, and confirmed to be in acceptable position radiographically.      An identical procedure, including neurological decompression, and placement of cage packed with bone putty was then performed at the C4-5, and subsequently C5-6 levels.  Size 6 cages were placed both of these levels. An anterior  plate was then applied in the standard fashion, spanning C3-6.     AP and lateral  radiographs were taken, demonstrating correct level as well as adequate positionof all implants. The wound was then thoroughly irrigated. The plate screw    capture mechanism was then locked with the torque wrench. A deep drain was placed. The platysma was  closed with 3-0 Vicryl, followed by 4-0 and 5-0 Monocryl for the skin. A 2-0  silk suture was used to secure down the drain. A soft dressing was placed. The  patient was subsequently transferred to his hospital bed, awoken, and    transferred to the Recovery Room in stable condition.

## 2022-10-31 NOTE — PLAN OF CARE
VSS. Patient able to tolerate oral liquids. Patient reports tolerable pain level for transfer. Dressing intact. SCDs intact. IVF infusing. Patient and family will receive home meds per bedside delivery tomorrow. Waiting for neck brace to arrive for patient. Notified charge nurse and recovery suites nurse. Patient to be transferred via bed to recovery suites. Pt stable for transfer. No distress noted. Report called to MANFRED Lazaro. Staff at bedside to transfer patient to recovery suites.

## 2022-10-31 NOTE — PATIENT INSTRUCTIONS
DR. CATA GUDINO'S POSTOPERATIVE INSTRUCTIONS -   ANTERIOR CERVICAL DISKECTOMY AND FUSION       Antibiotics: You do not need additional antibiotics at home.    NSAIDs: Please refrain from taking ibuprofen (Advil), naproxen (Aleve), and other non steroidal anti-inflammatory medications other than the Celebrex that will be prescribed to you after surgery.    Wound Care: You may remove your dressing and shower 5 days after surgery. Until then please keep your wound clean and dry. Sponge baths are acceptable. Do not go in a pool or hot tub until seen in clinic. Please leave the small steri-strips covering your wound in place until they fall off naturally (2 weeks). You may notice clear suture ends hanging from the sides of your incision after the steri-strips are removed, it is ok to clip these with scissors.    Cervical Collar: If given a collar after surgery you should wear it at all times. The only times it may be removed are for eating and showering.    Pain: We will use a multimodal approach for pain management after your surgery.  You will be given a prescription for pain medicine when you are discharged from the hospital.  You will also be given prescriptions for Robaxin (a muscle relaxer), Gabapentin, Celebrex and Tylenol.  Please note: you will only be given ONE prescription for narcotics when you are discharged from the hospital.  This medication is for breakthrough pain only. This medication will not be refilled.  The other medications given to you may be refilled if needed.      Difficulty Swallowing: This is very common in the postoperative period. You may find it easier to eat a pureed diet for the first 1-2 weeks after surgery. Ice chips and menthol cough drops may also be soothing.    Difficulty Breathing: This is uncommon after surgery and may represent dangerous swelling in your neck. If you experience difficulty breathing please call 911 immediately.    Infection: Signs of infection include  increasing wound drainage and redness around the wound, as well as a temperature over 101.5 degrees. It is unnecessary to take your temperature on a routine basis. Please call the above number if you are concerned about an infection.    Driving and Work: It is ok to return to driving and work as long as you are not taking narcotic pain medications or wearing your cervical collar. Please do not lift over 5 pounds or participate in exercise or sports until cleared by Dr. Marshall.    Deep Venous Thrombosis (Blood Clots): Symptoms include swelling in the legs and shortness of breath. Please call the office or proceed to the nearest emergency room if you have any of these symptoms.    Physical Therapy: The best physical therapy immediately after surgery is walking. Please try to walk as much as possible.    Follow-up: You will be scheduled for a follow-up appointment in 4 weeks with either Dr. Marshall or his physician assistant, Hawa Swartz PA-C.    Questions: During business hours please call [phone] for routine questions. For after hours questions please call [phone] and ask to speak with the Orthopaedic resident on call.    Disability: If you submitted short term disability paperwork for us to complete and would like to check the status, please call the Disability Department at [phone].  You may fax any necessary paperwork to [phone].

## 2022-10-31 NOTE — ANESTHESIA PROCEDURE NOTES
Intubation    Date/Time: 10/31/2022 7:53 AM  Performed by: Olimpia Peters CRNA  Authorized by: Diego Rowell MD     Intubation:     Induction:  Intravenous    Intubated:  Postinduction    Mask Ventilation:  Easy mask    Attempts:  1    Attempted By:  CRNA    Method of Intubation:  Video laryngoscopy    Blade:  Grace 3    Laryngeal View Grade: Grade I - full view of cords      Difficult Airway Encountered?: No      Complications:  None    Airway Device:  EMG ETT (NIMS)    Airway Device Size:  8.0    Style/Cuff Inflation:  Cuffed    Inflation Amount (mL):  8    Tube secured:  22    Secured at:  The lips    Placement Verified By:  Capnometry    Complicating Factors:  None

## 2022-10-31 NOTE — PT/OT/SLP EVAL
"Physical Therapy Evaluation and Discharge Note     Patient Name:  Karthik Bentley  MRN: [id number]    Admit Date: 10/31/2022  Admitting Diagnosis:  Cervical myelopathy  Length of Stay: 0 days  Recent Surgery: Procedure(s) (LRB):  DISCECTOMY, SPINE, CERVICAL, ANTERIOR APPROACH, WITH FUSION C3-6 depuy SNS:vocal/motors/SSEP (N/A) Day of Surgery    Recommendations:     Discharge Recommendations: Home, no PT needs anticipated  Equipment recommendations: none  Barriers to discharge: None Identified     Assessment:     Karthik Bentley is a 67 y.o. male admitted to [facility] on 10/31/2022 with medical diagnosis of Cervical myelopathy. Pt presents with orthopedic precautions, pain. These deficits effect their roles and responsibilities in which they were able to complete prior to admit. Karthik Bentley does not require acute PT services at this time due to being at (I) PLOF and demonstrating safety with functional mobility, including transfers and ambulation. Pt able to ambulate >150 ft with no AD supervision. Pt declines trialing stairs, stating he feels confident navigating them at home. Pt verbalizes understanding of spinal precautions and importance of wearing cervical collar. Once medically stable, recommending pt discharge to Home, no PT needs anticipated.        Plan:   No physical therapy needed during current admission.    Plan of Care reviewed with: patient    This plan of care has been discussed with the patient/caregiver, who was included in its development and is in agreement with the identified goals and treatment plan.     Subjective     Communicated with RN prior to session.  Patient found up in chair upon PT entry to room, agreeable to evaluation. Pt alone during session.    Chief Complaint: posterior neck pressure    Patient/Family Comments/goals: "I am very active and feel good to go home"    Pain/Comfort:  Pain Rating 1:  (pt did not rate)  Location - Orientation 1: posterior  Location 1: neck  Pain Addressed 1: Cessation " of Activity, Distraction, Reposition  Pain Rating Post-Intervention 1:  (pt did not rate)    Patients cultural, spiritual, Nondenominational conflicts given the current situation: None identified     Patient History: information obtained from pt     Living Environment: Pt lives alone in 2 story town home  with 0 JASON (full flight with R HR; bed and full bath on 2nd level). Bathroom set-up: tub/shower combo  Prior Level of Function: independent with mobility and ADLs; retired ; drives; enjoys walking  DME owned: none  Support Available/Caregiver Assistance:  friends who can (A) as needed    Objective:     Patient found with: SCD, MIRNA drain, cervical collar    Recent Surgery: Procedure(s) (LRB):  DISCECTOMY, SPINE, CERVICAL, ANTERIOR APPROACH, WITH FUSION C3-6 depuy SNS:vocal/motors/SSEP (N/A) Day of Surgery  General Precautions: Standard, fall   Orthopedic Precautions:spinal precautions   Braces: Aspen collar   Oxygen Device: room air      Exams:    Cognition:  Alert  Command following: Follows multistep verbal commands  Communication: clear/fluent    Sensation:   Light touch sensation: Intact BLEs    Gross Motor Coordination: No deficits noted during functional mobility tasks     Edema/Skin Integrity: None noted; Visible skin intact    Postural examination/scapula alignment:  no observable deficits    Lower Extremity Range of Motion:  Right Lower Extremity: WFL  Left Lower Extremity: WFL    Lower Extremity Strength:  Right Lower Extremity: knee ext: 5/5, knee flex: 5/5, hip flex: 5/5, DF: 5/5, PF: 5/5  Left Lower Extremity: knee ext: 5/5, knee flex: 5/5, hip flex: 5/5, DF: 5/5, PF: 5/5    Functional Mobility:    Bed Mobility: not assessed as pt in recliner    Transfers:   Sit <> Stand Transfer: Supervision x 1 trials from recliner with no AD              Gait:  Distance: ~250 ft  Assistance level: Supervision  Assistive Device: none  Gait Assessment: no LOB during gait    Balance:  Dynamic Sitting: GOOD:  Maintains balance through MODERATE excursions of active trunk movement  Standing:  Static: GOOD: Takes MODERATE challenges from all directions   Dynamic: GOOD: Needs SUPERVISION only during gait and able to self right with moderate LOB    Outcome Measure: AM-PAC 6 CLICK MOBILITY  Total Score:24     Patient/Caregiver Education:     Therapist educated pt/caregiver regarding:   PT POC and goals for therapy   Safety with mobility and fall risk   Instruction on use of call button and importance of calling nursing staff for assistance with mobility   Time provided for therapeutic counseling and discussion of current health disposition. All questions answered to satisfaction, within scope of PT practice     Patient/caregiver able to verbalize understanding and expressed no further questions this visit; will follow-up with pt/caregiver during current admit for additional questions/concerns within scope of practice.     White board updated.     Patient left up in chair with all lines intact and call button in reach.    GOALS:   Multidisciplinary Problems       Physical Therapy Goals          Problem: Physical Therapy    Goal Priority Disciplines Outcome Goal Variances Interventions   Physical Therapy Goal     PT, PT/OT Ongoing, Progressing     Description: Patient does not require acute PT services at this time due to being at (I) PLOF and demonstrating safety with functional mobility, including transfers and ambulation. Pt has met all goals for safe d/c home.                         History:     Past Medical History:   Diagnosis Date    Diabetes mellitus type II     Hypertension     Rheumatoid arthritis        Past Surgical History:   Procedure Laterality Date    COLONOSCOPY N/A 1/16/2020    Procedure: COLONOSCOPY;  Surgeon: Cynthia Kearney MD;  Location: [facility] ([address] [place] [address]);  Service: Endoscopy;  Laterality: N/A;    ENDOSCOPIC CARPAL TUNNEL RELEASE Right 4/1/2022    Procedure: RELEASE, CARPAL TUNNEL, ENDOSCOPIC - right  arthrex;  Surgeon: Wiley Warren MD;  Location: [facility] OR;  Service: Orthopedics;  Laterality: Right;    EPIDURAL STEROID INJECTION N/A 9/6/2022    Procedure: CERVICAL EDITH CONTRAST DIRECT REFERRAL;  Surgeon: Yasmeen Taylor MD;  Location: [facility] PAIN [facility];  Service: Pain Management;  Laterality: N/A;    SHOULDER SURGERY         Time Tracking:     PT Received On: 10/31/22  PT Start Time: 1352     PT Stop Time: 1404  PT Total Time (min): 12 min     Billable Minutes: Evaluation 12    10/31/2022

## 2022-10-31 NOTE — PLAN OF CARE
Problem: Occupational Therapy  Goal: Occupational Therapy Goal  Description: Pt is functioning at his PLOF in self-care, ADLs, and functional mobility at this time. Therefore, he does not require further skilled OT intervention at this time. Pt is appropriate for D/C home.  Outcome: Met   OT eval completed. Pt completed bed mobility with Mod (I), toilet transfer with supervision, UBD with supervision, and SBA to don/doff c-collar. Pt is appropriate for D/C home

## 2022-10-31 NOTE — PLAN OF CARE
Pt safe to d/c home.    Problem: Physical Therapy  Goal: Physical Therapy Goal  Description: Patient does not require acute PT services at this time due to being at (I) PLOF and demonstrating safety with functional mobility, including transfers and ambulation. Pt has met all goals for safe d/c home.  Outcome: Ongoing, Progressing     10/31/2022

## 2022-10-31 NOTE — H&P
"DATE: 10/31/2022  PATIENT: Karthik Bentley     Attending Physician: Kody Marshall M.D.     HISTORY:  The patient returns for follow-up.  He has a history of a right endoscopic carpal tunnel release, and his chief complaint is decreased in left hand  strength, as well as dexterity issues.      He also endorses neck pain, and diffuse upper extremity weakness.     PMH/PSH/FamHx/SocHx:  Unchanged from prior visit     ROS:  REVIEW OF SYSTEMS:  Constitution: Negative. Negative for chills, fever and night sweats.   HENT: Negative for congestion and headaches.    Eyes: Negative for blurred vision, left vision loss and right vision loss.   Cardiovascular: Negative for chest pain and syncope.   Respiratory: Negative for cough and shortness of breath.    Endocrine: Negative for polydipsia, polyphagia and polyuria.   Hematologic/Lymphatic: Negative for bleeding problem. Does not bruise/bleed easily.   Skin: Negative for dry skin, itching and rash.   Musculoskeletal: Negative for falls and muscle weakness.   Gastrointestinal: Negative for abdominal pain and bowel incontinence.   Allergic/Immunologic: Negative for hives and persistent infections.  Genitourinary: Negative for urinary retention/incontinence and nocturia.   Neurological: positive for disturbances in coordination, no myelopathic symptoms such as handwriting changes or difficulty with buttons, coins, keys or small objects. No loss of balance and seizures.   Psychiatric/Behavioral: Negative for depression. The patient does not have insomnia.   Denies myelopathic symptoms, perineal paresthesias, bowel or bladder incontinence     EXAM:  Ht 5' 8" (1.727 m)   Wt 83.9 kg (185 lb)   BMI 28.13 kg/m²      Neuro and physical exam stable.      IMAGING:     Today I personally re-reviewed AP, Lat and Flex/Ex  upright C-spine films that demonstrate moderate DDD from  C4-7. Reversal of the lordotic curve.      Lumbar MRI shows foraminal narrowing and spinal canal stenosis " most notable at C4-C5 and C5-C6. Short segment cord signal abnormality at C4-C5 which is suggestive chronic compressive myelopathy.     Body mass index is 28.13 kg/m².            Hemoglobin A1C   Date Value Ref Range Status   10/11/2022 8.4 (H) 4.0 - 5.6 % Final       Comment:       ADA Screening Guidelines:  5.7-6.4%  Consistent with prediabetes  >or=6.5%  Consistent with diabetes     High levels of fetal hemoglobin interfere with the HbA1C  assay. Heterozygous hemoglobin variants (HbS, HgC, etc)do  not significantly interfere with this assay.   However, presence of multiple variants may affect accuracy.      09/20/2022 8.5 (H) 4.0 - 5.6 % Final       Comment:       ADA Screening Guidelines:  5.7-6.4%  Consistent with prediabetes  >or=6.5%  Consistent with diabetes     High levels of fetal hemoglobin interfere with the HbA1C  assay. Heterozygous hemoglobin variants (HbS, HgC, etc)do  not significantly interfere with this assay.   However, presence of multiple variants may affect accuracy.      05/10/2022 7.5 (H) 4.0 - 5.6 % Final       Comment:       ADA Screening Guidelines:  5.7-6.4%  Consistent with prediabetes  >or=6.5%  Consistent with diabetes     High levels of fetal hemoglobin interfere with the HbA1C  assay. Heterozygous hemoglobin variants (HbS, HgC, etc)do  not significantly interfere with this assay.   However, presence of multiple variants may affect accuracy.               ASSESSMENT/PLAN:     There are no diagnoses linked to this encounter.        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 I discussed the natural history of their diagnoses as well as surgical and nonsurgical treatment options. I educated the patient on the importance of core/back strengthening, correct posture, bending/lifting ergonomics, and low-impact aerobic exercises (walking, elliptical, and aquatherapy). He has failed conservative management and is a candidate for C3-6 ACDF.     I had a sit down discussion with the patient  regarding the above procedure. We specifically discussed the risks, benefits, and alternatives to surgery. We discussed the surgical procedure including the skin incision, nerve decompression, bone fusion, allograft and/or iliac crest bone graft, and surgical implants including plates and interbody spacers as indicated: they understand the risks include but are not limited to death, paralysis, blindness, bleeding, infection, damage to arteries, veins and nerves, tracheal injury, esophageal injury, vertebral artery injury, dysphagia, spinal fluid leak, continued or worsening pain, no improvement in symptoms, non-union, and the possible need for more surgery in the future, as well as the possibility other unforseen and unknown complications. We talked about expected hospital stay and recovery period. All questions were answered; they understand and wish to proceed.

## 2022-11-01 ENCOUNTER — TELEPHONE (OUTPATIENT)
Dept: ORTHOPEDICS | Facility: CLINIC | Age: 67
End: 2022-11-01
Payer: MEDICARE

## 2022-11-01 DIAGNOSIS — M50.30 DDD (DEGENERATIVE DISC DISEASE), CERVICAL: Primary | ICD-10-CM

## 2022-11-01 LAB
POCT GLUCOSE: 150 MG/DL (ref 70–110)
POCT GLUCOSE: 184 MG/DL (ref 70–110)
POCT GLUCOSE: 193 MG/DL (ref 70–110)
POCT GLUCOSE: 202 MG/DL (ref 70–110)

## 2022-11-01 PROCEDURE — 25000003 PHARM REV CODE 250: Performed by: STUDENT IN AN ORGANIZED HEALTH CARE EDUCATION/TRAINING PROGRAM

## 2022-11-01 PROCEDURE — 99900035 HC TECH TIME PER 15 MIN (STAT)

## 2022-11-01 PROCEDURE — 11000001 HC ACUTE MED/SURG PRIVATE ROOM

## 2022-11-01 PROCEDURE — 63600175 PHARM REV CODE 636 W HCPCS: Performed by: STUDENT IN AN ORGANIZED HEALTH CARE EDUCATION/TRAINING PROGRAM

## 2022-11-01 PROCEDURE — 94761 N-INVAS EAR/PLS OXIMETRY MLT: CPT

## 2022-11-01 RX ADMIN — GABAPENTIN 600 MG: 300 CAPSULE ORAL at 02:11

## 2022-11-01 RX ADMIN — DOCUSATE SODIUM 100 MG: 100 CAPSULE ORAL at 09:11

## 2022-11-01 RX ADMIN — VALSARTAN 160 MG: 160 TABLET, FILM COATED ORAL at 09:11

## 2022-11-01 RX ADMIN — ACETAMINOPHEN 1000 MG: 500 TABLET ORAL at 05:11

## 2022-11-01 RX ADMIN — CELECOXIB 200 MG: 200 CAPSULE ORAL at 09:11

## 2022-11-01 RX ADMIN — INSULIN ASPART 1 UNITS: 100 INJECTION, SOLUTION INTRAVENOUS; SUBCUTANEOUS at 09:11

## 2022-11-01 RX ADMIN — ACETAMINOPHEN 1000 MG: 500 TABLET ORAL at 09:11

## 2022-11-01 RX ADMIN — FAMOTIDINE 20 MG: 20 TABLET ORAL at 09:11

## 2022-11-01 RX ADMIN — GABAPENTIN 600 MG: 300 CAPSULE ORAL at 09:11

## 2022-11-01 RX ADMIN — ATORVASTATIN CALCIUM 40 MG: 20 TABLET, FILM COATED ORAL at 09:11

## 2022-11-01 RX ADMIN — INSULIN ASPART 2 UNITS: 100 INJECTION, SOLUTION INTRAVENOUS; SUBCUTANEOUS at 05:11

## 2022-11-01 RX ADMIN — CEFAZOLIN SODIUM 2 G: 2 SOLUTION INTRAVENOUS at 12:11

## 2022-11-01 RX ADMIN — ACETAMINOPHEN 1000 MG: 500 TABLET ORAL at 02:11

## 2022-11-01 RX ADMIN — INSULIN ASPART 4 UNITS: 100 INJECTION, SOLUTION INTRAVENOUS; SUBCUTANEOUS at 07:11

## 2022-11-01 RX ADMIN — POLYETHYLENE GLYCOL 3350 17 G: 17 POWDER, FOR SOLUTION ORAL at 09:11

## 2022-11-01 NOTE — ANESTHESIA POSTPROCEDURE EVALUATION
Anesthesia Post Evaluation    Patient: Karthik Bentley    Procedure(s) Performed: Procedure(s) (LRB):  DISCECTOMY, SPINE, CERVICAL, ANTERIOR APPROACH, WITH FUSION C3-6 depuy SNS:vocal/motors/SSEP (N/A)    Final Anesthesia Type: general      Patient location during evaluation: PACU  Patient participation: Yes- Able to Participate  Level of consciousness: awake and alert  Post-procedure vital signs: reviewed and stable  Pain management: adequate  Airway patency: patent    PONV status at discharge: No PONV  Anesthetic complications: no      Cardiovascular status: blood pressure returned to baseline  Respiratory status: unassisted  Hydration status: euvolemic  Follow-up not needed.          Vitals Value Taken Time   /68 11/01/22 0404   Temp 36.3 °C (97.3 °F) 11/01/22 0404   Pulse 55 11/01/22 0404   Resp 17 11/01/22 0404   SpO2 98 % 11/01/22 0404         Event Time   Out of Recovery 11:45:14         Pain/Pablo Score: Pain Rating Prior to Med Admin: 6 (11/1/2022  5:15 AM)  Pain Rating Post Med Admin: 3 (11/1/2022  6:00 AM)  Pablo Score: 10 (10/31/2022 12:06 PM)

## 2022-11-01 NOTE — PLAN OF CARE
Problem: Adult Inpatient Plan of Care  Goal: Plan of Care Review  Outcome: Ongoing, Progressing  Goal: Patient-Specific Goal (Individualized)  Outcome: Ongoing, Progressing  Goal: Absence of Hospital-Acquired Illness or Injury  Outcome: Ongoing, Progressing  Goal: Optimal Comfort and Wellbeing  Outcome: Ongoing, Progressing     Problem: Diabetes Comorbidity  Goal: Blood Glucose Level Within Targeted Range  Outcome: Ongoing, Progressing     Problem: Bleeding (Spinal Surgery)  Goal: Absence of Bleeding  Outcome: Ongoing, Progressing     Problem: Pain (Spinal Surgery)  Goal: Acceptable Pain Control  Outcome: Ongoing, Progressing     POC reviewed with patient. All questions and concerns addressed. Fall/safety precautions implemented and maintained. Blood glucose monitored. C-collar in place. Drain care performed. No acute events noted this shift. Please see flowsheet for full assessment and vitals. Bed locked in lowest position. Side rails up x2. Call bell within reach. Will continue to monitor.

## 2022-11-01 NOTE — PROGRESS NOTES
[facility])  Orthopedics  Progress Note    Patient Name: Karthik Bentley  MRN: [id number]  Admission Date: 10/31/2022  Hospital Length of Stay: 0 days  Attending Provider: Kody Marshall MD  Primary Care Provider: Anibal Moya MD  Follow-up For: Procedure(s) (LRB):  DISCECTOMY, SPINE, CERVICAL, ANTERIOR APPROACH, WITH FUSION C3-6 depuy SNS:vocal/motors/SSEP (N/A)    Post-Operative Day: 1 Day Post-Op  Subjective:     Principal Problem:Cervical myelopathy    Principal Orthopedic Problem: sp C3-6 ACDF 10/31/22    Interval History: NAEON. VS wnl. Drain 90 yesterday, 25 overnight. No hoarseness, some difficulty with swallowing liquid but no issues eating yesterday. Did well with PT, 150ft.    Review of patient's allergies indicates:  No Known Allergies    Current Facility-Administered Medications   Medication    0.9%  NaCl infusion    acetaminophen tablet 1,000 mg    atorvastatin tablet 40 mg    bisacodyL suppository 10 mg    celecoxib capsule 200 mg    dextrose 10% bolus 125 mL    dextrose 10% bolus 250 mL    dextrose 50% injection 12.5 g    dextrose 50% injection 25 g    docusate sodium capsule 100 mg    famotidine tablet 20 mg    gabapentin capsule 600 mg    glucagon (human recombinant) injection 1 mg    glucose chewable tablet 16 g    glucose chewable tablet 24 g    insulin aspart U-100 pen 1-10 Units    melatonin tablet 6 mg    metoclopramide HCl injection 5 mg    morphine injection 2 mg    ondansetron injection 4 mg    oxyCODONE immediate release tablet 5 mg    oxyCODONE immediate release tablet Tab 10 mg    polyethylene glycol packet 17 g    sodium chloride 0.9% flush 10 mL    sodium chloride 0.9% flush 10 mL    valsartan tablet 160 mg     Objective:     Vital Signs (Most Recent):  Temp: 97.3 °F (36.3 °C) (11/01/22 0404)  Pulse: (!) 55 (11/01/22 0404)  Resp: 17 (11/01/22 0404)  BP: (!) 144/68 (11/01/22 0404)  SpO2: 98 % (11/01/22 0404)   Vital Signs (24h Range):  Temp:  " [97.2 °F (36.2 °C)-98.6 °F (37 °C)] 97.3 °F (36.3 °C)  Pulse:  [51-92] 55  Resp:  [15-24] 17  SpO2:  [97 %-100 %] 98 %  BP: (138-177)/(68-90) 144/68     Weight: 83.9 kg (185 lb)  Height: 5' 8" (172.7 cm)  Body mass index is 28.13 kg/m².      Intake/Output Summary (Last 24 hours) at 11/1/2022 0656  Last data filed at 11/1/2022 0600  Gross per 24 hour   Intake 1720 ml   Output 615 ml   Net 1105 ml       Ortho/SPM Exam  General Exam  General appearance: A&Ox3. NAD.   HEENT: Normocephalic, head atraumatic. Conjuntiva normal. EOMI. External appearance of nose, mouth, ears wnl.  Neck: Supple. Trachea midline.  Respiratory: Breathing unlabored on room air. Symmetric chest rise.   CV: Extremities warm and well perfused.  Neurologic: No focal motor or sensory deficits.   Psychatric: A&Ox3. Appropriate mood and affect.  Skin: Warm, dry, well perfused. No rash.    C spine  Aspen collar in place  Dressing cdi  Drain with light ss output  Motor/sensation intact throughout, some weakness in deltoids  Radial pulse 2+ brisk cap refill    Significant Labs: None    Significant Imaging: None    Assessment/Plan:     * Cervical myelopathy  Karthik Bentley is a 67 y.o. male s/p C3-6 ACDF on 10/31.    - Antibiotics: postop ancef x24hrs  - Weight bearing status: WBAT in C collar  - Labs: none  - DVT Prophylaxis: No chemical DVT PPx, SCDs at all times while in bed  - Lines/Drains: PIV/MIRNA. Continue MIRNA today, plan to pull tomorrow  - Pain control: multimodal  - PT/OT     Dispo: plan to pull drain tomorrow and dc home tomorrow            Lore Jansen MD  Orthopedics  [facility] ([facility])  "

## 2022-11-01 NOTE — SUBJECTIVE & OBJECTIVE
"Principal Problem:Cervical myelopathy    Principal Orthopedic Problem: sp C3-6 ACDF 10/31/22    Interval History: NAEON. VS wnl. Drain 90 yesterday, 25 overnight. No hoarseness, some difficulty with swallowing liquid but no issues eating yesterday. Did well with PT, 150ft.    Review of patient's allergies indicates:  No Known Allergies    Current Facility-Administered Medications   Medication    0.9%  NaCl infusion    acetaminophen tablet 1,000 mg    atorvastatin tablet 40 mg    bisacodyL suppository 10 mg    celecoxib capsule 200 mg    dextrose 10% bolus 125 mL    dextrose 10% bolus 250 mL    dextrose 50% injection 12.5 g    dextrose 50% injection 25 g    docusate sodium capsule 100 mg    famotidine tablet 20 mg    gabapentin capsule 600 mg    glucagon (human recombinant) injection 1 mg    glucose chewable tablet 16 g    glucose chewable tablet 24 g    insulin aspart U-100 pen 1-10 Units    melatonin tablet 6 mg    metoclopramide HCl injection 5 mg    morphine injection 2 mg    ondansetron injection 4 mg    oxyCODONE immediate release tablet 5 mg    oxyCODONE immediate release tablet Tab 10 mg    polyethylene glycol packet 17 g    sodium chloride 0.9% flush 10 mL    sodium chloride 0.9% flush 10 mL    valsartan tablet 160 mg     Objective:     Vital Signs (Most Recent):  Temp: 97.3 °F (36.3 °C) (11/01/22 0404)  Pulse: (!) 55 (11/01/22 0404)  Resp: 17 (11/01/22 0404)  BP: (!) 144/68 (11/01/22 0404)  SpO2: 98 % (11/01/22 0404)   Vital Signs (24h Range):  Temp:  [97.2 °F (36.2 °C)-98.6 °F (37 °C)] 97.3 °F (36.3 °C)  Pulse:  [51-92] 55  Resp:  [15-24] 17  SpO2:  [97 %-100 %] 98 %  BP: (138-177)/(68-90) 144/68     Weight: 83.9 kg (185 lb)  Height: 5' 8" (172.7 cm)  Body mass index is 28.13 kg/m².      Intake/Output Summary (Last 24 hours) at 11/1/2022 0656  Last data filed at 11/1/2022 0600  Gross per 24 hour   Intake 1720 ml   Output 615 ml   Net 1105 ml       Ortho/SPM Exam  General Exam  General appearance: A&Ox3. " NAD.   HEENT: Normocephalic, head atraumatic. Conjuntiva normal. EOMI. External appearance of nose, mouth, ears wnl.  Neck: Supple. Trachea midline.  Respiratory: Breathing unlabored on room air. Symmetric chest rise.   CV: Extremities warm and well perfused.  Neurologic: No focal motor or sensory deficits.   Psychatric: A&Ox3. Appropriate mood and affect.  Skin: Warm, dry, well perfused. No rash.    C spine  Aspen collar in place  Dressing cdi  Drain with light ss output  Motor/sensation intact throughout, some weakness in deltoids  Radial pulse 2+ brisk cap refill    Significant Labs: None    Significant Imaging: None

## 2022-11-02 VITALS
HEIGHT: 68 IN | OXYGEN SATURATION: 98 % | DIASTOLIC BLOOD PRESSURE: 77 MMHG | BODY MASS INDEX: 28.04 KG/M2 | SYSTOLIC BLOOD PRESSURE: 141 MMHG | HEART RATE: 92 BPM | RESPIRATION RATE: 18 BRPM | WEIGHT: 185 LBS | TEMPERATURE: 98 F

## 2022-11-02 LAB — POCT GLUCOSE: 156 MG/DL (ref 70–110)

## 2022-11-02 PROCEDURE — 25000003 PHARM REV CODE 250: Performed by: STUDENT IN AN ORGANIZED HEALTH CARE EDUCATION/TRAINING PROGRAM

## 2022-11-02 PROCEDURE — 94761 N-INVAS EAR/PLS OXIMETRY MLT: CPT

## 2022-11-02 PROCEDURE — 99900035 HC TECH TIME PER 15 MIN (STAT)

## 2022-11-02 RX ADMIN — POLYETHYLENE GLYCOL 3350 17 G: 17 POWDER, FOR SOLUTION ORAL at 08:11

## 2022-11-02 RX ADMIN — OXYCODONE 5 MG: 5 TABLET ORAL at 05:11

## 2022-11-02 RX ADMIN — VALSARTAN 160 MG: 160 TABLET, FILM COATED ORAL at 08:11

## 2022-11-02 RX ADMIN — FAMOTIDINE 20 MG: 20 TABLET ORAL at 08:11

## 2022-11-02 RX ADMIN — DOCUSATE SODIUM 100 MG: 100 CAPSULE ORAL at 08:11

## 2022-11-02 RX ADMIN — ACETAMINOPHEN 1000 MG: 500 TABLET ORAL at 05:11

## 2022-11-02 RX ADMIN — ATORVASTATIN CALCIUM 40 MG: 20 TABLET, FILM COATED ORAL at 08:11

## 2022-11-02 RX ADMIN — CELECOXIB 200 MG: 200 CAPSULE ORAL at 08:11

## 2022-11-02 RX ADMIN — GABAPENTIN 600 MG: 300 CAPSULE ORAL at 08:11

## 2022-11-02 RX ADMIN — INSULIN ASPART 2 UNITS: 100 INJECTION, SOLUTION INTRAVENOUS; SUBCUTANEOUS at 07:11

## 2022-11-02 NOTE — PLAN OF CARE
Problem: Adult Inpatient Plan of Care  Goal: Plan of Care Review  Outcome: Ongoing, Progressing  Goal: Patient-Specific Goal (Individualized)  Outcome: Ongoing, Progressing  Goal: Absence of Hospital-Acquired Illness or Injury  Outcome: Ongoing, Progressing  Goal: Optimal Comfort and Wellbeing  Outcome: Ongoing, Progressing     Problem: Diabetes Comorbidity  Goal: Blood Glucose Level Within Targeted Range  Outcome: Ongoing, Progressing     Problem: Bleeding (Spinal Surgery)  Goal: Absence of Bleeding  Outcome: Ongoing, Progressing     Problem: Infection (Spinal Surgery)  Goal: Absence of Infection Signs and Symptoms  Outcome: Ongoing, Progressing     POC reviewed with patient. All questions and concerns addressed. Fall/safety precautions implemented and maintained. Drain care performed. Blood glucose monitored. C-collar in place. No acute events noted this shift. Please see flowsheet for full assessment and vitals. Bed locked in lowest position. Side rails up x2. Call bell within reach. Will continue to monitor.

## 2022-11-02 NOTE — HPI
Karthik Allen a  67 y.o. male with symptomatic cervical myelopathy who failed conservative management and presented for planned C3-6 ACDF.

## 2022-11-02 NOTE — DISCHARGE SUMMARY
[facility])  Orthopedics  Discharge Summary      Patient Name: Karthik Bentley  MRN: [id number]  Admission Date: 10/31/2022  Hospital Length of Stay: 0 days  Discharge Date and Time:  11/02/2022 11:58 AM  Attending Physician: No att. providers found   Discharging Provider: Lore Jansen MD  Primary Care Provider: Anibal Moya MD    HPI:   Karthik Bentleyis a  67 y.o. male with symptomatic cervical myelopathy who failed conservative management and presented for planned C3-6 ACDF.      Procedure(s) (LRB):  DISCECTOMY, SPINE, CERVICAL, ANTERIOR APPROACH, WITH FUSION C3-6 depuy SNS:vocal/motors/SSEP (N/A)      Hospital Course:  Underwent C3-6 ACDF 10/31/22 with Dr. Marshall.  Surgery was uncomplicated.  Postoperatively he was admitted to the hospital.  He had 1 drain which was pulled on postop day 2.  He did well with physical therapy and it was recommended that he discharge home.  Sign postop day 2 he was meeting all appropriate measures including good pain control, safe mobilization, tolerating food and drink, voiding appropriately.  He was discharged home in good condition with C-collar in place      Goals of Care Treatment Preferences:  Code Status: Full Code      Consults (From admission, onward)        Status Ordering Provider     IP consult case management/social work  Once        Provider:  (Not yet assigned)    Acknowledged HENOK TREVINO          Significant Diagnostic Studies:     Pending Diagnostic Studies:     None        Final Active Diagnoses:    Diagnosis Date Noted POA    PRINCIPAL PROBLEM:  Cervical myelopathy [G95.9] 10/31/2022 Yes      Problems Resolved During this Admission:      Discharged Condition: good    Disposition: Home or Self Care    Follow Up: 11/21/22    Patient Instructions:      Diet general     Diet general     Call MD for:  temperature >100.4     Call MD for:  persistent nausea and vomiting     Call MD for:  severe uncontrolled pain     Call MD for:  difficulty  breathing, headache or visual disturbances     Call MD for:  redness, tenderness, or signs of infection (pain, swelling, redness, odor or green/yellow discharge around incision site)     Call MD for:  hives     Call MD for:  persistent dizziness or light-headedness     Call MD for:  extreme fatigue     Call MD for:  temperature >100.4     Call MD for:  persistent nausea and vomiting     Call MD for:  severe uncontrolled pain     Call MD for:  difficulty breathing, headache or visual disturbances     Call MD for:  redness, tenderness, or signs of infection (pain, swelling, redness, odor or green/yellow discharge around incision site)     Call MD for:  hives     Call MD for:  persistent dizziness or light-headedness     Call MD for:  extreme fatigue     Medications:  Reconciled Home Medications:      Medication List      START taking these medications    acetaminophen 500 MG tablet  Commonly known as: TYLENOL  Take 2 tablets (1,000 mg total) by mouth every 8 (eight) hours as needed for Pain (100).     methocarbamoL 750 MG Tab  Commonly known as: ROBAXIN  Take 1 tablet (750 mg total) by mouth 3 (three) times daily as needed.     oxyCODONE 5 MG immediate release tablet  Commonly known as: ROXICODONE  Take 1 tablet (5 mg total) by mouth every 6 (six) hours as needed for Pain. May take 1-2 tablets every 6 hours as needed for pain        CHANGE how you take these medications    gabapentin 300 MG capsule  Commonly known as: NEURONTIN  Take 2 capsules (600 mg total) by mouth 3 (three) times daily.  What changed: when to take this        CONTINUE taking these medications    blood sugar diagnostic Strp  To check BG 1 times daily, to use with insurance preferred meter     blood-glucose meter kit  To check BG 1 times daily, to use with insurance preferred meter     CARBocaine (PF) 15 mg/mL (1.5 %) injection  Generic drug: MEPIVAcaine     celecoxib 200 MG capsule  Commonly known as: CeleBREX  Take 1 capsule (200 mg total) by  mouth once daily.     folic acid 1 MG tablet  Commonly known as: FOLVITE  Take 1 tablet (1 mg total) by mouth once daily.     lancets Misc  To check BG 1 times daily, to use with insurance preferred meter     metFORMIN 1000 MG tablet  Commonly known as: GLUCOPHAGE  Take 1 tablet (1,000 mg total) by mouth 2 (two) times daily with meals.     methotrexate 2.5 MG Tab  Take 6 tablets (15 mg total) by mouth every 7 days.     rosuvastatin 10 MG tablet  Commonly known as: CRESTOR  Take 1 tablet (10 mg total) by mouth once daily.     TRULICITY 1.5 mg/0.5 mL pen injector  Generic drug: dulaglutide  Inject 1.5 mg into the skin every 7 days.     valACYclovir 500 MG tablet  Commonly known as: VALTREX  Take 1 tablet (500 mg total) by mouth once daily.     valsartan 160 MG tablet  Commonly known as: DIOVAN  Take 1 tablet (160 mg total) by mouth once daily.        STOP taking these medications    tiZANidine 4 MG tablet  Commonly known as: ZANAFLEX            Lore Jansen MD  Orthopedics  [facility]

## 2022-11-02 NOTE — PLAN OF CARE
10/31/22 1453   BERMAN Message   Medicare Outpatient and Observation Notification regarding financial responsibility Given to patient/caregiver   Date BERMAN was signed 10/31/22   Time BERMAN was signed [id number]

## 2022-11-02 NOTE — PLAN OF CARE
[facility])  Discharge Final Note    Primary Care Provider: Anibal Moya MD    Expected Discharge Date: 11/2/2022    Final Discharge Note (most recent)       Final Note - 11/02/22 1304          Final Note    Assessment Type Final Discharge Note     Anticipated Discharge Disposition Home or Self Care     What phone number can be called within the next 1-3 days to see how you are doing after discharge? --   [phone]    Hospital Resources/Appts/Education Provided Appointments scheduled and added to AVS                     Important Message from Medicare      Future Appointments   Date Time Provider Department Center   11/16/2022  1:00 PM LAB, APPOINTMENT [facility] INTMED NOM LAB IM [address] [facility]   11/18/2022 11:00 AM Ashley Max MD NOM RHEUM [address]   11/21/2022  7:00 AM [facility] OIC-XRAY [facility] XRAY IC [facility]   11/21/2022  8:00 AM Hawa Swartz PA-C NOM SPINE [address]   12/13/2022  8:45 AM Wiley Warren MD [facility] ORTHO [address]   1/11/2023  1:00 PM Breana Benton, IRON [facility] OPTOMTY [address]   2/13/2023  2:00 PM Anibal Moya MD [facility] IM [address] [facility]     Patient discharged home to care of family on 11/2/22.

## 2022-11-02 NOTE — HOSPITAL COURSE
Underwent C3-6 ACDF 10/31/22 with Dr. Marshall.  Surgery was uncomplicated.  Postoperatively he was admitted to the hospital.  He had 1 drain which was pulled on postop day 2.  He did well with physical therapy and it was recommended that he discharge home.  Sign postop day 2 he was meeting all appropriate measures including good pain control, safe mobilization, tolerating food and drink, voiding appropriately.  He was discharged home in good condition with C-collar in place

## 2022-11-03 ENCOUNTER — HOSPITAL ENCOUNTER (EMERGENCY)
Facility: HOSPITAL | Age: 67
Discharge: HOME | End: 2022-11-03
Attending: EMERGENCY MEDICINE
Payer: MEDICARE

## 2022-11-03 VITALS
RESPIRATION RATE: 18 BRPM | HEART RATE: 74 BPM | DIASTOLIC BLOOD PRESSURE: 86 MMHG | OXYGEN SATURATION: 99 % | TEMPERATURE: 98 F | WEIGHT: 185 LBS | HEIGHT: 68 IN | SYSTOLIC BLOOD PRESSURE: 183 MMHG | BODY MASS INDEX: 28.04 KG/M2

## 2022-11-03 DIAGNOSIS — R06.89 BREATHING DIFFICULTY: Primary | ICD-10-CM

## 2022-11-03 PROCEDURE — 99283 EMERGENCY DEPT VISIT LOW MDM: CPT

## 2022-11-03 PROCEDURE — 25000003 PHARM REV CODE 250: Performed by: EMERGENCY MEDICINE

## 2022-11-03 PROCEDURE — 99284 PR EMERGENCY DEPT VISIT,LEVEL IV: ICD-10-PCS | Mod: ,,, | Performed by: EMERGENCY MEDICINE

## 2022-11-03 PROCEDURE — 99284 EMERGENCY DEPT VISIT MOD MDM: CPT | Mod: ,,, | Performed by: EMERGENCY MEDICINE

## 2022-11-03 RX ORDER — OXYCODONE HYDROCHLORIDE 5 MG/1
5 TABLET ORAL
Status: COMPLETED | OUTPATIENT
Start: 2022-11-03 | End: 2022-11-03

## 2022-11-03 RX ADMIN — OXYCODONE 5 MG: 5 TABLET ORAL at 12:11

## 2022-11-03 NOTE — DISCHARGE INSTRUCTIONS
If symptoms worsen, or increased swelling occurs, please contact orthopedic clinic immediately or return to emergency department.   
20

## 2022-11-03 NOTE — ED NOTES
"Had cervical spine surgery with Dr. Vaughn on 10/31/22.  Was recently discharged.  States he is having a "breathing issue" and having trouble breathing out.  Feels like something in his throat. Denies any chest pain or feeling SOB. Denies fever/chills/N/V/D.   "

## 2022-11-03 NOTE — ED PROVIDER NOTES
"Encounter Date: 11/3/2022       History     Chief Complaint   Patient presents with    Post-op Problem     Neck surg on the 31st, in hosp and now at night trouble blowing air out, something in my throat     Karthik Bentley is a 67 y.o. old male with a history of cervical myelopathy, now postop day 3 from his C3-C6 ACDF, who presents with intermittent difficulty breathing.  States he has been experiencing trouble with exhalation while supine and with a pillow behind his head.  With flexion of his neck while supine, he describes experiencing his throat "closing up in the back" which makes it difficult to expel air.  When he is upright with his neck fully extended, he has no difficulty breathing and denies any shortness of breath, pain with breathing, or chest pain.  Otherwise, he states he is doing well since his surgery.  Has minimal pain and feels his bilateral shoulder range of motion is significantly improved.  Denies any numbness or tingling.  Lives at home by himself but states he has friends close by should he need any assistance at home.        Review of patient's allergies indicates:  No Known Allergies  Past Medical History:   Diagnosis Date    Diabetes mellitus type II     Hypertension     Rheumatoid arthritis      Past Surgical History:   Procedure Laterality Date    ANTERIOR CERVICAL DISCECTOMY W/ FUSION N/A 10/31/2022    Procedure: DISCECTOMY, SPINE, CERVICAL, ANTERIOR APPROACH, WITH FUSION C3-6 depuy SNS:vocal/motors/SSEP;  Surgeon: Kody Marshall MD;  Location: [facility];  Service: Orthopedics;  Laterality: N/A;    COLONOSCOPY N/A 1/16/2020    Procedure: COLONOSCOPY;  Surgeon: Cynthia Kearney MD;  Location: [facility] ENDO ([address] [place] [address]);  Service: Endoscopy;  Laterality: N/A;    ENDOSCOPIC CARPAL TUNNEL RELEASE Right 4/1/2022    Procedure: RELEASE, CARPAL TUNNEL, ENDOSCOPIC - right arthrex;  Surgeon: Wiley Warren MD;  Location: [facility] OR;  Service: Orthopedics;  Laterality: Right;    EPIDURAL STEROID " INJECTION N/A 9/6/2022    Procedure: CERVICAL EDITH CONTRAST DIRECT REFERRAL;  Surgeon: Yasmeen Taylor MD;  Location: [facility] PAIN MGT;  Service: Pain Management;  Laterality: N/A;    SHOULDER SURGERY       Family History   Problem Relation Age of Onset    Bone cancer Mother     Colon cancer Father      Social History     Tobacco Use    Smoking status: Never    Smokeless tobacco: Never   Substance Use Topics    Alcohol use: Yes     Comment: occasionally    Drug use: Never     Review of Systems  Constitutional: Denies fever/chills  Neurological: Denies numbness/tingling (any exceptions noted in orthopaedic exam)   Psychiatric/Behavioral: Denies change in normal mood  Eyes: Denies change in vision  Cardiovascular: Denies chest pain  Respiratory: Denies shortness of breath  Hematologic/Lymphatic: Denies easy bleeding/bruising   Skin: Denies new rash or skin lesions   Gastrointestinal: Denies nausea/vomitting/diarrhea, change in bowel habits, abdominal pain   Allergic/Immunologic: Denies adverse reactions to current medications  Musculoskeletal: see HPI    Physical Exam     Initial Vitals [11/03/22 1034]   BP Pulse Resp Temp SpO2   (!) 173/90 85 18 97.4 °F (36.3 °C) 99 %      MAP       --         Physical Exam    Constitutional: He appears well-developed and well-nourished. No distress.   HENT:   Head: Normocephalic and atraumatic.   Neck:   Aspen collar in place with underlying dressing c/d/i    Cardiovascular:  Normal rate, regular rhythm and normal heart sounds.           Pulmonary/Chest: Breath sounds normal. No stridor. No respiratory distress. He has no wheezes. He exhibits no tenderness.   Abdominal: Abdomen is soft. Bowel sounds are normal. He exhibits no distension. There is no abdominal tenderness.   Musculoskeletal:      Comments: Bilateral UE:  - No swelling, bruising  - AROM and PROM of shoulder, elbow, wrist intact without pain   - Able to abduct to 90 degrees without pain   - AIN/PIN/Radial/Median/Ulnar  Nerves assessed in isolation without deficit  - SILT throughout  - Radial & Ulnar arteries palpated 2+  - Capillary Refill <3s     Neurological: He is alert and oriented to person, place, and time.   Skin: Skin is warm and dry. Capillary refill takes less than 2 seconds.   Psychiatric: He has a normal mood and affect.       ED Course   Procedures  Labs Reviewed - No data to display       Imaging Results    None          Medications   oxyCODONE immediate release tablet 5 mg (5 mg Oral Given 11/3/22 1246)     Medical Decision Making:   Initial Assessment:   Karthik Bentley is a 67 y.o. old male now 3 days s/p C3-C6 ACD, who presents with difficulty breathing while supine.  His description of symptoms appear to be positional and brought on by flexion at the neck, which seems to be causing narrowing of the oropharynx.  On presentation, vits stable, NVI in bilateral upper and lower extremities, and dressing c/d/i.  No concerning findings on physical exam, and patient has remained satting 99% on room air with respiratory rate of 18.    Differential Diagnosis:   Postoperative swelling   Obstructive sleep apnea     ED Management:  - Respiratory monitoring   - Ortho consulted           Attending Attestation:   Physician Attestation Statement for Resident:  As the supervising MD   Physician Attestation Statement: I have personally seen and examined this patient.   I agree with the above history.  -:   As the supervising MD I agree with the above PE.   -: Mild swelling of the R neck near incision site, site is c/d/I.  Pt has catch in his breathing if he flexes his neck and exhales sharply through his nose.  No stridor.  No trouble breathing through his mouth.   -: No symptoms of PE.  Positional trouble breathing seems similar to sleep apnea, may be exacerbated by post-operative swelling.  Will discuss with orthopedic surgery given pt is very recently post-op.  Ortho consult pending at time of my sign out to Dr. Landin.                               Clinical Impression:   Final diagnoses:  [R06.89] Breathing difficulty (Primary)             Darwin Aly MD  Resident  11/03/22 1530       Adalberto Navarro MD  11/04/22 [id number]

## 2022-11-03 NOTE — ASSESSMENT & PLAN NOTE
Karthik Bentley is a 67 y.o. male s/p C3-6 ACDF with Dr. Marshall on 10/31 presenting to the ED for evaluation of breathing difficulties after surgery. On exam he has no hematoma over his surgical site. He is talking and breathing comfortably. He does have some mucus secretions in his posterior oropharynx which are likely the cause of his complaints, but these are minimal. His pain is well controlled postoperatively. No concern for post-op hematoma or other serious condition.    Plan:  No acute Orthopedic intervention  Recommend drinking plenty of fluids and taking Mucinex if needed to help improve the mucus  Continue C-collar  Continue multimodal pain medications

## 2022-11-03 NOTE — SUBJECTIVE & OBJECTIVE
Past Medical History:   Diagnosis Date    Diabetes mellitus type II     Hypertension     Rheumatoid arthritis        Past Surgical History:   Procedure Laterality Date    ANTERIOR CERVICAL DISCECTOMY W/ FUSION N/A 10/31/2022    Procedure: DISCECTOMY, SPINE, CERVICAL, ANTERIOR APPROACH, WITH FUSION C3-6 depuy SNS:vocal/motors/SSEP;  Surgeon: Kody Marshall MD;  Location: [facility] OR;  Service: Orthopedics;  Laterality: N/A;    COLONOSCOPY N/A 1/16/2020    Procedure: COLONOSCOPY;  Surgeon: Cynthia Kearney MD;  Location: [facility] ENDO ([facility] FLR);  Service: Endoscopy;  Laterality: N/A;    ENDOSCOPIC CARPAL TUNNEL RELEASE Right 4/1/2022    Procedure: RELEASE, CARPAL TUNNEL, ENDOSCOPIC - right arthrex;  Surgeon: Wiley Warren MD;  Location: [facility] OR;  Service: Orthopedics;  Laterality: Right;    EPIDURAL STEROID INJECTION N/A 9/6/2022    Procedure: CERVICAL EDITH CONTRAST DIRECT REFERRAL;  Surgeon: Yasmeen Taylor MD;  Location: [facility] PAIN MGT;  Service: Pain Management;  Laterality: N/A;    SHOULDER SURGERY         Review of patient's allergies indicates:  No Known Allergies    No current facility-administered medications for this encounter.     Current Outpatient Medications   Medication Sig    acetaminophen (TYLENOL) 500 MG tablet Take 2 tablets (1,000 mg total) by mouth every 8 (eight) hours as needed for Pain (100).    blood sugar diagnostic Strp To check BG 1 times daily, to use with insurance preferred meter    blood-glucose meter kit To check BG 1 times daily, to use with insurance preferred meter    CARBOCAINE, PF, 15 mg/mL (1.5 %) injection     celecoxib (CELEBREX) 200 MG capsule Take 1 capsule (200 mg total) by mouth once daily.    dulaglutide (TRULICITY) 1.5 mg/0.5 mL pen injector Inject 1.5 mg into the skin every 7 days.    folic acid (FOLVITE) 1 MG tablet Take 1 tablet (1 mg total) by mouth once daily.    gabapentin (NEURONTIN) 300 MG capsule Take 2 capsules (600 mg total) by mouth 3 (three) times daily.     lancets Misc To check BG 1 times daily, to use with insurance preferred meter    metFORMIN (GLUCOPHAGE) 1000 MG tablet Take 1 tablet (1,000 mg total) by mouth 2 (two) times daily with meals.    methocarbamoL (ROBAXIN) 750 MG Tab Take 1 tablet (750 mg total) by mouth 3 (three) times daily as needed.    methotrexate 2.5 MG Tab Take 6 tablets (15 mg total) by mouth every 7 days.    oxyCODONE (ROXICODONE) 5 MG immediate release tablet Take 1 tablet (5 mg total) by mouth every 6 (six) hours as needed for Pain. May take 1-2 tablets every 6 hours as needed for pain    rosuvastatin (CRESTOR) 10 MG tablet Take 1 tablet (10 mg total) by mouth once daily.    valACYclovir (VALTREX) 500 MG tablet Take 1 tablet (500 mg total) by mouth once daily.    valsartan (DIOVAN) 160 MG tablet Take 1 tablet (160 mg total) by mouth once daily.     Family History       Problem Relation (Age of Onset)    Bone cancer Mother    Colon cancer Father          Tobacco Use    Smoking status: Never    Smokeless tobacco: Never   Substance and Sexual Activity    Alcohol use: Yes     Comment: occasionally    Drug use: Never    Sexual activity: Not on file     ROS  Constitutional: Denies fever/chills   Neurological: Denies numbness/tingling (any exceptions noted in orthopaedic exam)    Psychiatric/Behavioral: Denies change in normal mood   Eyes: Denies change in vision   Cardiovascular: Denies chest pain   Respiratory: Positive for breathing difficulties when flexing his neck  Hematologic/Lymphatic: Denies easy bleeding/bruising    Skin: Denies new rash or skin lesions    Gastrointestinal: Denies nausea/vomitting/diarrhea, change in bowel habits, abdominal pain    Allergic/Immunologic: Denies adverse reactions to current medications   Musculoskeletal: see HPI     Objective:     Vital Signs (Most Recent):  Temp: 97.6 °F (36.4 °C) (11/03/22 1450)  Pulse: 74 (11/03/22 1450)  Resp: 18 (11/03/22 1450)  BP: (!) 183/86 (11/03/22 1450)  SpO2: 99 % (11/03/22  "1450)   Vital Signs (24h Range):  Temp:  [97.4 °F (36.3 °C)-97.6 °F (36.4 °C)] 97.6 °F (36.4 °C)  Pulse:  [63-85] 74  Resp:  [18] 18  SpO2:  [99 %] 99 %  BP: (167-183)/(86-91) 183/86     Weight: 83.9 kg (185 lb)  Height: 5' 8" (172.7 cm)  Body mass index is 28.13 kg/m².    No intake or output data in the 24 hours ending 11/03/22 1531    Ortho/SPM Exam  General: no acute distress, appears stated age     Neuro: alert and oriented x3   Psych: normal mood   Head: normocephalic, atraumatic.    Eyes: no scleral icterus   Mouth: moist mucous membranes, no significant oral edema, no copious secretions   Cardiovascular: extremities warm and well perfused   Lungs: breathing comfortably, equal chest rise bilat   Skin: clean, dry, intact (any exceptions noted in below musculoskeletal exam)    Musculoskeletal:  LUE:  - Skin intact throughout, no open wounds  - No swelling  - No ecchymosis, erythema, or signs of cellulitis  - NonTTP throughout  - AROM and PROM of the shoulder, elbow, wrist, and hand intact without pain  - Axillary/AIN/PIN/Radial/Median/Ulnar nerves assessed in isolation and are without deficit  - SILT throughout  - Compartments soft  - 2+ radial artery pulse  - Capillary Refill < 2 sec    RUE:  - Skin intact throughout, no open wounds  - No swelling  - No ecchymosis, erythema, or signs of cellulitis  - NonTTP throughout  - AROM and PROM of the shoulder, elbow, wrist, and hand intact without pain  - Axillary/AIN/PIN/Radial/Median/Ulnar nerves assessed in isolation and are without deficit  - SILT throughout  - Compartments soft  - 2+ radial artery pulse  - Capillary Refill < 2 sec    LLE:  - Skin intact throughout, no open wounds  - No swelling  - No ecchymosis, erythema, or signs of cellulitis  - NonTTP throughout  - AROM and PROM of the hip, knee, ankle, and foot intact without pain  - TA/EHL/Gastroc/FHL assessed in isolation and are without deficit  - SILT throughout  - Compartments soft  - 2+ DP and PT " pulses  - Capillary Refill < 2 sec  - Negative Log roll    RLE:  - Skin intact throughout, no open wounds  - No swelling  - No ecchymosis, erythema, or signs of cellulitis  - NonTTP throughout  - AROM and PROM of the hip, knee, ankle, and foot intact without pain  - TA/EHL/Gastroc/FHL assessed in isolation and are without deficit  - SILT throughout  - Compartments soft  - 2+ DP and PT pulses  - Capillary Refill < 2 sec  - Negative Log roll    Spine/pelvis/axial body:  Dressing over anterior neck clean, dry, and intact  No hematoma or other fluid collection identified on exam  Trachea midline  No tenderness to palpation of thoracic, or lumbar spine  No pain with compression of pelvis    Significant Labs: All pertinent labs within the past 24 hours have been reviewed.    Significant Imaging: I have reviewed and interpreted all pertinent imaging results/findings.

## 2022-11-03 NOTE — HPI
"Karthik Bentley is a 67 y.o. old male with a history of cervical myelopathy, now postop day 3 from his C3-C6 ACDF with Dr. Marshall, who presents with intermittent difficulty breathing.  States he has been experiencing trouble with exhalation while supine and with a pillow behind his head.  With flexion of his neck while supine, he describes experiencing his throat "closing up in the back" which makes it difficult to expel air.  When he is upright with his neck fully extended, he has no difficulty breathing and denies any shortness of breath, pain with breathing, or chest pain.  Otherwise, he states he is doing well since his surgery.  Has minimal pain and feels his bilateral shoulder range of motion is significantly improved.  Denies any numbness or tingling.  Lives at home by himself but states he has friends close by should he need any assistance at home.      Orthopedics was consulted to evaluate the acute postop patient for his breathing difficulties.  "

## 2022-11-03 NOTE — CONSULTS
"[facility] - [facility] Dept  Orthopedics  Consult Note    Patient Name: Karthik Bentley  MRN: [id number]  Admission Date: 11/3/2022  Hospital Length of Stay: 0 days  Attending Provider: No att. providers found  Primary Care Provider: Anibal Moya MD    Patient information was obtained from patient, past medical records and ER records.     Inpatient consult to Orthopedic Surgery  Consult performed by: Jameel Garcia MD  Consult ordered by: Adalberto Navarro MD        Subjective:     Principal Problem:Cervical myelopathy    Chief Complaint:   Chief Complaint   Patient presents with    Post-op Problem     Neck surg on the 31st, in hosp and now at night trouble blowing air out, something in my throat        HPI: Karthik Bentley is a 67 y.o. old male with a history of cervical myelopathy, now postop day 3 from his C3-C6 ACDF with Dr. Marshall, who presents with intermittent difficulty breathing.  States he has been experiencing trouble with exhalation while supine and with a pillow behind his head.  With flexion of his neck while supine, he describes experiencing his throat "closing up in the back" which makes it difficult to expel air.  When he is upright with his neck fully extended, he has no difficulty breathing and denies any shortness of breath, pain with breathing, or chest pain.  Otherwise, he states he is doing well since his surgery.  Has minimal pain and feels his bilateral shoulder range of motion is significantly improved.  Denies any numbness or tingling.  Lives at home by himself but states he has friends close by should he need any assistance at home.      Orthopedics was consulted to evaluate the acute postop patient for his breathing difficulties.      Past Medical History:   Diagnosis Date    Diabetes mellitus type II     Hypertension     Rheumatoid arthritis        Past Surgical History:   Procedure Laterality Date    ANTERIOR CERVICAL DISCECTOMY W/ FUSION N/A 10/31/2022    Procedure: DISCECTOMY, " SPINE, CERVICAL, ANTERIOR APPROACH, WITH FUSION C3-6 depuy SNS:vocal/motors/SSEP;  Surgeon: Kody Marshall MD;  Location: [facility] OR;  Service: Orthopedics;  Laterality: N/A;    COLONOSCOPY N/A 1/16/2020    Procedure: COLONOSCOPY;  Surgeon: Cynthia Kearney MD;  Location: [facility] ENDO (4TH FLR);  Service: Endoscopy;  Laterality: N/A;    ENDOSCOPIC CARPAL TUNNEL RELEASE Right 4/1/2022    Procedure: RELEASE, CARPAL TUNNEL, ENDOSCOPIC - right arthrex;  Surgeon: Wiley Warren MD;  Location: [facility] OR;  Service: Orthopedics;  Laterality: Right;    EPIDURAL STEROID INJECTION N/A 9/6/2022    Procedure: CERVICAL EDITH CONTRAST DIRECT REFERRAL;  Surgeon: Yasmeen Taylor MD;  Location: [facility] PAIN MGT;  Service: Pain Management;  Laterality: N/A;    SHOULDER SURGERY         Review of patient's allergies indicates:  No Known Allergies    No current facility-administered medications for this encounter.     Current Outpatient Medications   Medication Sig    acetaminophen (TYLENOL) 500 MG tablet Take 2 tablets (1,000 mg total) by mouth every 8 (eight) hours as needed for Pain (100).    blood sugar diagnostic Strp To check BG 1 times daily, to use with insurance preferred meter    blood-glucose meter kit To check BG 1 times daily, to use with insurance preferred meter    CARBOCAINE, PF, 15 mg/mL (1.5 %) injection     celecoxib (CELEBREX) 200 MG capsule Take 1 capsule (200 mg total) by mouth once daily.    dulaglutide (TRULICITY) 1.5 mg/0.5 mL pen injector Inject 1.5 mg into the skin every 7 days.    folic acid (FOLVITE) 1 MG tablet Take 1 tablet (1 mg total) by mouth once daily.    gabapentin (NEURONTIN) 300 MG capsule Take 2 capsules (600 mg total) by mouth 3 (three) times daily.    lancets Misc To check BG 1 times daily, to use with insurance preferred meter    metFORMIN (GLUCOPHAGE) 1000 MG tablet Take 1 tablet (1,000 mg total) by mouth 2 (two) times daily with meals.    methocarbamoL (ROBAXIN) 750 MG Tab Take 1  "tablet (750 mg total) by mouth 3 (three) times daily as needed.    methotrexate 2.5 MG Tab Take 6 tablets (15 mg total) by mouth every 7 days.    oxyCODONE (ROXICODONE) 5 MG immediate release tablet Take 1 tablet (5 mg total) by mouth every 6 (six) hours as needed for Pain. May take 1-2 tablets every 6 hours as needed for pain    rosuvastatin (CRESTOR) 10 MG tablet Take 1 tablet (10 mg total) by mouth once daily.    valACYclovir (VALTREX) 500 MG tablet Take 1 tablet (500 mg total) by mouth once daily.    valsartan (DIOVAN) 160 MG tablet Take 1 tablet (160 mg total) by mouth once daily.     Family History       Problem Relation (Age of Onset)    Bone cancer Mother    Colon cancer Father          Tobacco Use    Smoking status: Never    Smokeless tobacco: Never   Substance and Sexual Activity    Alcohol use: Yes     Comment: occasionally    Drug use: Never    Sexual activity: Not on file     ROS  Constitutional: Denies fever/chills   Neurological: Denies numbness/tingling (any exceptions noted in orthopaedic exam)    Psychiatric/Behavioral: Denies change in normal mood   Eyes: Denies change in vision   Cardiovascular: Denies chest pain   Respiratory: Positive for breathing difficulties when flexing his neck  Hematologic/Lymphatic: Denies easy bleeding/bruising    Skin: Denies new rash or skin lesions    Gastrointestinal: Denies nausea/vomitting/diarrhea, change in bowel habits, abdominal pain    Allergic/Immunologic: Denies adverse reactions to current medications   Musculoskeletal: see HPI     Objective:     Vital Signs (Most Recent):  Temp: 97.6 °F (36.4 °C) (11/03/22 1450)  Pulse: 74 (11/03/22 1450)  Resp: 18 (11/03/22 1450)  BP: (!) 183/86 (11/03/22 1450)  SpO2: 99 % (11/03/22 1450)   Vital Signs (24h Range):  Temp:  [97.4 °F (36.3 °C)-97.6 °F (36.4 °C)] 97.6 °F (36.4 °C)  Pulse:  [63-85] 74  Resp:  [18] 18  SpO2:  [99 %] 99 %  BP: (167-183)/(86-91) 183/86     Weight: 83.9 kg (185 lb)  Height: 5' 8" " (172.7 cm)  Body mass index is 28.13 kg/m².    No intake or output data in the 24 hours ending 11/03/22 1531    Ortho/SPM Exam  General: no acute distress, appears stated age     Neuro: alert and oriented x3   Psych: normal mood   Head: normocephalic, atraumatic.    Eyes: no scleral icterus   Mouth: moist mucous membranes, no significant oral edema, no copious secretions   Cardiovascular: extremities warm and well perfused   Lungs: breathing comfortably, equal chest rise bilat   Skin: clean, dry, intact (any exceptions noted in below musculoskeletal exam)    Musculoskeletal:  LUE:  - Skin intact throughout, no open wounds  - No swelling  - No ecchymosis, erythema, or signs of cellulitis  - NonTTP throughout  - AROM and PROM of the shoulder, elbow, wrist, and hand intact without pain  - Axillary/AIN/PIN/Radial/Median/Ulnar nerves assessed in isolation and are without deficit  - SILT throughout  - Compartments soft  - 2+ radial artery pulse  - Capillary Refill < 2 sec    RUE:  - Skin intact throughout, no open wounds  - No swelling  - No ecchymosis, erythema, or signs of cellulitis  - NonTTP throughout  - AROM and PROM of the shoulder, elbow, wrist, and hand intact without pain  - Axillary/AIN/PIN/Radial/Median/Ulnar nerves assessed in isolation and are without deficit  - SILT throughout  - Compartments soft  - 2+ radial artery pulse  - Capillary Refill < 2 sec    LLE:  - Skin intact throughout, no open wounds  - No swelling  - No ecchymosis, erythema, or signs of cellulitis  - NonTTP throughout  - AROM and PROM of the hip, knee, ankle, and foot intact without pain  - TA/EHL/Gastroc/FHL assessed in isolation and are without deficit  - SILT throughout  - Compartments soft  - 2+ DP and PT pulses  - Capillary Refill < 2 sec  - Negative Log roll    RLE:  - Skin intact throughout, no open wounds  - No swelling  - No ecchymosis, erythema, or signs of cellulitis  - NonTTP throughout  - AROM and PROM of the hip, knee, ankle,  and foot intact without pain  - TA/EHL/Gastroc/FHL assessed in isolation and are without deficit  - SILT throughout  - Compartments soft  - 2+ DP and PT pulses  - Capillary Refill < 2 sec  - Negative Log roll    Spine/pelvis/axial body:  Dressing over anterior neck clean, dry, and intact  No hematoma or other fluid collection identified on exam  Trachea midline  No tenderness to palpation of thoracic, or lumbar spine  No pain with compression of pelvis    Significant Labs: All pertinent labs within the past 24 hours have been reviewed.    Significant Imaging: I have reviewed and interpreted all pertinent imaging results/findings.    Assessment/Plan:     * Cervical myelopathy  Karthik Bentley is a 67 y.o. male s/p C3-6 ACDF with Dr. Marshall on 10/31 presenting to the ED for evaluation of breathing difficulties after surgery. On exam he has no hematoma over his surgical site. He is talking and breathing comfortably. He does have some mucus secretions in his posterior oropharynx which are likely the cause of his complaints, but these are minimal. His pain is well controlled postoperatively. No concern for post-op hematoma or other serious condition.    Plan:  No acute Orthopedic intervention  Recommend drinking plenty of fluids and taking Mucinex if needed to help improve the mucus  Continue C-collar  Continue multimodal pain medications          Jameel Garcia MD  Orthopedics  [facility]

## 2022-11-16 ENCOUNTER — TELEPHONE (OUTPATIENT)
Dept: ORTHOPEDICS | Facility: CLINIC | Age: 67
End: 2022-11-16
Payer: MEDICARE

## 2022-11-16 NOTE — TELEPHONE ENCOUNTER
Spent 10 min on phone with the patient. Patient describes pain in forearm, likely unrelated to CTS or trigger finger treated by RD. Patient describing pain at night from shoulder and neck. Recommended f/u with respective Ms for those issues. Offered sooner f/u for hand with Dr. Warren which the patient declined multiple times.   All questions answered. Patient verbalized understanding and was thankful.     Martha Rodriguez, MS, OTC  Clinical & OR Assistant to Dr. Ross Dunbar [facility]  [phone]    ----- Message from Charity Ramos MA sent at 11/15/2022  4:28 PM CST -----  Contact: pt @ [phone]  Pt states he is having arm pain near the wrist. Pt is having difficulty raising his arm without there being a sharp pain in his forearm. Can you please give Pt a call and and schedule him. Thanks!  ----- Message -----  From: uMnira Penny  Sent: 11/15/2022   4:11 PM CST  To: [facility] Staff    Ray Thompson calling regarding Patient Advice (message) for #pt is experiencing short pain when he reaches out for something, Asking for an urgent call back

## 2022-11-18 ENCOUNTER — LAB VISIT (OUTPATIENT)
Dept: LAB | Facility: HOSPITAL | Age: 67
End: 2022-11-18
Payer: MEDICARE

## 2022-11-18 ENCOUNTER — OFFICE VISIT (OUTPATIENT)
Dept: RHEUMATOLOGY | Facility: CLINIC | Age: 67
End: 2022-11-18
Payer: MEDICARE

## 2022-11-18 VITALS
DIASTOLIC BLOOD PRESSURE: 82 MMHG | SYSTOLIC BLOOD PRESSURE: 140 MMHG | WEIGHT: 184.75 LBS | HEART RATE: 87 BPM | BODY MASS INDEX: 28 KG/M2 | HEIGHT: 68 IN

## 2022-11-18 DIAGNOSIS — M06.9 RHEUMATOID ARTHRITIS, INVOLVING UNSPECIFIED SITE, UNSPECIFIED WHETHER RHEUMATOID FACTOR PRESENT: ICD-10-CM

## 2022-11-18 LAB
ALBUMIN SERPL BCP-MCNC: 3.9 G/DL (ref 3.5–5.2)
ALP SERPL-CCNC: 89 U/L (ref 55–135)
ALT SERPL W/O P-5'-P-CCNC: 13 U/L (ref 10–44)
ANION GAP SERPL CALC-SCNC: 14 MMOL/L (ref 8–16)
AST SERPL-CCNC: 15 U/L (ref 10–40)
BASOPHILS # BLD AUTO: 0.03 K/UL (ref 0–0.2)
BASOPHILS NFR BLD: 0.5 % (ref 0–1.9)
BILIRUB SERPL-MCNC: 0.8 MG/DL (ref 0.1–1)
BUN SERPL-MCNC: 11 MG/DL (ref 8–23)
CALCIUM SERPL-MCNC: 10 MG/DL (ref 8.7–10.5)
CHLORIDE SERPL-SCNC: 104 MMOL/L (ref 95–110)
CO2 SERPL-SCNC: 22 MMOL/L (ref 23–29)
CREAT SERPL-MCNC: 0.8 MG/DL (ref 0.5–1.4)
CRP SERPL-MCNC: 3.3 MG/L (ref 0–8.2)
DIFFERENTIAL METHOD: ABNORMAL
EOSINOPHIL # BLD AUTO: 0.2 K/UL (ref 0–0.5)
EOSINOPHIL NFR BLD: 2.3 % (ref 0–8)
ERYTHROCYTE [DISTWIDTH] IN BLOOD BY AUTOMATED COUNT: 15.5 % (ref 11.5–14.5)
ERYTHROCYTE [SEDIMENTATION RATE] IN BLOOD BY PHOTOMETRIC METHOD: 39 MM/HR (ref 0–23)
EST. GFR  (NO RACE VARIABLE): >60 ML/MIN/1.73 M^2
GLUCOSE SERPL-MCNC: 111 MG/DL (ref 70–110)
HCT VFR BLD AUTO: 36.8 % (ref 40–54)
HGB BLD-MCNC: 11.3 G/DL (ref 14–18)
IMM GRANULOCYTES # BLD AUTO: 0.01 K/UL (ref 0–0.04)
IMM GRANULOCYTES NFR BLD AUTO: 0.2 % (ref 0–0.5)
LYMPHOCYTES # BLD AUTO: 1.9 K/UL (ref 1–4.8)
LYMPHOCYTES NFR BLD: 29.9 % (ref 18–48)
MCH RBC QN AUTO: 28.9 PG (ref 27–31)
MCHC RBC AUTO-ENTMCNC: 30.7 G/DL (ref 32–36)
MCV RBC AUTO: 94 FL (ref 82–98)
MONOCYTES # BLD AUTO: 0.6 K/UL (ref 0.3–1)
MONOCYTES NFR BLD: 8.6 % (ref 4–15)
NEUTROPHILS # BLD AUTO: 3.8 K/UL (ref 1.8–7.7)
NEUTROPHILS NFR BLD: 58.5 % (ref 38–73)
NRBC BLD-RTO: 0 /100 WBC
PLATELET # BLD AUTO: 390 K/UL (ref 150–450)
PMV BLD AUTO: 10.2 FL (ref 9.2–12.9)
POTASSIUM SERPL-SCNC: 3.8 MMOL/L (ref 3.5–5.1)
PROT SERPL-MCNC: 7.6 G/DL (ref 6–8.4)
RBC # BLD AUTO: 3.91 M/UL (ref 4.6–6.2)
SODIUM SERPL-SCNC: 140 MMOL/L (ref 136–145)
WBC # BLD AUTO: 6.48 K/UL (ref 3.9–12.7)

## 2022-11-18 PROCEDURE — 3077F SYST BP >= 140 MM HG: CPT | Mod: CPTII,GC,S$GLB, | Performed by: STUDENT IN AN ORGANIZED HEALTH CARE EDUCATION/TRAINING PROGRAM

## 2022-11-18 PROCEDURE — 3008F BODY MASS INDEX DOCD: CPT | Mod: CPTII,GC,S$GLB, | Performed by: STUDENT IN AN ORGANIZED HEALTH CARE EDUCATION/TRAINING PROGRAM

## 2022-11-18 PROCEDURE — 86140 C-REACTIVE PROTEIN: CPT | Performed by: STUDENT IN AN ORGANIZED HEALTH CARE EDUCATION/TRAINING PROGRAM

## 2022-11-18 PROCEDURE — 99214 PR OFFICE/OUTPT VISIT, EST, LEVL IV, 30-39 MIN: ICD-10-PCS | Mod: GC,S$GLB,, | Performed by: STUDENT IN AN ORGANIZED HEALTH CARE EDUCATION/TRAINING PROGRAM

## 2022-11-18 PROCEDURE — 99999 PR PBB SHADOW E&M-EST. PATIENT-LVL V: CPT | Mod: PBBFAC,GC,, | Performed by: STUDENT IN AN ORGANIZED HEALTH CARE EDUCATION/TRAINING PROGRAM

## 2022-11-18 PROCEDURE — 3072F PR LOW RISK FOR RETINOPATHY: ICD-10-PCS | Mod: CPTII,GC,S$GLB, | Performed by: STUDENT IN AN ORGANIZED HEALTH CARE EDUCATION/TRAINING PROGRAM

## 2022-11-18 PROCEDURE — 3066F PR DOCUMENTATION OF TREATMENT FOR NEPHROPATHY: ICD-10-PCS | Mod: CPTII,GC,S$GLB, | Performed by: STUDENT IN AN ORGANIZED HEALTH CARE EDUCATION/TRAINING PROGRAM

## 2022-11-18 PROCEDURE — 4010F ACE/ARB THERAPY RXD/TAKEN: CPT | Mod: CPTII,GC,S$GLB, | Performed by: STUDENT IN AN ORGANIZED HEALTH CARE EDUCATION/TRAINING PROGRAM

## 2022-11-18 PROCEDURE — 99999 PR PBB SHADOW E&M-EST. PATIENT-LVL V: ICD-10-PCS | Mod: PBBFAC,GC,, | Performed by: STUDENT IN AN ORGANIZED HEALTH CARE EDUCATION/TRAINING PROGRAM

## 2022-11-18 PROCEDURE — 3008F PR BODY MASS INDEX (BMI) DOCUMENTED: ICD-10-PCS | Mod: CPTII,GC,S$GLB, | Performed by: STUDENT IN AN ORGANIZED HEALTH CARE EDUCATION/TRAINING PROGRAM

## 2022-11-18 PROCEDURE — 3079F PR MOST RECENT DIASTOLIC BLOOD PRESSURE 80-89 MM HG: ICD-10-PCS | Mod: CPTII,GC,S$GLB, | Performed by: STUDENT IN AN ORGANIZED HEALTH CARE EDUCATION/TRAINING PROGRAM

## 2022-11-18 PROCEDURE — 80053 COMPREHEN METABOLIC PANEL: CPT | Performed by: STUDENT IN AN ORGANIZED HEALTH CARE EDUCATION/TRAINING PROGRAM

## 2022-11-18 PROCEDURE — 4010F PR ACE/ARB THEARPY RXD/TAKEN: ICD-10-PCS | Mod: CPTII,GC,S$GLB, | Performed by: STUDENT IN AN ORGANIZED HEALTH CARE EDUCATION/TRAINING PROGRAM

## 2022-11-18 PROCEDURE — 99214 OFFICE O/P EST MOD 30 MIN: CPT | Mod: GC,S$GLB,, | Performed by: STUDENT IN AN ORGANIZED HEALTH CARE EDUCATION/TRAINING PROGRAM

## 2022-11-18 PROCEDURE — 3077F PR MOST RECENT SYSTOLIC BLOOD PRESSURE >= 140 MM HG: ICD-10-PCS | Mod: CPTII,GC,S$GLB, | Performed by: STUDENT IN AN ORGANIZED HEALTH CARE EDUCATION/TRAINING PROGRAM

## 2022-11-18 PROCEDURE — 85025 COMPLETE CBC W/AUTO DIFF WBC: CPT | Performed by: STUDENT IN AN ORGANIZED HEALTH CARE EDUCATION/TRAINING PROGRAM

## 2022-11-18 PROCEDURE — 3052F PR MOST RECENT HEMOGLOBIN A1C LEVEL 8.0 - < 9.0%: ICD-10-PCS | Mod: CPTII,GC,S$GLB, | Performed by: STUDENT IN AN ORGANIZED HEALTH CARE EDUCATION/TRAINING PROGRAM

## 2022-11-18 PROCEDURE — 3061F NEG MICROALBUMINURIA REV: CPT | Mod: CPTII,GC,S$GLB, | Performed by: STUDENT IN AN ORGANIZED HEALTH CARE EDUCATION/TRAINING PROGRAM

## 2022-11-18 PROCEDURE — 1111F DSCHRG MED/CURRENT MED MERGE: CPT | Mod: CPTII,GC,S$GLB, | Performed by: STUDENT IN AN ORGANIZED HEALTH CARE EDUCATION/TRAINING PROGRAM

## 2022-11-18 PROCEDURE — 3072F LOW RISK FOR RETINOPATHY: CPT | Mod: CPTII,GC,S$GLB, | Performed by: STUDENT IN AN ORGANIZED HEALTH CARE EDUCATION/TRAINING PROGRAM

## 2022-11-18 PROCEDURE — 3061F PR NEG MICROALBUMINURIA RESULT DOCUMENTED/REVIEW: ICD-10-PCS | Mod: CPTII,GC,S$GLB, | Performed by: STUDENT IN AN ORGANIZED HEALTH CARE EDUCATION/TRAINING PROGRAM

## 2022-11-18 PROCEDURE — 1125F PR PAIN SEVERITY QUANTIFIED, PAIN PRESENT: ICD-10-PCS | Mod: CPTII,GC,S$GLB, | Performed by: STUDENT IN AN ORGANIZED HEALTH CARE EDUCATION/TRAINING PROGRAM

## 2022-11-18 PROCEDURE — 3079F DIAST BP 80-89 MM HG: CPT | Mod: CPTII,GC,S$GLB, | Performed by: STUDENT IN AN ORGANIZED HEALTH CARE EDUCATION/TRAINING PROGRAM

## 2022-11-18 PROCEDURE — 1111F PR DISCHARGE MEDS RECONCILED W/ CURRENT OUTPATIENT MED LIST: ICD-10-PCS | Mod: CPTII,GC,S$GLB, | Performed by: STUDENT IN AN ORGANIZED HEALTH CARE EDUCATION/TRAINING PROGRAM

## 2022-11-18 PROCEDURE — 1125F AMNT PAIN NOTED PAIN PRSNT: CPT | Mod: CPTII,GC,S$GLB, | Performed by: STUDENT IN AN ORGANIZED HEALTH CARE EDUCATION/TRAINING PROGRAM

## 2022-11-18 PROCEDURE — 3052F HG A1C>EQUAL 8.0%<EQUAL 9.0%: CPT | Mod: CPTII,GC,S$GLB, | Performed by: STUDENT IN AN ORGANIZED HEALTH CARE EDUCATION/TRAINING PROGRAM

## 2022-11-18 PROCEDURE — 85652 RBC SED RATE AUTOMATED: CPT | Performed by: STUDENT IN AN ORGANIZED HEALTH CARE EDUCATION/TRAINING PROGRAM

## 2022-11-18 PROCEDURE — 36415 COLL VENOUS BLD VENIPUNCTURE: CPT | Performed by: STUDENT IN AN ORGANIZED HEALTH CARE EDUCATION/TRAINING PROGRAM

## 2022-11-18 PROCEDURE — 3066F NEPHROPATHY DOC TX: CPT | Mod: CPTII,GC,S$GLB, | Performed by: STUDENT IN AN ORGANIZED HEALTH CARE EDUCATION/TRAINING PROGRAM

## 2022-11-18 RX ORDER — METHOTREXATE 2.5 MG/1
15 TABLET ORAL
Qty: 24 TABLET | Refills: 1 | Status: SHIPPED | OUTPATIENT
Start: 2022-11-18 | End: 2023-01-04 | Stop reason: SDUPTHER

## 2022-11-18 NOTE — PATIENT INSTRUCTIONS
Resume methotrexate 6 tablets ONCE A WEEK in split dosing.  Continue folic acid daily.  Stop Celebrex.  Labs in 4 weeks.  Follow up in 6 weeks.

## 2022-11-18 NOTE — PROGRESS NOTES
Subjective:       Patient ID: Karthik Bentley is a 67 y.o. male.    Chief Complaint: RA    HPI  Interval Hx:  He went to PT for the shoulders but they told him that he needed the neck surgery first. He had the neck surgery on 10/31/22 and recovering well so far. Right hand is getting stronger. Left thumb MCP swelling and pain is improved but still present. He gets a couple hours morning stiffness in the left hand. He felt a sharp pain in his right volar wrist and forearm when reaching down to his shoe after the neck surgery. He told his neck surgeon who referred to Hand Surgery. For RA he started MTX 6 tablets weekly split dosing on 10/11/22. He stopped it at some point after his surgery and ran out and didn't get a refill because he states a hold was placed on all his refills. He is not sure if he has been taking Celebrex 200 mg daily.      Initial Hx:  Patient is a 66 yo M with diabetes who presents for Rheumatology consultation for gout suspected in the left hand. He initially had RIGHT carpal tunnel surgery. He now presents with pain and swelling in the LEFT thumb and 2nd MCP. He also has carpal tunnel in the left wrist. He started with weakness in the left hand in mid-April. He complained to the OT about this but they were only treating his right hand post carpal tunnel surgery on 4/1/22. Left hand weakness and stiffness got worse until now he can't make a fist. A few months ago he developed swelling at the left thumb and 2nd finger MCP. Pain was not too much but a few days ago it became very tender 9/10 even with light touch. He denies erythema or warmth ever. He contacted Dr. Warren who started a medrol dosepack which he started last night. This helped with the pain.    He had a gout attacks in the past when he was in [employer] working. He had flares in the right big toe; he would wake up from sleep with severe pain in the great toe; there was a little swelling, some erythema, sometimes warmth. Mostly it was  painful with bearing weight. Flares lasted 3 days and resolved with a medication he took there and can't remember the name. It happens once or twice a year and he still gets flares in the right great toe, last was 3-4 months ago and continues to abort the flare with the medication. Never had joint fluid aspiration. He has not had a gout flare in other joints. He has never had a kidney stone. No known family history of gout. Has never been on allopurinol, colchicine, or steroids for gout. 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 Never was much of an alcohol drinker - no alcohol anymore.    He also had shoulder pain. 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  No acute fracture or dislocation.  No bone destruction identified..  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 He was referred to Dr. Degroot and got bilateral shoulder injections. He was doing well in PT for the shoulders but one day woke up with terrible pain in the shoulders and reduced ROM. He told Dr. Degroot who then referred to neck and spine Ortho.    He had MRI cervical spine 8/24/22: 1. Multilevel cervical spondylosis resulting in varying degrees of foraminal narrowing and spinal canal stenosis most notable at C4-C5 and C5-C6.  2. Short segment cord signal abnormality at C4-C5 which is suggestive chronic compressive myelopathy.  He had epidural injection for this and a medrol dosepack prior (7/28/22). He is on Celebrex for this.    Social Hx: never smoker; doesn't drink alcohol, never was a heavy drinker.   Family Hx: no family history of autoimmune disease    Review of Systems   Constitutional:  Negative for fever and unexpected weight change.   HENT:  Positive for trouble swallowing. Negative for mouth sores.    Eyes:  Negative for redness.  "  Respiratory:  Negative for cough and shortness of breath.    Cardiovascular:  Negative for chest pain.   Gastrointestinal:  Negative for constipation and diarrhea.   Genitourinary:  Negative for genital sores.   Musculoskeletal:  Positive for arthralgias and joint swelling.   Skin:  Negative for rash.   Neurological:  Negative for headaches.   Hematological:  Does not bruise/bleed easily.       Objective:   BP (!) 140/82   Pulse 87   Ht 5' 8" (1.727 m)   Wt 83.8 kg (184 lb 11.9 oz)   BMI 28.09 kg/m²      Physical Exam   Constitutional: He is oriented to person, place, and time. No distress.   HENT:   Head: Normocephalic and atraumatic.   Eyes: Pupils are equal, round, and reactive to light.   Cardiovascular: Normal rate and regular rhythm.   No murmur heard.  Pulmonary/Chest: Effort normal and breath sounds normal. No respiratory distress. He has no wheezes.   Abdominal: Soft. Bowel sounds are normal. There is no abdominal tenderness.   Musculoskeletal:         General: No deformity.      Right shoulder: Normal.      Left shoulder: Normal.      Right elbow: Normal.      Left elbow: Normal.      Right wrist: Normal.      Left wrist: Normal.      Cervical back: Normal range of motion.      Right knee: Normal.      Left knee: Normal.      Comments: Left 1st MCP swelling and tenderness. See homunculus. Right 3rd digit Dupuytren's contracture. Right knee 1+ effusion, trace on the left knee.   Neurological: He is alert and oriented to person, place, and time.   Skin: Skin is warm and dry.   Psychiatric: Affect and judgment normal.       Right Side Rheumatological Exam     Examination finds the shoulder, elbow, wrist, knee, 1st PIP, 1st MCP, 2nd PIP, 2nd MCP, 3rd PIP, 3rd MCP, 4th PIP, 4th MCP, 5th PIP and 5th MCP normal.    Muscle Strength (0-5 scale):  Neck Flexion:  5  Neck Extension: 5  Deltoid:  5  Biceps: 5/5   Triceps:  5  : 5/5   Iliopsoas: 5  Quadriceps:  5   Distal Lower Extremity: 5    Left Side " Rheumatological Exam     Examination finds the shoulder, elbow, wrist, knee, 1st PIP, 1st MCP, 2nd PIP, 2nd MCP, 3rd PIP, 3rd MCP, 4th PIP, 4th MCP, 5th PIP and 5th MCP normal.    Muscle Strength (0-5 scale):  Neck Flexion:  5  Neck Extension: 5  Deltoid:  5  Biceps: 5/5   Triceps:  5  :  5/5   Iliopsoas: 5  Quadriceps:  5   Distal Lower Extremity: 5          9/16/2022 10/11/2022 11/18/2022   Tender (WALKER-28) 1 / 28  10 / 28  0 / 28    Swollen (WALKER-28) 6 / 28 2 / 28 1 / 28    Provider Global -- 60 mm 10 mm   Patient Global -- 90 mm 50 mm   ESR 77 mm/hr 77 mm/hr 39 mm/hr   CRP 6.5 mg/L 6.5 mg/L 3.3 mg/L   WALKER-28 (ESR) -- 6.47 (High disease activity) 3.54 (Moderate disease activity)   WALKER-28 (CRP) -- 5.11 (High disease activity) 2.47 (Remission)   CDAI Score -- 27  7           Assessment:       1. Rheumatoid arthritis, involving unspecified site, unspecified whether rheumatoid factor present                  Plan:       Problem List Items Addressed This Visit    None  Visit Diagnoses       Rheumatoid arthritis, involving unspecified site, unspecified whether rheumatoid factor present        Relevant Medications    methotrexate 2.5 MG Tab    Other Relevant Orders    Ambulatory referral/consult to Physical/Occupational Therapy          Patient is a 66 yo M with diabetes who presents for Rheumatology consultation for gout suspected in the left hand.     Reviewed hand XR: appears to have marginal erosions 1st, 2nd, possibly 4th MCPs and calcium deposit 3rd MCP  Has synovitis on exam.     Labs 9/13/22:   Latest Reference Range & Units 04/28/09 08:38 09/07/11 16:23 09/13/22 10:20   Uric Acid 3.4 - 7.0 mg/dL 7.5 (H) 7.4 (H) 6.0   (H): Data is abnormally high    RF 25 (mildly elevated)  CCP negative  IESHA negative  SPEP and ОЛЬГА were normal  Anti-carP antibody negative  Large effusion on the right knee, aspirated last visit. Fluid had 111 WBCs and no crystals.    MRI hands/wrists RA protocol: MR imaging findings most  concerning for an inflammatory arthropathy favoring rheumatoid arthritis with moderate disease activity and mild risk of future aggressiveness.    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

## 2022-11-18 NOTE — PROGRESS NOTES
Rapid3 Question Responses and Scores 11/17/2022   MDHAQ Score 1.6   Psychologic Score 4.4   Pain Score 10   When you awakened in the morning OVER THE LAST WEEK, did you feel stiff? Yes   If Yes, please indicate the number of hours until you are as limber as you will be for the day -   Fatigue Score 6   Global Health Score 8.5   RAPID3 Score 7.94     Answers submitted by the patient for this visit:  Rheumatology Questionnaire (Submitted on 11/17/2022)  fever: No  eye redness: No  mouth sores: No  headaches: No  shortness of breath: No  chest pain: No  trouble swallowing: Yes  diarrhea: No  constipation: No  unexpected weight change: No  genital sore: No  During the last 3 days, have you had a skin rash?: No  Bruises or bleeds easily: No  cough: No       needs device and assist

## 2022-11-21 ENCOUNTER — OFFICE VISIT (OUTPATIENT)
Dept: ORTHOPEDICS | Facility: CLINIC | Age: 67
End: 2022-11-21
Payer: MEDICARE

## 2022-11-21 ENCOUNTER — HOSPITAL ENCOUNTER (OUTPATIENT)
Dept: RADIOLOGY | Facility: HOSPITAL | Age: 67
Discharge: HOME | End: 2022-11-21
Payer: MEDICARE

## 2022-11-21 VITALS — WEIGHT: 183.5 LBS | BODY MASS INDEX: 27.9 KG/M2

## 2022-11-21 DIAGNOSIS — Z98.1 S/P CERVICAL SPINAL FUSION: Primary | ICD-10-CM

## 2022-11-21 DIAGNOSIS — M50.30 DDD (DEGENERATIVE DISC DISEASE), CERVICAL: ICD-10-CM

## 2022-11-21 PROCEDURE — 1159F MED LIST DOCD IN RCRD: CPT | Mod: CPTII,S$GLB,, | Performed by: PHYSICIAN ASSISTANT

## 2022-11-21 PROCEDURE — 1159F PR MEDICATION LIST DOCUMENTED IN MEDICAL RECORD: ICD-10-PCS | Mod: CPTII,S$GLB,, | Performed by: PHYSICIAN ASSISTANT

## 2022-11-21 PROCEDURE — 1160F PR REVIEW ALL MEDS BY PRESCRIBER/CLIN PHARMACIST DOCUMENTED: ICD-10-PCS | Mod: CPTII,S$GLB,, | Performed by: PHYSICIAN ASSISTANT

## 2022-11-21 PROCEDURE — 3288F FALL RISK ASSESSMENT DOCD: CPT | Mod: CPTII,S$GLB,, | Performed by: PHYSICIAN ASSISTANT

## 2022-11-21 PROCEDURE — 1101F PT FALLS ASSESS-DOCD LE1/YR: CPT | Mod: CPTII,S$GLB,, | Performed by: PHYSICIAN ASSISTANT

## 2022-11-21 PROCEDURE — 72040 X-RAY EXAM NECK SPINE 2-3 VW: CPT | Mod: TC

## 2022-11-21 PROCEDURE — 1125F AMNT PAIN NOTED PAIN PRSNT: CPT | Mod: CPTII,S$GLB,, | Performed by: PHYSICIAN ASSISTANT

## 2022-11-21 PROCEDURE — 3061F PR NEG MICROALBUMINURIA RESULT DOCUMENTED/REVIEW: ICD-10-PCS | Mod: CPTII,S$GLB,, | Performed by: PHYSICIAN ASSISTANT

## 2022-11-21 PROCEDURE — 4010F ACE/ARB THERAPY RXD/TAKEN: CPT | Mod: CPTII,S$GLB,, | Performed by: PHYSICIAN ASSISTANT

## 2022-11-21 PROCEDURE — 3052F PR MOST RECENT HEMOGLOBIN A1C LEVEL 8.0 - < 9.0%: ICD-10-PCS | Mod: CPTII,S$GLB,, | Performed by: PHYSICIAN ASSISTANT

## 2022-11-21 PROCEDURE — 3008F BODY MASS INDEX DOCD: CPT | Mod: CPTII,S$GLB,, | Performed by: PHYSICIAN ASSISTANT

## 2022-11-21 PROCEDURE — 3008F PR BODY MASS INDEX (BMI) DOCUMENTED: ICD-10-PCS | Mod: CPTII,S$GLB,, | Performed by: PHYSICIAN ASSISTANT

## 2022-11-21 PROCEDURE — 72040 XR CERVICAL SPINE AP LATERAL: ICD-10-PCS | Mod: 26,,, | Performed by: RADIOLOGY

## 2022-11-21 PROCEDURE — 99999 PR PBB SHADOW E&M-EST. PATIENT-LVL III: CPT | Mod: PBBFAC,,, | Performed by: PHYSICIAN ASSISTANT

## 2022-11-21 PROCEDURE — 3061F NEG MICROALBUMINURIA REV: CPT | Mod: CPTII,S$GLB,, | Performed by: PHYSICIAN ASSISTANT

## 2022-11-21 PROCEDURE — 99024 PR POST-OP FOLLOW-UP VISIT: ICD-10-PCS | Mod: S$GLB,,, | Performed by: PHYSICIAN ASSISTANT

## 2022-11-21 PROCEDURE — 3072F PR LOW RISK FOR RETINOPATHY: ICD-10-PCS | Mod: CPTII,S$GLB,, | Performed by: PHYSICIAN ASSISTANT

## 2022-11-21 PROCEDURE — 4010F PR ACE/ARB THEARPY RXD/TAKEN: ICD-10-PCS | Mod: CPTII,S$GLB,, | Performed by: PHYSICIAN ASSISTANT

## 2022-11-21 PROCEDURE — 1125F PR PAIN SEVERITY QUANTIFIED, PAIN PRESENT: ICD-10-PCS | Mod: CPTII,S$GLB,, | Performed by: PHYSICIAN ASSISTANT

## 2022-11-21 PROCEDURE — 1101F PR PT FALLS ASSESS DOC 0-1 FALLS W/OUT INJ PAST YR: ICD-10-PCS | Mod: CPTII,S$GLB,, | Performed by: PHYSICIAN ASSISTANT

## 2022-11-21 PROCEDURE — 3072F LOW RISK FOR RETINOPATHY: CPT | Mod: CPTII,S$GLB,, | Performed by: PHYSICIAN ASSISTANT

## 2022-11-21 PROCEDURE — 3066F PR DOCUMENTATION OF TREATMENT FOR NEPHROPATHY: ICD-10-PCS | Mod: CPTII,S$GLB,, | Performed by: PHYSICIAN ASSISTANT

## 2022-11-21 PROCEDURE — 3066F NEPHROPATHY DOC TX: CPT | Mod: CPTII,S$GLB,, | Performed by: PHYSICIAN ASSISTANT

## 2022-11-21 PROCEDURE — 72040 X-RAY EXAM NECK SPINE 2-3 VW: CPT | Mod: 26,,, | Performed by: RADIOLOGY

## 2022-11-21 PROCEDURE — 3052F HG A1C>EQUAL 8.0%<EQUAL 9.0%: CPT | Mod: CPTII,S$GLB,, | Performed by: PHYSICIAN ASSISTANT

## 2022-11-21 PROCEDURE — 1160F RVW MEDS BY RX/DR IN RCRD: CPT | Mod: CPTII,S$GLB,, | Performed by: PHYSICIAN ASSISTANT

## 2022-11-21 PROCEDURE — 99999 PR PBB SHADOW E&M-EST. PATIENT-LVL III: ICD-10-PCS | Mod: PBBFAC,,, | Performed by: PHYSICIAN ASSISTANT

## 2022-11-21 PROCEDURE — 3288F PR FALLS RISK ASSESSMENT DOCUMENTED: ICD-10-PCS | Mod: CPTII,S$GLB,, | Performed by: PHYSICIAN ASSISTANT

## 2022-11-21 PROCEDURE — 99024 POSTOP FOLLOW-UP VISIT: CPT | Mod: S$GLB,,, | Performed by: PHYSICIAN ASSISTANT

## 2022-11-21 RX ORDER — METHOCARBAMOL 750 MG/1
750 TABLET, FILM COATED ORAL 3 TIMES DAILY
Qty: 60 TABLET | Refills: 0 | Status: SHIPPED | OUTPATIENT
Start: 2022-11-21 | End: 2022-12-11

## 2022-11-21 NOTE — PROGRESS NOTES
Date: 11/21/2022    Supervising Physician: Kody Marshall M.D.    Date of Surgery: 10/31/22     Procedure: C3-6 ACDF    History: Karthik Bentley is seen today for follow-up following the above listed procedure. Overall the patient is doing well but today notes his pain is 4/10.  He is having some pain in the right arm with activity.  He is wearing a collar.   He is taking tylenol for pain.  He ran out of gabapentin and robaxin.   he denies fever, chills, and sweats since the time of the surgery.       Exam: Incision is healing well, clean, dry and intact.   There is no sign of infection. Neuro exam is stable. No signs of DVT.    Radiographs: imaging today shows hardware in place, no evidence of failure.     Assessment/Plan: 4 weeks post op.    Doing well postoperatively. Refill of gabapentin and robaxin sent to the pharmacy. No lifting over 10 lbs. Continue c collar with activity.     I will plan to see the patient back for the next postop visit in 2 months with imaging.     Thank you for the opportunity to participate in this patient's care. Please give me a call if there are any concerns or questions.

## 2022-11-21 NOTE — PROGRESS NOTES
I  Have personally take the history and examined the patient and agree with fellow's note as stated above. Methotrexate stopped for neck surgery. The RA Is much improved since starting methotrexate, will resume. No NSAIDs with cervical fusion status. Will recheck in 6 wks and adjust methotrexate if necessary.

## 2022-11-28 ENCOUNTER — CLINICAL SUPPORT (OUTPATIENT)
Dept: REHABILITATION | Facility: HOSPITAL | Age: 67
End: 2022-11-28
Payer: MEDICARE

## 2022-11-28 DIAGNOSIS — M25.649 STIFFNESS OF HAND JOINT, UNSPECIFIED LATERALITY: ICD-10-CM

## 2022-11-28 DIAGNOSIS — M06.9 RHEUMATOID ARTHRITIS, INVOLVING UNSPECIFIED SITE, UNSPECIFIED WHETHER RHEUMATOID FACTOR PRESENT: ICD-10-CM

## 2022-11-28 DIAGNOSIS — R29.898 HAND WEAKNESS: ICD-10-CM

## 2022-11-28 PROCEDURE — 97165 OT EVAL LOW COMPLEX 30 MIN: CPT | Mod: PO

## 2022-11-28 NOTE — PATIENT INSTRUCTIONS
Joint Protection (Wringing)    Avoid wringing towels by twisting.  Solution: Loop towel around sink faucet as if braiding and pull gently, or let drip-dry.    Joint Protection (Use Large Joints)    Avoid placing pressure on fingertips.  Solution: Transfer work to other parts of body which are not affected or which have greater strength. Using body weight to push heavy doors open is an example.    Joint Protection (Ulnar Deviation)    Avoid positions that cause fingers to lean sideways toward little finger.  Solution: Use devices like jar-openers to assist in activities.    Joint Protection (Pinch)    Avoid tight pinch, such as when holding a pen.  Solution: Use a thick pen with a felt tip to reduce pressure on fingers.    Joint Protection (Lifting)    Avoid picking up heavy items with one hand.  Solution: Use both hands, and slide item whenever possible.     Joint Protection ()    Avoid: grasping thin utensils for prolonged periods.  Solution: Hold thick-handled tools in dagger fashion when-ever possible for performing tasks such as stirring or scrub-errol. Relax fingers every 10 minutes during activity.    Joint Protection (Carrying)    Avoid carrying items with weight on fingers.  Solution: Use a shoulder bag or a back pack.  Copyright © I. All rights reserved.

## 2022-11-28 NOTE — PLAN OF CARE
[facility] Occupational Therapy  Initial Evaluation     Date: 11/28/2022  Patient: Karthik Bentley  Chart Number: [id number]  Referring Physician: Ashley Max MD  Therapy Diagnosis:   1. Rheumatoid arthritis, involving unspecified site, unspecified whether rheumatoid factor present  Ambulatory referral/consult to Physical/Occupational Therapy      2. Stiffness of hand joint, unspecified laterality        3. Hand weakness          Medical Diagnosis: Bi-lateral Rheumatoid Arthritis   Physician Orders: Evaluate & Treat   Evaluation Date: 11/28/2022  Plan of Care Certification Date: 2/26/22   Authorization Period: 11/18/22-11/18/23   Surgery Date and Procedure: Not Applicable   Date of Return to MD: 1/4/22    Visit #: 1 of 1  Time In: 10:30 AM   Time Out: 11:15 AM   Total Billable Time: 45 minutes     Precautions: Standard, RIGHT CTR April 1st, 2022, trigger finger in RIGHT index finger & Long Finger       Subjective     Involved Side: Bi-lateral   Dominant Side: RIGHT  Date of Onset: 2 months ago was diagnosed with Rheumatoid Arthritis   History of Current Condition:   HPI from 11/18/22 Office Visit   Interval Hx:  He went to PT for the shoulders but they told him that he needed the neck surgery first. He had the neck surgery on 10/31/22 and recovering well so far. Right hand is getting stronger. Left thumb MCP swelling and pain is improved but still present. He gets a couple hours morning stiffness in the left hand. He felt a sharp pain in his right volar wrist and forearm when reaching down to his shoe after the neck surgery. He told his neck surgeon who referred to Hand Surgery. For RA he started MTX 6 tablets weekly split dosing on 10/11/22. He stopped it at some point after his surgery and ran out and didn't get a refill because he states a hold was placed on all his refills. He is not sure if he has been taking Celebrex 200 mg daily.     Initial Hx:  Patient is a 66 yo M with diabetes who  presents for Rheumatology consultation for gout suspected in the left hand. He initially had RIGHT carpal tunnel surgery. He now presents with pain and swelling in the LEFT thumb and 2nd MCP. He also has carpal tunnel in the left wrist. He started with weakness in the left hand in mid-April. He complained to the OT about this but they were only treating his right hand post carpal tunnel surgery on 4/1/22. Left hand weakness and stiffness got worse until now he can't make a fist. A few months ago he developed swelling at the left thumb and 2nd finger MCP. Pain was not too much but a few days ago it became very tender 9/10 even with light touch. He denies erythema or warmth ever. He contacted Dr. Warren who started a medrol dosepack which he started last night. This helped with the pain.     He had a gout attacks in the past when he was in [employer] working. He had flares in the right big toe; he would wake up from sleep with severe pain in the great toe; there was a little swelling, some erythema, sometimes warmth. Mostly it was painful with bearing weight. Flares lasted 3 days and resolved with a medication he took there and can't remember the name. It happens once or twice a year and he still gets flares in the right great toe, last was 3-4 months ago and continues to abort the flare with the medication. Never had joint fluid aspiration. He has not had a gout flare in other joints. He has never had a kidney stone. No known family history of gout. Has never been on allopurinol, colchicine, or steroids for gout. 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 Never was much of an alcohol drinker - no alcohol anymore.     He also had shoulder pain. 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  No acute fracture or dislocation.  No bone destruction identified..  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 He was referred to Dr. Degroot and got bilateral shoulder injections. He was doing well in PT for the shoulders but one day woke up with terrible pain in the shoulders and reduced ROM. He told Dr. Degroot who then referred to neck and spine Ortho. Patient had a cervical fusion on 10/31/22.   Imaging: None of his hands   Previous Therapy: Patient received hand therapy for his CTR April 2022-- Patient reports having an LMB & custom thermoplastic extension splint for RIGHT Long Finger     Patient's Goals for Therapy: Return to Prior Level of Function     Pain:  Functional Pain Scale Rating 0-10:   3/10 on average  2/10 at best  3/10 at worst  Location: LEFT>RIGHT   Description: stiffness, tightness  Aggravating Factors: opening items   Easing Factors: Rheumatoid arthritis medication      Previous Level of function Independent     Current Level of Function: Patient is limited in functional activity (ex: , opening water bottles, squeezing toothpaste).      Occupation:    Working presently: Retired   Duties: Not Applicable     Past Medical History/Physical Systems Review:   Karthik Bentley  has a past medical history of Diabetes mellitus type II, Hypertension, and Rheumatoid arthritis.    Karthik Bentley  has a past surgical history that includes Shoulder surgery; Colonoscopy (N/A, 1/16/2020); Endoscopic carpal tunnel release (Right, 4/1/2022); Epidural steroid injection (N/A, 9/6/2022); and Anterior cervical discectomy w/ fusion (N/A, 10/31/2022).    Karthik has a current medication list which includes the following prescription(s): acetaminophen, blood sugar diagnostic, blood-glucose meter, carbocaine (pf), trulicity, folic acid, gabapentin, lancets, metformin, methocarbamol, methotrexate, and rosuvastatin.    Review of patient's allergies indicates:  No Known Allergies       Objective     Mental status: alert    Observation:    LEFT Arthritic changes present in hand-- enlarged joints   RIGHT Long Finger mild flexion contracture--      Edema: Circumferential measurements: In centimters     Left Right   MPs  21.5 cm  21.9 cm     Thumb MPs  10.2 cm  9.5 cm        Range of Motion:   Left    LEFT   Thumb Opposition .5cm>DPC     Finger AROM   INDEX  LONG  RING  SMALL    MP  80 90 95 95   PIP  80 80 80 75   DIP  45 50 40        40         Strength: (GILBERTO Dynamometer in psi.)      11/28/2022 11/28/2022    Left Right   Rung II 21 46       Pinch Strength (Measured in psi)     11/28/2022 11/28/2022    Left Right   Key Pinch 10  17    3pt Pinch 9  17        CMS Impairment/Limitation/Restriction for FOTO Hand/Wrist/Finger Survey    Therapist reviewed FOTO scores for Karthik Bentley on 11/28/2022.   FOTO documents entered into [employer] - see Media section.    Limitation Score: 52%  Category: Carrying           Treatment     Unfortunately no time was available for formal treatment due to extensive patient history that was shared during evaluation       Home Exercise Program/Education:      Additional Education provided: Joint Protection & Activity Modification Education       Assessment     Karthik Bentley is a 67 y.o. male presents with limitations as described in problem list. Patient can benefit from Occupational Therapy services for Iontophoresis, ultrasound, moist heat, therapeutic exercises, home exercise program provied with written instructions, ice and strengthening and orthotics, if deemed necessary . The following goals were discussed with the patient and she is in agreement with them as to be addressed in the treatment plan.    The patient's rehab potential is Fair.     Anticipated barriers to occupational therapy: Not Applicable   Pt has no cultural, educational or language barriers to learning provided.    Profile and History Assessment of Occupational Performance Level of Clinical Decision Making Complexity Score   Occupational Profile:   Karthik  Mc is a 67 y.o. male who is retired [employer] has difficulty with  ADLs and IADLs as listed previously, which  affecting his/her daily functional abilities.      Comorbidities:    has a past medical history of Diabetes mellitus type II, Hypertension, and Rheumatoid arthritis.    Medical and Therapy History Review:   Brief               Performance Deficits    Physical:  Joint Mobility  Muscle Power/Strength   Strength  Pinch Strength  Fine Motor Coordination  Pain    Cognitive:  No Deficits    Psychosocial:    No Deficits     Clinical Decision Making:  low    Assessment Process:  Comprehensive Assessments    Modification/Need for Assistance:  Not Necessary    Intervention Selection:  Several Treatment Options       low  Based on PMHX, co morbidities , data from assessments and functional level of assistance required with task and clinical presentation directly impacting function.         Goals:    LTG's (6 weeks):  1)   Increase LEFT Range of Motion in order to make a functional fist to increase functional hand use for carrying bags.   2)   Increase  strength Bi-laterally 20 lbs. to grasp & open bottles.   3)   Increase Right 3jaw pinch 4 psis for squeezing toothpaste.   4)   Decrease complaints of pain to  0 out of 10 at worst to increase functional hand use for ADL/work/leisure activities.  5)   Patient to score at 48% or less on FOTO to demonstrate improved perception of functional hand use.  6)   Pt will return to near to prior level of function for ADLs and household management reporting I or Mod I with ADLs (dressing, feeding, grooming, toileting).     STG's (3 weeks)  1)   Patient to be IND with HEP and modalities for pain/edema managment.  2)   Increase composite LEFT Range of Motion 30 degrees to increase functional hand use for ADLs/work/leisure activities.  3)   Increase  strength 10 lbs. to improve functional grasp for ADLs/work/leisure activities.   4)   Increase 3jaw pinch 2 psi's  to increase independence with button and FM Coordination.   5)   Patient to be IND with Orthotic use, wear and care precautions.   6)   Decrease complaints of pain to  1 out of 10 at worst to increase functional hand use for ADL/work/leisure activities.      Plan     Pt to be treated by Occupational Therapy 1 times per week for 6 weeks during the certification period from 11/28/2022 to 1/9/22 to achieve the established goals.     Treatment to include: Paraffin, Fluidotherapy, Modalities for pain management, US 3 mhz, Therapeutic exercises/activities., Strengthening, Edema Control, Joint Protection, and Energy Conservation, as well as any other treatments deemed necessary based on the patient's needs or progress.     Toña Ng OTR/L  Occupational Therapist

## 2022-12-05 RX ORDER — GABAPENTIN 300 MG/1
300 CAPSULE ORAL 3 TIMES DAILY
Qty: 90 CAPSULE | Refills: 11 | Status: SHIPPED | OUTPATIENT
Start: 2022-12-05 | End: 2023-12-12

## 2022-12-08 NOTE — PROGRESS NOTES
Occupational Therapy Daily Treatment Note     Date: 12/9/2022  Name: Karthik Bentley  Clinic Number: [id number]    Therapy Diagnosis:   Encounter Diagnoses   Name Primary?    Stiffness of hand joint, unspecified laterality Yes    Hand weakness      Physician: Ashley Max MD    Medical Diagnosis: Bi-lateral Rheumatoid Arthritis   Physician Orders: Evaluate & Treat   Evaluation Date: 11/28/2022  Plan of Care Certification Date: 2/26/22   Authorization Period: 11/18/22-11/18/23   Surgery Date and Procedure: Not Applicable   Date of Return to MD: 1/4/22    Visit # / Visits authorized: 1 / 20  Time In: 1:00 PM   Time Out: 1:45 PM   Total Billable Time: 45 minutes    Precautions:  Standard      Subjective     Pt reports: that has he has no pain unless he stretches his hand. He reports that he feels stiffness & tightness on the top of his Left hand.  Response to previous treatment: tolerated well     Pain: 0/10  Location: Not Applicable     Objective     Pt received manual therapy techniques to bi-lateral hands  for 20 minutes  to increase joint mobilization and soft tissue mobilization    -Passive Range of Motion: hook, composite flexion x 10 each     Ray performed therapeutic exercises to maximize upper extremity motion and/or strength for 17 minutes including:  -Left Towel scrunch x 3 minutes each   -Red flexbar frowns & Green flexbar smiles x 1 minute each   -Bi-lateral 3#, elbow Progressive Resistive Exercises (palm up, palm down, neutral) x 1 minute each     Patient received ultrasound to  Left  Carpal Tunnel area to increase blood flow, circulation, tissue elasticity, pain management and for wound/scar management for Left Carpal Tunnel release minutes @ 3.3 Mhz, Intensity 1.5 w/cm2 at 100% duty cycle for 8 minutes.       Home Exercises and Education Provided     Education provided:   - Progress towards goals     Written Home Exercises Provided: Patient instructed to cont prior HEP.  Exercises were reviewed and Ray  was able to demonstrate them prior to the end of the session.  Karthik demonstrated good  understanding of the HEP provided.   .   See EMR under Patient Instructions for exercises provided prior visit.        Assessment     Pt would continue to benefit from skilled OT      Karthik is progressing well towards his goals and there are no updates to goals at this time. Pt prognosis is Fair.     Pt will continue to benefit from skilled outpatient occupational therapy to address the deficits listed in the problem list on initial evaluation provide pt/family education and to maximize pt's level of independence in the home and community environment.     Anticipated barriers to therapy: Not Applicable       Pt's spiritual, cultural and educational needs considered and pt agreeable to plan of care and goals.    Goals:    LTG's (6 weeks):  1)   Increase LEFT Range of Motion in order to make a functional fist to increase functional hand use for carrying bags.   2)   Increase  strength Bi-laterally 20 lbs. to grasp & open bottles.   3)   Increase Right 3jaw pinch 4 psis for squeezing toothpaste.   4)   Decrease complaints of pain to  0 out of 10 at worst to increase functional hand use for ADL/work/leisure activities.  5)   Patient to score at 48% or less on FOTO to demonstrate improved perception of functional hand use.  6)   Pt will return to near to prior level of function for ADLs and household management reporting I or Mod I with ADLs (dressing, feeding, grooming, toileting).      STG's (3 weeks)  1)   Patient to be IND with HEP and modalities for pain/edema managment.  2)   Increase composite LEFT Range of Motion 30 degrees to increase functional hand use for ADLs/work/leisure activities.  3)   Increase  strength 10 lbs. to improve functional grasp for ADLs/work/leisure activities.   4)   Increase 3jaw pinch 2 psi's to increase independence with button and FM Coordination.   5)   Patient to be IND with Orthotic use, wear  and care precautions.   6)   Decrease complaints of pain to  1 out of 10 at worst to increase functional hand use for ADL/work/leisure activities.    Plan   Cont OT to address above goals.        MIKE Washington  Occupational Therapist

## 2022-12-09 ENCOUNTER — CLINICAL SUPPORT (OUTPATIENT)
Dept: REHABILITATION | Facility: HOSPITAL | Age: 67
End: 2022-12-09
Attending: FAMILY MEDICINE
Payer: MEDICARE

## 2022-12-09 DIAGNOSIS — R29.898 HAND WEAKNESS: ICD-10-CM

## 2022-12-09 DIAGNOSIS — M25.649 STIFFNESS OF HAND JOINT, UNSPECIFIED LATERALITY: Primary | ICD-10-CM

## 2022-12-09 PROCEDURE — 97035 APP MDLTY 1+ULTRASOUND EA 15: CPT | Mod: PO

## 2022-12-09 PROCEDURE — 97110 THERAPEUTIC EXERCISES: CPT | Mod: PO

## 2022-12-09 PROCEDURE — 97140 MANUAL THERAPY 1/> REGIONS: CPT | Mod: PO

## 2022-12-12 ENCOUNTER — OFFICE VISIT (OUTPATIENT)
Dept: ORTHOPEDICS | Facility: CLINIC | Age: 67
End: 2022-12-12
Payer: MEDICARE

## 2022-12-12 VITALS — HEIGHT: 68 IN | WEIGHT: 183 LBS | BODY MASS INDEX: 27.74 KG/M2

## 2022-12-12 DIAGNOSIS — M79.642 LEFT HAND PAIN: Primary | ICD-10-CM

## 2022-12-12 PROCEDURE — 3066F PR DOCUMENTATION OF TREATMENT FOR NEPHROPATHY: ICD-10-PCS | Mod: HCNC,CPTII,S$GLB, | Performed by: ORTHOPAEDIC SURGERY

## 2022-12-12 PROCEDURE — 4010F ACE/ARB THERAPY RXD/TAKEN: CPT | Mod: HCNC,CPTII,S$GLB, | Performed by: ORTHOPAEDIC SURGERY

## 2022-12-12 PROCEDURE — 3072F PR LOW RISK FOR RETINOPATHY: ICD-10-PCS | Mod: HCNC,CPTII,S$GLB, | Performed by: ORTHOPAEDIC SURGERY

## 2022-12-12 PROCEDURE — 1125F AMNT PAIN NOTED PAIN PRSNT: CPT | Mod: HCNC,CPTII,S$GLB, | Performed by: ORTHOPAEDIC SURGERY

## 2022-12-12 PROCEDURE — 99999 PR PBB SHADOW E&M-EST. PATIENT-LVL III: CPT | Mod: PBBFAC,,, | Performed by: ORTHOPAEDIC SURGERY

## 2022-12-12 PROCEDURE — 3066F NEPHROPATHY DOC TX: CPT | Mod: HCNC,CPTII,S$GLB, | Performed by: ORTHOPAEDIC SURGERY

## 2022-12-12 PROCEDURE — 3061F NEG MICROALBUMINURIA REV: CPT | Mod: HCNC,CPTII,S$GLB, | Performed by: ORTHOPAEDIC SURGERY

## 2022-12-12 PROCEDURE — 3008F BODY MASS INDEX DOCD: CPT | Mod: HCNC,CPTII,S$GLB, | Performed by: ORTHOPAEDIC SURGERY

## 2022-12-12 PROCEDURE — 1159F MED LIST DOCD IN RCRD: CPT | Mod: HCNC,CPTII,S$GLB, | Performed by: ORTHOPAEDIC SURGERY

## 2022-12-12 PROCEDURE — 1159F PR MEDICATION LIST DOCUMENTED IN MEDICAL RECORD: ICD-10-PCS | Mod: HCNC,CPTII,S$GLB, | Performed by: ORTHOPAEDIC SURGERY

## 2022-12-12 PROCEDURE — 99213 PR OFFICE/OUTPT VISIT, EST, LEVL III, 20-29 MIN: ICD-10-PCS | Mod: HCNC,S$GLB,, | Performed by: ORTHOPAEDIC SURGERY

## 2022-12-12 PROCEDURE — 1101F PT FALLS ASSESS-DOCD LE1/YR: CPT | Mod: HCNC,CPTII,S$GLB, | Performed by: ORTHOPAEDIC SURGERY

## 2022-12-12 PROCEDURE — 3052F PR MOST RECENT HEMOGLOBIN A1C LEVEL 8.0 - < 9.0%: ICD-10-PCS | Mod: HCNC,CPTII,S$GLB, | Performed by: ORTHOPAEDIC SURGERY

## 2022-12-12 PROCEDURE — 1101F PR PT FALLS ASSESS DOC 0-1 FALLS W/OUT INJ PAST YR: ICD-10-PCS | Mod: HCNC,CPTII,S$GLB, | Performed by: ORTHOPAEDIC SURGERY

## 2022-12-12 PROCEDURE — 3288F FALL RISK ASSESSMENT DOCD: CPT | Mod: HCNC,CPTII,S$GLB, | Performed by: ORTHOPAEDIC SURGERY

## 2022-12-12 PROCEDURE — 1125F PR PAIN SEVERITY QUANTIFIED, PAIN PRESENT: ICD-10-PCS | Mod: HCNC,CPTII,S$GLB, | Performed by: ORTHOPAEDIC SURGERY

## 2022-12-12 PROCEDURE — 99999 PR PBB SHADOW E&M-EST. PATIENT-LVL III: ICD-10-PCS | Mod: PBBFAC,,, | Performed by: ORTHOPAEDIC SURGERY

## 2022-12-12 PROCEDURE — 99213 OFFICE O/P EST LOW 20 MIN: CPT | Mod: HCNC,S$GLB,, | Performed by: ORTHOPAEDIC SURGERY

## 2022-12-12 PROCEDURE — 4010F PR ACE/ARB THEARPY RXD/TAKEN: ICD-10-PCS | Mod: HCNC,CPTII,S$GLB, | Performed by: ORTHOPAEDIC SURGERY

## 2022-12-12 PROCEDURE — 3052F HG A1C>EQUAL 8.0%<EQUAL 9.0%: CPT | Mod: HCNC,CPTII,S$GLB, | Performed by: ORTHOPAEDIC SURGERY

## 2022-12-12 PROCEDURE — 3072F LOW RISK FOR RETINOPATHY: CPT | Mod: HCNC,CPTII,S$GLB, | Performed by: ORTHOPAEDIC SURGERY

## 2022-12-12 PROCEDURE — 3008F PR BODY MASS INDEX (BMI) DOCUMENTED: ICD-10-PCS | Mod: HCNC,CPTII,S$GLB, | Performed by: ORTHOPAEDIC SURGERY

## 2022-12-12 PROCEDURE — 3288F PR FALLS RISK ASSESSMENT DOCUMENTED: ICD-10-PCS | Mod: HCNC,CPTII,S$GLB, | Performed by: ORTHOPAEDIC SURGERY

## 2022-12-12 PROCEDURE — 3061F PR NEG MICROALBUMINURIA RESULT DOCUMENTED/REVIEW: ICD-10-PCS | Mod: HCNC,CPTII,S$GLB, | Performed by: ORTHOPAEDIC SURGERY

## 2022-12-12 NOTE — PROGRESS NOTES
[facility]  History & Physical  Orthopedics     SUBJECTIVE:       COVID-19 attestation:  This patient was treated during the COVID-19 pandemic.  This was discussed with the patient, they are aware of our current policies and procedures, were given the option of delaying their visit and or switching to a virtual visit, delaying their surgery when applicable, and they elect to proceed.     Chief Complaint:  Right index and long fingers     Referring Provider: No ref. provider found      History of Present Illness:  Patient is a 66 y.o. right hand dominant male who presents today in follow-up of his bilateral hands.  He is now approximately 4 months status post endoscopic right carpal tunnel release.  He reports that this is doing great.  His numbness and tingling is completely resolved and he is very happy with his result there.  He is also being worked up and treated for cervical radiculopathy and has a cervical spine MRI scheduled for later this month with spine clinic followups shortly thereafter.  He is coming in today now for some symptoms in his left hand.  Otherwise no new or other complaints.    Interval history September 13, 2022: The patient returns today for re-evaluation.  He notes that he has been found to have significant cervical spinal pathology for which he has been treated conservatively thus far including some cervical EDITH is a.  He has an upcoming appoint with Dr. Maricruz Bolden to discuss potential surgical options per his report.  In regards to his hands, he notes that he continues to do very well status post right endoscopic carpal tunnel release.  His left hand is really giving him significant dysfunction, however.  He notes that he cannot make a fist and really has no functional usage of the left hand.  He does report a remote history of gout but is not currently under the care of any treating provider for this condition.  He reports severe stiffness pain and numbness in the  left hand presents today for re-evaluation by myself with no other complaints    Interval history December 12, 2022: The patient returns today for re-evaluation.  He has now undergone cervical spinal surgery with Dr. Maricruz mitchell.  He is recovering from that.  In regards to his right hand where I have done a carpal tunnel release on he notes his hand is doing very well these days.  Today he returns complaining of persistent stiffness in the left hand.  He has been recently diagnosed with rheumatoid arthritis and was on trach and improving from that perspective until his rheumatoid medications had to be temporarily stopped for cervical spine surgery.  He is following back up with Rheumatology in the next several weeks to discuss resumption of that.  His only complaint for me today is stiffness in the left hand particularly at his MCPs and PIP joints.  This is rather widespread in the hand.  He denies any triggering of the fingers or any numbness in the left hand.     The patient is a/an retired.     Onset of symptoms/DOI was about a year ago.     Symptoms are aggravated by activity, movement and at night.     Symptoms are alleviated by rest and during the day.     Symptoms consist of pain, swelling, decreased ROM and numbness/tingling.     The patient rates their pain as a 1/10.     Attempted treatment(s) and/or interventions include activity modifications, rest     The patient denies any fevers, chills, N/V, D/C and presents for evaluation.             Past Medical History:   Diagnosis Date    Diabetes mellitus type II      Hypertension              Past Surgical History:   Procedure Laterality Date    COLONOSCOPY N/A 1/16/2020     Procedure: COLONOSCOPY;  Surgeon: Cynthia Kearney MD;  Location: [address];  Service: Endoscopy;  Laterality: N/A;    SHOULDER SURGERY          Review of patient's allergies indicates:  No Known Allergies  Social History          Social History Narrative    Not on file     "        Family History   Problem Relation Age of Onset    Bone cancer Mother      Colon cancer Father              Current Outpatient Medications:     blood sugar diagnostic Strp, To check BG 1 times daily, to use with insurance preferred meter, Disp: 100 strip, Rfl: 3    blood-glucose meter kit, To check BG 1 times daily, to use with insurance preferred meter, Disp: 1 each, Rfl: 0    gentian violet 2 % topical solution, Apply 0.5 mLs topically daily as needed. Apply between toes., Disp: 59 mL, Rfl: 1    lancets Misc, To check BG 1 times daily, to use with insurance preferred meter, Disp: 100 each, Rfl: 3    metFORMIN (GLUCOPHAGE) 1000 MG tablet, Take 1 tablet (1,000 mg total) by mouth 2 (two) times daily with meals., Disp: 180 tablet, Rfl: 3    pneumococcal 23-merritt ps (PNEUMOVAX 23) 25 mcg/0.5 mL vaccine, Inject into the muscle., Disp: 0.5 mL, Rfl: 0    rosuvastatin (CRESTOR) 10 MG tablet, Take 1 tablet (10 mg total) by mouth once daily., Disp: 90 tablet, Rfl: 3    TRULICITY 0.75 mg/0.5 mL pen injector, INJECT 0.75 MG(0.5 ML) UNDER THE SKIN EVERY 7 DAYS, Disp: 4 pen, Rfl: 11    valACYclovir (VALTREX) 500 MG tablet, Take 1 tablet (500 mg total) by mouth 2 (two) times daily. for 3 days, Disp: 6 tablet, Rfl: 6    varicella-zoster gE-AS01B, PF, (SHINGRIX, PF,) 50 mcg/0.5 mL injection, Inject into the muscle., Disp: 1 each, Rfl: 1        Review of Systems:  As per HPI otherwise noncontributory     OBJECTIVE:       Vital Signs (Most Recent):  Vitals       Vitals:     03/17/22 1038   Weight: 88 kg (194 lb)   Height: 5' 8" (1.727 m)         Body mass index is 29.5 kg/m².        Physical Exam:  Constitutional: The patient appears well-developed and well-nourished. No distress.   Skin: No lesions appreciated  Head: Normocephalic and atraumatic.   Nose: Nose normal.   Ears: No deformities seen  Eyes: Conjunctivae and EOM are normal.   Neck: No tracheal deviation present.   Cardiovascular: Normal rate and intact distal pulses.  "   Pulmonary/Chest: Effort normal. No respiratory distress.   Abdominal: There is no guarding.   Neurological: The patient is alert.   Psychiatric: The patient has a normal mood and affect.      Bilateral Hand/Wrist Examination:     Observation/Inspection:  Swelling                       none                  Deformity                     none  Discoloration               none                  Scars                            endoscopic right carpal tunnel well-healed                   Atrophy                        None  Patient with severe tenderness palpation left hand A1 pulleys of all digits        HAND/WRIST EXAMINATION:  Finkelstein's Test                                Neg  WHAT Test                                         Neg  Snuff box tenderness                          Neg  Osullivan's Test                                     Neg  Hook of Hamate Tenderness              Neg  CMC grind                                           Neg  Circumduction test                              Neg     Neurovascular Exam:  Digits WWP, brisk CR < 3s throughout  NVI motor/LTS to M/R/U nerves, radial pulse 2+  Tinel's Test - Carpal Tunnel                negative today  Tinel's Test - Cubital Tunnel               negative today  Phalen's Test                                      negative today   Median Nerve Compression Test       negative today     ROM right hand is full, left hand range of motion is severely restricted.  The patient is unable to make a fist.  He has nearly full range of motion at the MP joints but very minimal range of motion at his IPs.     ROM wrist full, painless    ROM elbow full, painless     Abdomen not guarded  Respirations nonlabored  Perfusion intact     Diagnostic Results:     Imaging - I independently viewed the patient's imaging as well as the radiology report.  Xrays of the patient's right hand  demonstrate no evidence of any acute fractures or dislocations with mild degenerative changes.     EMG -  Impression:  Abnormal examination.  There is electrodiagnostic evidence of:  Moderate to severe right median mononeuropathy at the wrist with predominantly demyelinating features and mild axonal features (carpal tunnel syndrome.)  Moderate left median mononeuropathy at the wrist with demyelinating features only (carpal tunnel syndrome.)  Likely an acute on chronic right C7 radiculopathy.  Mild ulnar neuropathy at the elbow on the left        ASSESSMENT/PLAN:       66 y.o. yo male with left hand stiffness      Plan: The patient and I had a thorough discussion today.  We discussed the working diagnosis as well as several other potential alternative diagnoses.  Treatment options were discussed, both conservative and surgical.  Conservative treatment options would include things such as activity modifications, workplace modifications, a period of rest, oral vs topical OTC and prescription anti-inflammatory medications, occupational therapy, splinting/bracing, immobilization, corticosteroid injections, and others.  Surgical options were discussed as well.      At this time, maximino denies any carpal or cubital tunnel complaints in regards to his left hand.  His only complaint is stiffness in the hand.  This was improving with treatment of his newly diagnosed rheumatoid arthritis until these medications had to be temporarily stopped to undergo cervical spine surgery.  He is fortunately following back up with Rheumatology in several weeks and hopefully will be resuming these.  I do not appreciate any significant trigger digits at this point in time nor any surgical pathology.  He has only very mild arthritis in the hand and is tough for me to blame that for his current symptoms.  As such, recommend continued hand therapy which he is already established in and continued follow-up with Rheumatology for optimization of his disease.  Follow-up with me in in 3 months or sooner for any problems.              Wiley Warren M.D.      Please be aware that this note has been generated with the assistance of [email or web address] voice-to-text.  Please excuse any spelling or grammatical errors.     Thank you for choosing Dr. Wiley Warren for your orthopedic hand and upper extremity care. It is our goal to provide you with exceptional care that will help keep you healthy, active, and get you back in the game.     If you felt that you received exemplary care today, please consider leaving feedback for Dr. Warren on [employer] at [email or web address].     Please do not hesitate to reach out to us via email, phone, or MyChart with any questions, concerns, or feedback.

## 2022-12-13 NOTE — PROGRESS NOTES
Occupational Therapy Daily Treatment Note     Date: 12/14/2022  Name: Karthik Bentley  Clinic Number: [id number]    Therapy Diagnosis:   Encounter Diagnoses   Name Primary?    Stiffness of hand joint, unspecified laterality Yes    Hand weakness        Physician: Ashley Max MD    Medical Diagnosis: Bi-lateral Rheumatoid Arthritis   Physician Orders: Evaluate & Treat   Evaluation Date: 11/28/2022  Plan of Care Certification Date: 2/26/22   Authorization Period: 11/18/22-11/18/23   Surgery Date and Procedure: Not Applicable   Date of Return to MD: 1/4/22    Visit # / Visits authorized: 2 / 20  Time In: 10:30 AM   Time Out: 11:15 AM   Total Billable Time: 45 minutes    Precautions:  Standard      Subjective     Pt reports: that Dr. Warren is hesitant to do surgery on his Left hand and wants to wait and see how he reacts to his rheumatoid arthritis medication. Patient continues to report that his Left dorsum of his hand is where he experiences his pain & stiffness.   Response to previous treatment: tolerated well     Pain: 2/10  Location: Not Applicable     Objective     Pt received manual therapy techniques to bi-lateral hands  for 20 minutes  to increase joint mobilization and soft tissue mobilization    -Passive Range of Motion: hook, composite flexion x 10 each   -CHT performed  Instrument Assisted Soft Tissue Mobilization  stimulating tissue turnover, scar tissue resorption, and the regeneration of tendons, muscles and other soft tissue structures along index finger A1 pulley x 5  minutes       Ray performed therapeutic exercises to maximize upper extremity motion and/or strength for 17 minutes including:  -Bi-lateral Green flexbar frowns/smiles, flexion/extension x 1 minute each   -Bi-lateral 3#, elbow Progressive Resistive Exercises (palm up, palm down, neutral) x 1 minute each     Patient received ultrasound to  Left  A1 Pulley index finger-small finger to increase blood flow, circulation, tissue elasticity,  pain management and for wound/scar management for Left A1 pulleys minutes @ 3.3 Mhz, Intensity 1.5 w/cm2 at 100% duty cycle for 8 minutes.       Home Exercises and Education Provided     Education provided:   - Progress towards goals     Written Home Exercises Provided: Patient instructed to cont prior HEP.  Exercises were reviewed and Karthik was able to demonstrate them prior to the end of the session.  Karthik demonstrated good  understanding of the HEP provided.   .   See EMR under Patient Instructions for exercises provided prior visit.        Assessment     Pt would continue to benefit from skilled OT      Karthik is progressing well towards his goals and there are no updates to goals at this time. Pt prognosis is Fair.     Pt will continue to benefit from skilled outpatient occupational therapy to address the deficits listed in the problem list on initial evaluation provide pt/family education and to maximize pt's level of independence in the home and community environment.     Anticipated barriers to therapy: Not Applicable       Pt's spiritual, cultural and educational needs considered and pt agreeable to plan of care and goals.    Goals:    LTG's (6 weeks):  1)   Increase LEFT Range of Motion in order to make a functional fist to increase functional hand use for carrying bags.   2)   Increase  strength Bi-laterally 20 lbs. to grasp & open bottles.   3)   Increase Right 3jaw pinch 4 psis for squeezing toothpaste.   4)   Decrease complaints of pain to  0 out of 10 at worst to increase functional hand use for ADL/work/leisure activities.  5)   Patient to score at 48% or less on FOTO to demonstrate improved perception of functional hand use.  6)   Pt will return to near to prior level of function for ADLs and household management reporting I or Mod I with ADLs (dressing, feeding, grooming, toileting).      STG's (3 weeks)  1)   Patient to be IND with HEP and modalities for pain/edema managment.  2)   Increase  composite LEFT Range of Motion 30 degrees to increase functional hand use for ADLs/work/leisure activities.  3)   Increase  strength 10 lbs. to improve functional grasp for ADLs/work/leisure activities.   4)   Increase 3jaw pinch 2 psi's to increase independence with button and FM Coordination.   5)   Patient to be IND with Orthotic use, wear and care precautions.   6)   Decrease complaints of pain to  1 out of 10 at worst to increase functional hand use for ADL/work/leisure activities.    Plan   Cont OT to address above goals.    MIKE Washington  Occupational Therapist

## 2022-12-14 ENCOUNTER — CLINICAL SUPPORT (OUTPATIENT)
Dept: REHABILITATION | Facility: HOSPITAL | Age: 67
End: 2022-12-14
Attending: FAMILY MEDICINE
Payer: MEDICARE

## 2022-12-14 DIAGNOSIS — M25.649 STIFFNESS OF HAND JOINT, UNSPECIFIED LATERALITY: Primary | ICD-10-CM

## 2022-12-14 DIAGNOSIS — R29.898 HAND WEAKNESS: ICD-10-CM

## 2022-12-14 PROCEDURE — 97110 THERAPEUTIC EXERCISES: CPT | Mod: HCNC,PO

## 2022-12-14 PROCEDURE — 97035 APP MDLTY 1+ULTRASOUND EA 15: CPT | Mod: HCNC,PO

## 2022-12-14 PROCEDURE — 97140 MANUAL THERAPY 1/> REGIONS: CPT | Mod: HCNC,PO

## 2022-12-20 NOTE — PROGRESS NOTES
Occupational Therapy Daily Treatment Note     Date: 12/21/2022  Name: Karthik Bentley  Clinic Number: [id number]    Therapy Diagnosis:   Encounter Diagnoses   Name Primary?    Stiffness of hand joint, unspecified laterality Yes    Hand weakness          Physician: Ashley Max MD    Medical Diagnosis: Bi-lateral Rheumatoid Arthritis   Physician Orders: Evaluate & Treat   Evaluation Date: 11/28/2022  Plan of Care Certification Date: 2/26/22   Authorization Period: 11/18/22-11/18/23   Surgery Date and Procedure: Not Applicable   Date of Return to MD: 1/4/22    Visit # / Visits authorized: 3 / 20  Time In: 10:30 AM   Time Out: 11:15 AM   Total Billable Time: 45 minutes    Precautions:  Standard      Subjective     Pt reports: that he feels his weakness & fatigue radiates from bi-lateral shoulders. Patient also reported that he felt a little weaker today compared to last session.   Response to previous treatment: tolerated well     Pain: 2/10  Location: Not Applicable     Objective       Pt received manual therapy techniques to bi-lateral hands  for 20 minutes  to increase joint mobilization and soft tissue mobilization    -Passive Range of Motion: hook, composite flexion x 10 each   -CHT performed  Instrument Assisted Soft Tissue Mobilization  stimulating tissue turnover, scar tissue resorption, and the regeneration of tendons, muscles and other soft tissue structures along LEFT index finger A1 pulley x 5  minutes       Ray performed therapeutic exercises to maximize upper extremity motion and/or strength for 17 minutes including:  -Bi-lateral Red flexbar frowns/smiles, flexion/extension x 1 minute each   -Bi-lateral 3#, elbow Progressive Resistive Exercises (palm up, palm down, neutral) x 1 minute each     Patient received ultrasound to  Left  A1 Pulley index finger-small finger to increase blood flow, circulation, tissue elasticity, pain management and for wound/scar management for Left A1 pulleys (index finger-small  finger) & thumb MP  minutes @ 3.3 Mhz, Intensity 1.5 w/cm2 at 100% duty cycle for 8 minutes.       Home Exercises and Education Provided     Education provided:   - Progress towards goals     Written Home Exercises Provided: Patient instructed to cont prior HEP.  Exercises were reviewed and Karthik was able to demonstrate them prior to the end of the session.  Karthik demonstrated good  understanding of the HEP provided.   .   See EMR under Patient Instructions for exercises provided prior visit.        Assessment     Pt would continue to benefit from skilled OT      Karthik is progressing well towards his goals and there are no updates to goals at this time. Pt prognosis is Fair.     Pt will continue to benefit from skilled outpatient occupational therapy to address the deficits listed in the problem list on initial evaluation provide pt/family education and to maximize pt's level of independence in the home and community environment.     Anticipated barriers to therapy: Not Applicable       Pt's spiritual, cultural and educational needs considered and pt agreeable to plan of care and goals.    Goals:    LTG's (6 weeks):  1)   Increase LEFT Range of Motion in order to make a functional fist to increase functional hand use for carrying bags.   2)   Increase  strength Bi-laterally 20 lbs. to grasp & open bottles.   3)   Increase Right 3jaw pinch 4 psis for squeezing toothpaste.   4)   Decrease complaints of pain to  0 out of 10 at worst to increase functional hand use for ADL/work/leisure activities.  5)   Patient to score at 48% or less on FOTO to demonstrate improved perception of functional hand use.  6)   Pt will return to near to prior level of function for ADLs and household management reporting I or Mod I with ADLs (dressing, feeding, grooming, toileting).      STG's (3 weeks)  1)   Patient to be IND with HEP and modalities for pain/edema managment.  2)   Increase composite LEFT Range of Motion 30 degrees to increase  functional hand use for ADLs/work/leisure activities.  3)   Increase  strength 10 lbs. to improve functional grasp for ADLs/work/leisure activities.   4)   Increase 3jaw pinch 2 psi's to increase independence with button and FM Coordination.   5)   Patient to be IND with Orthotic use, wear and care precautions.   6)   Decrease complaints of pain to  1 out of 10 at worst to increase functional hand use for ADL/work/leisure activities.    Plan   Cont OT to address above goals.    MIKE Washington  Occupational Therapist

## 2022-12-21 ENCOUNTER — CLINICAL SUPPORT (OUTPATIENT)
Dept: REHABILITATION | Facility: HOSPITAL | Age: 67
End: 2022-12-21
Attending: FAMILY MEDICINE
Payer: MEDICARE

## 2022-12-21 DIAGNOSIS — M25.649 STIFFNESS OF HAND JOINT, UNSPECIFIED LATERALITY: Primary | ICD-10-CM

## 2022-12-21 DIAGNOSIS — R29.898 HAND WEAKNESS: ICD-10-CM

## 2022-12-21 PROCEDURE — 97035 APP MDLTY 1+ULTRASOUND EA 15: CPT | Mod: HCNC,PO

## 2022-12-21 PROCEDURE — 97110 THERAPEUTIC EXERCISES: CPT | Mod: HCNC,PO

## 2022-12-21 PROCEDURE — 97140 MANUAL THERAPY 1/> REGIONS: CPT | Mod: HCNC,PO

## 2022-12-27 NOTE — PROGRESS NOTES
Occupational Therapy Daily Treatment Note     Date: 12/28/2022  Name: Karthik Bentley  Clinic Number: [id number]    Therapy Diagnosis:   Encounter Diagnoses   Name Primary?    Stiffness of hand joint, unspecified laterality Yes    Hand weakness            Physician: Ashley Max MD    Medical Diagnosis: Bi-lateral Rheumatoid Arthritis   Physician Orders: Evaluate & Treat   Evaluation Date: 11/28/2022  Plan of Care Certification Date: 2/26/22   Authorization Period: 11/18/22-11/18/23   Surgery Date and Procedure: Not Applicable   Date of Return to MD: 1/4/22    Visit # / Visits authorized: 4 / 20  Time In: 10:30 AM   Time Out: 11:15 AM   Total Billable Time: 45 minutes    Precautions:  Standard      Subjective     Pt reports: that his Left hand is weaker compared to the Right. Patient reports warming and stretching his fingers at home.   Response to previous treatment: tolerated well     Pain: 2/10  Location: Not Applicable     Objective     Pt received manual therapy techniques to bi-lateral hands  for 20 minutes  to increase joint mobilization and soft tissue mobilization    -Passive Range of Motion: hook, composite flexion x 10 each   -CHT performed  Instrument Assisted Soft Tissue Mobilization  stimulating tissue turnover, scar tissue resorption, and the regeneration of tendons, muscles and other soft tissue structures along LEFT index finger A1 pulley x 5  minutes       Ray performed therapeutic exercises to maximize upper extremity motion and/or strength for 17 minutes including:  -Bi-lateral Peach putty squeeze & molding x 3 minutes   -Bi-lateral Peach putty 3jaw & key pinch x 3 logs each   -Bi-lateral Peach Blooms x 5 each     Patient received ultrasound to  Left  A1 Pulley index finger-small finger to increase blood flow, circulation, tissue elasticity, pain management and for wound/scar management for Left A1 pulleys (index finger-small finger) & thumb MP  minutes @ 3.3 Mhz, Intensity 1.5 w/cm2 at 100% duty  cycle for 8 minutes.       Home Exercises and Education Provided     Education provided:   - Progress towards goals   -General Peach putty strengthening ---Patient given peach putty     Written Home Exercises Provided: Patient instructed to cont prior HEP.  Exercises were reviewed and Karthik was able to demonstrate them prior to the end of the session.  Karthik demonstrated good  understanding of the HEP provided.   .   See EMR under Patient Instructions for exercises provided prior visit.        Assessment     Pt would continue to benefit from skilled OT      Karthik is progressing well towards his goals and there are no updates to goals at this time. Pt prognosis is Fair.     Pt will continue to benefit from skilled outpatient occupational therapy to address the deficits listed in the problem list on initial evaluation provide pt/family education and to maximize pt's level of independence in the home and community environment.     Anticipated barriers to therapy: Not Applicable       Pt's spiritual, cultural and educational needs considered and pt agreeable to plan of care and goals.    Goals:    LTG's (6 weeks):  1)   Increase LEFT Range of Motion in order to make a functional fist to increase functional hand use for carrying bags.   2)   Increase  strength Bi-laterally 20 lbs. to grasp & open bottles.   3)   Increase Right 3jaw pinch 4 psis for squeezing toothpaste.   4)   Decrease complaints of pain to  0 out of 10 at worst to increase functional hand use for ADL/work/leisure activities.  5)   Patient to score at 48% or less on FOTO to demonstrate improved perception of functional hand use.  6)   Pt will return to near to prior level of function for ADLs and household management reporting I or Mod I with ADLs (dressing, feeding, grooming, toileting).      STG's (3 weeks)  1)   Patient to be IND with HEP and modalities for pain/edema managment.  2)   Increase composite LEFT Range of Motion 30 degrees to increase  functional hand use for ADLs/work/leisure activities.  3)   Increase  strength 10 lbs. to improve functional grasp for ADLs/work/leisure activities.   4)   Increase 3jaw pinch 2 psi's to increase independence with button and FM Coordination.   5)   Patient to be IND with Orthotic use, wear and care precautions.   6)   Decrease complaints of pain to  1 out of 10 at worst to increase functional hand use for ADL/work/leisure activities.    Plan   Cont OT to address above goals.    MIKE Washington  Occupational Therapist

## 2022-12-28 ENCOUNTER — CLINICAL SUPPORT (OUTPATIENT)
Dept: REHABILITATION | Facility: HOSPITAL | Age: 67
End: 2022-12-28
Payer: MEDICARE

## 2022-12-28 DIAGNOSIS — R29.898 HAND WEAKNESS: ICD-10-CM

## 2022-12-28 DIAGNOSIS — M25.649 STIFFNESS OF HAND JOINT, UNSPECIFIED LATERALITY: Primary | ICD-10-CM

## 2022-12-28 PROCEDURE — 97140 MANUAL THERAPY 1/> REGIONS: CPT | Mod: HCNC,PO

## 2022-12-28 PROCEDURE — 97035 APP MDLTY 1+ULTRASOUND EA 15: CPT | Mod: HCNC,PO

## 2022-12-28 PROCEDURE — 97110 THERAPEUTIC EXERCISES: CPT | Mod: HCNC,PO

## 2022-12-28 NOTE — PATIENT INSTRUCTIONS
[facility]  OCCUPATIONAL THERAPY  HOME EXERCISE PROGRAM     Complete the following strengthening program 1-2x/day.        3 minutes     Count of 3, repeat 10 times     Repeat 5 times      2 logs   _ 2 logs        SELVIN Washington, MIKE, СЕРГЕЙ  Certified Hand Therapist  Occupational Therapist

## 2023-01-04 ENCOUNTER — OFFICE VISIT (OUTPATIENT)
Dept: OPHTHALMOLOGY | Facility: CLINIC | Age: 68
End: 2023-01-04
Payer: MEDICARE

## 2023-01-04 ENCOUNTER — LAB VISIT (OUTPATIENT)
Dept: LAB | Facility: HOSPITAL | Age: 68
End: 2023-01-04
Payer: MEDICARE

## 2023-01-04 ENCOUNTER — OFFICE VISIT (OUTPATIENT)
Dept: RHEUMATOLOGY | Facility: CLINIC | Age: 68
End: 2023-01-04
Payer: MEDICARE

## 2023-01-04 VITALS
HEIGHT: 68 IN | SYSTOLIC BLOOD PRESSURE: 121 MMHG | HEART RATE: 76 BPM | WEIGHT: 182.56 LBS | DIASTOLIC BLOOD PRESSURE: 80 MMHG | BODY MASS INDEX: 27.67 KG/M2

## 2023-01-04 DIAGNOSIS — M05.79 RHEUMATOID ARTHRITIS INVOLVING MULTIPLE SITES WITH POSITIVE RHEUMATOID FACTOR: ICD-10-CM

## 2023-01-04 DIAGNOSIS — H53.8 BLURRY VISION: ICD-10-CM

## 2023-01-04 DIAGNOSIS — M06.9 RHEUMATOID ARTHRITIS, INVOLVING UNSPECIFIED SITE, UNSPECIFIED WHETHER RHEUMATOID FACTOR PRESENT: ICD-10-CM

## 2023-01-04 DIAGNOSIS — H15.101 EPISCLERITIS OF RIGHT EYE: Primary | ICD-10-CM

## 2023-01-04 DIAGNOSIS — H57.89 EYE REDNESS: Primary | ICD-10-CM

## 2023-01-04 LAB
ALBUMIN SERPL BCP-MCNC: 4.1 G/DL (ref 3.5–5.2)
ALP SERPL-CCNC: 87 U/L (ref 55–135)
ALT SERPL W/O P-5'-P-CCNC: 24 U/L (ref 10–44)
ANION GAP SERPL CALC-SCNC: 8 MMOL/L (ref 8–16)
AST SERPL-CCNC: 14 U/L (ref 10–40)
BASOPHILS # BLD AUTO: 0.02 K/UL (ref 0–0.2)
BASOPHILS NFR BLD: 0.3 % (ref 0–1.9)
BILIRUB SERPL-MCNC: 1 MG/DL (ref 0.1–1)
BUN SERPL-MCNC: 10 MG/DL (ref 8–23)
CALCIUM SERPL-MCNC: 10 MG/DL (ref 8.7–10.5)
CHLORIDE SERPL-SCNC: 104 MMOL/L (ref 95–110)
CO2 SERPL-SCNC: 29 MMOL/L (ref 23–29)
CREAT SERPL-MCNC: 1 MG/DL (ref 0.5–1.4)
CRP SERPL-MCNC: 0.5 MG/L (ref 0–8.2)
DIFFERENTIAL METHOD: ABNORMAL
EOSINOPHIL # BLD AUTO: 0.2 K/UL (ref 0–0.5)
EOSINOPHIL NFR BLD: 2.2 % (ref 0–8)
ERYTHROCYTE [DISTWIDTH] IN BLOOD BY AUTOMATED COUNT: 17 % (ref 11.5–14.5)
ERYTHROCYTE [SEDIMENTATION RATE] IN BLOOD BY PHOTOMETRIC METHOD: 9 MM/HR (ref 0–23)
EST. GFR  (NO RACE VARIABLE): >60 ML/MIN/1.73 M^2
GLUCOSE SERPL-MCNC: 101 MG/DL (ref 70–110)
HCT VFR BLD AUTO: 37 % (ref 40–54)
HGB BLD-MCNC: 11.5 G/DL (ref 14–18)
IMM GRANULOCYTES # BLD AUTO: 0.02 K/UL (ref 0–0.04)
IMM GRANULOCYTES NFR BLD AUTO: 0.3 % (ref 0–0.5)
LYMPHOCYTES # BLD AUTO: 1.9 K/UL (ref 1–4.8)
LYMPHOCYTES NFR BLD: 27.1 % (ref 18–48)
MCH RBC QN AUTO: 29.9 PG (ref 27–31)
MCHC RBC AUTO-ENTMCNC: 31.1 G/DL (ref 32–36)
MCV RBC AUTO: 96 FL (ref 82–98)
MONOCYTES # BLD AUTO: 0.5 K/UL (ref 0.3–1)
MONOCYTES NFR BLD: 6.9 % (ref 4–15)
NEUTROPHILS # BLD AUTO: 4.4 K/UL (ref 1.8–7.7)
NEUTROPHILS NFR BLD: 63.2 % (ref 38–73)
NRBC BLD-RTO: 0 /100 WBC
PLATELET # BLD AUTO: 258 K/UL (ref 150–450)
PMV BLD AUTO: 10.1 FL (ref 9.2–12.9)
POTASSIUM SERPL-SCNC: 4.4 MMOL/L (ref 3.5–5.1)
PROT SERPL-MCNC: 7.5 G/DL (ref 6–8.4)
RBC # BLD AUTO: 3.84 M/UL (ref 4.6–6.2)
SODIUM SERPL-SCNC: 141 MMOL/L (ref 136–145)
URATE SERPL-MCNC: 6.6 MG/DL (ref 3.4–7)
WBC # BLD AUTO: 6.93 K/UL (ref 3.9–12.7)

## 2023-01-04 PROCEDURE — 99214 OFFICE O/P EST MOD 30 MIN: CPT | Mod: HCNC,GC,S$GLB, | Performed by: STUDENT IN AN ORGANIZED HEALTH CARE EDUCATION/TRAINING PROGRAM

## 2023-01-04 PROCEDURE — 99999 PR PBB SHADOW E&M-EST. PATIENT-LVL V: ICD-10-PCS | Mod: PBBFAC,HCNC,GC, | Performed by: STUDENT IN AN ORGANIZED HEALTH CARE EDUCATION/TRAINING PROGRAM

## 2023-01-04 PROCEDURE — 1126F AMNT PAIN NOTED NONE PRSNT: CPT | Mod: HCNC,CPTII,S$GLB, | Performed by: OPHTHALMOLOGY

## 2023-01-04 PROCEDURE — 99999 PR PBB SHADOW E&M-EST. PATIENT-LVL III: ICD-10-PCS | Mod: PBBFAC,HCNC,, | Performed by: OPHTHALMOLOGY

## 2023-01-04 PROCEDURE — 3074F PR MOST RECENT SYSTOLIC BLOOD PRESSURE < 130 MM HG: ICD-10-PCS | Mod: HCNC,CPTII,GC,S$GLB | Performed by: STUDENT IN AN ORGANIZED HEALTH CARE EDUCATION/TRAINING PROGRAM

## 2023-01-04 PROCEDURE — 4010F PR ACE/ARB THEARPY RXD/TAKEN: ICD-10-PCS | Mod: HCNC,CPTII,GC,S$GLB | Performed by: STUDENT IN AN ORGANIZED HEALTH CARE EDUCATION/TRAINING PROGRAM

## 2023-01-04 PROCEDURE — 36415 COLL VENOUS BLD VENIPUNCTURE: CPT | Mod: HCNC | Performed by: STUDENT IN AN ORGANIZED HEALTH CARE EDUCATION/TRAINING PROGRAM

## 2023-01-04 PROCEDURE — 80053 COMPREHEN METABOLIC PANEL: CPT | Mod: HCNC | Performed by: STUDENT IN AN ORGANIZED HEALTH CARE EDUCATION/TRAINING PROGRAM

## 2023-01-04 PROCEDURE — 86140 C-REACTIVE PROTEIN: CPT | Mod: HCNC | Performed by: STUDENT IN AN ORGANIZED HEALTH CARE EDUCATION/TRAINING PROGRAM

## 2023-01-04 PROCEDURE — 3079F PR MOST RECENT DIASTOLIC BLOOD PRESSURE 80-89 MM HG: ICD-10-PCS | Mod: HCNC,CPTII,GC,S$GLB | Performed by: STUDENT IN AN ORGANIZED HEALTH CARE EDUCATION/TRAINING PROGRAM

## 2023-01-04 PROCEDURE — 1126F PR PAIN SEVERITY QUANTIFIED, NO PAIN PRESENT: ICD-10-PCS | Mod: HCNC,CPTII,S$GLB, | Performed by: OPHTHALMOLOGY

## 2023-01-04 PROCEDURE — 4010F ACE/ARB THERAPY RXD/TAKEN: CPT | Mod: HCNC,CPTII,S$GLB, | Performed by: OPHTHALMOLOGY

## 2023-01-04 PROCEDURE — 84550 ASSAY OF BLOOD/URIC ACID: CPT | Mod: HCNC | Performed by: STUDENT IN AN ORGANIZED HEALTH CARE EDUCATION/TRAINING PROGRAM

## 2023-01-04 PROCEDURE — 1159F MED LIST DOCD IN RCRD: CPT | Mod: HCNC,CPTII,S$GLB, | Performed by: OPHTHALMOLOGY

## 2023-01-04 PROCEDURE — 3008F BODY MASS INDEX DOCD: CPT | Mod: HCNC,CPTII,GC,S$GLB | Performed by: STUDENT IN AN ORGANIZED HEALTH CARE EDUCATION/TRAINING PROGRAM

## 2023-01-04 PROCEDURE — 99999 PR PBB SHADOW E&M-EST. PATIENT-LVL III: CPT | Mod: PBBFAC,HCNC,, | Performed by: OPHTHALMOLOGY

## 2023-01-04 PROCEDURE — 3079F DIAST BP 80-89 MM HG: CPT | Mod: HCNC,CPTII,GC,S$GLB | Performed by: STUDENT IN AN ORGANIZED HEALTH CARE EDUCATION/TRAINING PROGRAM

## 2023-01-04 PROCEDURE — 85652 RBC SED RATE AUTOMATED: CPT | Mod: HCNC | Performed by: STUDENT IN AN ORGANIZED HEALTH CARE EDUCATION/TRAINING PROGRAM

## 2023-01-04 PROCEDURE — 99214 PR OFFICE/OUTPT VISIT, EST, LEVL IV, 30-39 MIN: ICD-10-PCS | Mod: HCNC,S$GLB,, | Performed by: OPHTHALMOLOGY

## 2023-01-04 PROCEDURE — 4010F ACE/ARB THERAPY RXD/TAKEN: CPT | Mod: HCNC,CPTII,GC,S$GLB | Performed by: STUDENT IN AN ORGANIZED HEALTH CARE EDUCATION/TRAINING PROGRAM

## 2023-01-04 PROCEDURE — 85025 COMPLETE CBC W/AUTO DIFF WBC: CPT | Mod: HCNC | Performed by: STUDENT IN AN ORGANIZED HEALTH CARE EDUCATION/TRAINING PROGRAM

## 2023-01-04 PROCEDURE — 1160F RVW MEDS BY RX/DR IN RCRD: CPT | Mod: HCNC,CPTII,S$GLB, | Performed by: OPHTHALMOLOGY

## 2023-01-04 PROCEDURE — 1125F AMNT PAIN NOTED PAIN PRSNT: CPT | Mod: HCNC,CPTII,GC,S$GLB | Performed by: STUDENT IN AN ORGANIZED HEALTH CARE EDUCATION/TRAINING PROGRAM

## 2023-01-04 PROCEDURE — 1159F PR MEDICATION LIST DOCUMENTED IN MEDICAL RECORD: ICD-10-PCS | Mod: HCNC,CPTII,S$GLB, | Performed by: OPHTHALMOLOGY

## 2023-01-04 PROCEDURE — 1160F PR REVIEW ALL MEDS BY PRESCRIBER/CLIN PHARMACIST DOCUMENTED: ICD-10-PCS | Mod: HCNC,CPTII,S$GLB, | Performed by: OPHTHALMOLOGY

## 2023-01-04 PROCEDURE — 1125F PR PAIN SEVERITY QUANTIFIED, PAIN PRESENT: ICD-10-PCS | Mod: HCNC,CPTII,GC,S$GLB | Performed by: STUDENT IN AN ORGANIZED HEALTH CARE EDUCATION/TRAINING PROGRAM

## 2023-01-04 PROCEDURE — 99214 OFFICE O/P EST MOD 30 MIN: CPT | Mod: HCNC,S$GLB,, | Performed by: OPHTHALMOLOGY

## 2023-01-04 PROCEDURE — 99214 PR OFFICE/OUTPT VISIT, EST, LEVL IV, 30-39 MIN: ICD-10-PCS | Mod: HCNC,GC,S$GLB, | Performed by: STUDENT IN AN ORGANIZED HEALTH CARE EDUCATION/TRAINING PROGRAM

## 2023-01-04 PROCEDURE — 99999 PR PBB SHADOW E&M-EST. PATIENT-LVL V: CPT | Mod: PBBFAC,HCNC,GC, | Performed by: STUDENT IN AN ORGANIZED HEALTH CARE EDUCATION/TRAINING PROGRAM

## 2023-01-04 PROCEDURE — 3008F PR BODY MASS INDEX (BMI) DOCUMENTED: ICD-10-PCS | Mod: HCNC,CPTII,GC,S$GLB | Performed by: STUDENT IN AN ORGANIZED HEALTH CARE EDUCATION/TRAINING PROGRAM

## 2023-01-04 PROCEDURE — 3074F SYST BP LT 130 MM HG: CPT | Mod: HCNC,CPTII,GC,S$GLB | Performed by: STUDENT IN AN ORGANIZED HEALTH CARE EDUCATION/TRAINING PROGRAM

## 2023-01-04 PROCEDURE — 4010F PR ACE/ARB THEARPY RXD/TAKEN: ICD-10-PCS | Mod: HCNC,CPTII,S$GLB, | Performed by: OPHTHALMOLOGY

## 2023-01-04 RX ORDER — VALSARTAN 160 MG/1
TABLET ORAL
COMMUNITY
Start: 2022-12-19 | End: 2023-08-31

## 2023-01-04 RX ORDER — METHOTREXATE 2.5 MG/1
20 TABLET ORAL
Qty: 32 TABLET | Refills: 2 | Status: SHIPPED | OUTPATIENT
Start: 2023-01-04 | End: 2023-01-09 | Stop reason: SDUPTHER

## 2023-01-04 NOTE — PROGRESS NOTES
Subjective:       Patient ID: Karthik Bentley is a 67 y.o. male.    Chief Complaint: RA    HPI  Interval Hx:  He's been taking MTX 6 tabs weekly, sometimes split dosing, and folic acid daily. He's been doing OT for the stiffness in the left hand. Still unable to make a fist and he feels like this is not improving. Not much pain anymore. Still having some tight sensation in the right hand but can make a full fist. Morning stiffness in both hands (L>R) for up to an hour. Sometimes having swelling in the left thumb MCP. He complains of difficulty swallowing since his surgery. This is improving but he still has difficulty taking his pills and drinking fluids. He has not yet been cleared to start PT for the neck and shoulders.    Since last week, he woke up and right eye had a red patch in the sclera. Vision is blurry in the morning but clears up after washing his face.       Initial Hx:  Patient is a 68 yo M with diabetes who presents for Rheumatology consultation for gout suspected in the left hand. He initially had RIGHT carpal tunnel surgery. He now presents with pain and swelling in the LEFT thumb and 2nd MCP. He also has carpal tunnel in the left wrist. He started with weakness in the left hand in mid-April. He complained to the OT about this but they were only treating his right hand post carpal tunnel surgery on 4/1/22. Left hand weakness and stiffness got worse until now he can't make a fist. A few months ago he developed swelling at the left thumb and 2nd finger MCP. Pain was not too much but a few days ago it became very tender 9/10 even with light touch. He denies erythema or warmth ever. He contacted Dr. Warren who started a medrol dosepack which he started last night. This helped with the pain.    He had a gout attacks in the past when he was in [employer] ? working. He had flares in the right big toe; he would wake up from sleep with severe pain in the great toe; there was a little swelling, some erythema,  sometimes warmth. Mostly it was painful with bearing weight. Flares lasted 3 days and resolved with a medication he took there and can't remember the name. It happens once or twice a year and he still gets flares in the right great toe, last was 3-4 months ago and continues to abort the flare with the medication. Never had joint fluid aspiration. He has not had a gout flare in other joints. He has never had a kidney stone. No known family history of gout. Has never been on allopurinol, colchicine, or steroids for gout. 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 Never was much of an alcohol drinker - no alcohol anymore.    He also had shoulder pain. 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  No acute fracture or dislocation.  No bone destruction identified..  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 He was referred to Dr. Degroot and got bilateral shoulder injections. He was doing well in PT for the shoulders but one day woke up with terrible pain in the shoulders and reduced ROM. He told Dr. Degroot who then referred to neck and spine Ortho.    He had MRI cervical spine 8/24/22: 1. Multilevel cervical spondylosis resulting in varying degrees of foraminal narrowing and spinal canal stenosis most notable at C4-C5 and C5-C6.  2. Short segment cord signal abnormality at C4-C5 which is suggestive chronic compressive myelopathy.  He had epidural injection for this and a medrol dosepack prior (7/28/22). He is on Celebrex for this.    Social Hx: never smoker; doesn't drink alcohol, never was a heavy drinker.   Family Hx: no family history of autoimmune disease    Review of Systems   Constitutional:  Negative for fever and unexpected weight change.   HENT:  Positive for trouble swallowing. Negative for mouth sores.   "  Eyes:  Positive for redness.   Respiratory:  Negative for cough and shortness of breath.    Cardiovascular:  Negative for chest pain.   Gastrointestinal:  Negative for constipation and diarrhea.   Genitourinary:  Negative for genital sores.   Skin:  Negative for rash.   Neurological:  Negative for headaches.   Hematological:  Does not bruise/bleed easily.       Objective:   /80   Pulse 76   Ht 5' 8" (1.727 m)   Wt 82.8 kg (182 lb 8.7 oz)   BMI 27.76 kg/m²      Physical Exam   Constitutional: He is oriented to person, place, and time. No distress.   HENT:   Head: Normocephalic and atraumatic.   Eyes: Pupils are equal, round, and reactive to light.   Red, raised inflammation on the right sclera   Cardiovascular: Normal rate and regular rhythm.   No murmur heard.  Pulmonary/Chest: Effort normal and breath sounds normal. No respiratory distress. He has no wheezes.   Abdominal: Soft. Bowel sounds are normal. There is no abdominal tenderness.   Musculoskeletal:         General: No deformity.      Right shoulder: Normal.      Left shoulder: Normal.      Right elbow: Normal.      Left elbow: Normal.      Right wrist: Normal.      Left wrist: Normal.      Cervical back: Normal range of motion.      Right knee: Normal.      Left knee: Normal.      Comments: Left 1st MCP swelling and tenderness. See homunculus. Right 3rd digit Dupuytren's contracture.   Neurological: He is alert and oriented to person, place, and time.   Skin: Skin is warm and dry.   Psychiatric: Affect and judgment normal.       Right Side Rheumatological Exam     Examination finds the shoulder, elbow, wrist, knee, 1st PIP, 1st MCP, 2nd PIP, 2nd MCP, 3rd PIP, 3rd MCP, 4th PIP, 4th MCP, 5th PIP and 5th MCP normal.    Muscle Strength (0-5 scale):  Neck Flexion:  5  Neck Extension: 5  Deltoid:  5  Biceps: 5/5   Triceps:  5  : 5/5   Iliopsoas: 5  Quadriceps:  5   Distal Lower Extremity: 5    Left Side Rheumatological Exam     Examination finds the " shoulder, elbow, wrist, knee, 1st PIP, 1st MCP, 2nd PIP, 2nd MCP, 3rd PIP, 3rd MCP, 4th PIP, 4th MCP, 5th PIP and 5th MCP normal.    Muscle Strength (0-5 scale):  Neck Flexion:  5  Neck Extension: 5  Deltoid:  5  Biceps: 5/5   Triceps:  5  :  5/5   Iliopsoas: 5  Quadriceps:  5   Distal Lower Extremity: 5          9/16/2022 10/11/2022 11/18/2022 1/4/2023   Tender (WALKER-28) 1 / 28  10 / 28  0 / 28  0 / 28    Swollen (WALKER-28) 6 / 28  2 / 28  1 / 28  3 / 28    Provider Global -- 60 mm 10 mm 0 mm   Patient Global -- 90 mm 50 mm 55 mm   ESR 77 mm/hr 77 mm/hr 39 mm/hr 9 mm/hr   CRP 6.5 mg/L 6.5 mg/L 3.3 mg/L 0.5 mg/L   WALKER-28 (ESR) -- 6.47 (High disease activity) 3.54 (Moderate disease activity) 2.79 (Low disease activity)   WALKER-28 (CRP) -- 5.11 (High disease activity) 2.47 (Remission) 2.36 (Remission)   CDAI Score -- 27  7  8.5           Assessment:       1. Eye redness    2. Rheumatoid arthritis involving multiple sites with positive rheumatoid factor    3. Blurry vision                    Plan:       Problem List Items Addressed This Visit    None  Visit Diagnoses       Eye redness    -  Primary    Relevant Orders    Ambulatory referral/consult to Ophthalmology    Rheumatoid arthritis involving multiple sites with positive rheumatoid factor        Relevant Medications    methotrexate 2.5 MG Tab    Other Relevant Orders    Ambulatory referral/consult to Ophthalmology    URIC ACID (Completed)    Blurry vision        Relevant Orders    Ambulatory referral/consult to Ophthalmology            Patient is a 66 yo M with diabetes who presents for Rheumatology follow up for RA.    On initial presentation:  Reviewed hand XR: appears to have marginal erosions 1st, 2nd, possibly 4th MCPs and calcium deposit 3rd MCP  Has synovitis on exam.     RF 25 (mildly elevated)  CCP negative  IESHA negative  SPEP and ОЛЬГА were normal  Anti-carP antibody negative  Large effusion on the right knee, aspirated last visit. Fluid had 111 WBCs and no  crystals.    MRI hands/wrists RA protocol: MR imaging findings most concerning for an inflammatory arthropathy favoring rheumatoid arthritis with moderate disease activity and mild risk of future aggressiveness.    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 Will try to get him in with Ophthalmology today  Labs today CBC, CMP ESR, CRP, uric acid  Labs in 1 month CBC, CMP, ESR, CRP  Increase methotrexate to 8 tablets (20 mg) weekly in split dosing  Continue folic acid 1 mg daily  Continue Occupational Therapy for the hands  Resume PT for the shoulders when ready from neck surgery perpective  RTC 4 weeks      Ashley Max MD  Rheumatology Fellow  PGY-5

## 2023-01-04 NOTE — PROGRESS NOTES
HPI    Triage pt  Patient states OD red past 2 weeks, which seem to don't go away.  No eye pain, but feels irritated.(Burning)    Eye drops:OTC clear eyes prn OU  Hx of neck and spine surgery x 2 months ago.  Diabetic--BS unstable    I have personally interviewed the patient, reviewed the history and   examined the patient and agree with the technician's exam.   Last edited by Obi Jeffery MD on 1/4/2023  3:23 PM.            Assessment /Plan     For exam results, see Encounter Report.    Episcleritis of right eye      The appearance and lack of pain are highly inconsistent with a scleritis. I suspect he has an episcleritis aggravated by his chronic use of Visine-like eye drops. He will discontinue the vasoconstrictors and use artificial tears for  comfort. He will return to me as needed.

## 2023-01-04 NOTE — PATIENT INSTRUCTIONS
Increase methotrexate to 8 tablets once a week (4 tablets in the morning and 4 in the afternoon).  Continue folic acid 1 tablet daily.  See Ophthalmology as soon as possible.

## 2023-01-06 ENCOUNTER — PATIENT MESSAGE (OUTPATIENT)
Dept: RHEUMATOLOGY | Facility: CLINIC | Age: 68
End: 2023-01-06
Payer: MEDICARE

## 2023-01-06 DIAGNOSIS — M05.79 RHEUMATOID ARTHRITIS INVOLVING MULTIPLE SITES WITH POSITIVE RHEUMATOID FACTOR: ICD-10-CM

## 2023-01-09 ENCOUNTER — PATIENT MESSAGE (OUTPATIENT)
Dept: RHEUMATOLOGY | Facility: CLINIC | Age: 68
End: 2023-01-09
Payer: MEDICARE

## 2023-01-09 RX ORDER — METHOTREXATE 2.5 MG/1
20 TABLET ORAL
Qty: 32 TABLET | Refills: 2 | Status: SHIPPED | OUTPATIENT
Start: 2023-01-09 | End: 2023-02-03

## 2023-01-10 NOTE — PROGRESS NOTES
Occupational Therapy Daily Treatment Note     Date: 1/11/2023  Name: Karthik Bentley  Clinic Number: [id number]    Therapy Diagnosis:   Encounter Diagnoses   Name Primary?    Stiffness of hand joint, unspecified laterality Yes    Hand weakness      Physician: Ashley Max MD    Medical Diagnosis: Bi-lateral Rheumatoid Arthritis   Physician Orders: Evaluate & Treat   Evaluation Date: 11/28/2022  Plan of Care Certification Date: 2/26/22   Authorization Period: 11/18/22-11/18/23   Surgery Date and Procedure: Not Applicable   Date of Return to MD: 1/4/22    Visit # / Visits authorized: 5 / 20  Time In: 10:30 AM   Time Out: 11:15 AM   Total Billable Time: 45 minutes    Precautions:  Standard      Subjective     Pt reports: that his Left Long Finger & ring finger have the most stiffness. He reports that he believes his stiffness is not all related to his rheumatoid arthritis diagnosis but partially due to his carpal tunnel syndrome diagnosis.   Response to previous treatment: tolerated well     Pain: 2/10  Location: Not Applicable     Objective     Pt received manual therapy techniques to bi-lateral hands  for 20 minutes  to increase joint mobilization and soft tissue mobilization    -Passive Range of Motion: hook, composite flexion x 10 each   -Certified Hand Therapist performed  Instrument Assisted Soft Tissue Mobilization  stimulating tissue turnover, scar tissue resorption, and the regeneration of tendons, muscles and other soft tissue structures along LEFT index finger A1 pulley x 5  minutes       Karthik performed therapeutic exercises to maximize upper extremity motion and/or strength for 17 minutes including:  -Bi-lateral Peach putty presses with medium dowel x 2 minutes each   -Bi-lateral Wrist progressive resistive exercises with 2# 3 ways (palm up, palm down, neutral) x 20 each     Patient received ultrasound to  Left  A1 Pulley index finger-small finger to increase blood flow, circulation, tissue elasticity, pain  management and for wound/scar management for Left A1 pulleys (index finger-small finger) & thumb MP minutes @ 3.3 Mhz, Intensity 1.5 w/cm2 at 100% duty cycle for 8 minutes.     Home Exercises and Education Provided     Education provided:   - Progress towards goals     Written Home Exercises Provided: Patient instructed to cont prior HEP.  Exercises were reviewed and Karthik was able to demonstrate them prior to the end of the session.  Karthik demonstrated good  understanding of the HEP provided.   .   See EMR under Patient Instructions for exercises provided prior visit.        Assessment     1/11/22 Patient continues to have stiffness limiting his Range of Motion in Left>Right. Patient was shown the [employer] functional [employer] catalog but he says that he doesn't think he needs any adaptive equipment yet.     Pt would continue to benefit from skilled OT      Karthik is progressing well towards his goals and there are no updates to goals at this time. Pt prognosis is Fair.     Pt will continue to benefit from skilled outpatient occupational therapy to address the deficits listed in the problem list on initial evaluation provide pt/family education and to maximize pt's level of independence in the home and community environment.     Anticipated barriers to therapy: Not Applicable       Pt's spiritual, cultural and educational needs considered and pt agreeable to plan of care and goals.    Goals:    LTG's (6 weeks):  1)   Increase LEFT Range of Motion in order to make a functional fist to increase functional hand use for carrying bags.   2)   Increase  strength Bi-laterally 20 lbs. to grasp & open bottles.   3)   Increase Right 3jaw pinch 4 psis for squeezing toothpaste.   4)   Decrease complaints of pain to  0 out of 10 at worst to increase functional hand use for ADL/work/leisure activities.  5)   Patient to score at 48% or less on FOTO to demonstrate improved perception of functional hand use.  6)   Pt will return to  near to prior level of function for ADLs and household management reporting I or Mod I with ADLs (dressing, feeding, grooming, toileting).      STG's (3 weeks)  1)   Patient to be IND with HEP and modalities for pain/edema managment.  2)   Increase composite LEFT Range of Motion 30 degrees to increase functional hand use for ADLs/work/leisure activities.  3)   Increase  strength 10 lbs. to improve functional grasp for ADLs/work/leisure activities.   4)   Increase 3jaw pinch 2 psi's to increase independence with button and FM Coordination.   5)   Patient to be IND with Orthotic use, wear and care precautions.   6)   Decrease complaints of pain to  1 out of 10 at worst to increase functional hand use for ADL/work/leisure activities.    Plan   Cont OT to address above goals.    MIKE Washington  Occupational Therapist

## 2023-01-11 ENCOUNTER — CLINICAL SUPPORT (OUTPATIENT)
Dept: REHABILITATION | Facility: HOSPITAL | Age: 68
End: 2023-01-11
Attending: FAMILY MEDICINE
Payer: MEDICARE

## 2023-01-11 ENCOUNTER — SPECIALTY PHARMACY (OUTPATIENT)
Dept: PHARMACY | Facility: CLINIC | Age: 68
End: 2023-01-11
Payer: MEDICARE

## 2023-01-11 ENCOUNTER — PATIENT MESSAGE (OUTPATIENT)
Dept: PHARMACY | Facility: CLINIC | Age: 68
End: 2023-01-11
Payer: MEDICARE

## 2023-01-11 DIAGNOSIS — R29.898 HAND WEAKNESS: ICD-10-CM

## 2023-01-11 DIAGNOSIS — M25.649 STIFFNESS OF HAND JOINT, UNSPECIFIED LATERALITY: Primary | ICD-10-CM

## 2023-01-11 NOTE — TELEPHONE ENCOUNTER
PA not required for mtx, copay $9.23. Forwarded to patient's [facility] on [address]. [facility] and sent oLyfe message to inform patient.

## 2023-01-23 ENCOUNTER — HOSPITAL ENCOUNTER (OUTPATIENT)
Dept: RADIOLOGY | Facility: HOSPITAL | Age: 68
Discharge: HOME | End: 2023-01-23
Attending: PHYSICIAN ASSISTANT
Payer: MEDICARE

## 2023-01-23 ENCOUNTER — OFFICE VISIT (OUTPATIENT)
Dept: OPTOMETRY | Facility: CLINIC | Age: 68
End: 2023-01-23
Payer: MEDICARE

## 2023-01-23 ENCOUNTER — TELEPHONE (OUTPATIENT)
Dept: INTERNAL MEDICINE | Facility: CLINIC | Age: 68
End: 2023-01-23
Payer: MEDICARE

## 2023-01-23 ENCOUNTER — OFFICE VISIT (OUTPATIENT)
Dept: ORTHOPEDICS | Facility: CLINIC | Age: 68
End: 2023-01-23
Payer: MEDICARE

## 2023-01-23 VITALS — WEIGHT: 177.25 LBS | HEIGHT: 68 IN | BODY MASS INDEX: 26.86 KG/M2

## 2023-01-23 DIAGNOSIS — Z98.1 S/P CERVICAL SPINAL FUSION: ICD-10-CM

## 2023-01-23 DIAGNOSIS — H40.013 OPEN ANGLE WITH BORDERLINE FINDINGS AND LOW GLAUCOMA RISK IN BOTH EYES: ICD-10-CM

## 2023-01-23 DIAGNOSIS — E11.9 TYPE 2 DIABETES MELLITUS WITHOUT COMPLICATION, WITHOUT LONG-TERM CURRENT USE OF INSULIN: Primary | ICD-10-CM

## 2023-01-23 DIAGNOSIS — H25.13 NUCLEAR SCLEROSIS, BILATERAL: ICD-10-CM

## 2023-01-23 DIAGNOSIS — Z98.1 S/P CERVICAL SPINAL FUSION: Primary | ICD-10-CM

## 2023-01-23 DIAGNOSIS — I10 ESSENTIAL HYPERTENSION: ICD-10-CM

## 2023-01-23 DIAGNOSIS — Z12.5 PROSTATE CANCER SCREENING: ICD-10-CM

## 2023-01-23 DIAGNOSIS — R13.10 DYSPHAGIA, UNSPECIFIED TYPE: ICD-10-CM

## 2023-01-23 DIAGNOSIS — S19.9XXS UNSPECIFIED INJURY OF NECK, SEQUELA: ICD-10-CM

## 2023-01-23 DIAGNOSIS — H43.812 POSTERIOR VITREOUS DETACHMENT OF LEFT EYE: ICD-10-CM

## 2023-01-23 PROCEDURE — 1125F PR PAIN SEVERITY QUANTIFIED, PAIN PRESENT: ICD-10-PCS | Mod: HCNC,CPTII,S$GLB, | Performed by: PHYSICIAN ASSISTANT

## 2023-01-23 PROCEDURE — 99999 PR PBB SHADOW E&M-EST. PATIENT-LVL III: ICD-10-PCS | Mod: PBBFAC,HCNC,, | Performed by: PHYSICIAN ASSISTANT

## 2023-01-23 PROCEDURE — 1101F PR PT FALLS ASSESS DOC 0-1 FALLS W/OUT INJ PAST YR: ICD-10-PCS | Mod: HCNC,CPTII,S$GLB, | Performed by: PHYSICIAN ASSISTANT

## 2023-01-23 PROCEDURE — 1101F PT FALLS ASSESS-DOCD LE1/YR: CPT | Mod: HCNC,CPTII,S$GLB, | Performed by: PHYSICIAN ASSISTANT

## 2023-01-23 PROCEDURE — 92014 COMPRE OPH EXAM EST PT 1/>: CPT | Mod: HCNC,S$GLB,,

## 2023-01-23 PROCEDURE — 1160F RVW MEDS BY RX/DR IN RCRD: CPT | Mod: HCNC,CPTII,S$GLB, | Performed by: PHYSICIAN ASSISTANT

## 2023-01-23 PROCEDURE — 1160F RVW MEDS BY RX/DR IN RCRD: CPT | Mod: HCNC,CPTII,S$GLB,

## 2023-01-23 PROCEDURE — 99999 PR PBB SHADOW E&M-EST. PATIENT-LVL III: CPT | Mod: PBBFAC,HCNC,, | Performed by: PHYSICIAN ASSISTANT

## 2023-01-23 PROCEDURE — 99999 PR PBB SHADOW E&M-EST. PATIENT-LVL II: CPT | Mod: PBBFAC,HCNC,,

## 2023-01-23 PROCEDURE — 2023F DILAT RTA XM W/O RTNOPTHY: CPT | Mod: HCNC,CPTII,S$GLB,

## 2023-01-23 PROCEDURE — 99999 PR PBB SHADOW E&M-EST. PATIENT-LVL II: ICD-10-PCS | Mod: PBBFAC,HCNC,,

## 2023-01-23 PROCEDURE — 1159F PR MEDICATION LIST DOCUMENTED IN MEDICAL RECORD: ICD-10-PCS | Mod: HCNC,CPTII,S$GLB, | Performed by: PHYSICIAN ASSISTANT

## 2023-01-23 PROCEDURE — 1160F PR REVIEW ALL MEDS BY PRESCRIBER/CLIN PHARMACIST DOCUMENTED: ICD-10-PCS | Mod: HCNC,CPTII,S$GLB,

## 2023-01-23 PROCEDURE — 92133 CPTRZD OPH DX IMG PST SGM ON: CPT | Mod: HCNC,S$GLB,,

## 2023-01-23 PROCEDURE — 4010F PR ACE/ARB THEARPY RXD/TAKEN: ICD-10-PCS | Mod: HCNC,CPTII,S$GLB,

## 2023-01-23 PROCEDURE — 99499 UNLISTED E&M SERVICE: CPT | Mod: S$GLB,,,

## 2023-01-23 PROCEDURE — 4010F ACE/ARB THERAPY RXD/TAKEN: CPT | Mod: HCNC,CPTII,S$GLB,

## 2023-01-23 PROCEDURE — 1126F AMNT PAIN NOTED NONE PRSNT: CPT | Mod: HCNC,CPTII,S$GLB,

## 2023-01-23 PROCEDURE — 1159F MED LIST DOCD IN RCRD: CPT | Mod: HCNC,CPTII,S$GLB,

## 2023-01-23 PROCEDURE — 3008F PR BODY MASS INDEX (BMI) DOCUMENTED: ICD-10-PCS | Mod: HCNC,CPTII,S$GLB, | Performed by: PHYSICIAN ASSISTANT

## 2023-01-23 PROCEDURE — 99499 RISK ADDL DX/OHS AUDIT: ICD-10-PCS | Mod: S$GLB,,,

## 2023-01-23 PROCEDURE — 99024 PR POST-OP FOLLOW-UP VISIT: ICD-10-PCS | Mod: HCNC,S$GLB,, | Performed by: PHYSICIAN ASSISTANT

## 2023-01-23 PROCEDURE — 4010F PR ACE/ARB THEARPY RXD/TAKEN: ICD-10-PCS | Mod: HCNC,CPTII,S$GLB, | Performed by: PHYSICIAN ASSISTANT

## 2023-01-23 PROCEDURE — 72040 X-RAY EXAM NECK SPINE 2-3 VW: CPT | Mod: 26,HCNC,, | Performed by: RADIOLOGY

## 2023-01-23 PROCEDURE — 72040 XR CERVICAL SPINE AP LATERAL: ICD-10-PCS | Mod: 26,HCNC,, | Performed by: RADIOLOGY

## 2023-01-23 PROCEDURE — 1126F PR PAIN SEVERITY QUANTIFIED, NO PAIN PRESENT: ICD-10-PCS | Mod: HCNC,CPTII,S$GLB,

## 2023-01-23 PROCEDURE — 2023F PR DILATED RETINAL EXAM W/O EVID OF RETINOPATHY: ICD-10-PCS | Mod: HCNC,CPTII,S$GLB,

## 2023-01-23 PROCEDURE — 1159F PR MEDICATION LIST DOCUMENTED IN MEDICAL RECORD: ICD-10-PCS | Mod: HCNC,CPTII,S$GLB,

## 2023-01-23 PROCEDURE — 92014 PR EYE EXAM, EST PATIENT,COMPREHESV: ICD-10-PCS | Mod: HCNC,S$GLB,,

## 2023-01-23 PROCEDURE — 99024 POSTOP FOLLOW-UP VISIT: CPT | Mod: HCNC,S$GLB,, | Performed by: PHYSICIAN ASSISTANT

## 2023-01-23 PROCEDURE — 3288F FALL RISK ASSESSMENT DOCD: CPT | Mod: HCNC,CPTII,S$GLB, | Performed by: PHYSICIAN ASSISTANT

## 2023-01-23 PROCEDURE — 3288F PR FALLS RISK ASSESSMENT DOCUMENTED: ICD-10-PCS | Mod: HCNC,CPTII,S$GLB, | Performed by: PHYSICIAN ASSISTANT

## 2023-01-23 PROCEDURE — 72040 X-RAY EXAM NECK SPINE 2-3 VW: CPT | Mod: TC,HCNC

## 2023-01-23 PROCEDURE — 4010F ACE/ARB THERAPY RXD/TAKEN: CPT | Mod: HCNC,CPTII,S$GLB, | Performed by: PHYSICIAN ASSISTANT

## 2023-01-23 PROCEDURE — 1160F PR REVIEW ALL MEDS BY PRESCRIBER/CLIN PHARMACIST DOCUMENTED: ICD-10-PCS | Mod: HCNC,CPTII,S$GLB, | Performed by: PHYSICIAN ASSISTANT

## 2023-01-23 PROCEDURE — 92133 POSTERIOR SEGMENT OCT OPTIC NERVE(OCULAR COHERENCE TOMOGRAPHY) - OU - BOTH EYES: ICD-10-PCS | Mod: HCNC,S$GLB,,

## 2023-01-23 PROCEDURE — 3008F BODY MASS INDEX DOCD: CPT | Mod: HCNC,CPTII,S$GLB, | Performed by: PHYSICIAN ASSISTANT

## 2023-01-23 PROCEDURE — 1125F AMNT PAIN NOTED PAIN PRSNT: CPT | Mod: HCNC,CPTII,S$GLB, | Performed by: PHYSICIAN ASSISTANT

## 2023-01-23 PROCEDURE — 1159F MED LIST DOCD IN RCRD: CPT | Mod: HCNC,CPTII,S$GLB, | Performed by: PHYSICIAN ASSISTANT

## 2023-01-23 NOTE — TELEPHONE ENCOUNTER
Lab orders in - best done fasting        ===  ----- Message from Angelica Hernandez sent at 1/23/2023 12:02 PM CST -----  Contact: LIBBY [phone]  Pt calling to speak with the nurse to see if the A1C and cholesterol orders can be put in ASAP today so he able to go get them done today. Pt did inform me that he did have a piece of candy 5 min's go so he would like to see if it fasting or non fasting labs? Please call to advise.

## 2023-01-23 NOTE — TELEPHONE ENCOUNTER
----- Message from Angelica Hernandez sent at 1/23/2023 12:02 PM CST -----  Contact: LIBBY [phone]  Pt calling to speak with the nurse to see if the A1C and cholesterol orders can be put in ASAP today so he able to go get them done today. Pt did inform me that he did have a piece of candy 5 min's go so he would like to see if it fasting or non fasting labs? Please call to advise.

## 2023-01-23 NOTE — PROGRESS NOTES
Date: 01/23/2023    Supervising Physician: Kody Marshall M.D.    Date of Surgery: 10/31/22     Procedure: C3-6 ACDF    History: Karthik Bentley is seen today for follow-up following the above listed procedure. Overall the patient is doing well but today notes his pain is 2/10.  He is having some difficulty with overhead activity.  He is taking tylenol and gabapentin.        Exam: Incision is healed.   There is no sign of infection. Neuro exam is stable. No signs of DVT.    Radiographs: imaging today shows hardware in place, no evidence of failure.     Assessment/Plan: 3 months post op.    Doing well postoperatively. I will get him scheduled for a swallow study.  I will discuss with Dr. Degroot and Dr. Marshall as well.          Thank you for the opportunity to participate in this patient's care. Please give me a call if there are any concerns or questions.

## 2023-01-24 ENCOUNTER — TELEPHONE (OUTPATIENT)
Dept: SPEECH THERAPY | Facility: HOSPITAL | Age: 68
End: 2023-01-24
Payer: MEDICARE

## 2023-01-24 NOTE — TELEPHONE ENCOUNTER
Luz pt to schedule mbss. Informed of next available mbss pt wanted this as soon as possible he was transferred to [facility] for an appt.

## 2023-01-24 NOTE — PROGRESS NOTES
HPI    The patient reports for his annual diabetic eye exam. States he had   redness 2 weeks ago, using ATs tid-qid OD and that it is clearing. Notices   redness most when waking up from sleep. Denies pain or light sensitivity.   Using +1.50 OTC readers.  Sees Dr. Moya q3-6months. States BS isn't well-controlled, will be   repeating lab work within a few days.    Hemoglobin A1C       Date                     Value               Ref Range             Status                10/11/2022               8.4 (H)             4.0 - 5.6 %           Final                  09/20/2022               8.5 (H)             4.0 - 5.6 %           Final                  05/10/2022               7.5 (H)             4.0 - 5.6 %           Final            Last edited by Breana Benton, OD on 1/23/2023  7:04 PM.        ROS    Positive for: Endocrine, Eyes  Negative for: Constitutional, Gastrointestinal, Neurological, Skin,   Genitourinary, Musculoskeletal, HENT, Cardiovascular, Respiratory,   Psychiatric, Allergic/Imm, Heme/Lymph  Last edited by Breana Benton, OD on 1/23/2023  1:24 PM.        Assessment /Plan     For exam results, see Encounter Report.    Type 2 diabetes mellitus without complication, without long-term current use of insulin    Nuclear sclerosis, bilateral    Posterior vitreous detachment of left eye    Open angle with borderline findings and low glaucoma risk in both eyes  -     Posterior Segment OCT Optic Nerve- Both eyes      No diabetic retinopathy or csme evident on today's exam. Ed pt on importance of monitoring BS due to potential for visual fluctuations, continue care with PCP. Monitor with yearly dilated exam.   Pt educated on condition, cataracts are not visually significant. Surgery not recommended at this time. Continue to monitor yearly.   Advised pt on presence of PVD OS. Retina is flat and intact 360 OU, no holes, breaks, or tears OU. Pt educated on condition and etiology of PVD and on likelihood of  developing similar symptoms in fellow eye. Advised on signs and symptoms of RT/RD. Ed pt to return asap if experienced, pt expressed understanding. Monitor annually.   Pt educated on findings. Large C/D ratio OU. RNFL OCT is within normal limits in all quadrants, but with possible progression. Previous VFs show no glaucomatous defects. Monitor annually with RNFL OCT and DFE.  IOP: 21mmHg OD, 20mmHg OS (normotensive)  C/D: 0.65R OU  Family History: Negative  Pachy: 597um OD, 582um OS (thick OU)     OCT RNFL 01/23/2023:   Right Eye - All quadrants within normal limits, no glaucomatous thinning. G 91um, IT 122um, IN 98um, N 54um, SN 114um, ST 142um, T 71um. No progression from 2020 scan.  Left Eye - All quadrants within normal limits, no glaucomatous thinning. G 94um, IT 146um, IN 94um, N 52um, SN 117um, ST 137um, T 75um. Possible IN progression from 2020 scan.    OCT RNFL 08/27/2021:   Right Eye - All quadrants within normal limits, no glaucomatous thinning. G 90um, IT 120um, IN 103um, N 57um, SN 114um, ST 131um, T 68um. Possible ST progressive thinning from 2020 scan.  Left Eye - All quadrants within normal limits, no glaucomatous thinning. G 94um, IT 139um, IN 104um, N 61um, SN 120um, ST 138um, T 65um. No progression from 2020 scan.    OCT RNFL 08/03/2020:   Right Eye - All quadrants within normal limits, no glaucomatous thinning. G 89um, IT 115um, IN 90um, N 49um, SN 116um, ST 149um, T 72um  Left Eye - All quadrants within normal limits, no glaucomatous thinning. G 93um, IT 141um, IN 106um, N 57um, SN 114um, ST 135um, T 69um      RTC in 1 year for annual eye exam and RNFL OCT or sooner prn

## 2023-01-25 ENCOUNTER — LAB VISIT (OUTPATIENT)
Dept: LAB | Facility: HOSPITAL | Age: 68
End: 2023-01-25
Attending: FAMILY MEDICINE
Payer: MEDICARE

## 2023-01-25 DIAGNOSIS — Z12.5 PROSTATE CANCER SCREENING: ICD-10-CM

## 2023-01-25 DIAGNOSIS — E11.9 TYPE 2 DIABETES MELLITUS WITHOUT COMPLICATION, WITHOUT LONG-TERM CURRENT USE OF INSULIN: ICD-10-CM

## 2023-01-25 LAB
CHOLEST SERPL-MCNC: 122 MG/DL (ref 120–199)
CHOLEST/HDLC SERPL: 2.4 {RATIO} (ref 2–5)
COMPLEXED PSA SERPL-MCNC: 0.72 NG/ML (ref 0–4)
ESTIMATED AVG GLUCOSE: 140 MG/DL (ref 68–131)
HBA1C MFR BLD: 6.5 % (ref 4–5.6)
HDLC SERPL-MCNC: 51 MG/DL (ref 40–75)
HDLC SERPL: 41.8 % (ref 20–50)
LDLC SERPL CALC-MCNC: 55 MG/DL (ref 63–159)
NONHDLC SERPL-MCNC: 71 MG/DL
TRIGL SERPL-MCNC: 80 MG/DL (ref 30–150)

## 2023-01-25 PROCEDURE — 84153 ASSAY OF PSA TOTAL: CPT | Mod: HCNC | Performed by: FAMILY MEDICINE

## 2023-01-25 PROCEDURE — 36415 COLL VENOUS BLD VENIPUNCTURE: CPT | Mod: HCNC | Performed by: FAMILY MEDICINE

## 2023-01-25 PROCEDURE — 83036 HEMOGLOBIN GLYCOSYLATED A1C: CPT | Mod: HCNC | Performed by: FAMILY MEDICINE

## 2023-01-25 PROCEDURE — 80061 LIPID PANEL: CPT | Mod: HCNC | Performed by: FAMILY MEDICINE

## 2023-01-26 ENCOUNTER — HOSPITAL ENCOUNTER (OUTPATIENT)
Dept: RADIOLOGY | Facility: OTHER | Age: 68
Discharge: HOME | End: 2023-01-26
Attending: PHYSICIAN ASSISTANT
Payer: MEDICARE

## 2023-01-26 DIAGNOSIS — S19.9XXS UNSPECIFIED INJURY OF NECK, SEQUELA: ICD-10-CM

## 2023-01-26 DIAGNOSIS — Z98.1 S/P CERVICAL SPINAL FUSION: ICD-10-CM

## 2023-01-26 DIAGNOSIS — R13.10 DYSPHAGIA, UNSPECIFIED TYPE: ICD-10-CM

## 2023-01-26 PROCEDURE — 92611 MOTION FLUOROSCOPY/SWALLOW: CPT | Mod: HCNC

## 2023-01-26 PROCEDURE — A9698 NON-RAD CONTRAST MATERIALNOC: HCPCS | Mod: HCNC | Performed by: PHYSICIAN ASSISTANT

## 2023-01-26 PROCEDURE — 74230 X-RAY XM SWLNG FUNCJ C+: CPT | Mod: TC,HCNC

## 2023-01-26 PROCEDURE — 74230 FL MODIFIED BARIUM SWALLOW SPEECH STUDY: ICD-10-PCS | Mod: 26,HCNC,, | Performed by: RADIOLOGY

## 2023-01-26 PROCEDURE — 25500020 PHARM REV CODE 255: Mod: HCNC | Performed by: PHYSICIAN ASSISTANT

## 2023-01-26 PROCEDURE — 74230 X-RAY XM SWLNG FUNCJ C+: CPT | Mod: 26,HCNC,, | Performed by: RADIOLOGY

## 2023-01-26 RX ADMIN — BARIUM SULFATE 50 ML: 0.81 POWDER, FOR SUSPENSION ORAL at 10:01

## 2023-01-26 NOTE — PATIENT INSTRUCTIONS
[facility]  OCCUPATIONAL THERAPY  HOME EXERCISE PROGRAM     Complete the following strengthening exercises using 2-5 pound weight.  Do 20 repetitions of each, 1x per day.     Resisted Forearm Rotation  Use a weight or a hammer. Slowly rotate hand to one side then the other.      Resisted Wrist Flexion  With palm up and weight in hand, bend wrist up. Return slowly.      Resisted Wrist Extension  With palm down and weight in hand, bend wrist up. Then bend wrist down.      Resisted Wrist Deviation  With thumb up and weight in hand, bend wrist up. Return slowly.    Copyright © I. All rights reserved.     Therapist: MIKE Washington CHT

## 2023-01-26 NOTE — PROCEDURES
Modified Barium Swallow    Patient Name:  Karthik Bentley   MRN:  [id number]      Recommendations:     Recommendations:                General Recommendations:    Outpatient SLP dysphagia tx to review pharyngeal strengthening exercises to compensate for mildly reduced BOT retraction and reduced UES opening  Contact pharmacist/MD to discuss possibility of pills to be crushed, and/or transition to liquid medication as able  GI consult    Diet recommendations:  Easy to Chew Diet - IDDSI Level 7, Thin liquids - IDDSI Level 0     Aspiration Precautions: Reviewed general aspiration risk precautions including upright seating for 30 mins following intake. Double swallows per bolus and alternating solids/liquids    General Precautions: Standard,        Referral     Reason for Referral  Patient was referred for a Modified Barium Swallow Study to assess the efficiency of his swallow function, rule out aspiration and make recommendations regarding safe dietary consistencies, effective compensatory strategies, and safe eating environment.     Diagnosis: <principal problem not specified>       History:     67M with pmhx rheumatoid arthritis and gout. Pt s/p ACDF C3-6 10/31/22 with subsequent dysphagia following procedure. Patient reports globus sensation most notably with pills, requiring use of pudding for taking pills. He reports recent instance of vomiting pills. He reports difficulty with dry/tough solids.     Past Medical History:   Diagnosis Date    Diabetes mellitus type II     Hypertension     Rheumatoid arthritis        Objective:      view reveals anterior cervical hardware at C3-C6, which mildly impinges on the pharyngeal lumen and cervical esophageal space.     Visualization  Patient was seen in the lateral view    Oral Peripheral Examination   Symmetric facial features with symmetric smile  Functional oral motor range of motion and suspect adequate strength  No overt motor speech deficits noted   Adequate  "dentition    Consistencies Assessed  Thin liquid varibar barium via 5cc and 10cc volume cupsips self-administered, unmeasured single sips via cup including consecutive sips  Puree 1 tsp varibar pudding barium  Solids 1 bite of nettie cracker coated in varibar pudding barium  1 barium tablet followed by thin liquid wash, and barium tablet in applesauce      Oral Preparation/Oral Phase  Oral phase was WFL  Adequate labial seal on cup without anterior loss  Functional and timely a/p transit  Adequate mastication  No oral residues noted    Pharyngeal Phase     Pharyngeal swallow was further evaluated utilizing the analysis of Swallowing Physiology: Events, Kinematics & Timing for Use in Clinical Practice (ASPEKT-C) method of evaluation. This method of evaluation further assesses the swallowing safety and efficiency utilizing measurements of timing and pharyngeal residues as well as pharyngeal area at maximum pharyngeal constriction (PhAMPC) as indicated.     Thin liquids  Swallow initiated as bolus reached the level of the vallecular space, timely swallow response time at 133.2ms (WFL = 212.12 (±579.54))  Pharyngeal area at max constriction (observable unobliterated area of the pharynx) was measured at 0.64% of total pharyngeal space- this is WFL(unobliterated visible portion of pharynx of <2.7% is deemed normal)  No measurable pharyngeal residue post swallow    Pureed solids  Vallecular aggregation before the swallow noted (normal finding with chewable solids)  No observable pharyngeal area noted at max constriction, indicative of complete pharyngeal obliteration (0% of pharynx measurable at max constriction) this is within normal limits  Residues measured up to 0.17% of pharynx falling within normal range (residues occupying <1.5% of pharyngeal space is deemed "normal")    Chewable solids  Piecemeal swallow initiation and vallecular aggregation before the swallow noted (normal finding with chewable " solids)  Subjectively swallow timely occurring as bolus entered vallecular space  There was subjectively noted mildly reduced base of tongue constriction with trace-mild observable residues at the level of the vallecular space. Residues and pharyngeal squeeze quantified via ASPEKT-C measurements  Pharyngeal area at max constriction (observable unobliterated area of the pharynx) was measured at 0.59% of total pharyngeal space (no ASPEKT-C norms for chewable solids have been established though, this would fall within normal ranges of solids including puree (WFL for puree <1.4% of pharynx)  Residues noted to occupy 0.73% of total pharyngeal space, falling within normal range for comparison to puree (<1.5% equates to WFL for pureed solids, again, no norms yet established for chewable solids)    Barium tablet  Initial presentation of barium tablet followed by thin liquid with resultant inability to pass through the upper esophageal sphincter. Patient able to bring tablet back to oral cavity  When re-administered with applesauce and slight head to L posture barium tablet passed through oropharynx and UES without difficulty.     Cervical Esophageal Phase  Across all consistencies there was notably reduced UES opening (likely impacted by ACDF hardware)  Esophageal sweep revealed stasis/slowed emptying at 60+ seconds    Assessment:     Impressions   There was timely swallow initiation  No airway invasion noted across consistencies  Intermittently noted was transiently reduced base of tongue retraction (though area of pharynx visualized during max BOT retraction fell within functional limits per ASPEKT-C measures)  Resultant intermittent trace residues (which also fell within functional limits per ASPEKT-C measures)  Adequate hyolaryngeal elevation/excursion and subsequent epiglottic retroflexion  Noted was reduced upper esophageal sphincter relaxation/opening- suspect secondary to presence of ACDF hardware at C3-C6,  mechanically impinging on the UES.   This resulted in reduced ability to pass barium tablet through UES without use of compensatory strategies  Clearance of esophagus noted to take >60 seconds. Consider GI consult.    Prognosis: Fair. Positive prognostic factors include ability to utilize compensatory strategies independently, and motivation. Negative prognostic factors include presence of hardware along C-spine with resultant reduced UES opening    Plan  OP dysphagia therapy for training in base of tongue/pharyngeal strengthening exercises- potential for ability to compensate for UES deficits with improvements in minimal base of tongue deficits noted  GI consult to further assess possibility of esophageal dysmotility    Education  Results were discussed with patient. Results were discussed with Medical Team who was in agreement with plan.     Plan:   Plan of Care reviewed with:  patient        Discharge recommendations:   OP SLP dysphagia tx, GI consult  Barriers to Discharge:  None    Time Tracking:   SLP Treatment Date:   23  Speech Start Time:  1000  Speech Stop Time:  1045     Speech Total Time (min):  45 min    2023    Juan CM, Gen TONG, Larry BABB, Cristine BT, Mitzi ZAPATA, Dax PALMAV, Kary S, Vesna MS, Thor TJ, Renate AA, Warren TS. Reference Values for Healthy Swallowing Across the Range From Thin to Extremely Thick Liquids. J Speech Lang Hear Res. 2019 May 21;62(5):3692-6555. doi: 10.1044/0369_NNVUS-X-. PMID: 26700698; PMCID: [id number].    Char RITTER, Vinny KL, Mariaelena E, Jenelle AK, Ibarra F, Eduardo L, Cassi A, Yuval A, Alessandra A. Swallowing Kinematic Differences Across Frozen, Mixed, and Ultrathin Liquid Boluses in Healthy Adults: Age, Sex, and Normal Variability. J Speech Lang Hear Res. 2018 ;61(7):1414-5220. doi: 10.1044/5532_NJWGY-E-. PMID: 80385464; PMCID: [id number].

## 2023-01-26 NOTE — PROGRESS NOTES
Occupational Therapy Daily Treatment Note     Date: 1/27/2023  Name: Karthik Bentley  Clinic Number: [id number]    Therapy Diagnosis:   Encounter Diagnoses   Name Primary?    Stiffness of hand joint, unspecified laterality Yes    Hand weakness        Physician: Ashley Max MD    Medical Diagnosis: Bi-lateral Rheumatoid Arthritis   Physician Orders: Evaluate & Treat   Evaluation Date: 11/28/2022  Plan of Care Certification Date: 2/26/22   Authorization Period: 11/18/22-11/18/23   Surgery Date and Procedure: Not Applicable   Date of Return to MD: 1/4/22    Visit # / Visits authorized: 6 / 20  Time In: 10:45 AM   Time Out: 11:30 AM   Total Billable Time: 45 minutes    Precautions:  Standard      Subjective     Pt reports: that he does not have any changes since beginning therapy. He reports that he has begun to notice more stiffness & tightness in his Left wrist.   Response to previous treatment: tolerated well     Pain: 2/10  Location: Not Applicable     Objective     Pt received manual therapy techniques to bi-lateral hands  for 20 minutes  to increase joint mobilization and soft tissue mobilization    -Passive Range of Motion: hook, composite flexion x 10 each   -Certified Hand Therapist performed  Instrument Assisted Soft Tissue Mobilization  stimulating tissue turnover, scar tissue resorption, and the regeneration of tendons, muscles and other soft tissue structures along LEFT index finger A1 pulley x 5  minutes       Ray performed therapeutic exercises to maximize upper extremity motion and/or strength for 17 minutes including:  -Bi-lateral Peach putty presses with medium dowel x 2 minutes each   -Bi-lateral Wrist progressive resistive exercises with 2# 3 ways (palm up, palm down, neutral) x 20 each     Patient received ultrasound to  Left  A1 Pulley index finger-small finger to increase blood flow, circulation, tissue elasticity, pain management and for wound/scar management for Left A1 pulleys (index  finger-small finger) & thumb MP @ 3.3 Mhz, Intensity 1.5 w/cm2 at 100% duty cycle for 8 minutes.     Home Exercises and Education Provided     Education provided:   - Progress towards goals   -1/27/23 Patient given exercises for Bi-lateral wrist progressive resistive exercises & forearm rotation.     Written Home Exercises Provided: Patient instructed to cont prior HEP.  Exercises were reviewed and Karthik was able to demonstrate them prior to the end of the session.  Karthik demonstrated good  understanding of the HEP provided.   .   See EMR under Patient Instructions for exercises provided prior visit.        Assessment       Pt would continue to benefit from skilled OT      Karthik is progressing well towards his goals and there are no updates to goals at this time. Pt prognosis is Fair.     Pt will continue to benefit from skilled outpatient occupational therapy to address the deficits listed in the problem list on initial evaluation provide pt/family education and to maximize pt's level of independence in the home and community environment.     Anticipated barriers to therapy: Not Applicable       Pt's spiritual, cultural and educational needs considered and pt agreeable to plan of care and goals.    Goals:    LTG's (6 weeks):  1)   Increase LEFT Range of Motion in order to make a functional fist to increase functional hand use for carrying bags.   2)   Increase  strength Bi-laterally 20 lbs. to grasp & open bottles.   3)   Increase Right 3jaw pinch 4 psis for squeezing toothpaste.   4)   Decrease complaints of pain to  0 out of 10 at worst to increase functional hand use for ADL/work/leisure activities.  5)   Patient to score at 48% or less on FOTO to demonstrate improved perception of functional hand use.  6)   Pt will return to near to prior level of function for ADLs and household management reporting I or Mod I with ADLs (dressing, feeding, grooming, toileting).      STG's (3 weeks)  1)   Patient to be IND with  HEP and modalities for pain/edema managment.  2)   Increase composite LEFT Range of Motion 30 degrees to increase functional hand use for ADLs/work/leisure activities.  3)   Increase  strength 10 lbs. to improve functional grasp for ADLs/work/leisure activities.   4)   Increase 3jaw pinch 2 psi's to increase independence with button and FM Coordination.   5)   Patient to be IND with Orthotic use, wear and care precautions.   6)   Decrease complaints of pain to  1 out of 10 at worst to increase functional hand use for ADL/work/leisure activities.    Plan   Cont OT to address above goals.    MIKE Washington  Occupational Therapist

## 2023-01-27 ENCOUNTER — CLINICAL SUPPORT (OUTPATIENT)
Dept: REHABILITATION | Facility: HOSPITAL | Age: 68
End: 2023-01-27
Payer: MEDICARE

## 2023-01-27 DIAGNOSIS — R29.898 HAND WEAKNESS: ICD-10-CM

## 2023-01-27 DIAGNOSIS — M25.649 STIFFNESS OF HAND JOINT, UNSPECIFIED LATERALITY: Primary | ICD-10-CM

## 2023-01-31 NOTE — PROGRESS NOTES
Occupational Therapy Daily Treatment Note     Date: 2/1/2023  Name: Karthik Bentley  [facility] Number: [id number]    Therapy Diagnosis:   Encounter Diagnoses   Name Primary?    Stiffness of hand joint, unspecified laterality Yes    Hand weakness      Physician: Ashley Max MD    Medical Diagnosis: Bi-lateral Rheumatoid Arthritis   Physician Orders: Evaluate & Treat   Evaluation Date: 11/28/2022  Plan of Care Certification Date: 2/26/22   Authorization Period: 11/18/22-11/18/23   Surgery Date and Procedure: Not Applicable   Date of Return to MD: 1/4/22    Visit # / Visits authorized: 7 / 20  Time In: 11:15 AM   Time Out: 12:00 PM   Total Billable Time: 45 minutes    Precautions:  Standard      Subjective     Pt reports: that today is a good day and he has less stiffness than usual. Patient reports that his doctor recently increased his dosage of his rheumatoid arthritis medication.   Response to previous treatment: tolerated well     Pain: 3/10  Location: Not Applicable     Objective     Pt received manual therapy techniques to bi-lateral hands  for 25 minutes  to increase joint mobilization and soft tissue mobilization    -Passive Range of Motion: hook, composite flexion x 10 each   -Certified Hand Therapist performed  Instrument Assisted Soft Tissue Mobilization  stimulating tissue turnover, scar tissue resorption, and the regeneration of tendons, muscles and other soft tissue structures along LEFT index finger A1 pulley x 5  minutes       Patient received ultrasound to  Left  A1 Pulley index finger-small finger to increase blood flow, circulation, tissue elasticity, pain management and for wound/scar management for Left A1 pulleys (index finger-small finger) & thumb MP @ 3.3 Mhz, Intensity 1.5 w/cm2 at 100% duty cycle for 8 minutes.     Range of Motion:   Left     LEFT --Eval   Thumb Opposition .5cm>DPC      Finger active range of motion--Eval     INDEX  LONG  RING  SMALL    MP  80 90 95 95   PIP  80 80 80 75   DIP   45 50 40        40         Finger active range of motion 2/1/23    INDEX  LONG  RING  SMALL    MP  85 95 100 100   PIP  80 95 85 80   DIP  50 55 50       50      Left Thumb Opposition: DPC     Strength: (GILBERTO Dynamometer in psi.)        11/28/2022 11/28/2022 2/1/23 2/1/23     Left Right Left  Right    Rung II 21 46 27 52         Pinch Strength (Measured in psi)       11/28/2022 11/28/2022 2/1/23 2/1/23     Left Right Left  RIGHT   Key Pinch 10  17  11 18   3pt Pinch 9  17  8 20      Patient w/ FOTO score of 48% limitation rating, see attached for details     Home Exercises and Education Provided     Education provided:   - Progress towards goals     Written Home Exercises Provided: Patient instructed to cont prior HEP.  Exercises were reviewed and Karthik was able to demonstrate them prior to the end of the session.  Karthik demonstrated good  understanding of the HEP provided.   .   See EMR under Patient Instructions for exercises provided prior visit.        Assessment     Therapist reviewed rheumatoid arthritis management: heating, pain free stretching, activity modification, joint protection, splinting, & very light strengthening. Patient will be discharged from formal therapy and will continue to use skills taught in therapy to improve functional activity.       Anticipated barriers to therapy: Arthralgia Etiology      Pt's spiritual, cultural and educational needs considered and pt agreeable to plan of care and goals.    Goals:    LTG's (6 weeks):  1)   Increase LEFT Range of Motion in order to make a functional fist to increase functional hand use for carrying bags. (Not Met)   2)   Increase  strength Bi-laterally 20 lbs. to grasp & open bottles. (Not Met)   3)   Increase Right 3jaw pinch 4 psis for squeezing toothpaste. (Not Met)   4)   Decrease complaints of pain to  0 out of 10 at worst to increase functional hand use for ADL/work/leisure activities. (Not Met)   5)   Patient to score at 48% or less on  FOTO to demonstrate improved perception of functional hand use. (Met)   6)   Pt will return to near to prior level of function for ADLs and household management reporting I or Mod I with ADLs (dressing, feeding, grooming, toileting). (Met)      STG's (3 weeks)  1)   Patient to be IND with HEP and modalities for pain/edema managment. (Met)   2)   Increase composite LEFT Range of Motion 30 degrees to increase functional hand use for ADLs/work/leisure activities.  3)   Increase  strength 10 lbs. to improve functional grasp for ADLs/work/leisure activities. (Not Met)   4)   Increase 3jaw pinch 2 psi's to increase independence with button and FM Coordination. (Met)   5)   Patient to be IND with Orthotic use, wear and care precautions. (Met)  6)   Decrease complaints of pain to  1 out of 10 at worst to increase functional hand use for ADL/work/leisure activities. (Not Met)     Plan   Patient has been discharged from formal therapy.     MIKE Washington  Occupational Therapist

## 2023-02-01 ENCOUNTER — LAB VISIT (OUTPATIENT)
Dept: LAB | Facility: HOSPITAL | Age: 68
End: 2023-02-01
Attending: NURSE PRACTITIONER
Payer: MEDICARE

## 2023-02-01 ENCOUNTER — CLINICAL SUPPORT (OUTPATIENT)
Dept: REHABILITATION | Facility: HOSPITAL | Age: 68
End: 2023-02-01
Attending: NURSE PRACTITIONER
Payer: MEDICARE

## 2023-02-01 DIAGNOSIS — M25.649 STIFFNESS OF HAND JOINT, UNSPECIFIED LATERALITY: Primary | ICD-10-CM

## 2023-02-01 DIAGNOSIS — R29.898 HAND WEAKNESS: ICD-10-CM

## 2023-02-01 DIAGNOSIS — M06.9 RHEUMATOID ARTHRITIS, INVOLVING UNSPECIFIED SITE, UNSPECIFIED WHETHER RHEUMATOID FACTOR PRESENT: ICD-10-CM

## 2023-02-01 LAB
ALBUMIN SERPL BCP-MCNC: 4.1 G/DL (ref 3.5–5.2)
ALP SERPL-CCNC: 87 U/L (ref 55–135)
ALT SERPL W/O P-5'-P-CCNC: 21 U/L (ref 10–44)
ANION GAP SERPL CALC-SCNC: 12 MMOL/L (ref 8–16)
AST SERPL-CCNC: 18 U/L (ref 10–40)
BASOPHILS # BLD AUTO: 0.02 K/UL (ref 0–0.2)
BASOPHILS NFR BLD: 0.3 % (ref 0–1.9)
BILIRUB SERPL-MCNC: 0.6 MG/DL (ref 0.1–1)
BUN SERPL-MCNC: 14 MG/DL (ref 8–23)
CALCIUM SERPL-MCNC: 10 MG/DL (ref 8.7–10.5)
CHLORIDE SERPL-SCNC: 104 MMOL/L (ref 95–110)
CO2 SERPL-SCNC: 24 MMOL/L (ref 23–29)
CREAT SERPL-MCNC: 1.2 MG/DL (ref 0.5–1.4)
CRP SERPL-MCNC: 1.1 MG/L (ref 0–8.2)
DIFFERENTIAL METHOD: ABNORMAL
EOSINOPHIL # BLD AUTO: 0.1 K/UL (ref 0–0.5)
EOSINOPHIL NFR BLD: 2.3 % (ref 0–8)
ERYTHROCYTE [DISTWIDTH] IN BLOOD BY AUTOMATED COUNT: 16.4 % (ref 11.5–14.5)
ERYTHROCYTE [SEDIMENTATION RATE] IN BLOOD BY PHOTOMETRIC METHOD: 12 MM/HR (ref 0–23)
EST. GFR  (NO RACE VARIABLE): >60 ML/MIN/1.73 M^2
GLUCOSE SERPL-MCNC: 170 MG/DL (ref 70–110)
HCT VFR BLD AUTO: 38.2 % (ref 40–54)
HGB BLD-MCNC: 11.7 G/DL (ref 14–18)
IMM GRANULOCYTES # BLD AUTO: 0.01 K/UL (ref 0–0.04)
IMM GRANULOCYTES NFR BLD AUTO: 0.2 % (ref 0–0.5)
LYMPHOCYTES # BLD AUTO: 2 K/UL (ref 1–4.8)
LYMPHOCYTES NFR BLD: 32.5 % (ref 18–48)
MCH RBC QN AUTO: 30 PG (ref 27–31)
MCHC RBC AUTO-ENTMCNC: 30.6 G/DL (ref 32–36)
MCV RBC AUTO: 98 FL (ref 82–98)
MONOCYTES # BLD AUTO: 0.4 K/UL (ref 0.3–1)
MONOCYTES NFR BLD: 6.3 % (ref 4–15)
NEUTROPHILS # BLD AUTO: 3.6 K/UL (ref 1.8–7.7)
NEUTROPHILS NFR BLD: 58.4 % (ref 38–73)
NRBC BLD-RTO: 0 /100 WBC
PLATELET # BLD AUTO: 259 K/UL (ref 150–450)
PMV BLD AUTO: 10.5 FL (ref 9.2–12.9)
POTASSIUM SERPL-SCNC: 4.6 MMOL/L (ref 3.5–5.1)
PROT SERPL-MCNC: 7.5 G/DL (ref 6–8.4)
RBC # BLD AUTO: 3.9 M/UL (ref 4.6–6.2)
SODIUM SERPL-SCNC: 140 MMOL/L (ref 136–145)
WBC # BLD AUTO: 6.18 K/UL (ref 3.9–12.7)

## 2023-02-01 PROCEDURE — 85025 COMPLETE CBC W/AUTO DIFF WBC: CPT | Mod: HCNC | Performed by: STUDENT IN AN ORGANIZED HEALTH CARE EDUCATION/TRAINING PROGRAM

## 2023-02-01 PROCEDURE — 86140 C-REACTIVE PROTEIN: CPT | Mod: HCNC | Performed by: STUDENT IN AN ORGANIZED HEALTH CARE EDUCATION/TRAINING PROGRAM

## 2023-02-01 PROCEDURE — 97140 MANUAL THERAPY 1/> REGIONS: CPT | Mod: HCNC,PO

## 2023-02-01 PROCEDURE — 80053 COMPREHEN METABOLIC PANEL: CPT | Mod: HCNC | Performed by: STUDENT IN AN ORGANIZED HEALTH CARE EDUCATION/TRAINING PROGRAM

## 2023-02-01 PROCEDURE — 97035 APP MDLTY 1+ULTRASOUND EA 15: CPT | Mod: HCNC,PO

## 2023-02-01 PROCEDURE — 85652 RBC SED RATE AUTOMATED: CPT | Mod: HCNC | Performed by: STUDENT IN AN ORGANIZED HEALTH CARE EDUCATION/TRAINING PROGRAM

## 2023-02-01 PROCEDURE — 36415 COLL VENOUS BLD VENIPUNCTURE: CPT | Mod: HCNC | Performed by: STUDENT IN AN ORGANIZED HEALTH CARE EDUCATION/TRAINING PROGRAM

## 2023-02-03 ENCOUNTER — OFFICE VISIT (OUTPATIENT)
Dept: RHEUMATOLOGY | Facility: CLINIC | Age: 68
End: 2023-02-03
Payer: MEDICARE

## 2023-02-03 VITALS
WEIGHT: 178 LBS | BODY MASS INDEX: 26.98 KG/M2 | DIASTOLIC BLOOD PRESSURE: 77 MMHG | HEIGHT: 68 IN | SYSTOLIC BLOOD PRESSURE: 120 MMHG | HEART RATE: 79 BPM

## 2023-02-03 DIAGNOSIS — M19.011 OSTEOARTHRITIS OF BOTH SHOULDERS, UNSPECIFIED OSTEOARTHRITIS TYPE: ICD-10-CM

## 2023-02-03 DIAGNOSIS — M19.012 OSTEOARTHRITIS OF BOTH SHOULDERS, UNSPECIFIED OSTEOARTHRITIS TYPE: ICD-10-CM

## 2023-02-03 DIAGNOSIS — M1A.0710 CHRONIC IDIOPATHIC GOUT INVOLVING TOE OF RIGHT FOOT WITHOUT TOPHUS: ICD-10-CM

## 2023-02-03 DIAGNOSIS — G89.29 CHRONIC PAIN OF BOTH SHOULDERS: Primary | ICD-10-CM

## 2023-02-03 DIAGNOSIS — M25.511 CHRONIC PAIN OF BOTH SHOULDERS: Primary | ICD-10-CM

## 2023-02-03 DIAGNOSIS — M25.512 CHRONIC PAIN OF BOTH SHOULDERS: Primary | ICD-10-CM

## 2023-02-03 DIAGNOSIS — M06.9 RHEUMATOID ARTHRITIS, INVOLVING UNSPECIFIED SITE, UNSPECIFIED WHETHER RHEUMATOID FACTOR PRESENT: ICD-10-CM

## 2023-02-03 PROCEDURE — 1125F AMNT PAIN NOTED PAIN PRSNT: CPT | Mod: HCNC,CPTII,GC,S$GLB | Performed by: STUDENT IN AN ORGANIZED HEALTH CARE EDUCATION/TRAINING PROGRAM

## 2023-02-03 PROCEDURE — 3078F DIAST BP <80 MM HG: CPT | Mod: HCNC,CPTII,GC,S$GLB | Performed by: STUDENT IN AN ORGANIZED HEALTH CARE EDUCATION/TRAINING PROGRAM

## 2023-02-03 PROCEDURE — 1101F PR PT FALLS ASSESS DOC 0-1 FALLS W/OUT INJ PAST YR: ICD-10-PCS | Mod: HCNC,CPTII,GC,S$GLB | Performed by: STUDENT IN AN ORGANIZED HEALTH CARE EDUCATION/TRAINING PROGRAM

## 2023-02-03 PROCEDURE — 3288F FALL RISK ASSESSMENT DOCD: CPT | Mod: HCNC,CPTII,GC,S$GLB | Performed by: STUDENT IN AN ORGANIZED HEALTH CARE EDUCATION/TRAINING PROGRAM

## 2023-02-03 PROCEDURE — 3288F PR FALLS RISK ASSESSMENT DOCUMENTED: ICD-10-PCS | Mod: HCNC,CPTII,GC,S$GLB | Performed by: STUDENT IN AN ORGANIZED HEALTH CARE EDUCATION/TRAINING PROGRAM

## 2023-02-03 PROCEDURE — 1160F RVW MEDS BY RX/DR IN RCRD: CPT | Mod: HCNC,CPTII,GC,S$GLB | Performed by: STUDENT IN AN ORGANIZED HEALTH CARE EDUCATION/TRAINING PROGRAM

## 2023-02-03 PROCEDURE — 99999 PR PBB SHADOW E&M-EST. PATIENT-LVL IV: ICD-10-PCS | Mod: PBBFAC,HCNC,GC, | Performed by: STUDENT IN AN ORGANIZED HEALTH CARE EDUCATION/TRAINING PROGRAM

## 2023-02-03 PROCEDURE — 3044F PR MOST RECENT HEMOGLOBIN A1C LEVEL <7.0%: ICD-10-PCS | Mod: HCNC,CPTII,GC,S$GLB | Performed by: STUDENT IN AN ORGANIZED HEALTH CARE EDUCATION/TRAINING PROGRAM

## 2023-02-03 PROCEDURE — 3074F SYST BP LT 130 MM HG: CPT | Mod: HCNC,CPTII,GC,S$GLB | Performed by: STUDENT IN AN ORGANIZED HEALTH CARE EDUCATION/TRAINING PROGRAM

## 2023-02-03 PROCEDURE — 1160F PR REVIEW ALL MEDS BY PRESCRIBER/CLIN PHARMACIST DOCUMENTED: ICD-10-PCS | Mod: HCNC,CPTII,GC,S$GLB | Performed by: STUDENT IN AN ORGANIZED HEALTH CARE EDUCATION/TRAINING PROGRAM

## 2023-02-03 PROCEDURE — 99999 PR PBB SHADOW E&M-EST. PATIENT-LVL IV: CPT | Mod: PBBFAC,HCNC,GC, | Performed by: STUDENT IN AN ORGANIZED HEALTH CARE EDUCATION/TRAINING PROGRAM

## 2023-02-03 PROCEDURE — 4010F ACE/ARB THERAPY RXD/TAKEN: CPT | Mod: HCNC,CPTII,GC,S$GLB | Performed by: STUDENT IN AN ORGANIZED HEALTH CARE EDUCATION/TRAINING PROGRAM

## 2023-02-03 PROCEDURE — 99214 OFFICE O/P EST MOD 30 MIN: CPT | Mod: HCNC,GC,S$GLB, | Performed by: STUDENT IN AN ORGANIZED HEALTH CARE EDUCATION/TRAINING PROGRAM

## 2023-02-03 PROCEDURE — 3008F PR BODY MASS INDEX (BMI) DOCUMENTED: ICD-10-PCS | Mod: HCNC,CPTII,GC,S$GLB | Performed by: STUDENT IN AN ORGANIZED HEALTH CARE EDUCATION/TRAINING PROGRAM

## 2023-02-03 PROCEDURE — 4010F PR ACE/ARB THEARPY RXD/TAKEN: ICD-10-PCS | Mod: HCNC,CPTII,GC,S$GLB | Performed by: STUDENT IN AN ORGANIZED HEALTH CARE EDUCATION/TRAINING PROGRAM

## 2023-02-03 PROCEDURE — 3008F BODY MASS INDEX DOCD: CPT | Mod: HCNC,CPTII,GC,S$GLB | Performed by: STUDENT IN AN ORGANIZED HEALTH CARE EDUCATION/TRAINING PROGRAM

## 2023-02-03 PROCEDURE — 99214 PR OFFICE/OUTPT VISIT, EST, LEVL IV, 30-39 MIN: ICD-10-PCS | Mod: HCNC,GC,S$GLB, | Performed by: STUDENT IN AN ORGANIZED HEALTH CARE EDUCATION/TRAINING PROGRAM

## 2023-02-03 PROCEDURE — 1101F PT FALLS ASSESS-DOCD LE1/YR: CPT | Mod: HCNC,CPTII,GC,S$GLB | Performed by: STUDENT IN AN ORGANIZED HEALTH CARE EDUCATION/TRAINING PROGRAM

## 2023-02-03 PROCEDURE — 1159F PR MEDICATION LIST DOCUMENTED IN MEDICAL RECORD: ICD-10-PCS | Mod: HCNC,CPTII,GC,S$GLB | Performed by: STUDENT IN AN ORGANIZED HEALTH CARE EDUCATION/TRAINING PROGRAM

## 2023-02-03 PROCEDURE — 1159F MED LIST DOCD IN RCRD: CPT | Mod: HCNC,CPTII,GC,S$GLB | Performed by: STUDENT IN AN ORGANIZED HEALTH CARE EDUCATION/TRAINING PROGRAM

## 2023-02-03 PROCEDURE — 3044F HG A1C LEVEL LT 7.0%: CPT | Mod: HCNC,CPTII,GC,S$GLB | Performed by: STUDENT IN AN ORGANIZED HEALTH CARE EDUCATION/TRAINING PROGRAM

## 2023-02-03 PROCEDURE — 3074F PR MOST RECENT SYSTOLIC BLOOD PRESSURE < 130 MM HG: ICD-10-PCS | Mod: HCNC,CPTII,GC,S$GLB | Performed by: STUDENT IN AN ORGANIZED HEALTH CARE EDUCATION/TRAINING PROGRAM

## 2023-02-03 PROCEDURE — 1125F PR PAIN SEVERITY QUANTIFIED, PAIN PRESENT: ICD-10-PCS | Mod: HCNC,CPTII,GC,S$GLB | Performed by: STUDENT IN AN ORGANIZED HEALTH CARE EDUCATION/TRAINING PROGRAM

## 2023-02-03 PROCEDURE — 3078F PR MOST RECENT DIASTOLIC BLOOD PRESSURE < 80 MM HG: ICD-10-PCS | Mod: HCNC,CPTII,GC,S$GLB | Performed by: STUDENT IN AN ORGANIZED HEALTH CARE EDUCATION/TRAINING PROGRAM

## 2023-02-03 RX ORDER — METHOTREXATE 2.5 MG/1
25 TABLET ORAL
Qty: 40 TABLET | Refills: 2 | Status: SHIPPED | OUTPATIENT
Start: 2023-02-03 | End: 2023-03-21

## 2023-02-03 NOTE — PROGRESS NOTES
Rapid3 Question Responses and Scores 2/2/2023   MDHAQ Score 1.3   Psychologic Score 1.1   Pain Score 4   When you awakened in the morning OVER THE LAST WEEK, did you feel stiff? Yes   If Yes, please indicate the number of hours until you are as limber as you will be for the day 2   Fatigue Score 1   Global Health Score 5.5   RAPID3 Score 4.61     Answers submitted by the patient for this visit:  Rheumatology Questionnaire (Submitted on 2/2/2023)  fever: No  eye redness: No  mouth sores: No  headaches: No  shortness of breath: No  chest pain: No  trouble swallowing: Yes  diarrhea: No  constipation: No  unexpected weight change: No  genital sore: No  During the last 3 days, have you had a skin rash?: No  Bruises or bleeds easily: No  cough: No

## 2023-02-03 NOTE — PROGRESS NOTES
Subjective:       Patient ID: Karthik Bentley is a 67 y.o. male.    Chief Complaint: RA    HPI  Interval Hx:  He saw Ophthalmology and was diagnosed with episcleritis: The appearance and lack of pain are highly inconsistent with a scleritis. I suspect he has an episcleritis aggravated by his chronic use of Visine-like eye drops. He will discontinue the vasoconstrictors and use artificial tears for  comfort. He will return to me as needed. Patient reports that the Ophthalmologist told him it was not related to RA. He now uses OTC eye drops and the redness in the eye cleared up and did not recur.    He complains of a mild cold for the past week. He continues MTX 8 tabs (20 mg) weekly split dosing and folic acid daily. He has difficulty swallowing his folic acid because it's a large pill and he just had cervical spine surgery. He's been going to OT. He reports that he is not any better than when he started OT. He still can not make a fist with the left hand but movement is sometimes better after a hot shower. Pain level is about a 3 or 4 out of 10. Morning stiffness is 1-2 hours. Left thumb MCP swells up intermittently. Right great toe swelled up for the first time this morning. It was stiff and slightly uncomfortable.         Initial Hx:  Patient is a 68 yo M with diabetes who presents for Rheumatology consultation for gout suspected in the left hand. He initially had RIGHT carpal tunnel surgery. He now presents with pain and swelling in the LEFT thumb and 2nd MCP. He also has carpal tunnel in the left wrist. He started with weakness in the left hand in mid-April. He complained to the OT about this but they were only treating his right hand post carpal tunnel surgery on 4/1/22. Left hand weakness and stiffness got worse until now he can't make a fist. A few months ago he developed swelling at the left thumb and 2nd finger MCP. Pain was not too much but a few days ago it became very tender 9/10 even with light touch. He  denies erythema or warmth ever. He contacted Dr. Warren who started a medrol dosepack which he started last night. This helped with the pain.    He had a gout attacks in the past when he was in [place] working. He had flares in the right big toe; he would wake up from sleep with severe pain in the great toe; there was a little swelling, some erythema, sometimes warmth. Mostly it was painful with bearing weight. Flares lasted 3 days and resolved with a medication he took there and can't remember the name. It happens once or twice a year and he still gets flares in the right great toe, last was 3-4 months ago and continues to abort the flare with the medication. Never had joint fluid aspiration. He has not had a gout flare in other joints. He has never had a kidney stone. No known family history of gout. Has never been on allopurinol, colchicine, or steroids for gout. 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 Never was much of an alcohol drinker - no alcohol anymore.    He also had shoulder pain. 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  No acute fracture or dislocation.  No bone destruction identified..  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 He was referred to Dr. Degroot and got bilateral shoulder injections. He was doing well in PT for the shoulders but one day woke up with terrible pain in the shoulders and reduced ROM. He told Dr. Degroot who then referred to neck and spine Ortho.    He had MRI cervical spine 8/24/22: 1. Multilevel cervical spondylosis resulting in varying degrees of foraminal narrowing and spinal canal stenosis most notable at C4-C5 and C5-C6.  2. Short segment cord signal abnormality at C4-C5 which is suggestive chronic compressive myelopathy.  He had epidural injection for  "this and a medrol dosepack prior (7/28/22). He is on Celebrex for this.    Social Hx: never smoker; doesn't drink alcohol, never was a heavy drinker.   Family Hx: no family history of autoimmune disease    Review of Systems   Constitutional:  Negative for fever and unexpected weight change.   HENT:  Positive for trouble swallowing. Negative for mouth sores.    Eyes:  Negative for redness.   Respiratory:  Negative for cough and shortness of breath.    Cardiovascular:  Negative for chest pain.   Gastrointestinal:  Negative for constipation and diarrhea.   Genitourinary:  Negative for genital sores.   Skin:  Negative for rash.   Neurological:  Negative for headaches.   Hematological:  Does not bruise/bleed easily.       Objective:   /77   Pulse 79   Ht 5' 8" (1.727 m)   Wt 80.7 kg (178 lb)   BMI 27.06 kg/m²      Physical Exam      10/11/2022 11/18/2022 1/4/2023 2/3/2023   Tender (WALKER-28) 10 / 28  0 / 28  0 / 28  4 / 28    Swollen (WALKER-28) 2 / 28  1 / 28  3 / 28  3 / 28    Provider Global 60 mm 10 mm 0 mm 30 mm   Patient Global 90 mm 50 mm 55 mm 55 mm   ESR 77 mm/hr 39 mm/hr 9 mm/hr 12 mm/hr   CRP 6.5 mg/L 3.3 mg/L 0.5 mg/L 1.1 mg/L   WALKER-28 (ESR) 6.47 (High disease activity) 3.54 (Moderate disease activity) 2.79 (Low disease activity) 4.11 (Moderate disease activity)   WALKER-28 (CRP) 5.11 (High disease activity) 2.47 (Remission) 2.36 (Remission) 3.6 (Moderate disease activity)   CDAI Score 27  7  8.5  15.5           Assessment:       1. Chronic pain of both shoulders    2. Osteoarthritis of both shoulders, unspecified osteoarthritis type    3. Rheumatoid arthritis, involving unspecified site, unspecified whether rheumatoid factor present    4. Chronic idiopathic gout involving toe of right foot without tophus                      Plan:       Problem List Items Addressed This Visit          Orthopedic    Chronic pain of both shoulders - Primary    Relevant Orders    Ambulatory referral/consult to " Physical/Occupational Therapy     Other Visit Diagnoses       Osteoarthritis of both shoulders, unspecified osteoarthritis type        Relevant Orders    Ambulatory referral/consult to Physical/Occupational Therapy    Rheumatoid arthritis, involving unspecified site, unspecified whether rheumatoid factor present        Relevant Medications    methotrexate 2.5 MG Tab    Chronic idiopathic gout involving toe of right foot without tophus        Relevant Orders    URIC ACID              Patient is a 68 yo M with diabetes who presents for Rheumatology follow up for RA.    On initial presentation:  Reviewed hand XR: appears to have marginal erosions 1st, 2nd, possibly 4th MCPs and calcium deposit 3rd MCP  Has synovitis on exam.     RF 25 (mildly elevated)  CCP negative  IESHA negative  SPEP and ОЛЬГА were normal  Anti-carP antibody negative  Large effusion on the right knee, aspirated last visit. Fluid had 111 WBCs and no crystals.    MRI hands/wrists RA protocol: MR imaging findings most concerning for an inflammatory arthropathy favoring rheumatoid arthritis with moderate disease activity and mild risk of future aggressiveness.    Rheumatoid Arthritis  OA in the shoulders  Right 3rd digit Dupuytren's contracture  Possible gout of right great toe    If uric acid >6 in 4 weeks, will add allopurinol.  If synovitis not controlled with increased MTX dose, will add TNFi.      Plan:  Ordered PT for the shoulders  Increase MTX 10 tablets (25 mg) weekly split dosing  Labs in 4 weeks CBC, CMP ESR, CRP, uric acid  Continue folic acid 1 mg daily  RTC 6 weeks      Ashley Max MD  Rheumatology Fellow  PGY-5

## 2023-02-05 NOTE — PROGRESS NOTES
I have reviewed the notes, assessments, and/or procedures performed this visit, and I concur with the documentation.  Still active seropositive RA although reduced activity. Will increase methotrexate to 25mg po once a week divided dose, recheck labs in 4 wks and return in 6 wks. Also has radiographic findings of gouty erosions left thumb mcp and 1st mtps radiographically with only minimal hyperuricemia, and no crystals found on arthocentesis right knee previously. Will recheck SUA and if still > 6 will initiate allopurinol. VALERI

## 2023-02-06 ENCOUNTER — CLINICAL SUPPORT (OUTPATIENT)
Dept: REHABILITATION | Facility: HOSPITAL | Age: 68
End: 2023-02-06
Payer: MEDICARE

## 2023-02-06 DIAGNOSIS — M25.511 CHRONIC PAIN OF BOTH SHOULDERS: ICD-10-CM

## 2023-02-06 DIAGNOSIS — Z74.09 DECREASED MOBILITY AND ENDURANCE: ICD-10-CM

## 2023-02-06 DIAGNOSIS — M25.512 CHRONIC PAIN OF BOTH SHOULDERS: ICD-10-CM

## 2023-02-06 DIAGNOSIS — M25.619 LIMITED RANGE OF MOTION OF SHOULDER: ICD-10-CM

## 2023-02-06 DIAGNOSIS — M19.012 OSTEOARTHRITIS OF BOTH SHOULDERS, UNSPECIFIED OSTEOARTHRITIS TYPE: ICD-10-CM

## 2023-02-06 DIAGNOSIS — G89.29 CHRONIC PAIN OF BOTH SHOULDERS: ICD-10-CM

## 2023-02-06 DIAGNOSIS — M19.011 OSTEOARTHRITIS OF BOTH SHOULDERS, UNSPECIFIED OSTEOARTHRITIS TYPE: ICD-10-CM

## 2023-02-06 DIAGNOSIS — M62.81 MUSCLE WEAKNESS (GENERALIZED): Primary | ICD-10-CM

## 2023-02-06 PROCEDURE — 97110 THERAPEUTIC EXERCISES: CPT | Mod: HCNC,PN

## 2023-02-06 PROCEDURE — 97162 PT EVAL MOD COMPLEX 30 MIN: CPT | Mod: HCNC,PN

## 2023-02-06 NOTE — PROGRESS NOTES
[facility]  Physical Therapy Initial Evaluation - Shoulder    Name: Karthik Bentley  Clinic Number: [id number]    Therapy Diagnosis:   Encounter Diagnoses   Name Primary?    Chronic pain of both shoulders     Osteoarthritis of both shoulders, unspecified osteoarthritis type      Physician: Ashley Max MD    Physician Orders: PT Eval and Treat   Medical Diagnosis from Referral:   M25.511,G89.29,M25.512 (ICD-10-CM) - Chronic pain of both shoulders   M19.011,M19.012 (ICD-10-CM) - Osteoarthritis of both shoulders, unspecified osteoarthritis type     Evaluation Date: 2/6/2023  Plan of Care Expiration: 8/1/23 or 24th Visit  Authorization Period Expiration: 2/3/24  Visit # / Visits authorized: 1/ 1  Remaining Visits Scheduled - 00    Please see Plan of Care notation section to view Karthik Bentley Initial Evaluation.       Jose Hamm, PT,DPT

## 2023-02-07 DIAGNOSIS — Z00.00 ENCOUNTER FOR MEDICARE ANNUAL WELLNESS EXAM: ICD-10-CM

## 2023-02-07 PROBLEM — M25.619 LIMITED RANGE OF MOTION OF SHOULDER: Status: ACTIVE | Noted: 2023-02-07

## 2023-02-07 NOTE — PLAN OF CARE
[facility]  Physical Therapy Initial Evaluation - Shoulder    Name: Karthik Bentley  [facility] Number: [id number]    Therapy Diagnosis:   Encounter Diagnoses   Name Primary?    Chronic pain of both shoulders     Osteoarthritis of both shoulders, unspecified osteoarthritis type      Physician: Ashley Max MD    Physician Orders: PT Eval and Treat   Medical Diagnosis from Referral:   M25.511,G89.29,M25.512 (ICD-10-CM) - Chronic pain of both shoulders   M19.011,M19.012 (ICD-10-CM) - Osteoarthritis of both shoulders, unspecified osteoarthritis type     Evaluation Date: 2/6/2023  Plan of Care Expiration: 8/1/23 or 24th Visit  Authorization Period Expiration: 2/3/24  Visit # / Visits authorized: 1/ 1  Remaining Visits Scheduled - 00    Eval Visit FOTO-  (Date/Score/Turn Green)   5th Visit FOTO   -  (Date/Score/Turn Green)   10th Visit FOTO  -  (Date/Score/Turn Green)   D/C FOTO          -  (Date/Score/Turn Green)     Time In: 0200  Time Out: 0245  Total Billable Time: 45 minutes    Precautions: Standard    Subjective     History of current condition - Karthik is a 67 y.o. year old male who presents to the [facility] PT clinic  with complaint of bilateral  shoulder weakness following cervical fusion. Patient reports cervical intervention occurred 4 months prior to evaluation. Patient reports the inability to perform shoulder mobility greater than 90. Patient is unable to sleep in side lying, elizabeth and doff seat belt. Pt report onset of the symptoms occurred  4 months  prior to evaluation. Precipitating event:Surgery . Current symptoms include: shoulder pain and limited range of motion . Aggravating factors: any position. Treatment to date: Prior Physical Therapy . Patients presently rates pain 0/10 on pain scale.    Mechanism of Injury: following surgical intervention   Next MD Visit: LAURO      Medical History:   Past Medical History:   Diagnosis Date    Diabetes mellitus type II     Hypertension      Rheumatoid arthritis        Surgical History:   Karthik Bentley  has a past surgical history that includes Shoulder surgery; Colonoscopy (N/A, 1/16/2020); Endoscopic carpal tunnel release (Right, 4/1/2022); Epidural steroid injection (N/A, 9/6/2022); and Anterior cervical discectomy w/ fusion (N/A, 10/31/2022).    Medications:   Karthik has a current medication list which includes the following prescription(s): acetaminophen, blood sugar diagnostic, blood-glucose meter, carbocaine (pf), trulicity, folic acid, gabapentin, lancets, metformin, methotrexate, rosuvastatin, and valsartan.    Allergies:   Review of patient's allergies indicates:  No Known Allergies     Imaging:See imaging tab     Prior Therapy: Land based therapy received for current condition   Social History: Karthik lives in a multiple story home with 0 steps to enter and  lives alone  Occupation: retired   Assistive Devices Owned: bone stimulator    Hobbies/Exercise: kayak, fishing, deep sea diving, lawn service   Hand Dominance: right  Prior Level of Function: Modified Independent  Current Level of Function: Modified Independent secondary to limited     Pain:  Current 0/10, worst 10/10 (shoulder elevation  increases pain), best 0/10 (without movement )  Location: bilateral shoulder   Description: Sharp and Shooting  Aggravating Factors: Lifting  Easing Factors: rest    Pts goals: elevation above 90 degreees     Objective     Posture: Fair   Palpation:  multiple trigger point tenderness  Sensation: intact to light touch  DTRs: 2+    Range of Motion/Strength:     Shoulder Left Right Pain/Dysfunction with Movement   AROM      Flexion (180)  90°   90°     Extension (60)  30°    30°     Abduction (180)  100°   90°     Adduction (50)  20°   15°     Internal rotation (70)  20°   15°     ER (70) [at 45° shoulder abduction]  15°   20°           Left UE  5/5 4+/5 4/5 4-/5 3+/5 3/5 3-/5 2+/5 2/5 2-/5 1/5 0 NT   Shoulder  Flexion      x               Shoulder Extension       x               Shoulder Abduction      x               Shoulder Adduction      x               Shoulder  IR      x               Shoulder  ER      x               Elbow Extension       x               Elbow Flexion       x               Rhomboids       x               Upper Trap      x               Mid Traps      x          Lower Traps      x          Serratus Anterior      x            Right UE 5/5 4+/5 4/5 4-/5 3+/5 3/5 3-/5 2+/5 2/5 2-/5 1/5 0 NT   Shoulder  Flexion      x          Shoulder Extension      x          Shoulder Abduction      x          Shoulder Adduction      x          Shoulder  IR      x          Shoulder  ER      x          Elbow Extension       x          Elbow Flexion       x          Rhomboids       x          Upper Trap      x          Mid Traps      x          Lower Traps      x          Serratus Anterior      x              Special Tests Left Right Comments   Neer Impingement  (RC impingement) NT NT    Currie & Tay (RC impingement -sup/minor/infra) NT NT    Empty Can          (RC - supraspinatus) NT NT    Drop Arm   (RC function)  NT NT    Apprehension  (anterior instability) NT NT    Lift Off   (subscap) NT NT    Anterior load shift  (Capsular Laxity) NT NT           Strength  60 # 30 #        Other:       CMS Impairment/Limitation/Restriction for FOTO Shoulder Survey    Therapist reviewed FOTO scores for Karthik Bentley on 2/6/2023.   FOTO documents entered into [employer] - see Media section.    Limitation Score: See Media Section   Category: Mobility         TREATMENT   Treatment Time In: 240  Treatment Time Out: 250  Total Treatment time separate from Evaluation: 10 minutes    Karthik received therapeutic exercises to develop strength, ROM, flexibility, and posture for 10 minutes including:  Table slide (flexion, abduction, and scaption) -3' each     Home Exercises and Patient Education Provided    Education provided:   Patient was provided educational information regarding: role of  Physical Therapy, short and long term goals, patient/therapist expectations, scheduling, and attendance policy.    Written Home Exercises Provided: yes  Exercises were reviewed and Karthik was able to demonstrate them prior to the end of the session.  Karthik demonstrated fair  understanding of the education provided.     See EMR under: Patient Instructions for exercises provided 2/6/2023.    Assessment   Karthik is a 67 y.o. male referred to outpatient Physical Therapy with a medical diagnosis of Chronic pain of both shoulders. Pt presents with displays of weakness, impaired functional mobility, decreased upper extremity function, pain, decreased ROM, impaired joint extensibility, impaired muscle length, and orthopedic precautions. Patient currently presents with bilateral shoulder pain and limited range of motion / strength following cervical surgical intervention. Patient reports however a prolonged history of bilateral  shoulder limited range of motion prior to surgical intervention. Physical Therapy will contact operating MD in regards to post-op limitations. Patient will benefit from skilled therapy to address current deficits.     Pt prognosis is Fair.   Pt will benefit from skilled outpatient Physical Therapy to address the deficits stated above and in the chart below, provide pt/family education, and to maximize pt's level of independence.     Plan of care discussed with patient: Yes  Pt's spiritual, cultural and educational needs considered and patient is agreeable to the plan of care and goals as stated below:     Anticipated Barriers for therapy: chronic bilateral shoulder pain     Medical Necessity is demonstrated by the following  History  Co-morbidities and personal factors that may impact the plan of care Co-morbidities:       Past Medical History:   Diagnosis Date    Diabetes mellitus type II     Hypertension     Rheumatoid arthritis        Personal Factors:   lifestyle     moderate   Examination  Body  Structures and Functions, activity limitations and participation restrictions that may impact the plan of care Body Regions:   neck  upper extremities    Body Systems:    ROM  strength    Participation Restrictions:   none    Activity limitations:   Learning and applying knowledge  no deficits    General Tasks and Commands  no deficits    Communication  no deficits    Mobility  lifting and carrying objects    Self care  no deficits    Domestic Life  doing house work (cleaning house, washing dishes, laundry)    Interactions/Relationships  no deficits    Life Areas  no deficits    Community and Social Life  no deficits         moderate   Clinical Presentation stable and uncomplicated moderate   Decision Making/ Complexity Score: moderate     Goals:  Short Term Goals: 4 weeks      Goal   Status   Pt. to report decreased bilateral shoulder pain </= 6/10 at worst to increase tolerance for performing overhead reaching     Pt. to demonstrate proper cervical and scapula retraction requiring minimum verbal cues from PT to perform    Pt. to demonstrate an increase in bilateral shoulder elevation AROM to >115 deg. to promote greater ease with lifting tasks.     Pt. to demonstrate an increase in bilateral shoulder internal and external AROM to >10+ deg. to promote greater ease self care skills     Pt to tolerate HEP to improve ROM and independence with ADL's         Long Term Goals: 8 weeks    Goal Status    Pt. to report decreased bilateral shoulder pain </= 3/10 at worst to increase tolerance for performing overhead reaching     Pt. to demonstrate proper cervical and scapula retraction requiring minimum verbal cues from PT to perform    Pt. to demonstrate an increase in bilateral shoulder elevation AROM to >150 deg. to promote greater ease with lifting tasks.     Pt. to demonstrate an increase in bilateral shoulder internal and external AROM to >+15 deg. to promote greater ease self care skills     Pt. to demonstrate  increased MMT for bilateral shoulder flexion to 4/5 to improve ADL tolerance      Pt. to demonstrate increased MMT for mid-trap/lower trap strength to 4/5 to improve scapula stability during ADL and home related chores.     Pt. to demonstrate increased MMT to serratus anterior to 4/5 to improve scapula stability during repetitive UE tasks     Pt to tolerate HEP to improve ROM and independence with ADL's         Plan     Plan of care Certification: 2/6/2023 to  5/1/2023 (12).    Outpatient Physical Therapy 2 times weekly for 12 weeks to include the following interventions: Gait Training, Manual Therapy, Neuromuscular Re-ed, Patient Education, Therapeutic Activities, Therapeutic Exercise, and Integrative Dry Needling .     Jose Hamm, PT,DPT

## 2023-02-09 DIAGNOSIS — Z00.00 ENCOUNTER FOR MEDICARE ANNUAL WELLNESS EXAM: ICD-10-CM

## 2023-02-13 ENCOUNTER — TELEPHONE (OUTPATIENT)
Dept: INTERNAL MEDICINE | Facility: CLINIC | Age: 68
End: 2023-02-13
Payer: MEDICARE

## 2023-02-13 ENCOUNTER — CLINICAL SUPPORT (OUTPATIENT)
Dept: REHABILITATION | Facility: HOSPITAL | Age: 68
End: 2023-02-13
Payer: MEDICARE

## 2023-02-13 DIAGNOSIS — M25.512 CHRONIC PAIN OF BOTH SHOULDERS: Primary | ICD-10-CM

## 2023-02-13 DIAGNOSIS — M62.81 MUSCLE WEAKNESS (GENERALIZED): ICD-10-CM

## 2023-02-13 DIAGNOSIS — G89.29 CHRONIC PAIN OF BOTH SHOULDERS: Primary | ICD-10-CM

## 2023-02-13 DIAGNOSIS — Z74.09 DECREASED MOBILITY AND ENDURANCE: ICD-10-CM

## 2023-02-13 DIAGNOSIS — M25.619 LIMITED RANGE OF MOTION OF SHOULDER: ICD-10-CM

## 2023-02-13 DIAGNOSIS — M25.511 CHRONIC PAIN OF BOTH SHOULDERS: Primary | ICD-10-CM

## 2023-02-13 PROCEDURE — 97110 THERAPEUTIC EXERCISES: CPT | Mod: HCNC,PN

## 2023-02-13 PROCEDURE — 97140 MANUAL THERAPY 1/> REGIONS: CPT | Mod: HCNC,PN

## 2023-02-13 PROCEDURE — 97112 NEUROMUSCULAR REEDUCATION: CPT | Mod: HCNC,PN

## 2023-02-13 NOTE — TELEPHONE ENCOUNTER
Missed appt today  Letter sent    Diabetes Management Status    Statin: Taking  ACE/ARB: Taking    Screening or Prevention Patient's value Goal Complete/Controlled?   HgA1C Testing and Control   Lab Results   Component Value Date    HGBA1C 6.5 (H) 01/25/2023      Annually/Less than 8% Yes     Lipid profile : 01/25/2023 Annually Yes     LDL control Lab Results   Component Value Date    LDLCALC 55.0 (L) 01/25/2023    Annually/Less than 100 mg/dl  Yes     Nephropathy screening Lab Results   Component Value Date    LABMICR 9.0 05/10/2022     No results found for: PROTEINUA  Lab Results   Component Value Date    UTPCR Unable to calculate 01/12/2022      Annually Yes     Blood pressure BP Readings from Last 1 Encounters:   02/03/23 120/77    Less than 140/90 Yes     Dilated retinal exam : 01/23/2023 Annually Yes     Foot exam   : 01/18/2022 Annually No

## 2023-02-13 NOTE — PROGRESS NOTES
Physical Therapy Daily Treatment Note     Name: Karthik Bentley  Clinic Number: [id number]    Therapy Diagnosis:   Encounter Diagnoses   Name Primary?    Chronic pain of both shoulders Yes    Muscle weakness (generalized)     Decreased mobility and endurance     Limited range of motion of shoulder      Physician: Ashley Max MD    Visit Date: 2023    Physician Orders: PT Eval and Treat   Medical Diagnosis from Referral:   M25.511,G89.29,M25.512 (ICD-10-CM) - Chronic pain of both shoulders   M19.011,M19.012 (ICD-10-CM) - Osteoarthritis of both shoulders, unspecified osteoarthritis type      Evaluation Date: 2023  Plan of Care Expiration: 23 or  Visit  Progress Note: 3/6/2023  Authorization Period Expiration: 2/3/24  Visit # / Visits authorized:   Remaining Visits Scheduled -   PTA Consecutive Visits - 0    Eval Visit FOTO-  52 (2023)   5th Visit FOTO   -  (Date/Score/Turn Green)   10th Visit FOTO  -  (Date/Score/Turn Green)   D/C FOTO          -  (Date/Score/Turn Green)      Time In: 8:45  Time Out: 9:30  Billable Time: 45 minutes  Non-Billable Time: 00 minutes    Precautions: Standard, Diabetes, and HTN   Insurance: Payor: [employer] MANAGED MEDICARE / Plan: [employer] TOTAL CARE ADVANTAGE / Product Type: Medicare Advantage /     Subjective     Pt reports: no increase in pain to the shoulder. He would like to stick to one therapist during his time here due to feeling that he has to repeat his story every time. He is to continue with his Physical Therapy with me.   He is compliant with home exercise program.  Response to previous treatment: 1st treatment     Pre-Treatment Pain Ratin/10  Post-Treatment Pain Ratin/10  Location: bilateral shoulder      Objective     Karthik received therapeutic exercises to develop strength, endurance, ROM, flexibility, and posture for 15 minutes including:    Warm-up      Supine        Seated    Table slide (flexion, abduction, and scaption) -3' each      Standing          *Bold exercise performed       Karthik received the following manual therapy techniques: Joint mobilizations, Manual traction, Myofacial release, and Soft tissue Mobilization were applied to the: shoulders for 15 minutes, including:  Shoulder Mobilization - Distraction and Posterior Glides, bilaterally  Passive range of motion of the shoulders in all planes, bilaterally     Karthik participated in neuromuscular re-education activities to improve: Coordination, Sense, Proprioception, Posture, and Neuromuscular Recruitment for 15 minutes. The following activities were included:  Shoulder Isometric Set 2x10 with a 3' sec hold (Flexion/Abduction/Adduction/External Rotation/Internal Rotation/Extension)     Karthik participated in dynamic functional therapeutic activities to improve functional performance for 00  minutes, including:      Home Exercises Provided and Patient Education Provided     Education provided:   - Continue with HEP    Written Home Exercises Provided: Patient instructed to cont prior HEP.  Exercises were reviewed and Karthik was able to demonstrate them prior to the end of the session.  Karthik demonstrated good  understanding of the education provided.     See EMR under Patient Instructions for exercises provided prior visit.    Assessment     Patient completed the exercise program with no increase in pain to the shoulder. Increase in shoulder mobility was noted pos-mobilizations and was followed up with active range of motion and neuromuscular recruitment of the shoulder. Fatigue was felt at the shoulders after each set with no modifications were needed. Patient would continue to benefit from skilled Physical Therapy to increase shoulder mobility and strength.     Karthik Is progressing well towards His goals.     Pt prognosis is Fair.     Pt will continue to benefit from skilled outpatient physical therapy to address the deficits listed in the problem list box on initial evaluation, provide pt/family  education and to maximize pt's level of independence in the home and community environment.     Pt's spiritual, cultural and educational needs considered and pt agreeable to plan of care and goals.    Anticipated barriers to physical therapy: Chronic Bilateral Shoulder Pain      Goals:  Short Term Goals: 4 weeks        Goal   Status   Pt. to report decreased bilateral shoulder pain </= 6/10 at worst to increase tolerance for performing overhead reaching   Progressing 2/13/2023    Pt. to demonstrate proper cervical and scapula retraction requiring minimum verbal cues from PT to perform Progressing 2/13/2023     Pt. to demonstrate an increase in bilateral shoulder elevation AROM to >115 deg. to promote greater ease with lifting tasks.  Progressing 2/13/2023    Pt. to demonstrate an increase in bilateral shoulder internal and external AROM to >10+ deg. to promote greater ease self care skills  Progressing 2/13/2023    Pt to tolerate HEP to improve ROM and independence with ADL's  Progressing 2/13/2023          Long Term Goals: 8 weeks     Goal Status    Pt. to report decreased bilateral shoulder pain </= 3/10 at worst to increase tolerance for performing overhead reaching   Progressing 2/13/2023    Pt. to demonstrate proper cervical and scapula retraction requiring minimum verbal cues from PT to perform Progressing 2/13/2023    Pt. to demonstrate an increase in bilateral shoulder elevation AROM to >150 deg. to promote greater ease with lifting tasks.  Progressing 2/13/2023   Pt. to demonstrate an increase in bilateral shoulder internal and external AROM to >+15 deg. to promote greater ease self care skills  Progressing 2/13/2023    Pt. to demonstrate increased MMT for bilateral shoulder flexion to 4/5 to improve ADL tolerance   Progressing 2/13/2023    Pt. to demonstrate increased MMT for mid-trap/lower trap strength to 4/5 to improve scapula stability during ADL and home related chores.  Progressing 2/13/2023    Pt.  to demonstrate increased MMT to serratus anterior to 4/5 to improve scapula stability during repetitive UE tasks  Progressing 2/13/2023    Pt to tolerate HEP to improve ROM and independence with ADL's  Progressing 2/13/2023        Plan     Progress duration, workload, and resistance levels of the Therapeutic Activities and Exercises if the patient does not exhibit any pain, swelling, or other symptoms. Progress instruction in-home exercise progression and modification, including symptom management.    Babak López, PT ,DPT  2/13/2023

## 2023-02-13 NOTE — PROGRESS NOTES
Physical Therapy Daily Treatment Note     Name: Karthik Bentley  Clinic Number: [id number]    Therapy Diagnosis:   Encounter Diagnoses   Name Primary?    Chronic pain of both shoulders Yes    Muscle weakness (generalized)     Decreased mobility and endurance     Limited range of motion of shoulder      Physician: Ashley Max MD    Visit Date: 2023    Physician Orders: PT Eval and Treat   Medical Diagnosis from Referral:   M25.511,G89.29,M25.512 (ICD-10-CM) - Chronic pain of both shoulders   M19.011,M19.012 (ICD-10-CM) - Osteoarthritis of both shoulders, unspecified osteoarthritis type      Evaluation Date: 2023  Plan of Care Expiration: 23 or  Visit  Progress Note: 3/6/2023  Authorization Period Expiration: 2023  Visit # / Visits authorized:   Remaining Visits Scheduled - 00  PTA Consecutive Visits - 0/6    Eval Visit FOTO-  52 (2023)   5th Visit FOTO   -  (Date/Score/Turn Green)   10th Visit FOTO  -  (Date/Score/Turn Green)   D/C FOTO          -  (Date/Score/Turn Green)      Time In: 8:45  Time Out: 9:30  Billable Time: 45 minutes  Non-Billable Time: 00 minutes    Precautions: Standard, Diabetes, and HTN  Insurance: Payor: [employer] MANAGED MEDICARE / Plan: [employer] TOTAL CARE ADVANTAGE / Product Type: Medicare Advantage /     Subjective     Pt reports: no increase in discomfort to the shoulders. He is a little concerned about seeing multiple providers and would like to continue his therapy with me.   He is compliant with home exercise program.  Response to previous treatment: 1st treatment session     Pre-Treatment Pain Ratin/10  Post-Treatment Pain Ratin/10  Location: bilateral shoulder      Objective     Karthik received therapeutic exercises to develop strength, endurance, ROM, flexibility, posture, and core stabilization for 15 minutes including:    Warm-up      Supine        Seated    Table slide (flexion, abduction, and scaption) -3' each     Standing          *Bold exercise  performed       Ray received the following manual therapy techniques: Joint mobilizations, Manual traction, Myofacial release, and Soft tissue Mobilization were applied to the: shoulders for 15 minutes, including:  Shoulder Mobilization - Posterior Glides and Distraction, bilaterally   Passive range of motion of both shoulders in all planes     Ray participated in neuromuscular re-education activities to improve: Coordination, Kinesthetic, Proprioception, Posture, and Neuromuscular Recruitment for 15 minutes. The following activities were included:  Shoulder Isometric Set 2x10 with a 3' sec hold (Flexion/Abduction/Extension/External Rotation/Internal Rotation/Adduction)     Karthik participated in dynamic functional therapeutic activities to improve functional performance for 00  minutes, including:        Home Exercises Provided and Patient Education Provided     Education provided:   - Continue with HEP    Written Home Exercises Provided: Patient instructed to cont prior HEP.  Exercises were reviewed and Karthik was able to demonstrate them prior to the end of the session.  Karthik demonstrated good  understanding of the education provided.     See EMR under Patient Instructions for exercises provided prior visit.    Assessment     Patient completed the exercise program with no increase in pain to the shoulder. Increase in mobility was noted after Mobilization of the shoulders and was followed up with neuromuscular recruitment of the surrounding shoulders. New exercises were well tolerated with muscle fatigue noted after each set with no modifications needed. Patient would continue to benefit from skilled Physical Therapy to increase shoulder mobility and strength.     Karthik Is progressing well towards His goals.     Pt prognosis is Fair.     Pt will continue to benefit from skilled outpatient physical therapy to address the deficits listed in the problem list box on initial evaluation, provide pt/family education and to  maximize pt's level of independence in the home and community environment.     Pt's spiritual, cultural and educational needs considered and pt agreeable to plan of care and goals.    Anticipated barriers to physical therapy: Chronic Bilateral Shoulder Pain      Goals:  Short Term Goals: 4 weeks        Goal   Status   Pt. to report decreased bilateral shoulder pain </= 6/10 at worst to increase tolerance for performing overhead reaching   Progressing 2/13/2023    Pt. to demonstrate proper cervical and scapula retraction requiring minimum verbal cues from PT to perform Progressing 2/13/2023     Pt. to demonstrate an increase in bilateral shoulder elevation AROM to >115 deg. to promote greater ease with lifting tasks.  Progressing 2/13/2023    Pt. to demonstrate an increase in bilateral shoulder internal and external AROM to >10+ deg. to promote greater ease self care skills  Progressing 2/13/2023    Pt to tolerate HEP to improve ROM and independence with ADL's  Progressing 2/13/2023          Long Term Goals: 8 weeks     Goal Status    Pt. to report decreased bilateral shoulder pain </= 3/10 at worst to increase tolerance for performing overhead reaching   Progressing 2/13/2023    Pt. to demonstrate proper cervical and scapula retraction requiring minimum verbal cues from PT to perform Progressing 2/13/2023    Pt. to demonstrate an increase in bilateral shoulder elevation AROM to >150 deg. to promote greater ease with lifting tasks.  Progressing 2/13/2023   Pt. to demonstrate an increase in bilateral shoulder internal and external AROM to >+15 deg. to promote greater ease self care skills  Progressing 2/13/2023    Pt. to demonstrate increased MMT for bilateral shoulder flexion to 4/5 to improve ADL tolerance   Progressing 2/13/2023    Pt. to demonstrate increased MMT for mid-trap/lower trap strength to 4/5 to improve scapula stability during ADL and home related chores.  Progressing 2/13/2023    Pt. to demonstrate  increased MMT to serratus anterior to 4/5 to improve scapula stability during repetitive UE tasks  Progressing 2/13/2023    Pt to tolerate HEP to improve ROM and independence with ADL's  Progressing 2/13/2023        Plan     Progress duration, workload, and resistance levels of the Therapeutic Activities and Exercises if the patient does not exhibit any pain, swelling, or other symptoms. Progress instruction in-home exercise progression and modification, including symptom management.    Babak López, PT ,DPT  2/13/2023

## 2023-03-01 ENCOUNTER — PATIENT MESSAGE (OUTPATIENT)
Dept: RHEUMATOLOGY | Facility: CLINIC | Age: 68
End: 2023-03-01
Payer: MEDICARE

## 2023-03-02 ENCOUNTER — CLINICAL SUPPORT (OUTPATIENT)
Dept: REHABILITATION | Facility: HOSPITAL | Age: 68
End: 2023-03-02
Payer: MEDICARE

## 2023-03-02 DIAGNOSIS — G89.29 CHRONIC PAIN OF BOTH SHOULDERS: Primary | ICD-10-CM

## 2023-03-02 DIAGNOSIS — Z74.09 DECREASED MOBILITY AND ENDURANCE: ICD-10-CM

## 2023-03-02 DIAGNOSIS — M25.619 LIMITED RANGE OF MOTION OF SHOULDER: ICD-10-CM

## 2023-03-02 DIAGNOSIS — M25.511 CHRONIC PAIN OF BOTH SHOULDERS: Primary | ICD-10-CM

## 2023-03-02 DIAGNOSIS — M62.81 MUSCLE WEAKNESS (GENERALIZED): ICD-10-CM

## 2023-03-02 DIAGNOSIS — M25.512 CHRONIC PAIN OF BOTH SHOULDERS: Primary | ICD-10-CM

## 2023-03-02 PROCEDURE — 97140 MANUAL THERAPY 1/> REGIONS: CPT | Mod: HCNC,PN

## 2023-03-02 PROCEDURE — 97110 THERAPEUTIC EXERCISES: CPT | Mod: HCNC,PN

## 2023-03-02 PROCEDURE — 97112 NEUROMUSCULAR REEDUCATION: CPT | Mod: HCNC,PN

## 2023-03-02 NOTE — PROGRESS NOTES
"Physical Therapy Daily Treatment Note     Name: Karthik Bentley  Clinic Number: [id number]    Therapy Diagnosis:   Encounter Diagnoses   Name Primary?    Chronic pain of both shoulders Yes    Muscle weakness (generalized)     Decreased mobility and endurance     Limited range of motion of shoulder        Physician: Ashley Max MD    Visit Date: 3/2/2023    Physician Orders: PT Eval and Treat   Medical Diagnosis from Referral:   M25.511,G89.29,M25.512 (ICD-10-CM) - Chronic pain of both shoulders   M19.011,M19.012 (ICD-10-CM) - Osteoarthritis of both shoulders, unspecified osteoarthritis type      Evaluation Date: 2023  Plan of Care Expiration: 23 or  Visit  Progress Note: 3/6/2023  Authorization Period Expiration: 2/3/24  Visit # / Visits authorized:   Remaining Visits Scheduled -   PTA Consecutive Visits - 0    Eval Visit FOTO-  52 (2023)   5th Visit FOTO   -  (Date/Score/Turn Green)   10th Visit FOTO  -  (Date/Score/Turn Green)   D/C FOTO          -  (Date/Score/Turn Green)      Time In: 11:00  Time Out: 11:45  Billable Time: 45 minutes  Non-Billable Time: 00 minutes    Precautions: Standard, Diabetes, and HTN   Insurance: Payor: [employer] MANAGED MEDICARE / Plan: [employer] TOTAL CARE ADVANTAGE / Product Type: Medicare Advantage /     Subjective     Pt reports: no increase in pain to the shoulders or neck. Some stiffness present.   He is compliant with home exercise program.  Response to previous treatment: "no adverse effects"     Pre-Treatment Pain Ratin/10  Post-Treatment Pain Ratin/10  Location: bilateral shoulder      Objective     Karthik received therapeutic exercises to develop strength, endurance, ROM, flexibility, and posture for 15 minutes including:    Warm-up      Supine    Theraband Shoulder Sets 2x9, Red Theraband (Flexion/Abduction), bilaterally     Seated    Table slide (flexion, abduction, and scaption) -3' each   Bilateral Shoulder External Rotation 2x10, Red Theraband "     Standing          *Bold exercise performed       Ray received the following manual therapy techniques: Joint mobilizations, Manual traction, Myofacial release, and Soft tissue Mobilization were applied to the: shoulders for 15 minutes, including:  Shoulder Mobilization - Distraction and Posterior Glides, bilaterally  Passive range of motion of the shoulders in all planes, bilaterally     Ray participated in neuromuscular re-education activities to improve: Coordination, Sense, Proprioception, Posture, and Neuromuscular Recruitment for 15 minutes. The following activities were included:  Shoulder Isometric Set 2x10 with a 3' sec hold (Flexion/Abduction/Adduction/External Rotation/Internal Rotation/Extension)   Rhythms Stabilization on Wall with Ball 2x15 (Clockwise/Counter-Clockwise/Sideways/Up-Down), bilaterally   Standing Serratus Punches onto Wall with Ball 3x8     Ray participated in dynamic functional therapeutic activities to improve functional performance for 00  minutes, including:      Home Exercises Provided and Patient Education Provided     Education provided:   - Continue with HEP    Written Home Exercises Provided: Patient instructed to cont prior HEP.  Exercises were reviewed and Ray was able to demonstrate them prior to the end of the session.  Ray demonstrated good  understanding of the education provided.     See EMR under Patient Instructions for exercises provided prior visit.    Assessment     Patient completed the exercise program with some soreness to the shoulders. New exercise focused on functional strength, stability, and mobility. Increase in range of motion was noted after the passive range of motion and Mobilization and was followed up with Theraband exercises. Minimal cues were needed during today session to promote good scapular mechanics. While performing the neuromuscular re-education exercise, patient displayed wining of both shoulders. Serratus punches were added to help combat  this deficit. No modifications were needed. Patient would continue to benefit from skilled Physical Therapy to increase mobility and strength.     Karthik Is progressing well towards His goals.     Pt prognosis is Fair.     Pt will continue to benefit from skilled outpatient physical therapy to address the deficits listed in the problem list box on initial evaluation, provide pt/family education and to maximize pt's level of independence in the home and community environment.     Pt's spiritual, cultural and educational needs considered and pt agreeable to plan of care and goals.    Anticipated barriers to physical therapy: Chronic Bilateral Shoulder Pain      Goals:  Short Term Goals: 4 weeks        Goal   Status   Pt. to report decreased bilateral shoulder pain </= 6/10 at worst to increase tolerance for performing overhead reaching   Progressing 3/2/2023    Pt. to demonstrate proper cervical and scapula retraction requiring minimum verbal cues from PT to perform Progressing 3/2/2023     Pt. to demonstrate an increase in bilateral shoulder elevation AROM to >115 deg. to promote greater ease with lifting tasks.  Progressing 3/2/2023    Pt. to demonstrate an increase in bilateral shoulder internal and external AROM to >10+ deg. to promote greater ease self care skills  Progressing 3/2/2023    Pt to tolerate HEP to improve ROM and independence with ADL's  Progressing 3/2/2023          Long Term Goals: 8 weeks     Goal Status    Pt. to report decreased bilateral shoulder pain </= 3/10 at worst to increase tolerance for performing overhead reaching   Progressing 3/2/2023    Pt. to demonstrate proper cervical and scapula retraction requiring minimum verbal cues from PT to perform Progressing 3/2/2023    Pt. to demonstrate an increase in bilateral shoulder elevation AROM to >150 deg. to promote greater ease with lifting tasks.  Progressing 3/2/2023   Pt. to demonstrate an increase in bilateral shoulder internal and external  AROM to >+15 deg. to promote greater ease self care skills  Progressing 3/2/2023    Pt. to demonstrate increased MMT for bilateral shoulder flexion to 4/5 to improve ADL tolerance   Progressing 3/2/2023    Pt. to demonstrate increased MMT for mid-trap/lower trap strength to 4/5 to improve scapula stability during ADL and home related chores.  Progressing 3/2/2023    Pt. to demonstrate increased MMT to serratus anterior to 4/5 to improve scapula stability during repetitive UE tasks  Progressing 3/2/2023    Pt to tolerate HEP to improve ROM and independence with ADL's  Progressing 3/2/2023        Plan     Progress duration, workload, and resistance levels of the Therapeutic Activities and Exercises if the patient does not exhibit any pain, swelling, or other symptoms. Progress instruction in-home exercise progression and modification, including symptom management.    Babak López, PT ,DPT  3/2/2023

## 2023-03-03 ENCOUNTER — LAB VISIT (OUTPATIENT)
Dept: LAB | Facility: HOSPITAL | Age: 68
End: 2023-03-03
Payer: MEDICARE

## 2023-03-03 ENCOUNTER — PATIENT MESSAGE (OUTPATIENT)
Dept: RHEUMATOLOGY | Facility: CLINIC | Age: 68
End: 2023-03-03
Payer: MEDICARE

## 2023-03-03 DIAGNOSIS — M1A.0710 CHRONIC IDIOPATHIC GOUT INVOLVING TOE OF RIGHT FOOT WITHOUT TOPHUS: ICD-10-CM

## 2023-03-03 DIAGNOSIS — M06.9 RHEUMATOID ARTHRITIS, INVOLVING UNSPECIFIED SITE, UNSPECIFIED WHETHER RHEUMATOID FACTOR PRESENT: ICD-10-CM

## 2023-03-03 LAB
ALBUMIN SERPL BCP-MCNC: 4.1 G/DL (ref 3.5–5.2)
ALP SERPL-CCNC: 90 U/L (ref 55–135)
ALT SERPL W/O P-5'-P-CCNC: 12 U/L (ref 10–44)
ANION GAP SERPL CALC-SCNC: 7 MMOL/L (ref 8–16)
AST SERPL-CCNC: 15 U/L (ref 10–40)
BASOPHILS # BLD AUTO: 0.01 K/UL (ref 0–0.2)
BASOPHILS NFR BLD: 0.1 % (ref 0–1.9)
BILIRUB SERPL-MCNC: 1 MG/DL (ref 0.1–1)
BUN SERPL-MCNC: 10 MG/DL (ref 8–23)
CALCIUM SERPL-MCNC: 9.7 MG/DL (ref 8.7–10.5)
CHLORIDE SERPL-SCNC: 105 MMOL/L (ref 95–110)
CO2 SERPL-SCNC: 27 MMOL/L (ref 23–29)
CREAT SERPL-MCNC: 1.1 MG/DL (ref 0.5–1.4)
CRP SERPL-MCNC: 1.4 MG/L (ref 0–8.2)
DIFFERENTIAL METHOD: ABNORMAL
EOSINOPHIL # BLD AUTO: 0.1 K/UL (ref 0–0.5)
EOSINOPHIL NFR BLD: 1.4 % (ref 0–8)
ERYTHROCYTE [DISTWIDTH] IN BLOOD BY AUTOMATED COUNT: 15.7 % (ref 11.5–14.5)
ERYTHROCYTE [SEDIMENTATION RATE] IN BLOOD BY PHOTOMETRIC METHOD: 14 MM/HR (ref 0–23)
EST. GFR  (NO RACE VARIABLE): >60 ML/MIN/1.73 M^2
GLUCOSE SERPL-MCNC: 136 MG/DL (ref 70–110)
HCT VFR BLD AUTO: 36.4 % (ref 40–54)
HGB BLD-MCNC: 11.8 G/DL (ref 14–18)
IMM GRANULOCYTES # BLD AUTO: 0.03 K/UL (ref 0–0.04)
IMM GRANULOCYTES NFR BLD AUTO: 0.4 % (ref 0–0.5)
LYMPHOCYTES # BLD AUTO: 1.6 K/UL (ref 1–4.8)
LYMPHOCYTES NFR BLD: 22.1 % (ref 18–48)
MCH RBC QN AUTO: 30.6 PG (ref 27–31)
MCHC RBC AUTO-ENTMCNC: 32.4 G/DL (ref 32–36)
MCV RBC AUTO: 94 FL (ref 82–98)
MONOCYTES # BLD AUTO: 0.5 K/UL (ref 0.3–1)
MONOCYTES NFR BLD: 6.6 % (ref 4–15)
NEUTROPHILS # BLD AUTO: 5.1 K/UL (ref 1.8–7.7)
NEUTROPHILS NFR BLD: 69.4 % (ref 38–73)
NRBC BLD-RTO: 0 /100 WBC
PLATELET # BLD AUTO: 255 K/UL (ref 150–450)
PMV BLD AUTO: 10.2 FL (ref 9.2–12.9)
POTASSIUM SERPL-SCNC: 4.3 MMOL/L (ref 3.5–5.1)
PROT SERPL-MCNC: 7.6 G/DL (ref 6–8.4)
RBC # BLD AUTO: 3.86 M/UL (ref 4.6–6.2)
SODIUM SERPL-SCNC: 139 MMOL/L (ref 136–145)
URATE SERPL-MCNC: 7.8 MG/DL (ref 3.4–7)
WBC # BLD AUTO: 7.39 K/UL (ref 3.9–12.7)

## 2023-03-03 PROCEDURE — 86140 C-REACTIVE PROTEIN: CPT | Mod: HCNC | Performed by: STUDENT IN AN ORGANIZED HEALTH CARE EDUCATION/TRAINING PROGRAM

## 2023-03-03 PROCEDURE — 84550 ASSAY OF BLOOD/URIC ACID: CPT | Mod: HCNC | Performed by: STUDENT IN AN ORGANIZED HEALTH CARE EDUCATION/TRAINING PROGRAM

## 2023-03-03 PROCEDURE — 85025 COMPLETE CBC W/AUTO DIFF WBC: CPT | Mod: HCNC | Performed by: STUDENT IN AN ORGANIZED HEALTH CARE EDUCATION/TRAINING PROGRAM

## 2023-03-03 PROCEDURE — 36415 COLL VENOUS BLD VENIPUNCTURE: CPT | Mod: HCNC | Performed by: STUDENT IN AN ORGANIZED HEALTH CARE EDUCATION/TRAINING PROGRAM

## 2023-03-03 PROCEDURE — 85652 RBC SED RATE AUTOMATED: CPT | Mod: HCNC | Performed by: STUDENT IN AN ORGANIZED HEALTH CARE EDUCATION/TRAINING PROGRAM

## 2023-03-03 PROCEDURE — 80053 COMPREHEN METABOLIC PANEL: CPT | Mod: HCNC | Performed by: STUDENT IN AN ORGANIZED HEALTH CARE EDUCATION/TRAINING PROGRAM

## 2023-03-03 RX ORDER — PREDNISONE 20 MG/1
20 TABLET ORAL 2 TIMES DAILY
Qty: 14 TABLET | Refills: 0 | Status: SHIPPED | OUTPATIENT
Start: 2023-03-03 | End: 2023-03-10

## 2023-03-06 ENCOUNTER — TELEPHONE (OUTPATIENT)
Dept: ORTHOPEDICS | Facility: CLINIC | Age: 68
End: 2023-03-06
Payer: MEDICARE

## 2023-03-06 ENCOUNTER — CLINICAL SUPPORT (OUTPATIENT)
Dept: REHABILITATION | Facility: HOSPITAL | Age: 68
End: 2023-03-06
Payer: MEDICARE

## 2023-03-06 DIAGNOSIS — M25.512 CHRONIC PAIN OF BOTH SHOULDERS: Primary | ICD-10-CM

## 2023-03-06 DIAGNOSIS — M62.81 MUSCLE WEAKNESS (GENERALIZED): ICD-10-CM

## 2023-03-06 DIAGNOSIS — M25.511 CHRONIC PAIN OF BOTH SHOULDERS: Primary | ICD-10-CM

## 2023-03-06 DIAGNOSIS — G89.29 CHRONIC PAIN OF BOTH SHOULDERS: Primary | ICD-10-CM

## 2023-03-06 DIAGNOSIS — M25.619 LIMITED RANGE OF MOTION OF SHOULDER: ICD-10-CM

## 2023-03-06 DIAGNOSIS — Z74.09 DECREASED MOBILITY AND ENDURANCE: ICD-10-CM

## 2023-03-06 PROCEDURE — 97112 NEUROMUSCULAR REEDUCATION: CPT | Mod: HCNC,PN

## 2023-03-06 PROCEDURE — 97140 MANUAL THERAPY 1/> REGIONS: CPT | Mod: HCNC,PN

## 2023-03-06 PROCEDURE — 97110 THERAPEUTIC EXERCISES: CPT | Mod: HCNC,PN

## 2023-03-06 NOTE — PROGRESS NOTES
"Physical Therapy Daily Treatment Note     Name: Karthik Bentley  [facility] Number: [id number]    Therapy Diagnosis:   No diagnosis found.      Physician: Ashley Max MD    Visit Date: 3/6/2023    Physician Orders: PT Eval and Treat   Medical Diagnosis from Referral:   M25.511,G89.29,M25.512 (ICD-10-CM) - Chronic pain of both shoulders   M19.011,M19.012 (ICD-10-CM) - Osteoarthritis of both shoulders, unspecified osteoarthritis type      Evaluation Date: 2023  Plan of Care Expiration: 23 or  Visit  Progress Note: 2023  Authorization Period Expiration: 2/3/24  Visit # / Visits authorized: 3/ 12  Remaining Visits Scheduled -   PTA Consecutive Visits - 0    Eval Visit FOTO-  52 (2023)   5th Visit FOTO   -  (Date/Score/Turn Green)   10th Visit FOTO  -  (Date/Score/Turn Green)   D/C FOTO          -  (Date/Score/Turn Green)      Time In: 8:50  Time Out: 9:45  Billable Time: 55 minutes  Non-Billable Time: 00 minutes    Precautions: Standard, Diabetes, and HTN   Insurance: Payor: [employer] MANAGED MEDICARE / Plan: [employer] TOTAL CARE ADVANTAGE / Product Type: Medicare Advantage /     Subjective     Pt. reports 7% improvement since the beginning of PT.  Current pain levels to the bilateral shoulder are 3/10  Limiting factors include:  Reaching overhead  Getting Dressed   Driving / Reaching for the seatbelt   Grocery store/carrying groceries     Improvements factors include:   Range of motion/Mobility   Strength   Able to cut his grass without any pain (push mower)   Sleeping better       He is compliant with home exercise program.  Response to previous treatment: "no adverse effects"     Pre-Treatment Pain Rating: 3/10  Post-Treatment Pain Ratin/10  Location: bilateral shoulder      Objective     Range of Motion/Strength:      Shoulder Left Right Pain/Dysfunction with Movement   AROM         Flexion (180)  135°   140°      Extension (60)  65°    65°      Abduction (180)  135°   135°      Internal rotation " (70) L4  Iliac Crest     ER (70) [at 45° shoulder abduction]  45°   60°               Left UE  5/5 4+/5 4/5 4-/5 3+/5 3/5 3-/5 2+/5 2/5 2-/5 1/5 0 NT   Shoulder  Flexion        x                    Shoulder Extension    x                        Shoulder Abduction         x                   Shoulder  IR      x                      Shoulder  ER     x                       Elbow Extension      x                       Elbow Flexion  x                              Right UE 5/5 4+/5 4/5 4-/5 3+/5 3/5 3-/5 2+/5 2/5 2-/5 1/5 0 NT   Shoulder  Flexion       x                     Shoulder Extension   x                         Shoulder Abduction        x                    Shoulder  IR      x                      Shoulder  ER      x                      Elbow Extension       x                      Elbow Flexion  x                             Ray received therapeutic exercises to develop strength, endurance, ROM, flexibility, and posture for 20 minutes including:    Warm-up  Reassessment     Supine    Theraband Shoulder Sets 2x9, Red Theraband (Flexion/Abduction), bilaterally   Dumbbell External Rotation/Internal Rotation Combo 2x8, #2     Seated    Table slide (flexion, abduction, and scaption) -3' each   Bilateral Shoulder External Rotation 2x10, Red Theraband     Standing          *Bold exercise performed       Ray received the following manual therapy techniques: Joint mobilizations, Manual traction, Myofacial release, and Soft tissue Mobilization were applied to the: shoulders for 10 minutes, including:  Shoulder Mobilization - Distraction and Posterior Glides, bilaterally  Passive range of motion of the shoulders in all planes, bilaterally     Ray participated in neuromuscular re-education activities to improve: Coordination, Sense, Proprioception, Posture, and Neuromuscular Recruitment for 25 minutes. The following activities were included:  Shoulder Isometric Set 2x10 with a 3' sec hold  (Flexion/Abduction/Adduction/External Rotation/Internal Rotation/Extension)   Rhythms Stabilization on Wall with Ball 2x15 (Clockwise/Counter-Clockwise/Sideways/Up-Down), bilaterally   Standing Serratus Punches onto Wall with Ball 3x8, bilaterally   Resisted Bus Drivers 3x8, Red Theraband     Karthik participated in dynamic functional therapeutic activities to improve functional performance for 00  minutes, including:      Home Exercises Provided and Patient Education Provided     Education provided:   - Continue with HEP    Written Home Exercises Provided: Patient instructed to cont prior HEP.  Exercises were reviewed and Karthik was able to demonstrate them prior to the end of the session.  Karthik demonstrated good  understanding of the education provided.     See EMR under Patient Instructions for exercises provided prior visit.    Assessment     Pt. has been to [facility] for 3 visits due to bilateral shoulder pain. Improvements have been noted with range of motion, strength, sleeping capacity, and pain levels. Limitations still present are functional active range of motion (Internal Rotation > Abduction, External Rotation), strength, neuromuscular stability of the shoulders and mid back, overhead activities, and with activities of daily livings. Continuation of exercise program was well tolerated with no adverse effects. New exercises focused on stability and strength of External Rotation/Internal Rotation in supine to promote mobility and stability. Tactile cues were needed to perform shoulder movements correctly due to wanting to extend the elbow. No modifications were needed. Patient would continue to benefit from skilled Physical Therapy to improve limitations noted above.       Karthik Is progressing well towards His goals.     Pt prognosis is Fair.     Pt will continue to benefit from skilled outpatient physical therapy to address the deficits listed in the problem list box on initial evaluation,  provide pt/family education and to maximize pt's level of independence in the home and community environment.     Pt's spiritual, cultural and educational needs considered and pt agreeable to plan of care and goals.    Anticipated barriers to physical therapy: Chronic Bilateral Shoulder Pain      Goals:  Short Term Goals: 4 weeks        Goal   Status   Pt. to report decreased bilateral shoulder pain </= 6/10 at worst to increase tolerance for performing overhead reaching   Goal Met     Pt. to demonstrate proper cervical and scapula retraction requiring minimum verbal cues from PT to perform  Goal Met     Pt. to demonstrate an increase in bilateral shoulder elevation AROM to >115 deg. to promote greater ease with lifting tasks.   Goal Met     Pt. to demonstrate an increase in bilateral shoulder internal and external AROM to >10+ deg. to promote greater ease self care skills   Goal Met     Pt to tolerate HEP to improve ROM and independence with ADL's   Goal Met           Long Term Goals: 8 weeks     Goal Status    Pt. to report decreased bilateral shoulder pain </= 3/10 at worst to increase tolerance for performing overhead reaching   Progressing 3/6/2023    Pt. to demonstrate proper cervical and scapula retraction requiring minimum verbal cues from PT to perform Progressing 3/6/2023    Pt. to demonstrate an increase in bilateral shoulder elevation AROM to >150 deg. to promote greater ease with lifting tasks.  Progressing 3/6/2023   Pt. to demonstrate an increase in bilateral shoulder internal and external AROM to >+15 deg. to promote greater ease self care skills  Progressing 3/6/2023    Pt. to demonstrate increased MMT for bilateral shoulder flexion to 4/5 to improve ADL tolerance   Progressing 3/6/2023    Pt. to demonstrate increased MMT for mid-trap/lower trap strength to 4/5 to improve scapula stability during ADL and home related chores.  Progressing 3/6/2023    Pt. to demonstrate increased MMT to serratus  anterior to 4/5 to improve scapula stability during repetitive UE tasks  Progressing 3/6/2023    Pt to tolerate HEP to improve ROM and independence with ADL's  Progressing 3/6/2023        Plan     Progress duration, workload, and resistance levels of the Therapeutic Activities and Exercises if the patient does not exhibit any pain, swelling, or other symptoms. Progress instruction in-home exercise progression and modification, including symptom management.    Babak López, PT ,DPT  3/6/2023

## 2023-03-06 NOTE — TELEPHONE ENCOUNTER
Pt est with rheum, appt with hand no longer needed. All questions answered. Patient verbalized understanding and was thankful.     Martha Rodriguez, MS, OTC  Clinical & OR Assistant to Dr. Ross Dunbar [facility]  [phone]          ----- Message from Viktoria Hale sent at 3/6/2023 10:39 AM CST -----  Contact: Karthik henry [phone]  [email or web address]     Patient is calling for Medical Advice regarding:    How long has patient had these symptoms:    Pharmacy name and phone#:    Would like response via [employer]: call back    Comments: Pt is requesting a call back from the nurse or the doctor because pt has an appt coming up and is not sure if the appt is needed because he sees the rheumatologist

## 2023-03-08 ENCOUNTER — CLINICAL SUPPORT (OUTPATIENT)
Dept: REHABILITATION | Facility: HOSPITAL | Age: 68
End: 2023-03-08
Payer: MEDICARE

## 2023-03-08 DIAGNOSIS — M25.619 LIMITED RANGE OF MOTION OF SHOULDER: ICD-10-CM

## 2023-03-08 DIAGNOSIS — M25.512 CHRONIC PAIN OF BOTH SHOULDERS: Primary | ICD-10-CM

## 2023-03-08 DIAGNOSIS — M25.511 CHRONIC PAIN OF BOTH SHOULDERS: Primary | ICD-10-CM

## 2023-03-08 DIAGNOSIS — G89.29 CHRONIC PAIN OF BOTH SHOULDERS: Primary | ICD-10-CM

## 2023-03-08 DIAGNOSIS — M62.81 MUSCLE WEAKNESS (GENERALIZED): ICD-10-CM

## 2023-03-08 DIAGNOSIS — Z74.09 DECREASED MOBILITY AND ENDURANCE: ICD-10-CM

## 2023-03-08 PROCEDURE — 97110 THERAPEUTIC EXERCISES: CPT | Mod: HCNC,PN

## 2023-03-08 PROCEDURE — 97112 NEUROMUSCULAR REEDUCATION: CPT | Mod: HCNC,PN

## 2023-03-08 PROCEDURE — 97140 MANUAL THERAPY 1/> REGIONS: CPT | Mod: HCNC,PN

## 2023-03-08 NOTE — PROGRESS NOTES
"Physical Therapy Daily Treatment Note     Name: Karthik Bentley  Clinic Number: [id number]    Therapy Diagnosis:   Encounter Diagnoses   Name Primary?    Chronic pain of both shoulders Yes    Muscle weakness (generalized)     Decreased mobility and endurance     Limited range of motion of shoulder      Physician: Ashley Max MD    Visit Date: 3/8/2023    Physician Orders: PT Eval and Treat   Medical Diagnosis from Referral:   M25.511,G89.29,M25.512 (ICD-10-CM) - Chronic pain of both shoulders   M19.011,M19.012 (ICD-10-CM) - Osteoarthritis of both shoulders, unspecified osteoarthritis type      Evaluation Date: 2023  Plan of Care Expiration: 23 or  Visit  Progress Note: 2023  Authorization Period Expiration: 2/3/24  Visit # / Visits authorized:   Remaining Visits Scheduled -   PTA Consecutive Visits - 0    Eval Visit FOTO-  52 (2023)   5th Visit FOTO   -  (Date/Score/Turn Green)   10th Visit FOTO  -  (Date/Score/Turn Green)   D/C FOTO          -  (Date/Score/Turn Green)      Time In: 9:35  Time Out: 10:30  Billable Time: 55 minutes  Non-Billable Time: 00 minutes    Precautions: Standard, Diabetes, and HTN   Insurance: Payor: [employer] MANAGED MEDICARE / Plan: [employer] TOTAL CARE ADVANTAGE / Product Type: Medicare Advantage /     Subjective     Pt. Reports: sleeping better and seeing improvement with Physical Therapy. Overhead activities are still challenging but more manageable.       He is compliant with home exercise program.  Response to previous treatment: "felt like a workout"     Pre-Treatment Pain Rating: 3/10  Post-Treatment Pain Ratin/10  Location: bilateral shoulder      Objective     Karthik received therapeutic exercises to develop strength, endurance, ROM, flexibility, and posture for 15 minutes including:    Warm-up    Supine    Theraband Shoulder Sets 2x10, Green Theraband (Flexion/Abduction), bilaterally   Dumbbell External Rotation/Internal Rotation Combo 2x8, #4     Seated  "   Table slide (flexion, abduction, and scaption) -3' each   Bilateral Shoulder External Rotation 2x10, Green Theraband     Standing    Single Arm Pull Downs 3x10, Blue Theraband       *Bold exercise performed       Karthik received the following manual therapy techniques: Joint mobilizations, Manual traction, Myofacial release, and Soft tissue Mobilization were applied to the: shoulders for 15 minutes, including:  Shoulder Mobilization - Distraction and Posterior Glides, bilaterally  Passive range of motion of the shoulders in all planes, bilaterally     Ray participated in neuromuscular re-education activities to improve: Coordination, Sense, Proprioception, Posture, and Neuromuscular Recruitment for 25 minutes. The following activities were included:  Shoulder Isometric Set 2x10 with a 3' sec hold (Flexion/Abduction/Adduction/External Rotation/Internal Rotation/Extension)   Rhythms Stabilization on Wall with Ball 2x15 (Clockwise/Counter-Clockwise/Sideways/Up-Down), bilaterally   Standing Serratus Punches onto Wall with Ball 3x8, bilaterally   Resisted Bus Drivers 3x8, Red Theraband   90/90 Shoulder Isometric Walks 3x10 feet, (Flexion #6/Abduction #4)   Bent Over Rows 3x8, #19, bilaterally     Ray participated in dynamic functional therapeutic activities to improve functional performance for 00  minutes, including:      Home Exercises Provided and Patient Education Provided     Education provided:   - Continue with HEP    Written Home Exercises Provided: Patient instructed to cont prior HEP.  Exercises were reviewed and Karthik was able to demonstrate them prior to the end of the session.  Ray demonstrated good  understanding of the education provided.     See EMR under Patient Instructions for exercises provided prior visit.    Assessment     Patient completed the exercise program with no adverse effects. Some soreness and fatigue was noted in the shoulders and required some breaks but less frequent than before.  Improvement in strength is being noted in the shoulders by increase resistance with exercise. No modifications were needed with minimal cues needed to maintain shoulder and scapular mechanics in place. Patient would continue to benefit from skilled Physical Therapy to increase overall mobility and strength in the shoulders.       Karthik Is progressing well towards His goals.     Pt prognosis is Fair.     Pt will continue to benefit from skilled outpatient physical therapy to address the deficits listed in the problem list box on initial evaluation, provide pt/family education and to maximize pt's level of independence in the home and community environment.     Pt's spiritual, cultural and educational needs considered and pt agreeable to plan of care and goals.    Anticipated barriers to physical therapy: Chronic Bilateral Shoulder Pain      Goals:  Short Term Goals: 4 weeks        Goal   Status   Pt. to report decreased bilateral shoulder pain </= 6/10 at worst to increase tolerance for performing overhead reaching   Goal Met     Pt. to demonstrate proper cervical and scapula retraction requiring minimum verbal cues from PT to perform  Goal Met     Pt. to demonstrate an increase in bilateral shoulder elevation AROM to >115 deg. to promote greater ease with lifting tasks.   Goal Met     Pt. to demonstrate an increase in bilateral shoulder internal and external AROM to >10+ deg. to promote greater ease self care skills   Goal Met     Pt to tolerate HEP to improve ROM and independence with ADL's   Goal Met           Long Term Goals: 8 weeks     Goal Status    Pt. to report decreased bilateral shoulder pain </= 3/10 at worst to increase tolerance for performing overhead reaching   Progressing 3/8/2023    Pt. to demonstrate proper cervical and scapula retraction requiring minimum verbal cues from PT to perform Progressing 3/8/2023    Pt. to demonstrate an increase in bilateral shoulder elevation AROM to >150 deg. to promote  greater ease with lifting tasks.  Progressing 3/8/2023   Pt. to demonstrate an increase in bilateral shoulder internal and external AROM to >+15 deg. to promote greater ease self care skills  Progressing 3/8/2023    Pt. to demonstrate increased MMT for bilateral shoulder flexion to 4/5 to improve ADL tolerance   Progressing 3/8/2023    Pt. to demonstrate increased MMT for mid-trap/lower trap strength to 4/5 to improve scapula stability during ADL and home related chores.  Progressing 3/8/2023    Pt. to demonstrate increased MMT to serratus anterior to 4/5 to improve scapula stability during repetitive UE tasks  Progressing 3/8/2023    Pt to tolerate HEP to improve ROM and independence with ADL's  Progressing 3/8/2023        Plan     Progress duration, workload, and resistance levels of the Therapeutic Activities and Exercises if the patient does not exhibit any pain, swelling, or other symptoms. Progress instruction in-home exercise progression and modification, including symptom management.    Babak López, PT ,DPT  3/8/2023

## 2023-03-09 ENCOUNTER — PATIENT MESSAGE (OUTPATIENT)
Dept: ORTHOPEDICS | Facility: CLINIC | Age: 68
End: 2023-03-09
Payer: MEDICARE

## 2023-03-09 DIAGNOSIS — M79.642 LEFT HAND PAIN: Primary | ICD-10-CM

## 2023-03-12 ENCOUNTER — TELEPHONE (OUTPATIENT)
Dept: ORTHOPEDICS | Facility: CLINIC | Age: 68
End: 2023-03-12
Payer: MEDICARE

## 2023-03-12 NOTE — TELEPHONE ENCOUNTER
Spoke with the patient. Informed of X-rays scheduled prior to appointment. Patient verbalized understanding and was thankful.       Anuradha Taylor MA  Medical Assistant to Dr. Ross Dunbar [facility]

## 2023-03-13 ENCOUNTER — OFFICE VISIT (OUTPATIENT)
Dept: ORTHOPEDICS | Facility: CLINIC | Age: 68
End: 2023-03-13
Payer: MEDICARE

## 2023-03-13 ENCOUNTER — PATIENT MESSAGE (OUTPATIENT)
Dept: REHABILITATION | Facility: HOSPITAL | Age: 68
End: 2023-03-13

## 2023-03-13 ENCOUNTER — HOSPITAL ENCOUNTER (OUTPATIENT)
Dept: RADIOLOGY | Facility: OTHER | Age: 68
Discharge: HOME | End: 2023-03-13
Attending: ORTHOPAEDIC SURGERY
Payer: MEDICARE

## 2023-03-13 ENCOUNTER — OFFICE VISIT (OUTPATIENT)
Dept: INTERNAL MEDICINE | Facility: CLINIC | Age: 68
End: 2023-03-13
Payer: MEDICARE

## 2023-03-13 VITALS — HEIGHT: 68 IN | WEIGHT: 179 LBS | BODY MASS INDEX: 27.13 KG/M2

## 2023-03-13 VITALS
HEIGHT: 68 IN | BODY MASS INDEX: 27.26 KG/M2 | DIASTOLIC BLOOD PRESSURE: 68 MMHG | HEART RATE: 70 BPM | OXYGEN SATURATION: 100 % | SYSTOLIC BLOOD PRESSURE: 128 MMHG | WEIGHT: 179.88 LBS

## 2023-03-13 DIAGNOSIS — E11.9 TYPE 2 DIABETES MELLITUS WITHOUT COMPLICATION, WITHOUT LONG-TERM CURRENT USE OF INSULIN: ICD-10-CM

## 2023-03-13 DIAGNOSIS — M79.642 LEFT HAND PAIN: Primary | ICD-10-CM

## 2023-03-13 DIAGNOSIS — M10.9 ACUTE GOUT INVOLVING TOE OF LEFT FOOT, UNSPECIFIED CAUSE: Primary | ICD-10-CM

## 2023-03-13 DIAGNOSIS — I10 ESSENTIAL HYPERTENSION: ICD-10-CM

## 2023-03-13 DIAGNOSIS — M79.642 LEFT HAND PAIN: ICD-10-CM

## 2023-03-13 LAB
ALBUMIN/CREAT UR: 8.5 UG/MG (ref 0–30)
CREAT UR-MCNC: 94 MG/DL (ref 23–375)
MICROALBUMIN UR DL<=1MG/L-MCNC: 8 UG/ML

## 2023-03-13 PROCEDURE — 1101F PR PT FALLS ASSESS DOC 0-1 FALLS W/OUT INJ PAST YR: ICD-10-PCS | Mod: HCNC,CPTII,S$GLB, | Performed by: ORTHOPAEDIC SURGERY

## 2023-03-13 PROCEDURE — 1101F PT FALLS ASSESS-DOCD LE1/YR: CPT | Mod: HCNC,CPTII,S$GLB, | Performed by: ORTHOPAEDIC SURGERY

## 2023-03-13 PROCEDURE — 73130 X-RAY EXAM OF HAND: CPT | Mod: 26,HCNC,LT, | Performed by: RADIOLOGY

## 2023-03-13 PROCEDURE — 3288F FALL RISK ASSESSMENT DOCD: CPT | Mod: HCNC,CPTII,S$GLB, | Performed by: FAMILY MEDICINE

## 2023-03-13 PROCEDURE — 3008F PR BODY MASS INDEX (BMI) DOCUMENTED: ICD-10-PCS | Mod: HCNC,CPTII,S$GLB, | Performed by: FAMILY MEDICINE

## 2023-03-13 PROCEDURE — 1126F AMNT PAIN NOTED NONE PRSNT: CPT | Mod: HCNC,CPTII,S$GLB, | Performed by: FAMILY MEDICINE

## 2023-03-13 PROCEDURE — 1126F PR PAIN SEVERITY QUANTIFIED, NO PAIN PRESENT: ICD-10-PCS | Mod: HCNC,CPTII,S$GLB, | Performed by: ORTHOPAEDIC SURGERY

## 2023-03-13 PROCEDURE — 3288F FALL RISK ASSESSMENT DOCD: CPT | Mod: HCNC,CPTII,S$GLB, | Performed by: ORTHOPAEDIC SURGERY

## 2023-03-13 PROCEDURE — 1159F PR MEDICATION LIST DOCUMENTED IN MEDICAL RECORD: ICD-10-PCS | Mod: HCNC,CPTII,S$GLB, | Performed by: FAMILY MEDICINE

## 2023-03-13 PROCEDURE — 3008F BODY MASS INDEX DOCD: CPT | Mod: HCNC,CPTII,S$GLB, | Performed by: FAMILY MEDICINE

## 2023-03-13 PROCEDURE — 1126F AMNT PAIN NOTED NONE PRSNT: CPT | Mod: HCNC,CPTII,S$GLB, | Performed by: ORTHOPAEDIC SURGERY

## 2023-03-13 PROCEDURE — 3074F PR MOST RECENT SYSTOLIC BLOOD PRESSURE < 130 MM HG: ICD-10-PCS | Mod: HCNC,CPTII,S$GLB, | Performed by: FAMILY MEDICINE

## 2023-03-13 PROCEDURE — 1126F PR PAIN SEVERITY QUANTIFIED, NO PAIN PRESENT: ICD-10-PCS | Mod: HCNC,CPTII,S$GLB, | Performed by: FAMILY MEDICINE

## 2023-03-13 PROCEDURE — 3044F HG A1C LEVEL LT 7.0%: CPT | Mod: HCNC,CPTII,S$GLB, | Performed by: ORTHOPAEDIC SURGERY

## 2023-03-13 PROCEDURE — 1101F PR PT FALLS ASSESS DOC 0-1 FALLS W/OUT INJ PAST YR: ICD-10-PCS | Mod: HCNC,CPTII,S$GLB, | Performed by: FAMILY MEDICINE

## 2023-03-13 PROCEDURE — 4010F PR ACE/ARB THEARPY RXD/TAKEN: ICD-10-PCS | Mod: HCNC,CPTII,S$GLB, | Performed by: FAMILY MEDICINE

## 2023-03-13 PROCEDURE — 3074F SYST BP LT 130 MM HG: CPT | Mod: HCNC,CPTII,S$GLB, | Performed by: FAMILY MEDICINE

## 2023-03-13 PROCEDURE — 1159F MED LIST DOCD IN RCRD: CPT | Mod: HCNC,CPTII,S$GLB, | Performed by: FAMILY MEDICINE

## 2023-03-13 PROCEDURE — 3044F HG A1C LEVEL LT 7.0%: CPT | Mod: HCNC,CPTII,S$GLB, | Performed by: FAMILY MEDICINE

## 2023-03-13 PROCEDURE — 4010F ACE/ARB THERAPY RXD/TAKEN: CPT | Mod: HCNC,CPTII,S$GLB, | Performed by: ORTHOPAEDIC SURGERY

## 2023-03-13 PROCEDURE — 99999 PR PBB SHADOW E&M-EST. PATIENT-LVL III: ICD-10-PCS | Mod: PBBFAC,HCNC,, | Performed by: ORTHOPAEDIC SURGERY

## 2023-03-13 PROCEDURE — 4010F ACE/ARB THERAPY RXD/TAKEN: CPT | Mod: HCNC,CPTII,S$GLB, | Performed by: FAMILY MEDICINE

## 2023-03-13 PROCEDURE — 99214 PR OFFICE/OUTPT VISIT, EST, LEVL IV, 30-39 MIN: ICD-10-PCS | Mod: HCNC,S$GLB,, | Performed by: FAMILY MEDICINE

## 2023-03-13 PROCEDURE — 3078F PR MOST RECENT DIASTOLIC BLOOD PRESSURE < 80 MM HG: ICD-10-PCS | Mod: HCNC,CPTII,S$GLB, | Performed by: FAMILY MEDICINE

## 2023-03-13 PROCEDURE — 1159F MED LIST DOCD IN RCRD: CPT | Mod: HCNC,CPTII,S$GLB, | Performed by: ORTHOPAEDIC SURGERY

## 2023-03-13 PROCEDURE — 3044F PR MOST RECENT HEMOGLOBIN A1C LEVEL <7.0%: ICD-10-PCS | Mod: HCNC,CPTII,S$GLB, | Performed by: ORTHOPAEDIC SURGERY

## 2023-03-13 PROCEDURE — 3288F PR FALLS RISK ASSESSMENT DOCUMENTED: ICD-10-PCS | Mod: HCNC,CPTII,S$GLB, | Performed by: FAMILY MEDICINE

## 2023-03-13 PROCEDURE — 99213 PR OFFICE/OUTPT VISIT, EST, LEVL III, 20-29 MIN: ICD-10-PCS | Mod: HCNC,S$GLB,, | Performed by: ORTHOPAEDIC SURGERY

## 2023-03-13 PROCEDURE — 82570 ASSAY OF URINE CREATININE: CPT | Mod: HCNC | Performed by: FAMILY MEDICINE

## 2023-03-13 PROCEDURE — 99999 PR PBB SHADOW E&M-EST. PATIENT-LVL III: CPT | Mod: PBBFAC,HCNC,, | Performed by: ORTHOPAEDIC SURGERY

## 2023-03-13 PROCEDURE — 99999 PR PBB SHADOW E&M-EST. PATIENT-LVL IV: ICD-10-PCS | Mod: PBBFAC,HCNC,, | Performed by: FAMILY MEDICINE

## 2023-03-13 PROCEDURE — 73130 XR HAND COMPLETE 3 VIEW LEFT: ICD-10-PCS | Mod: 26,HCNC,LT, | Performed by: RADIOLOGY

## 2023-03-13 PROCEDURE — 1159F PR MEDICATION LIST DOCUMENTED IN MEDICAL RECORD: ICD-10-PCS | Mod: HCNC,CPTII,S$GLB, | Performed by: ORTHOPAEDIC SURGERY

## 2023-03-13 PROCEDURE — 99214 OFFICE O/P EST MOD 30 MIN: CPT | Mod: HCNC,S$GLB,, | Performed by: FAMILY MEDICINE

## 2023-03-13 PROCEDURE — 99999 PR PBB SHADOW E&M-EST. PATIENT-LVL IV: CPT | Mod: PBBFAC,HCNC,, | Performed by: FAMILY MEDICINE

## 2023-03-13 PROCEDURE — 73130 X-RAY EXAM OF HAND: CPT | Mod: TC,HCNC,FY,LT

## 2023-03-13 PROCEDURE — 3008F PR BODY MASS INDEX (BMI) DOCUMENTED: ICD-10-PCS | Mod: HCNC,CPTII,S$GLB, | Performed by: ORTHOPAEDIC SURGERY

## 2023-03-13 PROCEDURE — 3078F DIAST BP <80 MM HG: CPT | Mod: HCNC,CPTII,S$GLB, | Performed by: FAMILY MEDICINE

## 2023-03-13 PROCEDURE — 99213 OFFICE O/P EST LOW 20 MIN: CPT | Mod: HCNC,S$GLB,, | Performed by: ORTHOPAEDIC SURGERY

## 2023-03-13 PROCEDURE — 3288F PR FALLS RISK ASSESSMENT DOCUMENTED: ICD-10-PCS | Mod: HCNC,CPTII,S$GLB, | Performed by: ORTHOPAEDIC SURGERY

## 2023-03-13 PROCEDURE — 3044F PR MOST RECENT HEMOGLOBIN A1C LEVEL <7.0%: ICD-10-PCS | Mod: HCNC,CPTII,S$GLB, | Performed by: FAMILY MEDICINE

## 2023-03-13 PROCEDURE — 4010F PR ACE/ARB THEARPY RXD/TAKEN: ICD-10-PCS | Mod: HCNC,CPTII,S$GLB, | Performed by: ORTHOPAEDIC SURGERY

## 2023-03-13 PROCEDURE — 3008F BODY MASS INDEX DOCD: CPT | Mod: HCNC,CPTII,S$GLB, | Performed by: ORTHOPAEDIC SURGERY

## 2023-03-13 PROCEDURE — 1101F PT FALLS ASSESS-DOCD LE1/YR: CPT | Mod: HCNC,CPTII,S$GLB, | Performed by: FAMILY MEDICINE

## 2023-03-13 RX ORDER — COLCHICINE 0.6 MG/1
0.6 TABLET ORAL DAILY
Qty: 30 TABLET | Refills: 0 | Status: SHIPPED | OUTPATIENT
Start: 2023-03-13 | End: 2023-03-21 | Stop reason: SDUPTHER

## 2023-03-13 NOTE — PATIENT INSTRUCTIONS
Be careful of your diet to avoid high purine foods. You can review the gout diet at [email or web address]

## 2023-03-13 NOTE — PROGRESS NOTES
[facility]  History & Physical  Orthopedics     SUBJECTIVE:       COVID-19 attestation:  This patient was treated during the COVID-19 pandemic.  This was discussed with the patient, they are aware of our current policies and procedures, were given the option of delaying their visit and or switching to a virtual visit, delaying their surgery when applicable, and they elect to proceed.     Chief Complaint:  left hand     Referring Provider: No ref. provider found      History of Present Illness:  Patient is a 66 y.o. right hand dominant male who presents today status post a right carpal tunnel release.  This is doing well and he continues to be happy with it.  He is really only reporting an describing some persistent stiffness in the left hand.  He has received a diagnosis of rheumatoid arthritis and is being treated with methotrexate.  He notes that this in combination with occupational therapy and home exercises have helped his stiffness significantly and certainly presented for worsening.  He feels as if overall he is doing well.  No other new complaints.      The patient is a/an retired.     Onset of symptoms/DOI was about a year ago.     Symptoms are aggravated by activity, movement and at night.     Symptoms are alleviated by rest and during the day.     Symptoms consist of pain, swelling, decreased ROM and numbness/tingling.     The patient rates their pain as 0/10     Attempted treatment(s) and/or interventions include activity modifications, rest     The patient denies any fevers, chills, N/V, D/C and presents for evaluation.             Past Medical History:   Diagnosis Date    Diabetes mellitus type II      Hypertension              Past Surgical History:   Procedure Laterality Date    COLONOSCOPY N/A 1/16/2020     Procedure: COLONOSCOPY;  Surgeon: Cynthia Kearney MD;  Location: [address]);  Service: Endoscopy;  Laterality: N/A;    SHOULDER SURGERY          Review of  "patient's allergies indicates:  No Known Allergies  Social History          Social History Narrative    Not on file            Family History   Problem Relation Age of Onset    Bone cancer Mother      Colon cancer Father              Current Outpatient Medications:     blood sugar diagnostic Strp, To check BG 1 times daily, to use with insurance preferred meter, Disp: 100 strip, Rfl: 3    blood-glucose meter kit, To check BG 1 times daily, to use with insurance preferred meter, Disp: 1 each, Rfl: 0    gentian violet 2 % topical solution, Apply 0.5 mLs topically daily as needed. Apply between toes., Disp: 59 mL, Rfl: 1    lancets Misc, To check BG 1 times daily, to use with insurance preferred meter, Disp: 100 each, Rfl: 3    metFORMIN (GLUCOPHAGE) 1000 MG tablet, Take 1 tablet (1,000 mg total) by mouth 2 (two) times daily with meals., Disp: 180 tablet, Rfl: 3    pneumococcal 23-merritt ps (PNEUMOVAX 23) 25 mcg/0.5 mL vaccine, Inject into the muscle., Disp: 0.5 mL, Rfl: 0    rosuvastatin (CRESTOR) 10 MG tablet, Take 1 tablet (10 mg total) by mouth once daily., Disp: 90 tablet, Rfl: 3    TRULICITY 0.75 mg/0.5 mL pen injector, INJECT 0.75 MG(0.5 ML) UNDER THE SKIN EVERY 7 DAYS, Disp: 4 pen, Rfl: 11    valACYclovir (VALTREX) 500 MG tablet, Take 1 tablet (500 mg total) by mouth 2 (two) times daily. for 3 days, Disp: 6 tablet, Rfl: 6    varicella-zoster [id number], PF, (SHINGRIX, PF,) 50 mcg/0.5 mL injection, Inject into the muscle., Disp: 1 each, Rfl: 1        Review of Systems:  As per HPI otherwise noncontributory     OBJECTIVE:       Vital Signs (Most Recent):  Vitals       Vitals:     03/17/22 1038   Weight: 88 kg (194 lb)   Height: 5' 8" (1.727 m)         Body mass index is 29.5 kg/m².        Physical Exam:  Constitutional: The patient appears well-developed and well-nourished. No distress.   Skin: No lesions appreciated  Head: Normocephalic and atraumatic.   Nose: Nose normal.   Ears: No deformities seen  Eyes: " Conjunctivae and EOM are normal.   Neck: No tracheal deviation present.   Cardiovascular: Normal rate and intact distal pulses.    Pulmonary/Chest: Effort normal. No respiratory distress.   Abdominal: There is no guarding.   Neurological: The patient is alert.   Psychiatric: The patient has a normal mood and affect.      Bilateral Hand/Wrist Examination:     Observation/Inspection:  Swelling                       none                  Deformity                     none  Discoloration               none                  Scars                            endoscopic right carpal tunnel well-healed                   Atrophy                        None  No tenderness today      HAND/WRIST EXAMINATION:  Finkelstein's Test                                Neg  WHAT Test                                         Neg  Snuff box tenderness                          Neg  Osullivan's Test                                     Neg  Hook of Hamate Tenderness              Neg  CMC grind                                           Neg  Circumduction test                              Neg     Neurovascular Exam:  Digits WWP, brisk CR < 3s throughout  NVI motor/LTS to M/R/U nerves, radial pulse 2+  Tinel's Test - Carpal Tunnel                negative today  Tinel's Test - Cubital Tunnel               negative today  Phalen's Test                                      negative today   Median Nerve Compression Test       negative today     ROM right hand is full, left hand range of motion is only mildly restricted.  The patient can nearly make a fist.  Has full range of motion at the MPs and get all fingertips within a few mm of the palm.  This is significantly improved from prior visits.          ROM wrist full, painless    ROM elbow full, painless     Abdomen not guarded  Respirations nonlabored  Perfusion intact     Diagnostic Results:     Imaging - I independently viewed the patient's imaging as well as the radiology report.  Xrays of the  patient's right hand  demonstrate no evidence of any acute fractures or dislocations with mild degenerative changes.     EMG - Impression:  Abnormal examination.  There is electrodiagnostic evidence of:  Moderate to severe right median mononeuropathy at the wrist with predominantly demyelinating features and mild axonal features (carpal tunnel syndrome.)  Moderate left median mononeuropathy at the wrist with demyelinating features only (carpal tunnel syndrome.)  Likely an acute on chronic right C7 radiculopathy.  Mild ulnar neuropathy at the elbow on the left    MRI left wrist-Impression:     1. MR imaging findings most concerning for an inflammatory arthropathy favoring rheumatoid arthritis with moderate disease activity and mild risk of future aggressiveness.        ASSESSMENT/PLAN:       66 y.o. yo male with left hand stiffness secondary to rheumatoid arthritis     Plan: The patient and I had a thorough discussion today.  We discussed the working diagnosis as well as several other potential alternative diagnoses.  Treatment options were discussed, both conservative and surgical.  Conservative treatment options would include things such as activity modifications, workplace modifications, a period of rest, oral vs topical OTC and prescription anti-inflammatory medications, occupational therapy, splinting/bracing, immobilization, corticosteroid injections, and others.  Surgical options were discussed as well.      At this point in time, the patient has improved tremendously.  He is being treated for his RA.  He has no new complaints and I certainly would not recommend a surgical options for his left hand stiffness as it is much improved and quite minimal at this time.  He may continue activities as tolerated.  Recommend home exercises and stretching.  Recommend continuation of treatment for rheumatoid arthritis.  He may follow up with me on as needed/if needed basis.      Wiley Warren M.D.     Please be aware  that this note has been generated with the assistance of [employer] voice-to-text.  Please excuse any spelling or grammatical errors.     Thank you for choosing Dr. Wiley Warren for your orthopedic hand and upper extremity care. It is our goal to provide you with exceptional care that will help keep you healthy, active, and get you back in the game.     If you felt that you received exemplary care today, please consider leaving feedback for Dr. Warren on [employer] at [email or web address].     Please do not hesitate to reach out to us via email, phone, or MyChart with any questions, concerns, or feedback.

## 2023-03-13 NOTE — PROGRESS NOTES
Subjective:       Patient ID: Karthik Bentley is a 67 y.o. male.    Chief Complaint: Follow-up    Patient is here for follow-up of their chronic diseases    Diabetes Management Status    Statin: Taking  ACE/ARB: Taking    Screening or Prevention Patient's value Goal Complete/Controlled?   HgA1C Testing and Control   Lab Results   Component Value Date    HGBA1C 6.5 (H) 01/25/2023      Annually/Less than 8% Yes     Lipid profile : 01/25/2023 Annually Yes     LDL control Lab Results   Component Value Date    LDLCALC 55.0 (L) 01/25/2023    Annually/Less than 100 mg/dl  Yes     Nephropathy screening Lab Results   Component Value Date    LABMICR 9.0 05/10/2022     No results found for: PROTEINUA  Lab Results   Component Value Date    UTPCR Unable to calculate 01/12/2022      Annually Yes     Blood pressure BP Readings from Last 1 Encounters:   03/13/23 128/68    Less than 140/90 Yes     Dilated retinal exam : 01/23/2023 Annually Yes     Foot exam   : 01/18/2022 Annually No           Review of patient's allergies indicates:  No Known Allergies    Social History     Tobacco Use    Smoking status: Never    Smokeless tobacco: Never   Substance Use Topics    Alcohol use: Yes     Comment: occasionally    Drug use: Never       Family History   Problem Relation Age of Onset    Bone cancer Mother     Colon cancer Father     Glaucoma Neg Hx     Macular degeneration Neg Hx     Retinal detachment Neg Hx        Past Surgical History:   Procedure Laterality Date    ANTERIOR CERVICAL DISCECTOMY W/ FUSION N/A 10/31/2022    Procedure: DISCECTOMY, SPINE, CERVICAL, ANTERIOR APPROACH, WITH FUSION C3-6 depuy SNS:vocal/motors/SSEP;  Surgeon: Kody Marshall MD;  Location: [facility];  Service: Orthopedics;  Laterality: N/A;    COLONOSCOPY N/A 1/16/2020    Procedure: COLONOSCOPY;  Surgeon: Cynthia Kearney MD;  [facility] [facility] [address]  Service: Endoscopy;  Laterality: N/A;    ENDOSCOPIC CARPAL TUNNEL RELEASE Right 4/1/2022    Procedure:  RELEASE, CARPAL TUNNEL, ENDOSCOPIC - right arthrex;  Surgeon: Wiley Warren MD;  Location: [facility] OR;  Service: Orthopedics;  Laterality: Right;    EPIDURAL STEROID INJECTION N/A 9/6/2022    Procedure: CERVICAL EDITH CONTRAST DIRECT REFERRAL;  Surgeon: Yasmeen Taylor MD;  Location: [facility] PAIN MGT;  Service: Pain Management;  Laterality: N/A;    SHOULDER SURGERY         Patient Active Problem List   Diagnosis    Essential hypertension    Type 2 diabetes mellitus, without long-term current use of insulin    Screen for colon cancer    Overweight    HSV infection    Chronic pain of both shoulders    Shoulder pain, bilateral    Muscle weakness (generalized)    Decreased mobility and endurance    Cervical myelopathy    Limited range of motion of shoulder       Current Outpatient Medications on File Prior to Visit   Medication Sig Dispense Refill    acetaminophen (TYLENOL) 500 MG tablet Take 2 tablets (1,000 mg total) by mouth every 8 (eight) hours as needed for Pain (100). 120 tablet 0    blood sugar diagnostic Strp To check BG 1 times daily, to use with insurance preferred meter 100 strip 3    CARBOCAINE, PF, 15 mg/mL (1.5 %) injection       dulaglutide (TRULICITY) 1.5 mg/0.5 mL pen injector Inject 1.5 mg into the skin every 7 days. 4 pen 11    folic acid (FOLVITE) 1 MG tablet Take 1 tablet (1 mg total) by mouth once daily. 90 tablet 3    gabapentin (NEURONTIN) 300 MG capsule Take 1 capsule (300 mg total) by mouth 3 (three) times daily. 90 capsule 11    lancets Misc To check BG 1 times daily, to use with insurance preferred meter 100 each 3    metFORMIN (GLUCOPHAGE) 1000 MG tablet TAKE 1 TABLET(1000 MG) BY MOUTH TWICE DAILY WITH MEALS 180 tablet 3    methotrexate 2.5 MG Tab Take 10 tablets (25 mg total) by mouth every 7 days. 40 tablet 2    rosuvastatin (CRESTOR) 10 MG tablet TAKE 1 TABLET ONE TIME DAILY. 90 tablet 3    valsartan (DIOVAN) 160 MG tablet       blood-glucose meter kit To check BG 1 times daily, to use with  "insurance preferred meter 1 each 0     No current facility-administered medications on file prior to visit.           Review of Systems   Constitutional:  Negative for chills and fever.   HENT:  Negative for ear pain.    Eyes:  Negative for pain.   Respiratory:  Negative for chest tightness.    Cardiovascular:  Negative for chest pain.   Gastrointestinal:  Negative for abdominal pain.   Genitourinary:  Negative for flank pain.   Musculoskeletal:  Positive for arthralgias and myalgias. Negative for gait problem.        Left foot gout pain at bunion, was on prednisone for a week with only mild improvment   Neurological:  Negative for syncope.   Psychiatric/Behavioral:  Negative for behavioral problems.      Objective:    /68 (BP Location: Right arm, Patient Position: Sitting, BP Method: Large (Manual))   Pulse 70   Ht 5' 8" (1.727 m)   Wt 81.6 kg (179 lb 14.3 oz)   SpO2 100%   BMI 27.35 kg/m²     Physical Exam  Constitutional:       Appearance: He is well-developed.   HENT:      Head: Normocephalic and atraumatic.   Cardiovascular:      Rate and Rhythm: Normal rate.      Heart sounds: Normal heart sounds.   Pulmonary:      Effort: No respiratory distress.      Breath sounds: Normal breath sounds. No wheezing or rales.   Abdominal:      Palpations: Abdomen is soft.   Musculoskeletal:      Cervical back: Neck supple.        Feet:    Feet:      Right foot:      Protective Sensation: 5 sites tested.  5 sites sensed.      Left foot:      Protective Sensation: 5 sites tested.  5 sites sensed.      Comments: Bunion area of left foot with warmth and swelling. No redness which resolved after the prednisone sent in by rheumatology. Only mildly tender to touch.  Skin:     General: Skin is dry.   Neurological:      Mental Status: He is alert.   Psychiatric:         Behavior: Behavior normal.       Assessment:       1. Acute gout involving toe of left foot, unspecified cause    2. Essential hypertension    3. Type 2 " diabetes mellitus without complication, without long-term current use of insulin          Plan:       Karthik was seen today for follow-up.    Diagnoses and all orders for this visit:    Acute gout involving toe of left foot, unspecified cause  -     colchicine (COLCRYS) 0.6 mg tablet; Take 1 tablet (0.6 mg total) by mouth once daily. - until he sees his rheumatologist later this month to decide on long term plan    Essential hypertension  -controlled, stable, no change in meds    Type 2 diabetes mellitus without complication, without long-term current use of insulin  -     Microalbumin/Creatinine Ratio, Urine  Lab Results   Component Value Date    HGBA1C 6.5 (H) 01/25/2023    HGBA1C 8.4 (H) 10/11/2022    HGBA1C 8.5 (H) 09/20/2022   Meeting goal    Follow up in about 4 months (around 6/28/2023) for dm, htn.

## 2023-03-15 ENCOUNTER — CLINICAL SUPPORT (OUTPATIENT)
Dept: REHABILITATION | Facility: HOSPITAL | Age: 68
End: 2023-03-15
Payer: MEDICARE

## 2023-03-15 DIAGNOSIS — M25.619 LIMITED RANGE OF MOTION OF SHOULDER: ICD-10-CM

## 2023-03-15 DIAGNOSIS — G89.29 CHRONIC PAIN OF BOTH SHOULDERS: Primary | ICD-10-CM

## 2023-03-15 DIAGNOSIS — Z74.09 DECREASED MOBILITY AND ENDURANCE: ICD-10-CM

## 2023-03-15 DIAGNOSIS — M25.512 CHRONIC PAIN OF BOTH SHOULDERS: Primary | ICD-10-CM

## 2023-03-15 DIAGNOSIS — M62.81 MUSCLE WEAKNESS (GENERALIZED): ICD-10-CM

## 2023-03-15 DIAGNOSIS — M25.511 CHRONIC PAIN OF BOTH SHOULDERS: Primary | ICD-10-CM

## 2023-03-15 PROCEDURE — 97112 NEUROMUSCULAR REEDUCATION: CPT | Mod: HCNC,PN,CQ

## 2023-03-15 PROCEDURE — 97110 THERAPEUTIC EXERCISES: CPT | Mod: HCNC,PN,CQ

## 2023-03-15 PROCEDURE — 97140 MANUAL THERAPY 1/> REGIONS: CPT | Mod: HCNC,PN,CQ

## 2023-03-15 NOTE — PROGRESS NOTES
"Physical Therapy Daily Treatment Note     Name: Karthik Bentley  Clinic Number: [id number]    Therapy Diagnosis:   Encounter Diagnoses   Name Primary?    Chronic pain of both shoulders Yes    Muscle weakness (generalized)     Decreased mobility and endurance     Limited range of motion of shoulder        Physician: Ashley Max MD    Visit Date: 3/15/2023    Physician Orders: PT Eval and Treat   Medical Diagnosis from Referral:   M25.511,G89.29,M25.512 (ICD-10-CM) - Chronic pain of both shoulders   M19.011,M19.012 (ICD-10-CM) - Osteoarthritis of both shoulders, unspecified osteoarthritis type      Evaluation Date: 2/6/2023  Plan of Care Expiration: 8/1/23 or 24th Visit  Progress Note: 4/6/2023  Authorization Period Expiration: 2/3/24  Visit # / Visits authorized: 5/ 12  Remaining Visits Scheduled - 06  PTA Consecutive Visits - 1/6    Eval Visit FOTO-  52 (2/6/2023)   5th Visit FOTO   -  (Date/Score/Turn Green)   10th Visit FOTO  -  (Date/Score/Turn Green)   D/C FOTO          -  (Date/Score/Turn Green)      Time In: 9:35  Time Out: 10:15  Billable Time: 40 minutes  Non-Billable Time: 00 minutes    Precautions: Standard, Diabetes, and HTN   Insurance: Payor: [employer] MANAGED MEDICARE / Plan: [employer] TOTAL CARE ADVANTAGE / Product Type: Medicare Advantage /     Subjective     Pt. Reports: Shoulder is feeling tight, no pain just stiffness      He is compliant with home exercise program.  Response to previous treatment: "felt like a workout"     Pre-Treatment Pain Rating: 3/10  Post-Treatment Pain Rating: 3/10  Location: bilateral shoulder      Objective     Karthik received therapeutic exercises to develop strength, endurance, ROM, flexibility, and posture for 15 minutes including:    Warm-up    Supine    Theraband Shoulder Sets 2x10, Green Theraband (Flexion/Abduction), bilaterally   Dumbbell External Rotation/Internal Rotation Combo 2x8, #4     Seated    Table slide (flexion, abduction, and scaption) -3' each   Bilateral " Shoulder External Rotation 2x10, Green Theraband     Standing    Single Arm Pull Downs 3x10, Blue Theraband       *Bold exercise performed       Ray received the following manual therapy techniques: Joint mobilizations, Manual traction, Myofacial release, and Soft tissue Mobilization were applied to the: shoulders for 15 minutes, including:  Shoulder Mobilization - Distraction and Posterior Glides, bilaterally  Passive range of motion of the shoulders in all planes, bilaterally     Ray participated in neuromuscular re-education activities to improve: Coordination, Sense, Proprioception, Posture, and Neuromuscular Recruitment for 10 minutes. The following activities were included:  Shoulder Isometric Set 2x10 with a 3' sec hold (Flexion/Abduction/Adduction/External Rotation/Internal Rotation/Extension)   Rhythms Stabilization on Wall with Ball 2x15 (Clockwise/Counter-Clockwise/Sideways/Up-Down), bilaterally   Standing Serratus Punches onto Wall with Ball 3x8, bilaterally   Resisted Bus Drivers 3x8, Red Theraband   90/90 Shoulder Isometric Walks 3x10 feet, (Flexion #6/Abduction #4)   Bent Over Rows 3x8, #19, bilaterally     Ray participated in dynamic functional therapeutic activities to improve functional performance for 00  minutes, including:      Home Exercises Provided and Patient Education Provided     Education provided:   - Continue with HEP    Written Home Exercises Provided: Patient instructed to cont prior HEP.  Exercises were reviewed and Karthik was able to demonstrate them prior to the end of the session.  Ray demonstrated good  understanding of the education provided.     See EMR under Patient Instructions for exercises provided prior visit.    Assessment     Pt tolerated session well. No significant increase in pain with exercises. ROM with resisted supine flexion/abduction has improved as well as shoulder strength. Ray had no adverse effects from exercises and will continue to benefit from skilled  therapy.     Karthik Is progressing well towards His goals.     Pt prognosis is Fair.     Pt will continue to benefit from skilled outpatient physical therapy to address the deficits listed in the problem list box on initial evaluation, provide pt/family education and to maximize pt's level of independence in the home and community environment.     Pt's spiritual, cultural and educational needs considered and pt agreeable to plan of care and goals.    Anticipated barriers to physical therapy: Chronic Bilateral Shoulder Pain      Goals:  Short Term Goals: 4 weeks        Goal   Status   Pt. to report decreased bilateral shoulder pain </= 6/10 at worst to increase tolerance for performing overhead reaching   Goal Met     Pt. to demonstrate proper cervical and scapula retraction requiring minimum verbal cues from PT to perform  Goal Met     Pt. to demonstrate an increase in bilateral shoulder elevation AROM to >115 deg. to promote greater ease with lifting tasks.   Goal Met     Pt. to demonstrate an increase in bilateral shoulder internal and external AROM to >10+ deg. to promote greater ease self care skills   Goal Met     Pt to tolerate HEP to improve ROM and independence with ADL's   Goal Met           Long Term Goals: 8 weeks     Goal Status    Pt. to report decreased bilateral shoulder pain </= 3/10 at worst to increase tolerance for performing overhead reaching   Progressing 3/15/2023    Pt. to demonstrate proper cervical and scapula retraction requiring minimum verbal cues from PT to perform Progressing 3/15/2023    Pt. to demonstrate an increase in bilateral shoulder elevation AROM to >150 deg. to promote greater ease with lifting tasks.  Progressing 3/15/2023   Pt. to demonstrate an increase in bilateral shoulder internal and external AROM to >+15 deg. to promote greater ease self care skills  Progressing 3/15/2023    Pt. to demonstrate increased MMT for bilateral shoulder flexion to 4/5 to improve ADL tolerance    Progressing 3/15/2023    Pt. to demonstrate increased MMT for mid-trap/lower trap strength to 4/5 to improve scapula stability during ADL and home related chores.  Progressing 3/15/2023    Pt. to demonstrate increased MMT to serratus anterior to 4/5 to improve scapula stability during repetitive UE tasks  Progressing 3/15/2023    Pt to tolerate HEP to improve ROM and independence with ADL's  Progressing 3/15/2023        Plan     Progress duration, workload, and resistance levels of the Therapeutic Activities and Exercises if the patient does not exhibit any pain, swelling, or other symptoms. Progress instruction in-home exercise progression and modification, including symptom management.    Hugo Vidales, CITLALI ,  3/15/2023

## 2023-03-20 ENCOUNTER — CLINICAL SUPPORT (OUTPATIENT)
Dept: REHABILITATION | Facility: HOSPITAL | Age: 68
End: 2023-03-20
Payer: MEDICARE

## 2023-03-20 DIAGNOSIS — M25.619 LIMITED RANGE OF MOTION OF SHOULDER: ICD-10-CM

## 2023-03-20 DIAGNOSIS — Z74.09 DECREASED MOBILITY AND ENDURANCE: ICD-10-CM

## 2023-03-20 DIAGNOSIS — M25.512 CHRONIC PAIN OF BOTH SHOULDERS: Primary | ICD-10-CM

## 2023-03-20 DIAGNOSIS — M62.81 MUSCLE WEAKNESS (GENERALIZED): ICD-10-CM

## 2023-03-20 DIAGNOSIS — M25.511 CHRONIC PAIN OF BOTH SHOULDERS: Primary | ICD-10-CM

## 2023-03-20 DIAGNOSIS — G89.29 CHRONIC PAIN OF BOTH SHOULDERS: Primary | ICD-10-CM

## 2023-03-20 PROCEDURE — 97530 THERAPEUTIC ACTIVITIES: CPT | Mod: HCNC,PN

## 2023-03-20 PROCEDURE — 97110 THERAPEUTIC EXERCISES: CPT | Mod: HCNC,PN

## 2023-03-20 PROCEDURE — 97112 NEUROMUSCULAR REEDUCATION: CPT | Mod: HCNC,PN

## 2023-03-20 NOTE — PROGRESS NOTES
"Physical Therapy Daily Treatment Note     Name: Karthik Bentley  Clinic Number: [id number]    Therapy Diagnosis:   Encounter Diagnoses   Name Primary?    Chronic pain of both shoulders Yes    Muscle weakness (generalized)     Decreased mobility and endurance     Limited range of motion of shoulder      Physician: Ashley Max MD    Visit Date: 3/20/2023    Physician Orders: PT Eval and Treat   Medical Diagnosis from Referral:   M25.511,G89.29,M25.512 (ICD-10-CM) - Chronic pain of both shoulders   M19.011,M19.012 (ICD-10-CM) - Osteoarthritis of both shoulders, unspecified osteoarthritis type      Evaluation Date: 2/6/2023  Plan of Care Expiration: 8/1/23 or 24th Visit  Progress Note: 4/6/2023  Authorization Period Expiration: 2/3/24  Visit # / Visits authorized: 6/ 12  Remaining Visits Scheduled - 03  PTA Consecutive Visits - 0/6    Eval Visit FOTO-  52 (2/6/2023)   6th Visit FOTO  - 51 (3/20/2023)   10th Visit FOTO  -  (Date/Score/Turn Green)   D/C FOTO          -  (Date/Score/Turn Green)      Time In: 11:05  Time Out: 11:45  Billable Time: 40 minutes  Non-Billable Time: 00 minutes    Precautions: Standard, Diabetes, and HTN   Insurance: Payor: [employer] MANAGED MEDICARE / Plan: [employer] TOTAL CARE ADVANTAGE / Product Type: Medicare Advantage /     Subjective     Pt. Reports: doing well with an increase in range of motion of the shoulder with less pain. He is able to close the curtains with more ease and less discomfort.       He is compliant with home exercise program.  Response to previous treatment: "felt like a workout"     Pre-Treatment Pain Rating: 3/10  Post-Treatment Pain Rating: 3/10  Location: bilateral shoulder      Objective     Karthik received therapeutic exercises to develop strength, endurance, ROM, flexibility, and posture for 15 minutes including:    Warm-up    Supine    Theraband Shoulder Sets 2x10, Green Theraband (Flexion/Abduction), bilaterally   Dumbbell External Rotation/Internal Rotation Combo 2x8, #4 "     Seated    Table slide (flexion, abduction, and scaption) -3' each   Bilateral Shoulder External Rotation 2x10, Green Theraband   Ball Wall Slides x30 (Flexion/Abduction)     Standing    Single Arm Pull Downs 3x10, Blue Theraband   Dowel Extension 2x10  Towel Internal Rotation 2x10       *Bold exercise performed       Ray received the following manual therapy techniques: Joint mobilizations, Manual traction, Myofacial release, and Soft tissue Mobilization were applied to the: shoulders for 00 minutes, including:  Shoulder Mobilization - Distraction and Posterior Glides, bilaterally  Passive range of motion of the shoulders in all planes, bilaterally     Ray participated in neuromuscular re-education activities to improve: Coordination, Sense, Proprioception, Posture, and Neuromuscular Recruitment for 15 minutes. The following activities were included:  Shoulder Isometric Set 2x10 with a 3' sec hold (Flexion/Abduction/Adduction/External Rotation/Internal Rotation/Extension)   Rhythms Stabilization on Wall with Ball 2x15 (Clockwise/Counter-Clockwise/Sideways/Up-Down), bilaterally   Standing Serratus Punches onto Wall with Ball 3x8, bilaterally   Resisted Bus Drivers 3x8, Red Theraband   90/90 Shoulder Isometric Walks 3x10 feet, (Flexion #6/Abduction #4)   Bent Over Rows 3x8, #19, bilaterally   Serratus Wall Slides with Foam Roller 3x8    Ray participated in dynamic functional therapeutic activities to improve functional performance for 10  minutes, including:  Push Up on Countertop 2x8   Fishing Resistance 2x10, Green Theraband and Dowel     Home Exercises Provided and Patient Education Provided     Education provided:   - Continue with HEP    Written Home Exercises Provided: Patient instructed to cont prior HEP.  Exercises were reviewed and Karthik was able to demonstrate them prior to the end of the session.  Ray demonstrated good  understanding of the education provided.     See EMR under Patient Instructions for  exercises provided prior visit.    Assessment     Patient completed the exercise program with no increase in pain to the shoulders or cervical region. New exercises focused on patients hobbies to increase functional capacity. Improvement in range of motion and strength are noted by progressing patient through a more rigorous program with less discomfort and an increase in resistance. No modifications were needed. Patient would continue to benefit from skilled Physical Therapy to decrease pain and increase shoulder mobility.     Karthik Is progressing well towards His goals.     Pt prognosis is Fair.     Pt will continue to benefit from skilled outpatient physical therapy to address the deficits listed in the problem list box on initial evaluation, provide pt/family education and to maximize pt's level of independence in the home and community environment.     Pt's spiritual, cultural and educational needs considered and pt agreeable to plan of care and goals.    Anticipated barriers to physical therapy: Chronic Bilateral Shoulder Pain      Goals:  Short Term Goals: 4 weeks        Goal   Status   Pt. to report decreased bilateral shoulder pain </= 6/10 at worst to increase tolerance for performing overhead reaching   Goal Met     Pt. to demonstrate proper cervical and scapula retraction requiring minimum verbal cues from PT to perform  Goal Met     Pt. to demonstrate an increase in bilateral shoulder elevation AROM to >115 deg. to promote greater ease with lifting tasks.   Goal Met     Pt. to demonstrate an increase in bilateral shoulder internal and external AROM to >10+ deg. to promote greater ease self care skills   Goal Met     Pt to tolerate HEP to improve ROM and independence with ADL's   Goal Met           Long Term Goals: 8 weeks     Goal Status    Pt. to report decreased bilateral shoulder pain </= 3/10 at worst to increase tolerance for performing overhead reaching   Progressing 3/20/2023    Pt. to demonstrate  proper cervical and scapula retraction requiring minimum verbal cues from PT to perform Progressing 3/20/2023    Pt. to demonstrate an increase in bilateral shoulder elevation AROM to >150 deg. to promote greater ease with lifting tasks.  Progressing 3/20/2023   Pt. to demonstrate an increase in bilateral shoulder internal and external AROM to >+15 deg. to promote greater ease self care skills  Progressing 3/20/2023    Pt. to demonstrate increased MMT for bilateral shoulder flexion to 4/5 to improve ADL tolerance   Progressing 3/20/2023    Pt. to demonstrate increased MMT for mid-trap/lower trap strength to 4/5 to improve scapula stability during ADL and home related chores.  Progressing 3/20/2023    Pt. to demonstrate increased MMT to serratus anterior to 4/5 to improve scapula stability during repetitive UE tasks  Progressing 3/20/2023    Pt to tolerate HEP to improve ROM and independence with ADL's  Progressing 3/20/2023        Plan     Progress duration, workload, and resistance levels of the Therapeutic Activities and Exercises if the patient does not exhibit any pain, swelling, or other symptoms. Progress instruction in-home exercise progression and modification, including symptom management.    Babak López, PT, DPT  3/20/2023

## 2023-03-21 ENCOUNTER — OFFICE VISIT (OUTPATIENT)
Dept: RHEUMATOLOGY | Facility: CLINIC | Age: 68
End: 2023-03-21
Payer: MEDICARE

## 2023-03-21 ENCOUNTER — LAB VISIT (OUTPATIENT)
Dept: LAB | Facility: HOSPITAL | Age: 68
End: 2023-03-21
Payer: MEDICARE

## 2023-03-21 VITALS
DIASTOLIC BLOOD PRESSURE: 76 MMHG | BODY MASS INDEX: 27.13 KG/M2 | WEIGHT: 179 LBS | HEIGHT: 68 IN | SYSTOLIC BLOOD PRESSURE: 124 MMHG | HEART RATE: 73 BPM

## 2023-03-21 DIAGNOSIS — M06.9 RHEUMATOID ARTHRITIS, INVOLVING UNSPECIFIED SITE, UNSPECIFIED WHETHER RHEUMATOID FACTOR PRESENT: ICD-10-CM

## 2023-03-21 DIAGNOSIS — K22.4 ESOPHAGEAL DYSMOTILITY: ICD-10-CM

## 2023-03-21 DIAGNOSIS — M10.9 ACUTE GOUT INVOLVING TOE OF LEFT FOOT, UNSPECIFIED CAUSE: ICD-10-CM

## 2023-03-21 DIAGNOSIS — M1A.0710 CHRONIC IDIOPATHIC GOUT INVOLVING TOE OF RIGHT FOOT WITHOUT TOPHUS: ICD-10-CM

## 2023-03-21 DIAGNOSIS — M1A.0710 CHRONIC IDIOPATHIC GOUT INVOLVING TOE OF RIGHT FOOT WITHOUT TOPHUS: Primary | ICD-10-CM

## 2023-03-21 DIAGNOSIS — L30.9 DERMATITIS: ICD-10-CM

## 2023-03-21 PROCEDURE — 99214 OFFICE O/P EST MOD 30 MIN: CPT | Mod: HCNC,GC,S$GLB, | Performed by: STUDENT IN AN ORGANIZED HEALTH CARE EDUCATION/TRAINING PROGRAM

## 2023-03-21 PROCEDURE — 3008F PR BODY MASS INDEX (BMI) DOCUMENTED: ICD-10-PCS | Mod: HCNC,CPTII,GC,S$GLB | Performed by: STUDENT IN AN ORGANIZED HEALTH CARE EDUCATION/TRAINING PROGRAM

## 2023-03-21 PROCEDURE — 1125F AMNT PAIN NOTED PAIN PRSNT: CPT | Mod: HCNC,CPTII,GC,S$GLB | Performed by: STUDENT IN AN ORGANIZED HEALTH CARE EDUCATION/TRAINING PROGRAM

## 2023-03-21 PROCEDURE — 3078F PR MOST RECENT DIASTOLIC BLOOD PRESSURE < 80 MM HG: ICD-10-PCS | Mod: HCNC,CPTII,GC,S$GLB | Performed by: STUDENT IN AN ORGANIZED HEALTH CARE EDUCATION/TRAINING PROGRAM

## 2023-03-21 PROCEDURE — 1159F PR MEDICATION LIST DOCUMENTED IN MEDICAL RECORD: ICD-10-PCS | Mod: HCNC,CPTII,GC,S$GLB | Performed by: STUDENT IN AN ORGANIZED HEALTH CARE EDUCATION/TRAINING PROGRAM

## 2023-03-21 PROCEDURE — 3044F HG A1C LEVEL LT 7.0%: CPT | Mod: HCNC,CPTII,GC,S$GLB | Performed by: STUDENT IN AN ORGANIZED HEALTH CARE EDUCATION/TRAINING PROGRAM

## 2023-03-21 PROCEDURE — 36415 COLL VENOUS BLD VENIPUNCTURE: CPT | Mod: HCNC | Performed by: STUDENT IN AN ORGANIZED HEALTH CARE EDUCATION/TRAINING PROGRAM

## 2023-03-21 PROCEDURE — 4010F ACE/ARB THERAPY RXD/TAKEN: CPT | Mod: HCNC,CPTII,GC,S$GLB | Performed by: STUDENT IN AN ORGANIZED HEALTH CARE EDUCATION/TRAINING PROGRAM

## 2023-03-21 PROCEDURE — 3044F PR MOST RECENT HEMOGLOBIN A1C LEVEL <7.0%: ICD-10-PCS | Mod: HCNC,CPTII,GC,S$GLB | Performed by: STUDENT IN AN ORGANIZED HEALTH CARE EDUCATION/TRAINING PROGRAM

## 2023-03-21 PROCEDURE — 4010F PR ACE/ARB THEARPY RXD/TAKEN: ICD-10-PCS | Mod: HCNC,CPTII,GC,S$GLB | Performed by: STUDENT IN AN ORGANIZED HEALTH CARE EDUCATION/TRAINING PROGRAM

## 2023-03-21 PROCEDURE — 1101F PT FALLS ASSESS-DOCD LE1/YR: CPT | Mod: HCNC,CPTII,GC,S$GLB | Performed by: STUDENT IN AN ORGANIZED HEALTH CARE EDUCATION/TRAINING PROGRAM

## 2023-03-21 PROCEDURE — 99214 PR OFFICE/OUTPT VISIT, EST, LEVL IV, 30-39 MIN: ICD-10-PCS | Mod: HCNC,GC,S$GLB, | Performed by: STUDENT IN AN ORGANIZED HEALTH CARE EDUCATION/TRAINING PROGRAM

## 2023-03-21 PROCEDURE — 3078F DIAST BP <80 MM HG: CPT | Mod: HCNC,CPTII,GC,S$GLB | Performed by: STUDENT IN AN ORGANIZED HEALTH CARE EDUCATION/TRAINING PROGRAM

## 2023-03-21 PROCEDURE — 3061F PR NEG MICROALBUMINURIA RESULT DOCUMENTED/REVIEW: ICD-10-PCS | Mod: HCNC,CPTII,GC,S$GLB | Performed by: STUDENT IN AN ORGANIZED HEALTH CARE EDUCATION/TRAINING PROGRAM

## 2023-03-21 PROCEDURE — 81381 HLA I TYPING 1 ALLELE HR: CPT | Mod: HCNC,PO | Performed by: STUDENT IN AN ORGANIZED HEALTH CARE EDUCATION/TRAINING PROGRAM

## 2023-03-21 PROCEDURE — 3066F NEPHROPATHY DOC TX: CPT | Mod: HCNC,CPTII,GC,S$GLB | Performed by: STUDENT IN AN ORGANIZED HEALTH CARE EDUCATION/TRAINING PROGRAM

## 2023-03-21 PROCEDURE — 99999 PR PBB SHADOW E&M-EST. PATIENT-LVL V: ICD-10-PCS | Mod: PBBFAC,HCNC,GC, | Performed by: STUDENT IN AN ORGANIZED HEALTH CARE EDUCATION/TRAINING PROGRAM

## 2023-03-21 PROCEDURE — 3288F PR FALLS RISK ASSESSMENT DOCUMENTED: ICD-10-PCS | Mod: HCNC,CPTII,GC,S$GLB | Performed by: STUDENT IN AN ORGANIZED HEALTH CARE EDUCATION/TRAINING PROGRAM

## 2023-03-21 PROCEDURE — 1125F PR PAIN SEVERITY QUANTIFIED, PAIN PRESENT: ICD-10-PCS | Mod: HCNC,CPTII,GC,S$GLB | Performed by: STUDENT IN AN ORGANIZED HEALTH CARE EDUCATION/TRAINING PROGRAM

## 2023-03-21 PROCEDURE — 3074F PR MOST RECENT SYSTOLIC BLOOD PRESSURE < 130 MM HG: ICD-10-PCS | Mod: HCNC,CPTII,GC,S$GLB | Performed by: STUDENT IN AN ORGANIZED HEALTH CARE EDUCATION/TRAINING PROGRAM

## 2023-03-21 PROCEDURE — 3074F SYST BP LT 130 MM HG: CPT | Mod: HCNC,CPTII,GC,S$GLB | Performed by: STUDENT IN AN ORGANIZED HEALTH CARE EDUCATION/TRAINING PROGRAM

## 2023-03-21 PROCEDURE — 3066F PR DOCUMENTATION OF TREATMENT FOR NEPHROPATHY: ICD-10-PCS | Mod: HCNC,CPTII,GC,S$GLB | Performed by: STUDENT IN AN ORGANIZED HEALTH CARE EDUCATION/TRAINING PROGRAM

## 2023-03-21 PROCEDURE — 3288F FALL RISK ASSESSMENT DOCD: CPT | Mod: HCNC,CPTII,GC,S$GLB | Performed by: STUDENT IN AN ORGANIZED HEALTH CARE EDUCATION/TRAINING PROGRAM

## 2023-03-21 PROCEDURE — 1101F PR PT FALLS ASSESS DOC 0-1 FALLS W/OUT INJ PAST YR: ICD-10-PCS | Mod: HCNC,CPTII,GC,S$GLB | Performed by: STUDENT IN AN ORGANIZED HEALTH CARE EDUCATION/TRAINING PROGRAM

## 2023-03-21 PROCEDURE — 1159F MED LIST DOCD IN RCRD: CPT | Mod: HCNC,CPTII,GC,S$GLB | Performed by: STUDENT IN AN ORGANIZED HEALTH CARE EDUCATION/TRAINING PROGRAM

## 2023-03-21 PROCEDURE — 3061F NEG MICROALBUMINURIA REV: CPT | Mod: HCNC,CPTII,GC,S$GLB | Performed by: STUDENT IN AN ORGANIZED HEALTH CARE EDUCATION/TRAINING PROGRAM

## 2023-03-21 PROCEDURE — 99999 PR PBB SHADOW E&M-EST. PATIENT-LVL V: CPT | Mod: PBBFAC,HCNC,GC, | Performed by: STUDENT IN AN ORGANIZED HEALTH CARE EDUCATION/TRAINING PROGRAM

## 2023-03-21 PROCEDURE — 1160F PR REVIEW ALL MEDS BY PRESCRIBER/CLIN PHARMACIST DOCUMENTED: ICD-10-PCS | Mod: HCNC,CPTII,GC,S$GLB | Performed by: STUDENT IN AN ORGANIZED HEALTH CARE EDUCATION/TRAINING PROGRAM

## 2023-03-21 PROCEDURE — 3008F BODY MASS INDEX DOCD: CPT | Mod: HCNC,CPTII,GC,S$GLB | Performed by: STUDENT IN AN ORGANIZED HEALTH CARE EDUCATION/TRAINING PROGRAM

## 2023-03-21 PROCEDURE — 1160F RVW MEDS BY RX/DR IN RCRD: CPT | Mod: HCNC,CPTII,GC,S$GLB | Performed by: STUDENT IN AN ORGANIZED HEALTH CARE EDUCATION/TRAINING PROGRAM

## 2023-03-21 RX ORDER — METHOTREXATE 25 MG/.5ML
25 INJECTION, SOLUTION SUBCUTANEOUS
Qty: 2 ML | Refills: 2 | Status: SHIPPED | OUTPATIENT
Start: 2023-03-21 | End: 2023-04-10

## 2023-03-21 RX ORDER — METHOCARBAMOL 750 MG/1
TABLET, FILM COATED ORAL
COMMUNITY
Start: 2022-11-21 | End: 2023-08-01

## 2023-03-21 RX ORDER — VALACYCLOVIR HYDROCHLORIDE 500 MG/1
TABLET, FILM COATED ORAL
COMMUNITY
Start: 2023-02-25 | End: 2023-08-29

## 2023-03-21 RX ORDER — FOLIC ACID 1 MG/1
1 TABLET ORAL DAILY
Qty: 90 TABLET | Refills: 3 | Status: SHIPPED | OUTPATIENT
Start: 2023-03-21 | End: 2024-03-05 | Stop reason: SDUPTHER

## 2023-03-21 RX ORDER — COLCHICINE 0.6 MG/1
0.6 TABLET ORAL DAILY
Qty: 90 TABLET | Refills: 2 | Status: SHIPPED | OUTPATIENT
Start: 2023-03-21 | End: 2023-05-22 | Stop reason: SDUPTHER

## 2023-03-21 ASSESSMENT — ROUTINE ASSESSMENT OF PATIENT INDEX DATA (RAPID3)
MDHAQ FUNCTION SCORE: 0.5
TOTAL RAPID3 SCORE: 2.56
AM STIFFNESS SCORE: 1, YES
PAIN SCORE: 1
FATIGUE SCORE: 1
PATIENT GLOBAL ASSESSMENT SCORE: 5
PSYCHOLOGICAL DISTRESS SCORE: 0

## 2023-03-21 NOTE — PROGRESS NOTES
I have personally reviewed the history, confirmed exam findings, and discussed assessment and plan with fellow.       RA RF+ ACPA- TJ 0 SJ 2  Pt global 50 ESR 14 CRP 1.4 DAS28 2.94(LDA) ARY27-TZA 2.37(remission) CDAI 9(LDA) much improved handgrip  Gout, s/p resolving acute podagra SUA 7.8    Change methotrexate po to Rasuvo 25mg sc once a week with folic acid 1mg daily  B*[id number] if negative, start allopurinol 100mg daily  Colchicine 0.6 mg daily for prophylaxis  Monthly cbc, cmp and uric acid until uric acid < 6 mg/dl, and preferably < 5mg/dl given tophus left thumb mcp  Ref to Derm for perioral dermatitis  Ref to Gastro for dysphagia, upper regurgitation  RTC 3 months

## 2023-03-21 NOTE — PROGRESS NOTES
Subjective:       Patient ID: Karthik Bentley is a 67 y.o. male.    Chief Complaint: RA    HPI  Interval Hx:  Had a flare up in the left great toe and sent photos which appeared to be gout by photo. He endorsed that it was red, warm, and swollen. This had already been going on for a week by the time he told me about it. So I ordered for him prednisone 20 mg BID for 7 days. This didn't help at all. He contacted his PCP and got colchicine on 3/13/23 and took 2 tablets daily for 2 days and then reduced to daily until now. This resolved his attack and he was able to walk a mile.    He continues MTX 10 tabs (25 mg) weekly split dosing and folic acid daily. However he has been vomiting the MTX a lot of the time. Now able to make a full fist with the left hand. Still having a little stiffness, morning stiffness about an hour in the left hand. No other joints are bothering him.    He's doing PT for the shoulders which is helping a lot.    Started getting little red bumps around his mouth for the past 2 days.      Initial Hx:  Patient is a 68 yo M with diabetes who presents for Rheumatology consultation for gout suspected in the left hand. He initially had RIGHT carpal tunnel surgery. He now presents with pain and swelling in the LEFT thumb and 2nd MCP. He also has carpal tunnel in the left wrist. He started with weakness in the left hand in mid-April. He complained to the OT about this but they were only treating his right hand post carpal tunnel surgery on 4/1/22. Left hand weakness and stiffness got worse until now he can't make a fist. A few months ago he developed swelling at the left thumb and 2nd finger MCP. Pain was not too much but a few days ago it became very tender 9/10 even with light touch. He denies erythema or warmth ever. He contacted Dr. Warren who started a medrol dosepack which he started last night. This helped with the pain.    He had a gout attacks in the past when he was in [place] working. He had  flares in the right big toe; he would wake up from sleep with severe pain in the great toe; there was a little swelling, some erythema, sometimes warmth. Mostly it was painful with bearing weight. Flares lasted 3 days and resolved with a medication he took there and can't remember the name. It happens once or twice a year and he still gets flares in the right great toe, last was 3-4 months ago and continues to abort the flare with the medication. Never had joint fluid aspiration. He has not had a gout flare in other joints. He has never had a kidney stone. No known family history of gout. Has never been on allopurinol, colchicine, or steroids for gout. 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 Never was much of an alcohol drinker - no alcohol anymore.    He also had shoulder pain. 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  No acute fracture or dislocation.  No bone destruction identified..  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 He was referred to Dr. Degroot and got bilateral shoulder injections. He was doing well in PT for the shoulders but one day woke up with terrible pain in the shoulders and reduced ROM. He told Dr. Degroot who then referred to neck and spine Ortho.    He had MRI cervical spine 8/24/22: 1. Multilevel cervical spondylosis resulting in varying degrees of foraminal narrowing and spinal canal stenosis most notable at C4-C5 and C5-C6.  2. Short segment cord signal abnormality at C4-C5 which is suggestive chronic compressive myelopathy.  He had epidural injection for this and a medrol dosepack prior (7/28/22). He is on Celebrex for this.    Social Hx: never smoker; doesn't drink alcohol, never was a heavy drinker.   Family Hx: no family history of autoimmune disease    Review of Systems  "  Constitutional:  Negative for unexpected weight change.   HENT:  Negative for mouth sores.    Eyes:  Negative for redness.   Respiratory:  Negative for cough and shortness of breath.    Cardiovascular:  Negative for chest pain.   Gastrointestinal:  Negative for constipation and diarrhea.   Genitourinary:  Negative for genital sores.   Skin:  Negative for rash.   Hematological:  Does not bruise/bleed easily.       Objective:   /76   Pulse 73   Ht 5' 8" (1.727 m)   Wt 81.2 kg (179 lb)   BMI 27.22 kg/m²      Physical Exam   Constitutional: He is oriented to person, place, and time. No distress.   HENT:   Head: Normocephalic and atraumatic.   Eyes: Pupils are equal, round, and reactive to light.   Cardiovascular: Normal rate and regular rhythm.   No murmur heard.  Pulmonary/Chest: Effort normal and breath sounds normal. No respiratory distress. He has no wheezes.   Abdominal: Soft. Bowel sounds are normal. There is no abdominal tenderness.   Musculoskeletal:         General: No deformity. Normal range of motion.      Cervical back: Normal range of motion.      Comments: Now able to make 90% fist in the left hand. Synovitis has improved a lot. Left great toe MTP with healing gout, enlarged, dark discoloration, not tender or warm.   Neurological: He is alert and oriented to person, place, and time.   Skin: Skin is warm and dry.   Psychiatric: Affect and judgment normal.       11/18/2022 1/4/2023 2/3/2023 3/21/2023   Tender (WALKER-28) 0 / 28  0 / 28 4 / 28  0 / 28    Swollen (WALKER-28) 1 / 28  3 / 28  3 / 28  2 / 28    Provider Global 10 mm 0 mm 30 mm --   Patient Global 50 mm 55 mm 55 mm --   ESR 39 mm/hr 9 mm/hr 12 mm/hr 14 mm/hr   CRP 3.3 mg/L 0.5 mg/L 1.1 mg/L 1.4 mg/L   WALKER-28 (ESR) 3.54 (Moderate disease activity) 2.79 (Low disease activity) 4.11 (Moderate disease activity) --   WALKER-28 (CRP) 2.47 (Remission) 2.36 (Remission) 3.6 (Moderate disease activity) --   CDAI Score 7  8.5  15.5  --      "     Assessment:       1. Chronic idiopathic gout involving toe of right foot without tophus    2. Rheumatoid arthritis, involving unspecified site, unspecified whether rheumatoid factor present    3. Acute gout involving toe of left foot, unspecified cause    4. Dermatitis    5. Esophageal dysmotility            Plan:       Problem List Items Addressed This Visit    None  Visit Diagnoses       Chronic idiopathic gout involving toe of right foot without tophus    -  Primary    Relevant Orders    Misc Sendout Test, Blood HLA     Comprehensive Metabolic Panel    CBC Auto Differential    Uric Acid    Rheumatoid arthritis, involving unspecified site, unspecified whether rheumatoid factor present        Relevant Medications    methotrexate, PF, (RASUVO, PF,) 25 mg/0.5 mL AtIn    folic acid (FOLVITE) 1 MG tablet    Other Relevant Orders    C-Reactive Protein    Sedimentation rate    Acute gout involving toe of left foot, unspecified cause        Relevant Medications    colchicine (COLCRYS) 0.6 mg tablet    Dermatitis        Relevant Orders    Ambulatory referral/consult to Dermatology    Esophageal dysmotility        Relevant Orders    Ambulatory referral/consult to Gastroenterology            Patient is a 66 yo M with diabetes who presents for Rheumatology follow up for RA and gout.    On initial presentation:  Reviewed hand XR: appears to have marginal erosions 1st, 2nd, possibly 4th MCPs and calcium deposit 3rd MCP  Has synovitis on exam.     RF 25 (mildly elevated)  CCP negative  IESHA negative  SPEP and ОЛЬГА were normal  Anti-carP antibody negative  Large effusion on the right knee, aspirated last visit. Fluid had 111 WBCs and no crystals.    MRI hands/wrists RA protocol: MR imaging findings most concerning for an inflammatory arthropathy favoring rheumatoid arthritis with moderate disease activity and mild risk of future aggressiveness.    Rheumatoid Arthritis  OA in the shoulders  Right 3rd digit Dupuytren's  contracture  Chronic gout without tophi    Uric acid is 7.8.   Will add allopurinol.      Plan:  Order HLA-. After [id number] results, will advise him to start allopurinol 100 mg daily and then labs every 4 weeks while titrating dose CBC, CMP, uric acid.  Change MTX to subcutaneous 25 mg weekly due to vomiting with tablets.  Continue folic acid 1 mg daily.  Continue colchicine 0.6 mg daily.  GI referral for dysmotility seen on swallow study.  Dermatology referral for victor manuel-oral dermatitis.  RTC 3 months with CBC, CMP, ESR, CRP.      Ashley Max MD  Rheumatology Fellow  PGY-5

## 2023-03-21 NOTE — PATIENT INSTRUCTIONS
Lab test today.  Will send message with allopurinol when lab comes back.  Labs every 4 weeks after starting allopurinol.  Change methotrexate to subcutaneous injection.  Continue folic acid daily.  Continue colchicine daily.  GI referral for the swallowing  Dermatology referral for the victor manuel-oral dermatitis.

## 2023-03-22 ENCOUNTER — CLINICAL SUPPORT (OUTPATIENT)
Dept: REHABILITATION | Facility: HOSPITAL | Age: 68
End: 2023-03-22
Payer: MEDICARE

## 2023-03-22 DIAGNOSIS — M62.81 MUSCLE WEAKNESS (GENERALIZED): ICD-10-CM

## 2023-03-22 DIAGNOSIS — M25.511 CHRONIC PAIN OF BOTH SHOULDERS: Primary | ICD-10-CM

## 2023-03-22 DIAGNOSIS — M25.512 CHRONIC PAIN OF BOTH SHOULDERS: Primary | ICD-10-CM

## 2023-03-22 DIAGNOSIS — Z74.09 DECREASED MOBILITY AND ENDURANCE: ICD-10-CM

## 2023-03-22 DIAGNOSIS — G89.29 CHRONIC PAIN OF BOTH SHOULDERS: Primary | ICD-10-CM

## 2023-03-22 DIAGNOSIS — M25.619 LIMITED RANGE OF MOTION OF SHOULDER: ICD-10-CM

## 2023-03-22 PROCEDURE — 97110 THERAPEUTIC EXERCISES: CPT | Mod: HCNC,PN

## 2023-03-22 PROCEDURE — 97530 THERAPEUTIC ACTIVITIES: CPT | Mod: HCNC,PN

## 2023-03-22 PROCEDURE — 97112 NEUROMUSCULAR REEDUCATION: CPT | Mod: HCNC,PN

## 2023-03-22 NOTE — PROGRESS NOTES
"Physical Therapy Daily Treatment Note     Name: Karthik Bentley  Clinic Number: [id number]    Therapy Diagnosis:   Encounter Diagnoses   Name Primary?    Chronic pain of both shoulders Yes    Muscle weakness (generalized)     Decreased mobility and endurance     Limited range of motion of shoulder      Physician: Ashley Max MD    Visit Date: 3/22/2023    Physician Orders: PT Eval and Treat   Medical Diagnosis from Referral:   M25.511,G89.29,M25.512 (ICD-10-CM) - Chronic pain of both shoulders   M19.011,M19.012 (ICD-10-CM) - Osteoarthritis of both shoulders, unspecified osteoarthritis type      Evaluation Date: 2/6/2023  Plan of Care Expiration: 8/1/23 or 24th Visit  Progress Note: 4/6/2023  Authorization Period Expiration: 2/3/24  Visit # / Visits authorized: 7/ 12  Remaining Visits Scheduled - 02  PTA Consecutive Visits - 0/6    Eval Visit FOTO-  52 (2/6/2023)   6th Visit FOTO  - 51 (3/20/2023)   10th Visit FOTO  -  (Date/Score/Turn Green)   D/C FOTO          -  (Date/Score/Turn Green)      Time In: 9:35  Time Out: 10:15  Billable Time: 40 minutes  Non-Billable Time: 00 minutes    Precautions: Standard, Diabetes, and HTN   Insurance: Payor: [employer] MANAGED MEDICARE / Plan: [employer] TOTAL CARE ADVANTAGE / Product Type: Medicare Advantage /     Subjective     Pt. Reports: doing well with no increase in discomfort since last session. Doing well overall       He is compliant with home exercise program.  Response to previous treatment: "felt like a workout"     Pre-Treatment Pain Rating: 3/10  Post-Treatment Pain Rating: 3/10  Location: bilateral shoulder      Objective     Karthik received therapeutic exercises to develop strength, endurance, ROM, flexibility, and posture for 10 minutes including:    Warm-up    Supine    Theraband Shoulder Sets 2x10, Green Theraband (Flexion/Abduction), bilaterally   Dumbbell External Rotation/Internal Rotation Combo 2x8, #4     Seated    Table slide (flexion, abduction, and scaption) -3' " each   Bilateral Shoulder External Rotation 2x10, Green Theraband   Ball Wall Slides x30 (Flexion/Abduction)     Standing    Single Arm Pull Downs 3x10, Blue Theraband   Dowel Extension 2x10  Towel Internal Rotation 2x10       *Bold exercise performed     Ray received the following manual therapy techniques: Joint mobilizations, Manual traction, Myofacial release, and Soft tissue Mobilization were applied to the: shoulders for 00 minutes, including:  Shoulder Mobilization - Distraction and Posterior Glides, bilaterally  Passive range of motion of the shoulders in all planes, bilaterally     Ray participated in neuromuscular re-education activities to improve: Coordination, Sense, Proprioception, Posture, and Neuromuscular Recruitment for 15 minutes. The following activities were included:  Shoulder Isometric Set 2x10 with a 3' sec hold (Flexion/Abduction/Adduction/External Rotation/Internal Rotation/Extension)   Rhythms Stabilization on Wall with Ball 2x15 (Clockwise/Counter-Clockwise/Sideways/Up-Down), bilaterally   Standing Serratus Punches onto Wall with Ball 3x8, bilaterally   Resisted Bus Drivers 3x8, Red Theraband   90/90 Shoulder Isometric Walks 3x10 feet, (Flexion #6/Abduction #4)   Bent Over Rows 3x8, #19, bilaterally   Serratus Wall Slides with Foam Roller 3x8    Ray participated in dynamic functional therapeutic activities to improve functional performance for 15  minutes, including:  Push Up on Countertop 2x8   Fishing Resistance 2x10, Green Theraband and Dowel   Canoe Paddle Rows 2x10, Green Theraband and Dowel (Forward/Backwards)     Home Exercises Provided and Patient Education Provided     Education provided:   - Continue with HEP    Written Home Exercises Provided: Patient instructed to cont prior HEP.  Exercises were reviewed and Karthik was able to demonstrate them prior to the end of the session.  Ray demonstrated good  understanding of the education provided.     See EMR under Patient Instructions  for exercises provided prior visit.    Assessment     Patient completed the exercise program with no increase in pain to the shoulders or cervical region. New exercises focused on patients hobbies to increase functional capacity. Improvement in range of motion and strength are noted by progressing patient through a more rigorous program with less discomfort and an increase in resistance. No modifications were needed. Patient would continue to benefit from skilled Physical Therapy to decrease pain and increase shoulder mobility.     Karthik Is progressing well towards His goals.     Pt prognosis is Fair.     Pt will continue to benefit from skilled outpatient physical therapy to address the deficits listed in the problem list box on initial evaluation, provide pt/family education and to maximize pt's level of independence in the home and community environment.     Pt's spiritual, cultural and educational needs considered and pt agreeable to plan of care and goals.    Anticipated barriers to physical therapy: Chronic Bilateral Shoulder Pain      Goals:  Short Term Goals: 4 weeks        Goal   Status   Pt. to report decreased bilateral shoulder pain </= 6/10 at worst to increase tolerance for performing overhead reaching   Goal Met     Pt. to demonstrate proper cervical and scapula retraction requiring minimum verbal cues from PT to perform  Goal Met     Pt. to demonstrate an increase in bilateral shoulder elevation AROM to >115 deg. to promote greater ease with lifting tasks.   Goal Met     Pt. to demonstrate an increase in bilateral shoulder internal and external AROM to >10+ deg. to promote greater ease self care skills   Goal Met     Pt to tolerate HEP to improve ROM and independence with ADL's   Goal Met           Long Term Goals: 8 weeks     Goal Status    Pt. to report decreased bilateral shoulder pain </= 3/10 at worst to increase tolerance for performing overhead reaching   Progressing 3/22/2023    Pt. to  demonstrate proper cervical and scapula retraction requiring minimum verbal cues from PT to perform Progressing 3/22/2023    Pt. to demonstrate an increase in bilateral shoulder elevation AROM to >150 deg. to promote greater ease with lifting tasks.  Progressing 3/22/2023   Pt. to demonstrate an increase in bilateral shoulder internal and external AROM to >+15 deg. to promote greater ease self care skills  Progressing 3/22/2023    Pt. to demonstrate increased MMT for bilateral shoulder flexion to 4/5 to improve ADL tolerance   Progressing 3/22/2023    Pt. to demonstrate increased MMT for mid-trap/lower trap strength to 4/5 to improve scapula stability during ADL and home related chores.  Progressing 3/22/2023    Pt. to demonstrate increased MMT to serratus anterior to 4/5 to improve scapula stability during repetitive UE tasks  Progressing 3/22/2023    Pt to tolerate HEP to improve ROM and independence with ADL's  Progressing 3/22/2023        Plan     Progress duration, workload, and resistance levels of the Therapeutic Activities and Exercises if the patient does not exhibit any pain, swelling, or other symptoms. Progress instruction in-home exercise progression and modification, including symptom management.    Babak López, PT, DPT  3/22/2023

## 2023-03-23 ENCOUNTER — SPECIALTY PHARMACY (OUTPATIENT)
Dept: PHARMACY | Facility: CLINIC | Age: 68
End: 2023-03-23
Payer: MEDICARE

## 2023-03-23 NOTE — TELEPHONE ENCOUNTER
Order for Rasuvo. Switch from MTX oral tablets. PA required. Submitted question via [facility] website. Key: [id number] - PA Case ID: [id number]

## 2023-03-27 ENCOUNTER — CLINICAL SUPPORT (OUTPATIENT)
Dept: REHABILITATION | Facility: HOSPITAL | Age: 68
End: 2023-03-27
Payer: MEDICARE

## 2023-03-27 DIAGNOSIS — Z74.09 DECREASED MOBILITY AND ENDURANCE: ICD-10-CM

## 2023-03-27 DIAGNOSIS — M25.511 CHRONIC PAIN OF BOTH SHOULDERS: Primary | ICD-10-CM

## 2023-03-27 DIAGNOSIS — M25.512 CHRONIC PAIN OF BOTH SHOULDERS: Primary | ICD-10-CM

## 2023-03-27 DIAGNOSIS — M62.81 MUSCLE WEAKNESS (GENERALIZED): ICD-10-CM

## 2023-03-27 DIAGNOSIS — M25.619 LIMITED RANGE OF MOTION OF SHOULDER: ICD-10-CM

## 2023-03-27 DIAGNOSIS — G89.29 CHRONIC PAIN OF BOTH SHOULDERS: Primary | ICD-10-CM

## 2023-03-27 PROCEDURE — 97112 NEUROMUSCULAR REEDUCATION: CPT | Mod: HCNC,PN

## 2023-03-27 PROCEDURE — 97530 THERAPEUTIC ACTIVITIES: CPT | Mod: HCNC,PN

## 2023-03-27 PROCEDURE — 97110 THERAPEUTIC EXERCISES: CPT | Mod: HCNC,PN

## 2023-03-27 NOTE — PROGRESS NOTES
"Physical Therapy Daily Treatment Note     Name: Karthik Bentley  Clinic Number: [id number]    Therapy Diagnosis:   Encounter Diagnoses   Name Primary?    Chronic pain of both shoulders Yes    Muscle weakness (generalized)     Decreased mobility and endurance     Limited range of motion of shoulder        Physician: Ashley Max MD    Visit Date: 3/27/2023    Physician Orders: PT Eval and Treat   Medical Diagnosis from Referral:   M25.511,G89.29,M25.512 (ICD-10-CM) - Chronic pain of both shoulders   M19.011,M19.012 (ICD-10-CM) - Osteoarthritis of both shoulders, unspecified osteoarthritis type      Evaluation Date: 2/6/2023  Plan of Care Expiration: 8/1/23 or 24th Visit  Progress Note: 4/6/2023  Authorization Period Expiration: 5/12/2023  Visit # / Visits authorized: 8/ 12  Remaining Visits Scheduled - 02  PTA Consecutive Visits - 0/6    Eval Visit FOTO-  52 (2/6/2023)   6th Visit FOTO  - 51 (3/20/2023)   10th Visit FOTO  -  (Date/Score/Turn Green)   D/C FOTO          -  (Date/Score/Turn Green)      Time In: 10:20  Time Out: 11:00  Billable Time: 40 minutes  Non-Billable Time: 00 minutes    Precautions: Standard, Diabetes, and HTN   Insurance: Payor: [employer] MANAGED MEDICARE / Plan: [employer] TOTAL CARE ADVANTAGE / Product Type: Medicare Advantage /     Subjective     Pt. Reports: some stiffness to the shoulders today.       He is compliant with home exercise program.  Response to previous treatment: "felt like a workout"     Pre-Treatment Pain Rating: 3/10  Post-Treatment Pain Rating: 3/10  Location: bilateral shoulder      Objective     Karthik received therapeutic exercises to develop strength, endurance, ROM, flexibility, and posture for 15 minutes including:    Warm-up    Supine    Theraband Shoulder Sets 2x10, Green Theraband (Flexion/Abduction), bilaterally   Dumbbell External Rotation/Internal Rotation Combo 2x8, #4     Seated    Table slide (flexion, abduction, and scaption) -3' each   Bilateral Shoulder External " Rotation 2x10, Green Theraband   Ball Wall Slides x30 (Flexion/Abduction)     Standing    Single Arm Pull Downs 3x10, Blue Theraband   Dowel Extension 2x10  Towel Internal Rotation 2x10   Shoulder Abduction into External Rotation to 90° into an Overhead Press 2x8, Red Theraband   Horizontal Abduction 2x10, Green Theraband     *Bold exercise performed     Karthik received the following manual therapy techniques: Joint mobilizations, Manual traction, Myofacial release, and Soft tissue Mobilization were applied to the: shoulders for 00 minutes, including:  Shoulder Mobilization - Distraction and Posterior Glides, bilaterally  Passive range of motion of the shoulders in all planes, bilaterally     Ray participated in neuromuscular re-education activities to improve: Coordination, Sense, Proprioception, Posture, and Neuromuscular Recruitment for 10 minutes. The following activities were included:  Shoulder Isometric Set 2x10 with a 3' sec hold (Flexion/Abduction/Adduction/External Rotation/Internal Rotation/Extension)   Rhythms Stabilization on Wall with Ball 2x15 (Clockwise/Counter-Clockwise/Sideways/Up-Down), bilaterally   Standing Serratus Punches onto Wall with Ball 3x8, bilaterally   Resisted Bus Drivers 3x8, Red Theraband   90/90 Shoulder Isometric Walks 3x10 feet, (Flexion #6/Abduction #4)   Bent Over Rows 3x8, #19, bilaterally   Serratus Wall Slides with Foam Roller 3x8  Single Arm Y's 2x10, Red Theraband     Karthik participated in dynamic functional therapeutic activities to improve functional performance for 15  minutes, including:  Push Up on Countertop 2x8   Fishing Resistance 2x10, Green Theraband and Dowel   Canoe Paddle Rows 2x10, Green Theraband and Dowel (Forward/Backwards)   Kneeling Push Ups 3x8  Kneeling Swimmers 2x10, (Patient kneeling on Airex and Mimics Swimming Mechanics with Towel to help slide)   Kneeling Breast Stroke 2x10 Patient kneeling on Airex and Mimics Swimming Mechanics with Towel to help  slide)     Home Exercises Provided and Patient Education Provided     Education provided:   - Continue with HEP    Written Home Exercises Provided: Patient instructed to cont prior HEP.  Exercises were reviewed and Karthik was able to demonstrate them prior to the end of the session.  Karthik demonstrated good  understanding of the education provided.     See EMR under Patient Instructions for exercises provided prior visit.    Assessment     Patient completed the exercise program with no adverse effects. New exercises focused on proprioception with mobility to incorporate patients hobbies into his program. He required minimal cues during new exercises. Patient would continue to benefit from skilled Physical Therapy to increase overhead activity without discomfort.     Karthik Is progressing well towards His goals.     Pt prognosis is Fair.     Pt will continue to benefit from skilled outpatient physical therapy to address the deficits listed in the problem list box on initial evaluation, provide pt/family education and to maximize pt's level of independence in the home and community environment.     Pt's spiritual, cultural and educational needs considered and pt agreeable to plan of care and goals.    Anticipated barriers to physical therapy: Chronic Bilateral Shoulder Pain      Goals:  Short Term Goals: 4 weeks        Goal   Status   Pt. to report decreased bilateral shoulder pain </= 6/10 at worst to increase tolerance for performing overhead reaching   Goal Met     Pt. to demonstrate proper cervical and scapula retraction requiring minimum verbal cues from PT to perform  Goal Met     Pt. to demonstrate an increase in bilateral shoulder elevation AROM to >115 deg. to promote greater ease with lifting tasks.   Goal Met     Pt. to demonstrate an increase in bilateral shoulder internal and external AROM to >10+ deg. to promote greater ease self care skills   Goal Met     Pt to tolerate HEP to improve ROM and independence with  ADL's   Goal Met           Long Term Goals: 8 weeks     Goal Status    Pt. to report decreased bilateral shoulder pain </= 3/10 at worst to increase tolerance for performing overhead reaching   Progressing 3/27/2023    Pt. to demonstrate proper cervical and scapula retraction requiring minimum verbal cues from PT to perform Progressing 3/27/2023    Pt. to demonstrate an increase in bilateral shoulder elevation AROM to >150 deg. to promote greater ease with lifting tasks.  Progressing 3/27/2023   Pt. to demonstrate an increase in bilateral shoulder internal and external AROM to >+15 deg. to promote greater ease self care skills  Progressing 3/27/2023    Pt. to demonstrate increased MMT for bilateral shoulder flexion to 4/5 to improve ADL tolerance   Progressing 3/27/2023    Pt. to demonstrate increased MMT for mid-trap/lower trap strength to 4/5 to improve scapula stability during ADL and home related chores.  Progressing 3/27/2023    Pt. to demonstrate increased MMT to serratus anterior to 4/5 to improve scapula stability during repetitive UE tasks  Progressing 3/27/2023    Pt to tolerate HEP to improve ROM and independence with ADL's  Progressing 3/27/2023        Plan     Progress duration, workload, and resistance levels of the Therapeutic Activities and Exercises if the patient does not exhibit any pain, swelling, or other symptoms. Progress instruction in-home exercise progression and modification, including symptom management.    Babak López, PT, DPT  3/27/2023

## 2023-03-28 NOTE — TELEPHONE ENCOUNTER
Shanika, this is Norma Amaral with [facility].  We are working on your prescription that your doctor has sent us. We will be working with your insurance to get this approved for you. We will be calling you along the way with updates on your medication.  If you have any questions, you can reach us at [phone].    Welcome call outcome: Left voicemail

## 2023-03-29 ENCOUNTER — CLINICAL SUPPORT (OUTPATIENT)
Dept: REHABILITATION | Facility: HOSPITAL | Age: 68
End: 2023-03-29
Payer: MEDICARE

## 2023-03-29 ENCOUNTER — TELEPHONE (OUTPATIENT)
Dept: ADMINISTRATIVE | Facility: HOSPITAL | Age: 68
End: 2023-03-29
Payer: MEDICARE

## 2023-03-29 ENCOUNTER — PATIENT MESSAGE (OUTPATIENT)
Dept: RHEUMATOLOGY | Facility: CLINIC | Age: 68
End: 2023-03-29
Payer: MEDICARE

## 2023-03-29 DIAGNOSIS — G89.29 CHRONIC PAIN OF BOTH SHOULDERS: Primary | ICD-10-CM

## 2023-03-29 DIAGNOSIS — M25.619 LIMITED RANGE OF MOTION OF SHOULDER: ICD-10-CM

## 2023-03-29 DIAGNOSIS — M25.511 CHRONIC PAIN OF BOTH SHOULDERS: Primary | ICD-10-CM

## 2023-03-29 DIAGNOSIS — M25.512 CHRONIC PAIN OF BOTH SHOULDERS: Primary | ICD-10-CM

## 2023-03-29 DIAGNOSIS — Z74.09 DECREASED MOBILITY AND ENDURANCE: ICD-10-CM

## 2023-03-29 DIAGNOSIS — M06.9 RHEUMATOID ARTHRITIS, INVOLVING UNSPECIFIED SITE, UNSPECIFIED WHETHER RHEUMATOID FACTOR PRESENT: Primary | ICD-10-CM

## 2023-03-29 DIAGNOSIS — M62.81 MUSCLE WEAKNESS (GENERALIZED): ICD-10-CM

## 2023-03-29 PROCEDURE — 97112 NEUROMUSCULAR REEDUCATION: CPT | Mod: HCNC,PN

## 2023-03-29 PROCEDURE — 97530 THERAPEUTIC ACTIVITIES: CPT | Mod: HCNC,PN

## 2023-03-29 PROCEDURE — 97110 THERAPEUTIC EXERCISES: CPT | Mod: HCNC,PN

## 2023-03-29 NOTE — TELEPHONE ENCOUNTER
Rasuvo PA denied.  Plan prefers pt to use MTX vials.  Submitting urgent appeal via [facility] website.

## 2023-03-29 NOTE — PROGRESS NOTES
"Physical Therapy Daily Treatment Note     Name: Karthik Bentley  [facility] Number: [id number]    Therapy Diagnosis:   Encounter Diagnoses   Name Primary?    Chronic pain of both shoulders Yes    Muscle weakness (generalized)     Decreased mobility and endurance     Limited range of motion of shoulder        Physician: Ashley Max MD    Visit Date: 3/29/2023    Physician Orders: PT Eval and Treat   Medical Diagnosis from Referral:   M25.511,G89.29,M25.512 (ICD-10-CM) - Chronic pain of both shoulders   M19.011,M19.012 (ICD-10-CM) - Osteoarthritis of both shoulders, unspecified osteoarthritis type      Evaluation Date: 2/6/2023  Plan of Care Expiration: 8/1/23 or 24th Visit  Progress Note: 4/6/2023  Authorization Period Expiration: 5/12/2023  Visit # / Visits authorized: 9/ 12  Remaining Visits Scheduled - 00  PTA Consecutive Visits - 0/6    Eval Visit FOTO-  52 (2/6/2023)   6th Visit FOTO  - 51 (3/20/2023)   D/C FOTO  - 51  (3/29/2023)      Time In: 10:20  Time Out: 11:20  Billable Time: 60 minutes  Non-Billable Time: 00 minutes    Precautions: Standard, Diabetes, and HTN   Insurance: Payor: [employer] MANAGED MEDICARE / Plan: [employer] TOTAL CARE ADVANTAGE / Product Type: Medicare Advantage /     Subjective     Pt. reports 75% improvement since the beginning of PT.  Current pain levels to the bilateral shoulder are 3/10  Limiting factors include:  Morning Stiffness      Improvements factors include:   Range of motion/Mobility   Strength   Able to cut his grass without any pain (push mower)   Sleeping better   Reaching Overhead (curtains)   Getting Dressed   Driving / Reaching for the seatbelt   Grocery store/carrying groceries       He is compliant with home exercise program.  Response to previous treatment: "felt like a workout"     Pre-Treatment Pain Rating: 3/10  Post-Treatment Pain Rating: 3/10  Location: bilateral shoulder      Objective      Range of Motion/Strength:      Shoulder Left Right Pain/Dysfunction with Movement "   AROM         Flexion (180)  160°   160°      Extension (60)  65°    65°      Abduction (180)  160°   160°      Internal rotation (70) L2  L4     ER (70) [at 45° shoulder abduction]  65°   65°         Left UE  5/5 4+/5 4/5 4-/5 3+/5 3/5 3-/5 2+/5 2/5 2-/5 1/5 0 NT   Shoulder  Flexion        x                     Shoulder Extension    x                         Shoulder Abduction      x                      Shoulder  IR      x                       Shoulder  ER     x                        Elbow Extension      x                        Elbow Flexion  x                               Right UE 5/5 4+/5 4/5 4-/5 3+/5 3/5 3-/5 2+/5 2/5 2-/5 1/5 0 NT   Shoulder  Flexion       x                      Shoulder Extension   x                          Shoulder Abduction      x                       Shoulder  IR      x                       Shoulder  ER      x                       Elbow Extension       x                       Elbow Flexion  x                                Ray received therapeutic exercises to develop strength, endurance, ROM, flexibility, and posture for 15 minutes including:    Warm-up    Supine    Theraband Shoulder Sets 2x10, Green Theraband (Flexion/Abduction), bilaterally   Dumbbell External Rotation/Internal Rotation Combo 2x8, #4     Seated    Table slide (flexion, abduction, and scaption) -3' each   Bilateral Shoulder External Rotation 2x10, Green Theraband   Ball Wall Slides x30 (Flexion/Abduction), #2 Ankle Weight    Standing    Single Arm Pull Downs 3x10, Blue Theraband   Dowel Extension 2x10  Towel Internal Rotation 2x10   Shoulder Abduction into External Rotation to 90° into an Overhead Press 2x10, Red Theraband   Horizontal Abduction 2x10, Green Theraband   Tricep Extensions 2x10, (Dowel and 2 Theraband attached to Doorway) Green Theraband     *Bold exercise performed     Ray received the following manual therapy techniques: Joint mobilizations, Manual traction, Myofacial release, and Soft tissue  Mobilization were applied to the: shoulders for 00 minutes, including:  Shoulder Mobilization - Distraction and Posterior Glides, bilaterally  Passive range of motion of the shoulders in all planes, bilaterally     Ray participated in neuromuscular re-education activities to improve: Coordination, Sense, Proprioception, Posture, and Neuromuscular Recruitment for 15 minutes. The following activities were included:  Shoulder Isometric Set 2x10 with a 3' sec hold (Flexion/Abduction/Adduction/External Rotation/Internal Rotation/Extension)   Rhythms Stabilization on Wall with Ball 2x15 (Clockwise/Counter-Clockwise/Sideways/Up-Down), bilaterally   Standing Serratus Punches onto Wall with Ball 3x8, bilaterally   Resisted Bus Drivers 3x8, Red Theraband   90/90 Shoulder Isometric Walks 3x10 feet, (Flexion #6/Abduction #4)   Bent Over Rows 3x8, #19, bilaterally   Serratus Wall Slides with Foam Roller 3x8  Single Arm Y's 2x10, Green Theraband   Rows with Dowel 2x10 (Dowel and 2 Theraband attached to Doorway) Green Theraband   Pull Downs 2x10, (Dowel and 2 Theraband attached to Doorway) Green Theraband     Karthik participated in dynamic functional therapeutic activities to improve functional performance for 25  minutes, including:  Push Up on Countertop 2x8   Fishing Resistance 2x10, Green Theraband and Dowel   Canoe Paddle Rows 2x10, Green Theraband and Dowel (Forward/Backwards)   Kneeling Push Ups 3x8  Kneeling Swimmers 2x10, (Patient kneeling on Airex and Mimics Swimming Mechanics with Towel to help slide)   Kneeling Breast Stroke 2x10 Patient kneeling on Airex and Mimics Swimming Mechanics with Towel to help slide)   Kayak Mimicing Paddle 2x2 min (Forward/Backwards) (Dowel and 2 Theraband attached to Doorway) Green Theraband     Home Exercises Provided and Patient Education Provided     Education provided:   - Continue with HEP    Written Home Exercises Provided: Patient instructed to cont prior HEP.  Exercises were reviewed  and Karthik was able to demonstrate them prior to the end of the session.  Karthik demonstrated good  understanding of the education provided.     See EMR under Patient Instructions for exercises provided prior visit.    Assessment     Pt. has been to [facility] for 11 visits due to bilateral shoulder pain. Improvements have been noted with active range of motion, overhead activities, with activities of daily livings (donning shirt/self care), strength, sleeping capacity, and pain levels. Limitations still present are strength and neuromuscular stability of the shoulders and mid back. Continuation of exercise program was well tolerated with no adverse effects. New exercises focused on stability and strength of External Rotation/Internal Rotation in supine to promote mobility and stability. At this time, patient is fully functional without pain and is ready to be discharged after meeting majority of goals.     Karthik Is progressing well towards His goals.     Pt prognosis is Fair.     Pt will continue to benefit from skilled outpatient physical therapy to address the deficits listed in the problem list box on initial evaluation, provide pt/family education and to maximize pt's level of independence in the home and community environment.     Pt's spiritual, cultural and educational needs considered and pt agreeable to plan of care and goals.    Anticipated barriers to physical therapy: Chronic Bilateral Shoulder Pain      Goals:  Short Term Goals: 4 weeks        Goal   Status   Pt. to report decreased bilateral shoulder pain </= 6/10 at worst to increase tolerance for performing overhead reaching   Goal Met     Pt. to demonstrate proper cervical and scapula retraction requiring minimum verbal cues from PT to perform  Goal Met     Pt. to demonstrate an increase in bilateral shoulder elevation AROM to >115 deg. to promote greater ease with lifting tasks.   Goal Met     Pt. to demonstrate an increase in  bilateral shoulder internal and external AROM to >10+ deg. to promote greater ease self care skills   Goal Met     Pt to tolerate HEP to improve ROM and independence with ADL's   Goal Met           Long Term Goals: 8 weeks     Goal Status    Pt. to report decreased bilateral shoulder pain </= 3/10 at worst to increase tolerance for performing overhead reaching  Goal Met    Pt. to demonstrate proper cervical and scapula retraction requiring minimum verbal cues from PT to perform Goal Met   Pt. to demonstrate an increase in bilateral shoulder elevation AROM to >150 deg. to promote greater ease with lifting tasks.  Goal Met   Pt. to demonstrate an increase in bilateral shoulder internal and external AROM to >+15 deg. to promote greater ease self care skills  Partially Met    Pt. to demonstrate increased MMT for bilateral shoulder flexion to 4/5 to improve ADL tolerance   Not Met    Pt. to demonstrate increased MMT for mid-trap/lower trap strength to 4/5 to improve scapula stability during ADL and home related chores.  Goal Met   Pt. to demonstrate increased MMT to serratus anterior to 4/5 to improve scapula stability during repetitive UE tasks  Goal Met   Pt to tolerate HEP to improve ROM and independence with ADL's  Goal Met       Plan     Discharge patient due to meeting goals and reaching functional range of motion/strength.     Babak López, PT, DPT  3/29/2023

## 2023-03-29 NOTE — TELEPHONE ENCOUNTER
Dani BRENNAN approved until 12/31/2023.    BI complete   Rx [employer]    Copay $99   ( LIS Level: none)  Pt is in initial phase.  Will be in initial phase with this rx.  [facility] in network    Outgoing call to pt to inform him of approval and discuss if copay is affordable or if he would like to proceed with PAP.  Discussed PAP process with pt.  Pt stated he did not have issues with his last oral MTX dose and would like to continue with oral tablet for a few more weeks.  Informed pt [facility] will profile prescription and he can contact [facility] back if he decides to proceed with Rasuvo.  Pt voiced understanding.  Pt requested PAP application be mailed to him so he can send to [facility] if he does decide to proceed.  Profiling prescription in [facility] and closing ptout.

## 2023-04-03 ENCOUNTER — TELEPHONE (OUTPATIENT)
Dept: ADMINISTRATIVE | Facility: HOSPITAL | Age: 68
End: 2023-04-03
Payer: MEDICARE

## 2023-04-10 RX ORDER — METHOTREXATE 2.5 MG/1
TABLET ORAL
Qty: 40 TABLET | Refills: 2 | Status: SHIPPED | OUTPATIENT
Start: 2023-04-10 | End: 2023-08-30 | Stop reason: SDUPTHER

## 2023-04-18 LAB
B5801 INTERPRETATION: NORMAL
HLA-B27 RELATED AG QL: NORMAL
HLA-B27 RELATED AG QL: POSITIVE

## 2023-04-27 ENCOUNTER — PATIENT MESSAGE (OUTPATIENT)
Dept: ORTHOPEDICS | Facility: CLINIC | Age: 68
End: 2023-04-27
Payer: MEDICARE

## 2023-05-08 ENCOUNTER — PATIENT MESSAGE (OUTPATIENT)
Dept: ORTHOPEDICS | Facility: CLINIC | Age: 68
End: 2023-05-08
Payer: MEDICARE

## 2023-05-17 ENCOUNTER — PATIENT MESSAGE (OUTPATIENT)
Dept: RHEUMATOLOGY | Facility: CLINIC | Age: 68
End: 2023-05-17
Payer: MEDICARE

## 2023-05-17 DIAGNOSIS — M10.9 ACUTE GOUT INVOLVING TOE OF LEFT FOOT, UNSPECIFIED CAUSE: ICD-10-CM

## 2023-05-17 RX ORDER — FEBUXOSTAT 40 MG/1
40 TABLET, FILM COATED ORAL DAILY
Qty: 30 TABLET | Refills: 3 | Status: ACTIVE | OUTPATIENT
Start: 2023-05-17 | End: 2023-09-12 | Stop reason: SDUPTHER

## 2023-05-22 ENCOUNTER — LAB VISIT (OUTPATIENT)
Dept: LAB | Facility: HOSPITAL | Age: 68
End: 2023-05-22
Attending: STUDENT IN AN ORGANIZED HEALTH CARE EDUCATION/TRAINING PROGRAM
Payer: MEDICARE

## 2023-05-22 DIAGNOSIS — M06.9 RHEUMATOID ARTHRITIS, INVOLVING UNSPECIFIED SITE, UNSPECIFIED WHETHER RHEUMATOID FACTOR PRESENT: ICD-10-CM

## 2023-05-22 DIAGNOSIS — M1A.0710 CHRONIC IDIOPATHIC GOUT INVOLVING TOE OF RIGHT FOOT WITHOUT TOPHUS: ICD-10-CM

## 2023-05-22 LAB
ALBUMIN SERPL BCP-MCNC: 3.7 G/DL (ref 3.5–5.2)
ALP SERPL-CCNC: 75 U/L (ref 55–135)
ALT SERPL W/O P-5'-P-CCNC: 31 U/L (ref 10–44)
ANION GAP SERPL CALC-SCNC: 10 MMOL/L (ref 8–16)
AST SERPL-CCNC: 23 U/L (ref 10–40)
BASOPHILS # BLD AUTO: 0.02 K/UL (ref 0–0.2)
BASOPHILS NFR BLD: 0.3 % (ref 0–1.9)
BILIRUB SERPL-MCNC: 0.9 MG/DL (ref 0.1–1)
BUN SERPL-MCNC: 15 MG/DL (ref 8–23)
CALCIUM SERPL-MCNC: 9.4 MG/DL (ref 8.7–10.5)
CHLORIDE SERPL-SCNC: 101 MMOL/L (ref 95–110)
CO2 SERPL-SCNC: 26 MMOL/L (ref 23–29)
CREAT SERPL-MCNC: 1.2 MG/DL (ref 0.5–1.4)
CRP SERPL-MCNC: 13.1 MG/L (ref 0–8.2)
DIFFERENTIAL METHOD: ABNORMAL
EOSINOPHIL # BLD AUTO: 0 K/UL (ref 0–0.5)
EOSINOPHIL NFR BLD: 0.4 % (ref 0–8)
ERYTHROCYTE [DISTWIDTH] IN BLOOD BY AUTOMATED COUNT: 15.9 % (ref 11.5–14.5)
ERYTHROCYTE [SEDIMENTATION RATE] IN BLOOD BY PHOTOMETRIC METHOD: 6 MM/HR (ref 0–23)
EST. GFR  (NO RACE VARIABLE): >60 ML/MIN/1.73 M^2
GLUCOSE SERPL-MCNC: 138 MG/DL (ref 70–110)
HCT VFR BLD AUTO: 36.4 % (ref 40–54)
HGB BLD-MCNC: 11.8 G/DL (ref 14–18)
IMM GRANULOCYTES # BLD AUTO: 0.02 K/UL (ref 0–0.04)
IMM GRANULOCYTES NFR BLD AUTO: 0.3 % (ref 0–0.5)
LYMPHOCYTES # BLD AUTO: 1.4 K/UL (ref 1–4.8)
LYMPHOCYTES NFR BLD: 20.3 % (ref 18–48)
MCH RBC QN AUTO: 31.6 PG (ref 27–31)
MCHC RBC AUTO-ENTMCNC: 32.4 G/DL (ref 32–36)
MCV RBC AUTO: 97 FL (ref 82–98)
MONOCYTES # BLD AUTO: 0.7 K/UL (ref 0.3–1)
MONOCYTES NFR BLD: 9.5 % (ref 4–15)
NEUTROPHILS # BLD AUTO: 4.9 K/UL (ref 1.8–7.7)
NEUTROPHILS NFR BLD: 69.2 % (ref 38–73)
NRBC BLD-RTO: 0 /100 WBC
PLATELET # BLD AUTO: 213 K/UL (ref 150–450)
PMV BLD AUTO: 10.4 FL (ref 9.2–12.9)
POTASSIUM SERPL-SCNC: 4.4 MMOL/L (ref 3.5–5.1)
PROT SERPL-MCNC: 6.8 G/DL (ref 6–8.4)
RBC # BLD AUTO: 3.74 M/UL (ref 4.6–6.2)
SODIUM SERPL-SCNC: 137 MMOL/L (ref 136–145)
WBC # BLD AUTO: 7.03 K/UL (ref 3.9–12.7)

## 2023-05-22 PROCEDURE — 85652 RBC SED RATE AUTOMATED: CPT | Mod: HCNC | Performed by: STUDENT IN AN ORGANIZED HEALTH CARE EDUCATION/TRAINING PROGRAM

## 2023-05-22 PROCEDURE — 36415 COLL VENOUS BLD VENIPUNCTURE: CPT | Mod: HCNC | Performed by: STUDENT IN AN ORGANIZED HEALTH CARE EDUCATION/TRAINING PROGRAM

## 2023-05-22 PROCEDURE — 86140 C-REACTIVE PROTEIN: CPT | Mod: HCNC | Performed by: STUDENT IN AN ORGANIZED HEALTH CARE EDUCATION/TRAINING PROGRAM

## 2023-05-22 PROCEDURE — 85025 COMPLETE CBC W/AUTO DIFF WBC: CPT | Mod: HCNC | Performed by: STUDENT IN AN ORGANIZED HEALTH CARE EDUCATION/TRAINING PROGRAM

## 2023-05-22 PROCEDURE — 80053 COMPREHEN METABOLIC PANEL: CPT | Mod: HCNC | Performed by: STUDENT IN AN ORGANIZED HEALTH CARE EDUCATION/TRAINING PROGRAM

## 2023-05-22 RX ORDER — COLCHICINE 0.6 MG/1
0.6 TABLET ORAL DAILY
Qty: 90 TABLET | Refills: 2 | Status: SHIPPED | OUTPATIENT
Start: 2023-05-22 | End: 2023-09-12 | Stop reason: SDUPTHER

## 2023-05-24 ENCOUNTER — PATIENT MESSAGE (OUTPATIENT)
Dept: RHEUMATOLOGY | Facility: CLINIC | Age: 68
End: 2023-05-24
Payer: MEDICARE

## 2023-05-25 ENCOUNTER — TELEPHONE (OUTPATIENT)
Dept: PHARMACY | Facility: CLINIC | Age: 68
End: 2023-05-25
Payer: MEDICARE

## 2023-06-27 ENCOUNTER — LAB VISIT (OUTPATIENT)
Dept: LAB | Facility: HOSPITAL | Age: 68
End: 2023-06-27
Attending: STUDENT IN AN ORGANIZED HEALTH CARE EDUCATION/TRAINING PROGRAM
Payer: MEDICARE

## 2023-06-27 ENCOUNTER — OFFICE VISIT (OUTPATIENT)
Dept: RHEUMATOLOGY | Facility: CLINIC | Age: 68
End: 2023-06-27
Payer: MEDICARE

## 2023-06-27 VITALS
SYSTOLIC BLOOD PRESSURE: 110 MMHG | WEIGHT: 172 LBS | DIASTOLIC BLOOD PRESSURE: 74 MMHG | BODY MASS INDEX: 26.07 KG/M2 | HEIGHT: 68 IN | HEART RATE: 62 BPM

## 2023-06-27 DIAGNOSIS — M1A.0710 CHRONIC IDIOPATHIC GOUT INVOLVING TOE OF RIGHT FOOT WITHOUT TOPHUS: ICD-10-CM

## 2023-06-27 DIAGNOSIS — M1A.09X0 IDIOPATHIC CHRONIC GOUT OF MULTIPLE SITES WITHOUT TOPHUS: ICD-10-CM

## 2023-06-27 DIAGNOSIS — M06.9 RHEUMATOID ARTHRITIS, INVOLVING UNSPECIFIED SITE, UNSPECIFIED WHETHER RHEUMATOID FACTOR PRESENT: Primary | ICD-10-CM

## 2023-06-27 LAB
ALBUMIN SERPL BCP-MCNC: 3.9 G/DL (ref 3.5–5.2)
ALP SERPL-CCNC: 72 U/L (ref 55–135)
ALT SERPL W/O P-5'-P-CCNC: 20 U/L (ref 10–44)
ANION GAP SERPL CALC-SCNC: 8 MMOL/L (ref 8–16)
AST SERPL-CCNC: 19 U/L (ref 10–40)
BASOPHILS # BLD AUTO: 0.02 K/UL (ref 0–0.2)
BASOPHILS NFR BLD: 0.3 % (ref 0–1.9)
BILIRUB SERPL-MCNC: 0.9 MG/DL (ref 0.1–1)
BUN SERPL-MCNC: 11 MG/DL (ref 8–23)
CALCIUM SERPL-MCNC: 9.6 MG/DL (ref 8.7–10.5)
CHLORIDE SERPL-SCNC: 105 MMOL/L (ref 95–110)
CO2 SERPL-SCNC: 26 MMOL/L (ref 23–29)
CREAT SERPL-MCNC: 1.1 MG/DL (ref 0.5–1.4)
CRP SERPL-MCNC: 0.4 MG/L (ref 0–8.2)
DIFFERENTIAL METHOD: ABNORMAL
EOSINOPHIL # BLD AUTO: 0.2 K/UL (ref 0–0.5)
EOSINOPHIL NFR BLD: 2.5 % (ref 0–8)
ERYTHROCYTE [DISTWIDTH] IN BLOOD BY AUTOMATED COUNT: 16 % (ref 11.5–14.5)
EST. GFR  (NO RACE VARIABLE): >60 ML/MIN/1.73 M^2
GLUCOSE SERPL-MCNC: 127 MG/DL (ref 70–110)
HCT VFR BLD AUTO: 39.1 % (ref 40–54)
HGB BLD-MCNC: 12.4 G/DL (ref 14–18)
IMM GRANULOCYTES # BLD AUTO: 0 K/UL (ref 0–0.04)
IMM GRANULOCYTES NFR BLD AUTO: 0 % (ref 0–0.5)
LYMPHOCYTES # BLD AUTO: 2 K/UL (ref 1–4.8)
LYMPHOCYTES NFR BLD: 33.4 % (ref 18–48)
MCH RBC QN AUTO: 31.6 PG (ref 27–31)
MCHC RBC AUTO-ENTMCNC: 31.7 G/DL (ref 32–36)
MCV RBC AUTO: 100 FL (ref 82–98)
MONOCYTES # BLD AUTO: 0.5 K/UL (ref 0.3–1)
MONOCYTES NFR BLD: 8.5 % (ref 4–15)
NEUTROPHILS # BLD AUTO: 3.3 K/UL (ref 1.8–7.7)
NEUTROPHILS NFR BLD: 55.3 % (ref 38–73)
NRBC BLD-RTO: 0 /100 WBC
PLATELET # BLD AUTO: 228 K/UL (ref 150–450)
PMV BLD AUTO: 10.6 FL (ref 9.2–12.9)
POTASSIUM SERPL-SCNC: 4.5 MMOL/L (ref 3.5–5.1)
PROT SERPL-MCNC: 7 G/DL (ref 6–8.4)
RBC # BLD AUTO: 3.93 M/UL (ref 4.6–6.2)
SODIUM SERPL-SCNC: 139 MMOL/L (ref 136–145)
URATE SERPL-MCNC: 6 MG/DL (ref 3.4–7)
WBC # BLD AUTO: 6.02 K/UL (ref 3.9–12.7)

## 2023-06-27 PROCEDURE — 3074F PR MOST RECENT SYSTOLIC BLOOD PRESSURE < 130 MM HG: ICD-10-PCS | Mod: HCNC,CPTII,GC,S$GLB | Performed by: STUDENT IN AN ORGANIZED HEALTH CARE EDUCATION/TRAINING PROGRAM

## 2023-06-27 PROCEDURE — 1159F PR MEDICATION LIST DOCUMENTED IN MEDICAL RECORD: ICD-10-PCS | Mod: HCNC,CPTII,GC,S$GLB | Performed by: STUDENT IN AN ORGANIZED HEALTH CARE EDUCATION/TRAINING PROGRAM

## 2023-06-27 PROCEDURE — 1159F MED LIST DOCD IN RCRD: CPT | Mod: HCNC,CPTII,GC,S$GLB | Performed by: STUDENT IN AN ORGANIZED HEALTH CARE EDUCATION/TRAINING PROGRAM

## 2023-06-27 PROCEDURE — 99999 PR PBB SHADOW E&M-EST. PATIENT-LVL IV: ICD-10-PCS | Mod: PBBFAC,HCNC,GC, | Performed by: STUDENT IN AN ORGANIZED HEALTH CARE EDUCATION/TRAINING PROGRAM

## 2023-06-27 PROCEDURE — 3074F SYST BP LT 130 MM HG: CPT | Mod: HCNC,CPTII,GC,S$GLB | Performed by: STUDENT IN AN ORGANIZED HEALTH CARE EDUCATION/TRAINING PROGRAM

## 2023-06-27 PROCEDURE — 3066F NEPHROPATHY DOC TX: CPT | Mod: HCNC,CPTII,GC,S$GLB | Performed by: STUDENT IN AN ORGANIZED HEALTH CARE EDUCATION/TRAINING PROGRAM

## 2023-06-27 PROCEDURE — 1125F AMNT PAIN NOTED PAIN PRSNT: CPT | Mod: HCNC,CPTII,GC,S$GLB | Performed by: STUDENT IN AN ORGANIZED HEALTH CARE EDUCATION/TRAINING PROGRAM

## 2023-06-27 PROCEDURE — 99214 PR OFFICE/OUTPT VISIT, EST, LEVL IV, 30-39 MIN: ICD-10-PCS | Mod: HCNC,GC,S$GLB, | Performed by: STUDENT IN AN ORGANIZED HEALTH CARE EDUCATION/TRAINING PROGRAM

## 2023-06-27 PROCEDURE — 3061F PR NEG MICROALBUMINURIA RESULT DOCUMENTED/REVIEW: ICD-10-PCS | Mod: HCNC,CPTII,GC,S$GLB | Performed by: STUDENT IN AN ORGANIZED HEALTH CARE EDUCATION/TRAINING PROGRAM

## 2023-06-27 PROCEDURE — 4010F PR ACE/ARB THEARPY RXD/TAKEN: ICD-10-PCS | Mod: HCNC,CPTII,GC,S$GLB | Performed by: STUDENT IN AN ORGANIZED HEALTH CARE EDUCATION/TRAINING PROGRAM

## 2023-06-27 PROCEDURE — 3044F HG A1C LEVEL LT 7.0%: CPT | Mod: HCNC,CPTII,GC,S$GLB | Performed by: STUDENT IN AN ORGANIZED HEALTH CARE EDUCATION/TRAINING PROGRAM

## 2023-06-27 PROCEDURE — 3044F PR MOST RECENT HEMOGLOBIN A1C LEVEL <7.0%: ICD-10-PCS | Mod: HCNC,CPTII,GC,S$GLB | Performed by: STUDENT IN AN ORGANIZED HEALTH CARE EDUCATION/TRAINING PROGRAM

## 2023-06-27 PROCEDURE — 3008F PR BODY MASS INDEX (BMI) DOCUMENTED: ICD-10-PCS | Mod: HCNC,CPTII,GC,S$GLB | Performed by: STUDENT IN AN ORGANIZED HEALTH CARE EDUCATION/TRAINING PROGRAM

## 2023-06-27 PROCEDURE — 3066F PR DOCUMENTATION OF TREATMENT FOR NEPHROPATHY: ICD-10-PCS | Mod: HCNC,CPTII,GC,S$GLB | Performed by: STUDENT IN AN ORGANIZED HEALTH CARE EDUCATION/TRAINING PROGRAM

## 2023-06-27 PROCEDURE — 86140 C-REACTIVE PROTEIN: CPT | Mod: HCNC | Performed by: STUDENT IN AN ORGANIZED HEALTH CARE EDUCATION/TRAINING PROGRAM

## 2023-06-27 PROCEDURE — 80053 COMPREHEN METABOLIC PANEL: CPT | Mod: HCNC | Performed by: STUDENT IN AN ORGANIZED HEALTH CARE EDUCATION/TRAINING PROGRAM

## 2023-06-27 PROCEDURE — 1160F PR REVIEW ALL MEDS BY PRESCRIBER/CLIN PHARMACIST DOCUMENTED: ICD-10-PCS | Mod: HCNC,CPTII,GC,S$GLB | Performed by: STUDENT IN AN ORGANIZED HEALTH CARE EDUCATION/TRAINING PROGRAM

## 2023-06-27 PROCEDURE — 36415 COLL VENOUS BLD VENIPUNCTURE: CPT | Mod: HCNC | Performed by: STUDENT IN AN ORGANIZED HEALTH CARE EDUCATION/TRAINING PROGRAM

## 2023-06-27 PROCEDURE — 1125F PR PAIN SEVERITY QUANTIFIED, PAIN PRESENT: ICD-10-PCS | Mod: HCNC,CPTII,GC,S$GLB | Performed by: STUDENT IN AN ORGANIZED HEALTH CARE EDUCATION/TRAINING PROGRAM

## 2023-06-27 PROCEDURE — 99214 OFFICE O/P EST MOD 30 MIN: CPT | Mod: HCNC,GC,S$GLB, | Performed by: STUDENT IN AN ORGANIZED HEALTH CARE EDUCATION/TRAINING PROGRAM

## 2023-06-27 PROCEDURE — 3008F BODY MASS INDEX DOCD: CPT | Mod: HCNC,CPTII,GC,S$GLB | Performed by: STUDENT IN AN ORGANIZED HEALTH CARE EDUCATION/TRAINING PROGRAM

## 2023-06-27 PROCEDURE — 99999 PR PBB SHADOW E&M-EST. PATIENT-LVL IV: CPT | Mod: PBBFAC,HCNC,GC, | Performed by: STUDENT IN AN ORGANIZED HEALTH CARE EDUCATION/TRAINING PROGRAM

## 2023-06-27 PROCEDURE — 3061F NEG MICROALBUMINURIA REV: CPT | Mod: HCNC,CPTII,GC,S$GLB | Performed by: STUDENT IN AN ORGANIZED HEALTH CARE EDUCATION/TRAINING PROGRAM

## 2023-06-27 PROCEDURE — 1160F RVW MEDS BY RX/DR IN RCRD: CPT | Mod: HCNC,CPTII,GC,S$GLB | Performed by: STUDENT IN AN ORGANIZED HEALTH CARE EDUCATION/TRAINING PROGRAM

## 2023-06-27 PROCEDURE — 4010F ACE/ARB THERAPY RXD/TAKEN: CPT | Mod: HCNC,CPTII,GC,S$GLB | Performed by: STUDENT IN AN ORGANIZED HEALTH CARE EDUCATION/TRAINING PROGRAM

## 2023-06-27 PROCEDURE — 84550 ASSAY OF BLOOD/URIC ACID: CPT | Mod: HCNC | Performed by: STUDENT IN AN ORGANIZED HEALTH CARE EDUCATION/TRAINING PROGRAM

## 2023-06-27 PROCEDURE — 85025 COMPLETE CBC W/AUTO DIFF WBC: CPT | Mod: HCNC | Performed by: STUDENT IN AN ORGANIZED HEALTH CARE EDUCATION/TRAINING PROGRAM

## 2023-06-27 PROCEDURE — 3078F PR MOST RECENT DIASTOLIC BLOOD PRESSURE < 80 MM HG: ICD-10-PCS | Mod: HCNC,CPTII,GC,S$GLB | Performed by: STUDENT IN AN ORGANIZED HEALTH CARE EDUCATION/TRAINING PROGRAM

## 2023-06-27 PROCEDURE — 3078F DIAST BP <80 MM HG: CPT | Mod: HCNC,CPTII,GC,S$GLB | Performed by: STUDENT IN AN ORGANIZED HEALTH CARE EDUCATION/TRAINING PROGRAM

## 2023-06-27 NOTE — PROGRESS NOTES
Rapid3 Question Responses and Scores 6/25/2023   MDHAQ Score 0.7   Psychologic Score 1.1   Pain Score 1.5   When you awakened in the morning OVER THE LAST WEEK, did you feel stiff? Yes   If Yes, please indicate the number of hours until you are as limber as you will be for the day 1   Fatigue Score 1   Global Health Score 1.5   RAPID3 Score 1.78     Answers submitted by the patient for this visit:  Rheumatology Questionnaire (Submitted on 6/25/2023)  fever: No  eye redness: No  mouth sores: No  headaches: No  shortness of breath: No  chest pain: No  trouble swallowing: No  diarrhea: No  constipation: No  unexpected weight change: No  genital sore: No  During the last 3 days, have you had a skin rash?: No  Bruises or bleeds easily: No  cough: No

## 2023-06-27 NOTE — PROGRESS NOTES
Subjective:       Patient ID: Karthik Bentley is a 68 y.o. male.    Chief Complaint: RA    HPI  Interval Hx:  Last visit he had recently had left great toe gout attack which resolved with colchicine although oddly there was no improvement with prednisone. We wanted to start allopurinol but his HLA  was positive so we started febuxostat. He started febuxostat about a month ago. He continues to take colchicine 0.6 mg daily. He was no longer having vomiting with tablets so he didn't want to do the MTX SC injections. He continues MTX 10 tabs (25 mg) weekly split dosing and folic acid daily. He denies any gout attacks since last visit. His left hand mobility is better and can make a fist with the left hand. He has some  He completed PT for the shoulders and continues to do home exercises. He is still having pills sticking in his throat and having to eat pudding due to swallowing issues.       Initial Hx:  Patient is a 66 yo M with diabetes who presents for Rheumatology consultation for gout suspected in the left hand. He initially had RIGHT carpal tunnel surgery. He now presents with pain and swelling in the LEFT thumb and 2nd MCP. He also has carpal tunnel in the left wrist. He started with weakness in the left hand in mid-April. He complained to the OT about this but they were only treating his right hand post carpal tunnel surgery on 4/1/22. Left hand weakness and stiffness got worse until now he can't make a fist. A few months ago he developed swelling at the left thumb and 2nd finger MCP. Pain was not too much but a few days ago it became very tender 9/10 even with light touch. He denies erythema or warmth ever. He contacted Dr. Warren who started a medrol dosepack which he started last night. This helped with the pain.    He had a gout attacks in the past when he was in [employer] working. He had flares in the right big toe; he would wake up from sleep with severe pain in the great toe; there was a little  swelling, some erythema, sometimes warmth. Mostly it was painful with bearing weight. Flares lasted 3 days and resolved with a medication he took there and can't remember the name. It happens once or twice a year and he still gets flares in the right great toe, last was 3-4 months ago and continues to abort the flare with the medication. Never had joint fluid aspiration. He has not had a gout flare in other joints. He has never had a kidney stone. No known family history of gout. Has never been on allopurinol, colchicine, or steroids for gout. 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 Never was much of an alcohol drinker - no alcohol anymore.    He also had shoulder pain. 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  No acute fracture or dislocation.  No bone destruction identified..  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 He was referred to Dr. Degroot and got bilateral shoulder injections. He was doing well in PT for the shoulders but one day woke up with terrible pain in the shoulders and reduced ROM. He told Dr. Degroot who then referred to neck and spine Ortho.    He had MRI cervical spine 8/24/22: 1. Multilevel cervical spondylosis resulting in varying degrees of foraminal narrowing and spinal canal stenosis most notable at C4-C5 and C5-C6.  2. Short segment cord signal abnormality at C4-C5 which is suggestive chronic compressive myelopathy.  He had epidural injection for this and a medrol dosepack prior (7/28/22). He is on Celebrex for this.    Social Hx: never smoker; doesn't drink alcohol, never was a heavy drinker.   Family Hx: no family history of autoimmune disease    Review of Systems   Constitutional:  Negative for fever and unexpected weight change.   HENT:  Negative for mouth sores and  "trouble swallowing.    Eyes:  Negative for redness.   Respiratory:  Negative for cough and shortness of breath.    Cardiovascular:  Negative for chest pain.   Gastrointestinal:  Negative for constipation and diarrhea.   Genitourinary:  Negative for genital sores.   Skin:  Negative for rash.   Neurological:  Negative for headaches.   Hematological:  Does not bruise/bleed easily.       Objective:   /74   Pulse 62   Ht 5' 8" (1.727 m)   Wt 78 kg (172 lb)   BMI 26.15 kg/m²      Physical Exam   Constitutional: He is oriented to person, place, and time. No distress.   HENT:   Head: Normocephalic and atraumatic.   Eyes: Pupils are equal, round, and reactive to light.   Cardiovascular: Normal rate and regular rhythm.   No murmur heard.  Pulmonary/Chest: Effort normal and breath sounds normal. No respiratory distress. He has no wheezes.   Abdominal: Soft. Bowel sounds are normal. There is no abdominal tenderness.   Musculoskeletal:         General: No deformity. Normal range of motion.      Cervical back: Normal range of motion.      Comments: Residual synovitis in left 1st and 2nd MCP, minimal. No tender joints. ROM of shoulders significantly improved, almost 100%.   Neurological: He is alert and oriented to person, place, and time.   Skin: Skin is warm and dry.   Psychiatric: Affect and judgment normal.       11/18/2022 1/4/2023 2/3/2023 3/21/2023   Tender (WALKER-28) 0 / 28  0 / 28 4 / 28  0 / 28    Swollen (WALKER-28) 1 / 28  3 / 28  3 / 28  2 / 28    Provider Global 10 mm 0 mm 30 mm 20 mm   Patient Global 50 mm 55 mm 55 mm 50 mm   ESR 39 mm/hr 9 mm/hr 12 mm/hr 14 mm/hr   CRP 3.3 mg/L 0.5 mg/L 1.1 mg/L 1.4 mg/L   WALKER-28 (ESR) 3.54 (Moderate disease activity) 2.79 (Low disease activity) 4.11 (Moderate disease activity) 2.94 (Low disease activity)   WALKER-28 (CRP) 2.47 (Remission) 2.36 (Remission) 3.6 (Moderate disease activity) 2.37 (Remission)   CDAI Score 7  8.5  15.5  9       There is currently no information " documented on the homunculus. Go to the Rheumatology activity and complete the homunculus joint exam.    Assessment:       1. Rheumatoid arthritis, involving unspecified site, unspecified whether rheumatoid factor present    2. Idiopathic chronic gout of multiple sites without tophus              Plan:       Problem List Items Addressed This Visit    None  Visit Diagnoses       Rheumatoid arthritis, involving unspecified site, unspecified whether rheumatoid factor present    -  Primary    Relevant Orders    CBC Auto Differential    Comprehensive Metabolic Panel    C-Reactive Protein    Sedimentation rate    Uric Acid    Idiopathic chronic gout of multiple sites without tophus        Relevant Orders    CBC Auto Differential    Comprehensive Metabolic Panel    C-Reactive Protein    Sedimentation rate    Uric Acid    C-REACTIVE PROTEIN    URIC ACID              Patient is a 66 yo M with diabetes who presents for Rheumatology follow up for RA and gout.    On initial presentation:  Reviewed hand XR: appears to have marginal erosions 1st, 2nd, possibly 4th MCPs and calcium deposit 3rd MCP  Has synovitis on exam.     RF 25 (mildly elevated)  CCP negative  IESHA negative  SPEP and ОЛЬГА were normal  Anti-carP antibody negative  Large effusion on the right knee, aspirated last visit. Fluid had 111 WBCs and no crystals.    MRI hands/wrists RA protocol: MR imaging findings most concerning for an inflammatory arthropathy favoring rheumatoid arthritis with moderate disease activity and mild risk of future aggressiveness.    Rheumatoid Arthritis  OA in the shoulders  Right 3rd digit Dupuytren's contracture - been evaluated by Hand Surgery and the Dupuytren's is not advanced enough for surgery  Chronic gout without tophi - stable on colchicine and febuxostat (started 1 month ago)    Uric acid 6.0 today after 1 month of febuxostat. No gout attacks since last visit. Will monitor for another month since uric acid is  borderline.      Plan:  Continue MTX 25 mg (10 tabs) weekly, split dosing  Continue folic acid 1 mg daily.  Continue febuxostat 40 mg daily.  Continue colchicine 0.6 mg daily.  GI referral for dysmotility seen on swallow study.  Uric acid lab in 1 month  RTC 3 months with CBC, CMP, ESR, CRP, uric acid.      Ashley Max MD  Rheumatology Fellow  PGY-5

## 2023-06-27 NOTE — PATIENT INSTRUCTIONS
Try Mediterranean diet to reduce inflammation with Rheumatoid Arthritis.  Labs and follow up in 3 months.

## 2023-06-30 NOTE — PROGRESS NOTES
I have personally reviewed the history, confirmed exam findings, and discussed assessment and plan with fellow.      RA TJ 1 SJ I Pt global 15 ESR 6 CRP 0.4 DAS28 2.3  YQN23-NPX 2.13(both remission)   CDAI 5(LDA)  Gout, intercritical SUA 6 on febuxostat 40mg daily      Recheck uric acid in one celena  Continue febuxostat 40mg daily until then  Cont colchicine 0.6mg daily   Continue methotrexate 25mg po once a week with folic acid 1mg daily  Ref to Gastro for dysphagia, esophageal dysmotility seen on MBS 1/23/23  Cont exercise program  Cont Mediterranean style diet  RTC 3 months with standing labs

## 2023-07-11 ENCOUNTER — LAB VISIT (OUTPATIENT)
Dept: LAB | Facility: HOSPITAL | Age: 68
End: 2023-07-11
Attending: FAMILY MEDICINE
Payer: MEDICARE

## 2023-07-11 ENCOUNTER — OFFICE VISIT (OUTPATIENT)
Dept: INTERNAL MEDICINE | Facility: CLINIC | Age: 68
End: 2023-07-11
Payer: MEDICARE

## 2023-07-11 VITALS
OXYGEN SATURATION: 98 % | HEIGHT: 68 IN | WEIGHT: 174 LBS | HEART RATE: 78 BPM | DIASTOLIC BLOOD PRESSURE: 72 MMHG | BODY MASS INDEX: 26.37 KG/M2 | SYSTOLIC BLOOD PRESSURE: 128 MMHG

## 2023-07-11 DIAGNOSIS — Z79.899 MEDICATION MANAGEMENT: ICD-10-CM

## 2023-07-11 DIAGNOSIS — I10 ESSENTIAL HYPERTENSION: Primary | ICD-10-CM

## 2023-07-11 DIAGNOSIS — G56.02 CARPAL TUNNEL SYNDROME ON LEFT: ICD-10-CM

## 2023-07-11 DIAGNOSIS — D52.1 DRUG-INDUCED FOLATE DEFICIENCY ANEMIA: ICD-10-CM

## 2023-07-11 DIAGNOSIS — G95.9 CERVICAL MYELOPATHY: ICD-10-CM

## 2023-07-11 DIAGNOSIS — E66.3 OVERWEIGHT: ICD-10-CM

## 2023-07-11 DIAGNOSIS — R55 SYNCOPE, UNSPECIFIED SYNCOPE TYPE: ICD-10-CM

## 2023-07-11 DIAGNOSIS — Z98.890 HISTORY OF CARPAL TUNNEL SURGERY OF RIGHT WRIST: ICD-10-CM

## 2023-07-11 DIAGNOSIS — Z87.39 PERSONAL HISTORY OF GOUT: ICD-10-CM

## 2023-07-11 DIAGNOSIS — E11.65 TYPE 2 DIABETES MELLITUS WITH HYPERGLYCEMIA, WITHOUT LONG-TERM CURRENT USE OF INSULIN: ICD-10-CM

## 2023-07-11 DIAGNOSIS — Z79.899 DRUG-INDUCED IMMUNODEFICIENCY: ICD-10-CM

## 2023-07-11 DIAGNOSIS — D84.821 DRUG-INDUCED IMMUNODEFICIENCY: ICD-10-CM

## 2023-07-11 DIAGNOSIS — M06.9 RHEUMATOID ARTHRITIS INVOLVING BOTH HANDS, UNSPECIFIED WHETHER RHEUMATOID FACTOR PRESENT: ICD-10-CM

## 2023-07-11 LAB
ESTIMATED AVG GLUCOSE: 128 MG/DL (ref 68–131)
HBA1C MFR BLD: 6.1 % (ref 4–5.6)
TSH SERPL DL<=0.005 MIU/L-ACNC: 1.35 UIU/ML (ref 0.4–4)

## 2023-07-11 PROCEDURE — 83036 HEMOGLOBIN GLYCOSYLATED A1C: CPT | Mod: HCNC | Performed by: FAMILY MEDICINE

## 2023-07-11 PROCEDURE — 3066F NEPHROPATHY DOC TX: CPT | Mod: HCNC,CPTII,S$GLB, | Performed by: FAMILY MEDICINE

## 2023-07-11 PROCEDURE — 99999 PR PBB SHADOW E&M-EST. PATIENT-LVL V: ICD-10-PCS | Mod: PBBFAC,HCNC,, | Performed by: FAMILY MEDICINE

## 2023-07-11 PROCEDURE — 3061F NEG MICROALBUMINURIA REV: CPT | Mod: HCNC,CPTII,S$GLB, | Performed by: FAMILY MEDICINE

## 2023-07-11 PROCEDURE — 3074F SYST BP LT 130 MM HG: CPT | Mod: HCNC,CPTII,S$GLB, | Performed by: FAMILY MEDICINE

## 2023-07-11 PROCEDURE — 4010F PR ACE/ARB THEARPY RXD/TAKEN: ICD-10-PCS | Mod: HCNC,CPTII,S$GLB, | Performed by: FAMILY MEDICINE

## 2023-07-11 PROCEDURE — 3061F PR NEG MICROALBUMINURIA RESULT DOCUMENTED/REVIEW: ICD-10-PCS | Mod: HCNC,CPTII,S$GLB, | Performed by: FAMILY MEDICINE

## 2023-07-11 PROCEDURE — 3066F PR DOCUMENTATION OF TREATMENT FOR NEPHROPATHY: ICD-10-PCS | Mod: HCNC,CPTII,S$GLB, | Performed by: FAMILY MEDICINE

## 2023-07-11 PROCEDURE — 99999 PR PBB SHADOW E&M-EST. PATIENT-LVL V: CPT | Mod: PBBFAC,HCNC,, | Performed by: FAMILY MEDICINE

## 2023-07-11 PROCEDURE — 1159F PR MEDICATION LIST DOCUMENTED IN MEDICAL RECORD: ICD-10-PCS | Mod: HCNC,CPTII,S$GLB, | Performed by: FAMILY MEDICINE

## 2023-07-11 PROCEDURE — 1126F AMNT PAIN NOTED NONE PRSNT: CPT | Mod: HCNC,CPTII,S$GLB, | Performed by: FAMILY MEDICINE

## 2023-07-11 PROCEDURE — 1101F PT FALLS ASSESS-DOCD LE1/YR: CPT | Mod: HCNC,CPTII,S$GLB, | Performed by: FAMILY MEDICINE

## 2023-07-11 PROCEDURE — 3288F FALL RISK ASSESSMENT DOCD: CPT | Mod: HCNC,CPTII,S$GLB, | Performed by: FAMILY MEDICINE

## 2023-07-11 PROCEDURE — 99215 PR OFFICE/OUTPT VISIT, EST, LEVL V, 40-54 MIN: ICD-10-PCS | Mod: HCNC,S$GLB,, | Performed by: FAMILY MEDICINE

## 2023-07-11 PROCEDURE — 36415 COLL VENOUS BLD VENIPUNCTURE: CPT | Mod: HCNC | Performed by: FAMILY MEDICINE

## 2023-07-11 PROCEDURE — 3078F DIAST BP <80 MM HG: CPT | Mod: HCNC,CPTII,S$GLB, | Performed by: FAMILY MEDICINE

## 2023-07-11 PROCEDURE — 4010F ACE/ARB THERAPY RXD/TAKEN: CPT | Mod: HCNC,CPTII,S$GLB, | Performed by: FAMILY MEDICINE

## 2023-07-11 PROCEDURE — 3074F PR MOST RECENT SYSTOLIC BLOOD PRESSURE < 130 MM HG: ICD-10-PCS | Mod: HCNC,CPTII,S$GLB, | Performed by: FAMILY MEDICINE

## 2023-07-11 PROCEDURE — 3044F PR MOST RECENT HEMOGLOBIN A1C LEVEL <7.0%: ICD-10-PCS | Mod: HCNC,CPTII,S$GLB, | Performed by: FAMILY MEDICINE

## 2023-07-11 PROCEDURE — 3044F HG A1C LEVEL LT 7.0%: CPT | Mod: HCNC,CPTII,S$GLB, | Performed by: FAMILY MEDICINE

## 2023-07-11 PROCEDURE — 3008F PR BODY MASS INDEX (BMI) DOCUMENTED: ICD-10-PCS | Mod: HCNC,CPTII,S$GLB, | Performed by: FAMILY MEDICINE

## 2023-07-11 PROCEDURE — 3008F BODY MASS INDEX DOCD: CPT | Mod: HCNC,CPTII,S$GLB, | Performed by: FAMILY MEDICINE

## 2023-07-11 PROCEDURE — 99215 OFFICE O/P EST HI 40 MIN: CPT | Mod: HCNC,S$GLB,, | Performed by: FAMILY MEDICINE

## 2023-07-11 PROCEDURE — 1159F MED LIST DOCD IN RCRD: CPT | Mod: HCNC,CPTII,S$GLB, | Performed by: FAMILY MEDICINE

## 2023-07-11 PROCEDURE — 3288F PR FALLS RISK ASSESSMENT DOCUMENTED: ICD-10-PCS | Mod: HCNC,CPTII,S$GLB, | Performed by: FAMILY MEDICINE

## 2023-07-11 PROCEDURE — 84443 ASSAY THYROID STIM HORMONE: CPT | Mod: HCNC | Performed by: FAMILY MEDICINE

## 2023-07-11 PROCEDURE — 1126F PR PAIN SEVERITY QUANTIFIED, NO PAIN PRESENT: ICD-10-PCS | Mod: HCNC,CPTII,S$GLB, | Performed by: FAMILY MEDICINE

## 2023-07-11 PROCEDURE — 1101F PR PT FALLS ASSESS DOC 0-1 FALLS W/OUT INJ PAST YR: ICD-10-PCS | Mod: HCNC,CPTII,S$GLB, | Performed by: FAMILY MEDICINE

## 2023-07-11 PROCEDURE — 3078F PR MOST RECENT DIASTOLIC BLOOD PRESSURE < 80 MM HG: ICD-10-PCS | Mod: HCNC,CPTII,S$GLB, | Performed by: FAMILY MEDICINE

## 2023-07-11 NOTE — PROGRESS NOTES
Primary Care Visit  Family Medicine at [facility]      DATE   07/11/2023 1:01 PM    REASON FOR VISIT   Follow up every 3 months    PRIMARY CARE PROVIDER   JESSICA SEPULVEDA MD    HISTORY OF PRESENTING ILLNESS   Karthik Bentley, 68 y.o., presents today due to follow up for chronic health conditions and diabetes management. The last appointment with primary care was 3/13/2023. Bloodwork drawn 6/27/23.     Patient had a blackout spell from the heat. He felt dizzy and it was 87-90 degrees outside. He states that the episode only happened once. It was witnessed and he did not injure himself. This was a month or two ago.    Diabetes Management Status    Statin: Taking  ACE/ARB: Taking    Screening or Prevention Patient's value Goal Complete/Controlled?   HgA1C Testing and Control   Lab Results   Component Value Date    HGBA1C 6.5 (H) 01/25/2023      Annually/Less than 8% Yes   Lipid profile : 01/25/2023 Annually Yes   LDL control Lab Results   Component Value Date    LDLCALC 55.0 (L) 01/25/2023    Annually/Less than 100 mg/dl  Yes   Nephropathy screening Lab Results   Component Value Date    LABMICR 8.0 03/13/2023     No results found for: PROTEINUA  Lab Results   Component Value Date    UTPCR Unable to calculate 01/12/2022      Annually Yes   Blood pressure BP Readings from Last 1 Encounters:   07/11/23 128/72    Less than 140/90 Yes   Dilated retinal exam : 01/23/2023 Annually Yes   Foot exam   : 03/13/2023 Annually Yes         Current Medications:  Current Outpatient Medications   Medication Sig    acetaminophen (TYLENOL) 500 MG tablet Take 2 tablets (1,000 mg total) by mouth every 8 (eight) hours as needed for Pain (100).    blood sugar diagnostic Strp To check BG 1 times daily, to use with insurance preferred meter    blood-glucose meter kit To check BG 1 times daily, to use with insurance preferred meter    CARBOCAINE, PF, 15 mg/mL (1.5 %) injection     colchicine (COLCRYS) 0.6 mg tablet Take 1 tablet (0.6 mg  "total) by mouth once daily.    dulaglutide (TRULICITY) 1.5 mg/0.5 mL pen injector Inject 1.5 mg into the skin every 7 days.    febuxostat (ULORIC) 40 mg Tab Take 1 tablet (40 mg total) by mouth once daily.    folic acid (FOLVITE) 1 MG tablet Take 1 tablet (1 mg total) by mouth once daily.    gabapentin (NEURONTIN) 300 MG capsule Take 1 capsule (300 mg total) by mouth 3 (three) times daily.    lancets Misc To check BG 1 times daily, to use with insurance preferred meter    metFORMIN (GLUCOPHAGE) 1000 MG tablet TAKE 1 TABLET(1000 MG) BY MOUTH TWICE DAILY WITH MEALS    methocarbamoL (ROBAXIN) 750 MG Tab     methotrexate 2.5 MG Tab Take 5 tablets in the AM and 5 tablets in the PM ONCE EVERY 7 DAYS.    rosuvastatin (CRESTOR) 10 MG tablet TAKE 1 TABLET ONE TIME DAILY.    valACYclovir (VALTREX) 500 MG tablet     valsartan (DIOVAN) 160 MG tablet      Allergies:  Review of patient's allergies indicates:  No Known Allergies    SUBJECTIVE   Review of Systems:  Review of Systems   Constitutional:  Negative for chills and fever.   HENT:  Negative for ear pain.    Eyes:  Negative for pain.   Respiratory:  Negative for chest tightness.    Cardiovascular:  Negative for chest pain.   Gastrointestinal:  Negative for abdominal pain.   Genitourinary:  Negative for flank pain.   Musculoskeletal:  Positive for arthralgias and myalgias. Negative for gait problem.   Neurological:  Negative for syncope.   Psychiatric/Behavioral:  Negative for behavioral problems.       OBJECTIVE   Vital Signs:  Vitals:    07/11/23 1329   BP: 128/72   BP Location: Left arm   Patient Position: Sitting   BP Method: Medium (Manual)   Pulse: 78   SpO2: 98%   Weight: 78.9 kg (174 lb)   Height: 5' 8" (1.727 m)    Body mass index is 26.46 kg/m².    Physical Exam:  Physical Exam  Constitutional:       Appearance: He is well-developed.   HENT:      Head: Normocephalic and atraumatic.   Cardiovascular:      Rate and Rhythm: Normal rate.      Heart sounds: Normal heart " sounds.   Pulmonary:      Effort: No respiratory distress.      Breath sounds: Normal breath sounds. No wheezing or rales.   Abdominal:      Palpations: Abdomen is soft.   Musculoskeletal:      Cervical back: Neck supple.   Skin:     General: Skin is dry.   Neurological:      Mental Status: He is alert.   Psychiatric:         Behavior: Behavior normal.       Previous Weight:  Wt Readings from Last 3 Encounters:   06/27/23 78 kg (172 lb)   03/21/23 81.2 kg (179 lb)   03/13/23 81.2 kg (179 lb)     Diabetes Management Status  Statin: Taking  ACE/ARB: Taking  Screening or Prevention Patient's value Goal Complete/Controlled?   HgA1C Testing and Control   Lab Results   Component Value Date    HGBA1C 6.5 (H) 01/25/2023      Annually/Less than 8% Yes   Lipid profile : 01/25/2023 Annually Yes   LDL control Lab Results   Component Value Date    LDLCALC 55.0 (L) 01/25/2023    Annually/Less than 100 mg/dl  Yes   Nephropathy screening Lab Results   Component Value Date    LABMICR 8.0 03/13/2023     No results found for: PROTEINUA  Lab Results   Component Value Date    UTPCR Unable to calculate 01/12/2022      Annually Yes   Blood pressure BP Readings from Last 1 Encounters:   07/11/23 128/72    Less than 140/90 Yes   Dilated retinal exam : 01/23/2023 Annually Yes   Foot exam   : 03/13/2023 Annually Yes      Cardiac Risk Screening:  The ASCVD Risk score (Tyler ROMERO, et al., 2019) failed to calculate for the following reasons:    The valid total cholesterol range is 130 to 320 mg/dL     Diabetes Screening:  Hemoglobin A1C   Date Value Ref Range Status   01/25/2023 6.5 (H) 4.0 - 5.6 % Final     Comment:     ADA Screening Guidelines:  5.7-6.4%  Consistent with prediabetes  >or=6.5%  Consistent with diabetes    High levels of fetal hemoglobin interfere with the HbA1C  assay. Heterozygous hemoglobin variants (HbS, HgC, etc)do  not significantly interfere with this assay.   However, presence of multiple variants may affect accuracy.      10/11/2022 8.4 (H) 4.0 - 5.6 % Final     Comment:     ADA Screening Guidelines:  5.7-6.4%  Consistent with prediabetes  >or=6.5%  Consistent with diabetes    High levels of fetal hemoglobin interfere with the HbA1C  assay. Heterozygous hemoglobin variants (HbS, HgC, etc)do  not significantly interfere with this assay.   However, presence of multiple variants may affect accuracy.        Laboratory Results:  Lab Results   Component Value Date/Time    HGB 12.4 (L) 06/27/2023 10:45 AM    HCT 39.1 (L) 06/27/2023 10:45 AM    WBC 6.02 06/27/2023 10:45 AM     06/27/2023 10:45 AM     (H) 06/27/2023 10:45 AM    TSH 4.002 (H) 01/12/2022 07:53 AM    CHOL 122 01/25/2023 10:09 AM    HDL 51 01/25/2023 10:09 AM    TRIG 80 01/25/2023 10:09 AM    ALT 20 06/27/2023 10:45 AM    AST 19 06/27/2023 10:45 AM    K 4.5 06/27/2023 10:45 AM     06/27/2023 10:45 AM     06/27/2023 10:45 AM      Problem List:  Patient Active Problem List   Diagnosis    Essential hypertension    Type 2 diabetes mellitus, without long-term current use of insulin    Screen for colon cancer    Overweight    HSV infection    Chronic pain of both shoulders    Cervical myelopathy     HEALTH MAINTENANCE PLANNING     Health Maintenance         Date Due Completion Date    COVID-19 Vaccine (6 - Pfizer series) 02/14/2023 10/14/2022    Hemoglobin A1c 07/25/2023 1/25/2023    Influenza Vaccine (1) 09/01/2023 9/20/2022    Eye Exam 01/23/2024 1/23/2023    Lipid Panel 01/25/2024 1/25/2023    Diabetes Urine Screening 03/13/2024 3/13/2023    Foot Exam 03/13/2024 3/13/2023 (Done)    Override on 3/13/2023: Done    Override on 1/18/2022: Done    Override on 1/16/2021: Done    Override on 1/27/2020: Done    Low Dose Statin 03/21/2024 3/21/2023    Colorectal Cancer Screening 01/16/2025 1/16/2020    TETANUS VACCINE 12/31/2029 12/31/2019          PERSONAL AND FAMILY HISTORY   Past medical, surgical, social, and family history: Reviewed and updated in EMR.   He  has a past medical history of Diabetes mellitus type II, Hypertension, and Rheumatoid arthritis.    He has a past surgical history that includes Shoulder surgery; Colonoscopy (N/A, 1/16/2020); Endoscopic carpal tunnel release (Right, 4/1/2022); Epidural steroid injection (N/A, 9/6/2022); and Anterior cervical discectomy w/ fusion (N/A, 10/31/2022).    His family history includes Bone cancer in his mother; Colon cancer in his father.    He reports that he has never smoked. He has never used smokeless tobacco. He reports current alcohol use. He reports that he does not use drugs.    ASSESSMENT and PLAN     Karthik Bentley should follow up in 6 months, or at least annually for a wellness visit.    Essential hypertension  -controlled, stable, no change in meds    Type 2 diabetes mellitus with hyperglycemia, without long-term current use of insulin  -monitoring today   -     HEMOGLOBIN A1C; Future; Expected date: 10/11/2023    Drug-induced folate deficiency anemia  Personal history of gout  Rheumatoid arthritis involving both hands, unspecified whether rheumatoid factor present  Drug-induced immunodeficiency  -managed by rheumatology    Overweight  -losing weight, no longer obese    Carpal tunnel syndrome on left  History of carpal tunnel surgery of right wrist  -     Ambulatory referral/consult to Orthopedics; Future; Expected date: 08/11/2023    Syncope, unspecified syncope type  -     Echo; Future  -     Holter monitor - 24 hour; Future    Cervical myelopathy  -stable      Patient was counseled today on:  - Exercise (aiming for 20-30 minutes, 4-5 days per week) discussed  - Benefit of 15-20 minute post-prandial walk/stroll discussed  - Harmful effects of smoking, etOH, and recreational drugs discussed    The above asssessment and plan was discussed with Dr. Moya.    I spent 20 minutes on this visit, including face-to-face with the patient, chart review, and updating medical, surgical, social, and family history.  All  questions were answered, and the patient is in agreement with the above.     --  Yara Ash, [facility]  Year 4 Medical Student  [employer]    Follow up in about 6 months (around 1/11/2024) for est care with new PCP.    I have seen pt and discussed all of the above issues with him, and examined him. I have edited the above medical student note to make it my own.   [email or web address]

## 2023-07-14 ENCOUNTER — CLINICAL SUPPORT (OUTPATIENT)
Dept: CARDIOLOGY | Facility: HOSPITAL | Age: 68
End: 2023-07-14
Attending: FAMILY MEDICINE
Payer: MEDICARE

## 2023-07-14 DIAGNOSIS — R55 SYNCOPE, UNSPECIFIED SYNCOPE TYPE: ICD-10-CM

## 2023-07-14 PROCEDURE — 93227 XTRNL ECG REC<48 HR R&I: CPT | Mod: ,,, | Performed by: INTERNAL MEDICINE

## 2023-07-14 PROCEDURE — 93225 XTRNL ECG REC<48 HRS REC: CPT | Mod: HCNC

## 2023-07-14 PROCEDURE — 93227 HOLTER MONITOR - 24 HOUR (CUPID ONLY): ICD-10-PCS | Mod: ,,, | Performed by: INTERNAL MEDICINE

## 2023-07-18 ENCOUNTER — TELEPHONE (OUTPATIENT)
Dept: INTERNAL MEDICINE | Facility: CLINIC | Age: 68
End: 2023-07-18
Payer: MEDICARE

## 2023-07-18 DIAGNOSIS — R55 SYNCOPE, UNSPECIFIED SYNCOPE TYPE: Primary | ICD-10-CM

## 2023-07-18 LAB
OHS CV EVENT MONITOR DAY: 0
OHS CV HOLTER LENGTH DECIMAL HOURS: 23.98
OHS CV HOLTER LENGTH HOURS: 23
OHS CV HOLTER LENGTH MINUTES: 59
OHS CV HOLTER SINUS AVERAGE HR: 65
OHS CV HOLTER SINUS MAX HR: 145
OHS CV HOLTER SINUS MIN HR: 45

## 2023-07-18 NOTE — TELEPHONE ENCOUNTER
Please call pt  Set up EP cardiology appt for syncope    ===  Ray, we did this holter monitor because of your syncopal episode.  The events you records corresponded to your heart rate being low 2 of the 3 times.  I recommend you see an EP cardiologist. I'll put in a referral & ask my nurse to contact you to set that up.

## 2023-07-26 ENCOUNTER — TELEPHONE (OUTPATIENT)
Dept: INTERNAL MEDICINE | Facility: CLINIC | Age: 68
End: 2023-07-26
Payer: MEDICARE

## 2023-07-26 DIAGNOSIS — M79.604 LEG PAIN, ANTERIOR, RIGHT: Primary | ICD-10-CM

## 2023-07-26 DIAGNOSIS — I49.8 OTHER CARDIAC ARRHYTHMIA: Primary | ICD-10-CM

## 2023-07-26 NOTE — TELEPHONE ENCOUNTER
Pt is requesting an order for an x-ray states the pain in his right leg isn't getting any better. Informed him that you do not have any available appointments until Monday. Pt states he only wants x-ray completed.

## 2023-07-26 NOTE — TELEPHONE ENCOUNTER
----- Message from Johnathanclotilde Coral Gil sent at 7/26/2023 10:35 AM CDT -----  Contact: [phone]  [email or web address]     Patient is calling for Medical Advice regarding: at last appt pt complained of right leg pain pt states that he is still in pain. When he bends over it feels like a torn muscle a sharp pain. Pt would like to be seen today because the pain is unbearable he states. Informed pt no available appt until 8/8. Pt states he may need to get a xray.     How long has patient had these symptoms: n/a    Pharmacy name and phone#: n/a     Would like response via [employer]Wall:  no     Comments: pt also has forms for insurance that needs to be filled out and want to come and drop them off and see if you can fax them off once done.

## 2023-07-26 NOTE — TELEPHONE ENCOUNTER
What part of his right leg did he want an xray of? Knee?   Yes, I can order but need to know the part of the knee to xray

## 2023-07-27 ENCOUNTER — HOSPITAL ENCOUNTER (OUTPATIENT)
Dept: RADIOLOGY | Facility: HOSPITAL | Age: 68
Discharge: HOME | End: 2023-07-27
Attending: FAMILY MEDICINE
Payer: MEDICARE

## 2023-07-27 DIAGNOSIS — M79.604 LEG PAIN, ANTERIOR, RIGHT: ICD-10-CM

## 2023-07-27 PROCEDURE — 73590 X-RAY EXAM OF LOWER LEG: CPT | Mod: TC,RT

## 2023-07-27 PROCEDURE — 73590 XR TIBIA FIBULA 2 VIEW RIGHT: ICD-10-PCS | Mod: 26,RT,, | Performed by: RADIOLOGY

## 2023-07-27 PROCEDURE — 73590 X-RAY EXAM OF LOWER LEG: CPT | Mod: 26,RT,, | Performed by: RADIOLOGY

## 2023-07-31 ENCOUNTER — TELEPHONE (OUTPATIENT)
Dept: ORTHOPEDICS | Facility: CLINIC | Age: 68
End: 2023-07-31
Payer: MEDICARE

## 2023-07-31 ENCOUNTER — PATIENT MESSAGE (OUTPATIENT)
Dept: ORTHOPEDICS | Facility: CLINIC | Age: 68
End: 2023-07-31
Payer: MEDICARE

## 2023-07-31 NOTE — TELEPHONE ENCOUNTER
Pt is seeking a second opinion for his R hand and finger (triggering) pain s/p R ATS CTR c Warren and L hand pain that pt feels was ignored. Pt has  RA and is being treated.     Informed pt that we would review his records and get back in touch. Patient verbalized understanding and was thankful.

## 2023-08-01 ENCOUNTER — HOSPITAL ENCOUNTER (OUTPATIENT)
Dept: CARDIOLOGY | Facility: CLINIC | Age: 68
Discharge: HOME | End: 2023-08-01
Payer: MEDICARE

## 2023-08-01 ENCOUNTER — OFFICE VISIT (OUTPATIENT)
Dept: ELECTROPHYSIOLOGY | Facility: CLINIC | Age: 68
End: 2023-08-01
Payer: MEDICARE

## 2023-08-01 VITALS
WEIGHT: 173.75 LBS | BODY MASS INDEX: 26.33 KG/M2 | SYSTOLIC BLOOD PRESSURE: 127 MMHG | HEART RATE: 68 BPM | HEIGHT: 68 IN | DIASTOLIC BLOOD PRESSURE: 74 MMHG

## 2023-08-01 DIAGNOSIS — E11.65 TYPE 2 DIABETES MELLITUS WITH HYPERGLYCEMIA, WITHOUT LONG-TERM CURRENT USE OF INSULIN: ICD-10-CM

## 2023-08-01 DIAGNOSIS — M10.9 GOUT, UNSPECIFIED CAUSE, UNSPECIFIED CHRONICITY, UNSPECIFIED SITE: ICD-10-CM

## 2023-08-01 DIAGNOSIS — R55 SYNCOPE, UNSPECIFIED SYNCOPE TYPE: Primary | ICD-10-CM

## 2023-08-01 DIAGNOSIS — I10 ESSENTIAL HYPERTENSION: ICD-10-CM

## 2023-08-01 DIAGNOSIS — I49.8 OTHER CARDIAC ARRHYTHMIA: ICD-10-CM

## 2023-08-01 DIAGNOSIS — E66.3 OVERWEIGHT (BMI 25.0-29.9): ICD-10-CM

## 2023-08-01 DIAGNOSIS — M06.9 RHEUMATOID ARTHRITIS, INVOLVING UNSPECIFIED SITE, UNSPECIFIED WHETHER RHEUMATOID FACTOR PRESENT: ICD-10-CM

## 2023-08-01 DIAGNOSIS — E78.2 MIXED HYPERLIPIDEMIA: ICD-10-CM

## 2023-08-01 PROCEDURE — 1101F PT FALLS ASSESS-DOCD LE1/YR: CPT | Mod: CPTII,S$GLB,, | Performed by: STUDENT IN AN ORGANIZED HEALTH CARE EDUCATION/TRAINING PROGRAM

## 2023-08-01 PROCEDURE — 3288F FALL RISK ASSESSMENT DOCD: CPT | Mod: CPTII,S$GLB,, | Performed by: STUDENT IN AN ORGANIZED HEALTH CARE EDUCATION/TRAINING PROGRAM

## 2023-08-01 PROCEDURE — 93010 ELECTROCARDIOGRAM REPORT: CPT | Mod: S$GLB,,, | Performed by: INTERNAL MEDICINE

## 2023-08-01 PROCEDURE — 3074F SYST BP LT 130 MM HG: CPT | Mod: CPTII,S$GLB,, | Performed by: STUDENT IN AN ORGANIZED HEALTH CARE EDUCATION/TRAINING PROGRAM

## 2023-08-01 PROCEDURE — 3078F DIAST BP <80 MM HG: CPT | Mod: CPTII,S$GLB,, | Performed by: STUDENT IN AN ORGANIZED HEALTH CARE EDUCATION/TRAINING PROGRAM

## 2023-08-01 PROCEDURE — 1126F AMNT PAIN NOTED NONE PRSNT: CPT | Mod: CPTII,S$GLB,, | Performed by: STUDENT IN AN ORGANIZED HEALTH CARE EDUCATION/TRAINING PROGRAM

## 2023-08-01 PROCEDURE — 99205 PR OFFICE/OUTPT VISIT, NEW, LEVL V, 60-74 MIN: ICD-10-PCS | Mod: S$GLB,,, | Performed by: STUDENT IN AN ORGANIZED HEALTH CARE EDUCATION/TRAINING PROGRAM

## 2023-08-01 PROCEDURE — 3008F PR BODY MASS INDEX (BMI) DOCUMENTED: ICD-10-PCS | Mod: CPTII,S$GLB,, | Performed by: STUDENT IN AN ORGANIZED HEALTH CARE EDUCATION/TRAINING PROGRAM

## 2023-08-01 PROCEDURE — 99205 OFFICE O/P NEW HI 60 MIN: CPT | Mod: S$GLB,,, | Performed by: STUDENT IN AN ORGANIZED HEALTH CARE EDUCATION/TRAINING PROGRAM

## 2023-08-01 PROCEDURE — 93005 RHYTHM STRIP: ICD-10-PCS | Mod: S$GLB,,, | Performed by: STUDENT IN AN ORGANIZED HEALTH CARE EDUCATION/TRAINING PROGRAM

## 2023-08-01 PROCEDURE — 3288F PR FALLS RISK ASSESSMENT DOCUMENTED: ICD-10-PCS | Mod: CPTII,S$GLB,, | Performed by: STUDENT IN AN ORGANIZED HEALTH CARE EDUCATION/TRAINING PROGRAM

## 2023-08-01 PROCEDURE — 3066F PR DOCUMENTATION OF TREATMENT FOR NEPHROPATHY: ICD-10-PCS | Mod: CPTII,S$GLB,, | Performed by: STUDENT IN AN ORGANIZED HEALTH CARE EDUCATION/TRAINING PROGRAM

## 2023-08-01 PROCEDURE — 93010 RHYTHM STRIP: ICD-10-PCS | Mod: S$GLB,,, | Performed by: INTERNAL MEDICINE

## 2023-08-01 PROCEDURE — 3078F PR MOST RECENT DIASTOLIC BLOOD PRESSURE < 80 MM HG: ICD-10-PCS | Mod: CPTII,S$GLB,, | Performed by: STUDENT IN AN ORGANIZED HEALTH CARE EDUCATION/TRAINING PROGRAM

## 2023-08-01 PROCEDURE — 3061F NEG MICROALBUMINURIA REV: CPT | Mod: CPTII,S$GLB,, | Performed by: STUDENT IN AN ORGANIZED HEALTH CARE EDUCATION/TRAINING PROGRAM

## 2023-08-01 PROCEDURE — 3044F PR MOST RECENT HEMOGLOBIN A1C LEVEL <7.0%: ICD-10-PCS | Mod: CPTII,S$GLB,, | Performed by: STUDENT IN AN ORGANIZED HEALTH CARE EDUCATION/TRAINING PROGRAM

## 2023-08-01 PROCEDURE — 1126F PR PAIN SEVERITY QUANTIFIED, NO PAIN PRESENT: ICD-10-PCS | Mod: CPTII,S$GLB,, | Performed by: STUDENT IN AN ORGANIZED HEALTH CARE EDUCATION/TRAINING PROGRAM

## 2023-08-01 PROCEDURE — 3044F HG A1C LEVEL LT 7.0%: CPT | Mod: CPTII,S$GLB,, | Performed by: STUDENT IN AN ORGANIZED HEALTH CARE EDUCATION/TRAINING PROGRAM

## 2023-08-01 PROCEDURE — 3066F NEPHROPATHY DOC TX: CPT | Mod: CPTII,S$GLB,, | Performed by: STUDENT IN AN ORGANIZED HEALTH CARE EDUCATION/TRAINING PROGRAM

## 2023-08-01 PROCEDURE — 93005 ELECTROCARDIOGRAM TRACING: CPT | Mod: S$GLB,,, | Performed by: STUDENT IN AN ORGANIZED HEALTH CARE EDUCATION/TRAINING PROGRAM

## 2023-08-01 PROCEDURE — 1101F PR PT FALLS ASSESS DOC 0-1 FALLS W/OUT INJ PAST YR: ICD-10-PCS | Mod: CPTII,S$GLB,, | Performed by: STUDENT IN AN ORGANIZED HEALTH CARE EDUCATION/TRAINING PROGRAM

## 2023-08-01 PROCEDURE — 99999 PR PBB SHADOW E&M-EST. PATIENT-LVL III: ICD-10-PCS | Mod: PBBFAC,,, | Performed by: STUDENT IN AN ORGANIZED HEALTH CARE EDUCATION/TRAINING PROGRAM

## 2023-08-01 PROCEDURE — 99999 PR PBB SHADOW E&M-EST. PATIENT-LVL III: CPT | Mod: PBBFAC,,, | Performed by: STUDENT IN AN ORGANIZED HEALTH CARE EDUCATION/TRAINING PROGRAM

## 2023-08-01 PROCEDURE — 4010F ACE/ARB THERAPY RXD/TAKEN: CPT | Mod: CPTII,S$GLB,, | Performed by: STUDENT IN AN ORGANIZED HEALTH CARE EDUCATION/TRAINING PROGRAM

## 2023-08-01 PROCEDURE — 3008F BODY MASS INDEX DOCD: CPT | Mod: CPTII,S$GLB,, | Performed by: STUDENT IN AN ORGANIZED HEALTH CARE EDUCATION/TRAINING PROGRAM

## 2023-08-01 PROCEDURE — 4010F PR ACE/ARB THEARPY RXD/TAKEN: ICD-10-PCS | Mod: CPTII,S$GLB,, | Performed by: STUDENT IN AN ORGANIZED HEALTH CARE EDUCATION/TRAINING PROGRAM

## 2023-08-01 PROCEDURE — 3061F PR NEG MICROALBUMINURIA RESULT DOCUMENTED/REVIEW: ICD-10-PCS | Mod: CPTII,S$GLB,, | Performed by: STUDENT IN AN ORGANIZED HEALTH CARE EDUCATION/TRAINING PROGRAM

## 2023-08-01 PROCEDURE — 3074F PR MOST RECENT SYSTOLIC BLOOD PRESSURE < 130 MM HG: ICD-10-PCS | Mod: CPTII,S$GLB,, | Performed by: STUDENT IN AN ORGANIZED HEALTH CARE EDUCATION/TRAINING PROGRAM

## 2023-08-06 PROBLEM — E78.2 MIXED HYPERLIPIDEMIA: Status: ACTIVE | Noted: 2023-08-06

## 2023-08-06 PROBLEM — M10.9 GOUT: Status: ACTIVE | Noted: 2023-08-06

## 2023-08-06 PROBLEM — M06.9 RHEUMATOID ARTHRITIS: Status: ACTIVE | Noted: 2023-08-06

## 2023-08-11 ENCOUNTER — TELEPHONE (OUTPATIENT)
Dept: ORTHOPEDICS | Facility: CLINIC | Age: 68
End: 2023-08-11
Payer: MEDICARE

## 2023-08-28 NOTE — TELEPHONE ENCOUNTER
No care due was identified.  [facility] Embedded Care Due Messages. Reference number: [id number].   8/28/2023 1:15:19 PM CDT

## 2023-08-29 ENCOUNTER — OFFICE VISIT (OUTPATIENT)
Dept: ORTHOPEDICS | Facility: CLINIC | Age: 68
End: 2023-08-29
Payer: MEDICARE

## 2023-08-29 VITALS — HEIGHT: 68 IN | BODY MASS INDEX: 26.33 KG/M2 | WEIGHT: 173.75 LBS

## 2023-08-29 DIAGNOSIS — M25.642 STIFFNESS OF HAND JOINT, LEFT: ICD-10-CM

## 2023-08-29 DIAGNOSIS — M65.332 TRIGGER MIDDLE FINGER OF LEFT HAND: ICD-10-CM

## 2023-08-29 DIAGNOSIS — Z98.890 HISTORY OF CARPAL TUNNEL SURGERY OF RIGHT WRIST: Primary | ICD-10-CM

## 2023-08-29 DIAGNOSIS — Z98.890 HISTORY OF CARPAL TUNNEL SURGERY OF RIGHT WRIST: ICD-10-CM

## 2023-08-29 DIAGNOSIS — M25.642 STIFFNESS OF HAND JOINT, LEFT: Primary | ICD-10-CM

## 2023-08-29 PROCEDURE — 99215 OFFICE O/P EST HI 40 MIN: CPT | Mod: S$GLB,,, | Performed by: ORTHOPAEDIC SURGERY

## 2023-08-29 PROCEDURE — 1125F AMNT PAIN NOTED PAIN PRSNT: CPT | Mod: CPTII,S$GLB,, | Performed by: ORTHOPAEDIC SURGERY

## 2023-08-29 PROCEDURE — 1101F PT FALLS ASSESS-DOCD LE1/YR: CPT | Mod: CPTII,S$GLB,, | Performed by: ORTHOPAEDIC SURGERY

## 2023-08-29 PROCEDURE — 99999 PR PBB SHADOW E&M-EST. PATIENT-LVL III: ICD-10-PCS | Mod: PBBFAC,,, | Performed by: ORTHOPAEDIC SURGERY

## 2023-08-29 PROCEDURE — 3066F PR DOCUMENTATION OF TREATMENT FOR NEPHROPATHY: ICD-10-PCS | Mod: CPTII,S$GLB,, | Performed by: ORTHOPAEDIC SURGERY

## 2023-08-29 PROCEDURE — 1101F PR PT FALLS ASSESS DOC 0-1 FALLS W/OUT INJ PAST YR: ICD-10-PCS | Mod: CPTII,S$GLB,, | Performed by: ORTHOPAEDIC SURGERY

## 2023-08-29 PROCEDURE — 3008F PR BODY MASS INDEX (BMI) DOCUMENTED: ICD-10-PCS | Mod: CPTII,S$GLB,, | Performed by: ORTHOPAEDIC SURGERY

## 2023-08-29 PROCEDURE — 1125F PR PAIN SEVERITY QUANTIFIED, PAIN PRESENT: ICD-10-PCS | Mod: CPTII,S$GLB,, | Performed by: ORTHOPAEDIC SURGERY

## 2023-08-29 PROCEDURE — 4010F PR ACE/ARB THEARPY RXD/TAKEN: ICD-10-PCS | Mod: CPTII,S$GLB,, | Performed by: ORTHOPAEDIC SURGERY

## 2023-08-29 PROCEDURE — 3008F BODY MASS INDEX DOCD: CPT | Mod: CPTII,S$GLB,, | Performed by: ORTHOPAEDIC SURGERY

## 2023-08-29 PROCEDURE — 4010F ACE/ARB THERAPY RXD/TAKEN: CPT | Mod: CPTII,S$GLB,, | Performed by: ORTHOPAEDIC SURGERY

## 2023-08-29 PROCEDURE — 1159F MED LIST DOCD IN RCRD: CPT | Mod: CPTII,S$GLB,, | Performed by: ORTHOPAEDIC SURGERY

## 2023-08-29 PROCEDURE — 3044F HG A1C LEVEL LT 7.0%: CPT | Mod: CPTII,S$GLB,, | Performed by: ORTHOPAEDIC SURGERY

## 2023-08-29 PROCEDURE — 3288F FALL RISK ASSESSMENT DOCD: CPT | Mod: CPTII,S$GLB,, | Performed by: ORTHOPAEDIC SURGERY

## 2023-08-29 PROCEDURE — 3066F NEPHROPATHY DOC TX: CPT | Mod: CPTII,S$GLB,, | Performed by: ORTHOPAEDIC SURGERY

## 2023-08-29 PROCEDURE — 3288F PR FALLS RISK ASSESSMENT DOCUMENTED: ICD-10-PCS | Mod: CPTII,S$GLB,, | Performed by: ORTHOPAEDIC SURGERY

## 2023-08-29 PROCEDURE — 99215 PR OFFICE/OUTPT VISIT, EST, LEVL V, 40-54 MIN: ICD-10-PCS | Mod: S$GLB,,, | Performed by: ORTHOPAEDIC SURGERY

## 2023-08-29 PROCEDURE — 1159F PR MEDICATION LIST DOCUMENTED IN MEDICAL RECORD: ICD-10-PCS | Mod: CPTII,S$GLB,, | Performed by: ORTHOPAEDIC SURGERY

## 2023-08-29 PROCEDURE — 3044F PR MOST RECENT HEMOGLOBIN A1C LEVEL <7.0%: ICD-10-PCS | Mod: CPTII,S$GLB,, | Performed by: ORTHOPAEDIC SURGERY

## 2023-08-29 PROCEDURE — 3061F PR NEG MICROALBUMINURIA RESULT DOCUMENTED/REVIEW: ICD-10-PCS | Mod: CPTII,S$GLB,, | Performed by: ORTHOPAEDIC SURGERY

## 2023-08-29 PROCEDURE — 99999 PR PBB SHADOW E&M-EST. PATIENT-LVL III: CPT | Mod: PBBFAC,,, | Performed by: ORTHOPAEDIC SURGERY

## 2023-08-29 PROCEDURE — 3061F NEG MICROALBUMINURIA REV: CPT | Mod: CPTII,S$GLB,, | Performed by: ORTHOPAEDIC SURGERY

## 2023-08-29 RX ORDER — VALACYCLOVIR HYDROCHLORIDE 500 MG/1
500 TABLET, FILM COATED ORAL
Qty: 30 TABLET | Refills: 11 | Status: SHIPPED | OUTPATIENT
Start: 2023-08-29

## 2023-08-29 NOTE — H&P (VIEW-ONLY)
[facility]  History & Physical  Orthopedics    SUBJECTIVE:      COVID-19 attestation:  This patient was treated during the COVID-19 pandemic.  This was discussed with the patient, they are aware of our current policies and procedures, were given the option of delaying their visit and or switching to a virtual visit, delaying their surgery when applicable, and they elect to proceed.    Chief Complaint:   Chief Complaint   Patient presents with    Right Hand - Numbness    Left Hand - Numbness       Referring Provider: Anibal Moya MD     History of Present Illness:  Patient is a 68 y.o. right hand dominant male who presents today with complaints of left hand stiffness.  Patient states he is had this left hand stiffness for multiple months.  He does have a history of endoscopic right carpal tunnel release in April 2022.  He also had a history of anterior cervical discectomy with fusion in October 2022 for radiculopathy.  Prior to that he would an EMG completed with prior reports of bilateral carpal tunnel syndrome, more severe on the right.  Of note, he was also diagnosed with rheumatoid arthritis and is currently being treated with methotrexate.    He also has complaints of a right long finger contracture with stiffness. He stated that this had developed during his recovery and therapy from his carpal tunnel release.  He is not had any previous injections to the area.  He states he has difficulty with picking up objects, turning knobs, opening jars etc. during his daily activities.    The patient is a/an  and previous  for which he uses hands frequently..     The patient denies any fevers, chills, N/V, D/C and presents for evaluation.       Past Medical History:   Diagnosis Date    Diabetes mellitus type II     Hypertension     Rheumatoid arthritis      Past Surgical History:   Procedure Laterality Date    ANTERIOR CERVICAL DISCECTOMY W/ FUSION N/A 10/31/2022     Procedure: DISCECTOMY, SPINE, CERVICAL, ANTERIOR APPROACH, WITH FUSION C3-6 depuy SNS:vocal/motors/SSEP;  Surgeon: Kody Marshall MD;  Location: [facility] OR;  Service: Orthopedics;  Laterality: N/A;    COLONOSCOPY N/A 1/16/2020    Procedure: COLONOSCOPY;  Surgeon: Cynthia Kearney MD;  Location: [facility] ENDO (4TH FLR);  Service: Endoscopy;  Laterality: N/A;    ENDOSCOPIC CARPAL TUNNEL RELEASE Right 4/1/2022    Procedure: RELEASE, CARPAL TUNNEL, ENDOSCOPIC - right arthrex;  Surgeon: Wiley Warren MD;  Location: [facility] OR;  Service: Orthopedics;  Laterality: Right;    EPIDURAL STEROID INJECTION N/A 9/6/2022    Procedure: CERVICAL EDITH CONTRAST DIRECT REFERRAL;  Surgeon: Yasmeen Taylor MD;  Location: [facility] PAIN MGT;  Service: Pain Management;  Laterality: N/A;    SHOULDER SURGERY       Review of patient's allergies indicates:  No Known Allergies  Social History     Social History Narrative    Not on file     Family History   Problem Relation Age of Onset    Bone cancer Mother     Colon cancer Father     Glaucoma Neg Hx     Macular degeneration Neg Hx     Retinal detachment Neg Hx          Current Outpatient Medications:     acetaminophen (TYLENOL) 500 MG tablet, Take 2 tablets (1,000 mg total) by mouth every 8 (eight) hours as needed for Pain (100)., Disp: 120 tablet, Rfl: 0    blood sugar diagnostic Strp, To check BG 1 times daily, to use with insurance preferred meter, Disp: 100 strip, Rfl: 3    colchicine (COLCRYS) 0.6 mg tablet, Take 1 tablet (0.6 mg total) by mouth once daily., Disp: 90 tablet, Rfl: 2    dulaglutide (TRULICITY) 1.5 mg/0.5 mL pen injector, Inject 1.5 mg into the skin every 7 days., Disp: 4 pen, Rfl: 11    febuxostat (ULORIC) 40 mg Tab, Take 1 tablet (40 mg total) by mouth once daily., Disp: 30 tablet, Rfl: 3    folic acid (FOLVITE) 1 MG tablet, Take 1 tablet (1 mg total) by mouth once daily., Disp: 90 tablet, Rfl: 3    gabapentin (NEURONTIN) 300 MG capsule, Take 1 capsule (300 mg total) by  "mouth 3 (three) times daily., Disp: 90 capsule, Rfl: 11    lancets Misc, To check BG 1 times daily, to use with insurance preferred meter, Disp: 100 each, Rfl: 3    metFORMIN (GLUCOPHAGE) 1000 MG tablet, TAKE 1 TABLET(1000 MG) BY MOUTH TWICE DAILY WITH MEALS, Disp: 180 tablet, Rfl: 3    methotrexate 2.5 MG Tab, Take 5 tablets in the AM and 5 tablets in the PM ONCE EVERY 7 DAYS., Disp: 40 tablet, Rfl: 2    rosuvastatin (CRESTOR) 10 MG tablet, TAKE 1 TABLET ONE TIME DAILY., Disp: 90 tablet, Rfl: 3    valACYclovir (VALTREX) 500 MG tablet, , Disp: , Rfl:     valsartan (DIOVAN) 160 MG tablet, , Disp: , Rfl:     blood-glucose meter kit, To check BG 1 times daily, to use with insurance preferred meter, Disp: 1 each, Rfl: 0    ROS    Review of Systems:  Constitutional: no fever or chills  Eyes: no visual changes  ENT: no nasal congestion or sore throat  Respiratory: no cough or shortness of breath  Cardiovascular: no chest pain  Gastrointestinal: no nausea or vomiting, tolerating diet  Musculoskeletal: pain and soreness    OBJECTIVE:      Vital Signs (Most Recent):  Vitals:    08/29/23 0853   Weight: 78.8 kg (173 lb 11.6 oz)   Height: 5' 8" (1.727 m)     Body mass index is 26.41 kg/m².    Physical Exam    Physical Exam:  Constitutional: The patient appears well-developed and well-nourished. No distress.   Head: Normocephalic and atraumatic.   Nose: Nose normal.   Eyes: Conjunctivae and EOM are normal.   Neck: No tracheal deviation present.   Cardiovascular: Normal rate and intact distal pulses.    Pulmonary/Chest: Effort normal. No respiratory distress.   Abdominal: There is no guarding.   Lymphatic: Negative for adenopathy   Neurological: The patient is alert.   Psychiatric: The patient has a normal mood and affect.     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     ASSESSMENT/PLAN:      68 y.o. yo male with   Encounter Diagnoses   Name Primary?    History of carpal tunnel surgery of right wrist     Stiffness of hand joint, left Yes    Trigger middle finger of left hand       Plan:    - Plan for OR for left carpal tunnel release and left long trigger finger release.  -Discussed with patient and/or family the risks and benefits of surgical intervention.  Conservative measures have been exhausted and patient would like to proceed with surgery.      We have discussed risks, which include but are not limited to blood clots in the legs that can travel to the lungs (pulmonary embolism). Pulmonary embolism can cause shortness of breath, chest pain, and even shock. Other risks include urinary tract infection, nausea and vomiting (usually related to pain medication), chronic pain, bleeding, nerve damage, blood vessel injury, scarring and infection, which can require re-operation. Furthermore, the risks of anesthesia include potential heart, lung, kidney, and liver damage.  Informed consent was obtained.  The patient understands and would like to proceed with surgery.      The patient's pathophysiology was explained in detail with reference to x-rays, models, other visual aids as appropriate.  Treatment options were discussed in detail.  Questions were invited and answered to the patient's satisfaction. I reviewed Primary care , and other specialty's notes to better coordinate patient's care.        Please be aware that this note has been generated with the assistance of  MModal voice-to-text.  Please excuse any spelling or grammatical errors.

## 2023-08-29 NOTE — PROGRESS NOTES
Pt had right hand N/T, swelling, stiffness, a lot of pain. Long finger and index are still restricted on ROM. Never addressed that issue.  Long finger on right has been there since APril surgery- pt feels like it came up all of a sudden.  Ot did not feel that there was much more to do.    Pt was Dcd from OT    Pt would feel stiffness and tightness in neck. Pt went to therapy and had injection in in shoulder.. Sometime after pt was unable to lift arm ( woke up with this)- saw Dr. Marshall immediately , had myelopathy, had surgery. Pt states he did get better from that issue.  Still has some stiffness and discomfort in sleep but has improved since prior to surgery.    Pt knew he had CTS in left hand and getting worse, maybe due to overuse. Never had a dx of RA but pt was referred for RA since he had left thumb MCP pain/swelling and index mcp JOINT    Issue with left hand- swelling stiffness,, no n/t, no pain.    Pt has mod CTS on nerve testing, pt also has long finger trigger .  Plan for surgery

## 2023-08-29 NOTE — TELEPHONE ENCOUNTER
Refill Routing Note   Medication(s) are not appropriate for processing by [facility] for the following reason(s):      No active prescription written by PCP    [facility] action(s):  Defer Care Due:  None identified     Medication Therapy Plan: MED MODIFIED OR EDITED ON MEDLIST FROM BID TO ONCE DAILY; PT REPORTED MED ON 03/21/23; HISTORICAL MED;        Appointments  past 12m or future 3m with PCP    Date Provider   Last Visit   7/11/2023 Anibal Moya MD   Next Visit   Visit date not found Anibal Moya MD   ED visits in past 90 days: 0        Note composed:9:38 AM 08/29/2023

## 2023-08-29 NOTE — PROGRESS NOTES
[facility]  History & Physical  Orthopedics    SUBJECTIVE:      COVID-19 attestation:  This patient was treated during the COVID-19 pandemic.  This was discussed with the patient, they are aware of our current policies and procedures, were given the option of delaying their visit and or switching to a virtual visit, delaying their surgery when applicable, and they elect to proceed.    Chief Complaint:   Chief Complaint   Patient presents with    Right Hand - Numbness    Left Hand - Numbness       Referring Provider: Anibal Moya MD     History of Present Illness:  Patient is a 68 y.o. right hand dominant male who presents today with complaints of left hand stiffness.  Patient states he is had this left hand stiffness for multiple months.  He does have a history of endoscopic right carpal tunnel release in April 2022.  He also had a history of anterior cervical discectomy with fusion in October 2022 for radiculopathy.  Prior to that he would an EMG completed with prior reports of bilateral carpal tunnel syndrome, more severe on the right.  Of note, he was also diagnosed with rheumatoid arthritis and is currently being treated with methotrexate.    He also has complaints of a right long finger contracture with stiffness. He stated that this had developed during his recovery and therapy from his carpal tunnel release.  He is not had any previous injections to the area.  He states he has difficulty with picking up objects, turning knobs, opening jars etc. during his daily activities.    The patient is a/an  and previous  for which he uses hands frequently..     The patient denies any fevers, chills, N/V, D/C and presents for evaluation.       Past Medical History:   Diagnosis Date    Diabetes mellitus type II     Hypertension     Rheumatoid arthritis      Past Surgical History:   Procedure Laterality Date    ANTERIOR CERVICAL DISCECTOMY W/ FUSION N/A 10/31/2022     Procedure: DISCECTOMY, SPINE, CERVICAL, ANTERIOR APPROACH, WITH FUSION C3-6 depuy SNS:vocal/motors/SSEP;  Surgeon: Kody Marshall MD;  Location: [facility] OR;  Service: Orthopedics;  Laterality: N/A;    COLONOSCOPY N/A 1/16/2020    Procedure: COLONOSCOPY;  Surgeon: Cynthia Kearney MD;  Location: [facility] ENDO (4TH FLR);  Service: Endoscopy;  Laterality: N/A;    ENDOSCOPIC CARPAL TUNNEL RELEASE Right 4/1/2022    Procedure: RELEASE, CARPAL TUNNEL, ENDOSCOPIC - right arthrex;  Surgeon: Wiley Warren MD;  Location: [facility] OR;  Service: Orthopedics;  Laterality: Right;    EPIDURAL STEROID INJECTION N/A 9/6/2022    Procedure: CERVICAL EDITH CONTRAST DIRECT REFERRAL;  Surgeon: Yasmeen aTylor MD;  Location: [facility] PAIN MGT;  Service: Pain Management;  Laterality: N/A;    SHOULDER SURGERY       Review of patient's allergies indicates:  No Known Allergies  Social History     Social History Narrative    Not on file     Family History   Problem Relation Age of Onset    Bone cancer Mother     Colon cancer Father     Glaucoma Neg Hx     Macular degeneration Neg Hx     Retinal detachment Neg Hx          Current Outpatient Medications:     acetaminophen (TYLENOL) 500 MG tablet, Take 2 tablets (1,000 mg total) by mouth every 8 (eight) hours as needed for Pain (100)., Disp: 120 tablet, Rfl: 0    blood sugar diagnostic Strp, To check BG 1 times daily, to use with insurance preferred meter, Disp: 100 strip, Rfl: 3    colchicine (COLCRYS) 0.6 mg tablet, Take 1 tablet (0.6 mg total) by mouth once daily., Disp: 90 tablet, Rfl: 2    dulaglutide (TRULICITY) 1.5 mg/0.5 mL pen injector, Inject 1.5 mg into the skin every 7 days., Disp: 4 pen, Rfl: 11    febuxostat (ULORIC) 40 mg Tab, Take 1 tablet (40 mg total) by mouth once daily., Disp: 30 tablet, Rfl: 3    folic acid (FOLVITE) 1 MG tablet, Take 1 tablet (1 mg total) by mouth once daily., Disp: 90 tablet, Rfl: 3    gabapentin (NEURONTIN) 300 MG capsule, Take 1 capsule (300 mg total) by  "mouth 3 (three) times daily., Disp: 90 capsule, Rfl: 11    lancets Misc, To check BG 1 times daily, to use with insurance preferred meter, Disp: 100 each, Rfl: 3    metFORMIN (GLUCOPHAGE) 1000 MG tablet, TAKE 1 TABLET(1000 MG) BY MOUTH TWICE DAILY WITH MEALS, Disp: 180 tablet, Rfl: 3    methotrexate 2.5 MG Tab, Take 5 tablets in the AM and 5 tablets in the PM ONCE EVERY 7 DAYS., Disp: 40 tablet, Rfl: 2    rosuvastatin (CRESTOR) 10 MG tablet, TAKE 1 TABLET ONE TIME DAILY., Disp: 90 tablet, Rfl: 3    valACYclovir (VALTREX) 500 MG tablet, , Disp: , Rfl:     valsartan (DIOVAN) 160 MG tablet, , Disp: , Rfl:     blood-glucose meter kit, To check BG 1 times daily, to use with insurance preferred meter, Disp: 1 each, Rfl: 0    ROS    Review of Systems:  Constitutional: no fever or chills  Eyes: no visual changes  ENT: no nasal congestion or sore throat  Respiratory: no cough or shortness of breath  Cardiovascular: no chest pain  Gastrointestinal: no nausea or vomiting, tolerating diet  Musculoskeletal: pain and soreness    OBJECTIVE:      Vital Signs (Most Recent):  Vitals:    08/29/23 0853   Weight: 78.8 kg (173 lb 11.6 oz)   Height: 5' 8" (1.727 m)     Body mass index is 26.41 kg/m².    Physical Exam    Physical Exam:  Constitutional: The patient appears well-developed and well-nourished. No distress.   Head: Normocephalic and atraumatic.   Nose: Nose normal.   Eyes: Conjunctivae and EOM are normal.   Neck: No tracheal deviation present.   Cardiovascular: Normal rate and intact distal pulses.    Pulmonary/Chest: Effort normal. No respiratory distress.   Abdominal: There is no guarding.   Lymphatic: Negative for adenopathy   Neurological: The patient is alert.   Psychiatric: The patient has a normal mood and affect.     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     ASSESSMENT/PLAN:      68 y.o. yo male with   Encounter Diagnoses   Name Primary?    History of carpal tunnel surgery of right wrist     Stiffness of hand joint, left Yes    Trigger middle finger of left hand       Plan:    - Plan for OR for left carpal tunnel release and left long trigger finger release.  -Discussed with patient and/or family the risks and benefits of surgical intervention.  Conservative measures have been exhausted and patient would like to proceed with surgery.      We have discussed risks, which include but are not limited to blood clots in the legs that can travel to the lungs (pulmonary embolism). Pulmonary embolism can cause shortness of breath, chest pain, and even shock. Other risks include urinary tract infection, nausea and vomiting (usually related to pain medication), chronic pain, bleeding, nerve damage, blood vessel injury, scarring and infection, which can require re-operation. Furthermore, the risks of anesthesia include potential heart, lung, kidney, and liver damage.  Informed consent was obtained.  The patient understands and would like to proceed with surgery.      The patient's pathophysiology was explained in detail with reference to x-rays, models, other visual aids as appropriate.  Treatment options were discussed in detail.  Questions were invited and answered to the patient's satisfaction. I reviewed Primary care , and other specialty's notes to better coordinate patient's care.        Please be aware that this note has been generated with the assistance of  MModal voice-to-text.  Please excuse any spelling or grammatical errors.

## 2023-08-30 DIAGNOSIS — M06.9 RHEUMATOID ARTHRITIS, INVOLVING UNSPECIFIED SITE, UNSPECIFIED WHETHER RHEUMATOID FACTOR PRESENT: ICD-10-CM

## 2023-08-30 RX ORDER — METHOTREXATE 2.5 MG/1
TABLET ORAL
Qty: 40 TABLET | Refills: 0 | Status: SHIPPED | OUTPATIENT
Start: 2023-08-30 | End: 2023-09-12 | Stop reason: SDUPTHER

## 2023-08-30 NOTE — TELEPHONE ENCOUNTER
----- Message from Janice Madera RN sent at 8/29/2023  6:24 PM CDT -----  Contact: [phone]    ----- Message -----  From: Rebecca Ellis  Sent: 8/29/2023   4:00 PM CDT  To: Ruben RIVERO Staff    Requesting an RX refill or new RX.  Is this a refill or new RX: refill  RX name and strength :  methotrexate 2.5 MG Tab 40 tablet   Is this a 30 day or 90 day RX:   Pharmacy name and phone #   [id number] - [facility] [address] [address] [facility] [address]   Phone:  [phone]  Fax:  [phone]         Patient is out of medication, please call to confirm

## 2023-08-30 NOTE — TELEPHONE ENCOUNTER
No care due was identified.  [facility] Embedded Care Due Messages. Reference number: [id number].   8/30/2023 8:06:28 AM CDT

## 2023-08-30 NOTE — TELEPHONE ENCOUNTER
Refill Routing Note   Medication(s) are not appropriate for processing by [facility] for the following reason(s):      No active prescription written by provider    [facility] action(s):  Defer Care Due:  None identified              Appointments  past 12m or future 3m with PCP    Date Provider   Last Visit   7/11/2023 Anibal Moya MD   Next Visit   Visit date not found Anibal Moya MD   ED visits in past 90 days: 0        Note composed:6:14 PM 08/30/2023

## 2023-08-31 RX ORDER — VALSARTAN 160 MG/1
160 TABLET ORAL
Qty: 90 TABLET | Refills: 3 | Status: SHIPPED | OUTPATIENT
Start: 2023-08-31

## 2023-09-01 ENCOUNTER — TELEPHONE (OUTPATIENT)
Dept: ORTHOPEDICS | Facility: CLINIC | Age: 68
End: 2023-09-01
Payer: MEDICARE

## 2023-09-01 NOTE — TELEPHONE ENCOUNTER
"Spoke leah Poole, provided with Lucrecia Wellington information for P2P 09/05/23 after the holiday weekend. Lucrecia informed.       ----- Message from Tequila Sotelo sent at 9/1/2023  4:24 PM CDT -----  Regarding: Procedure Question  "Type:  Patient Call Back    Who Called:Shilpi Whittington)    What is the reqeust in detail:Requesting call back in regards to procedure scheduled  on 9/6/2023 need a peer to peer to discuss if procedure is approved or denied. Please advise    Can the clinic reply by [facility]?no    Best Call Back Number:[phone] ext [phone]      Additional Information:[id number] ref #      "

## 2023-09-05 ENCOUNTER — PATIENT MESSAGE (OUTPATIENT)
Dept: ORTHOPEDICS | Facility: CLINIC | Age: 68
End: 2023-09-05
Payer: MEDICARE

## 2023-09-05 ENCOUNTER — TELEPHONE (OUTPATIENT)
Dept: ORTHOPEDICS | Facility: CLINIC | Age: 68
End: 2023-09-05
Payer: MEDICARE

## 2023-09-05 ENCOUNTER — DOCUMENTATION ONLY (OUTPATIENT)
Dept: ORTHOPEDICS | Facility: CLINIC | Age: 68
End: 2023-09-05
Payer: MEDICARE

## 2023-09-05 DIAGNOSIS — M65.332 TRIGGER MIDDLE FINGER OF LEFT HAND: Primary | ICD-10-CM

## 2023-09-05 PROBLEM — G56.02 LEFT CARPAL TUNNEL SYNDROME: Status: ACTIVE | Noted: 2023-09-05

## 2023-09-05 RX ORDER — SODIUM CHLORIDE, SODIUM LACTATE, POTASSIUM CHLORIDE, CALCIUM CHLORIDE 600; 310; 30; 20 MG/100ML; MG/100ML; MG/100ML; MG/100ML
INJECTION, SOLUTION INTRAVENOUS CONTINUOUS
Status: CANCELLED | OUTPATIENT
Start: 2023-09-05

## 2023-09-05 RX ORDER — TRAMADOL HYDROCHLORIDE 50 MG/1
50 TABLET ORAL EVERY 6 HOURS PRN
Qty: 10 TABLET | Refills: 0 | Status: SHIPPED | OUTPATIENT
Start: 2023-09-05 | End: 2023-09-20 | Stop reason: SDUPTHER

## 2023-09-05 NOTE — PROGRESS NOTES
Spoke with , Dr. Richardson, regarding pt scheduled surgery. She is working on getting insurance approval for this. We should hopefully receive an update by this afternoon.

## 2023-09-05 NOTE — PRE-PROCEDURE INSTRUCTIONS
Pre admit phone call completed.    Instructions given to patient about NPO status as follows:     The evening before surgery do not eat anything after 9 p.m. ( this includes hard candy, chewing gum and mints).  You may only have WATER from 9 p.m. until you leave your home. DO NOT  DRINK ANY LIQUIDS ON THE WAY TO THE HOSPITAL.      Patient was also instructed on the below information:    Park in the Parking lot behind the hospital or in the [employer] [facility] across the street from the parking lot.  Parking is complimentary.  If you will be discharged the same day as your procedure, please arrange for a responsible adult to drive you home or  to accompany you if traveling by taxi.  YOU WILL NOT BE PERMITTED TO DRIVE OR TO LEAVE THE HOSPITAL ALONE AFTER SURGERY.  It is strongly recommended that you arrange for someone to remain with you for the first 24 hrs following your surgery.    Patient verbalized understanding of above instructions.

## 2023-09-05 NOTE — TELEPHONE ENCOUNTER
LVM for pt informing that his authorization for surgery is incomplete and that he is advised to postpone surgery until the authorization is granted. Surgery center notified and PO appt canceled. My O message sent.

## 2023-09-05 NOTE — TELEPHONE ENCOUNTER
Spoke c pt. Informed pt of 6:30 arrival time for 09/06/23 surgery at the [facility]. Reminded pt of NPO status. Pt expressed understanding & was thankful.

## 2023-09-06 DIAGNOSIS — M79.642 LEFT HAND PAIN: ICD-10-CM

## 2023-09-06 DIAGNOSIS — M65.30 ACQUIRED TRIGGER FINGER: ICD-10-CM

## 2023-09-06 DIAGNOSIS — M65.332 TRIGGER MIDDLE FINGER OF LEFT HAND: Primary | ICD-10-CM

## 2023-09-11 NOTE — PROGRESS NOTES
Subjective:      Patient ID: Karthik Bentley is a 68 y.o. male.    Chief Complaint: No chief complaint on file.    Initial Presentation  Karthik Bentley is a 68 y.o. M with a past medical history of seropositive RA (+RF), gout, HLD, cervical myelopathy, HTN and T2DM.     He initially presented to Rheumatology in 2022 for evaluation for arthritis and gout. He was found to have synovitis on exam and XR showed marginal erosions 1st, 2nd, possibly 4th MCPs and calcium deposit 3rd MCP so he was diagnosed with RA. He was initially started on MTX, 6 tablets weekly which was later increased to 10 tablets weekly.     In January 2023, he was found to have episcleritis, suspected 2/2 chronic Visine-like eye drop use for which was discontinued. He was told that this was not related to his RA.      In regards to his gout, he was started on Febuxostat 40 mg daily in May. Uric acid decreased to 5.4 in July. Last flare was months ago (March per chart review).     Interval History  He has been very doing well. He runs almost daily and has been playing the piano with minimal issues. He is having a carpal tunnel release surgery and trigger finger release with Dr. Downey. September 25th.     Rheumatology ROS  (-) fevers, chills (-) weight loss, (-) fatigue, (+) morning stiffness (lasts for 20 min),  (+) arthralgias, (-) arthritis, (-) headaches, (-)  vision changes or loss of vision, (-) hx of red eyes including uveitis, iritis, scleritis or episcleritis, (-)  photophobia, (-) dry eyes, (-) dry mouth, (-) rash, (-) photosensitivity, (-) alopecia, (-) mucosal ulcers, (-) Raynaud's  phenomenon, (-) SQ nodules, (-) sense of skin tightening in hands, face or torso, (-)  hx pleurisy, (-) sharp chest pains that increase with deep breath, (-) lung fibrosis, (-) hemoptysis, (-) hx of DVT or PE (-) chest pains, (-) shortness of breath, (-) hx pericarditis (-) abdominal pain, (-) nausea, (-) vomiting, (-) diarrhea, (-) constipation, (-) melena,   (-) bloody diarrhea, (-) UC/Crohns, (-) dysphagia, (-) GERD/Reflux, (-) hematuria, proteinuria, (-) renal failure, (-) focal weakness, (-) trouble combing hair or (-) getting out of chairs,  (-) hx of low WBC, low platelets, anemia, (-) genital ulcers    Review of Systems   Constitutional:  Negative for fever and unexpected weight change.   HENT:  Negative for mouth sores and trouble swallowing.    Eyes:  Negative for redness.   Respiratory:  Negative for cough and shortness of breath.    Cardiovascular:  Negative for chest pain.   Gastrointestinal:  Negative for constipation and diarrhea.   Genitourinary:  Negative for genital sores.   Skin:  Negative for rash.   Neurological:  Negative for headaches.   Hematological:  Does not bruise/bleed easily.      Imaging/Procedures  XR Hand Complete (03/13/23):  FINDINGS:  No acute fracture or dislocation seen.  Mild degenerative change 1st MCP joint and scattered in the interphalangeal joints.  No significant soft tissue edema.  Stable high-density focus/calcification in the soft tissues between the 4th and 5th metacarpal.  Impression:  No acute osseous abnormality seen.    XR Hand, R (09/16/22):  Bones are well mineralized.  Alignment is satisfactory.  Mild joint space narrowing and hypertrophic spurring at the radiocarpal joint.  Some degenerative changes at the 1st carpometacarpal joint.  Mild degenerative changes at some of the interphalangeal joints.  No fracture, dislocation, or erosive change.  There may be mild soft tissue swelling about the PIP joints of the 2nd, 3rd and 4th digits.  Atherosclerotic vascular calcifications are present.    XR Foot Complete (09/16/22):  Right foot: Bones are satisfactorily mineralized.  Moderate hallux valgus and bunion formation.  Hypertrophic spurring and joint space narrowing at the 1st MTP joint.  Elsewhere, alignment is satisfactory and joint spaces appear adequately maintained.  Some spurring at the dorsal aspect of the  "midfoot.  Small calcaneal spurs.  No fracture, dislocation, or erosive change.  Atherosclerotic vascular calcifications are now seen.     Left foot: Bones are satisfactorily mineralized.  Moderate hallux valgus and bunion formation.  Hypertrophic spurring and joint space narrowing at the 1st MTP joint.  Elsewhere, alignment is satisfactory and joint spaces appear fairly well maintained.  Small calcaneal spurs.  Some spurring at the dorsal aspect of the foot in the tarsometatarsal region.  No fracture, dislocation, or erosive change.  Atherosclerotic vascular calcifications are noted.    Rheumatologic labs    Component      Latest Ref Rng 5/22/2023 6/27/2023   Sed Rate      0 - 23 mm/Hr 6     CRP      0.0 - 8.2 mg/L  0.4      Component      Latest Ref Rng 7/27/2023   Uric Acid      3.4 - 7.0 mg/dL 5.4      Rheumatologic medications history  MTX, initially 6 tablets weekly increased to 10 tablets weekly (2022-present)  Colchicine 0.6 mg daily   Febuxostat 40 mg daily (05/2023-present)    Objective:   /69   Pulse (!) 48   Ht 5' 8" (1.727 m)   Wt 78.9 kg (173 lb 15.1 oz)   BMI 26.45 kg/m²   Physical Exam   Constitutional: He is oriented to person, place, and time. normal appearance.   HENT:   Nose: Nose normal.   Mouth/Throat: Oropharynx is clear.   Eyes: Conjunctivae are normal.   Cardiovascular: Normal rate, regular rhythm, normal heart sounds and normal pulses.   Pulmonary/Chest: Effort normal and breath sounds normal.   Abdominal: Soft. Bowel sounds are normal. He exhibits no distension.   Musculoskeletal:         General: Swelling (L 1st and 2nd MCP) and deformity (L and R 1st MCP tophi) present. No tenderness. Normal range of motion.      Cervical back: Normal range of motion and neck supple.   Neurological: He is alert and oriented to person, place, and time.   Skin: Skin is warm. No rash noted. No erythema.   Psychiatric: His behavior is normal. Mood normal.        2/3/2023 3/21/2023 6/27/2023 " 9/12/2023   Tender (WALKER-28) 4 / 28  0 / 28 1 / 28  0 / 28    Swollen (WALKER-28) 3 / 28  2 / 28  1 / 28  2 / 28    Provider Global 30 mm 20 mm 15 mm 10 mm   Patient Global 55 mm 50 mm 15 mm 30 mm   ESR 12 mm/hr 14 mm/hr 6 mm/hr 6 mm/hr   CRP 1.1 mg/L 1.4 mg/L 0.4 mg/L 0.4 mg/L   WALKER-28 (ESR) 4.11 (Moderate disease activity) 2.94 (Low disease activity) 2.3 (Remission) 2.07 (Remission)   WALKER-28 (CRP) 3.6 (Moderate disease activity) 2.37 (Remission) 2.13 (Remission) 1.9 (Remission)   CDAI Score 15.5  9  5  6         Assessment:     1. Rheumatoid arthritis, involving unspecified site, unspecified whether rheumatoid factor present    2. Methotrexate, long term, current use    3. Acute gout involving toe of left foot, unspecified cause      Plan:     Problem List Items Addressed This Visit          Immunology/Multi System    Rheumatoid arthritis - Primary    Relevant Medications    methotrexate 2.5 MG Tab    Other Relevant Orders    C-Reactive Protein    Sedimentation rate    Comprehensive Metabolic Panel    Uric Acid       Orthopedic    Gout    Relevant Medications    colchicine, gout, (COLCRYS) 0.6 mg tablet     Other Visit Diagnoses       Methotrexate, long term, current use        Relevant Orders    Comprehensive Metabolic Panel          Karthik Bentley is a 68 y.o. M with a past medical history of seropositive RA (+RF), gout, HLD, cervical myelopathy, HTN and T2DM who presents for follow-up.     Seropositive RA- diagnosed in 2022, well controlled on MTX 10 tablets weekly, trace synovitis on exam today, CDAI 6    Gout- uric acid 6.4 on Febuxostat 40 mg daily, also continues to take Colchicine 0.6 mg daily, last flare in March 2023    Plan:  - Check CBC, CMP, ESR, CRP and uric acid today   - Continue all medications for upcoming procedure   - Continue MTX, folic acid for RA  - Continue Febuxostat 40 mg daily and  Colchicine 0.6 mg daily for gout   - Repeat labs screening labs every 3 months     This patient was examined  with Dr. Miranda. Plan discussed with the patient. Return to clinic in 6 months.    Giovanna Cummings MD  Rheumatology Fellow, PGY4

## 2023-09-12 ENCOUNTER — LAB VISIT (OUTPATIENT)
Dept: LAB | Facility: HOSPITAL | Age: 68
End: 2023-09-12
Payer: MEDICARE

## 2023-09-12 ENCOUNTER — TELEPHONE (OUTPATIENT)
Dept: RHEUMATOLOGY | Facility: CLINIC | Age: 68
End: 2023-09-12
Payer: MEDICARE

## 2023-09-12 ENCOUNTER — OFFICE VISIT (OUTPATIENT)
Dept: RHEUMATOLOGY | Facility: CLINIC | Age: 68
End: 2023-09-12
Payer: MEDICARE

## 2023-09-12 VITALS
SYSTOLIC BLOOD PRESSURE: 120 MMHG | DIASTOLIC BLOOD PRESSURE: 69 MMHG | WEIGHT: 173.94 LBS | HEIGHT: 68 IN | BODY MASS INDEX: 26.36 KG/M2 | HEART RATE: 48 BPM

## 2023-09-12 DIAGNOSIS — Z79.899 OTHER LONG TERM (CURRENT) DRUG THERAPY: ICD-10-CM

## 2023-09-12 DIAGNOSIS — M06.9 RHEUMATOID ARTHRITIS, INVOLVING UNSPECIFIED SITE, UNSPECIFIED WHETHER RHEUMATOID FACTOR PRESENT: Primary | ICD-10-CM

## 2023-09-12 DIAGNOSIS — M06.9 RHEUMATOID ARTHRITIS, INVOLVING UNSPECIFIED SITE, UNSPECIFIED WHETHER RHEUMATOID FACTOR PRESENT: ICD-10-CM

## 2023-09-12 DIAGNOSIS — M1A.9XX0 CHRONIC GOUT WITHOUT TOPHUS, UNSPECIFIED CAUSE, UNSPECIFIED SITE: ICD-10-CM

## 2023-09-12 DIAGNOSIS — M10.9 ACUTE GOUT INVOLVING TOE OF LEFT FOOT, UNSPECIFIED CAUSE: ICD-10-CM

## 2023-09-12 DIAGNOSIS — D64.9 ANEMIA, UNSPECIFIED TYPE: Primary | ICD-10-CM

## 2023-09-12 DIAGNOSIS — M1A.09X0 IDIOPATHIC CHRONIC GOUT OF MULTIPLE SITES WITHOUT TOPHUS: ICD-10-CM

## 2023-09-12 DIAGNOSIS — Z79.631 METHOTREXATE, LONG TERM, CURRENT USE: ICD-10-CM

## 2023-09-12 LAB
ALBUMIN SERPL BCP-MCNC: 3.9 G/DL (ref 3.5–5.2)
ALP SERPL-CCNC: 79 U/L (ref 55–135)
ALT SERPL W/O P-5'-P-CCNC: 32 U/L (ref 10–44)
ANION GAP SERPL CALC-SCNC: 10 MMOL/L (ref 8–16)
AST SERPL-CCNC: 25 U/L (ref 10–40)
BASOPHILS # BLD AUTO: 0.03 K/UL (ref 0–0.2)
BASOPHILS NFR BLD: 0.4 % (ref 0–1.9)
BILIRUB SERPL-MCNC: 1 MG/DL (ref 0.1–1)
BUN SERPL-MCNC: 12 MG/DL (ref 8–23)
CALCIUM SERPL-MCNC: 9.4 MG/DL (ref 8.7–10.5)
CHLORIDE SERPL-SCNC: 107 MMOL/L (ref 95–110)
CO2 SERPL-SCNC: 24 MMOL/L (ref 23–29)
CREAT SERPL-MCNC: 1 MG/DL (ref 0.5–1.4)
CRP SERPL-MCNC: 0.4 MG/L (ref 0–8.2)
CRP SERPL-MCNC: 0.4 MG/L (ref 0–8.2)
DIFFERENTIAL METHOD: ABNORMAL
EOSINOPHIL # BLD AUTO: 0.1 K/UL (ref 0–0.5)
EOSINOPHIL NFR BLD: 1.3 % (ref 0–8)
ERYTHROCYTE [DISTWIDTH] IN BLOOD BY AUTOMATED COUNT: 15.1 % (ref 11.5–14.5)
ERYTHROCYTE [SEDIMENTATION RATE] IN BLOOD BY PHOTOMETRIC METHOD: 6 MM/HR (ref 0–23)
EST. GFR  (NO RACE VARIABLE): >60 ML/MIN/1.73 M^2
GLUCOSE SERPL-MCNC: 98 MG/DL (ref 70–110)
HCT VFR BLD AUTO: 37.8 % (ref 40–54)
HGB BLD-MCNC: 12.2 G/DL (ref 14–18)
IMM GRANULOCYTES # BLD AUTO: 0.03 K/UL (ref 0–0.04)
IMM GRANULOCYTES NFR BLD AUTO: 0.4 % (ref 0–0.5)
LYMPHOCYTES # BLD AUTO: 1.7 K/UL (ref 1–4.8)
LYMPHOCYTES NFR BLD: 21.6 % (ref 18–48)
MCH RBC QN AUTO: 32 PG (ref 27–31)
MCHC RBC AUTO-ENTMCNC: 32.3 G/DL (ref 32–36)
MCV RBC AUTO: 99 FL (ref 82–98)
MONOCYTES # BLD AUTO: 0.6 K/UL (ref 0.3–1)
MONOCYTES NFR BLD: 7.1 % (ref 4–15)
NEUTROPHILS # BLD AUTO: 5.5 K/UL (ref 1.8–7.7)
NEUTROPHILS NFR BLD: 69.2 % (ref 38–73)
NRBC BLD-RTO: 0 /100 WBC
PLATELET # BLD AUTO: 214 K/UL (ref 150–450)
PMV BLD AUTO: 11.2 FL (ref 9.2–12.9)
POTASSIUM SERPL-SCNC: 4.9 MMOL/L (ref 3.5–5.1)
PROT SERPL-MCNC: 6.9 G/DL (ref 6–8.4)
RBC # BLD AUTO: 3.81 M/UL (ref 4.6–6.2)
SODIUM SERPL-SCNC: 141 MMOL/L (ref 136–145)
URATE SERPL-MCNC: 6.4 MG/DL (ref 3.4–7)
WBC # BLD AUTO: 7.87 K/UL (ref 3.9–12.7)

## 2023-09-12 PROCEDURE — 3288F PR FALLS RISK ASSESSMENT DOCUMENTED: ICD-10-PCS | Mod: CPTII,GC,S$GLB, | Performed by: STUDENT IN AN ORGANIZED HEALTH CARE EDUCATION/TRAINING PROGRAM

## 2023-09-12 PROCEDURE — 3288F FALL RISK ASSESSMENT DOCD: CPT | Mod: CPTII,GC,S$GLB, | Performed by: STUDENT IN AN ORGANIZED HEALTH CARE EDUCATION/TRAINING PROGRAM

## 2023-09-12 PROCEDURE — 99999 PR PBB SHADOW E&M-EST. PATIENT-LVL IV: ICD-10-PCS | Mod: PBBFAC,GC,, | Performed by: STUDENT IN AN ORGANIZED HEALTH CARE EDUCATION/TRAINING PROGRAM

## 2023-09-12 PROCEDURE — 3061F PR NEG MICROALBUMINURIA RESULT DOCUMENTED/REVIEW: ICD-10-PCS | Mod: CPTII,GC,S$GLB, | Performed by: STUDENT IN AN ORGANIZED HEALTH CARE EDUCATION/TRAINING PROGRAM

## 2023-09-12 PROCEDURE — 85652 RBC SED RATE AUTOMATED: CPT | Performed by: STUDENT IN AN ORGANIZED HEALTH CARE EDUCATION/TRAINING PROGRAM

## 2023-09-12 PROCEDURE — 3008F BODY MASS INDEX DOCD: CPT | Mod: CPTII,GC,S$GLB, | Performed by: STUDENT IN AN ORGANIZED HEALTH CARE EDUCATION/TRAINING PROGRAM

## 2023-09-12 PROCEDURE — 1159F MED LIST DOCD IN RCRD: CPT | Mod: CPTII,GC,S$GLB, | Performed by: STUDENT IN AN ORGANIZED HEALTH CARE EDUCATION/TRAINING PROGRAM

## 2023-09-12 PROCEDURE — 1101F PR PT FALLS ASSESS DOC 0-1 FALLS W/OUT INJ PAST YR: ICD-10-PCS | Mod: CPTII,GC,S$GLB, | Performed by: STUDENT IN AN ORGANIZED HEALTH CARE EDUCATION/TRAINING PROGRAM

## 2023-09-12 PROCEDURE — 86140 C-REACTIVE PROTEIN: CPT | Performed by: STUDENT IN AN ORGANIZED HEALTH CARE EDUCATION/TRAINING PROGRAM

## 2023-09-12 PROCEDURE — 3008F PR BODY MASS INDEX (BMI) DOCUMENTED: ICD-10-PCS | Mod: CPTII,GC,S$GLB, | Performed by: STUDENT IN AN ORGANIZED HEALTH CARE EDUCATION/TRAINING PROGRAM

## 2023-09-12 PROCEDURE — 3078F PR MOST RECENT DIASTOLIC BLOOD PRESSURE < 80 MM HG: ICD-10-PCS | Mod: CPTII,GC,S$GLB, | Performed by: STUDENT IN AN ORGANIZED HEALTH CARE EDUCATION/TRAINING PROGRAM

## 2023-09-12 PROCEDURE — 99214 PR OFFICE/OUTPT VISIT, EST, LEVL IV, 30-39 MIN: ICD-10-PCS | Mod: GC,S$GLB,, | Performed by: STUDENT IN AN ORGANIZED HEALTH CARE EDUCATION/TRAINING PROGRAM

## 2023-09-12 PROCEDURE — 4010F PR ACE/ARB THEARPY RXD/TAKEN: ICD-10-PCS | Mod: CPTII,GC,S$GLB, | Performed by: STUDENT IN AN ORGANIZED HEALTH CARE EDUCATION/TRAINING PROGRAM

## 2023-09-12 PROCEDURE — 85025 COMPLETE CBC W/AUTO DIFF WBC: CPT | Performed by: STUDENT IN AN ORGANIZED HEALTH CARE EDUCATION/TRAINING PROGRAM

## 2023-09-12 PROCEDURE — 84550 ASSAY OF BLOOD/URIC ACID: CPT | Performed by: STUDENT IN AN ORGANIZED HEALTH CARE EDUCATION/TRAINING PROGRAM

## 2023-09-12 PROCEDURE — 36415 COLL VENOUS BLD VENIPUNCTURE: CPT | Performed by: STUDENT IN AN ORGANIZED HEALTH CARE EDUCATION/TRAINING PROGRAM

## 2023-09-12 PROCEDURE — 3074F PR MOST RECENT SYSTOLIC BLOOD PRESSURE < 130 MM HG: ICD-10-PCS | Mod: CPTII,GC,S$GLB, | Performed by: STUDENT IN AN ORGANIZED HEALTH CARE EDUCATION/TRAINING PROGRAM

## 2023-09-12 PROCEDURE — 3044F HG A1C LEVEL LT 7.0%: CPT | Mod: CPTII,GC,S$GLB, | Performed by: STUDENT IN AN ORGANIZED HEALTH CARE EDUCATION/TRAINING PROGRAM

## 2023-09-12 PROCEDURE — 3066F PR DOCUMENTATION OF TREATMENT FOR NEPHROPATHY: ICD-10-PCS | Mod: CPTII,GC,S$GLB, | Performed by: STUDENT IN AN ORGANIZED HEALTH CARE EDUCATION/TRAINING PROGRAM

## 2023-09-12 PROCEDURE — 1160F RVW MEDS BY RX/DR IN RCRD: CPT | Mod: CPTII,GC,S$GLB, | Performed by: STUDENT IN AN ORGANIZED HEALTH CARE EDUCATION/TRAINING PROGRAM

## 2023-09-12 PROCEDURE — 1125F PR PAIN SEVERITY QUANTIFIED, PAIN PRESENT: ICD-10-PCS | Mod: CPTII,GC,S$GLB, | Performed by: STUDENT IN AN ORGANIZED HEALTH CARE EDUCATION/TRAINING PROGRAM

## 2023-09-12 PROCEDURE — 3061F NEG MICROALBUMINURIA REV: CPT | Mod: CPTII,GC,S$GLB, | Performed by: STUDENT IN AN ORGANIZED HEALTH CARE EDUCATION/TRAINING PROGRAM

## 2023-09-12 PROCEDURE — 99999 PR PBB SHADOW E&M-EST. PATIENT-LVL IV: CPT | Mod: PBBFAC,GC,, | Performed by: STUDENT IN AN ORGANIZED HEALTH CARE EDUCATION/TRAINING PROGRAM

## 2023-09-12 PROCEDURE — 1159F PR MEDICATION LIST DOCUMENTED IN MEDICAL RECORD: ICD-10-PCS | Mod: CPTII,GC,S$GLB, | Performed by: STUDENT IN AN ORGANIZED HEALTH CARE EDUCATION/TRAINING PROGRAM

## 2023-09-12 PROCEDURE — 1160F PR REVIEW ALL MEDS BY PRESCRIBER/CLIN PHARMACIST DOCUMENTED: ICD-10-PCS | Mod: CPTII,GC,S$GLB, | Performed by: STUDENT IN AN ORGANIZED HEALTH CARE EDUCATION/TRAINING PROGRAM

## 2023-09-12 PROCEDURE — 3078F DIAST BP <80 MM HG: CPT | Mod: CPTII,GC,S$GLB, | Performed by: STUDENT IN AN ORGANIZED HEALTH CARE EDUCATION/TRAINING PROGRAM

## 2023-09-12 PROCEDURE — 1101F PT FALLS ASSESS-DOCD LE1/YR: CPT | Mod: CPTII,GC,S$GLB, | Performed by: STUDENT IN AN ORGANIZED HEALTH CARE EDUCATION/TRAINING PROGRAM

## 2023-09-12 PROCEDURE — 3074F SYST BP LT 130 MM HG: CPT | Mod: CPTII,GC,S$GLB, | Performed by: STUDENT IN AN ORGANIZED HEALTH CARE EDUCATION/TRAINING PROGRAM

## 2023-09-12 PROCEDURE — 4010F ACE/ARB THERAPY RXD/TAKEN: CPT | Mod: CPTII,GC,S$GLB, | Performed by: STUDENT IN AN ORGANIZED HEALTH CARE EDUCATION/TRAINING PROGRAM

## 2023-09-12 PROCEDURE — 3044F PR MOST RECENT HEMOGLOBIN A1C LEVEL <7.0%: ICD-10-PCS | Mod: CPTII,GC,S$GLB, | Performed by: STUDENT IN AN ORGANIZED HEALTH CARE EDUCATION/TRAINING PROGRAM

## 2023-09-12 PROCEDURE — 3066F NEPHROPATHY DOC TX: CPT | Mod: CPTII,GC,S$GLB, | Performed by: STUDENT IN AN ORGANIZED HEALTH CARE EDUCATION/TRAINING PROGRAM

## 2023-09-12 PROCEDURE — 80053 COMPREHEN METABOLIC PANEL: CPT | Performed by: STUDENT IN AN ORGANIZED HEALTH CARE EDUCATION/TRAINING PROGRAM

## 2023-09-12 PROCEDURE — 99214 OFFICE O/P EST MOD 30 MIN: CPT | Mod: GC,S$GLB,, | Performed by: STUDENT IN AN ORGANIZED HEALTH CARE EDUCATION/TRAINING PROGRAM

## 2023-09-12 PROCEDURE — 1125F AMNT PAIN NOTED PAIN PRSNT: CPT | Mod: CPTII,GC,S$GLB, | Performed by: STUDENT IN AN ORGANIZED HEALTH CARE EDUCATION/TRAINING PROGRAM

## 2023-09-12 RX ORDER — FEBUXOSTAT 40 MG/1
40 TABLET, FILM COATED ORAL DAILY
Qty: 30 TABLET | Refills: 3 | Status: SHIPPED | OUTPATIENT
Start: 2023-09-12 | End: 2024-03-05 | Stop reason: SDUPTHER

## 2023-09-12 RX ORDER — COLCHICINE 0.6 MG/1
0.6 TABLET ORAL DAILY
Qty: 90 TABLET | Refills: 2 | Status: SHIPPED | OUTPATIENT
Start: 2023-09-12 | End: 2024-03-05

## 2023-09-12 RX ORDER — METHOTREXATE 2.5 MG/1
TABLET ORAL
Qty: 40 TABLET | Refills: 2 | Status: SHIPPED | OUTPATIENT
Start: 2023-09-12 | End: 2023-10-18

## 2023-09-12 NOTE — PROGRESS NOTES
9/11/2023     8:33 PM   Rapid3 Question Responses and Scores   MDHAQ Score 0.5   Psychologic Score 0   Pain Score 1   When you awakened in the morning OVER THE LAST WEEK, did you feel stiff? Yes   If Yes, please indicate the number of hours until you are as limber as you will be for the day 1   Fatigue Score 0   Global Health Score 3   RAPID3 Score 1.89     Answers submitted by the patient for this visit:  Rheumatology Questionnaire (Submitted on 9/11/2023)  fever: No  eye redness: No  mouth sores: No  headaches: No  shortness of breath: No  chest pain: No  trouble swallowing: No  diarrhea: No  constipation: No  unexpected weight change: No  genital sore: No  During the last 3 days, have you had a skin rash?: No  Bruises or bleeds easily: No  cough: No

## 2023-09-13 NOTE — PROGRESS NOTES
I have personally reviewed the history, confirmed exam findings, and discussed assessment and plan with fellow.;

## 2023-09-18 ENCOUNTER — ANESTHESIA EVENT (OUTPATIENT)
Dept: SURGERY | Facility: HOSPITAL | Age: 68
End: 2023-09-18
Payer: MEDICARE

## 2023-09-20 DIAGNOSIS — M65.332 TRIGGER MIDDLE FINGER OF LEFT HAND: Primary | ICD-10-CM

## 2023-09-20 RX ORDER — TRAMADOL HYDROCHLORIDE 50 MG/1
50 TABLET ORAL EVERY 6 HOURS PRN
Qty: 10 TABLET | Refills: 0 | Status: SHIPPED | OUTPATIENT
Start: 2023-09-20

## 2023-09-22 ENCOUNTER — TELEPHONE (OUTPATIENT)
Dept: ORTHOPEDICS | Facility: CLINIC | Age: 68
End: 2023-09-22
Payer: MEDICARE

## 2023-09-22 ENCOUNTER — PATIENT MESSAGE (OUTPATIENT)
Dept: ORTHOPEDICS | Facility: CLINIC | Age: 68
End: 2023-09-22
Payer: MEDICARE

## 2023-09-22 NOTE — TELEPHONE ENCOUNTER
Attempted to contact pt. Left voicemail. Informed pt of 7:15 arrival time for surgery at the [facility], NPO status. Asked pt to return call to clinic at [phone] to confirm. MyChart message also sent.

## 2023-09-25 ENCOUNTER — ANESTHESIA (OUTPATIENT)
Dept: SURGERY | Facility: HOSPITAL | Age: 68
End: 2023-09-25
Payer: MEDICARE

## 2023-09-25 ENCOUNTER — HOSPITAL ENCOUNTER (OUTPATIENT)
Facility: HOSPITAL | Age: 68
Discharge: HOME | End: 2023-09-25
Attending: ORTHOPAEDIC SURGERY | Admitting: ORTHOPAEDIC SURGERY
Payer: MEDICARE

## 2023-09-25 VITALS
BODY MASS INDEX: 26.37 KG/M2 | DIASTOLIC BLOOD PRESSURE: 63 MMHG | SYSTOLIC BLOOD PRESSURE: 139 MMHG | TEMPERATURE: 98 F | HEART RATE: 50 BPM | RESPIRATION RATE: 18 BRPM | HEIGHT: 68 IN | OXYGEN SATURATION: 99 % | WEIGHT: 174 LBS

## 2023-09-25 DIAGNOSIS — E11.9 TYPE 2 DIABETES MELLITUS WITHOUT COMPLICATION, WITHOUT LONG-TERM CURRENT USE OF INSULIN: ICD-10-CM

## 2023-09-25 DIAGNOSIS — Z01.818 PREOP TESTING: ICD-10-CM

## 2023-09-25 DIAGNOSIS — G56.02 LEFT CARPAL TUNNEL SYNDROME: Primary | ICD-10-CM

## 2023-09-25 DIAGNOSIS — M65.332 TRIGGER FINGER, LEFT MIDDLE FINGER: ICD-10-CM

## 2023-09-25 LAB
POCT GLUCOSE: 88 MG/DL (ref 70–110)
POCT GLUCOSE: 92 MG/DL (ref 70–110)

## 2023-09-25 PROCEDURE — 25000003 PHARM REV CODE 250: Performed by: ORTHOPAEDIC SURGERY

## 2023-09-25 PROCEDURE — 37000009 HC ANESTHESIA EA ADD 15 MINS: Performed by: ORTHOPAEDIC SURGERY

## 2023-09-25 PROCEDURE — 71000033 HC RECOVERY, INTIAL HOUR: Performed by: ORTHOPAEDIC SURGERY

## 2023-09-25 PROCEDURE — 27201423 OPTIME MED/SURG SUP & DEVICES STERILE SUPPLY: Performed by: ORTHOPAEDIC SURGERY

## 2023-09-25 PROCEDURE — 82962 GLUCOSE BLOOD TEST: CPT | Performed by: ORTHOPAEDIC SURGERY

## 2023-09-25 PROCEDURE — 94761 N-INVAS EAR/PLS OXIMETRY MLT: CPT

## 2023-09-25 PROCEDURE — 36000706: Performed by: ORTHOPAEDIC SURGERY

## 2023-09-25 PROCEDURE — 36000707: Performed by: ORTHOPAEDIC SURGERY

## 2023-09-25 PROCEDURE — D9220A PRA ANESTHESIA: ICD-10-PCS | Mod: ANES,,, | Performed by: ANESTHESIOLOGY

## 2023-09-25 PROCEDURE — 63600175 PHARM REV CODE 636 W HCPCS: Performed by: NURSE ANESTHETIST, CERTIFIED REGISTERED

## 2023-09-25 PROCEDURE — D9220A PRA ANESTHESIA: ICD-10-PCS | Mod: CRNA,,, | Performed by: NURSE ANESTHETIST, CERTIFIED REGISTERED

## 2023-09-25 PROCEDURE — 82962 GLUCOSE BLOOD TEST: CPT | Mod: 91 | Performed by: ORTHOPAEDIC SURGERY

## 2023-09-25 PROCEDURE — 63600175 PHARM REV CODE 636 W HCPCS: Performed by: ORTHOPAEDIC SURGERY

## 2023-09-25 PROCEDURE — 26055 INCISE FINGER TENDON SHEATH: CPT | Mod: 51,F2,, | Performed by: ORTHOPAEDIC SURGERY

## 2023-09-25 PROCEDURE — 37000008 HC ANESTHESIA 1ST 15 MINUTES: Performed by: ORTHOPAEDIC SURGERY

## 2023-09-25 PROCEDURE — 25000003 PHARM REV CODE 250: Performed by: NURSE ANESTHETIST, CERTIFIED REGISTERED

## 2023-09-25 PROCEDURE — 26055 PR INCISE FINGER TENDON SHEATH: ICD-10-PCS | Mod: 51,F2,, | Performed by: ORTHOPAEDIC SURGERY

## 2023-09-25 PROCEDURE — 71000015 HC POSTOP RECOV 1ST HR: Performed by: ORTHOPAEDIC SURGERY

## 2023-09-25 PROCEDURE — D9220A PRA ANESTHESIA: Mod: ANES,,, | Performed by: ANESTHESIOLOGY

## 2023-09-25 PROCEDURE — 99900035 HC TECH TIME PER 15 MIN (STAT)

## 2023-09-25 PROCEDURE — D9220A PRA ANESTHESIA: Mod: CRNA,,, | Performed by: NURSE ANESTHETIST, CERTIFIED REGISTERED

## 2023-09-25 PROCEDURE — 64721 CARPAL TUNNEL SURGERY: CPT | Mod: LT,,, | Performed by: ORTHOPAEDIC SURGERY

## 2023-09-25 PROCEDURE — 25000003 PHARM REV CODE 250: Performed by: ANESTHESIOLOGY

## 2023-09-25 PROCEDURE — 25000003 PHARM REV CODE 250

## 2023-09-25 PROCEDURE — 64721 PR REVISE MEDIAN N/CARPAL TUNNEL SURG: ICD-10-PCS | Mod: LT,,, | Performed by: ORTHOPAEDIC SURGERY

## 2023-09-25 RX ORDER — BUPIVACAINE HYDROCHLORIDE 2.5 MG/ML
INJECTION, SOLUTION EPIDURAL; INFILTRATION; INTRACAUDAL
Status: DISCONTINUED | OUTPATIENT
Start: 2023-09-25 | End: 2023-09-25 | Stop reason: HOSPADM

## 2023-09-25 RX ORDER — DEXAMETHASONE SODIUM PHOSPHATE 4 MG/ML
INJECTION, SOLUTION INTRA-ARTICULAR; INTRALESIONAL; INTRAMUSCULAR; INTRAVENOUS; SOFT TISSUE
Status: DISCONTINUED | OUTPATIENT
Start: 2023-09-25 | End: 2023-09-25

## 2023-09-25 RX ORDER — SODIUM CHLORIDE 0.9 % (FLUSH) 0.9 %
10 SYRINGE (ML) INJECTION
Status: ACTIVE | OUTPATIENT
Start: 2023-09-25

## 2023-09-25 RX ORDER — FENTANYL CITRATE 50 UG/ML
25 INJECTION, SOLUTION INTRAMUSCULAR; INTRAVENOUS EVERY 5 MIN PRN
Status: DISCONTINUED | OUTPATIENT
Start: 2023-09-25 | End: 2023-09-25 | Stop reason: HOSPADM

## 2023-09-25 RX ORDER — MUPIROCIN 20 MG/G
OINTMENT TOPICAL
Status: DISPENSED | OUTPATIENT
Start: 2023-09-25

## 2023-09-25 RX ORDER — LIDOCAINE HYDROCHLORIDE 10 MG/ML
INJECTION, SOLUTION EPIDURAL; INFILTRATION; INTRACAUDAL; PERINEURAL
Status: DISCONTINUED | OUTPATIENT
Start: 2023-09-25 | End: 2023-09-25 | Stop reason: HOSPADM

## 2023-09-25 RX ORDER — MIDAZOLAM HYDROCHLORIDE 1 MG/ML
INJECTION INTRAMUSCULAR; INTRAVENOUS
Status: DISCONTINUED | OUTPATIENT
Start: 2023-09-25 | End: 2023-09-25

## 2023-09-25 RX ORDER — ONDANSETRON 2 MG/ML
INJECTION INTRAMUSCULAR; INTRAVENOUS
Status: DISCONTINUED | OUTPATIENT
Start: 2023-09-25 | End: 2023-09-25

## 2023-09-25 RX ORDER — DEXMEDETOMIDINE HYDROCHLORIDE 100 UG/ML
INJECTION, SOLUTION INTRAVENOUS
Status: DISCONTINUED | OUTPATIENT
Start: 2023-09-25 | End: 2023-09-25

## 2023-09-25 RX ORDER — ACETAMINOPHEN 500 MG
1000 TABLET ORAL ONCE
Status: COMPLETED | OUTPATIENT
Start: 2023-09-25 | End: 2023-09-25

## 2023-09-25 RX ORDER — BACITRACIN ZINC 500 UNIT/G
OINTMENT (GRAM) TOPICAL
Status: DISCONTINUED | OUTPATIENT
Start: 2023-09-25 | End: 2023-09-25 | Stop reason: HOSPADM

## 2023-09-25 RX ORDER — CEFAZOLIN SODIUM 1 G/3ML
INJECTION, POWDER, FOR SOLUTION INTRAMUSCULAR; INTRAVENOUS
Status: DISCONTINUED | OUTPATIENT
Start: 2023-09-25 | End: 2023-09-25

## 2023-09-25 RX ORDER — PROPOFOL 10 MG/ML
VIAL (ML) INTRAVENOUS
Status: DISCONTINUED | OUTPATIENT
Start: 2023-09-25 | End: 2023-09-25

## 2023-09-25 RX ORDER — OXYCODONE HYDROCHLORIDE 5 MG/1
5 TABLET ORAL
Status: DISCONTINUED | OUTPATIENT
Start: 2023-09-25 | End: 2023-09-25 | Stop reason: HOSPADM

## 2023-09-25 RX ORDER — PROPOFOL 10 MG/ML
VIAL (ML) INTRAVENOUS CONTINUOUS PRN
Status: DISCONTINUED | OUTPATIENT
Start: 2023-09-25 | End: 2023-09-25

## 2023-09-25 RX ORDER — LIDOCAINE HYDROCHLORIDE 10 MG/ML
0.5 INJECTION, SOLUTION EPIDURAL; INFILTRATION; INTRACAUDAL; PERINEURAL ONCE
Status: ACTIVE | OUTPATIENT
Start: 2023-09-25

## 2023-09-25 RX ORDER — SODIUM CHLORIDE 0.9 % (FLUSH) 0.9 %
10 SYRINGE (ML) INJECTION
Status: DISCONTINUED | OUTPATIENT
Start: 2023-09-25 | End: 2023-09-25 | Stop reason: HOSPADM

## 2023-09-25 RX ORDER — DULAGLUTIDE 1.5 MG/.5ML
1.5 INJECTION, SOLUTION SUBCUTANEOUS
Qty: 12 PEN | Refills: 1 | Status: SHIPPED | OUTPATIENT
Start: 2023-09-25 | End: 2023-12-07

## 2023-09-25 RX ORDER — ONDANSETRON 2 MG/ML
4 INJECTION INTRAMUSCULAR; INTRAVENOUS DAILY PRN
Status: DISCONTINUED | OUTPATIENT
Start: 2023-09-25 | End: 2023-09-25 | Stop reason: HOSPADM

## 2023-09-25 RX ORDER — SODIUM CHLORIDE 9 MG/ML
INJECTION, SOLUTION INTRAVENOUS CONTINUOUS
Status: DISCONTINUED | OUTPATIENT
Start: 2023-09-25 | End: 2023-09-25 | Stop reason: HOSPADM

## 2023-09-25 RX ORDER — LIDOCAINE HYDROCHLORIDE 10 MG/ML
INJECTION, SOLUTION INTRAVENOUS
Status: DISCONTINUED | OUTPATIENT
Start: 2023-09-25 | End: 2023-09-25

## 2023-09-25 RX ADMIN — PROPOFOL 20 MG: 10 INJECTION, EMULSION INTRAVENOUS at 09:09

## 2023-09-25 RX ADMIN — SODIUM CHLORIDE: 0.9 INJECTION, SOLUTION INTRAVENOUS at 08:09

## 2023-09-25 RX ADMIN — LIDOCAINE HYDROCHLORIDE 100 MG: 10 INJECTION, SOLUTION INTRAVENOUS at 09:09

## 2023-09-25 RX ADMIN — DEXMEDETOMIDINE 20 MCG: 100 INJECTION, SOLUTION, CONCENTRATE INTRAVENOUS at 09:09

## 2023-09-25 RX ADMIN — ACETAMINOPHEN 1000 MG: 500 TABLET ORAL at 08:09

## 2023-09-25 RX ADMIN — DEXAMETHASONE SODIUM PHOSPHATE 4 MG: 4 INJECTION, SOLUTION INTRAMUSCULAR; INTRAVENOUS at 09:09

## 2023-09-25 RX ADMIN — ONDANSETRON 4 MG: 2 INJECTION INTRAMUSCULAR; INTRAVENOUS at 09:09

## 2023-09-25 RX ADMIN — SODIUM CHLORIDE: 9 INJECTION, SOLUTION INTRAVENOUS at 09:09

## 2023-09-25 RX ADMIN — PROPOFOL 75 MCG/KG/MIN: 10 INJECTION, EMULSION INTRAVENOUS at 09:09

## 2023-09-25 RX ADMIN — CEFAZOLIN 2 G: 330 INJECTION, POWDER, FOR SOLUTION INTRAMUSCULAR; INTRAVENOUS at 09:09

## 2023-09-25 RX ADMIN — MUPIROCIN: 20 OINTMENT TOPICAL at 08:09

## 2023-09-25 RX ADMIN — OXYCODONE HYDROCHLORIDE 5 MG: 5 TABLET ORAL at 11:09

## 2023-09-25 RX ADMIN — MIDAZOLAM HYDROCHLORIDE 2 MG: 1 INJECTION, SOLUTION INTRAMUSCULAR; INTRAVENOUS at 09:09

## 2023-09-25 NOTE — PLAN OF CARE
Pre Op complete with no distress noted.  Dr. Stephens scanned stomach and stated okay to proceed.  Call light in reach and belongings locked in locker.

## 2023-09-25 NOTE — TRANSFER OF CARE
"Anesthesia Transfer of Care Note    Patient: Karthik Bentley    Procedure(s) Performed: Procedure(s) (LRB):  RELEASE, TRIGGER FINGER, LEFT LONG FINGER (Left)  RELEASE, CARPAL TUNNEL,LEFT (Left)    Patient location: PACU    Anesthesia Type: general    Transport from OR: Transported from OR on room air with adequate spontaneous ventilation. Transported from OR on 6-10 L/min O2 by face mask with adequate spontaneous ventilation    Post pain: adequate analgesia    Post assessment: no apparent anesthetic complications and tolerated procedure well    Post vital signs: stable    Level of consciousness: awake    Nausea/Vomiting: no nausea/vomiting    Complications: none    Transfer of care protocol was followed      Last vitals:   Visit Vitals  BP (!) 188/60   Pulse (!) 50   Temp 36.4 °C (97.6 °F) (Oral)   Resp 16   Ht 5' 8" (1.727 m)   Wt 78.9 kg (174 lb)   SpO2 100%   BMI 26.46 kg/m²     "

## 2023-09-25 NOTE — ANESTHESIA PREPROCEDURE EVALUATION
09/25/2023  Karthik Bentley is a 68 y.o., male.      Pre-op Assessment    I have reviewed the Patient Summary Reports.     I have reviewed the Nursing Notes.    I have reviewed the Medications.     Review of Systems  Anesthesia Hx:  No problems with previous Anesthesia  Denies Family Hx of Anesthesia complications.    Cardiovascular:   Exercise tolerance: good Hypertension hyperlipidemia    Pulmonary:  Pulmonary Normal    Musculoskeletal:   Arthritis     Neurological:   Neuromuscular Disease,    Endocrine:   Diabetes, type 2      Past Medical History:   Diagnosis Date    Diabetes mellitus type II     Hypertension     Rheumatoid arthritis      Past Surgical History:   Procedure Laterality Date    ANTERIOR CERVICAL DISCECTOMY W/ FUSION N/A 10/31/2022    Procedure: DISCECTOMY, SPINE, CERVICAL, ANTERIOR APPROACH, WITH FUSION C3-6 depuy SNS:vocal/motors/SSEP;  Surgeon: Kody Marshall MD;  Location: [facility] OR;  Service: Orthopedics;  Laterality: N/A;    COLONOSCOPY N/A 1/16/2020    Procedure: COLONOSCOPY;  Surgeon: Cynthia Kearney MD;  Location: [address]);  Service: Endoscopy;  Laterality: N/A;    ENDOSCOPIC CARPAL TUNNEL RELEASE Right 4/1/2022    Procedure: RELEASE, CARPAL TUNNEL, ENDOSCOPIC - right arthrex;  Surgeon: Wiley Warren MD;  Location: [facility] OR;  Service: Orthopedics;  Laterality: Right;    EPIDURAL STEROID INJECTION N/A 9/6/2022    Procedure: CERVICAL EDITH CONTRAST DIRECT REFERRAL;  Surgeon: Yasmeen Taylor MD;  Location: [facility] PAIN [facility]GT;  Service: Pain Management;  Laterality: N/A;    SHOULDER SURGERY       Patient Active Problem List   Diagnosis    Essential hypertension    Type 2 diabetes mellitus, without long-term current use of insulin    Screen for colon cancer    Overweight    HSV infection    Chronic pain of both shoulders    Cervical myelopathy    Drug-induced  immunodeficiency    Syncope    Mixed hyperlipidemia    Gout    Rheumatoid arthritis    Trigger finger, left middle finger    Left carpal tunnel syndrome     Facility-Administered Medications as of 9/25/2023   Medication Dose Route Frequency Provider Last Rate Last Admin    [COMPLETED] acetaminophen tablet 1,000 mg  1,000 mg Oral Once Diego Rowell MD   1,000 mg at 09/25/23 0821    ceFAZolin 2 g in dextrose 5 % in water (D5W) 50 mL IVPB (MB+)  2 g Intravenous On Call Procedure Obi Smith MD        LIDOcaine (PF) 10 mg/ml (1%) injection 5 mg  0.5 mL Intradermal Once Esau Ham MD        mupirocin 2 % ointment   Nasal On Call Procedure Obi Smith MD   Given at 09/25/23 0822    sodium chloride 0.9% flush 10 mL  10 mL Intravenous PRN Obi Smith MD         Outpatient Medications as of 9/25/2023   Medication Sig Dispense Refill    folic acid (FOLVITE) 1 MG tablet Take 1 tablet (1 mg total) by mouth once daily. 90 tablet 3    gabapentin (NEURONTIN) 300 MG capsule Take 1 capsule (300 mg total) by mouth 3 (three) times daily. 90 capsule 11    metFORMIN (GLUCOPHAGE) 1000 MG tablet TAKE 1 TABLET(1000 MG) BY MOUTH TWICE DAILY WITH MEALS 180 tablet 3    rosuvastatin (CRESTOR) 10 MG tablet TAKE 1 TABLET ONE TIME DAILY. 90 tablet 3    valACYclovir (VALTREX) 500 MG tablet TAKE 1 TABLET(500 MG) BY MOUTH EVERY DAY 30 tablet 11    blood sugar diagnostic Strp To check BG 1 times daily, to use with insurance preferred meter 100 strip 3    dulaglutide (TRULICITY) 1.5 mg/0.5 mL pen injector Inject 1.5 mg into the skin every 7 days. 4 pen 11    lancets Misc To check BG 1 times daily, to use with insurance preferred meter 100 each 3     Review of patient's allergies indicates:  No Known Allergies         Physical Exam  General: Well nourished, Cooperative, Oriented and Alert    Airway:  Mallampati: II   Mouth Opening: Normal  TM Distance: Normal  Tongue: Normal  Neck ROM: Normal  ROM    Chest/Lungs:  Normal Respiratory Rate    Heart:  Rate: Normal      Wt Readings from Last 3 Encounters:   09/25/23 78.9 kg (174 lb)   09/12/23 78.9 kg (173 lb 15.1 oz)   08/29/23 78.8 kg (173 lb 11.6 oz)     Temp Readings from Last 3 Encounters:   09/25/23 36.4 °C (97.6 °F) (Oral)   11/03/22 36.4 °C (97.6 °F)   11/02/22 36.8 °C (98.3 °F) (Tympanic)     BP Readings from Last 3 Encounters:   09/25/23 (!) 188/60   09/12/23 120/69   08/01/23 127/74     Pulse Readings from Last 3 Encounters:   09/25/23 (!) 50   09/12/23 (!) 48   08/01/23 68     Lab Results   Component Value Date    WBC 7.87 09/12/2023    HGB 12.2 (L) 09/12/2023    HCT 37.8 (L) 09/12/2023    MCV 99 (H) 09/12/2023     09/12/2023         Chemistry        Component Value Date/Time     09/12/2023 1230    K 4.9 09/12/2023 1230     09/12/2023 1230    CO2 24 09/12/2023 1230    BUN 12 09/12/2023 1230    CREATININE 1.0 09/12/2023 1230    GLU 98 09/12/2023 1230        Component Value Date/Time    CALCIUM 9.4 09/12/2023 1230    ALKPHOS 79 09/12/2023 1230    AST 25 09/12/2023 1230    ALT 32 09/12/2023 1230    BILITOT 1.0 09/12/2023 1230    ESTGFRAFRICA >60.0 01/12/2022 0753    EGFRNONAA >60.0 01/12/2022 0753        Results for orders placed or performed in visit on 10/11/22   IN OFFICE EKG 12-LEAD (to [place])    Collection Time: 10/11/22  4:46 PM    Narrative    Test Reason : Z01.818,    Vent. Rate : 070 BPM     Atrial Rate : 070 BPM     P-R Int : 158 ms          QRS Dur : 090 ms      QT Int : 382 ms       P-R-T Axes : 000 149 161 degrees     QTc Int : 412 ms      Limb lead reversal    Normal sinus rhythm  Otherwise normal ECG  When compared with ECG of 15-ALMA-2007 07:18,  Limb lead reversal is now present    Please repeat with proper lead placement  Confirmed by Juliocesar GRAHAM, Marc CABAN ([id number]) on 10/12/2022 3:22:00 PM    Referred By: JESSICA SEPULVEDA           Confirmed By:Marc Gandara MD         Anesthesia Plan  Type of Anesthesia, risks & benefits  discussed:    Anesthesia Type: Gen ETT, Gen Supraglottic Airway, Gen Natural Airway  Intra-op Monitoring Plan: Standard ASA Monitors  Post Op Pain Control Plan: multimodal analgesia and IV/PO Opioids PRN  Induction:  IV  Informed Consent: Informed consent signed with the Patient and all parties understand the risks and agree with anesthesia plan.  All questions answered.   ASA Score: 2  Day of Surgery Review of History & Physical: H&P Update referred to the surgeon/provider.    Ready For Surgery From Anesthesia Perspective.     .

## 2023-09-25 NOTE — ANESTHESIA POSTPROCEDURE EVALUATION
Anesthesia Post Evaluation    Patient: Karthik Bentley    Procedure(s) Performed: Procedure(s) (LRB):  RELEASE, TRIGGER FINGER, LEFT LONG FINGER (Left)  RELEASE, CARPAL TUNNEL,LEFT (Left)    Final Anesthesia Type: general      Patient location during evaluation: PACU  Patient participation: Yes- Able to Participate  Level of consciousness: awake and alert  Post-procedure vital signs: reviewed and stable  Pain management: adequate  Airway patency: patent    PONV status at discharge: No PONV  Anesthetic complications: no      Cardiovascular status: blood pressure returned to baseline  Respiratory status: unassisted  Hydration status: euvolemic  Follow-up not needed.          Vitals Value Taken Time   /63 09/25/23 1116   Temp 36.6 °C (97.9 °F) 09/25/23 1130   Pulse 50 09/25/23 1123   Resp 18 09/25/23 1135   SpO2 100 % 09/25/23 1123   Vitals shown include unvalidated device data.      Event Time   Out of Recovery 11:08:00         Pain/Pablo Score: Pain Rating Prior to Med Admin: 6 (9/25/2023 11:35 AM)  Pain Rating Post Med Admin: 5 (9/25/2023 11:50 AM)  Pablo Score: 10 (9/25/2023 10:38 AM)

## 2023-09-25 NOTE — BRIEF OP NOTE
[place] - Surgery ([facility])  Brief Operative Note    Surgery Date: 9/25/2023     Surgeon(s) and Role:     * Zahra Downey MD - Primary    Assisting Surgeon: None    Pre-op Diagnosis:  Trigger middle finger of left hand [M65.332]  Left hand pain [M79.642]  Acquired trigger finger [M65.30]    Post-op Diagnosis:  Post-Op Diagnosis Codes:     * Trigger middle finger of left hand [M65.332]     * Left hand pain [M79.642]     * Acquired trigger finger [M65.30]    Procedure(s) (LRB):  RELEASE, TRIGGER FINGER, LEFT LONG FINGER (Left)  RELEASE, CARPAL TUNNEL,LEFT (Left)    Anesthesia: Local MAC    Operative Findings: See full op note    Estimated Blood Loss: minimal         Specimens:   Specimen (24h ago, onward)      None              Discharge Note    OUTCOME: Patient tolerated treatment/procedure well without complication and is now ready for discharge.    DISPOSITION: Home or Self Care    FINAL DIAGNOSIS:  Left carpal tunnel syndrome, Left long trigger finger    FOLLOWUP: In clinic    DISCHARGE INSTRUCTIONS:    Discharge Procedure Orders   Diet Adult Regular     Keep surgical extremity elevated     Notify your health care provider if you experience any of the following:  temperature >100.4     Notify your health care provider if you experience any of the following:  persistent nausea and vomiting or diarrhea     Notify your health care provider if you experience any of the following:  severe uncontrolled pain     Notify your health care provider if you experience any of the following:  redness, tenderness, or signs of infection (pain, swelling, redness, odor or green/yellow discharge around incision site)     Notify your health care provider if you experience any of the following:  difficulty breathing or increased cough     Notify your health care provider if you experience any of the following:  severe persistent headache     Notify your health care provider if you experience any of the following:  worsening  rash     Notify your health care provider if you experience any of the following:  persistent dizziness, light-headedness, or visual disturbances     Notify your health care provider if you experience any of the following:  increased confusion or weakness     Leave dressing on - Keep it clean, dry, and intact until clinic visit     Weight bearing restrictions (specify):   Scheduling Instructions: JAMIL RODRÍGUEZ

## 2023-09-25 NOTE — TELEPHONE ENCOUNTER
No care due was identified.  [facility] Embedded Care Due Messages. Reference number: [id number].   9/25/2023 12:38:23 PM CDT

## 2023-09-25 NOTE — INTERVAL H&P NOTE
The patient has been examined and the H&P has been reviewed:    I concur with the findings and no changes have occurred since H&P was written.    Surgery risks, benefits and alternative options discussed and understood by patient/family.          Active Hospital Problems    Diagnosis  POA    *Left carpal tunnel syndrome [G56.02]  Yes    Trigger finger, left middle finger [M65.332]  Yes      Resolved Hospital Problems   No resolved problems to display.

## 2023-09-25 NOTE — TELEPHONE ENCOUNTER
Refill Decision Note   Karthik Bentley  is requesting a refill authorization.  Brief Assessment and Rationale for Refill:  Approve     Medication Therapy Plan:         Comments:     Note composed:2:16 PM 09/25/2023

## 2023-09-25 NOTE — DISCHARGE INSTRUCTIONS
AFTER HAND SURGERY    DOS:  Keep affected wrist elevated above the level of your heart  Check circulation frequently in fingers by pressing on the nail bed. Nail bed should turn white and then pink when released.  Exercise fingers to promote circulation.  Advance diet as tolerated  Resume home medications today.      DONT:  No driving for 24 hours or while taking narcotic pain medication   Do not remove dressing. Keep clean and dry until office visit.    CALL PHYSICIAN FOR:  Obvious bleeding  Excessive swelling  Drainage (pus) from the wound  Pain unrelieved by pain medication.

## 2023-09-27 NOTE — OP NOTE
[facility] Surgery ([facility])  Surgery Department  Operative Note    SUMMARY     Date of Procedure: 9/25/2023     Procedure: Procedure(s) (LRB):  RELEASE, TRIGGER FINGER,left long  finger (Left)  RELEASE, CARPAL TUNNEL,left (Left)     Surgeon(s) and Role:     * Zahra Downey MD - Primary    Assisting Surgeon: None    Pre-Operative Diagnosis: History of carpal tunnel surgery of right wrist [Z98.890]  Trigger middle finger of left hand [M65.332]    Post-Operative Diagnosis: Post-Op Diagnosis Codes:     * History of carpal tunnel surgery of right wrist [Z98.890]     * Trigger middle finger of left hand [M65.332]    Anesthesia: Local MAC    Technical Procedures Used: surgery    Description of the Findings of the Procedure: SPECIMENS: None.   COMPLICATIONS: None.   INDICATIONS FOR PROCEDURE: Mr pena is a 68-year-old male who had numbness   and tingling into her left hand. He has failed conservative treatment, EMG   was positive for carpal tunnel syndrome.  Patient also has long finger trigger finger that is permanently flex this needed surgery as well it is failed conservative treatment. Therefore, operative intervention was   deemed necessary. Risks and benefits were explained to the patient in clinic   since performed in clinic.   PROCEDURE IN DETAIL: After correct site was marked with the patient's   participation in the holding area, the patient was brought to the Operating   Room, placed in supine position, underwent MAC anesthesia. A well-padded   nonsterile tourniquet was placed on the left arm. A time-out was called for   correct site, procedure and patient to be indicated. Under sterile conditions,   an injection of lidocaine 1% was injected into the carpal tunnel space. The arm   was prepped and draped in normal sterile fashion. The incision was marked out   using Maria cardinal lines. The arm was exsanguinated using Esmarch.   Tourniquet was insufflated to 250 mmHg and that is where it remained  for a total   of 20 minutes. The incision was made down to the palmar fascia. The palmar   fascia was sharply incised. The transverse ligament was identified. It was   very thick in nature. It was sharply incised. Median nerve was identified. A   Mosquito hemostat was passed from the undersurface of the transverse ligament   proximally and distally to free it from the median nerve. Metzenbaum scissors   were utilized to cut the transverse ligament proximally and distally. Once that   was completely released, a Mosquito hemostat was passed again to confirm a   complete release. Once that was performed, the area was irrigated with copious   amounts of normal saline. Nylon closed the skin.     Our attention was turned to the trigger finger incision was made right over the A1 pulley A1 pulley was released the finger kept triggering we released part of A2 we also made sure that there was not in A0 pulley all of this was released to continue to trigger the flexor tendon was very bulbous therefore new incision was made over the middle phalanx incision was made and carefully slip of FDS was taken in released to debulk the tendon sheath once that was performed no triggering was noted the areas were irrigated copious amounts normal saline nylon closed the skin sterile site was applied patient was placed in a well-padded splint tolerated suture was brought to cover area in stable condition     Postop plans patient is sutures out at 2 weeks patient to start therapy 1 week Sterile dressing was applied.   Tourniquet was deflated. Brisk capillary refill ensued. The patient was   transported to the Recovery Room in stable condition.   POSTOPERATIVE PLAN FOR THIS PATIENT: She is to keep her dressing clean, dry and   intact. We will see her back in 2 weeks for stitch removal.     Significant Surgical Tasks Conducted by the Assistant(s), if Applicable: retraction    Complications: No    Estimated Blood Loss (EBL): * No values recorded  between 9/25/2023 12:00 AM and 9/27/2023  1:42 PM *           Implants: * No implants in log *    Specimens:   Specimen (24h ago, onward)      None                    Condition: Good    Disposition: PACU - hemodynamically stable.    Attestation: I performed the procedure.    Discharge Note    SUMMARY     Admit Date: 9/25/2023    Discharge Date and Time: 9/25/2023 11:50 AM    Hospital Course (synopsis of major diagnoses, care, treatment, and services provided during the course of the hospital stay): surgery     Final Diagnosis: Post-Op Diagnosis Codes:     * History of carpal tunnel surgery of right wrist [Z98.890]     * Trigger middle finger of left hand [M65.332]    Disposition: Home or Self Care    Follow Up/Patient Instructions:     Medications:  Reconciled Home Medications:      Medication List        CONTINUE taking these medications      blood sugar diagnostic Strp  To check BG 1 times daily, to use with insurance preferred meter     blood-glucose meter kit  To check BG 1 times daily, to use with insurance preferred meter     colchicine (gout) 0.6 mg tablet  Commonly known as: COLCRYS  Take 1 tablet (0.6 mg total) by mouth once daily.     febuxostat 40 mg Tab  Commonly known as: ULORIC  Take 1 tablet (40 mg total) by mouth once daily.     folic acid 1 MG tablet  Commonly known as: FOLVITE  Take 1 tablet (1 mg total) by mouth once daily.     gabapentin 300 MG capsule  Commonly known as: NEURONTIN  Take 1 capsule (300 mg total) by mouth 3 (three) times daily.     lancets Misc  To check BG 1 times daily, to use with insurance preferred meter     metFORMIN 1000 MG tablet  Commonly known as: GLUCOPHAGE  TAKE 1 TABLET(1000 MG) BY MOUTH TWICE DAILY WITH MEALS     methotrexate 2.5 MG Tab  Take 5 tablets in the AM and 5 tablets in the PM ONCE EVERY 7 DAYS.     rosuvastatin 10 MG tablet  Commonly known as: CRESTOR  TAKE 1 TABLET ONE TIME DAILY.     traMADoL 50 mg tablet  Commonly known as: ULTRAM  Take 1 tablet (50 mg  total) by mouth every 6 (six) hours as needed for Pain.     valACYclovir 500 MG tablet  Commonly known as: VALTREX  TAKE 1 TABLET(500 MG) BY MOUTH EVERY DAY     valsartan 160 MG tablet  Commonly known as: DIOVAN  TAKE 1 TABLET(160 MG) BY MOUTH EVERY DAY            Discharge Procedure Orders   Ambulatory referral/consult to Physical/Occupational Therapy   Standing Status: Future   Referral Priority: Routine Referral Type: Physical Medicine   Referral Reason: Specialty Services Required   Number of Visits Requested: 1     Diet Adult Regular     Keep surgical extremity elevated     Notify your health care provider if you experience any of the following:  temperature >100.4     Notify your health care provider if you experience any of the following:  persistent nausea and vomiting or diarrhea     Notify your health care provider if you experience any of the following:  severe uncontrolled pain     Notify your health care provider if you experience any of the following:  redness, tenderness, or signs of infection (pain, swelling, redness, odor or green/yellow discharge around incision site)     Notify your health care provider if you experience any of the following:  difficulty breathing or increased cough     Notify your health care provider if you experience any of the following:  severe persistent headache     Notify your health care provider if you experience any of the following:  worsening rash     Notify your health care provider if you experience any of the following:  persistent dizziness, light-headedness, or visual disturbances     Notify your health care provider if you experience any of the following:  increased confusion or weakness     Leave dressing on - Keep it clean, dry, and intact until clinic visit     Weight bearing restrictions (specify):   Scheduling Instructions: JAMIL RODRÍGUEZ

## 2023-10-06 ENCOUNTER — TELEPHONE (OUTPATIENT)
Dept: ORTHOPEDICS | Facility: CLINIC | Age: 68
End: 2023-10-06
Payer: MEDICARE

## 2023-10-06 ENCOUNTER — NURSE TRIAGE (OUTPATIENT)
Dept: ADMINISTRATIVE | Facility: CLINIC | Age: 68
End: 2023-10-06
Payer: MEDICARE

## 2023-10-06 ENCOUNTER — TELEPHONE (OUTPATIENT)
Dept: INTERNAL MEDICINE | Facility: CLINIC | Age: 68
End: 2023-10-06
Payer: MEDICARE

## 2023-10-06 NOTE — TELEPHONE ENCOUNTER
Pt reports had carpal tunnel surgery on Monday, but for a few days now has developed a rash around both ankles, looks like poison Ivy: red bumps and itching, inflamed, but pt denies being outside or exposed to anything that he can think of to cause the rash. Pt states his meds are the same as he took before the surgery, and he has not even been taking the pain medication that was prescribed. Pt states he has tried benadryl cream a couple of times that soothed it some, but not seeing any real improvement, not worsening either. Pt advised home care per protocol, Pt encouraged to call back with any worsening symptoms or questions, and he verbalized understanding.    Reason for Disposition   Mild localized rash   Pimples    Additional Information   Negative: [1] Sudden onset of rash (within last 2 hours) AND [2] difficulty breathing or swallowing   Negative: Sounds like a life-threatening emergency to the triager   Negative: Patient sounds very sick or weak to the triager   Negative: [1] Localized purple or blood-colored spots or dots AND [2] not from injury or friction AND [3] fever   Negative: [1] Red area or streak AND [2] fever   Negative: [1] Rash is painful to touch AND [2] fever   Negative: [1] Looks infected (spreading redness, pus) AND [2] large red area (> 2 in. or 5 cm)   Negative: [1] Looks infected (spreading redness, pus) AND [2] diabetes mellitus or weak immune system (e.g., HIV positive, cancer chemo, splenectomy, organ transplant, chronic steroids)   Negative: [1] Localized purple or blood-colored spots or dots AND [2] not from injury or friction AND [3] no fever   Negative: [1] Looks infected (spreading redness, pus) AND [2] no fever   Negative: Looks like a boil, infected sore, deep ulcer or other infected rash   Negative: Genital area rash   Negative: [1] Localized rash is very painful AND [2] no fever   Negative: Lyme disease suspected (e.g., bull's eye rash or tick bite / exposure)   Negative: [1]  Applying cream or ointment AND [2] causes severe itch, burning or pain   Negative: Medication patch causing local rash or itching   Negative: [1] Pimples (localized) AND [2 ] no improvement after using Care Advice   Negative: Tender bumps in armpits   Negative: [1] Severe localized itching AND [2] after 2 days of steroid cream   Negative: Localized rash present > 7 days   Negative: Red, moist, irritated area between skin folds (or under larger breasts)   Negative: [1] Localized area of skin darkening or thickening on lower legs or ankles AND [2] has NOT been evaluated by a doctor (or NP/PA)   Negative: Mild localized rash from wearing a face mask   Negative: [1] Redness or itching where jewelry (or metal) touches skin AND [2] jewelry contains nickel    Protocols used: Rash or Redness - [id number]

## 2023-10-06 NOTE — TELEPHONE ENCOUNTER
----- Message from Marilu Cordero MA sent at 10/6/2023  1:34 PM CDT -----  Regarding: FW: self [phone]    ----- Message -----  From: Anabella Oquendo  Sent: 10/6/2023  12:44 PM CDT  To: [facility] Staff  Subject: self [phone]                                  Type: Patient Call Back       Who called: Patient       What is the request in detail: Patient requesting a call back in regards to both of his ankles breaking out with small bumps. Patient states it is itching,  and warm to the touch. Patient had  hand surgery on 9/25/23. Please advise, thank you       Can the clinic reply by [facility]? no       Would the patient rather a call back or a response via [facility]? Call back       Best call back number: [phone]       Additional Information:

## 2023-10-09 ENCOUNTER — OFFICE VISIT (OUTPATIENT)
Dept: URGENT CARE | Facility: CLINIC | Age: 68
End: 2023-10-09
Payer: MEDICARE

## 2023-10-09 ENCOUNTER — LAB VISIT (OUTPATIENT)
Dept: LAB | Facility: OTHER | Age: 68
End: 2023-10-09
Attending: INTERNAL MEDICINE
Payer: MEDICARE

## 2023-10-09 ENCOUNTER — OFFICE VISIT (OUTPATIENT)
Dept: ORTHOPEDICS | Facility: CLINIC | Age: 68
End: 2023-10-09
Payer: MEDICARE

## 2023-10-09 VITALS
DIASTOLIC BLOOD PRESSURE: 66 MMHG | BODY MASS INDEX: 26.52 KG/M2 | WEIGHT: 175 LBS | HEART RATE: 59 BPM | TEMPERATURE: 98 F | RESPIRATION RATE: 14 BRPM | OXYGEN SATURATION: 98 % | SYSTOLIC BLOOD PRESSURE: 139 MMHG | HEIGHT: 68 IN

## 2023-10-09 VITALS — HEIGHT: 68 IN | BODY MASS INDEX: 26.36 KG/M2 | WEIGHT: 173.94 LBS

## 2023-10-09 DIAGNOSIS — R21 RASH AND NONSPECIFIC SKIN ERUPTION: Primary | ICD-10-CM

## 2023-10-09 DIAGNOSIS — Z98.890 POST-OPERATIVE STATE: Primary | ICD-10-CM

## 2023-10-09 DIAGNOSIS — M1A.9XX0 CHRONIC GOUT WITHOUT TOPHUS, UNSPECIFIED CAUSE, UNSPECIFIED SITE: ICD-10-CM

## 2023-10-09 LAB — URATE SERPL-MCNC: 4.3 MG/DL (ref 3.4–7)

## 2023-10-09 PROCEDURE — 99213 PR OFFICE/OUTPT VISIT, EST, LEVL III, 20-29 MIN: ICD-10-PCS | Mod: S$GLB,,,

## 2023-10-09 PROCEDURE — 3061F NEG MICROALBUMINURIA REV: CPT | Mod: CPTII,S$GLB,, | Performed by: PHYSICIAN ASSISTANT

## 2023-10-09 PROCEDURE — 1126F AMNT PAIN NOTED NONE PRSNT: CPT | Mod: CPTII,S$GLB,, | Performed by: PHYSICIAN ASSISTANT

## 2023-10-09 PROCEDURE — 1126F PR PAIN SEVERITY QUANTIFIED, NO PAIN PRESENT: ICD-10-PCS | Mod: CPTII,S$GLB,, | Performed by: PHYSICIAN ASSISTANT

## 2023-10-09 PROCEDURE — 1101F PT FALLS ASSESS-DOCD LE1/YR: CPT | Mod: CPTII,S$GLB,, | Performed by: PHYSICIAN ASSISTANT

## 2023-10-09 PROCEDURE — 84550 ASSAY OF BLOOD/URIC ACID: CPT | Performed by: INTERNAL MEDICINE

## 2023-10-09 PROCEDURE — 3288F FALL RISK ASSESSMENT DOCD: CPT | Mod: CPTII,S$GLB,, | Performed by: PHYSICIAN ASSISTANT

## 2023-10-09 PROCEDURE — 3044F PR MOST RECENT HEMOGLOBIN A1C LEVEL <7.0%: ICD-10-PCS | Mod: CPTII,S$GLB,, | Performed by: PHYSICIAN ASSISTANT

## 2023-10-09 PROCEDURE — 3061F PR NEG MICROALBUMINURIA RESULT DOCUMENTED/REVIEW: ICD-10-PCS | Mod: CPTII,S$GLB,, | Performed by: PHYSICIAN ASSISTANT

## 2023-10-09 PROCEDURE — 99024 PR POST-OP FOLLOW-UP VISIT: ICD-10-PCS | Mod: S$GLB,,, | Performed by: PHYSICIAN ASSISTANT

## 2023-10-09 PROCEDURE — 99999 PR PBB SHADOW E&M-EST. PATIENT-LVL II: CPT | Mod: PBBFAC,,, | Performed by: PHYSICIAN ASSISTANT

## 2023-10-09 PROCEDURE — 99999 PR PBB SHADOW E&M-EST. PATIENT-LVL II: ICD-10-PCS | Mod: PBBFAC,,, | Performed by: PHYSICIAN ASSISTANT

## 2023-10-09 PROCEDURE — 3066F NEPHROPATHY DOC TX: CPT | Mod: CPTII,S$GLB,, | Performed by: PHYSICIAN ASSISTANT

## 2023-10-09 PROCEDURE — 4010F PR ACE/ARB THEARPY RXD/TAKEN: ICD-10-PCS | Mod: CPTII,S$GLB,, | Performed by: PHYSICIAN ASSISTANT

## 2023-10-09 PROCEDURE — 3066F PR DOCUMENTATION OF TREATMENT FOR NEPHROPATHY: ICD-10-PCS | Mod: CPTII,S$GLB,, | Performed by: PHYSICIAN ASSISTANT

## 2023-10-09 PROCEDURE — 99024 POSTOP FOLLOW-UP VISIT: CPT | Mod: S$GLB,,, | Performed by: PHYSICIAN ASSISTANT

## 2023-10-09 PROCEDURE — 4010F ACE/ARB THERAPY RXD/TAKEN: CPT | Mod: CPTII,S$GLB,, | Performed by: PHYSICIAN ASSISTANT

## 2023-10-09 PROCEDURE — 3044F HG A1C LEVEL LT 7.0%: CPT | Mod: CPTII,S$GLB,, | Performed by: PHYSICIAN ASSISTANT

## 2023-10-09 PROCEDURE — 3288F PR FALLS RISK ASSESSMENT DOCUMENTED: ICD-10-PCS | Mod: CPTII,S$GLB,, | Performed by: PHYSICIAN ASSISTANT

## 2023-10-09 PROCEDURE — 36415 COLL VENOUS BLD VENIPUNCTURE: CPT | Performed by: INTERNAL MEDICINE

## 2023-10-09 PROCEDURE — 99213 OFFICE O/P EST LOW 20 MIN: CPT | Mod: S$GLB,,,

## 2023-10-09 PROCEDURE — 1101F PR PT FALLS ASSESS DOC 0-1 FALLS W/OUT INJ PAST YR: ICD-10-PCS | Mod: CPTII,S$GLB,, | Performed by: PHYSICIAN ASSISTANT

## 2023-10-09 RX ORDER — TRIAMCINOLONE ACETONIDE 1 MG/G
CREAM TOPICAL 2 TIMES DAILY
Qty: 45 G | Refills: 0 | OUTPATIENT
Start: 2023-10-09 | End: 2023-10-11

## 2023-10-09 NOTE — PROGRESS NOTES
"Subjective:      Patient ID: Karthik Bentley is a 68 y.o. male.    Vitals:  height is 5' 8" (1.727 m) and weight is 79.4 kg (175 lb). His oral temperature is 97.7 °F (36.5 °C). His blood pressure is 139/66 and his pulse is 59 (abnormal). His respiration is 14 and oxygen saturation is 98%.     Chief Complaint: Rash    68 y.o male reports rash on both ankles that started 4 days ago. He reports that the rash is itchy, red and patchy. Patient reports that area is warm to touch. He denies any changes in soaps, lotions, detergents. He states that he is unsure of what could have caused his symptoms. He denies fever, chills, CP, SOB, numbness, tingling, leg swelling, leg pain. He states that he has tried OTC anti-itch cream with mild relief.    Rash  This is a new problem. The current episode started in the past 7 days (4 days ago). The problem has been gradually worsening since onset. The affected locations include the left ankle and right ankle. The rash is characterized by redness and itchiness. He was exposed to nothing. Pertinent negatives include no anorexia, congestion, cough, diarrhea, eye pain, facial edema, fatigue, fever, joint pain, nail changes, rhinorrhea, shortness of breath, sore throat or vomiting. Past treatments include anti-itch cream. The treatment provided no relief. There is no history of allergies, asthma, eczema or varicella.       Constitution: Negative for chills, fatigue and fever.   HENT:  Negative for congestion and sore throat.    Cardiovascular:  Negative for chest pain and sob on exertion.   Eyes:  Negative for eye pain.   Respiratory:  Negative for cough and shortness of breath.    Gastrointestinal:  Negative for vomiting and diarrhea.   Skin:  Positive for rash.   Neurological:  Negative for numbness and tingling.     Objective:     Physical Exam   Constitutional:  Non-toxic appearance. He does not appear ill. No distress.      Comments:Patient is in no acute distress, patient is non-toxic " appearing, patient is ox3, patient is answering question appropriately.   normal  Abdominal: Normal appearance.   Musculoskeletal:      Right ankle: He exhibits normal range of motion, no swelling, no ecchymosis, no deformity, no laceration and normal pulse. No tenderness.      Left ankle: He exhibits normal range of motion, no swelling, no ecchymosis, no deformity, no laceration and normal pulse. No tenderness.      Right foot: Normal range of motion and normal capillary refill. No tenderness, swelling, crepitus, deformity, laceration, bunion, Charcot foot, foot drop or prominent metatarsal heads.      Left foot: Normal range of motion and normal capillary refill. No tenderness, swelling, crepitus, deformity, laceration, bunion, Charcot foot, foot drop or prominent metatarsal heads.      Comments: 5/5 strength of LE. Full ROM. Sensation intact. Distal pulses, 2+ bilaterally. Patient is able to ambulate in exam room and throughout clinic. There is a maculopapular rash appreciated on the ankles bilaterally, please refer to attached image. There is no area of fluctuance, no area of induration, no discharge, no warmth appreciated on exam. Capillary refill, <2. Distal pulses, 2+.   Neurological: He is alert.   Skin: Skin is not diaphoretic. Capillary refill takes less than 2 seconds. not left foot, not right foot, not left ankle and not right ankle  Nursing note and vitals reviewed.        Assessment:     1. Rash and nonspecific skin eruption      Plan:   Previous notes reviewed.  Vital signs reviewed.  Discussed rash, home care, tx options, and given follow up precautions.  Patient was briefed on my thought process and diagnosis.   Patient involved with the treatment plan and agreed to the plan.  Patient informed on warning signs, patient understood warning signs and to go to urgent care or ER if warning signs appear.  Please excuse grammatical/spelling errors appreciated throughout this visit encounter for a remote  dictation device was used during this encounter.    Patient Instructions   Please return here or go to the Emergency Department for any concerns or worsening of condition.    Please apply TRIAMCINOLONE cream to the affected rash.    Please keep feet elevated when resting.    You were given a referral to dermatology, please call [phone] for scheduling and appointments.     Please follow up with your primary care doctor or specialist as needed.    If you  smoke, please stop smoking.    Rash and nonspecific skin eruption  -     triamcinolone acetonide 0.1% (KENALOG) 0.1 % cream; Apply topically 2 (two) times daily.  Dispense: 45 g; Refill: 0  -     Ambulatory referral/consult to Dermatology      Meghan Gold PA-C

## 2023-10-09 NOTE — PATIENT INSTRUCTIONS
Please return here or go to the Emergency Department for any concerns or worsening of condition.    Please apply TRIAMCINOLONE cream to the affected rash.    Please keep feet elevated when resting.    You were given a referral to dermatology, please call [phone] for scheduling and appointments.     Please follow up with your primary care doctor or specialist as needed.    If you  smoke, please stop smoking.

## 2023-10-09 NOTE — PROGRESS NOTES
"Mr. Bentley is here today for a post-operative visit.  He is 14 days status post L Long Trigger Finger Release with slips of the FDS and Left Carpal Tunnel Release by Dr. Downey on 9/25/23. He reports that he is doing well.  Pain is minimal.  He is not taking pain medication. He has stiffness. He is scheduled to begin therapy tomorrow.  He denies fever, chills, and sweats since the time of the surgery.     Of Note: Had partial release of A2 pulley as well. Flexor tendon was very bulbous and required incision of middle phalanx.     Physical exam:    Vitals:    10/09/23 1403   Weight: 78.9 kg (173 lb 15.1 oz)   Height: 5' 8" (1.727 m)   PainSc: 0-No pain     Vital signs are stable, patient is afebrile.  Patient is well dressed and well groomed, no acute distress.  Alert and oriented to person, place, and time.  Post op dressing taken down.  Incision is well healed.  There is no erythema or exudate.  There is no sign of any infection. He is NVI. Sutures removed without difficulty.  Mild moderate edema. He has stiffness throughout unable to make a composite fist.    Assessment: status post L Long Trigger Finger Release with slips of the FDS and Left Carpal Tunnel Release by Dr. Downey on 9/25/23    Plan:  Karthik was seen today for post-op evaluation.    Diagnoses and all orders for this visit:    Post-operative state      PO instruction reviewed and provided to patient  Begins Therapy tomorrow.   RTC in 4 weeks      "

## 2023-10-10 ENCOUNTER — CLINICAL SUPPORT (OUTPATIENT)
Dept: REHABILITATION | Facility: HOSPITAL | Age: 68
End: 2023-10-10
Payer: MEDICARE

## 2023-10-10 ENCOUNTER — TELEPHONE (OUTPATIENT)
Dept: INTERNAL MEDICINE | Facility: CLINIC | Age: 68
End: 2023-10-10
Payer: MEDICARE

## 2023-10-10 DIAGNOSIS — R60.0 LOCALIZED EDEMA: ICD-10-CM

## 2023-10-10 DIAGNOSIS — M25.642 DECREASED RANGE OF MOTION OF FINGER OF LEFT HAND: ICD-10-CM

## 2023-10-10 DIAGNOSIS — G56.02 LEFT CARPAL TUNNEL SYNDROME: ICD-10-CM

## 2023-10-10 DIAGNOSIS — M65.332 TRIGGER FINGER, LEFT MIDDLE FINGER: ICD-10-CM

## 2023-10-10 DIAGNOSIS — R52 PAIN: ICD-10-CM

## 2023-10-10 PROCEDURE — 97165 OT EVAL LOW COMPLEX 30 MIN: CPT

## 2023-10-10 NOTE — TELEPHONE ENCOUNTER
----- Message from Anne Marie Mejia sent at 10/10/2023  1:27 PM CDT -----  Consult/Advisory    Name Of Caller:Ray       Contact Preference:[phone]    Nature of call: Ptn called regarding some type of reaction on his ankles he was seen in urgent care and was told to fu with his pcp or a derm. No APPTS are avail with [facility] the issue he is having he started out with bumps around his ankle then it spread to the next he is experiencing itching and it is turning into a rash on both ankles . He also stated that it is hot around both ankles and it is spreading up both ankles and the color is changing getting darker

## 2023-10-10 NOTE — PATIENT INSTRUCTIONS
"[facility] - OCCUPATIONAL THERAPY  HOME EXERCISE PROGRAM     Complete the following massages for 2-3 minutes each, 3-4x/day.                       Complete the following exercises with 10-15 repetitions each, 4x/day.     AROM: DIP Flexion / Extension  Pinch middle knuckle to prevent bending. Bend end knuckle until stretch is felt.   Hold 3 seconds. Relax. Straighten finger as far as possible.    AROM: PIP Flexion / Extension  Pinch bottom knuckle  to prevent bending. Actively bend middle knuckle until stretch is felt.   Hold 3 seconds. Relax. Straighten finger as far as possible.    AROM: Isolated PIP Flexion  Bend only middle joint of your finger, keeping other fingers straight with other hand.    AROM: Isolated MCP Flexion / Extension ("Wave")   Bend only your large, bottom knuckles. Hold 3 seconds. Keep the tips of your fingers straight. Straighten fingers.    AROM: Isolated IPJ Flexion / Extension ("Hook")  Bend only your middle and end knuckles. Hold 3 seconds.   Straighten your fingers.     AROM: MCP and PIP Flexion / Extension ("Straight Fist")  Bend your bottom and middle knuckles, keeping the tips of your fingers straight. Try to touch the pads of your fingers on your palm. Hold 3 seconds. Straighten your fingers.     AROM: Composite Flexion / Extension ("Full Fist")  Bend every joint in your hand into a fist. Hold 3 seconds. Straighten your fingers.           AROM: Composte Extension ("Finger Lifts")  Lift your finger off of the table one at a time. Hold 3 seconds. Relax your finger.    AROM: Abduction / Adduction  With hand flat on table, spread all fingers apart, then bring them together as close as possible.    Copyright © [employer]. All rights reserved.     Therapist: ABEL Schmidt/ISELA     "

## 2023-10-11 ENCOUNTER — NURSE TRIAGE (OUTPATIENT)
Dept: ADMINISTRATIVE | Facility: CLINIC | Age: 68
End: 2023-10-11
Payer: MEDICARE

## 2023-10-11 ENCOUNTER — HOSPITAL ENCOUNTER (EMERGENCY)
Facility: HOSPITAL | Age: 68
Discharge: HOME | End: 2023-10-11
Attending: EMERGENCY MEDICINE
Payer: MEDICARE

## 2023-10-11 VITALS
HEIGHT: 68 IN | TEMPERATURE: 98 F | HEART RATE: 67 BPM | DIASTOLIC BLOOD PRESSURE: 68 MMHG | SYSTOLIC BLOOD PRESSURE: 144 MMHG | OXYGEN SATURATION: 100 % | RESPIRATION RATE: 19 BRPM | BODY MASS INDEX: 26.52 KG/M2 | WEIGHT: 175 LBS

## 2023-10-11 DIAGNOSIS — B35.4 TINEA CORPORIS: Primary | ICD-10-CM

## 2023-10-11 PROBLEM — R60.0 LOCALIZED EDEMA: Status: ACTIVE | Noted: 2023-10-11

## 2023-10-11 PROBLEM — M25.642 DECREASED RANGE OF MOTION OF FINGER OF LEFT HAND: Status: ACTIVE | Noted: 2023-10-11

## 2023-10-11 PROBLEM — R52 PAIN: Status: ACTIVE | Noted: 2023-10-11

## 2023-10-11 PROCEDURE — 99283 EMERGENCY DEPT VISIT LOW MDM: CPT

## 2023-10-11 RX ORDER — DIPHENHYDRAMINE HCL 25 MG
25 TABLET ORAL NIGHTLY PRN
COMMUNITY
End: 2024-03-05 | Stop reason: ALTCHOICE

## 2023-10-11 RX ORDER — CLOTRIMAZOLE AND BETAMETHASONE DIPROPIONATE 10; .64 MG/G; MG/G
CREAM TOPICAL 2 TIMES DAILY
Qty: 15 G | Refills: 0 | Status: SHIPPED | OUTPATIENT
Start: 2023-10-11 | End: 2023-10-16 | Stop reason: SDUPTHER

## 2023-10-11 NOTE — ED TRIAGE NOTES
Both ankles since Thursday; has been seen at ; given creams. Starting to spread and can't get in with Derm. Itchy

## 2023-10-11 NOTE — TELEPHONE ENCOUNTER
Pt states that he was seen in  with rash to bilateral ankles on Monday. States that he has been using Triamcinolone acetonide cream as prescribed. Pt states that rash is now worst, spreading with redness and unable to get a dermatology appt per referral. Advised to be seen per protocol. Unable to schedule appt per protocol. Pt unable to make 3:30 pm available appt. Encounter routed to provider for appt per protocol.       Reason for Disposition   Looks like a boil, infected sore, deep ulcer, or other infected rash (spreading redness, pus)    Additional Information   Negative: Sounds like a life-threatening emergency to the triager   Negative: Fever and localized purple or blood-colored spots or dots that are not from injury or friction   Negative: Fever and localized rash is very painful   Negative: Patient sounds very sick or weak to the triager    Protocols used: Rash or Redness - [id number]

## 2023-10-11 NOTE — TELEPHONE ENCOUNTER
Spoke with pt, he can not wait until Monday 10/16/2023 to see a Dermatologist. Pt stated that he will go to the ER because the rash is spreading. Pt wanted to see Dr. Moya but nothing available until Tuesday at 4 pm. Slot held in case pt want to be seen before Dr. Moya.

## 2023-10-11 NOTE — ED PROVIDER NOTES
Chief Complaint   Rash (Both ankles since Thursday; has been seen at ; given creams. Starting to spread and can't get in with Derm. Itchy )      History Of Present Illness   Karthik Bentley is a 68 y.o. male presenting with skin rash to both ankles that started a few days ago.  He describes the symptoms as intensely itchy.  He went to urgent care and has been given creams that are ineffective.  He has a dermatologist appointment next week.  No fever or chills.  No systemic symptoms.        Review of patient's allergies indicates:  No Known Allergies    Current Facility-Administered Medications on File Prior to Encounter   Medication Dose Route Frequency Provider Last Rate Last Admin    ceFAZolin 2 g in dextrose 5 % in water (D5W) 50 mL IVPB (MB+)  2 g Intravenous On Call Procedure Obi Smith MD        LIDOcaine (PF) 10 mg/ml (1%) injection 5 mg  0.5 mL Intradermal Once Esau Ham MD        mupirocin 2 % ointment   Nasal On Call Procedure Obi Smith MD   Given at 09/25/23 0822    sodium chloride 0.9% flush 10 mL  10 mL Intravenous PRN Obi Smith MD         Current Outpatient Medications on File Prior to Encounter   Medication Sig Dispense Refill    diphenhydrAMINE (SOMINEX) 25 mg tablet Take 25 mg by mouth nightly as needed for Insomnia.      febuxostat (ULORIC) 40 mg Tab Take 1 tablet (40 mg total) by mouth once daily. 30 tablet 3    folic acid (FOLVITE) 1 MG tablet Take 1 tablet (1 mg total) by mouth once daily. 90 tablet 3    gabapentin (NEURONTIN) 300 MG capsule Take 1 capsule (300 mg total) by mouth 3 (three) times daily. 90 capsule 11    metFORMIN (GLUCOPHAGE) 1000 MG tablet TAKE 1 TABLET(1000 MG) BY MOUTH TWICE DAILY WITH MEALS 180 tablet 3    rosuvastatin (CRESTOR) 10 MG tablet TAKE 1 TABLET ONE TIME DAILY. 90 tablet 3    TRULICITY 1.5 mg/0.5 mL pen injector ADMINISTER 1.5 MG UNDER THE SKIN EVERY 7 DAYS 12 pen 1    valsartan (DIOVAN) 160 MG tablet TAKE 1 TABLET(160 MG) BY MOUTH EVERY DAY  90 tablet 3    blood sugar diagnostic Strp To check BG 1 times daily, to use with insurance preferred meter 100 strip 3    blood-glucose meter kit To check BG 1 times daily, to use with insurance preferred meter 1 each 0    colchicine, gout, (COLCRYS) 0.6 mg tablet Take 1 tablet (0.6 mg total) by mouth once daily. 90 tablet 2    lancets Misc To check BG 1 times daily, to use with insurance preferred meter 100 each 3    methotrexate 2.5 MG Tab Take 5 tablets in the AM and 5 tablets in the PM ONCE EVERY 7 DAYS. 40 tablet 2    traMADoL (ULTRAM) 50 mg tablet Take 1 tablet (50 mg total) by mouth every 6 (six) hours as needed for Pain. 10 tablet 0    valACYclovir (VALTREX) 500 MG tablet TAKE 1 TABLET(500 MG) BY MOUTH EVERY DAY 30 tablet 11    [DISCONTINUED] triamcinolone acetonide 0.1% (KENALOG) 0.1 % cream Apply topically 2 (two) times daily. 45 g 0       Past History   As per HPI and below:  Past Medical History:   Diagnosis Date    Diabetes mellitus type II     Hypertension     Rheumatoid arthritis      Past Surgical History:   Procedure Laterality Date    ANTERIOR CERVICAL DISCECTOMY W/ FUSION N/A 10/31/2022    Procedure: DISCECTOMY, SPINE, CERVICAL, ANTERIOR APPROACH, WITH FUSION C3-6 depuy SNS:vocal/motors/SSEP;  Surgeon: Kody Marshall MD;  Location: [facility] OR;  Service: Orthopedics;  Laterality: N/A;    CARPAL TUNNEL RELEASE Left 9/25/2023    Procedure: RELEASE, CARPAL TUNNEL,LEFT;  Surgeon: Zahra Downey MD;  Location: [facility] OR;  Service: Orthopedics;  Laterality: Left;    CARPAL TUNNEL RELEASE Left 9/25/2023    Procedure: RELEASE, CARPAL TUNNEL,left;  Surgeon: Zahra Downey MD;  Location: [facility] OR;  Service: Orthopedics;  Laterality: Left;    COLONOSCOPY N/A 1/16/2020    Procedure: COLONOSCOPY;  Surgeon: Cynthia Kearney MD;  Location: [facility] ENDO [address]);  Service: Endoscopy;  Laterality: N/A;    ENDOSCOPIC CARPAL TUNNEL RELEASE Right 4/1/2022    Procedure: RELEASE, CARPAL TUNNEL, ENDOSCOPIC  "- right arthrex;  Surgeon: Wiley Warren MD;  Location: [facility] OR;  Service: Orthopedics;  Laterality: Right;    EPIDURAL STEROID INJECTION N/A 9/6/2022    Procedure: CERVICAL EDITH CONTRAST DIRECT REFERRAL;  Surgeon: Yasmeen Taylor MD;  Location: [facility] PAIN MGT;  Service: Pain Management;  Laterality: N/A;    SHOULDER SURGERY      TRIGGER FINGER RELEASE Left 9/25/2023    Procedure: RELEASE, TRIGGER FINGER, LEFT LONG FINGER;  Surgeon: Zahra Downey MD;  Location: [facility] OR;  Service: Orthopedics;  Laterality: Left;    TRIGGER FINGER RELEASE Left 9/25/2023    Procedure: RELEASE, TRIGGER FINGER,left long  finger;  Surgeon: Zahra Downey MD;  Location: [facility] OR;  Service: Orthopedics;  Laterality: Left;       Social History     Socioeconomic History    Marital status: Single   Tobacco Use    Smoking status: Never    Smokeless tobacco: Never   Substance and Sexual Activity    Alcohol use: Yes     Comment: occasionally    Drug use: Never    Sexual activity: Not Currently       Family History   Problem Relation Age of Onset    Bone cancer Mother     Colon cancer Father     Glaucoma Neg Hx     Macular degeneration Neg Hx     Retinal detachment Neg Hx        Physical Exam     Vitals:    10/11/23 1704   BP: (!) 144/68   BP Location: Right arm   Patient Position: Sitting   Pulse: 67   Resp: 19   Temp: 97.9 °F (36.6 °C)   TempSrc: Oral   SpO2: 100%   Weight: 79.4 kg (175 lb)   Height: 5' 8" (1.727 m)     Appearance: No acute distress.  Skin:  Macular rash to both ankles with confluence areas on the inner aspect of each ankle with irregular, raised borders.  Fungal in appearance.  Good turgor.  No abrasions.  No ecchymoses.  Musculoskeletal: Good range of motion all joints.  No deformities.    Neurologic: Motor intact.  Sensation intact.  Mental Status:  Alert and oriented x 3.  Appropriate, conversant.      Medications Given   Medications - No data to display    Results and Course     Labs Reviewed   HIV 1 / 2 " ANTIBODY   HEPATITIS C ANTIBODY       Imaging Results    None                  MDM, Impression and Plan   68 y.o. male with what looks like fungal rashes the inside of both ankles with numerous macular lesions around it.  So far, topical steroids have been ineffective.  I think he needs to see the dermatologist next week as scheduled, but I will change him to an antifungal/steroid combination to see if he gets more relief.  No systemic symptoms.  These are not petechiae, I do not think blood work is indicated.         Final diagnoses:  [B35.4] Tinea corporis (Primary)        ED Disposition Condition    Discharge Stable          ED Prescriptions       Medication Sig Dispense Start Date End Date Auth. Provider    clotrimazole-betamethasone 1-0.05% (LOTRISONE) cream Apply topically 2 (two) times daily. for 7 days 15 g 10/11/2023 10/18/2023 Bob Hoskins MD          Follow-up Information       Follow up With Specialties Details Why Contact Info    See the dermatologist as scheduled                   Bob Hoskins MD  10/11/23 2154

## 2023-10-11 NOTE — PLAN OF CARE
[facility]  Occupational Therapy Initial Evaluation     Name: Karthik Bentley  Clinic Number: [id number]    Therapy Diagnosis:   Encounter Diagnoses   Name Primary?    Left carpal tunnel syndrome     Trigger finger, left middle finger     Localized edema     Decreased range of motion of finger of left hand     Pain      Physician: Darwin Horn MD      Physician Orders: Eval and Treat  Medical Diagnosis:   G56.02 (ICD-10-CM) - Carpal tunnel syndrome, left upper limb   M65.332 (ICD-10-CM) - Trigger finger, left middle finger     Surgical Procedure and Date: 9/25/2023, TF release, CTS   Evaluation Date: 10/10/2023  Insurance Authorization Period Expiration:   Plan of Care Certification Period: 12/10/2023  Date of Return to MD: LAURO  Visit # / Visits authorized: 1 / 1  FOTO: 1/ 3    Precautions:  Standard    Time In: 11:07 AM  Time Out: 12:18 PM   Total Billable Time: 71 minutes    Subjective       Date of Onset: gradual onset > year       History of Current Condition/Mechanism of Injury: Ray reports: spinal fusion Last October 31, 2022. Pt  completed round of therapy following her R hand surgery. His L began to become symptomatic.       Falls: 0 -syncope from heat     Involved Side: Left   Dominant Side: Right           Prior Therapy: No       Pain:  Functional Pain Scale Rating 0-10:   2/10 on average  0/10 at best  3/10 at worst  Location: [place] Carpal tunnel volar digit   Description: Aching        Occupation:  Retired   Working presently: retired       Functional Limitations/Social History:    Previous functional status includes: Independent with all ADLs.     Current Functional Status   Home/Living environment: lives alone          Limitation of Functional Status as follows:   ADLs/IADLs:     - Feeding: uses R     - Bathing: uses R     - Dressing/Grooming: uses R    - Home Management: uses R    - Driving: uses R      Leisure: stays active, running     Patient's Goals for Therapy: Pt  would like full return of motion and strength     Past Medical History/Physical Systems Review:   Karthik Bentley  has a past medical history of Diabetes mellitus type II, Hypertension, and Rheumatoid arthritis.    Karthik Bentley  has a past surgical history that includes Shoulder surgery; Colonoscopy (N/A, 1/16/2020); Endoscopic carpal tunnel release (Right, 4/1/2022); Epidural steroid injection (N/A, 9/6/2022); Anterior cervical discectomy w/ fusion (N/A, 10/31/2022); Trigger finger release (Left, 9/25/2023); Carpal tunnel release (Left, 9/25/2023); Trigger finger release (Left, 9/25/2023); and Carpal tunnel release (Left, 9/25/2023).    Karthik has a current medication list which includes the following prescription(s): blood sugar diagnostic, blood-glucose meter, colchicine (gout), febuxostat, folic acid, gabapentin, lancets, metformin, methotrexate, rosuvastatin, tramadol, triamcinolone acetonide 0.1%, trulicity, valacyclovir, and valsartan, and the following Facility-Administered Medications: ceFAZolin 2 g in dextrose 5 % in water (D5W) 50 mL IVPB (MB+), lidocaine (pf) 10 mg/ml (1%), mupirocin, and sodium chloride 0.9%.    Review of patient's allergies indicates:  No Known Allergies     Objective     Observation/Appearance:  Skin intact, Skin dry, and Edema present     Edema. Measured in centimeters.   10/10/2023 10/10/2023    Right  Left    Proximal Wrist Crease 17.0 cm 17.4 cm     Edema. Measured in centimeters.   10/10/2023 10/10/2023    Right  Left    Long:       P1 6.7 7.2 cm    PIP 7.0 7.5   P2 6.1 cm 6.6    DIP 5.5  5.5          Hand ROM. Measured in degrees.   10/10/2023 10/10/2023    Right  Left         Index: MP   2 cm from DPC              PIP                    DIP                GIBSON          Long:  MP  /71              PIP  /72              DIP  /44              GIBSON  187        Ring:   MP  2.5 cm from DPC              PIP                DIP                GIBSON          Small:  MP                 PIP                  DIP                GIBSON          Thumb: MP                  IP         Rad ADD/ABD  WNL       Pal ADD/ABD  WNL          Opposition  WNL      Elbow and Wrist ROM. Measured in degrees.   10/11/2023 10/11/2023    Left Right   Supination/Pronation WNL    Wrist Ext/Flex 60/61    Wrist RD/UD        Strength (Dyanmometer) and Pinch Strength (Pinch Gauge)  Measured in pounds and psi. Average of three trials.   10/10/2023 10/10/2023    Right  Left    Rung II  Deferred    Key Pinch     3pt Pinch     2pt Pinch       Intake Outcome Measure for FOTO initial eval; hand Survey    Therapist reviewed FOTO scores for Karthik Bentley on 10/10/2023.   FOTO report - see Media section or FOTO account episode details.    Intake Score: 89.2%         Treatment     Total Treatment time separate from Evaluation time:            Karthik received the treatments listed below:      Steri strip applied distal middle phalanx scar due to slight opening of epidermis     therapeutic exercises to develop ROM for 10 minutes including:  - DIP/PIP jt blocking x 5 reps each   - FTG x 10 reps       hot pack for 8 minutes to L hand .        Patient Education and Home Exercises      Education provided:   -role of OT, goals for OT, scheduling/cancellations, insurance limitations with patient.  -Additional Education provided: HEP as directed.     Written Home Exercises Provided: yes.  Exercises were reviewed and Karthik was able to demonstrate them prior to the end of the session.    Karthik demonstrated good  understanding of the education provided.     Pt was advised to perform these exercises free of pain, and to stop performing them if pain occurs.    See EMR under Patient Instructions for exercises provided 10/10/2023.    Assessment     Karthik Bentley is a 68 y.o. male referred to outpatient occupational therapy and presents with a medical diagnosis of s/p CTS and LF trigger finger release.    Following medical record review it is determined that pt will benefit  from occupational therapy services in order to maximize pain free and/or functional use of left hand. The following goals were discussed with the patient and patient is in agreement with them as to be addressed in the treatment plan. The patient's rehab potential is Good.     Anticipated barriers to occupational therapy: no    Plan of care discussed with patient: Yes  Patient's spiritual, cultural and educational needs considered and patient is agreeable to the plan of care and goals as stated below:     Medical Necessity is demonstrated by the following  Occupational Profile/History  Co-morbidities and personal factors that may impact the plan of care [] LOW: Brief chart review  [x] MODERATE: Expanded chart review   [] HIGH: Extensive chart review    Moderate / High Support Documentation:      Examination  Performance deficits relating to physical, cognitive or psychosocial skills that result in activity limitations and/or participation restrictions  [] LOW: addressing 1-3 Performance deficits  [x] MODERATE: 3-5 Performance deficits  [] HIGH: 5+ Performance deficits (please support below)    Moderate / High Support Documentation:    Physical:  Joint Mobility  Joint Stability  Muscle Power/Strength  Muscle Endurance  Skin Integrity/Scar Formation  Edema   Strength  Pinch Strength  Pain    Cognitive:  No Deficits    Psychosocial:    Habits  Routines  Rituals     Treatment Options [] LOW: Limited options  [x] MODERATE: Several options  [] HIGH: Multiple options      Decision Making/ Complexity Score: low       The following goals were discussed with the patient and patient is in agreement with them as to be addressed in the treatment plan.     Goals:   Long term goals: (by d/c)  1)  Patient to be IND with HEP and modalities for pain management  2) Pt will demonstrate full composite fist   3)   Increase  strength 15-20 lbs. to carry laundry, carry groceries, sweep, and increase functional independence of left  hand for ADLs/iADLs  4)   Increase pinch 3-4  psis for  Increase in functional independence in ADLs/iADLs such as manipulating fasteners and opening containers  5) Patient to score at 10%  or less on FOTO to demonstrate improved perception of functional leftUE Use.      Short term Goals: ( 4 weeks)   1)  Patient to be IND with HEP and modalities for pain management  2)  Pt will demonstrate improved composite fist by 20+ degrees  3)   Assess  strength when appropriate   4)   Assess pinch strength when appropriate   5)   Patient to score at 65%  or less on FOTO to demonstrate improved perception of functional left UE Use.        Plan   Certification Period/Plan of care expiration: 10/10/2023 to 12/15/2023 .    Outpatient Occupational Therapy 1 times weekly for 8 weeks to include the following interventions: Paraffin, Fluidotherapy, Manual therapy/joint mobilizations, Modalities for pain management, US 3 mhz, Therapeutic exercises/activities., Strengthening, Orthotic Fabrication/Fit/Training, Edema Control, Scar Management, Joint Protection, and Energy Conservation.    Tabitha Sutherland OT        Physician's Signature: _________________________________________ Date: ________________

## 2023-10-16 ENCOUNTER — OFFICE VISIT (OUTPATIENT)
Dept: ALLERGY | Facility: CLINIC | Age: 68
End: 2023-10-16
Payer: MEDICARE

## 2023-10-16 VITALS
HEART RATE: 58 BPM | WEIGHT: 179.88 LBS | SYSTOLIC BLOOD PRESSURE: 112 MMHG | BODY MASS INDEX: 27.35 KG/M2 | DIASTOLIC BLOOD PRESSURE: 67 MMHG

## 2023-10-16 DIAGNOSIS — L30.9 DERMATITIS: Primary | ICD-10-CM

## 2023-10-16 PROCEDURE — 1126F AMNT PAIN NOTED NONE PRSNT: CPT | Mod: CPTII,S$GLB,, | Performed by: STUDENT IN AN ORGANIZED HEALTH CARE EDUCATION/TRAINING PROGRAM

## 2023-10-16 PROCEDURE — 3061F PR NEG MICROALBUMINURIA RESULT DOCUMENTED/REVIEW: ICD-10-PCS | Mod: CPTII,S$GLB,, | Performed by: STUDENT IN AN ORGANIZED HEALTH CARE EDUCATION/TRAINING PROGRAM

## 2023-10-16 PROCEDURE — 1159F MED LIST DOCD IN RCRD: CPT | Mod: CPTII,S$GLB,, | Performed by: STUDENT IN AN ORGANIZED HEALTH CARE EDUCATION/TRAINING PROGRAM

## 2023-10-16 PROCEDURE — 1126F PR PAIN SEVERITY QUANTIFIED, NO PAIN PRESENT: ICD-10-PCS | Mod: CPTII,S$GLB,, | Performed by: STUDENT IN AN ORGANIZED HEALTH CARE EDUCATION/TRAINING PROGRAM

## 2023-10-16 PROCEDURE — 1160F RVW MEDS BY RX/DR IN RCRD: CPT | Mod: CPTII,S$GLB,, | Performed by: STUDENT IN AN ORGANIZED HEALTH CARE EDUCATION/TRAINING PROGRAM

## 2023-10-16 PROCEDURE — 99999 PR PBB SHADOW E&M-EST. PATIENT-LVL III: ICD-10-PCS | Mod: PBBFAC,,, | Performed by: STUDENT IN AN ORGANIZED HEALTH CARE EDUCATION/TRAINING PROGRAM

## 2023-10-16 PROCEDURE — 3008F BODY MASS INDEX DOCD: CPT | Mod: CPTII,S$GLB,, | Performed by: STUDENT IN AN ORGANIZED HEALTH CARE EDUCATION/TRAINING PROGRAM

## 2023-10-16 PROCEDURE — 1160F PR REVIEW ALL MEDS BY PRESCRIBER/CLIN PHARMACIST DOCUMENTED: ICD-10-PCS | Mod: CPTII,S$GLB,, | Performed by: STUDENT IN AN ORGANIZED HEALTH CARE EDUCATION/TRAINING PROGRAM

## 2023-10-16 PROCEDURE — 4010F PR ACE/ARB THEARPY RXD/TAKEN: ICD-10-PCS | Mod: CPTII,S$GLB,, | Performed by: STUDENT IN AN ORGANIZED HEALTH CARE EDUCATION/TRAINING PROGRAM

## 2023-10-16 PROCEDURE — 3044F PR MOST RECENT HEMOGLOBIN A1C LEVEL <7.0%: ICD-10-PCS | Mod: CPTII,S$GLB,, | Performed by: STUDENT IN AN ORGANIZED HEALTH CARE EDUCATION/TRAINING PROGRAM

## 2023-10-16 PROCEDURE — 3074F SYST BP LT 130 MM HG: CPT | Mod: CPTII,S$GLB,, | Performed by: STUDENT IN AN ORGANIZED HEALTH CARE EDUCATION/TRAINING PROGRAM

## 2023-10-16 PROCEDURE — 3078F PR MOST RECENT DIASTOLIC BLOOD PRESSURE < 80 MM HG: ICD-10-PCS | Mod: CPTII,S$GLB,, | Performed by: STUDENT IN AN ORGANIZED HEALTH CARE EDUCATION/TRAINING PROGRAM

## 2023-10-16 PROCEDURE — 3008F PR BODY MASS INDEX (BMI) DOCUMENTED: ICD-10-PCS | Mod: CPTII,S$GLB,, | Performed by: STUDENT IN AN ORGANIZED HEALTH CARE EDUCATION/TRAINING PROGRAM

## 2023-10-16 PROCEDURE — 99204 PR OFFICE/OUTPT VISIT, NEW, LEVL IV, 45-59 MIN: ICD-10-PCS | Mod: S$GLB,,, | Performed by: STUDENT IN AN ORGANIZED HEALTH CARE EDUCATION/TRAINING PROGRAM

## 2023-10-16 PROCEDURE — 3044F HG A1C LEVEL LT 7.0%: CPT | Mod: CPTII,S$GLB,, | Performed by: STUDENT IN AN ORGANIZED HEALTH CARE EDUCATION/TRAINING PROGRAM

## 2023-10-16 PROCEDURE — 99204 OFFICE O/P NEW MOD 45 MIN: CPT | Mod: S$GLB,,, | Performed by: STUDENT IN AN ORGANIZED HEALTH CARE EDUCATION/TRAINING PROGRAM

## 2023-10-16 PROCEDURE — 99999 PR PBB SHADOW E&M-EST. PATIENT-LVL III: CPT | Mod: PBBFAC,,, | Performed by: STUDENT IN AN ORGANIZED HEALTH CARE EDUCATION/TRAINING PROGRAM

## 2023-10-16 PROCEDURE — 3066F PR DOCUMENTATION OF TREATMENT FOR NEPHROPATHY: ICD-10-PCS | Mod: CPTII,S$GLB,, | Performed by: STUDENT IN AN ORGANIZED HEALTH CARE EDUCATION/TRAINING PROGRAM

## 2023-10-16 PROCEDURE — 3074F PR MOST RECENT SYSTOLIC BLOOD PRESSURE < 130 MM HG: ICD-10-PCS | Mod: CPTII,S$GLB,, | Performed by: STUDENT IN AN ORGANIZED HEALTH CARE EDUCATION/TRAINING PROGRAM

## 2023-10-16 PROCEDURE — 1159F PR MEDICATION LIST DOCUMENTED IN MEDICAL RECORD: ICD-10-PCS | Mod: CPTII,S$GLB,, | Performed by: STUDENT IN AN ORGANIZED HEALTH CARE EDUCATION/TRAINING PROGRAM

## 2023-10-16 PROCEDURE — 3061F NEG MICROALBUMINURIA REV: CPT | Mod: CPTII,S$GLB,, | Performed by: STUDENT IN AN ORGANIZED HEALTH CARE EDUCATION/TRAINING PROGRAM

## 2023-10-16 PROCEDURE — 3066F NEPHROPATHY DOC TX: CPT | Mod: CPTII,S$GLB,, | Performed by: STUDENT IN AN ORGANIZED HEALTH CARE EDUCATION/TRAINING PROGRAM

## 2023-10-16 PROCEDURE — 4010F ACE/ARB THERAPY RXD/TAKEN: CPT | Mod: CPTII,S$GLB,, | Performed by: STUDENT IN AN ORGANIZED HEALTH CARE EDUCATION/TRAINING PROGRAM

## 2023-10-16 PROCEDURE — 3078F DIAST BP <80 MM HG: CPT | Mod: CPTII,S$GLB,, | Performed by: STUDENT IN AN ORGANIZED HEALTH CARE EDUCATION/TRAINING PROGRAM

## 2023-10-16 RX ORDER — CLOTRIMAZOLE AND BETAMETHASONE DIPROPIONATE 10; .64 MG/G; MG/G
CREAM TOPICAL 2 TIMES DAILY
Qty: 15 G | Refills: 0 | Status: SHIPPED | OUTPATIENT
Start: 2023-10-16 | End: 2023-10-23

## 2023-10-16 NOTE — PROGRESS NOTES
ALLERGY & IMMUNOLOGY CLINIC - INITIAL CONSULTATION      HISTORY OF PRESENT ILLNESS     Patient ID: Karthik Bentley is a 68 y.o. male    CC: rash of ankles     HPI: Karthik Bentley is a 68 y.o. male presenting for a rash of his ankles.    He was referred to dermatology by Meghan Gold PA-C (urgent care), but was somehow scheduled to my clinic (and the referral was linked to this appointment). He thought he was coming to see a dermatologist today.    He says the rash started on his ankles a couple of weeks ago. It spread down his feet and up his shins. He says it was red bumps, then it became more patchy, now its more scaly.  It was very itchy. He says it felt if had stood in an ant pile, but he didn't have any bug bites recently.     He says it thinks it might have been due to new boots. He had been wearing the boots on and off for about 2 weeks before the rash started. He says he remembers wearing them with low socks one day, so his ankles were exposed to the shoe material, and it was hot, and his ankles felt itchy. He thinks the rash showed up a few days after this wear. The boots were faux-leather. He says they were cheap. He has avoided wearing them since the rash started.    He tried benadryl cream and cortisone cream. He went to urgent care on 10/9/23, and was prescribed triamcinolone 0.1% cream. It didn't help.  He went to ED on 10/11/23, and he was prescribed clotrimazole/betamethasone. It is unclear if this has helped or if symptoms are improving on their own. He says the rash is still bothersome, but it is less itchy.  He hasn't had any new bumps pop up in the past few days.   He says he is almost out of the clotrimazole-betamethasone.     MEDICAL HISTORY     Vaccines:    Immunization History   Administered Date(s) Administered    COVID-19, MRNA, LN-S, PF (Pfizer) (Gray Cap) 07/11/2022    COVID-19, MRNA, LN-S, PF (Pfizer) (Purple Cap) 02/18/2021, 03/11/2021, 10/24/2021    COVID-19, mRNA, YINP-S,  bivalent booster, PF (PFIZER OMICRON) 10/14/2022    Influenza 12/10/2020    Influenza (FLUAD) - Quadrivalent - Adjuvanted - PF *Preferred* (65+) 10/24/2021, 09/20/2022    Influenza - High Dose - PF (65 years and older) 12/04/2019, 10/24/2021    Influenza - Quadrivalent - PF *Preferred* (6 months and older) 12/10/2020    Influenza A (H1N1) 2009 Monovalent - IM 12/10/2009    Influenza Split 09/29/2009    Pneumococcal Conjugate - 13 Valent 12/04/2019    Pneumococcal Polysaccharide - 23 Valent 02/06/2009, 01/23/2021    Tdap 12/31/2019    Zoster Recombinant 01/23/2021, 03/27/2021     Medical Hx:   Patient Active Problem List   Diagnosis    Essential hypertension    Type 2 diabetes mellitus, without long-term current use of insulin    Screen for colon cancer    Overweight    HSV infection    Chronic pain of both shoulders    Cervical myelopathy    Drug-induced immunodeficiency    Syncope    Mixed hyperlipidemia    Gout    Rheumatoid arthritis    Trigger finger, left middle finger    Left carpal tunnel syndrome    Localized edema    Decreased range of motion of finger of left hand    Pain     Surgical Hx:   Past Surgical History:   Procedure Laterality Date    ANTERIOR CERVICAL DISCECTOMY W/ FUSION N/A 10/31/2022    Procedure: DISCECTOMY, SPINE, CERVICAL, ANTERIOR APPROACH, WITH FUSION C3-6 depuy SNS:vocal/motors/SSEP;  Surgeon: Kody Marshall MD;  Location: [facility] OR;  Service: Orthopedics;  Laterality: N/A;    CARPAL TUNNEL RELEASE Left 9/25/2023    Procedure: RELEASE, CARPAL TUNNEL,LEFT;  Surgeon: Zahra Downey MD;  Location: [facility] OR;  Service: Orthopedics;  Laterality: Left;    CARPAL TUNNEL RELEASE Left 9/25/2023    Procedure: RELEASE, CARPAL TUNNEL,left;  Surgeon: Zahra Downey MD;  Location: [facility] OR;  Service: Orthopedics;  Laterality: Left;    COLONOSCOPY N/A 1/16/2020    Procedure: COLONOSCOPY;  Surgeon: Cynthia Kearney MD;  Location: [facility] ENDO (4TH FLR);  Service: Endoscopy;  Laterality:  N/A;    ENDOSCOPIC CARPAL TUNNEL RELEASE Right 4/1/2022    Procedure: RELEASE, CARPAL TUNNEL, ENDOSCOPIC - right arthrex;  Surgeon: Wiley Warren MD;  Location: [facility] OR;  Service: Orthopedics;  Laterality: Right;    EPIDURAL STEROID INJECTION N/A 9/6/2022    Procedure: CERVICAL EDITH CONTRAST DIRECT REFERRAL;  Surgeon: Yasmeen Taylor MD;  Location: [facility] PAIN MGT;  Service: Pain Management;  Laterality: N/A;    SHOULDER SURGERY      TRIGGER FINGER RELEASE Left 9/25/2023    Procedure: RELEASE, TRIGGER FINGER, LEFT LONG FINGER;  Surgeon: Zahra Downey MD;  Location: [facility] OR;  Service: Orthopedics;  Laterality: Left;    TRIGGER FINGER RELEASE Left 9/25/2023    Procedure: RELEASE, TRIGGER FINGER,left long  finger;  Surgeon: Zahra Downey MD;  Location: [facility] OR;  Service: Orthopedics;  Laterality: Left;     Medications:   Current Outpatient Medications on File Prior to Visit   Medication Sig Dispense Refill    blood sugar diagnostic Strp To check BG 1 times daily, to use with insurance preferred meter 100 strip 3    clotrimazole-betamethasone 1-0.05% (LOTRISONE) cream Apply topically 2 (two) times daily. for 7 days 15 g 0    colchicine, gout, (COLCRYS) 0.6 mg tablet Take 1 tablet (0.6 mg total) by mouth once daily. 90 tablet 2    diphenhydrAMINE (SOMINEX) 25 mg tablet Take 25 mg by mouth nightly as needed for Insomnia.      febuxostat (ULORIC) 40 mg Tab Take 1 tablet (40 mg total) by mouth once daily. 30 tablet 3    folic acid (FOLVITE) 1 MG tablet Take 1 tablet (1 mg total) by mouth once daily. 90 tablet 3    gabapentin (NEURONTIN) 300 MG capsule Take 1 capsule (300 mg total) by mouth 3 (three) times daily. 90 capsule 11    lancets Misc To check BG 1 times daily, to use with insurance preferred meter 100 each 3    metFORMIN (GLUCOPHAGE) 1000 MG tablet TAKE 1 TABLET(1000 MG) BY MOUTH TWICE DAILY WITH MEALS 180 tablet 3    methotrexate 2.5 MG Tab Take 5 tablets in the AM and 5 tablets in the PM ONCE  EVERY 7 DAYS. 40 tablet 2    rosuvastatin (CRESTOR) 10 MG tablet TAKE 1 TABLET ONE TIME DAILY. 90 tablet 3    traMADoL (ULTRAM) 50 mg tablet Take 1 tablet (50 mg total) by mouth every 6 (six) hours as needed for Pain. 10 tablet 0    TRULICITY 1.5 mg/0.5 mL pen injector ADMINISTER 1.5 MG UNDER THE SKIN EVERY 7 DAYS 12 pen 1    valACYclovir (VALTREX) 500 MG tablet TAKE 1 TABLET(500 MG) BY MOUTH EVERY DAY 30 tablet 11    valsartan (DIOVAN) 160 MG tablet TAKE 1 TABLET(160 MG) BY MOUTH EVERY DAY 90 tablet 3    blood-glucose meter kit To check BG 1 times daily, to use with insurance preferred meter 1 each 0     Current Facility-Administered Medications on File Prior to Visit   Medication Dose Route Frequency Provider Last Rate Last Admin    ceFAZolin 2 g in dextrose 5 % in water (D5W) 50 mL IVPB (MB+)  2 g Intravenous On Call Procedure Obi Smith MD        LIDOcaine (PF) 10 mg/ml (1%) injection 5 mg  0.5 mL Intradermal Once Esau Ham MD        mupirocin 2 % ointment   Nasal On Call Procedure Obi Smith MD   Given at 09/25/23 0822    sodium chloride 0.9% flush 10 mL  10 mL Intravenous PRN Obi Smith MD         Drug Allergies: Review of patient's allergies indicates:  No Known Allergies    Env/Occ:   Occupation: retired     Social Hx:   Social History     Tobacco Use    Smoking status: Never    Smokeless tobacco: Never   Substance Use Topics    Alcohol use: Yes     Comment: occasionally    Drug use: Never     Family Hx:   Family History   Problem Relation Age of Onset    Bone cancer Mother     Colon cancer Father     Glaucoma Neg Hx     Macular degeneration Neg Hx     Retinal detachment Neg Hx       PHYSICAL EXAM     VS: /67 (BP Location: Right arm, Patient Position: Sitting, BP Method: Large (Automatic))   Pulse (!) 58   Wt 81.6 kg (179 lb 14.3 oz)   BMI 27.35 kg/m²   GENERAL: Alert, NAD, well-appearing  EYES: EOMI, no conjunctival injection, no discharge, no infraorbital  shiners  LUNGS: normal effort, no increased WOB  DERM:  bilateral ankles with macules and patches of hyperpigmentation and some dark erythema with scaling present; the macules of hyperpigmentation spread up his shins and down to his mid feet   NEURO: normal speech, normal gait, no facial asymmetry     LABORATORY STUDIES     Component      Latest Ref [facility] 9/12/2023   WBC      3.90 - 12.70 K/uL 7.87    RBC      4.60 - 6.20 M/uL 3.81 (L)    Hemoglobin      14.0 - 18.0 g/dL 12.2 (L)    Hematocrit      40.0 - 54.0 % 37.8 (L)    MCV      82 - 98 fL 99 (H)    MCH      27.0 - 31.0 pg 32.0 (H)    MCHC      32.0 - 36.0 g/dL 32.3    RDW      11.5 - 14.5 % 15.1 (H)    Platelet Count      150 - 450 K/uL 214    MPV      9.2 - 12.9 fL 11.2    Immature Granulocytes      0.0 - 0.5 % 0.4    Gran # (ANC)      1.8 - 7.7 K/uL 5.5    Immature Grans (Abs)      0.00 - 0.04 K/uL 0.03    Lymph #      1.0 - 4.8 K/uL 1.7    Mono #      0.3 - 1.0 K/uL 0.6    Eos #      0.0 - 0.5 K/uL 0.1    Baso #      0.00 - 0.20 K/uL 0.03    Differential Method Automated    Sodium      136 - 145 mmol/L 141    Potassium      3.5 - 5.1 mmol/L 4.9    Chloride      95 - 110 mmol/L 107    CO2      23 - 29 mmol/L 24    Glucose      70 - 110 mg/dL 98    BUN      8 - 23 mg/dL 12    Creatinine      0.5 - 1.4 mg/dL 1.0    Calcium      8.7 - 10.5 mg/dL 9.4    PROTEIN TOTAL      6.0 - 8.4 g/dL 6.9    Albumin      3.5 - 5.2 g/dL 3.9    BILIRUBIN TOTAL      0.1 - 1.0 mg/dL 1.0    ALP      55 - 135 U/L 79    AST      10 - 40 U/L 25    ALT      10 - 44 U/L 32    CRP      0.0 - 8.2 mg/L 0.4    Sed Rate      0 - 23 mm/Hr 6       IMAGING & OTHER DIAGNOSTICS     CXR 10/11/22:  FINDINGS:  Normal heart size.  Normal pulmonary vasculature.  No focal infiltrates.  No pleural fluid or pneumothorax.  No acute bony findings.  Midline tracheal air column.  Impression:  No active cardiac or pulmonary findings     CHART REVIEW     Reviewed urgent care note, ED note, labs, imaging.      ASSESSMENT & PLAN     Karthik Bentley is a 68 y.o. male with     # Dermatitis of ankles: Suspect this is an allergic contact dermatitis to his new faux-leather boots (timing is consistent). His symptoms seem to be starting to improve.   -advised that he continue to avoid his new boots.  -counseled that if this is an allergic contact dermatitis, I expect his symptoms to continue to improve, but the post-inflammatory hyperpigmentation may take a long time to resolve.  -if the current rash worsens again, would recommend he see dermatology for consideration of other etiologies.  -if he continues to get rashes concerning for allergic contact dermatitis, would recommend he see dermatology for consideration of patch testing.   -low suspicion for fungal etiology, but as he finds the clotrimazole-betamethasone cream helpful (and he didn't find the triamcinolone helpful), prescribing an additional small tube of clotrimazole-betamethasone (suspect the betamethasone is what's helpful).      Follow up: as needed    I spent a total of 50 minutes on the day of the visit.  This includes face to face time and non-face to face time preparing to see the patient (eg, review of tests), obtaining and/or reviewing separately obtained history, documenting clinical information in the electronic or other health record, independently interpreting results.    Hayden Tomlinson MD  Allergy/Immunology

## 2023-10-17 ENCOUNTER — TELEPHONE (OUTPATIENT)
Dept: DERMATOLOGY | Facility: CLINIC | Age: 68
End: 2023-10-17
Payer: MEDICARE

## 2023-10-17 ENCOUNTER — CLINICAL SUPPORT (OUTPATIENT)
Dept: REHABILITATION | Facility: HOSPITAL | Age: 68
End: 2023-10-17
Payer: MEDICARE

## 2023-10-17 DIAGNOSIS — R60.0 LOCALIZED EDEMA: Primary | ICD-10-CM

## 2023-10-17 DIAGNOSIS — R52 PAIN: ICD-10-CM

## 2023-10-17 DIAGNOSIS — M25.642 DECREASED RANGE OF MOTION OF FINGER OF LEFT HAND: ICD-10-CM

## 2023-10-17 PROCEDURE — 97022 WHIRLPOOL THERAPY: CPT

## 2023-10-17 PROCEDURE — 97140 MANUAL THERAPY 1/> REGIONS: CPT

## 2023-10-17 PROCEDURE — 97530 THERAPEUTIC ACTIVITIES: CPT

## 2023-10-17 PROCEDURE — 97110 THERAPEUTIC EXERCISES: CPT

## 2023-10-17 NOTE — TELEPHONE ENCOUNTER
Spoke with pt - offered appt tomorrow due to cancellation. Pt was happy with appointment; accepted appointment and thanked me for call.     ----- Message -----  From: Connie Connelly  Sent: 10/16/2023   4:05 PM CDT  To: [facility] Derm Clinical Support Triage  Subject: pt called                                        Name of Who is Calling: ECHO HENNING [[id number]]      What is the request in detail: pt called to schedule an appt for his rash around the ankles. He stated the dept called him back to schedule , but was scheduled with an allergist. The Dr he met with today said he needs to be seen with Dermatology and is unable to treat him  . Pt has been with his condition for 2 weeks . Please advise       Can the clinic reply by [facility]: No       What Number to Call Back if not in [facility]: Telephone Information:       [phone]

## 2023-10-17 NOTE — PROGRESS NOTES
"  [facility]  Occupational Therapy Treatment Note     Date: 10/17/2023  Name: Karthik Bentley  Clinic Number: [id number]    Therapy Diagnosis:   Encounter Diagnoses   Name Primary?    Localized edema Yes    Decreased range of motion of finger of left hand     Pain      Physician: Darwin Horn MD    Physician Orders: Eval and Treat  Medical Diagnosis:   G56.02 (ICD-10-CM) - Carpal tunnel syndrome, left upper limb   M65.332 (ICD-10-CM) - Trigger finger, left middle finger      Surgical Procedure and Date: 9/25/2023, TF release, CTS   Evaluation Date: 10/10/2023  Insurance Authorization Period Expiration:   Plan of Care Certification Period: 12/10/2023  Date of Return to MD: ALFREDD  Visit # / Visits authorized: 1 / 1  FOTO: 1/ 3     Precautions:  Standard     Time In: 11:07 AM  Time Out: 12:05 PM   Total Billable Time:  58 minutes      Subjective     Patient reports:  "I was able to get some of the dead skin off and it looks better"  He was compliant with home exercise program given last session.   Response to previous treatment: First tx   Functional change: playing piano       Pain: 1/10  Location: left hands        Objective     Objective Measures updated at progress report unless specified.    Observation/Appearance:  Skin intact, Skin dry, and Edema present      Edema. Measured in centimeters.    10/10/2023 10/10/2023     Right  Left    Proximal Wrist Crease 17.0 cm 17.4 cm      Edema. Measured in centimeters.    10/10/2023 10/10/2023     Right  Left    Long:         P1 6.7 7.2 cm    PIP 7.0 7.5   P2 6.1 cm 6.6    DIP 5.5  5.5              Hand ROM. Measured in degrees.    10/10/2023 10/10/2023     Right  Left            Index: MP    2 cm from DPC              PIP                      DIP                  GIBSON               Long:  MP   /71              PIP   /72              DIP   /44              GIBSON   187           Ring:   MP   2.5 cm from DPC              PIP                  DIP        "           GIBSON               Small:  MP                   PIP                   DIP                  GIBSON               Thumb: MP                    IP           Rad ADD/ABD   WNL       Pal ADD/ABD   WNL          Opposition   WNL       Elbow and Wrist ROM. Measured in degrees.    10/11/2023 10/11/2023     Left Right   Supination/Pronation WNL     Wrist Ext/Flex 60/61     Wrist RD/UD             Strength (Dyanmometer) and Pinch Strength (Pinch Gauge)  Measured in pounds and psi. Average of three trials.    10/10/2023 10/10/2023     Right  Left    Rung II   Deferred    Key Pinch       3pt Pinch       2pt Pinch            Intake Outcome Measure for FOTO initial eval; hand Survey     Therapist reviewed FOTO scores for Karthik Bentley on 10/10/2023.   FOTO report - see Media section or FOTO account episode details.     Intake Score: 89.2%         Treatment     Ray received the treatments listed below:          Fluidotherapy: To L hand for 10 min, continuous air, 115 deg, air speed 50 to decrease pain, edema & scar tissue, sensory re- education, and increased tissue extensibility prior to therex        therapeutic exercises to develop ROM for 25 minutes including:  - DIP/PIP jt blocking x 10 reps (2 sets)   - hook grasp x 10 reps (2 sets)  - prayer stretch holding for for 30s x 3 reps   - passive stretching hook grasp,   - digit extension (1 min)   - digit abduction/adduction (1 min)       manual therapy techniques: Soft tissue Mobilization were applied to the: L hand for 10 minutes, including:  Scar tissue mobilization   Retrograde massage   Passive ROM, especially LF       therapeutic activities to improve functional performance for 10  minutes, including:  Isospheres (2 min)  Towel walks (3 min)   In hand manipulation with large poms (5 sets)                 Patient Education and Home Exercises     Education provided:   - passive stretching as demonstrated during session today   - Progress towards goals     Written Home  Exercises Provided: Patient instructed to cont prior HEP.  Exercises were reviewed and Karthik was able to demonstrate them prior to the end of the session.  Karthik demonstrated good  understanding of the home exercise program provided. See electronic medical record under Patient Instructions for exercises provided during therapy sessions.       Assessment     Pt concerned with cont inability to make full composite fist. Reminded pt of passive stretching exercises to add on to HEP as well as educated.        Karthik is progressing well towards his goals and there are no updates to goals at this time. Pt prognosis is Good.       Patient will continue to benefit from skilled outpatient occupational therapy to address the deficits listed in the problem list on initial evaluation provide patient/family education and to maximize patient's level of independence in the home and community environment.     Patient's spiritual, cultural and educational needs considered and patient agreeable to plan of care and goals.    Anticipated barriers to occupational therapy:     Goals:    Long term goals: (by d/c)  1)  Patient to be IND with HEP and modalities for pain management  2) Pt will demonstrate full composite fist   3)   Increase  strength 15-20 lbs. to carry laundry, carry groceries, sweep, and increase functional independence of left hand for ADLs/iADLs  4)   Increase pinch 3-4  psis for  Increase in functional independence in ADLs/iADLs such as manipulating fasteners and opening containers  5) Patient to score at 10%  or less on FOTO to demonstrate improved perception of functional leftUE Use.     Short term Goals: ( 4 weeks)   1)  Patient to be IND with HEP and modalities for pain management  2)  Pt will demonstrate improved composite fist by 20+ degrees    3)   Assess  strength when appropriate   4)   Assess pinch strength when appropriate   5)   Patient to score at 65%  or less on FOTO to demonstrate improved perception of  functional left UE Use.     Plan     Updates/Grading for next session:     Tabitha Sutherland, AARON   10/17/2023

## 2023-10-18 ENCOUNTER — PATIENT MESSAGE (OUTPATIENT)
Dept: DERMATOLOGY | Facility: CLINIC | Age: 68
End: 2023-10-18

## 2023-10-18 ENCOUNTER — OFFICE VISIT (OUTPATIENT)
Dept: DERMATOLOGY | Facility: CLINIC | Age: 68
End: 2023-10-18
Payer: MEDICARE

## 2023-10-18 DIAGNOSIS — L81.0 POST-INFLAMMATORY HYPERPIGMENTATION: ICD-10-CM

## 2023-10-18 DIAGNOSIS — L30.9 ECZEMA, UNSPECIFIED TYPE: Primary | ICD-10-CM

## 2023-10-18 DIAGNOSIS — B35.3 TINEA PEDIS OF BOTH FEET: ICD-10-CM

## 2023-10-18 PROCEDURE — 1160F PR REVIEW ALL MEDS BY PRESCRIBER/CLIN PHARMACIST DOCUMENTED: ICD-10-PCS | Mod: CPTII,S$GLB,, | Performed by: STUDENT IN AN ORGANIZED HEALTH CARE EDUCATION/TRAINING PROGRAM

## 2023-10-18 PROCEDURE — 99999 PR PBB SHADOW E&M-EST. PATIENT-LVL II: CPT | Mod: PBBFAC,,, | Performed by: STUDENT IN AN ORGANIZED HEALTH CARE EDUCATION/TRAINING PROGRAM

## 2023-10-18 PROCEDURE — 1159F PR MEDICATION LIST DOCUMENTED IN MEDICAL RECORD: ICD-10-PCS | Mod: CPTII,S$GLB,, | Performed by: STUDENT IN AN ORGANIZED HEALTH CARE EDUCATION/TRAINING PROGRAM

## 2023-10-18 PROCEDURE — 1101F PT FALLS ASSESS-DOCD LE1/YR: CPT | Mod: CPTII,S$GLB,, | Performed by: STUDENT IN AN ORGANIZED HEALTH CARE EDUCATION/TRAINING PROGRAM

## 2023-10-18 PROCEDURE — 3066F NEPHROPATHY DOC TX: CPT | Mod: CPTII,S$GLB,, | Performed by: STUDENT IN AN ORGANIZED HEALTH CARE EDUCATION/TRAINING PROGRAM

## 2023-10-18 PROCEDURE — 3061F PR NEG MICROALBUMINURIA RESULT DOCUMENTED/REVIEW: ICD-10-PCS | Mod: CPTII,S$GLB,, | Performed by: STUDENT IN AN ORGANIZED HEALTH CARE EDUCATION/TRAINING PROGRAM

## 2023-10-18 PROCEDURE — 99999 PR PBB SHADOW E&M-EST. PATIENT-LVL II: ICD-10-PCS | Mod: PBBFAC,,, | Performed by: STUDENT IN AN ORGANIZED HEALTH CARE EDUCATION/TRAINING PROGRAM

## 2023-10-18 PROCEDURE — 99203 OFFICE O/P NEW LOW 30 MIN: CPT | Mod: S$GLB,,, | Performed by: STUDENT IN AN ORGANIZED HEALTH CARE EDUCATION/TRAINING PROGRAM

## 2023-10-18 PROCEDURE — 1126F PR PAIN SEVERITY QUANTIFIED, NO PAIN PRESENT: ICD-10-PCS | Mod: CPTII,S$GLB,, | Performed by: STUDENT IN AN ORGANIZED HEALTH CARE EDUCATION/TRAINING PROGRAM

## 2023-10-18 PROCEDURE — 4010F PR ACE/ARB THEARPY RXD/TAKEN: ICD-10-PCS | Mod: CPTII,S$GLB,, | Performed by: STUDENT IN AN ORGANIZED HEALTH CARE EDUCATION/TRAINING PROGRAM

## 2023-10-18 PROCEDURE — 3061F NEG MICROALBUMINURIA REV: CPT | Mod: CPTII,S$GLB,, | Performed by: STUDENT IN AN ORGANIZED HEALTH CARE EDUCATION/TRAINING PROGRAM

## 2023-10-18 PROCEDURE — 3044F HG A1C LEVEL LT 7.0%: CPT | Mod: CPTII,S$GLB,, | Performed by: STUDENT IN AN ORGANIZED HEALTH CARE EDUCATION/TRAINING PROGRAM

## 2023-10-18 PROCEDURE — 4010F ACE/ARB THERAPY RXD/TAKEN: CPT | Mod: CPTII,S$GLB,, | Performed by: STUDENT IN AN ORGANIZED HEALTH CARE EDUCATION/TRAINING PROGRAM

## 2023-10-18 PROCEDURE — 3066F PR DOCUMENTATION OF TREATMENT FOR NEPHROPATHY: ICD-10-PCS | Mod: CPTII,S$GLB,, | Performed by: STUDENT IN AN ORGANIZED HEALTH CARE EDUCATION/TRAINING PROGRAM

## 2023-10-18 PROCEDURE — 1159F MED LIST DOCD IN RCRD: CPT | Mod: CPTII,S$GLB,, | Performed by: STUDENT IN AN ORGANIZED HEALTH CARE EDUCATION/TRAINING PROGRAM

## 2023-10-18 PROCEDURE — 3044F PR MOST RECENT HEMOGLOBIN A1C LEVEL <7.0%: ICD-10-PCS | Mod: CPTII,S$GLB,, | Performed by: STUDENT IN AN ORGANIZED HEALTH CARE EDUCATION/TRAINING PROGRAM

## 2023-10-18 PROCEDURE — 1101F PR PT FALLS ASSESS DOC 0-1 FALLS W/OUT INJ PAST YR: ICD-10-PCS | Mod: CPTII,S$GLB,, | Performed by: STUDENT IN AN ORGANIZED HEALTH CARE EDUCATION/TRAINING PROGRAM

## 2023-10-18 PROCEDURE — 99203 PR OFFICE/OUTPT VISIT, NEW, LEVL III, 30-44 MIN: ICD-10-PCS | Mod: S$GLB,,, | Performed by: STUDENT IN AN ORGANIZED HEALTH CARE EDUCATION/TRAINING PROGRAM

## 2023-10-18 PROCEDURE — 3288F PR FALLS RISK ASSESSMENT DOCUMENTED: ICD-10-PCS | Mod: CPTII,S$GLB,, | Performed by: STUDENT IN AN ORGANIZED HEALTH CARE EDUCATION/TRAINING PROGRAM

## 2023-10-18 PROCEDURE — 1160F RVW MEDS BY RX/DR IN RCRD: CPT | Mod: CPTII,S$GLB,, | Performed by: STUDENT IN AN ORGANIZED HEALTH CARE EDUCATION/TRAINING PROGRAM

## 2023-10-18 PROCEDURE — 1126F AMNT PAIN NOTED NONE PRSNT: CPT | Mod: CPTII,S$GLB,, | Performed by: STUDENT IN AN ORGANIZED HEALTH CARE EDUCATION/TRAINING PROGRAM

## 2023-10-18 PROCEDURE — 3288F FALL RISK ASSESSMENT DOCD: CPT | Mod: CPTII,S$GLB,, | Performed by: STUDENT IN AN ORGANIZED HEALTH CARE EDUCATION/TRAINING PROGRAM

## 2023-10-18 RX ORDER — CLOBETASOL PROPIONATE 0.5 MG/G
OINTMENT TOPICAL
Qty: 30 G | Refills: 1 | Status: SHIPPED | OUTPATIENT
Start: 2023-10-18 | End: 2024-03-05 | Stop reason: ALTCHOICE

## 2023-10-18 RX ORDER — KETOCONAZOLE 20 MG/G
CREAM TOPICAL 2 TIMES DAILY
Qty: 30 G | Refills: 2 | Status: SHIPPED | OUTPATIENT
Start: 2023-10-18 | End: 2024-03-05 | Stop reason: ALTCHOICE

## 2023-10-18 NOTE — PROGRESS NOTES
Subjective:      Patient ID:  Karthik Bentley is a 68 y.o. male who presents for   Chief Complaint   Patient presents with    Rash     Both ankles     68 y.o. male presents today for a rash.    New patient.     Location: both ankles  Duration: 2 weeks  Symptoms: started off as red bumps, became dark & patchy, itchy, hot, inflamed (extremely itchy)  Current treatments: Clotrimazole-betamethasone cream which helped  Prior treatments tried: Benadryl cream, OTC cortisone, Triamcinolone cream  Other pertinent history: recent carpel tunnel surgery on 9/25/23   Rash is improving since initial presentation  He also has history of foot fungus- treats with Tenactin spray  He ordered new black rubber boots from [facility] prior to rash starting was wearing these and wondering if this could have caused rash      Review of Systems   Constitutional:  Negative for fever, chills, fatigue and malaise.   Respiratory:  Negative for cough.    Skin:  Positive for itching and rash.   Hematologic/Lymphatic: Negative for adenopathy.   Allergic/Immunologic: Negative for environmental allergies.       Objective:   Physical Exam   Skin:   Areas Examined (abnormalities noted in diagram):   RLE Inspected  LLE Inspection Performed       Diagram Legend     Erythematous scaling macule/papule c/w actinic keratosis       Vascular papule c/w angioma      Pigmented verrucoid papule/plaque c/w seborrheic keratosis      Yellow umbilicated papule c/w sebaceous hyperplasia      Irregularly shaped tan macule c/w lentigo     1-2 mm smooth white papules consistent with Milia      Movable subcutaneous cyst with punctum c/w epidermal inclusion cyst      Subcutaneous movable cyst c/w pilar cyst      Firm pink to brown papule c/w dermatofibroma      Pedunculated fleshy papule(s) c/w skin tag(s)      Evenly pigmented macule c/w junctional nevus     Mildly variegated pigmented, slightly irregular-bordered macule c/w mildly atypical nevus      Flesh colored to evenly  pigmented papule c/w intradermal nevus       Pink pearly papule/plaque c/w basal cell carcinoma      Erythematous hyperkeratotic cursted plaque c/w SCC      Surgical scar with no sign of skin cancer recurrence      Open and closed comedones      Inflammatory papules and pustules      Verrucoid papule consistent consistent with wart     Erythematous eczematous patches and plaques     Dystrophic onycholytic nail with subungual debris c/w onychomycosis     Umbilicated papule    Erythematous-base heme-crusted tan verrucoid plaque consistent with inflamed seborrheic keratosis     Erythematous Silvery Scaling Plaque c/w Psoriasis     See annotation    Photo is in media tab from prior visit, today ankles with hyperpigmented scaly patches          Assessment / Plan:        Eczema, unspecified type  Photo above but today greatly improved with mostly PIH  Favor ACD/ICD from black rubber boots from [employer] (wore ankle socks which would explain why only ankles affected). Ddx tinea  Severely itchy and with distribution favor eczema (ACD/ICD)  Start clobetasol ointment bid x 2 weeks  If rash not gone f/u in 2 weeks, patient prefers to have set follow up  Stop wearing new boots    Post-inflammatory hyperpigmentation  Will take weeks to months to heal after rash    Tinea pedis of both feet  Start ketoconazole cream bid x 2 weeks to bottoms of feet and toewebs  Continue tenactin spray to shoes, socks    RTC 2 weeks cancel if rash clears

## 2023-10-19 RX ORDER — CLOBETASOL PROPIONATE 0.5 MG/G
OINTMENT TOPICAL
Qty: 60 G | Refills: 1 | Status: SHIPPED | OUTPATIENT
Start: 2023-10-19 | End: 2024-03-05 | Stop reason: ALTCHOICE

## 2023-10-24 ENCOUNTER — CLINICAL SUPPORT (OUTPATIENT)
Dept: REHABILITATION | Facility: HOSPITAL | Age: 68
End: 2023-10-24
Payer: MEDICARE

## 2023-10-24 DIAGNOSIS — R60.0 LOCALIZED EDEMA: Primary | ICD-10-CM

## 2023-10-24 DIAGNOSIS — R52 PAIN: ICD-10-CM

## 2023-10-24 DIAGNOSIS — M25.642 DECREASED RANGE OF MOTION OF FINGER OF LEFT HAND: ICD-10-CM

## 2023-10-24 PROCEDURE — 97110 THERAPEUTIC EXERCISES: CPT

## 2023-10-24 PROCEDURE — 97140 MANUAL THERAPY 1/> REGIONS: CPT

## 2023-10-24 PROCEDURE — 97022 WHIRLPOOL THERAPY: CPT

## 2023-10-24 NOTE — PROGRESS NOTES
"      [facility]  Occupational Therapy Treatment Note     Date: 10/24/2023  Name: Karthik Bentley  Clinic Number: [id number]    Therapy Diagnosis:   Encounter Diagnoses   Name Primary?    Localized edema Yes    Decreased range of motion of finger of left hand     Pain        Physician: Darwin Horn MD    Physician Orders: Eval and Treat  Medical Diagnosis:   G56.02 (ICD-10-CM) - Carpal tunnel syndrome, left upper limb   M65.332 (ICD-10-CM) - Trigger finger, left middle finger      Surgical Procedure and Date: 9/25/2023, TF release, CTS   Evaluation Date: 10/10/2023  Insurance Authorization Period Expiration:   Plan of Care Certification Period: 12/10/2023  Date of Return to MD: ALFREDD  Visit # / Visits authorized: 1 / 1  FOTO: 1/ 3     Precautions:  Standard     Time In: 11:07 AM  Time Out: 12:05 PM   Total Billable Time:  58 minutes      Subjective     Patient reports:  "I was able to get some of the dead skin off and it looks better"  He was compliant with home exercise program given last session.   Response to previous treatment: First tx   Functional change: playing piano       Pain: 1/10  Location: left hands        Objective     Objective Measures updated at progress report unless specified.    Observation/Appearance:  Skin intact, Skin dry, and Edema present      Edema. Measured in centimeters.    10/10/2023 10/10/2023     Right  Left    Proximal Wrist Crease 17.0 cm 17.4 cm      Edema. Measured in centimeters.    10/10/2023 10/10/2023     Right  Left    Long:         P1 6.7 7.2 cm    PIP 7.0 7.5   P2 6.1 cm 6.6    DIP 5.5  5.5              Hand ROM. Measured in degrees.    10/10/2023 10/10/2023     Right  Left            Index: MP    2 cm from DPC              PIP                      DIP                  GIBSON               Long:  MP   /71              PIP   /72              DIP   /44              GIBSON   187           Ring:   MP   2.5 cm from DPC              PIP                  DIP  "                 GIBSON               Small:  MP                   PIP                   DIP                  GIBSON               Thumb: MP                    IP           Rad ADD/ABD   WNL       Pal ADD/ABD   WNL          Opposition   WNL       Elbow and Wrist ROM. Measured in degrees.    10/11/2023 10/11/2023     Left Right   Supination/Pronation WNL     Wrist Ext/Flex 60/61     Wrist RD/UD             Strength (Dyanmometer) and Pinch Strength (Pinch Gauge)  Measured in pounds and psi. Average of three trials.    10/10/2023 10/10/2023     Right  Left    Rung II   Deferred    Key Pinch       3pt Pinch       2pt Pinch            Intake Outcome Measure for FOTO initial eval; hand Survey     Therapist reviewed FOTO scores for Karthik Bentley on 10/10/2023.   FOTO report - see Media section or FOTO account episode details.     Intake Score: 89.2%         Treatment     Ray received the treatments listed below:          Fluidotherapy: To L hand for 10 min, continuous air, 115 deg, air speed 50 to decrease pain, edema & scar tissue, sensory re- education, and increased tissue extensibility prior to therex        therapeutic exercises to develop ROM for 25 minutes including:  - DIP/PIP jt blocking x 10 reps (2 sets)   - hook grasp x 10 reps (2 sets)  - prayer stretch holding for for 30s x 3 reps   - passive stretching hook grasp,   - digit extension (1 min)   - digit abduction/adduction (1 min)       manual therapy techniques: Soft tissue Mobilization were applied to the: L hand for 10 minutes, including:  Scar tissue mobilization   Retrograde massage   Passive ROM, especially LF       therapeutic activities to improve functional performance for 10  minutes, including:  Isospheres (2 min)  Towel walks (3 min)   In hand manipulation with large poms (5 sets)                 Patient Education and Home Exercises     Education provided:   - passive stretching as demonstrated during session today   - Progress towards goals      Written Home Exercises Provided: Patient instructed to cont prior HEP.  Exercises were reviewed and Karthik was able to demonstrate them prior to the end of the session.  Karthik demonstrated good  understanding of the home exercise program provided. See electronic medical record under Patient Instructions for exercises provided during therapy sessions.       Assessment     Pt concerned with cont inability to make full composite fist. Reminded pt of passive stretching exercises to add on to HEP as well as educated.        Karthik is progressing well towards his goals and there are no updates to goals at this time. Pt prognosis is Good.       Patient will continue to benefit from skilled outpatient occupational therapy to address the deficits listed in the problem list on initial evaluation provide patient/family education and to maximize patient's level of independence in the home and community environment.     Patient's spiritual, cultural and educational needs considered and patient agreeable to plan of care and goals.    Anticipated barriers to occupational therapy:     Goals:    Long term goals: (by d/c)  1)  Patient to be IND with HEP and modalities for pain management  2) Pt will demonstrate full composite fist   3)   Increase  strength 15-20 lbs. to carry laundry, carry groceries, sweep, and increase functional independence of left hand for ADLs/iADLs  4)   Increase pinch 3-4  psis for  Increase in functional independence in ADLs/iADLs such as manipulating fasteners and opening containers  5) Patient to score at 10%  or less on FOTO to demonstrate improved perception of functional leftUE Use.     Short term Goals: ( 4 weeks)   1)  Patient to be IND with HEP and modalities for pain management  2)  Pt will demonstrate improved composite fist by 20+ degrees    3)   Assess  strength when appropriate   4)   Assess pinch strength when appropriate   5)   Patient to score at 65%  or less on FOTO to demonstrate  improved perception of functional left UE Use.     Plan     Updates/Grading for next session:     Tabitha Sutherland OT   10/24/2023

## 2023-10-31 ENCOUNTER — TELEPHONE (OUTPATIENT)
Dept: ADMINISTRATIVE | Facility: HOSPITAL | Age: 68
End: 2023-10-31
Payer: MEDICARE

## 2023-10-31 ENCOUNTER — CLINICAL SUPPORT (OUTPATIENT)
Dept: REHABILITATION | Facility: HOSPITAL | Age: 68
End: 2023-10-31
Payer: MEDICARE

## 2023-10-31 DIAGNOSIS — R60.0 LOCALIZED EDEMA: Primary | ICD-10-CM

## 2023-10-31 DIAGNOSIS — R52 PAIN: ICD-10-CM

## 2023-10-31 DIAGNOSIS — M25.642 DECREASED RANGE OF MOTION OF FINGER OF LEFT HAND: ICD-10-CM

## 2023-10-31 PROCEDURE — 97530 THERAPEUTIC ACTIVITIES: CPT

## 2023-10-31 PROCEDURE — 97022 WHIRLPOOL THERAPY: CPT

## 2023-10-31 PROCEDURE — 97110 THERAPEUTIC EXERCISES: CPT

## 2023-10-31 PROCEDURE — 97140 MANUAL THERAPY 1/> REGIONS: CPT

## 2023-10-31 NOTE — PROGRESS NOTES
"    [facility]  Occupational Therapy Treatment Note     Date: 10/31/2023  Name: Karthik Bentley  Clinic Number: [id number] Orders: eval and treat  Medical Diagnosis: Carpal tunnel syndrome of right wrist [G56.01], Trigger finger of right hand, unspecified finger [M65.30], Postoperative state [Z98.890]     Surgical Procedure and Date: 4/1/22, s/p R CTR / Date of Injury/Onset: ~2 months ago  Evaluation Date: 4/18/22  Insurance Authorization Period Expiration: 4/14/23  Plan of Care Expiration: 6/17/22  Progress Note Due: 5/18/22   Date of Return to MD: 6/7/22  Visit # / Visits authorized: 1 / 1  FOTO: initial eval      Precautions:  Standard, diabetic     Time In: 12:00 pm  Time Out: 12:55  Total Appointment Time (timed & untimed codes): 55 minutes       Therapy Diagnosis:   Encounter Diagnoses   Name Primary?    Localized edema Yes    Decreased range of motion of finger of left hand     Pain      Physician: Darwin Horn MD        Subjective     Patient reports:  "  He was compliant with home exercise program given last session.   Response to previous treatment: First tx   Functional change: playing piano       Pain: 0/10  Location: left hands        Objective     Objective Measures updated at progress report unless specified.          Treatment     Karthik received the treatments listed below:          therapeutic exercises to develop ROM for 15 minutes including:  DIP/PIP jt blocking x 10 reps   Prayer stretch   Wrist ext/flex x 10 reps       manual therapy techniques: Soft tissue Mobilization were applied to the: L incision sites x 3  for 10 minutes, including:  Scar tissue mobilization           therapeutic activities to improve functional performance for 15 minutes, including:  Isospheres (2 min)  Towel walks (3 min)   In hand manipulation with large poms (5 sets)         supervised modalities after being cleared for contradictions: Fluido Fluidotherapy: To L hand  for 10 min, " continuous air, 115 deg, air speed 50 to decrease pain, edema & scar tissue, sensory re- education, and increased tissue extensibility prior to therex            Patient Education and Home Exercises     Education provided:   -   - Progress towards goals     Written Home Exercises Provided: Patient instructed to cont prior HEP.  Exercises were reviewed and Karthik was able to demonstrate them prior to the end of the session.  Karthik demonstrated good  understanding of the home exercise program provided. See electronic medical record under Patient Instructions for exercises provided during therapy sessions.       Assessment          Karthik is progressing well towards his goals and there are no updates to goals at this time. Pt prognosis is Good.     Patient will continue to benefit from skilled outpatient occupational therapy to address the deficits listed in the problem list on initial evaluation provide patient/family education and to maximize patient's level of independence in the home and community environment.     Patient's spiritual, cultural and educational needs considered and patient agreeable to plan of care and goals.    Anticipated barriers to occupational therapy:     Goals:  Long Term Goals (LTGs); to be met by discharge.  LTG #1: Pt will report a pain level of 1 out of 10 with ADLs, playing piano, and fishing activities           LTG #2: Pt will demo improved FOTO score by at least 20 points.   LTG #3: Pt will return to prior level of function for ADLs and household management.   LTG #4: Pt will demonstrate improved R digits AROM WFL for grasping and holding things  LTG #5:  and pinch strength to be assessed when appropriate and goals set accordingly     Short Term Goals (STGs); to be met within 4 weeks (5/18/22).  STG #1: Pt will report 3 out of 10 pain level with ADLs and piano.  STG #2:  Pt will demonstrate independence with issued HEP.     Plan     Updates/Grading for next session:     Tabitha Sutherland  OT   10/31/2023

## 2023-11-03 ENCOUNTER — TELEPHONE (OUTPATIENT)
Dept: ORTHOPEDICS | Facility: CLINIC | Age: 68
End: 2023-11-03
Payer: MEDICARE

## 2023-11-06 ENCOUNTER — PATIENT MESSAGE (OUTPATIENT)
Dept: ADMINISTRATIVE | Facility: OTHER | Age: 68
End: 2023-11-06
Payer: MEDICARE

## 2023-11-07 ENCOUNTER — OFFICE VISIT (OUTPATIENT)
Dept: ORTHOPEDICS | Facility: CLINIC | Age: 68
End: 2023-11-07
Payer: MEDICARE

## 2023-11-07 VITALS — WEIGHT: 179.88 LBS | BODY MASS INDEX: 27.26 KG/M2 | HEIGHT: 68 IN

## 2023-11-07 DIAGNOSIS — Z98.890 POST-OPERATIVE STATE: Primary | ICD-10-CM

## 2023-11-07 PROCEDURE — 1159F PR MEDICATION LIST DOCUMENTED IN MEDICAL RECORD: ICD-10-PCS | Mod: CPTII,S$GLB,, | Performed by: PHYSICIAN ASSISTANT

## 2023-11-07 PROCEDURE — 99999 PR PBB SHADOW E&M-EST. PATIENT-LVL III: CPT | Mod: PBBFAC,,, | Performed by: PHYSICIAN ASSISTANT

## 2023-11-07 PROCEDURE — 99024 PR POST-OP FOLLOW-UP VISIT: ICD-10-PCS | Mod: S$GLB,,, | Performed by: PHYSICIAN ASSISTANT

## 2023-11-07 PROCEDURE — 1101F PR PT FALLS ASSESS DOC 0-1 FALLS W/OUT INJ PAST YR: ICD-10-PCS | Mod: CPTII,S$GLB,, | Performed by: PHYSICIAN ASSISTANT

## 2023-11-07 PROCEDURE — 3066F NEPHROPATHY DOC TX: CPT | Mod: CPTII,S$GLB,, | Performed by: PHYSICIAN ASSISTANT

## 2023-11-07 PROCEDURE — 99024 POSTOP FOLLOW-UP VISIT: CPT | Mod: S$GLB,,, | Performed by: PHYSICIAN ASSISTANT

## 2023-11-07 PROCEDURE — 3066F PR DOCUMENTATION OF TREATMENT FOR NEPHROPATHY: ICD-10-PCS | Mod: CPTII,S$GLB,, | Performed by: PHYSICIAN ASSISTANT

## 2023-11-07 PROCEDURE — 1159F MED LIST DOCD IN RCRD: CPT | Mod: CPTII,S$GLB,, | Performed by: PHYSICIAN ASSISTANT

## 2023-11-07 PROCEDURE — 1125F PR PAIN SEVERITY QUANTIFIED, PAIN PRESENT: ICD-10-PCS | Mod: CPTII,S$GLB,, | Performed by: PHYSICIAN ASSISTANT

## 2023-11-07 PROCEDURE — 3288F PR FALLS RISK ASSESSMENT DOCUMENTED: ICD-10-PCS | Mod: CPTII,S$GLB,, | Performed by: PHYSICIAN ASSISTANT

## 2023-11-07 PROCEDURE — 4010F PR ACE/ARB THEARPY RXD/TAKEN: ICD-10-PCS | Mod: CPTII,S$GLB,, | Performed by: PHYSICIAN ASSISTANT

## 2023-11-07 PROCEDURE — 3044F HG A1C LEVEL LT 7.0%: CPT | Mod: CPTII,S$GLB,, | Performed by: PHYSICIAN ASSISTANT

## 2023-11-07 PROCEDURE — 1101F PT FALLS ASSESS-DOCD LE1/YR: CPT | Mod: CPTII,S$GLB,, | Performed by: PHYSICIAN ASSISTANT

## 2023-11-07 PROCEDURE — 4010F ACE/ARB THERAPY RXD/TAKEN: CPT | Mod: CPTII,S$GLB,, | Performed by: PHYSICIAN ASSISTANT

## 2023-11-07 PROCEDURE — 3288F FALL RISK ASSESSMENT DOCD: CPT | Mod: CPTII,S$GLB,, | Performed by: PHYSICIAN ASSISTANT

## 2023-11-07 PROCEDURE — 3044F PR MOST RECENT HEMOGLOBIN A1C LEVEL <7.0%: ICD-10-PCS | Mod: CPTII,S$GLB,, | Performed by: PHYSICIAN ASSISTANT

## 2023-11-07 PROCEDURE — 3061F PR NEG MICROALBUMINURIA RESULT DOCUMENTED/REVIEW: ICD-10-PCS | Mod: CPTII,S$GLB,, | Performed by: PHYSICIAN ASSISTANT

## 2023-11-07 PROCEDURE — 99999 PR PBB SHADOW E&M-EST. PATIENT-LVL III: ICD-10-PCS | Mod: PBBFAC,,, | Performed by: PHYSICIAN ASSISTANT

## 2023-11-07 PROCEDURE — 1125F AMNT PAIN NOTED PAIN PRSNT: CPT | Mod: CPTII,S$GLB,, | Performed by: PHYSICIAN ASSISTANT

## 2023-11-07 PROCEDURE — 3061F NEG MICROALBUMINURIA REV: CPT | Mod: CPTII,S$GLB,, | Performed by: PHYSICIAN ASSISTANT

## 2023-11-07 NOTE — PROGRESS NOTES
"Mr. Bentley is here today for a post-operative visit.  He is 6 weeks status post L Long Trigger Finger Release with slips of the FDS and Left Carpal Tunnel Release by Dr. Downey on 9/25/23. He reports that he is doing well overall. He has been attending regular therapy and making gradual progress. Of note he had stiffness pre operatively. He still has slight stiffness with full flexion. Some tenderness at the carpal tunnel incision.  He denies fever, chills, and sweats since the time of the surgery.     Of Note: Had partial release of A2 pulley as well. Flexor tendon was very bulbous and required incision of middle phalanx.     Physical exam:    Vitals:    11/07/23 1047   Weight: 81.6 kg (179 lb 14.3 oz)   Height: 5' 8" (1.727 m)   PainSc:   6   PainLoc: Hand       Vital signs are stable, patient is afebrile.  Patient is well dressed and well groomed, no acute distress.  Alert and oriented to person, place, and time.   Incision is well healed.  There is no erythema or exudate.  There is no sign of any infection. He is NVI. Slight stiffness with full flexion. Improvement passively able to get a full composite fist passively.    Assessment: status post L Long Trigger Finger Release with slips of the FDS and Left Carpal Tunnel Release by Dr. Downey on 9/25/23    Plan:  Ray was seen today for pain, numbness and swelling.    Diagnoses and all orders for this visit:    Post-operative state    PO instruction reviewed and provided to patient  Continue therapy  Recommend home paraffin   RTC 6 wks if needed           "

## 2023-11-13 ENCOUNTER — PATIENT MESSAGE (OUTPATIENT)
Dept: ADMINISTRATIVE | Facility: HOSPITAL | Age: 68
End: 2023-11-13
Payer: MEDICARE

## 2023-11-14 ENCOUNTER — CLINICAL SUPPORT (OUTPATIENT)
Dept: REHABILITATION | Facility: HOSPITAL | Age: 68
End: 2023-11-14
Payer: MEDICARE

## 2023-11-14 DIAGNOSIS — R60.0 LOCALIZED EDEMA: Primary | ICD-10-CM

## 2023-11-14 DIAGNOSIS — R52 PAIN: ICD-10-CM

## 2023-11-14 DIAGNOSIS — M25.642 DECREASED RANGE OF MOTION OF FINGER OF LEFT HAND: ICD-10-CM

## 2023-11-14 PROCEDURE — 97110 THERAPEUTIC EXERCISES: CPT

## 2023-11-14 PROCEDURE — 97022 WHIRLPOOL THERAPY: CPT

## 2023-11-14 PROCEDURE — 97140 MANUAL THERAPY 1/> REGIONS: CPT

## 2023-11-14 PROCEDURE — 97530 THERAPEUTIC ACTIVITIES: CPT

## 2023-11-14 NOTE — PROGRESS NOTES
"        [facility]  Occupational Therapy Treatment Note     Date: 11/14/2023  Name: Karthik Bentley  Clinic Number: [id number]    Therapy Diagnosis:   Encounter Diagnoses   Name Primary?    Localized edema Yes    Decreased range of motion of finger of left hand     Pain          Physician: Darwin Horn MD    Physician Orders: Eval and Treat  Medical Diagnosis:   G56.02 (ICD-10-CM) - Carpal tunnel syndrome, left upper limb   M65.332 (ICD-10-CM) - Trigger finger, left middle finger      Surgical Procedure and Date: 9/25/2023, TF release, CTS   Evaluation Date: 10/10/2023  Insurance Authorization Period Expiration:   Plan of Care Certification Period: 12/10/2023  Date of Return to MD: ALFREDD  Visit # / Visits authorized: 1 / 1  FOTO: 1/ 3     Precautions:  Standard     Time In: 11:07 AM  Time Out: 12:05 PM   Total Billable Time:  58 minutes      Subjective     Patient reports:  "still swollen..I feel the clicking when I make a full fist"  He was compliant with home exercise program given last session.   Response to previous treatment: increased ROM  Functional change: playing piano       Pain: 1/10  Location: left hands        Objective     Objective Measures updated at progress report unless specified.    Observation/Appearance:  Skin intact, Skin dry, and Edema present      Edema. Measured in centimeters.    10/10/2023 10/10/2023     Right  Left    Proximal Wrist Crease 17.0 cm 17.4 cm      Edema. Measured in centimeters.    10/10/2023 10/10/2023     Right  Left    Long:         P1 6.7 7.2 cm    PIP 7.0 7.5   P2 6.1 cm 6.6    DIP 5.5  5.5              Hand ROM. Measured in degrees.    10/10/2023 10/10/2023     Right  Left            Index: MP    2 cm from DPC              PIP                      DIP                  GIBSON               Long:  MP   /71              PIP   /72              DIP   /44              GIBSON   187           Ring:   MP   2.5 cm from DPC              PIP                  " DIP                  GIBSON               Small:  MP                   PIP                   DIP                  GIBSON               Thumb: MP                    IP           Rad ADD/ABD   WNL       Pal ADD/ABD   WNL          Opposition   WNL       Elbow and Wrist ROM. Measured in degrees.    10/11/2023 10/11/2023     Left Right   Supination/Pronation WNL     Wrist Ext/Flex 60/61     Wrist RD/UD             Strength (Dyanmometer) and Pinch Strength (Pinch Gauge)  Measured in pounds and psi. Average of three trials.    11/14/2023 11/14/2023     Right  Left    Rung II  29 30   Key Pinch       3pt Pinch       2pt Pinch            Intake Outcome Measure for FOTO initial eval; hand Survey     Therapist reviewed FOTO scores for Karthik Bentley on 10/10/2023.   FOTO report - see Media section or FOTO account episode details.     Intake Score: 89.2%       Treatment     Ray received the treatments listed below:          Fluidotherapy: To L hand for 10 min, continuous air, 115 deg, air speed 50 to decrease pain, edema & scar tissue, sensory re- education, and increased tissue extensibility prior to therex        therapeutic exercises to develop ROM for 25 minutes including:  - DIP/PIP jt blocking x 10 reps (2 sets)   - hook grasp x 10 reps (2 sets)  - prayer stretch holding for for 30s x 3 reps   - passive stretching hook grasp,   - digit extension (1 min)   - digit abduction/adduction (1 min)       manual therapy techniques: Soft tissue Mobilization were applied to the: L hand for 10 minutes, including:  Scar tissue mobilization   Retrograde massage   Vibration tool scar   Passive ROM, especially LF   Lypmphtouch     therapeutic activities to improve functional performance for 10  minutes, including:  Isospheres (2 min)  Towel walks (3 min) with 1# weight for added resistance   In hand manipulation with medium poms (5 sets)       Patient Education and Home Exercises     Education provided:   - break from piano after 15-30 min    - passive stretching as demonstrated during session today   - Progress towards goals     Written Home Exercises Provided: Patient instructed to cont prior HEP.  Exercises were reviewed and Karthik was able to demonstrate them prior to the end of the session.  Karthik demonstrated good  understanding of the home exercise program provided. See electronic medical record under Patient Instructions for exercises provided during therapy sessions.       Assessment       Improved FDS/FDP tendon excursion demonstrated by increased DIP flexion with composite fist. Pt c/o on R hand PIP LF stiffness and slight ext lag in DIP jt. Told pt to bring it up to referring physician. Initial  taken today.       Karthik is progressing well towards his goals and there are no updates to goals at this time. Pt prognosis is Good.       Patient will continue to benefit from skilled outpatient occupational therapy to address the deficits listed in the problem list on initial evaluation provide patient/family education and to maximize patient's level of independence in the home and community environment.     Patient's spiritual, cultural and educational needs considered and patient agreeable to plan of care and goals.    Anticipated barriers to occupational therapy:     Goals:    Long term goals: (by d/c)  1)  Patient to be IND with HEP and modalities for pain management  2) Pt will demonstrate full composite fist   3)   Increase  strength 15-20 lbs. to carry laundry, carry groceries, sweep, and increase functional independence of left hand for ADLs/iADLs  4)   Increase pinch 3-4  psis for  Increase in functional independence in ADLs/iADLs such as manipulating fasteners and opening containers  5) Patient to score at 10%  or less on FOTO to demonstrate improved perception of functional leftUE Use.       Short term Goals: ( 4 weeks)   1)  Patient to be IND with HEP and modalities for pain management MET 11/14/23  2)  Pt will demonstrate improved  composite fist by 20+ degrees    3)   Assess  strength when appropriate MET 11/14/23  4)   Assess pinch strength when appropriate MET 11/14/23  5)   Patient to score at 65%  or less on FOTO to demonstrate improved perception of functional left UE Use.     Plan     Updates/Grading for next session:     Tabitha Sutherland OT   11/14/2023

## 2023-11-21 ENCOUNTER — CLINICAL SUPPORT (OUTPATIENT)
Dept: REHABILITATION | Facility: HOSPITAL | Age: 68
End: 2023-11-21
Payer: MEDICARE

## 2023-11-21 DIAGNOSIS — M25.642 DECREASED RANGE OF MOTION OF FINGER OF LEFT HAND: ICD-10-CM

## 2023-11-21 DIAGNOSIS — R52 PAIN: ICD-10-CM

## 2023-11-21 DIAGNOSIS — R60.0 LOCALIZED EDEMA: Primary | ICD-10-CM

## 2023-11-21 PROCEDURE — 97022 WHIRLPOOL THERAPY: CPT

## 2023-11-21 PROCEDURE — 97110 THERAPEUTIC EXERCISES: CPT

## 2023-11-21 PROCEDURE — 97140 MANUAL THERAPY 1/> REGIONS: CPT

## 2023-11-21 PROCEDURE — 97530 THERAPEUTIC ACTIVITIES: CPT

## 2023-11-21 NOTE — PROGRESS NOTES
"      [facility]  Occupational Therapy Treatment Note     Date: 11/21/2023  Name: Karthik Bentley  Clinic Number: [id number] Orders: eval and treat  Medical Diagnosis: Carpal tunnel syndrome of right wrist [G56.01], Trigger finger of right hand, unspecified finger [M65.30], Postoperative state [Z98.890]     Surgical Procedure and Date: 4/1/22, s/p R CTR / Date of Injury/Onset: ~2 months ago  Evaluation Date: 4/18/22  Insurance Authorization Period Expiration: 4/14/23  Plan of Care Expiration: 6/17/22  Progress Note Due: 5/18/22   Date of Return to MD: 6/7/22  Visit # / Visits authorized: 1 / 1  FOTO: initial eval      Precautions:  Standard, diabetic     Time In: 12:00 pm  Time Out: 12:55 pm   Total Appointment Time (timed & untimed codes): 55 minutes       Therapy Diagnosis:   Encounter Diagnoses   Name Primary?    Localized edema Yes    Decreased range of motion of finger of left hand     Pain        Physician: Darwin Horn MD        Subjective     Patient reports:  "I took a break from the piano to rule it out"   He was compliant with home exercise program given last session.   Response to previous treatment: First tx   Functional change: playing piano       Pain: 0/10  Location: left hands        Objective     Objective Measures updated at progress report unless specified.    Observation/Appearance:  Skin intact, Skin dry, and Edema present      Edema. Measured in centimeters.    10/10/2023 10/10/2023     Right  Left    Proximal Wrist Crease 17.0 cm 17.4 cm        Edema. Measured in centimeters.    10/10/2023 10/10/2023     Right  Left    Long:         P1 6.7 7.2 cm    PIP 7.0 7.5   P2 6.1 cm 6.6    DIP 5.5  5.5              Hand ROM. Measured in degrees.    10/10/2023 10/10/2023 11/21/2023      Right  Left  Left             Index: MP    2 cm from DPC 85              PIP         95              DIP                   GIBSON                 Long:  MP   /71 88              PIP   " /72 85              DIP   /44 64              GIBSON   187             Ring:   MP   2.5 cm from DPC 90              PIP     90              DIP     55              GIBSON                 Small:  MP                    PIP                    DIP                   GIBSON                 Thumb: MP                     IP            Rad ADD/ABD   WNL        Pal ADD/ABD   WNL           Opposition   WNL          Elbow and Wrist ROM. Measured in degrees.    10/11/2023 10/11/2023 11/21/2023     Left Right Left    Supination/Pronation WNL      Wrist Ext/Flex 60/61   64/   Wrist RD/UD              Strength (Dyanmometer) and Pinch Strength (Pinch Gauge)  Measured in pounds and psi. Average of three trials.    11/14/2023 11/14/2023 11/21     Right  Left  Left    Rung II;  29   30 35/42/   [place] Pinch  17.5 13.5      3pt Pinch  15/18   10.5   2pt Pinch  13.5          FMC  (9HPT)  R) 20 s  L) 22 s      Intake Outcome Measure for FOTO initial eval; hand Survey     Therapist reviewed FOTO scores for Karthik Bentley on 10/10/2023.   FOTO report - see Media section or FOTO account episode details.     Intake Score: 89.2%       Treatment     Ray received the treatments listed below:            therapeutic exercises to develop ROM for 15 minutes including:  - UPDATED  AND PINCH ASSESSMENT   Assessed FMC with 9HPT   Median nerve glides x 5 reps   DIP/PIP jt blocking x 10 reps   Prayer stretch  Wrist ext/flex x 10 reps 1# (3 sets)        manual therapy techniques: Soft tissue Mobilization were applied to the: L incision sites x 3  for 10 minutes, including:  Scar tissue mobilization       therapeutic activities to improve functional performance for 15 minutes, including:  Isospheres (2 min)  Towel walks (3 min) with resistance   In hand manipulation with large poms (5 sets)         supervised modalities after being cleared for contradictions: Fluido Fluidotherapy: To L hand  for 10 min, continuous air, 115 deg, air speed 50 to decrease pain,  edema & scar tissue, sensory re- education, and increased tissue extensibility prior to therex            Patient Education and Home Exercises     Education provided:   -   - Progress towards goals     Written Home Exercises Provided: Patient instructed to cont prior HEP.  Exercises were reviewed and Karthik was able to demonstrate them prior to the end of the session.  Karthik demonstrated good  understanding of the home exercise program provided. See electronic medical record under Patient Instructions for exercises provided during therapy sessions.       Assessment     Cont to demo improved composite fist. However he cont to have clicking, similar to TF in LF. With MP slightly hyper extended compared to other digits (RMO splint) he had decreased intensity of clicking. Added KT tape for TF.     Karthik is progressing well towards his goals and there are no updates to goals at this time. Pt prognosis is Good.     Patient will continue to benefit from skilled outpatient occupational therapy to address the deficits listed in the problem list on initial evaluation provide patient/family education and to maximize patient's level of independence in the home and community environment.     Patient's spiritual, cultural and educational needs considered and patient agreeable to plan of care and goals.    Anticipated barriers to occupational therapy:     Goals:  Long Term Goals (LTGs); to be met by discharge.  LTG #1: Pt will report a pain level of 1 out of 10 with ADLs, playing piano, and fishing activities           LTG #2: Pt will demo improved FOTO score by at least 20 points.   LTG #3: Pt will return to prior level of function for ADLs and household management.   LTG #4: Pt will demonstrate improved R digits AROM WFL for grasping and holding things  LTG #5:  and pinch strength to be assessed when appropriate and goals set accordingly     Short Term Goals (STGs); to be met within 4 weeks (5/18/22).  STG #1: Pt will report 3 out  of 10 pain level with ADLs and piano.  STG #2:  Pt will demonstrate independence with issued HEP.     Plan     Updates/Grading for next session:     Tabitha Sutherland OT   11/21/2023

## 2023-12-04 NOTE — PROGRESS NOTES
"Primary Care  Office Visit - In Person  12/5/2023      HPI    Patient is a 68 y.o.   Karthik Bentley  has a past medical history of Diabetes mellitus type II, Hypertension, and Rheumatoid arthritis.    Patient presenting to establish care     No acute complaints today       Active Medications:  Current Outpatient Medications   Medication Instructions    blood sugar diagnostic Strp To check BG 1 times daily, to use with insurance preferred meter    blood-glucose meter kit To check BG 1 times daily, to use with insurance preferred meter    clobetasol 0.05% (TEMOVATE) 0.05 % Oint Apply twice daily for 2 weeks    clobetasol 0.05% (TEMOVATE) 0.05 % Oint Apply twice daily to affected area for 2 weeks    colchicine (COLCRYS) 0.6 mg, Oral, Daily    diphenhydrAMINE (SOMINEX) 25 mg, Oral, Nightly PRN    febuxostat (ULORIC) 40 mg, Oral, Daily    folic acid (FOLVITE) 1 mg, Oral, Daily    gabapentin (NEURONTIN) 300 mg, Oral, 3 times daily    ketoconazole (NIZORAL) 2 % cream Topical (Top), 2 times daily, Apply twice daily to soles of feet and between toes for 2 weeks    lancets Misc To check BG 1 times daily, to use with insurance preferred meter    metFORMIN (GLUCOPHAGE) 1000 MG tablet TAKE 1 TABLET(1000 MG) BY MOUTH TWICE DAILY WITH MEALS    methotrexate 2.5 MG Tab TAKE 5 TABLETS BY MOUTH IN THE MORNING AND 5 TABLETS IN THE EVENING EVERY 7 DAYS    rosuvastatin (CRESTOR) 10 MG tablet TAKE 1 TABLET ONE TIME DAILY.    traMADoL (ULTRAM) 50 mg, Oral, Every 6 hours PRN    TRULICITY 1.5 mg, Subcutaneous, Every 7 days    valACYclovir (VALTREX) 500 mg, Oral    valsartan (DIOVAN) 160 mg, Oral       Vitals:    12/05/23 0816   BP: 122/76   BP Location: Right arm   Pulse: 73   Temp: 99.1 °F (37.3 °C)   SpO2: 96%   Weight: 83.2 kg (183 lb 6.8 oz)   Height: 5' 8" (1.727 m)       Physical Exam  Vitals reviewed.   Constitutional:       General: He is not in acute distress.  Cardiovascular:      Rate and Rhythm: Normal rate and regular rhythm.      " Pulses: Normal pulses.      Heart sounds: Normal heart sounds.   Pulmonary:      Effort: Pulmonary effort is normal.      Breath sounds: Normal breath sounds.   Abdominal:      General: Abdomen is flat. Bowel sounds are normal.      Palpations: Abdomen is soft.   Musculoskeletal:      Right lower leg: No edema.      Left lower leg: No edema.   Neurological:      General: No focal deficit present.      Mental Status: He is oriented to person, place, and time.          Assessment and Plan     1. Type 2 diabetes mellitus with hyperglycemia, without long-term current use of insulin  Comments:  Metformnin 1,000 mg BID and Trulicity 1.5 mg weekly   Would like to taper metformin  Orders:  -     HEMOGLOBIN A1C; Future; Expected date: 12/05/2023  -     Diabetic Eye Screening Photo; Future    2. Essential hypertension  Comments:  Well controlled on valsartan 160 mg daily    3. Mixed hyperlipidemia    4. Gout, unspecified cause, unspecified chronicity, unspecified site  Comments:  Followed by Dr. Miranda  Current regimern Colchicine and Febuxostat    5. Rheumatoid arthritis, involving unspecified site, unspecified whether rheumatoid factor present  Comments:  Continue MTX and folic acid   Followed by Rheumatology    Other orders  -     Cancel: PSA, SCREENING; Future; Expected date: 12/05/2023                   Upcoming Scheduled Appointments and Follow Up:    Future Appointments   Date Time Provider Department Center   [facility]   12/5/2023 12:30 PM Tabitha Sutherland, OT [facility] OP [address]   12/12/2023 10:45 AM Zahra Downey MD [facility] HAND [facility]   3/5/2024  9:00 AM [facility]   3/5/2024 11:00 AM Giovanna Cummings MD [facility] RHEUM [address]   4/1/2024 11:00 AM LAB, [facility]       Follow Up [facility] (with who? when?): Follow up in about 3 months (around 3/5/2024).      Extended Emergency Contact  Information  Primary Emergency Contact: Nati Burgos   [place]  Mobile Phone: [phone]  Relation: Sister      Silke Amos MD   Internal Medicine  12/5/2023 - 8:29 AM    I spent a total of 23 minutes on the day of the visit.This includes face to face time and non-face to face time preparing to see the patient (eg, review of tests), obtaining and/or reviewing separately obtained history, documenting clinical information in the electronic or other health record, independently interpreting results and communicating results to the patient/family/caregiver, or care coordinator.    While patients have the right to access their medical record, it is essential to recognize that progress notes primarily serve as a means of communication among healthcare professionals.

## 2023-12-05 ENCOUNTER — CLINICAL SUPPORT (OUTPATIENT)
Dept: REHABILITATION | Facility: HOSPITAL | Age: 68
End: 2023-12-05
Payer: MEDICARE

## 2023-12-05 ENCOUNTER — LAB VISIT (OUTPATIENT)
Dept: LAB | Facility: HOSPITAL | Age: 68
End: 2023-12-05
Attending: STUDENT IN AN ORGANIZED HEALTH CARE EDUCATION/TRAINING PROGRAM
Payer: MEDICARE

## 2023-12-05 ENCOUNTER — OFFICE VISIT (OUTPATIENT)
Dept: PRIMARY CARE CLINIC | Facility: CLINIC | Age: 68
End: 2023-12-05
Payer: MEDICARE

## 2023-12-05 VITALS
TEMPERATURE: 99 F | OXYGEN SATURATION: 96 % | HEIGHT: 68 IN | DIASTOLIC BLOOD PRESSURE: 76 MMHG | WEIGHT: 183.44 LBS | SYSTOLIC BLOOD PRESSURE: 122 MMHG | HEART RATE: 73 BPM | BODY MASS INDEX: 27.8 KG/M2

## 2023-12-05 DIAGNOSIS — R52 PAIN: ICD-10-CM

## 2023-12-05 DIAGNOSIS — I10 ESSENTIAL HYPERTENSION: ICD-10-CM

## 2023-12-05 DIAGNOSIS — M10.9 GOUT, UNSPECIFIED CAUSE, UNSPECIFIED CHRONICITY, UNSPECIFIED SITE: ICD-10-CM

## 2023-12-05 DIAGNOSIS — M06.9 RHEUMATOID ARTHRITIS, INVOLVING UNSPECIFIED SITE, UNSPECIFIED WHETHER RHEUMATOID FACTOR PRESENT: ICD-10-CM

## 2023-12-05 DIAGNOSIS — E78.2 MIXED HYPERLIPIDEMIA: ICD-10-CM

## 2023-12-05 DIAGNOSIS — R60.0 LOCALIZED EDEMA: Primary | ICD-10-CM

## 2023-12-05 DIAGNOSIS — E11.65 TYPE 2 DIABETES MELLITUS WITH HYPERGLYCEMIA, WITHOUT LONG-TERM CURRENT USE OF INSULIN: ICD-10-CM

## 2023-12-05 DIAGNOSIS — E11.65 TYPE 2 DIABETES MELLITUS WITH HYPERGLYCEMIA, WITHOUT LONG-TERM CURRENT USE OF INSULIN: Primary | ICD-10-CM

## 2023-12-05 DIAGNOSIS — M25.642 DECREASED RANGE OF MOTION OF FINGER OF LEFT HAND: ICD-10-CM

## 2023-12-05 LAB
ESTIMATED AVG GLUCOSE: 134 MG/DL (ref 68–131)
HBA1C MFR BLD: 6.3 % (ref 4–5.6)

## 2023-12-05 PROCEDURE — 1125F AMNT PAIN NOTED PAIN PRSNT: CPT | Mod: CPTII,S$GLB,, | Performed by: STUDENT IN AN ORGANIZED HEALTH CARE EDUCATION/TRAINING PROGRAM

## 2023-12-05 PROCEDURE — 3078F PR MOST RECENT DIASTOLIC BLOOD PRESSURE < 80 MM HG: ICD-10-PCS | Mod: CPTII,S$GLB,, | Performed by: STUDENT IN AN ORGANIZED HEALTH CARE EDUCATION/TRAINING PROGRAM

## 2023-12-05 PROCEDURE — 3061F PR NEG MICROALBUMINURIA RESULT DOCUMENTED/REVIEW: ICD-10-PCS | Mod: CPTII,S$GLB,, | Performed by: STUDENT IN AN ORGANIZED HEALTH CARE EDUCATION/TRAINING PROGRAM

## 2023-12-05 PROCEDURE — 3044F HG A1C LEVEL LT 7.0%: CPT | Mod: CPTII,S$GLB,, | Performed by: STUDENT IN AN ORGANIZED HEALTH CARE EDUCATION/TRAINING PROGRAM

## 2023-12-05 PROCEDURE — 1159F MED LIST DOCD IN RCRD: CPT | Mod: CPTII,S$GLB,, | Performed by: STUDENT IN AN ORGANIZED HEALTH CARE EDUCATION/TRAINING PROGRAM

## 2023-12-05 PROCEDURE — 3044F PR MOST RECENT HEMOGLOBIN A1C LEVEL <7.0%: ICD-10-PCS | Mod: CPTII,S$GLB,, | Performed by: STUDENT IN AN ORGANIZED HEALTH CARE EDUCATION/TRAINING PROGRAM

## 2023-12-05 PROCEDURE — 1101F PR PT FALLS ASSESS DOC 0-1 FALLS W/OUT INJ PAST YR: ICD-10-PCS | Mod: CPTII,S$GLB,, | Performed by: STUDENT IN AN ORGANIZED HEALTH CARE EDUCATION/TRAINING PROGRAM

## 2023-12-05 PROCEDURE — 4010F PR ACE/ARB THEARPY RXD/TAKEN: ICD-10-PCS | Mod: CPTII,S$GLB,, | Performed by: STUDENT IN AN ORGANIZED HEALTH CARE EDUCATION/TRAINING PROGRAM

## 2023-12-05 PROCEDURE — 1125F PR PAIN SEVERITY QUANTIFIED, PAIN PRESENT: ICD-10-PCS | Mod: CPTII,S$GLB,, | Performed by: STUDENT IN AN ORGANIZED HEALTH CARE EDUCATION/TRAINING PROGRAM

## 2023-12-05 PROCEDURE — 3008F BODY MASS INDEX DOCD: CPT | Mod: CPTII,S$GLB,, | Performed by: STUDENT IN AN ORGANIZED HEALTH CARE EDUCATION/TRAINING PROGRAM

## 2023-12-05 PROCEDURE — 83036 HEMOGLOBIN GLYCOSYLATED A1C: CPT | Performed by: STUDENT IN AN ORGANIZED HEALTH CARE EDUCATION/TRAINING PROGRAM

## 2023-12-05 PROCEDURE — 97112 NEUROMUSCULAR REEDUCATION: CPT

## 2023-12-05 PROCEDURE — 99397 PER PM REEVAL EST PAT 65+ YR: CPT | Mod: S$GLB,,, | Performed by: STUDENT IN AN ORGANIZED HEALTH CARE EDUCATION/TRAINING PROGRAM

## 2023-12-05 PROCEDURE — 97140 MANUAL THERAPY 1/> REGIONS: CPT

## 2023-12-05 PROCEDURE — 1101F PT FALLS ASSESS-DOCD LE1/YR: CPT | Mod: CPTII,S$GLB,, | Performed by: STUDENT IN AN ORGANIZED HEALTH CARE EDUCATION/TRAINING PROGRAM

## 2023-12-05 PROCEDURE — 3074F SYST BP LT 130 MM HG: CPT | Mod: CPTII,S$GLB,, | Performed by: STUDENT IN AN ORGANIZED HEALTH CARE EDUCATION/TRAINING PROGRAM

## 2023-12-05 PROCEDURE — 3078F DIAST BP <80 MM HG: CPT | Mod: CPTII,S$GLB,, | Performed by: STUDENT IN AN ORGANIZED HEALTH CARE EDUCATION/TRAINING PROGRAM

## 2023-12-05 PROCEDURE — 97022 WHIRLPOOL THERAPY: CPT | Mod: 59

## 2023-12-05 PROCEDURE — 36415 COLL VENOUS BLD VENIPUNCTURE: CPT | Mod: PN | Performed by: STUDENT IN AN ORGANIZED HEALTH CARE EDUCATION/TRAINING PROGRAM

## 2023-12-05 PROCEDURE — 4010F ACE/ARB THERAPY RXD/TAKEN: CPT | Mod: CPTII,S$GLB,, | Performed by: STUDENT IN AN ORGANIZED HEALTH CARE EDUCATION/TRAINING PROGRAM

## 2023-12-05 PROCEDURE — 3008F PR BODY MASS INDEX (BMI) DOCUMENTED: ICD-10-PCS | Mod: CPTII,S$GLB,, | Performed by: STUDENT IN AN ORGANIZED HEALTH CARE EDUCATION/TRAINING PROGRAM

## 2023-12-05 PROCEDURE — 99999 PR PBB SHADOW E&M-EST. PATIENT-LVL IV: ICD-10-PCS | Mod: PBBFAC,,, | Performed by: STUDENT IN AN ORGANIZED HEALTH CARE EDUCATION/TRAINING PROGRAM

## 2023-12-05 PROCEDURE — 3288F PR FALLS RISK ASSESSMENT DOCUMENTED: ICD-10-PCS | Mod: CPTII,S$GLB,, | Performed by: STUDENT IN AN ORGANIZED HEALTH CARE EDUCATION/TRAINING PROGRAM

## 2023-12-05 PROCEDURE — 3288F FALL RISK ASSESSMENT DOCD: CPT | Mod: CPTII,S$GLB,, | Performed by: STUDENT IN AN ORGANIZED HEALTH CARE EDUCATION/TRAINING PROGRAM

## 2023-12-05 PROCEDURE — 3061F NEG MICROALBUMINURIA REV: CPT | Mod: CPTII,S$GLB,, | Performed by: STUDENT IN AN ORGANIZED HEALTH CARE EDUCATION/TRAINING PROGRAM

## 2023-12-05 PROCEDURE — 3066F NEPHROPATHY DOC TX: CPT | Mod: CPTII,S$GLB,, | Performed by: STUDENT IN AN ORGANIZED HEALTH CARE EDUCATION/TRAINING PROGRAM

## 2023-12-05 PROCEDURE — 3074F PR MOST RECENT SYSTOLIC BLOOD PRESSURE < 130 MM HG: ICD-10-PCS | Mod: CPTII,S$GLB,, | Performed by: STUDENT IN AN ORGANIZED HEALTH CARE EDUCATION/TRAINING PROGRAM

## 2023-12-05 PROCEDURE — 3066F PR DOCUMENTATION OF TREATMENT FOR NEPHROPATHY: ICD-10-PCS | Mod: CPTII,S$GLB,, | Performed by: STUDENT IN AN ORGANIZED HEALTH CARE EDUCATION/TRAINING PROGRAM

## 2023-12-05 PROCEDURE — 1159F PR MEDICATION LIST DOCUMENTED IN MEDICAL RECORD: ICD-10-PCS | Mod: CPTII,S$GLB,, | Performed by: STUDENT IN AN ORGANIZED HEALTH CARE EDUCATION/TRAINING PROGRAM

## 2023-12-05 PROCEDURE — 97110 THERAPEUTIC EXERCISES: CPT

## 2023-12-05 PROCEDURE — 99999 PR PBB SHADOW E&M-EST. PATIENT-LVL IV: CPT | Mod: PBBFAC,,, | Performed by: STUDENT IN AN ORGANIZED HEALTH CARE EDUCATION/TRAINING PROGRAM

## 2023-12-05 PROCEDURE — 99397 PR PREVENTIVE VISIT,EST,65 & OVER: ICD-10-PCS | Mod: S$GLB,,, | Performed by: STUDENT IN AN ORGANIZED HEALTH CARE EDUCATION/TRAINING PROGRAM

## 2023-12-05 NOTE — PROGRESS NOTES
" [facility]  Occupational Therapy Treatment Note     Date: 12/5/2023  Name: Karthik Bentley  [facility] Number: [id number] Orders: eval and treat  Medical Diagnosis: Carpal tunnel syndrome of right wrist [G56.01], Trigger finger of right hand, unspecified finger [M65.30], Postoperative state [Z98.890]     Encounter Diagnoses   Name Primary?    Localized edema Yes    Decreased range of motion of finger of left hand     Pain        Surgical Procedure and Date: 4/1/22, s/p R CTR / Date of Injury/Onset: ~2 months ago  Evaluation Date: 4/18/22  Insurance Authorization Period Expiration: 4/14/23  Plan of Care Expiration: 6/17/22  Progress Note Due: 5/18/22   Date of Return to MD: 6/7/22  Visit # / Visits authorized: 1 / 1  FOTO: initial eval      Precautions:  Standard, diabetic     Time In: 12:00 pm  Time Out: 12:55 pm   Total Appointment Time (timed & untimed codes): 55 minutes       Therapy Diagnosis:   Encounter Diagnoses   Name Primary?    Localized edema Yes    Decreased range of motion of finger of left hand     Pain          Physician: Darwin Horn MD        Subjective     Patient reports:  "still clicking in my palm"   He was compliant with home exercise program given last session.   Response to previous treatment:  cont to improve ROM    Functional change: act as rosita      Pain: 0/10  Location: left hands        Objective     Objective Measures updated at progress report unless specified.    Observation/Appearance:  Skin intact, Skin dry, and Edema present      Edema. Measured in centimeters.    10/10/2023 10/10/2023     Right  Left    Proximal Wrist Crease 17.0 cm 17.4 cm        Edema. Measured in centimeters.    10/10/2023 10/10/2023     Right  Left    Long:         P1 6.7 7.2 cm    PIP 7.0 7.5   P2 6.1 cm 6.6    DIP 5.5  5.5              Hand ROM. Measured in degrees.    10/10/2023 10/10/2023 11/21/2023      Right  Left  Left             Index: MP    2 cm from DPC 85    "           PIP         95              DIP                   GIBSON                 Long:  MP   /71 88              PIP   /72 85              DIP   /44 64              GIBSON   187             Ring:   MP   2.5 cm from DPC 90              PIP     90              DIP     55              GIBSON                 Small:  MP                    PIP                    DIP                   GIBSON                 Thumb: MP                     IP            Rad ADD/ABD   WNL        Pal ADD/ABD   WNL           Opposition   WNL          Elbow and Wrist ROM. Measured in degrees.    10/11/2023 10/11/2023 11/21/2023     Left Right Left    Supination/Pronation WNL      Wrist Ext/Flex 60/61   64/   Wrist RD/UD                Strength (Dyanmometer) and Pinch Strength (Pinch Gauge)  Measured in pounds and psi. Average of three trials.    11/14/2023 11/14/2023 11/21     Right  Left  Left    Rung II;  29   30 38.5   Ohara Pinch  17.5 13.5      3pt Pinch  15/18   10.5   2pt Pinch  13.5          FMC  (9HPT)  R) 20 s  L) 22 s      Intake Outcome Measure for FOTO initial eval; hand Survey     Therapist reviewed FOTO scores for Karthik Bentley on 10/10/2023.   FOTO report - see Media section or FOTO account episode details.     Intake Score: 89.2%       Treatment     Ray received the treatments listed below:          therapeutic exercises to develop ROM for 15 minutes including:  - jt blocking DiP/PIP x 10 reps (2 sets)   - digit abduction/adduction (1 min)   - digit ext x 10 reps ( 3 sets)   - prayer stretch holding 30s x 3 reps       manual therapy techniques: Soft tissue Mobilization were applied to the: L incision sites x 3 for 10 minutes, including:  Scar tissue mobilization (x2 sites)  Vibration tool         Neuro re-ed: 25 min   - KT tape applied, web b/t digits, MP jt for trigger finger, web space         therapeutic activities to improve functional performance for 15 minutes, including: NT   Isospheres (2 min)  Towel walks (3 min) with resistance  "  In hand manipulation with large poms (5 sets)           supervised modalities after being cleared for contradictions: Fluido Fluidotherapy: To L hand  for 10 min, continuous air, 115 deg, air speed 50 to decrease pain, edema & scar tissue, sensory re- education, and increased tissue extensibility prior to therex            Patient Education and Home Exercises     Education provided:   -   - Progress towards goals     Written Home Exercises Provided: Patient instructed to cont prior HEP.  Exercises were reviewed and Karthik was able to demonstrate them prior to the end of the session.  Karthik demonstrated good  understanding of the home exercise program provided. See electronic medical record under Patient Instructions for exercises provided during therapy sessions.       Assessment     Applied tape to L hand, see above. Initially had reduced clicking with tape applied however following increased repetition of "fist" clicking returned. Overall improvement with fascial assistance with tape.     Karthik is progressing well towards his goals and there are no updates to goals at this time. Pt prognosis is Good.     Patient will continue to benefit from skilled outpatient occupational therapy to address the deficits listed in the problem list on initial evaluation provide patient/family education and to maximize patient's level of independence in the home and community environment.     Patient's spiritual, cultural and educational needs considered and patient agreeable to plan of care and goals.    Anticipated barriers to occupational therapy:     Goals:  Long Term Goals (LTGs); to be met by discharge.  LTG #1: Pt will report a pain level of 1 out of 10 with ADLs, playing piano, and fishing activities           LTG #2: Pt will demo improved FOTO score by at least 20 points.   LTG #3: Pt will return to prior level of function for ADLs and household management.   LTG #4: Pt will demonstrate improved R digits AROM WFL for grasping " and holding things  LTG #5:  and pinch strength to be assessed when appropriate and goals set accordingly     Short Term Goals (STGs); to be met within 4 weeks (5/18/22).  STG #1: Pt will report 3 out of 10 pain level with ADLs and piano.  STG #2:  Pt will demonstrate independence with issued HEP.     Plan     Updates/Grading for next session:     Tabitha Sutherland OT   12/5/2023

## 2023-12-06 ENCOUNTER — PATIENT MESSAGE (OUTPATIENT)
Dept: PRIMARY CARE CLINIC | Facility: CLINIC | Age: 68
End: 2023-12-06
Payer: MEDICARE

## 2023-12-07 ENCOUNTER — OFFICE VISIT (OUTPATIENT)
Dept: PRIMARY CARE CLINIC | Facility: CLINIC | Age: 68
End: 2023-12-07
Payer: MEDICARE

## 2023-12-07 DIAGNOSIS — E11.9 TYPE 2 DIABETES MELLITUS WITHOUT COMPLICATION, WITHOUT LONG-TERM CURRENT USE OF INSULIN: ICD-10-CM

## 2023-12-07 PROCEDURE — 4010F PR ACE/ARB THEARPY RXD/TAKEN: ICD-10-PCS | Mod: CPTII,95,, | Performed by: STUDENT IN AN ORGANIZED HEALTH CARE EDUCATION/TRAINING PROGRAM

## 2023-12-07 PROCEDURE — 3061F PR NEG MICROALBUMINURIA RESULT DOCUMENTED/REVIEW: ICD-10-PCS | Mod: CPTII,95,, | Performed by: STUDENT IN AN ORGANIZED HEALTH CARE EDUCATION/TRAINING PROGRAM

## 2023-12-07 PROCEDURE — 3066F PR DOCUMENTATION OF TREATMENT FOR NEPHROPATHY: ICD-10-PCS | Mod: CPTII,95,, | Performed by: STUDENT IN AN ORGANIZED HEALTH CARE EDUCATION/TRAINING PROGRAM

## 2023-12-07 PROCEDURE — 4010F ACE/ARB THERAPY RXD/TAKEN: CPT | Mod: CPTII,95,, | Performed by: STUDENT IN AN ORGANIZED HEALTH CARE EDUCATION/TRAINING PROGRAM

## 2023-12-07 PROCEDURE — 99214 OFFICE O/P EST MOD 30 MIN: CPT | Mod: 95,,, | Performed by: STUDENT IN AN ORGANIZED HEALTH CARE EDUCATION/TRAINING PROGRAM

## 2023-12-07 PROCEDURE — 3044F HG A1C LEVEL LT 7.0%: CPT | Mod: CPTII,95,, | Performed by: STUDENT IN AN ORGANIZED HEALTH CARE EDUCATION/TRAINING PROGRAM

## 2023-12-07 PROCEDURE — 3061F NEG MICROALBUMINURIA REV: CPT | Mod: CPTII,95,, | Performed by: STUDENT IN AN ORGANIZED HEALTH CARE EDUCATION/TRAINING PROGRAM

## 2023-12-07 PROCEDURE — 3044F PR MOST RECENT HEMOGLOBIN A1C LEVEL <7.0%: ICD-10-PCS | Mod: CPTII,95,, | Performed by: STUDENT IN AN ORGANIZED HEALTH CARE EDUCATION/TRAINING PROGRAM

## 2023-12-07 PROCEDURE — 3066F NEPHROPATHY DOC TX: CPT | Mod: CPTII,95,, | Performed by: STUDENT IN AN ORGANIZED HEALTH CARE EDUCATION/TRAINING PROGRAM

## 2023-12-07 PROCEDURE — 99214 PR OFFICE/OUTPT VISIT, EST, LEVL IV, 30-39 MIN: ICD-10-PCS | Mod: 95,,, | Performed by: STUDENT IN AN ORGANIZED HEALTH CARE EDUCATION/TRAINING PROGRAM

## 2023-12-07 RX ORDER — DULAGLUTIDE 3 MG/.5ML
3 INJECTION, SOLUTION SUBCUTANEOUS
Qty: 12 PEN | Refills: 3 | Status: SHIPPED | OUTPATIENT
Start: 2023-12-07 | End: 2024-01-04 | Stop reason: SDUPTHER

## 2023-12-07 NOTE — PROGRESS NOTES
Telehealth Visit    The patient location is: [place]   The chief complaint leading to consultation is: Lab follow up     Visit type: audiovisual    Face to Face time with patient: 20 minutes  25 minutes of total time spent on the encounter, which includes face to face time and non-face to face time preparing to see the patient (eg, review of tests), Obtaining and/or reviewing separately obtained history, Documenting clinical information in the electronic or other health record, Independently interpreting results (not separately reported) and communicating results to the patient/family/caregiver, or Care coordination (not separately reported).       HPI    Patient is a 68 y.o.   Karthik Bentley  has a past medical history of Diabetes mellitus type II, Hypertension, and Rheumatoid arthritis.    Patient presenting for lab follow up     He would like to taper metformin due to pill burden  He has been consistent with exercising and that has contributed to weight loss and lowering hemoglobin A1c      Active Medications:  Current Outpatient Medications   Medication Instructions    blood sugar diagnostic Strp To check BG 1 times daily, to use with insurance preferred meter    blood-glucose meter kit To check BG 1 times daily, to use with insurance preferred meter    clobetasol 0.05% (TEMOVATE) 0.05 % Oint Apply twice daily for 2 weeks    clobetasol 0.05% (TEMOVATE) 0.05 % Oint Apply twice daily to affected area for 2 weeks    colchicine (COLCRYS) 0.6 mg, Oral, Daily    diphenhydrAMINE (SOMINEX) 25 mg, Oral, Nightly PRN    febuxostat (ULORIC) 40 mg, Oral, Daily    folic acid (FOLVITE) 1 mg, Oral, Daily    gabapentin (NEURONTIN) 300 mg, Oral, 3 times daily    ketoconazole (NIZORAL) 2 % cream Topical (Top), 2 times daily, Apply twice daily to soles of feet and between toes for 2 weeks    lancets Misc To check BG 1 times daily, to use with insurance preferred meter    methotrexate 2.5 MG Tab TAKE 5 TABLETS BY MOUTH IN THE  MORNING AND 5 TABLETS IN THE EVENING EVERY 7 DAYS    rosuvastatin (CRESTOR) 10 MG tablet TAKE 1 TABLET ONE TIME DAILY.    traMADoL (ULTRAM) 50 mg, Oral, Every 6 hours PRN    TRULICITY 3 mg, Subcutaneous, Every 7 days    valACYclovir (VALTREX) 500 mg, Oral    valsartan (DIOVAN) 160 mg, Oral       Physical Exam    General: Does not appear to be in acute distress    Assessment and Plan     1. Type 2 diabetes mellitus without complication, without long-term current use of insulin  Comments:  HbA1C 6.3   Increase Trulicity from 1.5 mg to 3 mg weekly and titrate appropriately if needed   Discontine metformin  Orders:  -     dulaglutide (TRULICITY) 3 mg/0.5 mL pen injector; Inject 3 mg into the skin every 7 days.  Dispense: 12 pen ; Refill: 3                 Upcoming Scheduled Appointments and Follow Up:    Future Appointments   Date Time Provider Department Center   12/11/2023 10:30 AM Tabitha Sutherland, OT [facility] OP [facility] [place] 2 fl   12/12/2023 10:45 AM Zahra Downey MD [facility] HAND [facility]   3/5/2024  9:00 AM Silke Amos MD [facility]   3/5/2024 11:00 AM Giovanna Cummings MD [facility]   4/1/2024 11:00 AM [facility]       Follow Up [facility] (with who? when?): No follow-ups on file.        Extended Emergency Contact Information  Primary Emergency Contact: Diana Burgoslee   [place]  Mobile Phone: [phone]  Relation: Sister      Silke Amos MD   Internal Medicine  12/8/2023 - 8:25 AM        Each patient to whom he or she provides medical services by telemedicine is:  (1) informed of the relationship between the physician and patient and the respective role of any other health care provider with respect to management of the patient; and (2) notified that he or she may decline to receive medical services by telemedicine and may withdraw from such care at any time.    While patients have the right to access their medical  record, it is essential to recognize that progress notes primarily serve as a means of communication among healthcare professionals.

## 2023-12-11 ENCOUNTER — CLINICAL SUPPORT (OUTPATIENT)
Dept: REHABILITATION | Facility: HOSPITAL | Age: 68
End: 2023-12-11
Payer: MEDICARE

## 2023-12-11 DIAGNOSIS — R52 PAIN: ICD-10-CM

## 2023-12-11 DIAGNOSIS — R60.0 LOCALIZED EDEMA: Primary | ICD-10-CM

## 2023-12-11 DIAGNOSIS — M25.642 DECREASED RANGE OF MOTION OF FINGER OF LEFT HAND: ICD-10-CM

## 2023-12-11 PROCEDURE — 97140 MANUAL THERAPY 1/> REGIONS: CPT

## 2023-12-11 PROCEDURE — 97112 NEUROMUSCULAR REEDUCATION: CPT

## 2023-12-11 PROCEDURE — 97110 THERAPEUTIC EXERCISES: CPT

## 2023-12-11 NOTE — PROGRESS NOTES
"  [facility]  Occupational Therapy Treatment Note     Date: 12/11/2023  Name: Karthik Bentley  Clinic Number: [id number] Orders: eval and treat  Medical Diagnosis: Carpal tunnel syndrome of right wrist [G56.01], Trigger finger of right hand, unspecified finger [M65.30], Postoperative state [Z98.890]     Encounter Diagnoses   Name Primary?    Localized edema Yes    Decreased range of motion of finger of left hand     Pain          Surgical Procedure and Date: 4/1/22, s/p R CTR / Date of Injury/Onset: ~2 months ago  Evaluation Date: 4/18/22  Insurance Authorization Period Expiration: 4/14/23  Plan of Care Expiration: 6/17/22  Progress Note Due: 5/18/22   Date of Return to MD: 6/7/22  Visit # / Visits authorized: 1 / 1  FOTO: initial eval      Precautions:  Standard, diabetic     Time In: 10:40 am  Time Out: 11:35 am   Total Appointment Time (timed & untimed codes): 55 minutes       Therapy Diagnosis:   Encounter Diagnoses   Name Primary?    Localized edema Yes    Decreased range of motion of finger of left hand     Pain            Physician: Darwin Horn MD        Subjective       Patient reports:  "some of the tape is still on."   He was compliant with home exercise program given last session.   Response to previous treatment: cont clicking, cont to have morning stiffness  Functional change: light activity I, gradually increasing heavier work      Pain: 0/10  Location: left hands        Objective     Objective Measures updated at progress report unless specified.    Observation/Appearance:  Skin intact, Skin dry, and Edema present      Edema. Measured in centimeters.    10/10/2023 10/10/2023     Right  Left    Proximal Wrist Crease 17.0 cm 17.4 cm        Edema. Measured in centimeters.    10/10/2023 10/10/2023     Right  Left    Long:         P1 6.7 7.2 cm    PIP 7.0 7.5   P2 6.1 cm 6.6    DIP 5.5  5.5                Hand ROM. Measured in degrees.    10/10/2023 10/10/2023 " 11/21/2023 12/11/2023     Right  Left  Left  Left              Index: MP    2 cm from DPC 85 82              PIP         95 92              DIP                    GIBSON                   Long:  MP   /71 88 93              PIP   /72 85 85              DIP   /44 64 65              GIBSON   187               Ring:   MP   2.5 cm from DPC 90               PIP     90               DIP     55               GIBSON                   Small:  MP                     PIP                     DIP                    GIBSON                   Thumb: MP                      IP             Rad ADD/ABD   WNL         Pal ADD/ABD   WNL            Opposition   WNL           Elbow and Wrist ROM. Measured in degrees.    10/11/2023 10/11/2023 11/21/2023     Left Right Left    Supination/Pronation WNL      Wrist Ext/Flex 60/61   64/   Wrist RD/UD              Strength (Dyanmometer) and Pinch Strength (Pinch Gauge)  Measured in pounds and psi. Average of three trials.    11/14/2023 11/14/2023 11/21 12/11/2023     Right  Left  Left  Left    Rung II;  29   30 38.5 47   Ohara Pinch  17.5 13.5    16   3pt Pinch  15/18   10.5 14   2pt Pinch  13.5           FMC  (9HPT)  R) 20 s  L) 22 s      Intake Outcome Measure for FOTO initial eval; hand Survey     Therapist reviewed FOTO scores for Karthik Bentley on 10/10/2023.   FOTO report - see Media section or FOTO account episode details.     Intake Score: 89.2%       Treatment     Ray received the treatments listed below:          therapeutic exercises to develop ROM for 15 minutes including:   - updated objective measurem\e  - jt blocking DiP/PIP x 10 reps (2 sets) with blue foam between digits   - digit abduction/adduction (1 min)   - digit ext x 10 reps ( 3 sets)   - prayer stretch holding 30s x 3 reps NT        manual therapy techniques: Soft tissue Mobilization were applied to the: L incision sites x 3 for 25 minutes, including:  Scar tissue mobilization (x 2 sites)  Vibration tool   Palmar tissue       Neuro  re-ed: 25 min   - KT tape applied, between digits (juncturae/ dorsal persaud), MP jt for trigger finger, web space to reduce radial/palmar tissue tightness   - KT tape web over carpal tunnel site to aid in improving gliding of soft tissue structures       therapeutic activities to improve functional performance for 15 minutes, including: NT   Isospheres (2 min)  Towel walks (3 min) with resistance   In hand manipulation with large poms (5 sets)       supervised modalities after being cleared for contradictions: Fluido Fluidotherapy: To L hand  for 10 min, continuous air, 115 deg, air speed 50 to decrease pain, edema & scar tissue, sensory re- education, and increased tissue extensibility prior to therex      Patient Education and Home Exercises     Education provided:   - HEP; stretching and hook grasp, avoiding tight fist   - Progress towards goals     Written Home Exercises Provided: Patient instructed to cont prior HEP.  Exercises were reviewed and Karthik was able to demonstrate them prior to the end of the session.  Karthik demonstrated good  understanding of the home exercise program provided. See electronic medical record under Patient Instructions for exercises provided during therapy sessions.       Assessment     Upon observation pt has limited L shoulder ROM, applying increased tension within L elbow.    Pt cont to c/o clicking in palm in line of LF. Scar tissue has improved since start of care - however painful clicking with full composite fist.  Cont to have minimal edema surrounding carpal tunnel scar site.  Pt has been able to complete increased composite fist however palpable clicking in his palm which has limited his progression with strengthening.       Ray is progressing well towards his goals and there are no updates to goals at this time. Pt prognosis is Good.     Patient will continue to benefit from skilled outpatient occupational therapy to address the deficits listed in the problem list on initial  evaluation provide patient/family education and to maximize patient's level of independence in the home and community environment.     Patient's spiritual, cultural and educational needs considered and patient agreeable to plan of care and goals.    Anticipated barriers to occupational therapy:     Goals:  Long Term Goals (LTGs); to be met by discharge.  LTG #1: Pt will report a pain level of 1 out of 10 with ADLs, playing piano, and fishing activities           LTG #2: Pt will demo improved FOTO score by at least 20 points.   LTG #3: Pt will return to prior level of function for ADLs and household management.   LTG #4: Pt will demonstrate improved R digits AROM WFL for grasping and holding things  LTG #5:  and pinch strength to be assessed when appropriate and goals set accordingly     Short Term Goals (STGs); to be met within 4 weeks (5/18/22).  STG #1: Pt will report 3 out of 10 pain level with ADLs and piano.  STG #2:  Pt will demonstrate independence with issued HEP.     Plan     Updates/Grading for next session:     Tabitha Sutherland OT   12/11/2023

## 2023-12-12 ENCOUNTER — OFFICE VISIT (OUTPATIENT)
Dept: ORTHOPEDICS | Facility: CLINIC | Age: 68
End: 2023-12-12
Payer: MEDICARE

## 2023-12-12 VITALS — HEIGHT: 68 IN | BODY MASS INDEX: 27.74 KG/M2 | WEIGHT: 183 LBS

## 2023-12-12 DIAGNOSIS — M65.30 TRIGGER FINGER OF LEFT HAND, UNSPECIFIED FINGER: Primary | ICD-10-CM

## 2023-12-12 PROCEDURE — 1125F AMNT PAIN NOTED PAIN PRSNT: CPT | Mod: CPTII,S$GLB,, | Performed by: ORTHOPAEDIC SURGERY

## 2023-12-12 PROCEDURE — 3044F HG A1C LEVEL LT 7.0%: CPT | Mod: CPTII,S$GLB,, | Performed by: ORTHOPAEDIC SURGERY

## 2023-12-12 PROCEDURE — 3066F PR DOCUMENTATION OF TREATMENT FOR NEPHROPATHY: ICD-10-PCS | Mod: CPTII,S$GLB,, | Performed by: ORTHOPAEDIC SURGERY

## 2023-12-12 PROCEDURE — 1159F MED LIST DOCD IN RCRD: CPT | Mod: CPTII,S$GLB,, | Performed by: ORTHOPAEDIC SURGERY

## 2023-12-12 PROCEDURE — 3008F PR BODY MASS INDEX (BMI) DOCUMENTED: ICD-10-PCS | Mod: CPTII,S$GLB,, | Performed by: ORTHOPAEDIC SURGERY

## 2023-12-12 PROCEDURE — 1159F PR MEDICATION LIST DOCUMENTED IN MEDICAL RECORD: ICD-10-PCS | Mod: CPTII,S$GLB,, | Performed by: ORTHOPAEDIC SURGERY

## 2023-12-12 PROCEDURE — 1125F PR PAIN SEVERITY QUANTIFIED, PAIN PRESENT: ICD-10-PCS | Mod: CPTII,S$GLB,, | Performed by: ORTHOPAEDIC SURGERY

## 2023-12-12 PROCEDURE — 99999 PR PBB SHADOW E&M-EST. PATIENT-LVL III: CPT | Mod: PBBFAC,,, | Performed by: ORTHOPAEDIC SURGERY

## 2023-12-12 PROCEDURE — 4010F PR ACE/ARB THEARPY RXD/TAKEN: ICD-10-PCS | Mod: CPTII,S$GLB,, | Performed by: ORTHOPAEDIC SURGERY

## 2023-12-12 PROCEDURE — 3061F NEG MICROALBUMINURIA REV: CPT | Mod: CPTII,S$GLB,, | Performed by: ORTHOPAEDIC SURGERY

## 2023-12-12 PROCEDURE — 3066F NEPHROPATHY DOC TX: CPT | Mod: CPTII,S$GLB,, | Performed by: ORTHOPAEDIC SURGERY

## 2023-12-12 PROCEDURE — 99214 OFFICE O/P EST MOD 30 MIN: CPT | Mod: 25,S$GLB,, | Performed by: ORTHOPAEDIC SURGERY

## 2023-12-12 PROCEDURE — 4010F ACE/ARB THERAPY RXD/TAKEN: CPT | Mod: CPTII,S$GLB,, | Performed by: ORTHOPAEDIC SURGERY

## 2023-12-12 PROCEDURE — 3044F PR MOST RECENT HEMOGLOBIN A1C LEVEL <7.0%: ICD-10-PCS | Mod: CPTII,S$GLB,, | Performed by: ORTHOPAEDIC SURGERY

## 2023-12-12 PROCEDURE — 3061F PR NEG MICROALBUMINURIA RESULT DOCUMENTED/REVIEW: ICD-10-PCS | Mod: CPTII,S$GLB,, | Performed by: ORTHOPAEDIC SURGERY

## 2023-12-12 PROCEDURE — 99999 PR PBB SHADOW E&M-EST. PATIENT-LVL III: ICD-10-PCS | Mod: PBBFAC,,, | Performed by: ORTHOPAEDIC SURGERY

## 2023-12-12 PROCEDURE — 3008F BODY MASS INDEX DOCD: CPT | Mod: CPTII,S$GLB,, | Performed by: ORTHOPAEDIC SURGERY

## 2023-12-12 PROCEDURE — 20550 NJX 1 TENDON SHEATH/LIGAMENT: CPT | Mod: 79,LT,S$GLB, | Performed by: ORTHOPAEDIC SURGERY

## 2023-12-12 PROCEDURE — 20550 TENDON SHEATH: ICD-10-PCS | Mod: 79,LT,S$GLB, | Performed by: ORTHOPAEDIC SURGERY

## 2023-12-12 PROCEDURE — 99214 PR OFFICE/OUTPT VISIT, EST, LEVL IV, 30-39 MIN: ICD-10-PCS | Mod: 25,S$GLB,, | Performed by: ORTHOPAEDIC SURGERY

## 2023-12-12 RX ORDER — GABAPENTIN 300 MG/1
300 CAPSULE ORAL 3 TIMES DAILY
Qty: 90 CAPSULE | Refills: 11 | Status: SHIPPED | OUTPATIENT
Start: 2023-12-12

## 2023-12-12 RX ORDER — DEXAMETHASONE SODIUM PHOSPHATE 4 MG/ML
4 INJECTION, SOLUTION INTRA-ARTICULAR; INTRALESIONAL; INTRAMUSCULAR; INTRAVENOUS; SOFT TISSUE
Status: DISCONTINUED | OUTPATIENT
Start: 2023-12-12 | End: 2023-12-12 | Stop reason: HOSPADM

## 2023-12-12 RX ADMIN — DEXAMETHASONE SODIUM PHOSPHATE 4 MG: 4 INJECTION, SOLUTION INTRA-ARTICULAR; INTRALESIONAL; INTRAMUSCULAR; INTRAVENOUS; SOFT TISSUE at 10:12

## 2023-12-12 NOTE — PROGRESS NOTES
Patient has a index and long finger trigger finger on the right side fact his long finger has a flexion contracture already this was similar to his left side which underwent a trigger finger release of the long finger and required FDS slip tenotomy patient has trigger finger of the index finger the left will do an injection today he is getting some popping through a scar in the long finger with a trigger finger surgery was will do an injection there as well in addition we consented for trigger finger release on the right side

## 2023-12-12 NOTE — PROCEDURES
Tendon Sheath    Date/Time: 12/12/2023 10:45 AM    Performed by: Zahra Downey MD  Authorized by: Zahra Downey MD    Consent Done?:  Yes (Verbal)  Indications:  Pain  Timeout: prior to procedure the correct patient, procedure, and site was verified    Prep: patient was prepped and draped in usual sterile fashion      Local anesthesia used?: Yes    Anesthesia:  Local infiltration  Local anesthetic:  Lidocaine 1% without epinephrine  Location:  Long finger  Site:  L long MCP  Ultrasonic guidance for needle placement?: Yes    Needle size:  25 G  Approach:  Volar  Medications:  4 mg dexAMETHasone 4 mg/mL

## 2023-12-12 NOTE — PROGRESS NOTES
[facility]  History & Physical  Orthopedics    SUBJECTIVE:      Patient re-presented to clinic after undergoing a left-sided extensive trigger finger release of the long finger and the carpal tunnel release.  Patient has been having some difficulties with stiffness in flexion of the index and long finger of the left hand.  Patient also endorses that he has trigger fingers on the right hand of the index and long fingers as well.  The patient has been in therapy for the last 3 months has been progressing however he feels like he has reached a plateau in terms of flexion with that finger.  Patient also endorses pillar pain at the left-sided carpal tunnel release site.  But he states that this is getting a bit better over the course of time.  Patient at this visit is interested in pursuing further treatments to try and treat his left index and long finger difficulties that he is having with flexion.           Past Medical History:   Diagnosis Date    Diabetes mellitus type II     Hypertension     Rheumatoid arthritis      Past Surgical History:   Procedure Laterality Date    ANTERIOR CERVICAL DISCECTOMY W/ FUSION N/A 10/31/2022    Procedure: DISCECTOMY, SPINE, CERVICAL, ANTERIOR APPROACH, WITH FUSION C3-6 depuy SNS:vocal/motors/SSEP;  Surgeon: Kody Marshall MD;  Location: [facility] OR;  Service: Orthopedics;  Laterality: N/A;    CARPAL TUNNEL RELEASE Left 9/25/2023    Procedure: RELEASE, CARPAL TUNNEL,LEFT;  Surgeon: Zahra Downey MD;  Location: [facility] OR;  Service: Orthopedics;  Laterality: Left;    CARPAL TUNNEL RELEASE Left 9/25/2023    Procedure: RELEASE, CARPAL TUNNEL,left;  Surgeon: Zahra Downey MD;  Location: [facility] OR;  Service: Orthopedics;  Laterality: Left;    COLONOSCOPY N/A 1/16/2020    Procedure: COLONOSCOPY;  Surgeon: Cynthia Kearney MD;  Location: [facility] ENDO ([address] [place] [address]);  Service: Endoscopy;  Laterality: N/A;    ENDOSCOPIC CARPAL TUNNEL RELEASE Right 4/1/2022     Procedure: RELEASE, CARPAL TUNNEL, ENDOSCOPIC - right arthrex;  Surgeon: Wiley Warren MD;  Location: [facility] OR;  Service: Orthopedics;  Laterality: Right;    EPIDURAL STEROID INJECTION N/A 9/6/2022    Procedure: CERVICAL EDITH CONTRAST DIRECT REFERRAL;  Surgeon: Yasmeen Taylor MD;  Location: [facility] PAIN MGT;  Service: Pain Management;  Laterality: N/A;    SHOULDER SURGERY      TRIGGER FINGER RELEASE Left 9/25/2023    Procedure: RELEASE, TRIGGER FINGER, LEFT LONG FINGER;  Surgeon: Zahra Downey MD;  Location: [facility] OR;  Service: Orthopedics;  Laterality: Left;    TRIGGER FINGER RELEASE Left 9/25/2023    Procedure: RELEASE, TRIGGER FINGER,left long  finger;  Surgeon: Zahra Downey MD;  Location: [facility] OR;  Service: Orthopedics;  Laterality: Left;     Review of patient's allergies indicates:  No Known Allergies  Social History     Social History Narrative    Not on file     Family History   Problem Relation Age of Onset    Bone cancer Mother     Colon cancer Father     Glaucoma Neg Hx     Macular degeneration Neg Hx     Retinal detachment Neg Hx          Current Outpatient Medications:     blood sugar diagnostic Strp, To check BG 1 times daily, to use with insurance preferred meter, Disp: 100 strip, Rfl: 3    blood-glucose meter kit, To check BG 1 times daily, to use with insurance preferred meter, Disp: 1 each, Rfl: 0    clobetasol 0.05% (TEMOVATE) 0.05 % Oint, Apply twice daily for 2 weeks, Disp: 30 g, Rfl: 1    clobetasol 0.05% (TEMOVATE) 0.05 % Oint, Apply twice daily to affected area for 2 weeks, Disp: 60 g, Rfl: 1    colchicine, gout, (COLCRYS) 0.6 mg tablet, Take 1 tablet (0.6 mg total) by mouth once daily., Disp: 90 tablet, Rfl: 2    diphenhydrAMINE (SOMINEX) 25 mg tablet, Take 25 mg by mouth nightly as needed for Insomnia., Disp: , Rfl:     dulaglutide (TRULICITY) 3 mg/0.5 mL pen injector, Inject 3 mg into the skin every 7 days., Disp: 12 pen , Rfl: 3    febuxostat (ULORIC) 40 mg Tab, Take 1  tablet (40 mg total) by mouth once daily., Disp: 30 tablet, Rfl: 3    folic acid (FOLVITE) 1 MG tablet, Take 1 tablet (1 mg total) by mouth once daily., Disp: 90 tablet, Rfl: 3    gabapentin (NEURONTIN) 300 MG capsule, TAKE 1 CAPSULE(300 MG) BY MOUTH THREE TIMES DAILY, Disp: 90 capsule, Rfl: 11    ketoconazole (NIZORAL) 2 % cream, Apply topically 2 (two) times daily. Apply twice daily to soles of feet and between toes for 2 weeks, Disp: 30 g, Rfl: 2    lancets Misc, To check BG 1 times daily, to use with insurance preferred meter, Disp: 100 each, Rfl: 3    methotrexate 2.5 MG Tab, TAKE 5 TABLETS BY MOUTH IN THE MORNING AND 5 TABLETS IN THE EVENING EVERY 7 DAYS, Disp: 120 tablet, Rfl: 0    rosuvastatin (CRESTOR) 10 MG tablet, TAKE 1 TABLET ONE TIME DAILY., Disp: 90 tablet, Rfl: 3    traMADoL (ULTRAM) 50 mg tablet, Take 1 tablet (50 mg total) by mouth every 6 (six) hours as needed for Pain., Disp: 10 tablet, Rfl: 0    valACYclovir (VALTREX) 500 MG tablet, TAKE 1 TABLET(500 MG) BY MOUTH EVERY DAY, Disp: 30 tablet, Rfl: 11    valsartan (DIOVAN) 160 MG tablet, TAKE 1 TABLET(160 MG) BY MOUTH EVERY DAY, Disp: 90 tablet, Rfl: 3  No current facility-administered medications for this visit.    Facility-Administered Medications Ordered in Other Visits:     ceFAZolin 2 g in dextrose 5 % in water (D5W) 50 mL IVPB (MB+), 2 g, Intravenous, On Call Procedure, Obi Smith MD    LIDOcaine (PF) 10 mg/ml (1%) injection 5 mg, 0.5 mL, Intradermal, Once, Esau Ham MD    mupirocin 2 % ointment, , Nasal, On Call Procedure, Obi Smith MD, Given at 09/25/23 0822    sodium chloride 0.9% flush 10 mL, 10 mL, Intravenous, PRN, Obi Smith MD    ROS    Review of Systems:  Constitutional: no fever or chills  Eyes: no visual changes  ENT: no nasal congestion or sore throat  Respiratory: no cough or shortness of breath  Cardiovascular: no chest pain  Gastrointestinal: no nausea or vomiting, tolerating diet  Musculoskeletal: pain  "and soreness    OBJECTIVE:      Vital Signs (Most Recent):  Vitals:    12/12/23 1050   Weight: 83 kg (183 lb)   Height: 5' 8" (1.727 m)     Body mass index is 27.83 kg/m².    Physical Exam    Physical Exam:  Constitutional: The patient appears well-developed and well-nourished. No distress.   Head: Normocephalic and atraumatic.   Nose: Nose normal.   Eyes: Conjunctivae and EOM are normal.   Neck: No tracheal deviation present.   Cardiovascular: Normal rate and intact distal pulses.    Pulmonary/Chest: Effort normal. No respiratory distress.   Abdominal: There is no guarding.   Lymphatic: Negative for adenopathy   Neurological: The patient is alert.   Psychiatric: The patient has a normal mood and affect.     Bilateral Hand/Wrist Examination:    Observation/Inspection:  Swelling  none    Deformity  none  Discoloration  none     Scars   none    Atrophy  none    HAND/WRIST EXAMINATION:  Finkelstein's Test   Neg  WHAT Test    Neg  Snuff box tenderness   Neg  Osullivan's Test    Neg  Hook of Hamate Tenderness  Neg  CMC grind    Neg  Circumduction test   Neg  TFCC Compression Test  Neg    Scarring present over the index finger, large palpable nodule of the tendon and the able to feel the tendon clicking through what is likely superficial scar, there is triggering and locking of the index finger on the left and the right side of the index and long finger also have significant trigger fingers with palpable nodules.  Surgical scarring of the left-sided carpal tunnel release is clean dry and intact with no swelling, some minimal tenderness to palpation    Neurovascular Exam:  Digits WWP, brisk CR < 3s throughout  NVI motor/LTS to M/R/U nerves, radial pulse 2+  2+ biceps and brachioradialis reflexes  Tinel's Test - Carpal Tunnel  Neg  Tinel's Test - Cubital Tunnel  Neg  Phalen's Test    Neg  Median Nerve Compression Test Neg        ASSESSMENT/PLAN:      68 y.o. yo male status post left-sided carpal tunnel release and index " trigger finger release with slip of FDS excision VAC on 09/25/2023.  Also with right-sided index and long trigger finger and new onset left-sided index finger trigger finger.    Discussed with the patient all of his options, at this point in time the patient's index and long finger and the left hand are still triggering.  His index finger is new onset and likely secondary to time being thickened in the A1 pulley.  His long finger is likely secondary to the thickened tendon which is now trying to traverse through the scar tissue.  Patient also has fairly significant trigger finger on the right hand with the index and long.    Discussed with the patient that we would like to try conservative measures 1st which will include corticosteroid injection on the left side and the patient would like to proceed with therapy for trigger finger release on the right side.  Consents were obtained for right-sided index and long trigger finger release  Corticosteroid injection was performed of the left-sided index trigger finger and long scar tissue    Discussed with patient and/or family the risks and benefits of surgical intervention.  Conservative measures have been exhausted and patient would like to proceed with surgery.      We have discussed risks, which include but are not limited to blood clots in the legs that can travel to the lungs (pulmonary embolism). Pulmonary embolism can cause shortness of breath, chest pain, and even shock. Other risks include urinary tract infection, nausea and vomiting (usually related to pain medication), chronic pain, bleeding, nerve damage, blood vessel injury, scarring and infection, which can require re-operation. Furthermore, the risks of anesthesia include potential heart, lung, kidney, and liver damage.  Informed consent was obtained.  The patient understands and would like to proceed with surgery in the near future.

## 2023-12-12 NOTE — PROCEDURES
Tendon Sheath    Date/Time: 12/12/2023 10:45 AM    Performed by: Zahra Downey MD  Authorized by: Zahra Downey MD    Consent Done?:  Yes (Verbal)  Indications:  Pain  Timeout: prior to procedure the correct patient, procedure, and site was verified    Prep: patient was prepped and draped in usual sterile fashion      Local anesthesia used?: Yes    Anesthesia:  Local infiltration  Local anesthetic:  Lidocaine 1% without epinephrine  Location:  Index finger  Site:  L index MCP  Ultrasonic guidance for needle placement?: Yes    Needle size:  25 G  Approach:  Volar  Medications:  4 mg dexAMETHasone 4 mg/mL

## 2024-01-04 ENCOUNTER — PATIENT MESSAGE (OUTPATIENT)
Dept: PRIMARY CARE CLINIC | Facility: CLINIC | Age: 69
End: 2024-01-04
Payer: MEDICARE

## 2024-01-04 DIAGNOSIS — E11.9 TYPE 2 DIABETES MELLITUS WITHOUT COMPLICATION, WITHOUT LONG-TERM CURRENT USE OF INSULIN: ICD-10-CM

## 2024-01-04 RX ORDER — DULAGLUTIDE 3 MG/.5ML
3 INJECTION, SOLUTION SUBCUTANEOUS
Qty: 12 PEN | Refills: 3 | Status: SHIPPED | OUTPATIENT
Start: 2024-01-04 | End: 2025-01-03

## 2024-01-04 NOTE — TELEPHONE ENCOUNTER
No care due was identified.  [facility] Embedded Care Due Messages. Reference number: [id number].   1/04/2024 10:46:59 AM CST

## 2024-01-04 NOTE — TELEPHONE ENCOUNTER
----- Message from Izzy Ellis sent at 1/4/2024 10:39 AM CST -----  Contact: [phone]  Pt is requesting to have his refill for dulaglutide (TRULICITY) 3 mg/0.5 mL pen injector 12 pen sent to [facility] on [address].       Per pt, the dosage was to be increased as well. Pt also states that the Pharmacy has been waiting on the refilled request.       Pt would like a call with an update as well. Pt states he has missed a week w/without  taking his medication.                  Thank you

## 2024-02-02 ENCOUNTER — OFFICE VISIT (OUTPATIENT)
Dept: OPTOMETRY | Facility: CLINIC | Age: 69
End: 2024-02-02
Payer: COMMERCIAL

## 2024-02-02 DIAGNOSIS — E11.00 TYPE 2 DIABETES MELLITUS WITH HYPEROSMOLARITY WITHOUT COMA, WITHOUT LONG-TERM CURRENT USE OF INSULIN: ICD-10-CM

## 2024-02-02 DIAGNOSIS — H25.13 NUCLEAR SCLEROSIS, BILATERAL: ICD-10-CM

## 2024-02-02 DIAGNOSIS — E11.9 TYPE 2 DIABETES MELLITUS WITHOUT OPHTHALMIC MANIFESTATIONS: ICD-10-CM

## 2024-02-02 DIAGNOSIS — H40.013 OPEN ANGLE WITH BORDERLINE FINDINGS AND LOW GLAUCOMA RISK IN BOTH EYES: ICD-10-CM

## 2024-02-02 DIAGNOSIS — H52.4 PRESBYOPIA: Primary | ICD-10-CM

## 2024-02-02 DIAGNOSIS — E11.9 TYPE 2 DIABETES MELLITUS WITHOUT COMPLICATION, WITHOUT LONG-TERM CURRENT USE OF INSULIN: ICD-10-CM

## 2024-02-02 DIAGNOSIS — H43.813 PVD (POSTERIOR VITREOUS DETACHMENT), BILATERAL: ICD-10-CM

## 2024-02-02 PROCEDURE — 92015 DETERMINE REFRACTIVE STATE: CPT | Mod: S$GLB,,, | Performed by: OPTOMETRIST

## 2024-02-02 PROCEDURE — 99999 PR PBB SHADOW E&M-EST. PATIENT-LVL III: CPT | Mod: PBBFAC,,, | Performed by: OPTOMETRIST

## 2024-02-02 PROCEDURE — 92014 COMPRE OPH EXAM EST PT 1/>: CPT | Mod: S$GLB,,, | Performed by: OPTOMETRIST

## 2024-02-02 NOTE — PROGRESS NOTES
HPI    CORA: 1/23/2023  Chief complaint (CC): patient here for diabetic exam   Glasses? Readers   Contacts? -  H/o eye surgery, injections or laser: -  H/o eye injury: -  Known eye conditions? -  Family h/o eye conditions? -  Eye gtts?   None       (-) Flashes (+)  Floaters (-) Mucous   (-)  Tearing (-) Itching (-) Burning   (-) Headaches (-) Eye Pain/discomfort (-) Irritation   (-)  Redness (-) Double vision (+) Blurry vision    Diabetic? +  A1c? 6.3 - 12/5/2023      Last edited by Hanny Roque on 2/2/2024  1:20 PM.            Assessment /Plan     For exam results, see Encounter Report.    Presbyopia   Ok to continue with OTC readers    Type 2 diabetes mellitus with hyperosmolarity without coma, without long-term current use of insulin  Type 2 diabetes mellitus without complication, without long-term current use of insulin  Type 2 diabetes mellitus without ophthalmic manifestations  No diabetic retinopathy or csme evident on today's exam. Ed pt on importance of monitoring BS due to potential for visual fluctuations, continue care with PCP. Monitor with yearly dilated exam.     Nuclear sclerosis, bilateral  Pt educated on condition, cataracts are not visually significant. Surgery not recommended at this time. Continue to monitor yearly.     PVD (posterior vitreous detachment), bilateral   Stable, monitor  Retina is flat and intact 360 OU, no holes, breaks, or tears OU.    Open angle with borderline findings and low glaucoma risk in both eyes  -     Pt educated on findings. Large C/D ratio OU.   2020/2021/2023  RNFL OCT is within normal limits in all quadrants, but with possible progression.   2020/2021 VFs show no glaucomatous defects.   IOP: 21mmHg OD, 20mmHg OS (normotensive)  C/D: 0.65R OU  Family History: Negative  Pachy: 597um OD, 582um OS (thick OU)           RTC in 1 year for annual eye exam and RNFL OCT or sooner prn

## 2024-02-21 ENCOUNTER — PATIENT MESSAGE (OUTPATIENT)
Dept: ADMINISTRATIVE | Facility: HOSPITAL | Age: 69
End: 2024-02-21
Payer: MEDICARE

## 2024-02-21 ENCOUNTER — TELEPHONE (OUTPATIENT)
Dept: PRIMARY CARE CLINIC | Facility: CLINIC | Age: 69
End: 2024-02-21
Payer: MEDICARE

## 2024-02-21 ENCOUNTER — PATIENT MESSAGE (OUTPATIENT)
Dept: PRIMARY CARE CLINIC | Facility: CLINIC | Age: 69
End: 2024-02-21
Payer: MEDICARE

## 2024-02-21 NOTE — TELEPHONE ENCOUNTER
Pt advised that a response won't be until Monday upon your return. Is a virtual needed for jusr excuse. Lov 12/2023?

## 2024-02-21 NOTE — TELEPHONE ENCOUNTER
----- Message from Jesenia Griggs sent at 2/21/2024  1:02 PM CST -----  Contact: [phone]  [email or web address]     Patient is calling for Medical Advice regarding: jury duty excuse       Would like response via EMBIhart:  call     Comments:  Pt called to advise that he was given a summons for jury duty for the date 3/8/24. Pt says he had recent surgeries that he is currently recovering from and is unable to sit for four hours for jury duty. Pt would like to get a letter excusing him from jury duty emailed to the following email address.  Please Advise     [email or web address]

## 2024-02-27 ENCOUNTER — PATIENT OUTREACH (OUTPATIENT)
Dept: ADMINISTRATIVE | Facility: HOSPITAL | Age: 69
End: 2024-02-27
Payer: MEDICARE

## 2024-02-27 NOTE — PROGRESS NOTES

## 2024-03-01 ENCOUNTER — LAB VISIT (OUTPATIENT)
Dept: LAB | Facility: OTHER | Age: 69
End: 2024-03-01
Attending: INTERNAL MEDICINE
Payer: MEDICARE

## 2024-03-01 DIAGNOSIS — D64.9 ANEMIA, UNSPECIFIED TYPE: ICD-10-CM

## 2024-03-01 DIAGNOSIS — M06.9 RHEUMATOID ARTHRITIS, INVOLVING UNSPECIFIED SITE, UNSPECIFIED WHETHER RHEUMATOID FACTOR PRESENT: ICD-10-CM

## 2024-03-01 DIAGNOSIS — Z79.631 METHOTREXATE, LONG TERM, CURRENT USE: ICD-10-CM

## 2024-03-01 DIAGNOSIS — Z79.899 OTHER LONG TERM (CURRENT) DRUG THERAPY: ICD-10-CM

## 2024-03-01 LAB
ALBUMIN SERPL BCP-MCNC: 4 G/DL (ref 3.5–5.2)
ALP SERPL-CCNC: 73 U/L (ref 55–135)
ALT SERPL W/O P-5'-P-CCNC: 78 U/L (ref 10–44)
ANION GAP SERPL CALC-SCNC: 7 MMOL/L (ref 8–16)
AST SERPL-CCNC: 42 U/L (ref 10–40)
BILIRUB SERPL-MCNC: 1.1 MG/DL (ref 0.1–1)
BUN SERPL-MCNC: 13 MG/DL (ref 8–23)
CALCIUM SERPL-MCNC: 9.7 MG/DL (ref 8.7–10.5)
CHLORIDE SERPL-SCNC: 104 MMOL/L (ref 95–110)
CO2 SERPL-SCNC: 27 MMOL/L (ref 23–29)
CREAT SERPL-MCNC: 1.1 MG/DL (ref 0.5–1.4)
CRP SERPL-MCNC: 0.5 MG/L (ref 0–8.2)
ERYTHROCYTE [SEDIMENTATION RATE] IN BLOOD BY PHOTOMETRIC METHOD: 13 MM/HR (ref 0–23)
EST. GFR  (NO RACE VARIABLE): >60 ML/MIN/1.73 M^2
FERRITIN SERPL-MCNC: 64 NG/ML (ref 20–300)
FOLATE SERPL-MCNC: 10.8 NG/ML (ref 4–24)
GLUCOSE SERPL-MCNC: 104 MG/DL (ref 70–110)
IRON SERPL-MCNC: 84 UG/DL (ref 45–160)
LDH SERPL L TO P-CCNC: 187 U/L (ref 110–260)
POTASSIUM SERPL-SCNC: 4.2 MMOL/L (ref 3.5–5.1)
PROT SERPL-MCNC: 7.3 G/DL (ref 6–8.4)
RETICS/RBC NFR AUTO: 0.9 % (ref 0.4–2)
SATURATED IRON: 20 % (ref 20–50)
SODIUM SERPL-SCNC: 138 MMOL/L (ref 136–145)
TOTAL IRON BINDING CAPACITY: 413 UG/DL (ref 250–450)
TRANSFERRIN SERPL-MCNC: 279 MG/DL (ref 200–375)
URATE SERPL-MCNC: 4.2 MG/DL (ref 3.4–7)
VIT B12 SERPL-MCNC: 748 PG/ML (ref 210–950)

## 2024-03-01 PROCEDURE — 85652 RBC SED RATE AUTOMATED: CPT | Performed by: INTERNAL MEDICINE

## 2024-03-01 PROCEDURE — 83615 LACTATE (LD) (LDH) ENZYME: CPT | Performed by: INTERNAL MEDICINE

## 2024-03-01 PROCEDURE — 84238 ASSAY NONENDOCRINE RECEPTOR: CPT | Performed by: INTERNAL MEDICINE

## 2024-03-01 PROCEDURE — 80053 COMPREHEN METABOLIC PANEL: CPT | Performed by: INTERNAL MEDICINE

## 2024-03-01 PROCEDURE — 82607 VITAMIN B-12: CPT | Performed by: INTERNAL MEDICINE

## 2024-03-01 PROCEDURE — 83540 ASSAY OF IRON: CPT | Performed by: INTERNAL MEDICINE

## 2024-03-01 PROCEDURE — 82728 ASSAY OF FERRITIN: CPT | Performed by: INTERNAL MEDICINE

## 2024-03-01 PROCEDURE — 84550 ASSAY OF BLOOD/URIC ACID: CPT | Performed by: INTERNAL MEDICINE

## 2024-03-01 PROCEDURE — 82746 ASSAY OF FOLIC ACID SERUM: CPT | Performed by: INTERNAL MEDICINE

## 2024-03-01 PROCEDURE — 85045 AUTOMATED RETICULOCYTE COUNT: CPT | Performed by: INTERNAL MEDICINE

## 2024-03-01 PROCEDURE — 86140 C-REACTIVE PROTEIN: CPT | Performed by: INTERNAL MEDICINE

## 2024-03-02 LAB — STFR SERPL-MCNC: 3.3 MG/L (ref 1.8–4.6)

## 2024-03-05 ENCOUNTER — OFFICE VISIT (OUTPATIENT)
Dept: RHEUMATOLOGY | Facility: CLINIC | Age: 69
End: 2024-03-05
Payer: MEDICARE

## 2024-03-05 ENCOUNTER — OFFICE VISIT (OUTPATIENT)
Dept: PRIMARY CARE CLINIC | Facility: CLINIC | Age: 69
End: 2024-03-05
Payer: MEDICARE

## 2024-03-05 VITALS
SYSTOLIC BLOOD PRESSURE: 121 MMHG | HEIGHT: 68 IN | BODY MASS INDEX: 27.06 KG/M2 | WEIGHT: 178.56 LBS | DIASTOLIC BLOOD PRESSURE: 81 MMHG | HEART RATE: 53 BPM

## 2024-03-05 VITALS
WEIGHT: 185.19 LBS | HEIGHT: 68 IN | DIASTOLIC BLOOD PRESSURE: 60 MMHG | HEART RATE: 80 BPM | SYSTOLIC BLOOD PRESSURE: 128 MMHG | BODY MASS INDEX: 28.07 KG/M2 | OXYGEN SATURATION: 100 % | TEMPERATURE: 98 F

## 2024-03-05 DIAGNOSIS — Z12.5 SCREENING FOR PROSTATE CANCER: ICD-10-CM

## 2024-03-05 DIAGNOSIS — M1A.09X0 IDIOPATHIC CHRONIC GOUT OF MULTIPLE SITES WITHOUT TOPHUS: ICD-10-CM

## 2024-03-05 DIAGNOSIS — M06.9 RHEUMATOID ARTHRITIS, INVOLVING UNSPECIFIED SITE, UNSPECIFIED WHETHER RHEUMATOID FACTOR PRESENT: Primary | ICD-10-CM

## 2024-03-05 DIAGNOSIS — M06.9 RHEUMATOID ARTHRITIS, INVOLVING UNSPECIFIED SITE, UNSPECIFIED WHETHER RHEUMATOID FACTOR PRESENT: ICD-10-CM

## 2024-03-05 DIAGNOSIS — Z79.899 DRUG-INDUCED IMMUNODEFICIENCY: ICD-10-CM

## 2024-03-05 DIAGNOSIS — Z79.631 METHOTREXATE, LONG TERM, CURRENT USE: ICD-10-CM

## 2024-03-05 DIAGNOSIS — E11.9 TYPE 2 DIABETES MELLITUS WITHOUT COMPLICATION, WITHOUT LONG-TERM CURRENT USE OF INSULIN: Primary | ICD-10-CM

## 2024-03-05 DIAGNOSIS — R74.8 ELEVATED LIVER ENZYMES: ICD-10-CM

## 2024-03-05 DIAGNOSIS — D84.821 DRUG-INDUCED IMMUNODEFICIENCY: ICD-10-CM

## 2024-03-05 DIAGNOSIS — R74.01 TRANSAMINITIS: ICD-10-CM

## 2024-03-05 DIAGNOSIS — M65.311 TRIGGER FINGER OF RIGHT THUMB: ICD-10-CM

## 2024-03-05 LAB
ALBUMIN/CREAT UR: 2.5 UG/MG (ref 0–30)
CREAT UR-MCNC: 204 MG/DL (ref 23–375)
MICROALBUMIN UR DL<=1MG/L-MCNC: 5 UG/ML

## 2024-03-05 PROCEDURE — 3074F SYST BP LT 130 MM HG: CPT | Mod: CPTII,GC,S$GLB, | Performed by: STUDENT IN AN ORGANIZED HEALTH CARE EDUCATION/TRAINING PROGRAM

## 2024-03-05 PROCEDURE — 3079F DIAST BP 80-89 MM HG: CPT | Mod: CPTII,GC,S$GLB, | Performed by: STUDENT IN AN ORGANIZED HEALTH CARE EDUCATION/TRAINING PROGRAM

## 2024-03-05 PROCEDURE — 1125F AMNT PAIN NOTED PAIN PRSNT: CPT | Mod: CPTII,S$GLB,, | Performed by: STUDENT IN AN ORGANIZED HEALTH CARE EDUCATION/TRAINING PROGRAM

## 2024-03-05 PROCEDURE — 99999 PR PBB SHADOW E&M-EST. PATIENT-LVL IV: CPT | Mod: PBBFAC,GC,, | Performed by: STUDENT IN AN ORGANIZED HEALTH CARE EDUCATION/TRAINING PROGRAM

## 2024-03-05 PROCEDURE — 1101F PT FALLS ASSESS-DOCD LE1/YR: CPT | Mod: CPTII,GC,S$GLB, | Performed by: STUDENT IN AN ORGANIZED HEALTH CARE EDUCATION/TRAINING PROGRAM

## 2024-03-05 PROCEDURE — 4010F ACE/ARB THERAPY RXD/TAKEN: CPT | Mod: CPTII,S$GLB,, | Performed by: STUDENT IN AN ORGANIZED HEALTH CARE EDUCATION/TRAINING PROGRAM

## 2024-03-05 PROCEDURE — 99214 OFFICE O/P EST MOD 30 MIN: CPT | Mod: S$GLB,,, | Performed by: STUDENT IN AN ORGANIZED HEALTH CARE EDUCATION/TRAINING PROGRAM

## 2024-03-05 PROCEDURE — 99214 OFFICE O/P EST MOD 30 MIN: CPT | Mod: GC,S$GLB,, | Performed by: STUDENT IN AN ORGANIZED HEALTH CARE EDUCATION/TRAINING PROGRAM

## 2024-03-05 PROCEDURE — 3288F FALL RISK ASSESSMENT DOCD: CPT | Mod: CPTII,GC,S$GLB, | Performed by: STUDENT IN AN ORGANIZED HEALTH CARE EDUCATION/TRAINING PROGRAM

## 2024-03-05 PROCEDURE — 3008F BODY MASS INDEX DOCD: CPT | Mod: CPTII,S$GLB,, | Performed by: STUDENT IN AN ORGANIZED HEALTH CARE EDUCATION/TRAINING PROGRAM

## 2024-03-05 PROCEDURE — 3288F FALL RISK ASSESSMENT DOCD: CPT | Mod: CPTII,S$GLB,, | Performed by: STUDENT IN AN ORGANIZED HEALTH CARE EDUCATION/TRAINING PROGRAM

## 2024-03-05 PROCEDURE — 82043 UR ALBUMIN QUANTITATIVE: CPT | Performed by: STUDENT IN AN ORGANIZED HEALTH CARE EDUCATION/TRAINING PROGRAM

## 2024-03-05 PROCEDURE — 1159F MED LIST DOCD IN RCRD: CPT | Mod: CPTII,S$GLB,, | Performed by: STUDENT IN AN ORGANIZED HEALTH CARE EDUCATION/TRAINING PROGRAM

## 2024-03-05 PROCEDURE — 3074F SYST BP LT 130 MM HG: CPT | Mod: CPTII,S$GLB,, | Performed by: STUDENT IN AN ORGANIZED HEALTH CARE EDUCATION/TRAINING PROGRAM

## 2024-03-05 PROCEDURE — 1159F MED LIST DOCD IN RCRD: CPT | Mod: CPTII,GC,S$GLB, | Performed by: STUDENT IN AN ORGANIZED HEALTH CARE EDUCATION/TRAINING PROGRAM

## 2024-03-05 PROCEDURE — 4010F ACE/ARB THERAPY RXD/TAKEN: CPT | Mod: CPTII,GC,S$GLB, | Performed by: STUDENT IN AN ORGANIZED HEALTH CARE EDUCATION/TRAINING PROGRAM

## 2024-03-05 PROCEDURE — 1125F AMNT PAIN NOTED PAIN PRSNT: CPT | Mod: CPTII,GC,S$GLB, | Performed by: STUDENT IN AN ORGANIZED HEALTH CARE EDUCATION/TRAINING PROGRAM

## 2024-03-05 PROCEDURE — 3008F BODY MASS INDEX DOCD: CPT | Mod: CPTII,GC,S$GLB, | Performed by: STUDENT IN AN ORGANIZED HEALTH CARE EDUCATION/TRAINING PROGRAM

## 2024-03-05 PROCEDURE — 99999 PR PBB SHADOW E&M-EST. PATIENT-LVL IV: CPT | Mod: PBBFAC,,, | Performed by: STUDENT IN AN ORGANIZED HEALTH CARE EDUCATION/TRAINING PROGRAM

## 2024-03-05 PROCEDURE — 1101F PT FALLS ASSESS-DOCD LE1/YR: CPT | Mod: CPTII,S$GLB,, | Performed by: STUDENT IN AN ORGANIZED HEALTH CARE EDUCATION/TRAINING PROGRAM

## 2024-03-05 PROCEDURE — 3078F DIAST BP <80 MM HG: CPT | Mod: CPTII,S$GLB,, | Performed by: STUDENT IN AN ORGANIZED HEALTH CARE EDUCATION/TRAINING PROGRAM

## 2024-03-05 RX ORDER — METHOTREXATE 2.5 MG/1
TABLET ORAL
Qty: 120 TABLET | Refills: 0 | Status: SHIPPED | OUTPATIENT
Start: 2024-03-05

## 2024-03-05 RX ORDER — FEBUXOSTAT 40 MG/1
40 TABLET, FILM COATED ORAL DAILY
Qty: 30 TABLET | Refills: 5 | Status: SHIPPED | OUTPATIENT
Start: 2024-03-05

## 2024-03-05 RX ORDER — FOLIC ACID 1 MG/1
1 TABLET ORAL DAILY
Qty: 90 TABLET | Refills: 3 | Status: SHIPPED | OUTPATIENT
Start: 2024-03-05

## 2024-03-05 NOTE — PROGRESS NOTES
"Primary Care  Office Visit - In Person  3/5/2024      HPI    Patient is a 68 y.o.   Karthik Bentley  has a past medical history of Diabetes mellitus type II, Hypertension, and Rheumatoid arthritis.    Patient presenting for follow up    Reports glucose range 70 - 120     Active Medications:  Current Outpatient Medications   Medication Instructions    blood glucose control, low Soln To use as needed    blood sugar diagnostic (TRUE METRIX GLUCOSE TEST STRIP) Strp To check glucose three times daily with meals and nightly    blood-glucose meter kit To check BG 1 times daily, to use with insurance preferred meter    colchicine (COLCRYS) 0.6 mg, Oral, Daily    febuxostat (ULORIC) 40 mg, Oral, Daily    folic acid (FOLVITE) 1 mg, Oral, Daily    gabapentin (NEURONTIN) 300 mg, Oral, 3 times daily    lancets Misc To check BG 1 times daily, to use with insurance preferred meter    methotrexate 2.5 MG Tab TAKE 5 TABLETS BY MOUTH IN THE MORNING AND 5 TABLETS IN THE EVENING EVERY 7 DAYS    rosuvastatin (CRESTOR) 10 MG tablet TAKE 1 TABLET ONE TIME DAILY.    traMADoL (ULTRAM) 50 mg, Oral, Every 6 hours PRN    TRULICITY 3 mg, Subcutaneous, Every 7 days    valACYclovir (VALTREX) 500 mg, Oral    valsartan (DIOVAN) 160 mg, Oral       Vitals:    03/05/24 0907   BP: 128/60   BP Location: Right arm   Pulse: 80   Temp: 98.2 °F (36.8 °C)   SpO2: 100%   Weight: 84 kg (185 lb 3 oz)   Height: 5' 8" (1.727 m)       Physical Exam  Vitals reviewed.   Constitutional:       General: He is not in acute distress.  Cardiovascular:      Rate and Rhythm: Normal rate and regular rhythm.      Pulses: Normal pulses.      Heart sounds: Normal heart sounds.   Pulmonary:      Effort: Pulmonary effort is normal.      Breath sounds: Normal breath sounds.   Abdominal:      General: Abdomen is flat. Bowel sounds are normal.      Palpations: Abdomen is soft.   Musculoskeletal:      Right lower leg: No edema.      Left lower leg: No edema.   Neurological:      " General: No focal deficit present.      Mental Status: He is oriented to person, place, and time.          Assessment and Plan       1. Type 2 diabetes mellitus without complication, without long-term current use of insulin  Glucose well controlled on Trulicity 3 mg weekly. With patient's diet changes and exercise will likely only require 1.5 mg weekly   -     HEMOGLOBIN A1C; Future; Expected date: 03/05/2024  -     Microalbumin/Creatinine Ratio, Urine    2. Rheumatoid arthritis, involving unspecified site, unspecified whether rheumatoid factor present  3. Transaminitis  Recent labs significant for transaminitis. Patient does not drink alcohol. Suspect 2/2 febuxostat. Trulicity less likely but there are post-marketing reports and this is the only new medication. Repeat LFTs in 2 weeks. Patient to discuss with rheumatology as well.   -     HEPATIC FUNCTION PANEL; Future; Expected date: 03/05/2024    4. Screening for prostate cancer  -     PSA, SCREENING; Future; Expected date: 03/05/2024        Previous labs, records, and notes reviewed and considered for their impact on clinical decision making today.                Upcoming Scheduled Appointments and Follow Up:    Future Appointments   Date Time Provider Department Center   3/5/2024 11:00 AM Giovanna Cummings MD [facility]   3/19/2024 12:00 PM LAB, [facility] LAB [place]       Follow Up [facility] Care (with who? when?): Follow up in about 4 months (around 7/5/2024).      Extended Emergency Contact Information  Primary Emergency Contact: Nati Burgos   [place]  Mobile Phone: [phone]  Relation: Sister      Silke Amos MD   Internal Medicine  3/5/2024 - 9:55 AM    I spent a total of 30 minutes on the day of the visit.This includes face to face time and non-face to face time preparing to see the patient (eg, review of tests), obtaining and/or reviewing separately obtained history, documenting clinical information  in the electronic or other health record, independently interpreting results and communicating results to the patient/family/caregiver, or care coordinator.    While patients have the right to access their medical record, it is essential to recognize that progress notes primarily serve as a means of communication among healthcare professionals.

## 2024-03-05 NOTE — PROGRESS NOTES
Subjective:      Patient ID: Karthik Bentley is a 68 y.o. male.    Chief Complaint: Seropositive RA (+RF)    Initial Presentation  Karthik Bentley is a 68 y.o. M with a past medical history of seropositive RA (+RF), gout, HLD, cervical myelopathy, HTN and T2DM.     He initially presented to Rheumatology in 2022 for evaluation for arthritis and gout. He was found to have synovitis on exam and XR showed marginal erosions 1st, 2nd, possibly 4th MCPs and calcium deposit 3rd MCP so he was diagnosed with RA. He was initially started on MTX, 6 tablets weekly which was later increased to 10 tablets weekly.     In January 2023, he was found to have episcleritis, suspected 2/2 chronic Visine-like eye drop use for which was discontinued. He was told that this was not related to his RA.      In regards to his gout, he was started on Febuxostat 40 mg daily in May. Uric acid decreased to 4.2. Last flare was a year ago (March 2023 per chart review).     Interval History  He has been very doing well. He runs almost daily and has been playing the piano with minimal issues. He had a L-sided carpal tunnel release surgery and trigger finger release with Dr. Downey on September 25th. He has continued to have issues in both the L hand and now also the R hand with triggering. He is having triggering of the R thumb, R index, R middle, L index, L middle. He feels frustrated by this.     Rheumatology ROS  (-) fevers, chills (-) weight loss, (-) fatigue, (+) morning stiffness (lasts about an hour, improves with movement),  (+) arthralgias (mostly involving the thumbs), (-) arthritis, (-) headaches, (-)  vision changes or loss of vision, (-) hx of red eyes including uveitis, iritis, scleritis or episcleritis, (-)  photophobia, (-) dry eyes, (-) dry mouth, (-) rash, (-) photosensitivity, (-) alopecia, (-) mucosal ulcers, (-) Raynaud's  phenomenon, (-) SQ nodules, (-) sense of skin tightening in hands, face or torso, (-)  hx pleurisy, (-) sharp  chest pains that increase with deep breath, (-) lung fibrosis, (-) hemoptysis, (-) hx of DVT or PE (-) chest pains, (-) shortness of breath, (-) hx pericarditis (-) abdominal pain, (-) nausea, (-) vomiting, (-) diarrhea, (-) constipation, (-) melena,  (-) bloody diarrhea, (-) UC/Crohns, (-) dysphagia, (-) GERD/Reflux, (-) hematuria, proteinuria, (-) renal failure, (-) focal weakness, (-) trouble combing hair or (-) getting out of chairs,  (-) hx of low WBC, low platelets, anemia, (-) genital ulcers    Review of Systems   Constitutional:  Negative for fever and unexpected weight change.   HENT:  Positive for trouble swallowing. Negative for mouth sores.    Eyes:  Negative for redness.   Respiratory:  Negative for cough and shortness of breath.    Cardiovascular:  Negative for chest pain.   Gastrointestinal:  Negative for constipation and diarrhea.   Genitourinary:  Negative for genital sores.   Skin:  Negative for rash.   Neurological:  Negative for headaches.   Hematological:  Does not bruise/bleed easily.      Imaging/Procedures  XR Hand Complete (03/13/23):  FINDINGS:  No acute fracture or dislocation seen.  Mild degenerative change 1st MCP joint and scattered in the interphalangeal joints.  No significant soft tissue edema.  Stable high-density focus/calcification in the soft tissues between the 4th and 5th metacarpal.  Impression:  No acute osseous abnormality seen.    XR Hand, R (09/16/22):  Bones are well mineralized.  Alignment is satisfactory.  Mild joint space narrowing and hypertrophic spurring at the radiocarpal joint.  Some degenerative changes at the 1st carpometacarpal joint.  Mild degenerative changes at some of the interphalangeal joints.  No fracture, dislocation, or erosive change.  There may be mild soft tissue swelling about the PIP joints of the 2nd, 3rd and 4th digits.  Atherosclerotic vascular calcifications are present.    XR Foot Complete (09/16/22):  Right foot: Bones are satisfactorily  mineralized.  Moderate hallux valgus and bunion formation.  Hypertrophic spurring and joint space narrowing at the 1st MTP joint.  Elsewhere, alignment is satisfactory and joint spaces appear adequately maintained.  Some spurring at the dorsal aspect of the midfoot.  Small calcaneal spurs.  No fracture, dislocation, or erosive change.  Atherosclerotic vascular calcifications are now seen.     Left foot: Bones are satisfactorily mineralized.  Moderate hallux valgus and bunion formation.  Hypertrophic spurring and joint space narrowing at the 1st MTP joint.  Elsewhere, alignment is satisfactory and joint spaces appear fairly well maintained.  Small calcaneal spurs.  Some spurring at the dorsal aspect of the foot in the tarsometatarsal region.  No fracture, dislocation, or erosive change.  Atherosclerotic vascular calcifications are noted.    Rheumatologic labs  Component      Latest Ref Rng 3/1/2024   Sodium      136 - 145 mmol/L 138    Potassium      3.5 - 5.1 mmol/L 4.2    Chloride      95 - 110 mmol/L 104    CO2      23 - 29 mmol/L 27    Glucose      70 - 110 mg/dL 104    BUN      8 - 23 mg/dL 13    Creatinine      0.5 - 1.4 mg/dL 1.1    Calcium      8.7 - 10.5 mg/dL 9.7    PROTEIN TOTAL      6.0 - 8.4 g/dL 7.3    Albumin      3.5 - 5.2 g/dL 4.0    BILIRUBIN TOTAL      0.1 - 1.0 mg/dL 1.1 (H)    ALP      55 - 135 U/L 73    AST      10 - 40 U/L 42 (H)    ALT      10 - 44 U/L 78 (H)    eGFR      >60 mL/min/1.73 m^2 >60    Anion Gap      8 - 16 mmol/L 7 (L)       Component      Latest Ref Rng 3/1/2024   CRP      0.0 - 8.2 mg/L 0.5    Sed Rate      0 - 23 mm/Hr 13      Component      Latest Ref Rng 3/1/2024   Iron      45 - 160 ug/dL 84    Transferrin      200 - 375 mg/dL 279    TIBC      250 - 450 ug/dL 413    Saturated Iron      20 - 50 % 20    Ferritin      20.0 - 300.0 ng/mL 64    Vitamin B12      210 - 950 pg/mL 748    Folate      4.0 - 24.0 ng/mL 10.8    Retic      0.4 - 2.0 % 0.9    Lactate Dehydrogenase       "110 - 260 U/L 187    Sol. Transferrin Receptor      1.8 - 4.6 mg/L 3.3      Component      Latest Ref Rng 3/1/2024   Uric Acid      3.4 - 7.0 mg/dL 4.2      Rheumatologic medications history  MTX, initially 6 tablets weekly increased to 10 tablets weekly (2022-present)  Folic acid 1 mg daily   Febuxostat 40 mg daily (05/2023-present)    Objective:   /81   Pulse (!) 53   Ht 5' 8" (1.727 m)   Wt 81 kg (178 lb 9.2 oz)   BMI 27.15 kg/m²   Physical Exam   Constitutional: He is oriented to person, place, and time. normal appearance.   HENT:   Nose: Nose normal.   Mouth/Throat: Oropharynx is clear.   Eyes: Conjunctivae are normal.   Cardiovascular: Normal rate, regular rhythm, normal heart sounds and normal pulses.   Pulmonary/Chest: Effort normal and breath sounds normal.   Abdominal: Soft. Bowel sounds are normal. He exhibits no distension.   Musculoskeletal:         General: Swelling (L 1st and 2nd MCP) and deformity (L and R 1st MCP tophi) present. No tenderness. Normal range of motion.      Cervical back: Normal range of motion and neck supple.   Neurological: He is alert and oriented to person, place, and time.   Skin: Skin is warm. No rash noted. No erythema.   Psychiatric: His behavior is normal. Mood normal.        3/21/2023 6/27/2023 9/12/2023 3/5/2024   Tender (WALKER-28) 0 / 28  1 / 28  0 / 28  0 / 28    Swollen (WALKER-28) 2 / 28 1 / 28 2 / 28 2 / 28    Provider Global 20 mm 15 mm 10 mm 10 mm   Patient Global 50 mm 15 mm 30 mm 50 mm   ESR 14 mm/hr 6 mm/hr 6 mm/hr 13 mm/hr   CRP 1.4 mg/L 0.4 mg/L 0.4 mg/L 0.5 mg/L   WALKER-28 (ESR) 2.94 (Low disease activity) 2.3 (Remission) 2.07 (Remission) 2.89 (Low disease activity)   WALKER-28 (CRP) 2.37 (Remission) 2.13 (Remission) 1.9 (Remission) 2.2 (Remission)   CDAI Score 9  5  6  8         Assessment:     1. Rheumatoid arthritis, involving unspecified site, unspecified whether rheumatoid factor present    2. Idiopathic chronic gout of multiple sites without tophus  "   3. Trigger finger of right thumb    4. Methotrexate, long term, current use    5. Elevated liver enzymes      Plan:     Problem List Items Addressed This Visit          Immunology/Multi System    Rheumatoid arthritis - Primary    Relevant Medications    methotrexate 2.5 MG Tab    folic acid (FOLVITE) 1 MG tablet    Other Relevant Orders    Sedimentation rate    C-REACTIVE PROTEIN    COMPREHENSIVE METABOLIC PANEL    CBC W/ AUTO DIFFERENTIAL       Orthopedic    Gout    Relevant Medications    febuxostat (ULORIC) 40 mg Tab    Other Relevant Orders    URIC ACID     Other Visit Diagnoses       Trigger finger of right thumb        Relevant Orders    Ambulatory referral/consult to Orthopedics    Methotrexate, long term, current use        Relevant Orders    CBC W/ AUTO DIFFERENTIAL    Gamma GT    Elevated liver enzymes        Relevant Orders    CK    Gamma GT          Karthik Bentley is a 68 y.o. M with a past medical history of seropositive RA (+RF), gout, HLD, cervical myelopathy, HTN and T2DM who presents for follow-up.     Seropositive RA- diagnosed in 2022, well controlled on MTX 10 tablets weekly, trace synovitis on exam today, CDAI 8, in remission     Gout- uric acid 4.2 on Febuxostat 40 mg daily, uses Colchicine 0.6 mg as needed, last flare in March 2023    Plan:  - Recent CMP, ESR, CRP and uric acid reviewed   - Repeat LFTs in 2 weeks, check GGT and CK also   - If LFTs still abnormal, will need referral to hepatology   - Continue MTX 10 mg weekly and folic acid daily for RA  - Continue Febuxostat 40 mg daily and Colchicine 0.6 mg as needed for gout   - Repeat labs screening labs every 3 months and again in 6 months before visit     This patient was examined with Dr. Miranda. Plan discussed with the patient. Return to clinic in 6 months.    Giovanna Cummings MD  Rheumatology Fellow, PGY4

## 2024-03-05 NOTE — Clinical Note
Shanika,  I saw Mr. Bentley today after you did. I saw you ordered repeat LFTs in 2 weeks. I also added on a CK and GGT to that. If LFTs still abnormal at that point, will need referral to hepatology.  Thanks,  Mary Cummings Rheumatology Fellow

## 2024-03-05 NOTE — PROGRESS NOTES
Answers submitted by the patient for this visit:  Rheumatology Questionnaire (Submitted on 3/4/2024)  fever: No  eye redness: No  mouth sores: No  headaches: No  shortness of breath: No  chest pain: No  trouble swallowing: Yes  diarrhea: No  constipation: No  unexpected weight change: No  genital sore: No  During the last 3 days, have you had a skin rash?: No  Bruises or bleeds easily: No  cough: No      3/4/2024     6:36 PM   Rapid3 Question Responses and Scores   MDHAQ Score 0.9   Psychologic Score 2.2   Pain Score 6.5   When you awakened in the morning OVER THE LAST WEEK, did you feel stiff? Yes   If Yes, please indicate the number of hours until you are as limber as you will be for the day 1   Fatigue Score 5   Global Health Score 5   RAPID3 Score 4.83

## 2024-03-06 NOTE — PROGRESS NOTES
I have personally reviewed the history, confirmed exam findings, and discussed assessment and plan with fellow.     Rheumatology Questionnaire (Submitted on 3/4/2024)  fever: No  eye redness: No  mouth sores: No  headaches: No  shortness of breath: No  chest pain: No  trouble swallowing: Yes  diarrhea: No  constipation: No  unexpected weight change: No  genital sore: No  During the last 3 days, have you had a skin rash?: No  Bruises or bleeds easily: No  cough: No      RA TJ 0 SJ 2 Pt global 50 ESR 13 CRP 0.5 DAS28 2.89(LDA)  EAD40-KUH 2.2(remission)  CDAI 8(LDA)  Gout, intercritical SUA 4.2 on febuxostat 40mg daily   mildly elevated transaminases   many recent situational stressors brother just passed in Hospice with dementia and another relative killed in [place]  His major issue is concerns regarding multiple trigger fingers and lack of consistent improvement with steroid injections and surgery    Recheck hepatic function panel, GGT and CK on or about 3/15/24 if still elevated will refer to Hepatology   Continue febuxostat 40mg daily   Can stop  colchicine 0.6mg daily prophylaxis, use prn acute attack  Continue methotrexate 25mg po once a week with folic acid 1mg daily  *He would like 3rd Ortho Hand opinion regarding right thumb triggering, right index and middle triggering and persistent post op symptoms left middle trigger finger post synovectomy. Suggested Dr. Becky Elias  Ref to Gastro for dysphagia, esophageal dysmotility seen on MBS 1/23/23  Resume  exercise program  Cont Mediterranean style diet  RTC 3 months with standing labs   Answers submitted by the patient for this visit:  Rheumatology Questionnaire (Submitted on 3/4/2024)  fever: No  eye redness: No  mouth sores: No  headaches: No  shortness of breath: No  chest pain: No  trouble swallowing: Yes  diarrhea: No  constipation: No  unexpected weight change: No  genital sore: No  During the last 3 days, have you had a skin rash?: No  Bruises or bleeds  easily: No  cough: No

## 2024-03-15 DIAGNOSIS — M79.641 RIGHT HAND PAIN: Primary | ICD-10-CM

## 2024-03-19 ENCOUNTER — LAB VISIT (OUTPATIENT)
Dept: LAB | Facility: HOSPITAL | Age: 69
End: 2024-03-19
Attending: STUDENT IN AN ORGANIZED HEALTH CARE EDUCATION/TRAINING PROGRAM
Payer: MEDICARE

## 2024-03-19 DIAGNOSIS — R74.01 TRANSAMINITIS: ICD-10-CM

## 2024-03-19 DIAGNOSIS — E11.9 TYPE 2 DIABETES MELLITUS WITHOUT COMPLICATION, WITHOUT LONG-TERM CURRENT USE OF INSULIN: ICD-10-CM

## 2024-03-19 DIAGNOSIS — R74.8 ELEVATED LIVER ENZYMES: ICD-10-CM

## 2024-03-19 DIAGNOSIS — Z79.631 METHOTREXATE, LONG TERM, CURRENT USE: ICD-10-CM

## 2024-03-19 DIAGNOSIS — Z12.5 SCREENING FOR PROSTATE CANCER: ICD-10-CM

## 2024-03-19 LAB
ALBUMIN SERPL BCP-MCNC: 4 G/DL (ref 3.5–5.2)
ALP SERPL-CCNC: 78 U/L (ref 55–135)
ALT SERPL W/O P-5'-P-CCNC: 54 U/L (ref 10–44)
AST SERPL-CCNC: 30 U/L (ref 10–40)
BILIRUB DIRECT SERPL-MCNC: 0.3 MG/DL (ref 0.1–0.3)
BILIRUB SERPL-MCNC: 0.7 MG/DL (ref 0.1–1)
CK SERPL-CCNC: 133 U/L (ref 20–200)
COMPLEXED PSA SERPL-MCNC: 1.3 NG/ML (ref 0–4)
ESTIMATED AVG GLUCOSE: 128 MG/DL (ref 68–131)
GGT SERPL-CCNC: 15 U/L (ref 8–55)
HBA1C MFR BLD: 6.1 % (ref 4–5.6)
PROT SERPL-MCNC: 6.9 G/DL (ref 6–8.4)

## 2024-03-19 PROCEDURE — 83036 HEMOGLOBIN GLYCOSYLATED A1C: CPT | Performed by: STUDENT IN AN ORGANIZED HEALTH CARE EDUCATION/TRAINING PROGRAM

## 2024-03-19 PROCEDURE — 36415 COLL VENOUS BLD VENIPUNCTURE: CPT | Mod: PN | Performed by: STUDENT IN AN ORGANIZED HEALTH CARE EDUCATION/TRAINING PROGRAM

## 2024-03-19 PROCEDURE — 84153 ASSAY OF PSA TOTAL: CPT | Performed by: STUDENT IN AN ORGANIZED HEALTH CARE EDUCATION/TRAINING PROGRAM

## 2024-03-19 PROCEDURE — 82977 ASSAY OF GGT: CPT | Performed by: STUDENT IN AN ORGANIZED HEALTH CARE EDUCATION/TRAINING PROGRAM

## 2024-03-19 PROCEDURE — 82550 ASSAY OF CK (CPK): CPT | Performed by: STUDENT IN AN ORGANIZED HEALTH CARE EDUCATION/TRAINING PROGRAM

## 2024-03-19 PROCEDURE — 80076 HEPATIC FUNCTION PANEL: CPT | Performed by: STUDENT IN AN ORGANIZED HEALTH CARE EDUCATION/TRAINING PROGRAM

## 2024-03-22 ENCOUNTER — PATIENT MESSAGE (OUTPATIENT)
Dept: PRIMARY CARE CLINIC | Facility: CLINIC | Age: 69
End: 2024-03-22
Payer: MEDICARE

## 2024-03-22 DIAGNOSIS — E11.9 TYPE 2 DIABETES MELLITUS WITHOUT COMPLICATION, WITHOUT LONG-TERM CURRENT USE OF INSULIN: Primary | ICD-10-CM

## 2024-04-22 ENCOUNTER — PATIENT MESSAGE (OUTPATIENT)
Dept: ORTHOPEDICS | Facility: CLINIC | Age: 69
End: 2024-04-22
Payer: MEDICARE

## 2024-04-23 ENCOUNTER — OFFICE VISIT (OUTPATIENT)
Dept: ORTHOPEDICS | Facility: CLINIC | Age: 69
End: 2024-04-23
Payer: MEDICARE

## 2024-04-23 ENCOUNTER — HOSPITAL ENCOUNTER (OUTPATIENT)
Dept: RADIOLOGY | Facility: HOSPITAL | Age: 69
Discharge: HOME | End: 2024-04-23
Attending: ORTHOPAEDIC SURGERY
Payer: MEDICARE

## 2024-04-23 VITALS — WEIGHT: 178.56 LBS | BODY MASS INDEX: 27.06 KG/M2 | HEIGHT: 68 IN

## 2024-04-23 DIAGNOSIS — M65.331 TRIGGER FINGER, RIGHT MIDDLE FINGER: ICD-10-CM

## 2024-04-23 DIAGNOSIS — M79.644 FINGER PAIN, RIGHT: ICD-10-CM

## 2024-04-23 DIAGNOSIS — M65.311 TRIGGER FINGER OF RIGHT THUMB: ICD-10-CM

## 2024-04-23 DIAGNOSIS — M79.644 FINGER PAIN, RIGHT: Primary | ICD-10-CM

## 2024-04-23 DIAGNOSIS — M65.311 TRIGGER THUMB, RIGHT THUMB: Primary | ICD-10-CM

## 2024-04-23 PROCEDURE — 3008F BODY MASS INDEX DOCD: CPT | Mod: CPTII,S$GLB,, | Performed by: ORTHOPAEDIC SURGERY

## 2024-04-23 PROCEDURE — 1159F MED LIST DOCD IN RCRD: CPT | Mod: CPTII,S$GLB,, | Performed by: ORTHOPAEDIC SURGERY

## 2024-04-23 PROCEDURE — 3061F NEG MICROALBUMINURIA REV: CPT | Mod: CPTII,S$GLB,, | Performed by: ORTHOPAEDIC SURGERY

## 2024-04-23 PROCEDURE — 3044F HG A1C LEVEL LT 7.0%: CPT | Mod: CPTII,S$GLB,, | Performed by: ORTHOPAEDIC SURGERY

## 2024-04-23 PROCEDURE — 99999 PR PBB SHADOW E&M-EST. PATIENT-LVL III: CPT | Mod: PBBFAC,,, | Performed by: ORTHOPAEDIC SURGERY

## 2024-04-23 PROCEDURE — 4010F ACE/ARB THERAPY RXD/TAKEN: CPT | Mod: CPTII,S$GLB,, | Performed by: ORTHOPAEDIC SURGERY

## 2024-04-23 PROCEDURE — 3066F NEPHROPATHY DOC TX: CPT | Mod: CPTII,S$GLB,, | Performed by: ORTHOPAEDIC SURGERY

## 2024-04-23 PROCEDURE — 1101F PT FALLS ASSESS-DOCD LE1/YR: CPT | Mod: CPTII,S$GLB,, | Performed by: ORTHOPAEDIC SURGERY

## 2024-04-23 PROCEDURE — 73140 X-RAY EXAM OF FINGER(S): CPT | Mod: TC,RT

## 2024-04-23 PROCEDURE — 99213 OFFICE O/P EST LOW 20 MIN: CPT | Mod: 25,S$GLB,, | Performed by: ORTHOPAEDIC SURGERY

## 2024-04-23 PROCEDURE — 1125F AMNT PAIN NOTED PAIN PRSNT: CPT | Mod: CPTII,S$GLB,, | Performed by: ORTHOPAEDIC SURGERY

## 2024-04-23 PROCEDURE — 3288F FALL RISK ASSESSMENT DOCD: CPT | Mod: CPTII,S$GLB,, | Performed by: ORTHOPAEDIC SURGERY

## 2024-04-23 PROCEDURE — 73140 X-RAY EXAM OF FINGER(S): CPT | Mod: 26,RT,, | Performed by: RADIOLOGY

## 2024-04-23 PROCEDURE — 20550 NJX 1 TENDON SHEATH/LIGAMENT: CPT | Mod: RT,S$GLB,, | Performed by: ORTHOPAEDIC SURGERY

## 2024-04-23 RX ORDER — DEXAMETHASONE SODIUM PHOSPHATE 4 MG/ML
4 INJECTION, SOLUTION INTRA-ARTICULAR; INTRALESIONAL; INTRAMUSCULAR; INTRAVENOUS; SOFT TISSUE
Status: DISCONTINUED | OUTPATIENT
Start: 2024-04-23 | End: 2024-04-23 | Stop reason: HOSPADM

## 2024-04-23 RX ADMIN — DEXAMETHASONE SODIUM PHOSPHATE 4 MG: 4 INJECTION, SOLUTION INTRA-ARTICULAR; INTRALESIONAL; INTRAMUSCULAR; INTRAVENOUS; SOFT TISSUE at 01:04

## 2024-04-23 NOTE — PROCEDURES
Tendon Sheath    Date/Time: 4/23/2024 1:45 PM    Performed by: Wiley Warren MD  Authorized by: Wiley Warren MD    Consent Done?:  Yes (Verbal)  Indications:  Pain  Site marked: the procedure site was marked    Timeout: prior to procedure the correct patient, procedure, and site was verified    Prep: patient was prepped and draped in usual sterile fashion      Local anesthesia used?: Yes    Local anesthetic: Topical spray prior to injection and 1cc 1% plain lidocaine in the injectate.  Location:  Thumb  Site:  R thumb flexor tendon sheath  Ultrasonic guidance for needle placement?: No    Needle size:  25 G  Approach:  Volar  Medications:  4 mg dexAMETHasone 4 mg/mL  Patient tolerance:  Patient tolerated the procedure well with no immediate complications

## 2024-04-23 NOTE — PROGRESS NOTES
[facility]  History & Physical  Orthopedics     SUBJECTIVE:       COVID-19 attestation:  This patient was treated during the COVID-19 pandemic.  This was discussed with the patient, they are aware of our current policies and procedures, were given the option of delaying their visit and or switching to a virtual visit, delaying their surgery when applicable, and they elect to proceed.     Chief Complaint:  left hand     Referring Provider: No ref. provider found      History of Present Illness:  Patient is a 66 y.o. right hand dominant male who presents today status post a right carpal tunnel release.  This is doing well and he continues to be happy with it.  He is really only reporting an describing some persistent stiffness in the left hand.  He has received a diagnosis of rheumatoid arthritis and is being treated with methotrexate.  He notes that this in combination with occupational therapy and home exercises have helped his stiffness significantly and certainly presented for worsening.  He feels as if overall he is doing well.  No other new complaints.      Interval history April 23, 2024: The patient returns today for re-evaluation.  He is now status post a right endoscopic carpal tunnel release by myself and status post a left open carpal tunnel release and a left long finger A1 pulley release by Dr. Ronen feliciano.  He notes that he is very pleased with his right hand at this point in time and continues to recover from the left hand.  He is mostly reporting today some triggering to the right thumb greater than right long finger.  He would like to have this evaluated and has no other complaints for me today.    The patient is a/an retired.     Onset of symptoms/DOI was about a year ago.     Symptoms are aggravated by activity, movement and at night.     Symptoms are alleviated by rest and during the day.     Symptoms consist of pain, swelling, decreased ROM and numbness/tingling.     The  patient rates their pain as 0/10     Attempted treatment(s) and/or interventions include activity modifications, rest     The patient denies any fevers, chills, N/V, D/C and presents for evaluation.             Past Medical History:   Diagnosis Date    Diabetes mellitus type II      Hypertension              Past Surgical History:   Procedure Laterality Date    COLONOSCOPY N/A 1/16/2020     Procedure: COLONOSCOPY;  Surgeon: Cynthia Kearney MD;  Location: [address]);  Service: Endoscopy;  Laterality: N/A;    SHOULDER SURGERY          Review of patient's allergies indicates:  No Known Allergies  Social History          Social History Narrative    Not on file            Family History   Problem Relation Age of Onset    Bone cancer Mother      Colon cancer Father              Current Outpatient Medications:     blood sugar diagnostic Strp, To check BG 1 times daily, to use with insurance preferred meter, Disp: 100 strip, Rfl: 3    blood-glucose meter kit, To check BG 1 times daily, to use with insurance preferred meter, Disp: 1 each, Rfl: 0    gentian violet 2 % topical solution, Apply 0.5 mLs topically daily as needed. Apply between toes., Disp: 59 mL, Rfl: 1    lancets Misc, To check BG 1 times daily, to use with insurance preferred meter, Disp: 100 each, Rfl: 3    metFORMIN (GLUCOPHAGE) 1000 MG tablet, Take 1 tablet (1,000 mg total) by mouth 2 (two) times daily with meals., Disp: 180 tablet, Rfl: 3    pneumococcal 23-merritt ps (PNEUMOVAX 23) 25 mcg/0.5 mL vaccine, Inject into the muscle., Disp: 0.5 mL, Rfl: 0    rosuvastatin (CRESTOR) 10 MG tablet, Take 1 tablet (10 mg total) by mouth once daily., Disp: 90 tablet, Rfl: 3    TRULICITY 0.75 mg/0.5 mL pen injector, INJECT 0.75 MG(0.5 ML) UNDER THE SKIN EVERY 7 DAYS, Disp: 4 pen, Rfl: 11    valACYclovir (VALTREX) 500 MG tablet, Take 1 tablet (500 mg total) by mouth 2 (two) times daily. for 3 days, Disp: 6 tablet, Rfl: 6    varicella-zoster gE-AS01B, PF,  "(SHINGRIX, PF,) 50 mcg/0.5 mL injection, Inject into the muscle., Disp: 1 each, Rfl: 1        Review of Systems:  As per HPI otherwise noncontributory     OBJECTIVE:       Vital Signs (Most Recent):  Vitals       Vitals:     03/17/22 1038   Weight: 88 kg (194 lb)   Height: 5' 8" (1.727 m)         Body mass index is 29.5 kg/m².        Physical Exam:  Constitutional: The patient appears well-developed and well-nourished. No distress.   Skin: No lesions appreciated  Head: Normocephalic and atraumatic.   Nose: Nose normal.   Ears: No deformities seen  Eyes: Conjunctivae and EOM are normal.   Neck: No tracheal deviation present.   Cardiovascular: Normal rate and intact distal pulses.    Pulmonary/Chest: Effort normal. No respiratory distress.   Abdominal: There is no guarding.   Neurological: The patient is alert.   Psychiatric: The patient has a normal mood and affect.      Right Hand/Wrist Examination:     Observation/Inspection:  Swelling                       none                  Deformity                     none  Discoloration               none                  Scars                            endoscopic right carpal tunnel well-healed, open left carpal tunnel release, left long finger A1 pulley release                   Atrophy                        None  Patient with significant tenderness palpation at the right thumb and long finger A1 pulleys with betty triggering of each seen today on exam     HAND/WRIST EXAMINATION:  Finkelstein's Test                                Neg  WHAT Test                                         Neg  Snuff box tenderness                          Neg  Osullivan's Test                                     Neg  Hook of Hamate Tenderness              Neg  CMC grind                                           Neg  Circumduction test                              Neg     Neurovascular Exam:  Digits WWP, brisk CR < 3s throughout  NVI motor/LTS to M/R/U nerves, radial pulse 2+  Tinel's Test - " Carpal Tunnel                negative today  Tinel's Test - Cubital Tunnel               negative today  Phalen's Test                                      negative today   Median Nerve Compression Test       negative today     ROM of the right hand is full and painless today      ROM wrist full, painless    ROM elbow full, painless     Abdomen not guarded  Respirations nonlabored  Perfusion intact     Diagnostic Results:     Imaging - I independently viewed the patient's imaging as well as the radiology report.  Xrays of the patient's right hand  demonstrate no evidence of any acute fractures or dislocations with mild degenerative changes.      ASSESSMENT/PLAN:       66 y.o. yo male with right thumb and long finger trigger fingers       Plan: The patient and I had a thorough discussion today.  We discussed the working diagnosis as well as several other potential alternative diagnoses.  Treatment options were discussed, both conservative and surgical.  Conservative treatment options would include things such as activity modifications, workplace modifications, a period of rest, oral vs topical OTC and prescription anti-inflammatory medications, occupational therapy, splinting/bracing, immobilization, corticosteroid injections, and others.  Surgical options were discussed as well.      At this point in time, the patient like to proceed with corticosteroid injections to the right thumb and long finger flexor tendon sheath today which will be administered.  We will also provide him a trigger finger splint handout today.  Follow-up on an as needed/if needed basis.         Wiley Warren M.D.     Please be aware that this note has been generated with the assistance of [employer] voice-to-text.  Please excuse any spelling or grammatical errors.     Thank you for choosing Dr. Wiley Warren for your orthopedic hand and upper extremity care. It is our goal to provide you with exceptional care that will help keep you healthy,  active, and get you back in the game.     If you felt that you received exemplary care today, please consider leaving feedback for Dr. Warren on [email or web address] at [email or web address].     Please do not hesitate to reach out to us via email, phone, or MyChart with any questions, concerns, or feedback.

## 2024-04-23 NOTE — PROCEDURES
Tendon Sheath    Date/Time: 4/23/2024 1:45 PM    Performed by: Wiley Warren MD  Authorized by: Wiley Warren MD    Consent Done?:  Yes (Verbal)  Indications:  Pain  Site marked: the procedure site was marked    Timeout: prior to procedure the correct patient, procedure, and site was verified    Prep: patient was prepped and draped in usual sterile fashion      Local anesthesia used?: Yes    Local anesthetic:  Topical anesthetic (Topical spray prior to injection and 1cc 1% plain lidocaine in the injectate)  Location:  Long finger  Site:  R long flexor tendon sheath  Ultrasonic guidance for needle placement?: No    Needle size:  25 G  Approach:  Volar  Medications:  4 mg dexAMETHasone 4 mg/mL  Patient tolerance:  Patient tolerated the procedure well with no immediate complications

## 2024-04-24 DIAGNOSIS — E11.9 TYPE 2 DIABETES MELLITUS WITHOUT COMPLICATION: ICD-10-CM

## 2024-05-20 ENCOUNTER — PATIENT MESSAGE (OUTPATIENT)
Dept: ADMINISTRATIVE | Facility: HOSPITAL | Age: 69
End: 2024-05-20
Payer: MEDICARE

## 2024-05-20 ENCOUNTER — PATIENT OUTREACH (OUTPATIENT)
Dept: ADMINISTRATIVE | Facility: HOSPITAL | Age: 69
End: 2024-05-20
Payer: MEDICARE

## 2024-05-20 NOTE — PROGRESS NOTES
Patient due for the following    RSV Vaccine (Age 60+ and Pregnant patients) (1 - 1-dose 60+ series)    Lipid Panel     Foot Exam     COVID-19 Vaccine (7 - 2023-24 season)      Immunizations: reviewed and updated  Care Everywhere: triggered  Care Teams: up to date  Outreach: [facility]

## 2024-05-20 NOTE — PROGRESS NOTES
Karthik Bentley presented for an initial Medicare AWV today. The following components were reviewed and updated:    Medical history  Family History  Social history  Allergies and Current Medications  Health Risk Assessment  Health Maintenance  Care Team    **See Completed Assessments for Annual Wellness visit with in the encounter summary    The following assessments were completed:  Depression Screening  Cognitive function Screening  Timed Get Up Test  Whisper Test      Opioid documentation:      Patient does not have a current opioid prescription.         There were no vitals filed for this visit.  There is no height or weight on file to calculate BMI.       Physical Exam  Vitals reviewed.   Constitutional:       Appearance: Normal appearance. He is normal weight.   HENT:      Head: Normocephalic and atraumatic.      Nose: Nose normal.      Mouth/Throat:      Mouth: Mucous membranes are moist.      Pharynx: Oropharynx is clear.   Eyes:      Extraocular Movements: Extraocular movements intact.      Conjunctiva/sclera: Conjunctivae normal.      Pupils: Pupils are equal, round, and reactive to light.   Cardiovascular:      Rate and Rhythm: Normal rate and regular rhythm.      Pulses: Normal pulses.      Heart sounds: Normal heart sounds.   Pulmonary:      Effort: Pulmonary effort is normal.      Breath sounds: Normal breath sounds.   Musculoskeletal:         General: Normal range of motion.      Cervical back: Normal range of motion and neck supple.   Skin:     General: Skin is warm and dry.      Capillary Refill: Capillary refill takes less than 2 seconds.   Neurological:      General: No focal deficit present.      Mental Status: He is alert and oriented to person, place, and time. Mental status is at baseline.   Psychiatric:         Mood and Affect: Mood normal.         Behavior: Behavior normal.         Thought Content: Thought content normal.         Judgment: Judgment normal.       Diagnoses and health risks  identified today and associated recommendations/orders:  1. Encounter for annual wellness visit (AWV) in Medicare patient    2. Cervical myelopathy  - Procedure of discectomy, spine, cervical, anterior approach with fusion C3-C6 performed on 10/31/22.   Xray of cervical spine performed on 01/23/23 revealing ACDF and disc replacement identified extending from C3 through C6 vertebral segments. The position alignment is satisfactory and unchanged as compared to the previous study. This diagnosis has been resolved.    3. Type 2 diabetes mellitus without complication, without long-term current use of insulin  - Last Hgb A1c was 5.1 on 03/19/24. Continue Jardiance 25 mg daily. Next Hgb A1c due in 08/2024    4. Mixed hyperlipidemia  - Last lipid panel drawn on 01/25/23 was WNL. Continue rosuvastatin 10 mg daily.    5. Screening for prostate cancer  - Last prostate cancer screen done with PSA was WNL 1.3 on 03/20/24. Next due 03/21/25.    6. Essential hypertension  - BP today is 126/70. Continue valsartan 160 mg daily.  - Patient states he has never had a diagnosis of hypertension. There are no previous records to diagnose patient with hypertension. F/U with PCP.    7. Need for RSV immunization  - Patient does not wish to receive RSV vaccine.    8. Screen for colon cancer  - Last colon cancer screen done with colonoscopy on 01/21/20 with next due 01/21/25.       Provided Ray with a 5-10 year written screening schedule and personal prevention plan. Recommendations were developed using the USPSTF age appropriate recommendations. Education, counseling, and referrals were provided as needed.  After Visit Summary printed and given to patient which includes a list of additional screenings\tests needed.    No follow-ups on file.      Linda Jones NP

## 2024-05-21 ENCOUNTER — OFFICE VISIT (OUTPATIENT)
Dept: PRIMARY CARE CLINIC | Facility: CLINIC | Age: 69
End: 2024-05-21
Payer: MEDICARE

## 2024-05-21 VITALS
HEIGHT: 68 IN | BODY MASS INDEX: 29.07 KG/M2 | OXYGEN SATURATION: 98 % | TEMPERATURE: 98 F | WEIGHT: 191.81 LBS | RESPIRATION RATE: 20 BRPM | DIASTOLIC BLOOD PRESSURE: 70 MMHG | SYSTOLIC BLOOD PRESSURE: 126 MMHG | HEART RATE: 51 BPM

## 2024-05-21 DIAGNOSIS — E11.9 TYPE 2 DIABETES MELLITUS WITHOUT COMPLICATION, WITHOUT LONG-TERM CURRENT USE OF INSULIN: ICD-10-CM

## 2024-05-21 DIAGNOSIS — Z12.11 SCREEN FOR COLON CANCER: ICD-10-CM

## 2024-05-21 DIAGNOSIS — E78.2 MIXED HYPERLIPIDEMIA: ICD-10-CM

## 2024-05-21 DIAGNOSIS — Z00.00 ENCOUNTER FOR MEDICARE ANNUAL WELLNESS EXAM: ICD-10-CM

## 2024-05-21 DIAGNOSIS — Z00.00 ENCOUNTER FOR ANNUAL WELLNESS VISIT (AWV) IN MEDICARE PATIENT: Primary | ICD-10-CM

## 2024-05-21 DIAGNOSIS — I10 ESSENTIAL HYPERTENSION: ICD-10-CM

## 2024-05-21 DIAGNOSIS — Z00.00 ENCOUNTER FOR PREVENTIVE HEALTH EXAMINATION: ICD-10-CM

## 2024-05-21 DIAGNOSIS — Z29.11 NEED FOR RSV IMMUNIZATION: ICD-10-CM

## 2024-05-21 DIAGNOSIS — Z12.5 SCREENING FOR PROSTATE CANCER: ICD-10-CM

## 2024-05-21 DIAGNOSIS — G95.9 CERVICAL MYELOPATHY: ICD-10-CM

## 2024-05-21 PROBLEM — G89.29 CHRONIC PAIN OF BOTH SHOULDERS: Status: RESOLVED | Noted: 2022-07-02 | Resolved: 2024-05-21

## 2024-05-21 PROBLEM — R60.0 LOCALIZED EDEMA: Status: RESOLVED | Noted: 2023-10-11 | Resolved: 2024-05-21

## 2024-05-21 PROBLEM — M25.512 CHRONIC PAIN OF BOTH SHOULDERS: Status: RESOLVED | Noted: 2022-07-02 | Resolved: 2024-05-21

## 2024-05-21 PROBLEM — R55 SYNCOPE: Status: RESOLVED | Noted: 2023-08-01 | Resolved: 2024-05-21

## 2024-05-21 PROBLEM — G56.02 LEFT CARPAL TUNNEL SYNDROME: Status: RESOLVED | Noted: 2023-09-05 | Resolved: 2024-05-21

## 2024-05-21 PROBLEM — M25.511 CHRONIC PAIN OF BOTH SHOULDERS: Status: RESOLVED | Noted: 2022-07-02 | Resolved: 2024-05-21

## 2024-05-21 PROBLEM — R52 PAIN: Status: RESOLVED | Noted: 2023-10-11 | Resolved: 2024-05-21

## 2024-05-21 PROCEDURE — 99999 PR PBB SHADOW E&M-EST. PATIENT-LVL V: CPT | Mod: PBBFAC,HCNC,, | Performed by: NURSE PRACTITIONER

## 2024-05-21 PROCEDURE — 3061F NEG MICROALBUMINURIA REV: CPT | Mod: HCNC,CPTII,S$GLB, | Performed by: NURSE PRACTITIONER

## 2024-05-21 PROCEDURE — 1170F FXNL STATUS ASSESSED: CPT | Mod: HCNC,CPTII,S$GLB, | Performed by: NURSE PRACTITIONER

## 2024-05-21 PROCEDURE — 1160F RVW MEDS BY RX/DR IN RCRD: CPT | Mod: HCNC,CPTII,S$GLB, | Performed by: NURSE PRACTITIONER

## 2024-05-21 PROCEDURE — 4010F ACE/ARB THERAPY RXD/TAKEN: CPT | Mod: HCNC,CPTII,S$GLB, | Performed by: NURSE PRACTITIONER

## 2024-05-21 PROCEDURE — 1101F PT FALLS ASSESS-DOCD LE1/YR: CPT | Mod: HCNC,CPTII,S$GLB, | Performed by: NURSE PRACTITIONER

## 2024-05-21 PROCEDURE — 1158F ADVNC CARE PLAN TLK DOCD: CPT | Mod: HCNC,CPTII,S$GLB, | Performed by: NURSE PRACTITIONER

## 2024-05-21 PROCEDURE — 3288F FALL RISK ASSESSMENT DOCD: CPT | Mod: HCNC,CPTII,S$GLB, | Performed by: NURSE PRACTITIONER

## 2024-05-21 PROCEDURE — 1159F MED LIST DOCD IN RCRD: CPT | Mod: HCNC,CPTII,S$GLB, | Performed by: NURSE PRACTITIONER

## 2024-05-21 PROCEDURE — 3074F SYST BP LT 130 MM HG: CPT | Mod: HCNC,CPTII,S$GLB, | Performed by: NURSE PRACTITIONER

## 2024-05-21 PROCEDURE — 3044F HG A1C LEVEL LT 7.0%: CPT | Mod: HCNC,CPTII,S$GLB, | Performed by: NURSE PRACTITIONER

## 2024-05-21 PROCEDURE — 3066F NEPHROPATHY DOC TX: CPT | Mod: HCNC,CPTII,S$GLB, | Performed by: NURSE PRACTITIONER

## 2024-05-21 PROCEDURE — 3078F DIAST BP <80 MM HG: CPT | Mod: HCNC,CPTII,S$GLB, | Performed by: NURSE PRACTITIONER

## 2024-05-21 PROCEDURE — G0439 PPPS, SUBSEQ VISIT: HCPCS | Mod: HCNC,S$GLB,, | Performed by: NURSE PRACTITIONER

## 2024-05-21 NOTE — PATIENT INSTRUCTIONS
Counseling and Referral of Other Preventative  (Italic type indicates deductible and co-insurance are waived)    Patient Name: Karthik Bentley  Today's Date: 5/21/2024    Health Maintenance       Date Due Completion Date    Lipid Panel 01/25/2024 1/25/2023    Foot Exam 03/13/2024 3/13/2023 (Done)    Override on 3/13/2023: Done    Override on 1/18/2022: Done    Override on 1/16/2021: Done    Override on 1/27/2020: Done    RSV Vaccine (Age 60+ and Pregnant patients) (1 - 1-dose 60+ series) 05/21/2024 (Originally 6/26/2014) ---    COVID-19 Vaccine (7 - 2023-24 season) 05/21/2025 (Originally 4/4/2024) 12/4/2023    Hemoglobin A1c 09/19/2024 3/19/2024    Colorectal Cancer Screening 01/16/2025 1/16/2020    Eye Exam 02/02/2025 2/2/2024    Override on 2/2/2024: Done    Low Dose Statin 03/05/2025 3/5/2024    Diabetes Urine Screening 03/05/2025 3/5/2024    PROSTATE-SPECIFIC ANTIGEN 03/19/2025 3/19/2024    TETANUS VACCINE 12/31/2029 12/31/2019        Orders Placed This Encounter   Procedures    Ambulatory referral/consult to Podiatry       The following information is provided to all patients.  This information is to help you find resources for any of the problems found today that may be affecting your health:                  Living healthy guide: www.[facility].gov      Understanding Diabetes: www.diabetes.org      Eating healthy: www.cdc.gov/healthyweight      CDC home safety checklist: www.cdc.gov/steadi/patient.html      Agency on Aging: www.goea.[place].gov      Alcoholics anonymous (AA): www.aa.org      Physical Activity: www.lalo.nih.gov/eb9osju      Tobacco use: www.quitwithusla.org         Counseling and Referral of Other Preventative  (Italic type indicates deductible and co-insurance are waived)    Patient Name: Karthik Bentley  Today's Date: 5/21/2024    Health Maintenance       Date Due Completion Date    Lipid Panel 01/25/2024 1/25/2023    Foot Exam 03/13/2024 3/13/2023 (Done)    Override on 3/13/2023: Done     Override on 1/18/2022: Done    Override on 1/16/2021: Done    Override on 1/27/2020: Done    RSV Vaccine (Age 60+ and Pregnant patients) (1 - 1-dose 60+ series) 05/21/2024 (Originally 6/26/2014) ---    COVID-19 Vaccine (7 - 2023-24 season) 05/21/2025 (Originally 4/4/2024) 12/4/2023    Hemoglobin A1c 09/19/2024 3/19/2024    Colorectal Cancer Screening 01/16/2025 1/16/2020    Eye Exam 02/02/2025 2/2/2024    Override on 2/2/2024: Done    Low Dose Statin 03/05/2025 3/5/2024    Diabetes Urine Screening 03/05/2025 3/5/2024    PROSTATE-SPECIFIC ANTIGEN 03/19/2025 3/19/2024    TETANUS VACCINE 12/31/2029 12/31/2019        Orders Placed This Encounter   Procedures    Ambulatory referral/consult to Podiatry       The following information is provided to all patients.  This information is to help you find resources for any of the problems found today that may be affecting your health:                  Living healthy guide: www.[facility].[place].[facility]      Understanding Diabetes: www.diabetes.org      Eating healthy: www.cdc.gov/healthyweight      CDC home safety checklist: www.cdc.gov/steadi/patient.html      Agency on Aging: www.goea.[place].[facility]      Alcoholics anonymous (AA): www.aa.org      Physical Activity: www.lalo.nih.gov/tb3mznt      Tobacco use: www.quitwithusla.org

## 2024-07-29 ENCOUNTER — PATIENT MESSAGE (OUTPATIENT)
Dept: PODIATRY | Facility: CLINIC | Age: 69
End: 2024-07-29
Payer: MEDICARE

## 2024-08-01 ENCOUNTER — OFFICE VISIT (OUTPATIENT)
Dept: PODIATRY | Facility: CLINIC | Age: 69
End: 2024-08-01
Payer: MEDICARE

## 2024-08-01 VITALS
WEIGHT: 188.25 LBS | HEIGHT: 68 IN | BODY MASS INDEX: 28.53 KG/M2 | HEART RATE: 62 BPM | DIASTOLIC BLOOD PRESSURE: 62 MMHG | SYSTOLIC BLOOD PRESSURE: 122 MMHG

## 2024-08-01 DIAGNOSIS — L85.3 XEROSIS OF SKIN: ICD-10-CM

## 2024-08-01 DIAGNOSIS — E11.9 ENCOUNTER FOR DIABETIC FOOT EXAM: ICD-10-CM

## 2024-08-01 DIAGNOSIS — M06.9 RHEUMATOID ARTHRITIS, INVOLVING UNSPECIFIED SITE, UNSPECIFIED WHETHER RHEUMATOID FACTOR PRESENT: Primary | ICD-10-CM

## 2024-08-01 DIAGNOSIS — E11.9 TYPE 2 DIABETES MELLITUS WITHOUT COMPLICATION, WITHOUT LONG-TERM CURRENT USE OF INSULIN: ICD-10-CM

## 2024-08-01 PROCEDURE — 3074F SYST BP LT 130 MM HG: CPT | Mod: HCNC,CPTII,S$GLB, | Performed by: PODIATRIST

## 2024-08-01 PROCEDURE — 3008F BODY MASS INDEX DOCD: CPT | Mod: HCNC,CPTII,S$GLB, | Performed by: PODIATRIST

## 2024-08-01 PROCEDURE — 3288F FALL RISK ASSESSMENT DOCD: CPT | Mod: HCNC,CPTII,S$GLB, | Performed by: PODIATRIST

## 2024-08-01 PROCEDURE — 3061F NEG MICROALBUMINURIA REV: CPT | Mod: HCNC,CPTII,S$GLB, | Performed by: PODIATRIST

## 2024-08-01 PROCEDURE — 3044F HG A1C LEVEL LT 7.0%: CPT | Mod: HCNC,CPTII,S$GLB, | Performed by: PODIATRIST

## 2024-08-01 PROCEDURE — 1160F RVW MEDS BY RX/DR IN RCRD: CPT | Mod: HCNC,CPTII,S$GLB, | Performed by: PODIATRIST

## 2024-08-01 PROCEDURE — 1159F MED LIST DOCD IN RCRD: CPT | Mod: HCNC,CPTII,S$GLB, | Performed by: PODIATRIST

## 2024-08-01 PROCEDURE — 3078F DIAST BP <80 MM HG: CPT | Mod: HCNC,CPTII,S$GLB, | Performed by: PODIATRIST

## 2024-08-01 PROCEDURE — 99999 PR PBB SHADOW E&M-EST. PATIENT-LVL IV: CPT | Mod: PBBFAC,HCNC,, | Performed by: PODIATRIST

## 2024-08-01 PROCEDURE — 99204 OFFICE O/P NEW MOD 45 MIN: CPT | Mod: HCNC,S$GLB,, | Performed by: PODIATRIST

## 2024-08-01 PROCEDURE — 3066F NEPHROPATHY DOC TX: CPT | Mod: HCNC,CPTII,S$GLB, | Performed by: PODIATRIST

## 2024-08-01 PROCEDURE — 1101F PT FALLS ASSESS-DOCD LE1/YR: CPT | Mod: HCNC,CPTII,S$GLB, | Performed by: PODIATRIST

## 2024-08-01 PROCEDURE — 1126F AMNT PAIN NOTED NONE PRSNT: CPT | Mod: HCNC,CPTII,S$GLB, | Performed by: PODIATRIST

## 2024-08-01 PROCEDURE — 4010F ACE/ARB THERAPY RXD/TAKEN: CPT | Mod: HCNC,CPTII,S$GLB, | Performed by: PODIATRIST

## 2024-08-01 RX ORDER — TRIAMCINOLONE ACETONIDE 0.25 MG/G
OINTMENT TOPICAL DAILY
Qty: 80 G | Refills: 0 | Status: SHIPPED | OUTPATIENT
Start: 2024-08-01 | End: 2024-08-15

## 2024-08-01 RX ORDER — AMMONIUM LACTATE 12 G/100G
1 CREAM TOPICAL DAILY
Qty: 140 G | Refills: 1 | Status: SHIPPED | OUTPATIENT
Start: 2024-08-01

## 2024-08-19 ENCOUNTER — PATIENT MESSAGE (OUTPATIENT)
Dept: ADMINISTRATIVE | Facility: HOSPITAL | Age: 69
End: 2024-08-19
Payer: MEDICARE

## 2024-08-22 ENCOUNTER — LAB VISIT (OUTPATIENT)
Dept: LAB | Facility: HOSPITAL | Age: 69
End: 2024-08-22
Attending: STUDENT IN AN ORGANIZED HEALTH CARE EDUCATION/TRAINING PROGRAM
Payer: MEDICARE

## 2024-08-22 DIAGNOSIS — E11.9 TYPE 2 DIABETES MELLITUS WITHOUT COMPLICATION: ICD-10-CM

## 2024-08-22 LAB
CHOLEST SERPL-MCNC: 146 MG/DL (ref 120–199)
CHOLEST/HDLC SERPL: 2.1 {RATIO} (ref 2–5)
HDLC SERPL-MCNC: 70 MG/DL (ref 40–75)
HDLC SERPL: 47.9 % (ref 20–50)
LDLC SERPL CALC-MCNC: 63 MG/DL (ref 63–159)
NONHDLC SERPL-MCNC: 76 MG/DL
TRIGL SERPL-MCNC: 65 MG/DL (ref 30–150)

## 2024-08-22 PROCEDURE — 36415 COLL VENOUS BLD VENIPUNCTURE: CPT | Mod: HCNC,PN | Performed by: STUDENT IN AN ORGANIZED HEALTH CARE EDUCATION/TRAINING PROGRAM

## 2024-08-22 PROCEDURE — 80061 LIPID PANEL: CPT | Mod: HCNC | Performed by: STUDENT IN AN ORGANIZED HEALTH CARE EDUCATION/TRAINING PROGRAM

## 2024-10-04 ENCOUNTER — LAB VISIT (OUTPATIENT)
Dept: LAB | Facility: HOSPITAL | Age: 69
End: 2024-10-04
Attending: STUDENT IN AN ORGANIZED HEALTH CARE EDUCATION/TRAINING PROGRAM
Payer: MEDICARE

## 2024-10-04 DIAGNOSIS — E11.9 TYPE 2 DIABETES MELLITUS WITHOUT COMPLICATION, WITHOUT LONG-TERM CURRENT USE OF INSULIN: ICD-10-CM

## 2024-10-04 LAB
ESTIMATED AVG GLUCOSE: 163 MG/DL (ref 68–131)
HBA1C MFR BLD: 7.3 % (ref 4–5.6)

## 2024-10-04 PROCEDURE — 36415 COLL VENOUS BLD VENIPUNCTURE: CPT | Mod: HCNC,PN | Performed by: STUDENT IN AN ORGANIZED HEALTH CARE EDUCATION/TRAINING PROGRAM

## 2024-10-04 PROCEDURE — 83036 HEMOGLOBIN GLYCOSYLATED A1C: CPT | Mod: HCNC | Performed by: STUDENT IN AN ORGANIZED HEALTH CARE EDUCATION/TRAINING PROGRAM

## 2024-10-05 DIAGNOSIS — M1A.09X0 IDIOPATHIC CHRONIC GOUT OF MULTIPLE SITES WITHOUT TOPHUS: ICD-10-CM

## 2024-10-07 ENCOUNTER — PATIENT MESSAGE (OUTPATIENT)
Dept: PRIMARY CARE CLINIC | Facility: CLINIC | Age: 69
End: 2024-10-07
Payer: MEDICARE

## 2024-10-07 RX ORDER — FEBUXOSTAT 40 MG/1
40 TABLET, FILM COATED ORAL
Qty: 30 TABLET | Refills: 5 | OUTPATIENT
Start: 2024-10-07

## 2024-10-11 ENCOUNTER — PATIENT MESSAGE (OUTPATIENT)
Dept: PRIMARY CARE CLINIC | Facility: CLINIC | Age: 69
End: 2024-10-11

## 2024-10-11 ENCOUNTER — OFFICE VISIT (OUTPATIENT)
Dept: PRIMARY CARE CLINIC | Facility: CLINIC | Age: 69
End: 2024-10-11
Payer: MEDICARE

## 2024-10-11 DIAGNOSIS — E11.9 TYPE 2 DIABETES MELLITUS WITHOUT COMPLICATION, WITHOUT LONG-TERM CURRENT USE OF INSULIN: Primary | ICD-10-CM

## 2024-10-11 DIAGNOSIS — R39.15 URINARY URGENCY: ICD-10-CM

## 2024-10-11 NOTE — PROGRESS NOTES
Telehealth Visit    The patient location is: [place]   The chief complaint leading to consultation is: Lab follow up     Visit type: audiovisual    Face to Face time with patient: 10 minutes  15 minutes of total time spent on the encounter, which includes face to face time and non-face to face time preparing to see the patient (eg, review of tests), Obtaining and/or reviewing separately obtained history, Documenting clinical information in the electronic or other health record, Independently interpreting results (not separately reported) and communicating results to the patient/family/caregiver, or Care coordination (not separately reported).       HPI    Patient is a 69 y.o.   Karthik Bentley  has a past medical history of Cervical myelopathy (10/31/2022), Chronic pain of both shoulders (07/02/2022), Diabetes mellitus type II, Hypertension, Left carpal tunnel syndrome (09/05/2023), Localized edema (10/11/2023), Pain (10/11/2023), Rheumatoid arthritis, and Syncope (08/01/2023).    Patient presenting for lab follow up. HbA1C increased from 6.1 to 7.3    He reports increase in urinary urgency after starting Jardiance   He scheduled an appt with urology       Active Medications:  Current Outpatient Medications   Medication Instructions    ammonium lactate 1 g, Topical (Top), Daily    blood glucose control, low Soln To use as needed    blood sugar diagnostic (TRUE METRIX GLUCOSE TEST STRIP) Strp To check glucose three times daily with meals and nightly    blood-glucose meter kit To check BG 1 times daily, to use with insurance preferred meter    empagliflozin (JARDIANCE) 25 mg, Oral, Daily    febuxostat (ULORIC) 40 mg, Oral, Daily    folic acid (FOLVITE) 1 mg, Oral, Daily    gabapentin (NEURONTIN) 300 mg, Oral, 3 times daily    lancets Misc To check BG 1 times daily, to use with insurance preferred meter    methotrexate 2.5 MG Tab TAKE 5 TABLETS BY MOUTH IN THE MORNING AND 5 TABLETS IN THE EVENING EVERY 7 DAYS     rosuvastatin (CRESTOR) 10 MG tablet TAKE 1 TABLET ONE TIME DAILY.    traMADoL (ULTRAM) 50 mg, Oral, Every 6 hours PRN    triamcinolone acetonide 0.025% (KENALOG) 0.025 % Oint Topical (Top), Daily    valACYclovir (VALTREX) 500 mg, Oral    valsartan (DIOVAN) 160 mg, Oral       Physical Exam    General: Does not appear to be in acute distress    Assessment and Plan     1. Type 2 diabetes mellitus without complication, without long-term current use of insulin  Comments:  Rise in A1C attributed to grief with loss of brother   He plans to manage with lifestyle modification  Orders:  -     HEMOGLOBIN A1C; Future; Expected date: 01/11/2025  -     HEPATIC FUNCTION PANEL; Future; Expected date: 10/11/2024    2. Urinary urgency  -     Urinalysis, Reflex to Urine Culture Urine, Clean Catch; Future; Expected date: 10/11/2024                 Upcoming Scheduled Appointments and Follow Up:    Future Appointments   Date Time Provider Department Center   10/28/2024  2:00 PM Dino Zambrano MD [facility] [place]       Follow Up [facility] (with who? when?): No follow-ups on file.        Extended Emergency Contact Information  Primary Emergency Contact: Nati Burgos   [place]  Mobile Phone: [phone]  Relation: Sister      Silke MD [facility]   Internal Medicine  10/11/2024 - 2:56 PM        Each patient to whom he or she provides medical services by telemedicine is:  (1) informed of the relationship between the physician and patient and the respective role of any other health care provider with respect to management of the patient; and (2) notified that he or she may decline to receive medical services by telemedicine and may withdraw from such care at any time.    While patients have the right to access their medical record, it is essential to recognize that progress notes primarily serve as a means of communication among healthcare professionals.

## 2024-10-15 ENCOUNTER — CLINICAL SUPPORT (OUTPATIENT)
Dept: PRIMARY CARE CLINIC | Facility: CLINIC | Age: 69
End: 2024-10-15
Payer: MEDICARE

## 2024-10-15 DIAGNOSIS — R39.15 URINARY URGENCY: ICD-10-CM

## 2024-10-15 LAB
BACTERIA #/AREA URNS AUTO: ABNORMAL /HPF
BILIRUB UR QL STRIP: NEGATIVE
CLARITY UR REFRACT.AUTO: CLEAR
COLOR UR AUTO: YELLOW
GLUCOSE UR QL STRIP: ABNORMAL
HGB UR QL STRIP: NEGATIVE
KETONES UR QL STRIP: NEGATIVE
LEUKOCYTE ESTERASE UR QL STRIP: NEGATIVE
MICROSCOPIC COMMENT: ABNORMAL
NITRITE UR QL STRIP: NEGATIVE
PH UR STRIP: 5 [PH] (ref 5–8)
PROT UR QL STRIP: NEGATIVE
RBC #/AREA URNS AUTO: 2 /HPF (ref 0–4)
SP GR UR STRIP: >1.03 (ref 1–1.03)
URN SPEC COLLECT METH UR: ABNORMAL
WBC #/AREA URNS AUTO: 1 /HPF (ref 0–5)
YEAST UR QL AUTO: ABNORMAL

## 2024-10-15 PROCEDURE — 81001 URINALYSIS AUTO W/SCOPE: CPT | Mod: HCNC | Performed by: STUDENT IN AN ORGANIZED HEALTH CARE EDUCATION/TRAINING PROGRAM

## 2024-10-16 RX ORDER — FLUCONAZOLE 150 MG/1
150 TABLET ORAL DAILY
Qty: 1 TABLET | Refills: 0 | Status: SHIPPED | OUTPATIENT
Start: 2024-10-16 | End: 2024-10-17

## 2024-10-28 ENCOUNTER — OFFICE VISIT (OUTPATIENT)
Dept: UROLOGY | Facility: CLINIC | Age: 69
End: 2024-10-28
Payer: MEDICARE

## 2024-10-28 VITALS
DIASTOLIC BLOOD PRESSURE: 71 MMHG | SYSTOLIC BLOOD PRESSURE: 123 MMHG | HEART RATE: 49 BPM | BODY MASS INDEX: 29 KG/M2 | WEIGHT: 190.69 LBS

## 2024-10-28 DIAGNOSIS — N52.9 ED (ERECTILE DYSFUNCTION) OF ORGANIC ORIGIN: Primary | ICD-10-CM

## 2024-10-28 PROCEDURE — 3051F HG A1C>EQUAL 7.0%<8.0%: CPT | Mod: CPTII,S$GLB,, | Performed by: UROLOGY

## 2024-10-28 PROCEDURE — 1159F MED LIST DOCD IN RCRD: CPT | Mod: CPTII,S$GLB,, | Performed by: UROLOGY

## 2024-10-28 PROCEDURE — 3078F DIAST BP <80 MM HG: CPT | Mod: CPTII,S$GLB,, | Performed by: UROLOGY

## 2024-10-28 PROCEDURE — 1101F PT FALLS ASSESS-DOCD LE1/YR: CPT | Mod: CPTII,S$GLB,, | Performed by: UROLOGY

## 2024-10-28 PROCEDURE — 3066F NEPHROPATHY DOC TX: CPT | Mod: CPTII,S$GLB,, | Performed by: UROLOGY

## 2024-10-28 PROCEDURE — 3061F NEG MICROALBUMINURIA REV: CPT | Mod: CPTII,S$GLB,, | Performed by: UROLOGY

## 2024-10-28 PROCEDURE — 3288F FALL RISK ASSESSMENT DOCD: CPT | Mod: CPTII,S$GLB,, | Performed by: UROLOGY

## 2024-10-28 PROCEDURE — 3008F BODY MASS INDEX DOCD: CPT | Mod: CPTII,S$GLB,, | Performed by: UROLOGY

## 2024-10-28 PROCEDURE — 99204 OFFICE O/P NEW MOD 45 MIN: CPT | Mod: S$GLB,,, | Performed by: UROLOGY

## 2024-10-28 PROCEDURE — 3074F SYST BP LT 130 MM HG: CPT | Mod: CPTII,S$GLB,, | Performed by: UROLOGY

## 2024-10-28 PROCEDURE — 4010F ACE/ARB THERAPY RXD/TAKEN: CPT | Mod: CPTII,S$GLB,, | Performed by: UROLOGY

## 2024-10-28 PROCEDURE — 99999 PR PBB SHADOW E&M-EST. PATIENT-LVL III: CPT | Mod: PBBFAC,,, | Performed by: UROLOGY

## 2024-10-28 RX ORDER — TADALAFIL 20 MG/1
20 TABLET ORAL DAILY
Qty: 30 TABLET | Refills: 5 | Status: SHIPPED | OUTPATIENT
Start: 2024-10-28 | End: 2025-10-28

## 2024-11-18 ENCOUNTER — PATIENT MESSAGE (OUTPATIENT)
Dept: ADMINISTRATIVE | Facility: HOSPITAL | Age: 69
End: 2024-11-18
Payer: MEDICARE

## 2024-11-25 ENCOUNTER — LAB VISIT (OUTPATIENT)
Dept: LAB | Facility: HOSPITAL | Age: 69
End: 2024-11-25
Payer: MEDICARE

## 2024-11-25 DIAGNOSIS — M06.9 RHEUMATOID ARTHRITIS, INVOLVING UNSPECIFIED SITE, UNSPECIFIED WHETHER RHEUMATOID FACTOR PRESENT: ICD-10-CM

## 2024-11-25 DIAGNOSIS — Z79.631 METHOTREXATE, LONG TERM, CURRENT USE: ICD-10-CM

## 2024-11-25 LAB
ALBUMIN SERPL BCP-MCNC: 4.1 G/DL (ref 3.5–5.2)
ALP SERPL-CCNC: 78 U/L (ref 40–150)
ALT SERPL W/O P-5'-P-CCNC: 32 U/L (ref 10–44)
ANION GAP SERPL CALC-SCNC: 11 MMOL/L (ref 8–16)
AST SERPL-CCNC: 28 U/L (ref 10–40)
BASOPHILS # BLD AUTO: 0.02 K/UL (ref 0–0.2)
BASOPHILS NFR BLD: 0.3 % (ref 0–1.9)
BILIRUB SERPL-MCNC: 1.2 MG/DL (ref 0.1–1)
BUN SERPL-MCNC: 15 MG/DL (ref 8–23)
CALCIUM SERPL-MCNC: 9.7 MG/DL (ref 8.7–10.5)
CHLORIDE SERPL-SCNC: 108 MMOL/L (ref 95–110)
CO2 SERPL-SCNC: 21 MMOL/L (ref 23–29)
CREAT SERPL-MCNC: 1.3 MG/DL (ref 0.5–1.4)
CRP SERPL-MCNC: 1.2 MG/L (ref 0–8.2)
DIFFERENTIAL METHOD BLD: ABNORMAL
EOSINOPHIL # BLD AUTO: 0.3 K/UL (ref 0–0.5)
EOSINOPHIL NFR BLD: 3.9 % (ref 0–8)
ERYTHROCYTE [DISTWIDTH] IN BLOOD BY AUTOMATED COUNT: 14.7 % (ref 11.5–14.5)
ERYTHROCYTE [SEDIMENTATION RATE] IN BLOOD BY PHOTOMETRIC METHOD: 11 MM/HR (ref 0–23)
EST. GFR  (NO RACE VARIABLE): 59.1 ML/MIN/1.73 M^2
GLUCOSE SERPL-MCNC: 99 MG/DL (ref 70–110)
HCT VFR BLD AUTO: 44.3 % (ref 40–54)
HGB BLD-MCNC: 14 G/DL (ref 14–18)
IMM GRANULOCYTES # BLD AUTO: 0.01 K/UL (ref 0–0.04)
IMM GRANULOCYTES NFR BLD AUTO: 0.1 % (ref 0–0.5)
LYMPHOCYTES # BLD AUTO: 2.6 K/UL (ref 1–4.8)
LYMPHOCYTES NFR BLD: 36.1 % (ref 18–48)
MCH RBC QN AUTO: 31.9 PG (ref 27–31)
MCHC RBC AUTO-ENTMCNC: 31.6 G/DL (ref 32–36)
MCV RBC AUTO: 101 FL (ref 82–98)
MONOCYTES # BLD AUTO: 0.6 K/UL (ref 0.3–1)
MONOCYTES NFR BLD: 8 % (ref 4–15)
NEUTROPHILS # BLD AUTO: 3.7 K/UL (ref 1.8–7.7)
NEUTROPHILS NFR BLD: 51.6 % (ref 38–73)
NRBC BLD-RTO: 0 /100 WBC
PLATELET # BLD AUTO: 207 K/UL (ref 150–450)
PMV BLD AUTO: 12 FL (ref 9.2–12.9)
POTASSIUM SERPL-SCNC: 4.2 MMOL/L (ref 3.5–5.1)
PROT SERPL-MCNC: 7.5 G/DL (ref 6–8.4)
RBC # BLD AUTO: 4.39 M/UL (ref 4.6–6.2)
SODIUM SERPL-SCNC: 140 MMOL/L (ref 136–145)
WBC # BLD AUTO: 7.21 K/UL (ref 3.9–12.7)

## 2024-11-25 PROCEDURE — 85025 COMPLETE CBC W/AUTO DIFF WBC: CPT | Mod: HCNC | Performed by: STUDENT IN AN ORGANIZED HEALTH CARE EDUCATION/TRAINING PROGRAM

## 2024-11-25 PROCEDURE — 80053 COMPREHEN METABOLIC PANEL: CPT | Mod: HCNC | Performed by: STUDENT IN AN ORGANIZED HEALTH CARE EDUCATION/TRAINING PROGRAM

## 2024-11-25 PROCEDURE — 36415 COLL VENOUS BLD VENIPUNCTURE: CPT | Mod: HCNC,PN | Performed by: STUDENT IN AN ORGANIZED HEALTH CARE EDUCATION/TRAINING PROGRAM

## 2024-11-25 PROCEDURE — 85652 RBC SED RATE AUTOMATED: CPT | Mod: HCNC | Performed by: STUDENT IN AN ORGANIZED HEALTH CARE EDUCATION/TRAINING PROGRAM

## 2024-11-25 PROCEDURE — 86140 C-REACTIVE PROTEIN: CPT | Mod: HCNC | Performed by: STUDENT IN AN ORGANIZED HEALTH CARE EDUCATION/TRAINING PROGRAM

## 2024-11-26 ENCOUNTER — PATIENT MESSAGE (OUTPATIENT)
Dept: RHEUMATOLOGY | Facility: CLINIC | Age: 69
End: 2024-11-26
Payer: MEDICARE

## 2024-11-26 DIAGNOSIS — M06.9 RHEUMATOID ARTHRITIS, INVOLVING UNSPECIFIED SITE, UNSPECIFIED WHETHER RHEUMATOID FACTOR PRESENT: ICD-10-CM

## 2024-11-26 RX ORDER — METHOTREXATE 2.5 MG/1
TABLET ORAL
Qty: 120 TABLET | Refills: 0 | Status: SHIPPED | OUTPATIENT
Start: 2024-11-26

## 2024-11-27 DIAGNOSIS — M06.9 RHEUMATOID ARTHRITIS, INVOLVING UNSPECIFIED SITE, UNSPECIFIED WHETHER RHEUMATOID FACTOR PRESENT: ICD-10-CM

## 2024-11-30 RX ORDER — METHOTREXATE 2.5 MG/1
TABLET ORAL
Qty: 120 TABLET | Refills: 0 | OUTPATIENT
Start: 2024-11-30

## 2025-01-10 ENCOUNTER — LAB VISIT (OUTPATIENT)
Dept: LAB | Facility: HOSPITAL | Age: 70
End: 2025-01-10
Attending: STUDENT IN AN ORGANIZED HEALTH CARE EDUCATION/TRAINING PROGRAM
Payer: MEDICARE

## 2025-01-10 DIAGNOSIS — E11.9 TYPE 2 DIABETES MELLITUS WITHOUT COMPLICATION, WITHOUT LONG-TERM CURRENT USE OF INSULIN: ICD-10-CM

## 2025-01-10 LAB
ESTIMATED AVG GLUCOSE: 160 MG/DL (ref 68–131)
HBA1C MFR BLD: 7.2 % (ref 4–5.6)

## 2025-01-10 PROCEDURE — 36415 COLL VENOUS BLD VENIPUNCTURE: CPT | Mod: HCNC,PN | Performed by: STUDENT IN AN ORGANIZED HEALTH CARE EDUCATION/TRAINING PROGRAM

## 2025-01-10 PROCEDURE — 83036 HEMOGLOBIN GLYCOSYLATED A1C: CPT | Mod: HCNC | Performed by: STUDENT IN AN ORGANIZED HEALTH CARE EDUCATION/TRAINING PROGRAM

## 2025-01-13 ENCOUNTER — PATIENT MESSAGE (OUTPATIENT)
Dept: PRIMARY CARE CLINIC | Facility: CLINIC | Age: 70
End: 2025-01-13
Payer: MEDICARE

## 2025-02-04 ENCOUNTER — LAB VISIT (OUTPATIENT)
Dept: LAB | Facility: HOSPITAL | Age: 70
End: 2025-02-04
Attending: STUDENT IN AN ORGANIZED HEALTH CARE EDUCATION/TRAINING PROGRAM
Payer: MEDICARE

## 2025-02-04 DIAGNOSIS — M1A.09X0 IDIOPATHIC CHRONIC GOUT OF MULTIPLE SITES WITHOUT TOPHUS: ICD-10-CM

## 2025-02-04 DIAGNOSIS — Z79.631 METHOTREXATE, LONG TERM, CURRENT USE: ICD-10-CM

## 2025-02-04 DIAGNOSIS — M06.9 RHEUMATOID ARTHRITIS, INVOLVING UNSPECIFIED SITE, UNSPECIFIED WHETHER RHEUMATOID FACTOR PRESENT: ICD-10-CM

## 2025-02-04 PROCEDURE — 80053 COMPREHEN METABOLIC PANEL: CPT | Mod: HCNC | Performed by: STUDENT IN AN ORGANIZED HEALTH CARE EDUCATION/TRAINING PROGRAM

## 2025-02-04 PROCEDURE — 84550 ASSAY OF BLOOD/URIC ACID: CPT | Mod: HCNC | Performed by: STUDENT IN AN ORGANIZED HEALTH CARE EDUCATION/TRAINING PROGRAM

## 2025-02-04 PROCEDURE — 85025 COMPLETE CBC W/AUTO DIFF WBC: CPT | Mod: HCNC | Performed by: STUDENT IN AN ORGANIZED HEALTH CARE EDUCATION/TRAINING PROGRAM

## 2025-02-04 PROCEDURE — 86140 C-REACTIVE PROTEIN: CPT | Mod: HCNC | Performed by: STUDENT IN AN ORGANIZED HEALTH CARE EDUCATION/TRAINING PROGRAM

## 2025-02-04 PROCEDURE — 85652 RBC SED RATE AUTOMATED: CPT | Mod: HCNC | Performed by: STUDENT IN AN ORGANIZED HEALTH CARE EDUCATION/TRAINING PROGRAM

## 2025-02-04 PROCEDURE — 36415 COLL VENOUS BLD VENIPUNCTURE: CPT | Mod: HCNC,PN | Performed by: STUDENT IN AN ORGANIZED HEALTH CARE EDUCATION/TRAINING PROGRAM

## 2025-02-04 RX ORDER — METHOTREXATE 2.5 MG/1
TABLET ORAL
Qty: 120 TABLET | Refills: 0 | Status: SHIPPED | OUTPATIENT
Start: 2025-02-04

## 2025-02-04 RX ORDER — FEBUXOSTAT 40 MG/1
40 TABLET, FILM COATED ORAL DAILY
Qty: 30 TABLET | Refills: 5 | Status: SHIPPED | OUTPATIENT
Start: 2025-02-04

## 2025-02-05 LAB
ALBUMIN SERPL BCP-MCNC: 4.2 G/DL (ref 3.5–5.2)
ALP SERPL-CCNC: 84 U/L (ref 40–150)
ALT SERPL W/O P-5'-P-CCNC: 27 U/L (ref 10–44)
ANION GAP SERPL CALC-SCNC: 13 MMOL/L (ref 8–16)
AST SERPL-CCNC: 25 U/L (ref 10–40)
BASOPHILS # BLD AUTO: 0.03 K/UL (ref 0–0.2)
BASOPHILS NFR BLD: 0.4 % (ref 0–1.9)
BILIRUB SERPL-MCNC: 0.6 MG/DL (ref 0.1–1)
BUN SERPL-MCNC: 23 MG/DL (ref 8–23)
CALCIUM SERPL-MCNC: 10.2 MG/DL (ref 8.7–10.5)
CHLORIDE SERPL-SCNC: 105 MMOL/L (ref 95–110)
CO2 SERPL-SCNC: 23 MMOL/L (ref 23–29)
CREAT SERPL-MCNC: 1.5 MG/DL (ref 0.5–1.4)
CRP SERPL-MCNC: 0.9 MG/L (ref 0–8.2)
DIFFERENTIAL METHOD BLD: ABNORMAL
EOSINOPHIL # BLD AUTO: 0.3 K/UL (ref 0–0.5)
EOSINOPHIL NFR BLD: 2.9 % (ref 0–8)
ERYTHROCYTE [DISTWIDTH] IN BLOOD BY AUTOMATED COUNT: 13.9 % (ref 11.5–14.5)
ERYTHROCYTE [SEDIMENTATION RATE] IN BLOOD BY PHOTOMETRIC METHOD: 6 MM/HR (ref 0–23)
EST. GFR  (NO RACE VARIABLE): 49.8 ML/MIN/1.73 M^2
GLUCOSE SERPL-MCNC: 115 MG/DL (ref 70–110)
HCT VFR BLD AUTO: 49.3 % (ref 40–54)
HGB BLD-MCNC: 15.3 G/DL (ref 14–18)
IMM GRANULOCYTES # BLD AUTO: 0.02 K/UL (ref 0–0.04)
IMM GRANULOCYTES NFR BLD AUTO: 0.2 % (ref 0–0.5)
LYMPHOCYTES # BLD AUTO: 3.1 K/UL (ref 1–4.8)
LYMPHOCYTES NFR BLD: 36.8 % (ref 18–48)
MCH RBC QN AUTO: 31.5 PG (ref 27–31)
MCHC RBC AUTO-ENTMCNC: 31 G/DL (ref 32–36)
MCV RBC AUTO: 102 FL (ref 82–98)
MONOCYTES # BLD AUTO: 0.7 K/UL (ref 0.3–1)
MONOCYTES NFR BLD: 8.6 % (ref 4–15)
NEUTROPHILS # BLD AUTO: 4.4 K/UL (ref 1.8–7.7)
NEUTROPHILS NFR BLD: 51.1 % (ref 38–73)
NRBC BLD-RTO: 0 /100 WBC
PLATELET # BLD AUTO: 225 K/UL (ref 150–450)
PMV BLD AUTO: 11.5 FL (ref 9.2–12.9)
POTASSIUM SERPL-SCNC: 5.4 MMOL/L (ref 3.5–5.1)
PROT SERPL-MCNC: 8.2 G/DL (ref 6–8.4)
RBC # BLD AUTO: 4.85 M/UL (ref 4.6–6.2)
SODIUM SERPL-SCNC: 141 MMOL/L (ref 136–145)
URATE SERPL-MCNC: 5.4 MG/DL (ref 3.4–7)
WBC # BLD AUTO: 8.51 K/UL (ref 3.9–12.7)

## 2025-02-25 ENCOUNTER — OFFICE VISIT (OUTPATIENT)
Dept: RHEUMATOLOGY | Facility: CLINIC | Age: 70
End: 2025-02-25
Payer: MEDICARE

## 2025-02-25 ENCOUNTER — TELEPHONE (OUTPATIENT)
Dept: RHEUMATOLOGY | Facility: CLINIC | Age: 70
End: 2025-02-25

## 2025-02-25 VITALS
WEIGHT: 198.44 LBS | HEIGHT: 68 IN | SYSTOLIC BLOOD PRESSURE: 148 MMHG | BODY MASS INDEX: 30.07 KG/M2 | DIASTOLIC BLOOD PRESSURE: 67 MMHG | HEART RATE: 68 BPM

## 2025-02-25 DIAGNOSIS — M10.9 GOUT, UNSPECIFIED CAUSE, UNSPECIFIED CHRONICITY, UNSPECIFIED SITE: Primary | ICD-10-CM

## 2025-02-25 DIAGNOSIS — M1A.09X0 IDIOPATHIC CHRONIC GOUT OF MULTIPLE SITES WITHOUT TOPHUS: ICD-10-CM

## 2025-02-25 DIAGNOSIS — N17.9 AKI (ACUTE KIDNEY INJURY): ICD-10-CM

## 2025-02-25 DIAGNOSIS — E87.5 HYPERKALEMIA: Primary | ICD-10-CM

## 2025-02-25 PROCEDURE — 99999 PR PBB SHADOW E&M-EST. PATIENT-LVL III: CPT | Mod: PBBFAC,HCNC,GC, | Performed by: STUDENT IN AN ORGANIZED HEALTH CARE EDUCATION/TRAINING PROGRAM

## 2025-02-25 RX ORDER — COLCHICINE 0.6 MG/1
TABLET ORAL
Qty: 30 TABLET | Refills: 0 | Status: SHIPPED | OUTPATIENT
Start: 2025-02-25

## 2025-02-25 ASSESSMENT — ROUTINE ASSESSMENT OF PATIENT INDEX DATA (RAPID3)
MDHAQ FUNCTION SCORE: 0.4
PSYCHOLOGICAL DISTRESS SCORE: 0
TOTAL RAPID3 SCORE: 2.11
FATIGUE SCORE: 2
PAIN SCORE: 2
PATIENT GLOBAL ASSESSMENT SCORE: 3
AM STIFFNESS SCORE: 1, YES
WHEN YOU AWAKENED IN THE MORNING OVER THE LAST WEEK, PLEASE INDICATE THE AMOUNT OF TIME IT TAKES UNTIL YOU ARE AS LIMBER AS YOU WILL BE FOR THE DAY: 1

## 2025-02-25 NOTE — PROGRESS NOTES
2/24/2025    11:51 AM   Rapid3 Question Responses and Scores   MDHAQ Score 0.4   Psychologic Score 0   Pain Score 2   When you awakened in the morning OVER THE LAST WEEK, did you feel stiff? Yes   If Yes, please indicate the number of hours until you are as limber as you will be for the day 1   Fatigue Score 2   Global Health Score 3   RAPID3 Score 2.11    Answers submitted by the patient for this visit:  Rheumatology Questionnaire (Submitted on 2/24/2025)  fever: No  eye redness: No  mouth sores: No  headaches: No  shortness of breath: No  chest pain: No  trouble swallowing: No  diarrhea: No  constipation: No  unexpected weight change: No  genital sore: No  During the last 3 days, have you had a skin rash?: No  Bruises or bleeds easily: No  cough: No

## 2025-02-25 NOTE — PROGRESS NOTES
Subjective:      Patient ID: Karthik Bentley is a 69 y.o. male.    Chief Complaint: Seropositive RA (+RF)    Initial Presentation  Karthik Bentley is a 69 y.o. M with a past medical history of seropositive RA (+RF), gout, HLD, cervical myelopathy, HTN and T2DM.     He initially presented to Rheumatology in 2022 for evaluation for arthritis and gout. He was found to have synovitis on exam and XR showed marginal erosions 1st, 2nd, possibly 4th MCPs and calcium deposit 3rd MCP so he was diagnosed with RA. He was initially started on MTX, 6 tablets weekly which was later increased to 10 tablets weekly.     In January 2023, he was found to have episcleritis, suspected 2/2 chronic Visine-like eye drop use for which was discontinued. He was told that this was not related to his RA.      In regards to his gout, he was started on Febuxostat 40 mg daily in May. Uric acid decreased to 4.2. Last flare was a year ago (March 2023 per chart review).     Interval History  He has been very doing well. He runs almost daily and has been playing the piano with minimal issues. He had CTS release on his R hand. He denies RA flares or gout flares.  He has some stiffness in the hands but attributes this to his trigger fingers.     Rheumatology ROS  (-) fevers, chills (-) weight loss, (-) fatigue, (-) morning stiffness,  (+) arthralgias (mostly involving the thumbs), (-) arthritis, (-) headaches, (-)  vision changes or loss of vision, (-) hx of red eyes including uveitis, iritis, scleritis or episcleritis, (-)  photophobia, (-) dry eyes, (-) dry mouth, (-) rash, (-) photosensitivity, (-) alopecia, (-) mucosal ulcers, (-) Raynaud's  phenomenon, (-) SQ nodules, (-) sense of skin tightening in hands, face or torso, (-)  hx pleurisy, (-) sharp chest pains that increase with deep breath, (-) lung fibrosis, (-) hemoptysis, (-) hx of DVT or PE (-) chest pains, (-) shortness of breath, (-) hx pericarditis (-) abdominal pain, (-) nausea, (-) vomiting, (-)  diarrhea, (-) constipation, (-) melena,  (-) bloody diarrhea, (-) UC/Crohns, (-) dysphagia, (-) GERD/Reflux, (-) hematuria, proteinuria, (-) renal failure, (-) focal weakness, (-) trouble combing hair or (-) getting out of chairs,  (-) hx of low WBC, low platelets, anemia, (-) genital ulcers    Review of Systems   Constitutional:  Negative for fever and unexpected weight change.   HENT:  Negative for mouth sores and trouble swallowing.    Eyes:  Negative for redness.   Respiratory:  Negative for cough and shortness of breath.    Cardiovascular:  Negative for chest pain.   Gastrointestinal:  Negative for constipation and diarrhea.   Genitourinary:  Negative for genital sores.   Skin:  Negative for rash.   Neurological:  Negative for headaches.   Hematological:  Does not bruise/bleed easily.      Imaging/Procedures  XR Hand Complete (03/13/23):  FINDINGS:  No acute fracture or dislocation seen.  Mild degenerative change 1st MCP joint and scattered in the interphalangeal joints.  No significant soft tissue edema.  Stable high-density focus/calcification in the soft tissues between the 4th and 5th metacarpal.  Impression:  No acute osseous abnormality seen.    XR Hand, R (09/16/22):  Bones are well mineralized.  Alignment is satisfactory.  Mild joint space narrowing and hypertrophic spurring at the radiocarpal joint.  Some degenerative changes at the 1st carpometacarpal joint.  Mild degenerative changes at some of the interphalangeal joints.  No fracture, dislocation, or erosive change.  There may be mild soft tissue swelling about the PIP joints of the 2nd, 3rd and 4th digits.  Atherosclerotic vascular calcifications are present.    XR Foot Complete (09/16/22):  Right foot: Bones are satisfactorily mineralized.  Moderate hallux valgus and bunion formation.  Hypertrophic spurring and joint space narrowing at the 1st MTP joint.  Elsewhere, alignment is satisfactory and joint spaces appear adequately maintained.  Some  spurring at the dorsal aspect of the midfoot.  Small calcaneal spurs.  No fracture, dislocation, or erosive change.  Atherosclerotic vascular calcifications are now seen.     Left foot: Bones are satisfactorily mineralized.  Moderate hallux valgus and bunion formation.  Hypertrophic spurring and joint space narrowing at the 1st MTP joint.  Elsewhere, alignment is satisfactory and joint spaces appear fairly well maintained.  Small calcaneal spurs.  Some spurring at the dorsal aspect of the foot in the tarsometatarsal region.  No fracture, dislocation, or erosive change.  Atherosclerotic vascular calcifications are noted.    Rheumatologic labs  Component      Latest Ref [facility] 2/4/2025   WBC      3.90 - 12.70 K/uL 8.51    RBC      4.60 - 6.20 M/uL 4.85    Hemoglobin      14.0 - 18.0 g/dL 15.3    Hematocrit      40.0 - 54.0 % 49.3    MCV      82 - 98 fL 102 (H)    MCH      27.0 - 31.0 pg 31.5 (H)    MCHC      32.0 - 36.0 g/dL 31.0 (L)    RDW      11.5 - 14.5 % 13.9    Platelet Count      150 - 450 K/uL 225    MPV      9.2 - 12.9 fL 11.5    Immature Granulocytes      0.0 - 0.5 % 0.2    Gran # (ANC)      1.8 - 7.7 K/uL 4.4    Immature Grans (Abs)      0.00 - 0.04 K/uL 0.02    Lymph #      1.0 - 4.8 K/uL 3.1    Mono #      0.3 - 1.0 K/uL 0.7    Eos #      0.0 - 0.5 K/uL 0.3    Baso #      0.00 - 0.20 K/uL 0.03    nRBC      0 /100 WBC 0    Gran %      38.0 - 73.0 % 51.1    Lymph %      18.0 - 48.0 % 36.8    Mono %      4.0 - 15.0 % 8.6    Eos %      0.0 - 8.0 % 2.9    Basophil %      0.0 - 1.9 % 0.4    Differential Method Automated    Sodium      136 - 145 mmol/L 141    Potassium      3.5 - 5.1 mmol/L 5.4 (H)    Chloride      95 - 110 mmol/L 105    CO2      23 - 29 mmol/L 23    Glucose      70 - 110 mg/dL 115 (H)    BUN      8 - 23 mg/dL 23    Creatinine      0.5 - 1.4 mg/dL 1.5 (H)    Calcium      8.7 - 10.5 mg/dL 10.2    PROTEIN TOTAL      6.0 - 8.4 g/dL 8.2    Albumin      3.5 - 5.2 g/dL 4.2    BILIRUBIN TOTAL      0.1 -  "1.0 mg/dL 0.6    ALP      40 - 150 U/L 84    AST      10 - 40 U/L 25    ALT      10 - 44 U/L 27    eGFR      >60 mL/min/1.73 m^2 49.8 !    Anion Gap      8 - 16 mmol/L 13    Sed Rate      0 - 23 mm/Hr 6    CRP      0.0 - 8.2 mg/L 0.9    Uric Acid      3.4 - 7.0 mg/dL 5.4       Rheumatologic medications history  MTX, initially 6 tablets weekly increased to 10 tablets weekly (2022-present)  Folic acid 1 mg daily   Febuxostat 40 mg daily (05/2023-present)    Objective:   BP (!) 148/67   Pulse 68   Ht 5' 8" (1.727 m)   Wt 90 kg (198 lb 6.6 oz)   BMI 30.17 kg/m²   Physical Exam   Constitutional: He is oriented to person, place, and time. normal appearance.   HENT:   Nose: Nose normal.   Mouth/Throat: Oropharynx is clear.   Eyes: Conjunctivae are normal.   Cardiovascular: Normal rate, regular rhythm, normal heart sounds and normal pulses.   Pulmonary/Chest: Effort normal and breath sounds normal.   Abdominal: Soft. Bowel sounds are normal. He exhibits no distension.   Musculoskeletal:         General: Deformity (L and R 1st MCP tophi) present. No swelling or tenderness. Normal range of motion.      Cervical back: Normal range of motion and neck supple.   Neurological: He is alert and oriented to person, place, and time.   Skin: Skin is warm. No rash noted. No erythema.   Psychiatric: His behavior is normal. Mood normal.      3/21/2023 6/27/2023 9/12/2023 3/5/2024   Tender (WALKER-28) 0 / 28 1 / 28  0 / 28  0 / 28    Swollen (WALKER-28) 2 / 28 1 / 28 2 / 28 2 / 28    Provider Global 20 / 100 15 / 100 10 / 100 10 / 100   Patient Global 50 / 100 15 / 100 30 / 100 50 / 100   ESR 14 mm/hr 6 mm/hr 6 mm/hr 13 mm/hr   CRP 1.4 mg/L 0.4 mg/L 0.4 mg/L 0.5 mg/L   WALKER-28 (ESR) 2.94 (Low disease activity) 2.3 (Remission) 2.07 (Remission) 2.89 (Low disease activity)   WALKER-28 (CRP) 2.37 (Remission) 2.13 (Remission) 1.9 (Remission) 2.2 (Remission)   CDAI Score 9  5  6  8      Assessment:     1. Gout, unspecified cause, unspecified " chronicity, unspecified site    2. Idiopathic chronic gout of multiple sites without tophus      Plan:     Problem List Items Addressed This Visit       Gout - Primary    Relevant Medications    colchicine (COLCRYS) 0.6 mg tablet     Karthik Bentley is a 69 y.o. M with a past medical history of seropositive RA (+RF), gout, HLD, cervical myelopathy, HTN and T2DM who presents for follow-up.     Seropositive RA- diagnosed in 2022, well controlled on MTX 10 tablets weekly, trace synovitis on exam today, CDAI 8, in remission     Gout- uric acid 5.4 on Febuxostat 40 mg daily,  last flare in March 2023, can use Colchicine as needed for flares    Plan:  - Recent CMP, ESR, CRP and uric acid reviewed   - Decrease MTX to 20 mg weekly and folic acid daily for RA  - If experiences worsening joint pain, swelling or stiffness can increase back to 25 mg weekly   - Continue Febuxostat 40 mg daily and Colchicine 0.6 mg as needed for gout   - Repeat labs screening labs every 3 months and again in 6 months before visit     This patient was examined with Dr. Miranda. Plan discussed with the patient. Return to clinic in 6 months.    Giovanna Cummings MD  Rheumatology Fellow, PGY5

## 2025-02-26 ENCOUNTER — OFFICE VISIT (OUTPATIENT)
Dept: OPTOMETRY | Facility: CLINIC | Age: 70
End: 2025-02-26
Payer: COMMERCIAL

## 2025-02-26 DIAGNOSIS — H25.13 NUCLEAR SCLEROSIS, BILATERAL: ICD-10-CM

## 2025-02-26 DIAGNOSIS — H40.013 OPEN ANGLE WITH BORDERLINE FINDINGS AND LOW GLAUCOMA RISK IN BOTH EYES: ICD-10-CM

## 2025-02-26 DIAGNOSIS — E11.9 TYPE 2 DIABETES MELLITUS WITHOUT OPHTHALMIC MANIFESTATIONS: Primary | ICD-10-CM

## 2025-02-26 DIAGNOSIS — H52.4 BILATERAL PRESBYOPIA: ICD-10-CM

## 2025-02-26 DIAGNOSIS — E11.36 TYPE 2 DIABETES MELLITUS WITH CATARACT: ICD-10-CM

## 2025-02-26 DIAGNOSIS — H43.813 PVD (POSTERIOR VITREOUS DETACHMENT), BILATERAL: ICD-10-CM

## 2025-02-26 PROCEDURE — 92014 COMPRE OPH EXAM EST PT 1/>: CPT | Mod: S$GLB,,, | Performed by: OPTOMETRIST

## 2025-02-26 PROCEDURE — 92133 CPTRZD OPH DX IMG PST SGM ON: CPT | Mod: S$GLB,,, | Performed by: OPTOMETRIST

## 2025-02-26 PROCEDURE — 99999 PR PBB SHADOW E&M-EST. PATIENT-LVL III: CPT | Mod: PBBFAC,,, | Performed by: OPTOMETRIST

## 2025-02-26 NOTE — PROGRESS NOTES
SAH[facility]    CORA: 2/24/2024, Dr. Brito  Chief complaint (CC): 68 yo M presents today for diabetic eye exam. Pt   reports some blurriness.   Glasses? OTC readers for reading only  Contacts? -  H/o eye surgery, injections or laser: -  H/o eye injury: -  Known eye conditions?    Nuclear sclerosis, bilateral    PVD (posterior vitreous detachment), bilateral   Open angle with borderline findings and low glaucoma risk in both eyes  Family h/o eye conditions? -  Eye gtts? -      (-) Flashes (+) Floaters, longstanding (-) Mucous   (-)  Tearing (-) Itching (-) Burning   (-) Headaches (-) Eye Pain/discomfort (-) Irritation   (-)  Redness (-) Double vision (-) Blurry vision    Diabetic? +  A1c? Hemoglobin A1C       Date                     Value               Ref Range             Status                01/10/2025               7.2 (H)             4.0 - 5.6 %           Final                  10/04/2024               7.3 (H)             4.0 - 5.6 %           Final                  03/19/2024               6.1 (H)             4.0 - 5.6 %           Final                Last edited by Laura Mederos MA on 2/26/2025  1:26 PM.            Assessment /Plan     For exam results, see Encounter Report.    Type 2 diabetes mellitus without ophthalmic manifestations  BS control. No signs of diabetic retinopathy. Monitor with annual exam.     Nuclear sclerosis, bilateral  Type 2 diabetes mellitus with cataract  Nuclear sclerotic cataract - not visually significant. Observe.    Open angle with borderline findings and low glaucoma risk in both eyes  (-) FHx. IOP 18 OD 16 OS. C/d 0.65 OD, OS. OCT today WNL. Educated pt on findings w/understanding. RTC 1 year Routine.     PVD (posterior vitreous detachment), bilateral  No e/o h/b/t 360 degrees OU. Monitor for worsening of symptoms or S/Sx of RD.     Bilateral presbyopia  SRx released to patient. Patient educated on lens options. Normal ocular health. RTC 1 year for routine exam.       Pt retired   in 2019. Pt has lived internationally for years in [place], [place], etc.

## 2025-02-26 NOTE — PROGRESS NOTES
I have personally reviewed the history, confirmed exam findings, and discussed assessment and plan with fellow.   Answers submitted by the patient for this visit:  Rheumatology Questionnaire (Submitted on 2/24/2025)  fever: No  eye redness: No  mouth sores: No  headaches: No  shortness of breath: No  chest pain: No  trouble swallowing: No  diarrhea: No  constipation: No  unexpected weight change: No  genital sore: No  During the last 3 days, have you had a skin rash?: No  Bruises or bleeds easily: No  cough: No       Latest Reference Range & Units 02/04/25 13:08   WBC 3.90 - 12.70 K/uL 8.51   RBC 4.60 - 6.20 M/uL 4.85   Hemoglobin 14.0 - 18.0 g/dL 15.3   Hematocrit 40.0 - 54.0 % 49.3   MCV 82 - 98 fL 102 (H)   MCH 27.0 - 31.0 pg 31.5 (H)   MCHC 32.0 - 36.0 g/dL 31.0 (L)   RDW 11.5 - 14.5 % 13.9   Platelet Count 150 - 450 K/uL 225   MPV 9.2 - 12.9 fL 11.5   Gran % 38.0 - 73.0 % 51.1   Lymph % 18.0 - 48.0 % 36.8   Mono % 4.0 - 15.0 % 8.6   Eos % 0.0 - 8.0 % 2.9   Basophil % 0.0 - 1.9 % 0.4   Immature Granulocytes 0.0 - 0.5 % 0.2   Gran # (ANC) 1.8 - 7.7 K/uL 4.4   Lymph # 1.0 - 4.8 K/uL 3.1   Mono # 0.3 - 1.0 K/uL 0.7   Eos # 0.0 - 0.5 K/uL 0.3   Baso # 0.00 - 0.20 K/uL 0.03   Immature Grans (Abs) 0.00 - 0.04 K/uL 0.02   nRBC 0 /100 WBC 0   Differential Method  Automated   Sed Rate 0 - 23 mm/Hr 6   Sodium 136 - 145 mmol/L 141   Potassium 3.5 - 5.1 mmol/L 5.4 (H)   Chloride 95 - 110 mmol/L 105   CO2 23 - 29 mmol/L 23   Anion Gap 8 - 16 mmol/L 13   BUN 8 - 23 mg/dL 23   Creatinine 0.5 - 1.4 mg/dL 1.5 (H)   eGFR >60 mL/min/1.73 m^2 49.8 !   Glucose 70 - 110 mg/dL 115 (H)   Calcium 8.7 - 10.5 mg/dL 10.2   ALP 40 - 150 U/L 84   PROTEIN TOTAL 6.0 - 8.4 g/dL 8.2   Albumin 3.5 - 5.2 g/dL 4.2   Uric Acid 3.4 - 7.0 mg/dL 5.4   BILIRUBIN TOTAL 0.1 - 1.0 mg/dL 0.6   AST 10 - 40 U/L 25   ALT 10 - 44 U/L 27   CRP 0.0 - 8.2 mg/L 0.9   (H): Data is abnormally high  (L): Data is abnormally low  !: Data is  abnormal        ASSESSMENT:          RA TJ 0 SJ 2 Pt global 50 ESR 6 CRP 0.9 DAS28 1.67  LOL36-PVB 1.61 (both remission) CDAI 4(LDA)  Gout, intercritical SUA 5.4 on febuxostat 40mg daily  Mild  hyperkalemia  JASWINDER   /67    PLAN:    Repeat K in am  Drink at least 60 oz water daily, recheck RFP in 10 days   Continue febuxostat 40mg daily   Would like to try and reduce  methotrexate to  20mg po once a week with folic acid 1mg daily  Ref to Gastro for dysphagia, esophageal dysmotility seen on MBS 1/23/23  Cont  exercise program  Cont Mediterranean style diet  RTC 3 months with standing labs

## 2025-02-27 ENCOUNTER — LAB VISIT (OUTPATIENT)
Dept: LAB | Facility: HOSPITAL | Age: 70
End: 2025-02-27
Attending: INTERNAL MEDICINE
Payer: MEDICARE

## 2025-02-27 ENCOUNTER — RESULTS FOLLOW-UP (OUTPATIENT)
Dept: RHEUMATOLOGY | Facility: CLINIC | Age: 70
End: 2025-02-27

## 2025-02-27 DIAGNOSIS — E87.5 HYPERKALEMIA: ICD-10-CM

## 2025-02-27 LAB — POTASSIUM SERPL-SCNC: 4.7 MMOL/L (ref 3.5–5.1)

## 2025-02-27 PROCEDURE — 84132 ASSAY OF SERUM POTASSIUM: CPT | Performed by: INTERNAL MEDICINE

## 2025-02-27 PROCEDURE — 36415 COLL VENOUS BLD VENIPUNCTURE: CPT | Mod: PN | Performed by: INTERNAL MEDICINE

## 2025-02-28 NOTE — TELEPHONE ENCOUNTER
No care due was identified.  [facility] Embedded Care Due Messages. Reference number: [id number].   2/28/2025 4:17:42 PM CST

## 2025-02-28 NOTE — TELEPHONE ENCOUNTER
LOV virtual 10/11/24  Annual   RTC     Patient is requesting a refill for valsartan. Spoke to the patient. The patient states Dr. Moya, original prescriber, retired. The patient recently ran out of his medication.

## 2025-02-28 NOTE — TELEPHONE ENCOUNTER
----- Message from Janel sent at 2/28/2025  1:31 PM CST -----  Contact: [phone]  Type: Rx Clarification/ Additional Information/ QuestionsMedication: valsartan (DIOVAN) 160 MG tabletWhat questions do you have about the medication, if any?What information is needed? Pt having issue refilling, says Eransjoselito could not refill, Pharmacy told him to speak with providerPharmacy number:[id number] - [place] [id number] [facility] AT [address] [address]: [phone] Fax: [phone] Contact Name:Comments:

## 2025-03-03 ENCOUNTER — TELEPHONE (OUTPATIENT)
Dept: ORTHOPEDICS | Facility: CLINIC | Age: 70
End: 2025-03-03
Payer: MEDICARE

## 2025-03-03 ENCOUNTER — TELEPHONE (OUTPATIENT)
Dept: UROLOGY | Facility: CLINIC | Age: 70
End: 2025-03-03
Payer: MEDICARE

## 2025-03-03 DIAGNOSIS — N52.9 ED (ERECTILE DYSFUNCTION) OF ORGANIC ORIGIN: Primary | ICD-10-CM

## 2025-03-03 RX ORDER — TADALAFIL 20 MG/1
20 TABLET ORAL
Qty: 30 TABLET | Refills: 3 | Status: SHIPPED | OUTPATIENT
Start: 2025-03-03 | End: 2026-03-03

## 2025-03-03 RX ORDER — VALSARTAN 160 MG/1
160 TABLET ORAL DAILY
Qty: 90 TABLET | Refills: 0 | Status: SHIPPED | OUTPATIENT
Start: 2025-03-03

## 2025-03-03 NOTE — TELEPHONE ENCOUNTER
Spoke with the patient, he was unsure if he should continue gabapentin. Provider said he does not have to continue since he is not experiencing pain.

## 2025-03-03 NOTE — TELEPHONE ENCOUNTER
Left patient a voicemail about scheduling f/u appointment, explained that is has been almost two years since last visit.

## 2025-03-03 NOTE — TELEPHONE ENCOUNTER
----- Message from Med Assistant Prather sent at 3/3/2025  2:19 PM CST -----  Contact: Patient @  [phone]    ----- Message -----  From: Latonya Burnham  Sent: 3/3/2025   1:04 PM CST  To: Juan Manuel PURDY Staff    Requesting an RX refill or new RX.Is this a refill or new RX: RX name and strength tadalafiL (CIALIS) 20 MG TabIs this a 30 day or 90 day RX: Pharmacy name and phone #[facility] - [place], [place] - [id number] [facility] [address]: [phone] Fax: [phone]

## 2025-03-03 NOTE — TELEPHONE ENCOUNTER
ED (erectile dysfunction) of organic origin  -     tadalafiL (CIALIS) 20 MG Tab; Take 1 tablet (20 mg total) by mouth as needed.  Dispense: 30 tablet; Refill: 3

## 2025-03-07 ENCOUNTER — LAB VISIT (OUTPATIENT)
Dept: LAB | Facility: HOSPITAL | Age: 70
End: 2025-03-07
Attending: INTERNAL MEDICINE
Payer: MEDICARE

## 2025-03-07 DIAGNOSIS — N17.9 AKI (ACUTE KIDNEY INJURY): ICD-10-CM

## 2025-03-07 LAB
ALBUMIN SERPL BCP-MCNC: 4 G/DL (ref 3.5–5.2)
ANION GAP SERPL CALC-SCNC: 7 MMOL/L (ref 8–16)
BUN SERPL-MCNC: 14 MG/DL (ref 8–23)
CALCIUM SERPL-MCNC: 9.3 MG/DL (ref 8.7–10.5)
CHLORIDE SERPL-SCNC: 106 MMOL/L (ref 95–110)
CO2 SERPL-SCNC: 25 MMOL/L (ref 23–29)
CREAT SERPL-MCNC: 1.1 MG/DL (ref 0.5–1.4)
EST. GFR  (NO RACE VARIABLE): >60 ML/MIN/1.73 M^2
GLUCOSE SERPL-MCNC: 142 MG/DL (ref 70–110)
PHOSPHATE SERPL-MCNC: 3 MG/DL (ref 2.7–4.5)
POTASSIUM SERPL-SCNC: 5 MMOL/L (ref 3.5–5.1)
SODIUM SERPL-SCNC: 138 MMOL/L (ref 136–145)

## 2025-03-07 PROCEDURE — 80069 RENAL FUNCTION PANEL: CPT | Performed by: INTERNAL MEDICINE

## 2025-03-07 PROCEDURE — 36415 COLL VENOUS BLD VENIPUNCTURE: CPT | Mod: PN | Performed by: INTERNAL MEDICINE

## 2025-04-03 ENCOUNTER — TELEPHONE (OUTPATIENT)
Dept: OPTOMETRY | Facility: CLINIC | Age: 70
End: 2025-04-03
Payer: MEDICARE

## 2025-04-17 ENCOUNTER — OFFICE VISIT (OUTPATIENT)
Dept: PRIMARY CARE CLINIC | Facility: CLINIC | Age: 70
End: 2025-04-17
Payer: MEDICARE

## 2025-04-17 ENCOUNTER — LAB VISIT (OUTPATIENT)
Dept: LAB | Facility: HOSPITAL | Age: 70
End: 2025-04-17
Attending: INTERNAL MEDICINE
Payer: MEDICARE

## 2025-04-17 VITALS
WEIGHT: 194 LBS | OXYGEN SATURATION: 98 % | BODY MASS INDEX: 29.4 KG/M2 | HEART RATE: 76 BPM | DIASTOLIC BLOOD PRESSURE: 70 MMHG | HEIGHT: 68 IN | SYSTOLIC BLOOD PRESSURE: 128 MMHG

## 2025-04-17 DIAGNOSIS — Z12.11 SCREENING FOR COLON CANCER: Primary | ICD-10-CM

## 2025-04-17 DIAGNOSIS — E11.9 TYPE 2 DIABETES MELLITUS WITHOUT COMPLICATION, WITHOUT LONG-TERM CURRENT USE OF INSULIN: ICD-10-CM

## 2025-04-17 DIAGNOSIS — I10 ESSENTIAL HYPERTENSION: ICD-10-CM

## 2025-04-17 DIAGNOSIS — Z12.5 SCREENING FOR PROSTATE CANCER: ICD-10-CM

## 2025-04-17 DIAGNOSIS — E11.65 TYPE 2 DIABETES MELLITUS WITH HYPERGLYCEMIA, WITHOUT LONG-TERM CURRENT USE OF INSULIN: ICD-10-CM

## 2025-04-17 LAB
ALBUMIN/CREAT UR: NORMAL
CREAT UR-MCNC: 88 MG/DL (ref 23–375)
EAG (OHS): 174 MG/DL (ref 68–131)
HBA1C MFR BLD: 7.7 % (ref 4–5.6)
MICROALBUMIN UR-MCNC: <5 UG/ML (ref ?–5000)

## 2025-04-17 PROCEDURE — 36415 COLL VENOUS BLD VENIPUNCTURE: CPT | Mod: HCNC,PN

## 2025-04-17 PROCEDURE — 82570 ASSAY OF URINE CREATININE: CPT | Mod: HCNC

## 2025-04-17 PROCEDURE — 83036 HEMOGLOBIN GLYCOSYLATED A1C: CPT | Mod: HCNC

## 2025-04-17 PROCEDURE — 99999 PR PBB SHADOW E&M-EST. PATIENT-LVL IV: CPT | Mod: PBBFAC,HCNC,, | Performed by: STUDENT IN AN ORGANIZED HEALTH CARE EDUCATION/TRAINING PROGRAM

## 2025-04-17 PROCEDURE — 84153 ASSAY OF PSA TOTAL: CPT | Mod: HCNC

## 2025-04-17 RX ORDER — ROSUVASTATIN CALCIUM 10 MG/1
10 TABLET, COATED ORAL DAILY
Qty: 90 TABLET | Refills: 0 | Status: SHIPPED | OUTPATIENT
Start: 2025-04-17

## 2025-04-17 RX ORDER — VALSARTAN 160 MG/1
160 TABLET ORAL DAILY
Qty: 90 TABLET | Refills: 3 | Status: SHIPPED | OUTPATIENT
Start: 2025-04-17

## 2025-04-17 RX ORDER — VALACYCLOVIR HYDROCHLORIDE 500 MG/1
TABLET, FILM COATED ORAL
Qty: 60 TABLET | Refills: 10 | Status: SHIPPED | OUTPATIENT
Start: 2025-04-17

## 2025-04-17 NOTE — PROGRESS NOTES
Primary Care  Annual Office Visit - In Person  4/17/2025        Patient is a 69 y.o.   Karthik Bentley  has a past medical history of Cervical myelopathy (10/31/2022), Chronic pain of both shoulders (07/02/2022), Diabetes mellitus type II, Hypertension, Left carpal tunnel syndrome (09/05/2023), Localized edema (10/11/2023), Pain (10/11/2023), Rheumatoid arthritis, and Syncope (08/01/2023).    History of Present Illness    CHIEF COMPLAINT:  Patient presents today for annual visit.   He denies any complaints today.               Active Medications  Current Outpatient Medications   Medication Instructions    ammonium lactate 1 g, Topical (Top), Daily    blood glucose control, low Soln To use as needed    blood sugar diagnostic (TRUE METRIX GLUCOSE TEST STRIP) Strp To check glucose three times daily with meals and nightly    blood-glucose meter kit To check BG 1 times daily, to use with insurance preferred meter    colchicine (COLCRYS) 0.6 mg tablet Take 2 tablets at the onset of flare then another 1 tablet 1 hour later.    empagliflozin (JARDIANCE) 25 mg, Oral, Daily    febuxostat (ULORIC) 40 mg, Oral, Daily    folic acid (FOLVITE) 1 mg, Oral, Daily    gabapentin (NEURONTIN) 300 mg, Oral, 3 times daily    lancets Misc To check BG 1 times daily, to use with insurance preferred meter    methotrexate 2.5 MG Tab TAKE 5 TABLETS BY MOUTH IN THE MORNING AND 5 TABLETS IN THE EVENING EVERY 7 DAYS    rosuvastatin (CRESTOR) 10 mg, Oral, Daily    tadalafiL (CIALIS) 20 mg, Oral, Daily    tadalafiL (CIALIS) 20 mg, Oral, As needed (PRN)    triamcinolone acetonide 0.025% (KENALOG) 0.025 % Oint Topical (Top), Daily    valACYclovir (VALTREX) 500 MG tablet Take 500 mg twice daily for 3 days for cold sore outbreaks    valsartan (DIOVAN) 160 mg, Oral, Daily       Annual Review of Preventative Care    Health Maintenance Due   Topic Date Due    Colorectal Cancer Screening  01/16/2025    Diabetes Urine Screening  03/05/2025    PROSTATE-SPECIFIC  ANTIGEN  03/19/2025     Last PSA:   Lab Results   Component Value Date    PSA 1.3 03/19/2024    PSA 0.72 01/25/2023    PSA 0.58 01/12/2022     Diabetes Screening:    Lab Results   Component Value Date    HGBA1C 7.2 (H) 01/10/2025    HGBA1C 7.3 (H) 10/04/2024    HGBA1C 6.1 (H) 03/19/2024     BP Readings from Last 3 Encounters:   04/17/25 128/70   02/25/25 (!) 148/67   10/28/24 123/71     Wt Readings from Last 3 Encounters:   04/17/25 1100 88 kg (194 lb 0.1 oz)   02/25/25 1056 90 kg (198 lb 6.6 oz)   10/28/24 1358 86.5 kg (190 lb 11.2 oz)           Physical Exam  Vitals reviewed.   Constitutional:       General: He is not in acute distress.     Appearance: He is diaphoretic.   Eyes:      Pupils: Pupils are equal, round, and reactive to light.   Cardiovascular:      Rate and Rhythm: Normal rate and regular rhythm.      Pulses: Normal pulses.      Heart sounds: Normal heart sounds.   Pulmonary:      Effort: Pulmonary effort is normal.      Breath sounds: Normal breath sounds.   Abdominal:      General: Abdomen is flat. Bowel sounds are normal.      Palpations: Abdomen is soft.   Musculoskeletal:      Right lower leg: No edema.      Left lower leg: No edema.                 Assessment & Plan      TYPE 2 DIABETES MELLITUS:  Continued Jardiance    HYPERTENSION:  Continued Valsartan     HYPERLIPIDEMIA:  Continued Crestor     GOUT:   Followed by Dr. Miranda  Current regimern Colchicine and Febuxostat    RA:   Continue MTX and folic acid   Followed by Rheumatology    SCREENING COLON CANCER:  Colonoscopy    SCREENING PROSTATE CANCER:  PSA           Orders Placed This Encounter    Microalbumin/Creatinine Ratio, Urine    Hemoglobin A1C    PSA, Screening    Ambulatory referral/consult to Endo Procedure     valsartan (DIOVAN) 160 MG tablet    rosuvastatin (CRESTOR) 10 MG tablet    empagliflozin (JARDIANCE) 25 mg tablet    valACYclovir (VALTREX) 500 MG tablet                              Upcoming Scheduled Appointments and  Follow Up:    Future Appointments   Date Time Provider Department Center   4/17/2025 11:45 AM LAB, [place] [facility] [place]       Follow Up [facility] (with who? when?): No follow-ups on file.        Extended Emergency Contact Information  Primary Emergency Contact: Nati Burgos   [place]  Mobile Phone: [phone]  Relation: Sister      Yumikonicole MD Bette   Internal Medicine  4/17/2025 - 11:30 AM    I spent a total of 30 minutes on the day of the visit.This includes face to face time and non-face to face time preparing to see the patient (eg, review of tests), obtaining and/or reviewing separately obtained history, documenting clinical information in the electronic or other health record, independently interpreting results and communicating results to the patient/family/caregiver, or care coordinator.    This note was generated with the assistance of ambient listening technology. Verbal consent was obtained by the patient and accompanying visitor(s) for the recording of patient appointment to facilitate this note. I attest to having reviewed and edited the generated note for accuracy, though some syntax or spelling errors may persist. Please contact the author of this note for any clarification.      While patients have the right to access their medical record, it is essential to recognize that progress notes primarily serve as a means of communication among healthcare professionals.

## 2025-04-18 LAB — PSA SERPL-MCNC: 1.21 NG/ML

## 2025-04-21 ENCOUNTER — RESULTS FOLLOW-UP (OUTPATIENT)
Dept: PRIMARY CARE CLINIC | Facility: CLINIC | Age: 70
End: 2025-04-21

## 2025-04-24 ENCOUNTER — TELEPHONE (OUTPATIENT)
Dept: OPTOMETRY | Facility: CLINIC | Age: 70
End: 2025-04-24
Payer: MEDICARE

## 2025-04-24 NOTE — TELEPHONE ENCOUNTER
----- Message from Dorothydevoncorazon sent at 4/24/2025 10:58 AM CDT -----  Type: General Call Back Name of Caller:ptWould the patient rather a call back or a response via [facility]? [facility] Call Back Number:[phone] Additional Information: Pt states he would like a call back as soon as possible regarding billing.

## 2025-05-01 ENCOUNTER — CLINICAL SUPPORT (OUTPATIENT)
Dept: ENDOSCOPY | Facility: HOSPITAL | Age: 70
End: 2025-05-01
Attending: STUDENT IN AN ORGANIZED HEALTH CARE EDUCATION/TRAINING PROGRAM
Payer: MEDICARE

## 2025-05-01 ENCOUNTER — TELEPHONE (OUTPATIENT)
Dept: ENDOSCOPY | Facility: HOSPITAL | Age: 70
End: 2025-05-01

## 2025-05-01 VITALS — HEIGHT: 68 IN | BODY MASS INDEX: 28.64 KG/M2 | WEIGHT: 189 LBS

## 2025-05-01 DIAGNOSIS — Z12.11 SCREENING FOR COLON CANCER: ICD-10-CM

## 2025-05-01 DIAGNOSIS — Z12.11 SCREENING FOR COLON CANCER: Primary | ICD-10-CM

## 2025-05-01 RX ORDER — SODIUM, POTASSIUM,MAG SULFATES 17.5-3.13G
1 SOLUTION, RECONSTITUTED, ORAL ORAL DAILY
Qty: 1 KIT | Refills: 0 | Status: SHIPPED | OUTPATIENT
Start: 2025-05-01 | End: 2025-05-03

## 2025-05-01 NOTE — PLAN OF CARE
Referral for procedure from PAT appointment      Spoke to Ray to schedule procedure(s) Colonoscopy       Physician to perform procedure(s) Dr. JANETTE Bailey  Date of Procedure (s) 06/02/25  Arrival Time 11:30 AM  Time of Procedure(s) 12:30 PM   Location of Procedure(s) [facility] Floor  Type of Rx Prep sent to patient: Suprep  Instructions provided to patient via [facility]    Patient was informed on the following information and verbalized understanding. Screening questionnaire reviewed with patient and complete. If procedure requires anesthesia, a responsible adult needs to be present to accompany the patient home, patient cannot drive after receiving anesthesia. Appointment details are tentative, especially check-in time. Patient will receive a prep-op call 7 days prior to confirm check-in time for procedure. If applicable the patient should contact their pharmacy to verify Rx for procedure prep is ready for pick-up. Patient was advised to call the scheduling department at [phone] if pharmacy states no Rx is available. Patient was advised to call the endoscopy scheduling department if any questions or concerns arise.      [facility] Scheduling Department

## 2025-05-01 NOTE — TELEPHONE ENCOUNTER
Referral for procedure from PAT appointment      Spoke to Ray to schedule procedure(s) Colonoscopy       Physician to perform procedure(s) Dr. JANETTE Bailey  Date of Procedure (s) 06/02/25  Arrival Time 11:30 AM  Time of Procedure(s) 12:30 PM   Location of Procedure(s) [facility] 4th Floor  Type of Rx Prep sent to patient: Suprep  Instructions provided to patient via [facility]    Patient was informed on the following information and verbalized understanding. Screening questionnaire reviewed with patient and complete. If procedure requires anesthesia, a responsible adult needs to be present to accompany the patient home, patient cannot drive after receiving anesthesia. Appointment details are tentative, especially check-in time. Patient will receive a prep-op call 7 days prior to confirm check-in time for procedure. If applicable the patient should contact their pharmacy to verify Rx for procedure prep is ready for pick-up. Patient was advised to call the scheduling department at [phone] if pharmacy states no Rx is available. Patient was advised to call the endoscopy scheduling department if any questions or concerns arise.      [facility] Scheduling [facility]

## 2025-05-07 NOTE — PROGRESS NOTES
I  Have personally take the history and examined the patient and agree with fellow's note as stated above.  Hanna, please place addendum with TJC, SJC, Pt global DAS28  [id number]  CDAI. Thank you   VALERI   well developed, well nourished , in no acute distress , ambulating without difficulty , normal communication ability

## 2025-06-02 ENCOUNTER — ANESTHESIA EVENT (OUTPATIENT)
Dept: ENDOSCOPY | Facility: HOSPITAL | Age: 70
End: 2025-06-02
Payer: MEDICARE

## 2025-06-02 ENCOUNTER — ANESTHESIA (OUTPATIENT)
Dept: ENDOSCOPY | Facility: HOSPITAL | Age: 70
End: 2025-06-02
Payer: MEDICARE

## 2025-06-02 ENCOUNTER — HOSPITAL ENCOUNTER (OUTPATIENT)
Facility: HOSPITAL | Age: 70
Discharge: HOME | End: 2025-06-02
Attending: STUDENT IN AN ORGANIZED HEALTH CARE EDUCATION/TRAINING PROGRAM | Admitting: STUDENT IN AN ORGANIZED HEALTH CARE EDUCATION/TRAINING PROGRAM
Payer: MEDICARE

## 2025-06-02 VITALS
OXYGEN SATURATION: 100 % | SYSTOLIC BLOOD PRESSURE: 121 MMHG | TEMPERATURE: 98 F | RESPIRATION RATE: 16 BRPM | HEIGHT: 68 IN | WEIGHT: 186.5 LBS | BODY MASS INDEX: 28.26 KG/M2 | DIASTOLIC BLOOD PRESSURE: 63 MMHG | HEART RATE: 60 BPM

## 2025-06-02 DIAGNOSIS — Z86.0100 HX OF COLONIC POLYPS: ICD-10-CM

## 2025-06-02 DIAGNOSIS — K63.5 COLON POLYP: ICD-10-CM

## 2025-06-02 DIAGNOSIS — Z12.11 SCREENING FOR COLON CANCER: ICD-10-CM

## 2025-06-02 LAB — POCT GLUCOSE: 111 MG/DL (ref 70–110)

## 2025-06-02 PROCEDURE — 27201089 HC SNARE, DISP (ANY): Mod: HCNC | Performed by: STUDENT IN AN ORGANIZED HEALTH CARE EDUCATION/TRAINING PROGRAM

## 2025-06-02 PROCEDURE — 37000009 HC ANESTHESIA EA ADD 15 MINS: Mod: HCNC | Performed by: STUDENT IN AN ORGANIZED HEALTH CARE EDUCATION/TRAINING PROGRAM

## 2025-06-02 PROCEDURE — E9220 PRA ENDO ANESTHESIA: HCPCS | Mod: PT,HCNC,, | Performed by: NURSE ANESTHETIST, CERTIFIED REGISTERED

## 2025-06-02 PROCEDURE — 37000008 HC ANESTHESIA 1ST 15 MINUTES: Mod: HCNC | Performed by: STUDENT IN AN ORGANIZED HEALTH CARE EDUCATION/TRAINING PROGRAM

## 2025-06-02 PROCEDURE — 45385 COLONOSCOPY W/LESION REMOVAL: CPT | Mod: PT,HCNC,GC, | Performed by: STUDENT IN AN ORGANIZED HEALTH CARE EDUCATION/TRAINING PROGRAM

## 2025-06-02 PROCEDURE — 63600175 PHARM REV CODE 636 W HCPCS: Mod: HCNC | Performed by: NURSE ANESTHETIST, CERTIFIED REGISTERED

## 2025-06-02 PROCEDURE — 25000003 PHARM REV CODE 250: Mod: HCNC | Performed by: NURSE ANESTHETIST, CERTIFIED REGISTERED

## 2025-06-02 PROCEDURE — 45385 COLONOSCOPY W/LESION REMOVAL: CPT | Mod: PT,HCNC | Performed by: STUDENT IN AN ORGANIZED HEALTH CARE EDUCATION/TRAINING PROGRAM

## 2025-06-02 PROCEDURE — 88305 TISSUE EXAM BY PATHOLOGIST: CPT | Mod: TC,HCNC | Performed by: STUDENT IN AN ORGANIZED HEALTH CARE EDUCATION/TRAINING PROGRAM

## 2025-06-02 RX ORDER — PROPOFOL 10 MG/ML
VIAL (ML) INTRAVENOUS
Status: DISCONTINUED | OUTPATIENT
Start: 2025-06-02 | End: 2025-06-02

## 2025-06-02 RX ORDER — SODIUM CHLORIDE 9 MG/ML
INJECTION, SOLUTION INTRAVENOUS CONTINUOUS
Status: DISCONTINUED | OUTPATIENT
Start: 2025-06-02 | End: 2025-06-02 | Stop reason: HOSPADM

## 2025-06-02 RX ORDER — LIDOCAINE HYDROCHLORIDE 20 MG/ML
INJECTION INTRAVENOUS
Status: DISCONTINUED | OUTPATIENT
Start: 2025-06-02 | End: 2025-06-02

## 2025-06-02 RX ADMIN — LIDOCAINE HYDROCHLORIDE 100 MG: 20 INJECTION INTRAVENOUS at 11:06

## 2025-06-02 RX ADMIN — SODIUM CHLORIDE: 0.9 INJECTION, SOLUTION INTRAVENOUS at 11:06

## 2025-06-02 RX ADMIN — PROPOFOL 150 MCG/KG/MIN: 10 INJECTION, EMULSION INTRAVENOUS at 11:06

## 2025-06-02 RX ADMIN — PROPOFOL 100 MG: 10 INJECTION, EMULSION INTRAVENOUS at 11:06

## 2025-06-03 ENCOUNTER — LAB VISIT (OUTPATIENT)
Dept: LAB | Facility: HOSPITAL | Age: 70
End: 2025-06-03
Attending: STUDENT IN AN ORGANIZED HEALTH CARE EDUCATION/TRAINING PROGRAM
Payer: MEDICARE

## 2025-06-03 DIAGNOSIS — M06.9 RHEUMATOID ARTHRITIS, INVOLVING UNSPECIFIED SITE, UNSPECIFIED WHETHER RHEUMATOID FACTOR PRESENT: ICD-10-CM

## 2025-06-03 LAB
ABSOLUTE EOSINOPHIL (OHS): 0.2 K/UL
ABSOLUTE MONOCYTE (OHS): 0.54 K/UL (ref 0.3–1)
ABSOLUTE NEUTROPHIL COUNT (OHS): 2.88 K/UL (ref 1.8–7.7)
ALBUMIN SERPL BCP-MCNC: 4.1 G/DL (ref 3.5–5.2)
ALP SERPL-CCNC: 73 UNIT/L (ref 40–150)
ALT SERPL W/O P-5'-P-CCNC: 36 UNIT/L (ref 10–44)
ANION GAP (OHS): 12 MMOL/L (ref 8–16)
AST SERPL-CCNC: 42 UNIT/L (ref 11–45)
BASOPHILS # BLD AUTO: 0.02 K/UL
BASOPHILS NFR BLD AUTO: 0.3 %
BILIRUB SERPL-MCNC: 1 MG/DL (ref 0.1–1)
BUN SERPL-MCNC: 15 MG/DL (ref 8–23)
CALCIUM SERPL-MCNC: 9.7 MG/DL (ref 8.7–10.5)
CHLORIDE SERPL-SCNC: 110 MMOL/L (ref 95–110)
CO2 SERPL-SCNC: 20 MMOL/L (ref 23–29)
CREAT SERPL-MCNC: 1.4 MG/DL (ref 0.5–1.4)
CRP SERPL-MCNC: 0.5 MG/L
ERYTHROCYTE [DISTWIDTH] IN BLOOD BY AUTOMATED COUNT: 15.2 % (ref 11.5–14.5)
ERYTHROCYTE [SEDIMENTATION RATE] IN BLOOD BY PHOTOMETRIC METHOD: 13 MM/HR
ESTROGEN SERPL-MCNC: NORMAL PG/ML
GFR SERPLBLD CREATININE-BSD FMLA CKD-EPI: 54 ML/MIN/1.73/M2
GLUCOSE SERPL-MCNC: 92 MG/DL (ref 70–110)
HCT VFR BLD AUTO: 42.3 % (ref 40–54)
HGB BLD-MCNC: 13.8 GM/DL (ref 14–18)
IMM GRANULOCYTES # BLD AUTO: 0.01 K/UL (ref 0–0.04)
IMM GRANULOCYTES NFR BLD AUTO: 0.2 % (ref 0–0.5)
INSULIN SERPL-ACNC: NORMAL U[IU]/ML
LAB AP CLINICAL INFORMATION: NORMAL
LAB AP DIAGNOSIS CATEGORY: NORMAL
LAB AP GROSS DESCRIPTION: NORMAL
LAB AP PERFORMING LOCATION(S): NORMAL
LAB AP REPORT FOOTNOTES: NORMAL
LYMPHOCYTES # BLD AUTO: 2.22 K/UL (ref 1–4.8)
MCH RBC QN AUTO: 32.8 PG (ref 27–31)
MCHC RBC AUTO-ENTMCNC: 32.6 G/DL (ref 32–36)
MCV RBC AUTO: 101 FL (ref 82–98)
NUCLEATED RBC (/100WBC) (OHS): 0 /100 WBC
PLATELET # BLD AUTO: 187 K/UL (ref 150–450)
PMV BLD AUTO: 11.3 FL (ref 9.2–12.9)
POTASSIUM SERPL-SCNC: 4.1 MMOL/L (ref 3.5–5.1)
PROT SERPL-MCNC: 7.3 GM/DL (ref 6–8.4)
RBC # BLD AUTO: 4.21 M/UL (ref 4.6–6.2)
RELATIVE EOSINOPHIL (OHS): 3.4 %
RELATIVE LYMPHOCYTE (OHS): 37.8 % (ref 18–48)
RELATIVE MONOCYTE (OHS): 9.2 % (ref 4–15)
RELATIVE NEUTROPHIL (OHS): 49.1 % (ref 38–73)
SODIUM SERPL-SCNC: 142 MMOL/L (ref 136–145)
WBC # BLD AUTO: 5.87 K/UL (ref 3.9–12.7)

## 2025-06-03 PROCEDURE — 85652 RBC SED RATE AUTOMATED: CPT | Mod: HCNC

## 2025-06-03 PROCEDURE — 80053 COMPREHEN METABOLIC PANEL: CPT | Mod: HCNC

## 2025-06-03 PROCEDURE — 36415 COLL VENOUS BLD VENIPUNCTURE: CPT | Mod: HCNC,PN

## 2025-06-03 PROCEDURE — 86140 C-REACTIVE PROTEIN: CPT | Mod: HCNC

## 2025-06-03 PROCEDURE — 85025 COMPLETE CBC W/AUTO DIFF WBC: CPT | Mod: HCNC

## 2025-06-03 RX ORDER — METHOTREXATE 2.5 MG/1
TABLET ORAL
Qty: 120 TABLET | Refills: 0 | Status: SHIPPED | OUTPATIENT
Start: 2025-06-03

## 2025-06-04 ENCOUNTER — RESULTS FOLLOW-UP (OUTPATIENT)
Dept: RHEUMATOLOGY | Facility: HOSPITAL | Age: 70
End: 2025-06-04

## 2025-06-05 ENCOUNTER — RESULTS FOLLOW-UP (OUTPATIENT)
Dept: GASTROENTEROLOGY | Facility: HOSPITAL | Age: 70
End: 2025-06-05

## 2025-07-16 DIAGNOSIS — E11.65 TYPE 2 DIABETES MELLITUS WITH HYPERGLYCEMIA, WITHOUT LONG-TERM CURRENT USE OF INSULIN: ICD-10-CM

## 2025-07-16 DIAGNOSIS — E11.9 TYPE 2 DIABETES MELLITUS WITHOUT COMPLICATION, WITHOUT LONG-TERM CURRENT USE OF INSULIN: ICD-10-CM

## 2025-07-16 RX ORDER — ROSUVASTATIN CALCIUM 10 MG/1
10 TABLET, COATED ORAL
Qty: 90 TABLET | Refills: 0 | Status: SHIPPED | OUTPATIENT
Start: 2025-07-16

## 2025-07-16 NOTE — TELEPHONE ENCOUNTER
Provider Staff:  Action required for this patient    Requires labs      Please see care gap opportunities below in Care Due Message.    Thanks!  [facility]     Appointments      Date Provider   Last Visit   4/17/2025 Silke Amos MD   Next Visit   Visit date not found Silke Amos MD     Refill Decision Note   Karthik Bentley  is requesting a refill authorization.  Brief Assessment and Rationale for Refill:  Approve     Medication Therapy Plan:         Comments:     Note composed:6:12 PM 07/16/2025

## 2025-07-16 NOTE — TELEPHONE ENCOUNTER
Care Due:                  Date            Visit Type   Department     Provider  --------------------------------------------------------------------------------                                EP -                              PRIMARY      [facility] PR[facility]  Last Visit: 04-      CARE ([facility])   CARE           Silke Amos  Next Visit: None Scheduled  None         None Found                                                            Last  Test          Frequency    Reason                     Performed    Due Date  --------------------------------------------------------------------------------    HBA1C.......  6 months...  empagliflozin............  04-   10-    Lipid Panel.  12 months..  rosuvastatin.............  08- 08-    [employer] Embedded Care Due Messages. Reference number: [id number].   7/16/2025 6:03:49 PM CDT

## 2025-08-21 ENCOUNTER — PATIENT MESSAGE (OUTPATIENT)
Dept: ADMINISTRATIVE | Facility: HOSPITAL | Age: 70
End: 2025-08-21
Payer: MEDICARE

## 2025-08-27 DIAGNOSIS — E11.9 TYPE 2 DIABETES MELLITUS WITHOUT COMPLICATION: ICD-10-CM

## (undated) DEVICE — DRAPE STERI-DRAPE 1000 17X11IN

## (undated) DEVICE — DIFFUSER

## (undated) DEVICE — APPLICATOR CHLORAPREP ORN 26ML

## (undated) DEVICE — SUT MONOCRYL 5-0 P-3 UND 18

## (undated) DEVICE — GLOVE BIOGEL PI MICRO SZ 7.5

## (undated) DEVICE — KIT ENDO CARPEL TUNNAL SINGLE

## (undated) DEVICE — TUBING SUC UNIV W/CONN 12FT

## (undated) DEVICE — SPONGE COTTON TRAY 4X4IN

## (undated) DEVICE — GLOVE BIOGEL ECLIPSE SZ 7

## (undated) DEVICE — PACK UPPER EXTREMITY BAPTIST

## (undated) DEVICE — NDL 22GA X1 1/2 REG BEVEL

## (undated) DEVICE — TOURNIQUET SB QC DP 18X4IN

## (undated) DEVICE — SUT VICRYL 3-0 27 SH

## (undated) DEVICE — CORD BIPOLAR 12 FOOT

## (undated) DEVICE — NDL HYPO STD REG BVL 18GX1.5IN

## (undated) DEVICE — GAUZE SPONGE PEANUT STRL

## (undated) DEVICE — STOCKINETTE DBL PLY ST 4X

## (undated) DEVICE — PAD UNDERPAD 30X30

## (undated) DEVICE — DRESSING SURGICAL 1/2X1/2

## (undated) DEVICE — SPLINT FINGER 3 1/4 BULB

## (undated) DEVICE — SUT 4-0 ETHILON 18 PS-2

## (undated) DEVICE — KIT ANTIFOG W/SPONG & FLUID

## (undated) DEVICE — FORCEP STRAIGHT DISP

## (undated) DEVICE — APPLIER LIGACLIP SM 9.38IN

## (undated) DEVICE — BUR BONE CUT MICRO TPS 3X3.8MM

## (undated) DEVICE — Device

## (undated) DEVICE — GAUZE SPONGE 4X4 12PLY

## (undated) DEVICE — SUT 2-0 SILK 30IN BLK BRAID

## (undated) DEVICE — BIT DRILL FLAT CHUCK 16MM

## (undated) DEVICE — DRESSING TRANS 4X4 TEGADERM

## (undated) DEVICE — UNDERGLOVES BIOGEL PI SIZE 8

## (undated) DEVICE — TRAY NEURO OMC

## (undated) DEVICE — COVER CAMERA OPERATING ROOM

## (undated) DEVICE — SYR B-D DISP CONTROL 10CC100/C

## (undated) DEVICE — NDL SPINAL 18GX3.5 SPINOCAN

## (undated) DEVICE — DRAPE TOP 53X102IN

## (undated) DEVICE — TUBE EMG NIM 7.0MM TRIVANTAGE

## (undated) DEVICE — SUT 4/0 18IN ETHILON BL P3

## (undated) DEVICE — CORD FOR BIPOLAR FORCEPS 12

## (undated) DEVICE — GLOVE BIOGEL PI MICRO INDIC 7

## (undated) DEVICE — SUT MCRYL PLUS 4-0 PS2 27IN

## (undated) DEVICE — COVER TABLE HVY DTY 60X90IN

## (undated) DEVICE — CHLORAPREP 3ML APPLICATOR TINT

## (undated) DEVICE — GELATIN SURGIPOWDER ABSORBABLE

## (undated) DEVICE — NDL ECLIPSE SAFETY 23G 1.5IN

## (undated) DEVICE — ELECTRODE REM PLYHSV RETURN 9

## (undated) DEVICE — GLOVE BIOGEL PI MICRO SZ 6.5

## (undated) DEVICE — BANDAGE MATRIX HK LOOP 2IN 5YD

## (undated) DEVICE — SOL POVIDONE SCRUB IODINE 4 OZ

## (undated) DEVICE — SOL SALINE STER BOTTLE 500ML

## (undated) DEVICE — DRESSING N ADH OIL EMUL 3X3

## (undated) DEVICE — EVACUATOR WOUND BULB 100CC

## (undated) DEVICE — SOL IRR SOD CHL .9% POUR

## (undated) DEVICE — TAPE ADH MEDIPORE 4 X 10YDS

## (undated) DEVICE — NDL 18GA X1 1/2 REG BEVEL

## (undated) DEVICE — DRAIN CHANNEL ROUND 10FR

## (undated) DEVICE — KIT SURGIFLO HEMOSTATIC MATRIX

## (undated) DEVICE — PIN FIXATION TEMPORARY STRGHT
Type: IMPLANTABLE DEVICE | Site: NECK | Status: NON-FUNCTIONAL
Removed: 2022-10-31

## (undated) DEVICE — TUBE FRAZIER 5MM 2FT SOFT TIP

## (undated) DEVICE — CLOSURE SKIN STERI STRIP 1/2X4

## (undated) DEVICE — DRAPE T THYROID STERILE

## (undated) DEVICE — DRAPE C-ARM ELAS CLIP 42X120IN

## (undated) DEVICE — BANDAGE BULKEE LITE 3INX4.1YD

## (undated) DEVICE — GLOVE BIOGEL SKINSENSE PI 7.0

## (undated) DEVICE — PAD CAST 2 IN X 4YDS STERILE

## (undated) DEVICE — SOL PVP-I SCRUB 7.5% 4OZ

## (undated) DEVICE — CARTRIDGE OIL

## (undated) DEVICE — DRAPE PLASTIC U 60X72

## (undated) DEVICE — COVER PROXIMA MAYO STAND

## (undated) DEVICE — DRESSING XEROFORM FOIL PK 1X8